# Patient Record
Sex: FEMALE | Race: WHITE | NOT HISPANIC OR LATINO | Employment: OTHER
[De-identification: names, ages, dates, MRNs, and addresses within clinical notes are randomized per-mention and may not be internally consistent; named-entity substitution may affect disease eponyms.]

---

## 2015-11-09 LAB — PAP-ABSTRACT: NORMAL

## 2017-06-19 ENCOUNTER — RECORDS - HEALTHEAST (OUTPATIENT)
Dept: ADMINISTRATIVE | Facility: OTHER | Age: 52
End: 2017-06-19

## 2017-06-20 ENCOUNTER — RECORDS - HEALTHEAST (OUTPATIENT)
Dept: ADMINISTRATIVE | Facility: OTHER | Age: 52
End: 2017-06-20

## 2017-07-10 ENCOUNTER — RECORDS - HEALTHEAST (OUTPATIENT)
Dept: ADMINISTRATIVE | Facility: OTHER | Age: 52
End: 2017-07-10

## 2017-07-13 ENCOUNTER — RECORDS - HEALTHEAST (OUTPATIENT)
Dept: ADMINISTRATIVE | Facility: OTHER | Age: 52
End: 2017-07-13

## 2017-07-15 ENCOUNTER — HEALTH MAINTENANCE LETTER (OUTPATIENT)
Age: 52
End: 2017-07-15

## 2017-08-07 ENCOUNTER — RECORDS - HEALTHEAST (OUTPATIENT)
Dept: ADMINISTRATIVE | Facility: OTHER | Age: 52
End: 2017-08-07

## 2017-09-14 ENCOUNTER — RECORDS - HEALTHEAST (OUTPATIENT)
Dept: ADMINISTRATIVE | Facility: OTHER | Age: 52
End: 2017-09-14

## 2018-01-28 ENCOUNTER — APPOINTMENT (OUTPATIENT)
Dept: CT IMAGING | Facility: CLINIC | Age: 53
DRG: 392 | End: 2018-01-28
Attending: EMERGENCY MEDICINE
Payer: COMMERCIAL

## 2018-01-28 ENCOUNTER — HOSPITAL ENCOUNTER (INPATIENT)
Facility: CLINIC | Age: 53
LOS: 2 days | Discharge: HOME | DRG: 392 | End: 2018-01-30
Attending: EMERGENCY MEDICINE
Payer: COMMERCIAL

## 2018-01-28 DIAGNOSIS — K57.32 DIVERTICULITIS OF COLON: ICD-10-CM

## 2018-01-28 DIAGNOSIS — R00.0 SINUS TACHYCARDIA: ICD-10-CM

## 2018-01-28 PROBLEM — K57.92 DIVERTICULITIS: Status: ACTIVE | Noted: 2018-01-28

## 2018-01-28 LAB
ALBUMIN UR-MCNC: NEGATIVE MG/DL
ANION GAP SERPL CALCULATED.3IONS-SCNC: 5 MMOL/L (ref 3–14)
APPEARANCE UR: CLEAR
BASOPHILS # BLD AUTO: 0 10E9/L (ref 0–0.2)
BASOPHILS NFR BLD AUTO: 0.2 %
BILIRUB UR QL STRIP: NEGATIVE
BUN SERPL-MCNC: 15 MG/DL (ref 7–30)
CALCIUM SERPL-MCNC: 9.2 MG/DL (ref 8.5–10.1)
CHLORIDE SERPL-SCNC: 107 MMOL/L (ref 94–109)
CO2 SERPL-SCNC: 25 MMOL/L (ref 20–32)
COLOR UR AUTO: YELLOW
CREAT SERPL-MCNC: 0.73 MG/DL (ref 0.52–1.04)
DIFFERENTIAL METHOD BLD: ABNORMAL
EOSINOPHIL # BLD AUTO: 0.1 10E9/L (ref 0–0.7)
EOSINOPHIL NFR BLD AUTO: 0.9 %
ERYTHROCYTE [DISTWIDTH] IN BLOOD BY AUTOMATED COUNT: 14 % (ref 10–15)
GFR SERPL CREATININE-BSD FRML MDRD: 83 ML/MIN/1.7M2
GLUCOSE SERPL-MCNC: 89 MG/DL (ref 70–99)
GLUCOSE UR STRIP-MCNC: NEGATIVE MG/DL
HCT VFR BLD AUTO: 48.3 % (ref 35–47)
HGB BLD-MCNC: 16.4 G/DL (ref 11.7–15.7)
HGB UR QL STRIP: NEGATIVE
IMM GRANULOCYTES # BLD: 0 10E9/L (ref 0–0.4)
IMM GRANULOCYTES NFR BLD: 0.2 %
KETONES UR STRIP-MCNC: NEGATIVE MG/DL
LACTATE BLD-SCNC: 0.5 MMOL/L (ref 0.7–2)
LEUKOCYTE ESTERASE UR QL STRIP: NEGATIVE
LYMPHOCYTES # BLD AUTO: 1.6 10E9/L (ref 0.8–5.3)
LYMPHOCYTES NFR BLD AUTO: 12.3 %
MCH RBC QN AUTO: 30.9 PG (ref 26.5–33)
MCHC RBC AUTO-ENTMCNC: 34 G/DL (ref 31.5–36.5)
MCV RBC AUTO: 91 FL (ref 78–100)
MONOCYTES # BLD AUTO: 0.9 10E9/L (ref 0–1.3)
MONOCYTES NFR BLD AUTO: 7.1 %
NEUTROPHILS # BLD AUTO: 10.3 10E9/L (ref 1.6–8.3)
NEUTROPHILS NFR BLD AUTO: 79.3 %
NITRATE UR QL: NEGATIVE
PH UR STRIP: 6 PH (ref 5–7)
PLATELET # BLD AUTO: 296 10E9/L (ref 150–450)
POTASSIUM SERPL-SCNC: 4.1 MMOL/L (ref 3.4–5.3)
RBC # BLD AUTO: 5.3 10E12/L (ref 3.8–5.2)
SODIUM SERPL-SCNC: 137 MMOL/L (ref 133–144)
SOURCE: NORMAL
SP GR UR STRIP: 1.02 (ref 1–1.03)
UROBILINOGEN UR STRIP-MCNC: NORMAL MG/DL (ref 0–2)
WBC # BLD AUTO: 13 10E9/L (ref 4–11)

## 2018-01-28 PROCEDURE — 25000128 H RX IP 250 OP 636: Performed by: PHYSICIAN ASSISTANT

## 2018-01-28 PROCEDURE — 12000000 ZZH R&B MED SURG/OB

## 2018-01-28 PROCEDURE — 99285 EMERGENCY DEPT VISIT HI MDM: CPT | Mod: Z6 | Performed by: EMERGENCY MEDICINE

## 2018-01-28 PROCEDURE — 99223 1ST HOSP IP/OBS HIGH 75: CPT | Mod: AI | Performed by: PHYSICIAN ASSISTANT

## 2018-01-28 PROCEDURE — 96375 TX/PRO/DX INJ NEW DRUG ADDON: CPT

## 2018-01-28 PROCEDURE — 74177 CT ABD & PELVIS W/CONTRAST: CPT

## 2018-01-28 PROCEDURE — 99221 1ST HOSP IP/OBS SF/LOW 40: CPT | Performed by: SURGERY

## 2018-01-28 PROCEDURE — 80048 BASIC METABOLIC PNL TOTAL CA: CPT | Performed by: EMERGENCY MEDICINE

## 2018-01-28 PROCEDURE — G0378 HOSPITAL OBSERVATION PER HR: HCPCS

## 2018-01-28 PROCEDURE — 85025 COMPLETE CBC W/AUTO DIFF WBC: CPT | Performed by: EMERGENCY MEDICINE

## 2018-01-28 PROCEDURE — 96375 TX/PRO/DX INJ NEW DRUG ADDON: CPT | Performed by: EMERGENCY MEDICINE

## 2018-01-28 PROCEDURE — 25000128 H RX IP 250 OP 636: Performed by: EMERGENCY MEDICINE

## 2018-01-28 PROCEDURE — 25000125 ZZHC RX 250: Performed by: EMERGENCY MEDICINE

## 2018-01-28 PROCEDURE — 83605 ASSAY OF LACTIC ACID: CPT | Performed by: EMERGENCY MEDICINE

## 2018-01-28 PROCEDURE — 81003 URINALYSIS AUTO W/O SCOPE: CPT | Performed by: EMERGENCY MEDICINE

## 2018-01-28 PROCEDURE — 96365 THER/PROPH/DIAG IV INF INIT: CPT | Mod: 59 | Performed by: EMERGENCY MEDICINE

## 2018-01-28 PROCEDURE — 99285 EMERGENCY DEPT VISIT HI MDM: CPT | Mod: 25 | Performed by: EMERGENCY MEDICINE

## 2018-01-28 PROCEDURE — 25000132 ZZH RX MED GY IP 250 OP 250 PS 637: Performed by: PHYSICIAN ASSISTANT

## 2018-01-28 RX ORDER — HYDROMORPHONE HYDROCHLORIDE 1 MG/ML
.3-.5 INJECTION, SOLUTION INTRAMUSCULAR; INTRAVENOUS; SUBCUTANEOUS
Status: DISCONTINUED | OUTPATIENT
Start: 2018-01-28 | End: 2018-01-30 | Stop reason: HOSPADM

## 2018-01-28 RX ORDER — PROMETHAZINE HYDROCHLORIDE 25 MG/ML
12.5 INJECTION, SOLUTION INTRAMUSCULAR; INTRAVENOUS EVERY 6 HOURS PRN
Status: DISCONTINUED | OUTPATIENT
Start: 2018-01-28 | End: 2018-01-30 | Stop reason: HOSPADM

## 2018-01-28 RX ORDER — BUPROPION HYDROCHLORIDE 150 MG/1
150 TABLET, FILM COATED, EXTENDED RELEASE ORAL
COMMUNITY
Start: 2018-01-25 | End: 2018-04-05

## 2018-01-28 RX ORDER — IOPAMIDOL 755 MG/ML
76 INJECTION, SOLUTION INTRAVASCULAR ONCE
Status: COMPLETED | OUTPATIENT
Start: 2018-01-28 | End: 2018-01-28

## 2018-01-28 RX ORDER — IBUPROFEN 600 MG/1
600 TABLET, FILM COATED ORAL EVERY 6 HOURS PRN
Status: DISCONTINUED | OUTPATIENT
Start: 2018-01-28 | End: 2018-01-30 | Stop reason: HOSPADM

## 2018-01-28 RX ORDER — TIZANIDINE 2 MG/1
2 TABLET ORAL EVERY 6 HOURS PRN
COMMUNITY
Start: 2018-01-25 | End: 2020-06-10

## 2018-01-28 RX ORDER — NALOXONE HYDROCHLORIDE 0.4 MG/ML
.1-.4 INJECTION, SOLUTION INTRAMUSCULAR; INTRAVENOUS; SUBCUTANEOUS
Status: DISCONTINUED | OUTPATIENT
Start: 2018-01-28 | End: 2018-01-28

## 2018-01-28 RX ORDER — PROCHLORPERAZINE 25 MG
25 SUPPOSITORY, RECTAL RECTAL EVERY 12 HOURS PRN
Status: DISCONTINUED | OUTPATIENT
Start: 2018-01-28 | End: 2018-01-30 | Stop reason: HOSPADM

## 2018-01-28 RX ORDER — HYDROMORPHONE HYDROCHLORIDE 1 MG/ML
0.5 INJECTION, SOLUTION INTRAMUSCULAR; INTRAVENOUS; SUBCUTANEOUS
Status: DISCONTINUED | OUTPATIENT
Start: 2018-01-28 | End: 2018-01-28 | Stop reason: DRUGHIGH

## 2018-01-28 RX ORDER — SODIUM CHLORIDE 9 MG/ML
INJECTION, SOLUTION INTRAVENOUS CONTINUOUS
Status: DISCONTINUED | OUTPATIENT
Start: 2018-01-28 | End: 2018-01-30 | Stop reason: HOSPADM

## 2018-01-28 RX ORDER — NALOXONE HYDROCHLORIDE 0.4 MG/ML
.1-.4 INJECTION, SOLUTION INTRAMUSCULAR; INTRAVENOUS; SUBCUTANEOUS
Status: DISCONTINUED | OUTPATIENT
Start: 2018-01-28 | End: 2018-01-30 | Stop reason: HOSPADM

## 2018-01-28 RX ORDER — NICOTINE 21 MG/24HR
1 PATCH, TRANSDERMAL 24 HOURS TRANSDERMAL DAILY
Status: DISCONTINUED | OUTPATIENT
Start: 2018-01-28 | End: 2018-01-28

## 2018-01-28 RX ORDER — PROCHLORPERAZINE MALEATE 10 MG
10 TABLET ORAL EVERY 6 HOURS PRN
Status: DISCONTINUED | OUTPATIENT
Start: 2018-01-28 | End: 2018-01-30 | Stop reason: HOSPADM

## 2018-01-28 RX ORDER — BUPROPION HYDROCHLORIDE 150 MG/1
150 TABLET, FILM COATED, EXTENDED RELEASE ORAL 2 TIMES DAILY
Status: DISCONTINUED | OUTPATIENT
Start: 2018-01-28 | End: 2018-01-28

## 2018-01-28 RX ORDER — PROCHLORPERAZINE MALEATE 10 MG
10 TABLET ORAL EVERY 6 HOURS PRN
Status: DISCONTINUED | OUTPATIENT
Start: 2018-01-28 | End: 2018-01-28

## 2018-01-28 RX ORDER — NICOTINE 21 MG/24HR
1 PATCH, TRANSDERMAL 24 HOURS TRANSDERMAL DAILY
Status: DISCONTINUED | OUTPATIENT
Start: 2018-01-28 | End: 2018-01-30 | Stop reason: HOSPADM

## 2018-01-28 RX ORDER — PROCHLORPERAZINE 25 MG
25 SUPPOSITORY, RECTAL RECTAL EVERY 12 HOURS PRN
Status: DISCONTINUED | OUTPATIENT
Start: 2018-01-28 | End: 2018-01-28

## 2018-01-28 RX ORDER — HYOSCYAMINE SULFATE 0.125 MG
125-250 TABLET ORAL EVERY 4 HOURS PRN
Status: DISCONTINUED | OUTPATIENT
Start: 2018-01-28 | End: 2018-01-30 | Stop reason: HOSPADM

## 2018-01-28 RX ORDER — AMPICILLIN AND SULBACTAM 2; 1 G/1; G/1
3 INJECTION, POWDER, FOR SOLUTION INTRAMUSCULAR; INTRAVENOUS ONCE
Status: COMPLETED | OUTPATIENT
Start: 2018-01-28 | End: 2018-01-28

## 2018-01-28 RX ORDER — HYDROCODONE BITARTRATE AND ACETAMINOPHEN 5; 325 MG/1; MG/1
1-2 TABLET ORAL EVERY 4 HOURS PRN
Status: DISCONTINUED | OUTPATIENT
Start: 2018-01-28 | End: 2018-01-30 | Stop reason: HOSPADM

## 2018-01-28 RX ORDER — AMPICILLIN AND SULBACTAM 2; 1 G/1; G/1
3 INJECTION, POWDER, FOR SOLUTION INTRAMUSCULAR; INTRAVENOUS EVERY 6 HOURS
Status: DISCONTINUED | OUTPATIENT
Start: 2018-01-28 | End: 2018-01-30 | Stop reason: HOSPADM

## 2018-01-28 RX ORDER — KETOROLAC TROMETHAMINE 30 MG/ML
30 INJECTION, SOLUTION INTRAMUSCULAR; INTRAVENOUS ONCE
Status: COMPLETED | OUTPATIENT
Start: 2018-01-28 | End: 2018-01-28

## 2018-01-28 RX ADMIN — HYDROMORPHONE HYDROCHLORIDE 0.5 MG: 1 INJECTION, SOLUTION INTRAMUSCULAR; INTRAVENOUS; SUBCUTANEOUS at 21:22

## 2018-01-28 RX ADMIN — KETOROLAC TROMETHAMINE 30 MG: 30 INJECTION, SOLUTION INTRAMUSCULAR at 10:02

## 2018-01-28 RX ADMIN — IOPAMIDOL 76 ML: 755 INJECTION, SOLUTION INTRAVENOUS at 10:21

## 2018-01-28 RX ADMIN — AMPICILLIN SODIUM AND SULBACTAM SODIUM 3 G: 2; 1 INJECTION, POWDER, FOR SOLUTION INTRAMUSCULAR; INTRAVENOUS at 13:11

## 2018-01-28 RX ADMIN — NICOTINE 1 PATCH: 14 PATCH, EXTENDED RELEASE TRANSDERMAL at 15:24

## 2018-01-28 RX ADMIN — SODIUM CHLORIDE 59 ML: 9 INJECTION, SOLUTION INTRAVENOUS at 10:20

## 2018-01-28 RX ADMIN — HYDROMORPHONE HYDROCHLORIDE 0.5 MG: 1 INJECTION, SOLUTION INTRAMUSCULAR; INTRAVENOUS; SUBCUTANEOUS at 19:01

## 2018-01-28 RX ADMIN — SODIUM CHLORIDE: 9 INJECTION, SOLUTION INTRAVENOUS at 22:42

## 2018-01-28 RX ADMIN — HYDROMORPHONE HYDROCHLORIDE 0.5 MG: 1 INJECTION, SOLUTION INTRAMUSCULAR; INTRAVENOUS; SUBCUTANEOUS at 13:10

## 2018-01-28 RX ADMIN — AMPICILLIN SODIUM AND SULBACTAM SODIUM 3 G: 2; 1 INJECTION, POWDER, FOR SOLUTION INTRAMUSCULAR; INTRAVENOUS at 17:59

## 2018-01-28 RX ADMIN — SODIUM CHLORIDE 1000 ML: 9 INJECTION, SOLUTION INTRAVENOUS at 13:09

## 2018-01-28 RX ADMIN — HYDROMORPHONE HYDROCHLORIDE 0.5 MG: 1 INJECTION, SOLUTION INTRAMUSCULAR; INTRAVENOUS; SUBCUTANEOUS at 16:47

## 2018-01-28 RX ADMIN — BUPROPION HYDROCHLORIDE 150 MG: 150 TABLET, FILM COATED, EXTENDED RELEASE ORAL at 20:36

## 2018-01-28 ASSESSMENT — ACTIVITIES OF DAILY LIVING (ADL)
RETIRED_EATING: 0-->INDEPENDENT
FALL_HISTORY_WITHIN_LAST_SIX_MONTHS: NO
TOILETING: 0-->INDEPENDENT
DRESS: 0-->INDEPENDENT
SWALLOWING: 2-->DIFFICULTY SWALLOWING LIQUIDS
BATHING: 0-->INDEPENDENT
AMBULATION: 0-->INDEPENDENT
RETIRED_COMMUNICATION: 0-->UNDERSTANDS/COMMUNICATES WITHOUT DIFFICULTY
COGNITION: 0 - NO COGNITION ISSUES REPORTED
TRANSFERRING: 0-->INDEPENDENT

## 2018-01-28 ASSESSMENT — ENCOUNTER SYMPTOMS
ABDOMINAL PAIN: 1
FEVER: 0
NAUSEA: 1

## 2018-01-28 NOTE — ED NOTES
Patient has  [place] to Observation  order. Patient has been given Patient Bill of Rights, Observation brochure and  What does Observation mean to me  forms.  Patient has been given the opportunity to ask questions about observation status and their plan of care.   Margarita Courtney

## 2018-01-28 NOTE — IP AVS SNAPSHOT
MRN:[id number]                      After Visit Summary   1/28/2018    Selina Parikh    MRN: [id number]           Thank you!     Thank you for choosing [facility] for your care. Our goal is always to provide you with excellent care. Hearing back from our patients is one way we can continue to improve our services. Please take a few minutes to complete the written survey that you may receive in the mail after you visit with us. Thank you!        Patient Information     Date Of Birth          1965        Designated Caregiver       Most Recent Value    Caregiver    Will someone help with your care after discharge? yes    Name of designated caregiver Smitha    Phone number of caregiver [phone]    Caregiver address [address] [address]      About your hospital stay     You were admitted on:  January 28, 2018 You last received care in the:  [facility]    You were discharged on:  January 30, 2018       Who to Call     For medical emergencies, please call 911.  For non-urgent questions about your medical care, please call your primary care provider or clinic, [phone]          Attending Provider     Provider Specialty    Eliazar Ferreira DO Emergency Medicine    Francisco Alanis MD Internal Medicine    [place], Adonis Esquivel MD Pulmonary    Adonis Larios MD [facility]       Primary Care Provider Office Phone # Fax #    Karen Paredes [phone]      Further instructions from your care team       Take augmentin 875 twice daily for 14 days.  Follow up with primary care within one week--talk to them about a surgical referral.    Pending Results     Date and Time Order Name Status Description    1/29/2018 0132 EKG 12-LEAD, TRACING ONLY In process             Admission Information     Date & Time Provider Department Dept. Phone    1/28/2018 Adonis Larios MD [facility] [phone]  "     Your Vitals Were     Blood Pressure Pulse Temperature Respirations Weight Pulse Oximetry    141/81 79 98  F (36.7  C) (Oral) 18 69.4 kg (153 lb) 97%    BMI (Body Mass Index)                   29.39 kg/m2           [employer] Information     [employer] lets you send messages to your doctor, view your test results, renew your prescriptions, schedule appointments and more. To sign up, go to [email or web address] . Click on \"Log in\" on the left side of the screen, which will take you to the Welcome page. Then click on \"Sign up Now\" on the right side of the page.     You will be asked to enter the access code listed below, as well as some personal information. Please follow the directions to create your username and password.     Your access code is: [email or web address]  Expires: 2018  1:05 PM     Your access code will  in 90 days. If you need help or a new code, please call your [facility] clinic or [phone].        Care EveryWhere ID     This is your Care EveryWhere ID. This could be used by other organizations to access your [facility] medical records  [id number]        Equal Access to Services     ISIAH THOMPSON : Hadii maame Powell, waamberda chucky, qaybta kaalmada fiona, mickey benjamin. So [facility] [phone].    ATENCIÓN: Si habla español, tiene a carlson disposición servicios gratuitos de asistencia lingüística. Glynn al [phone].    We comply with applicable federal civil rights laws and [place] laws. We do not discriminate on the basis of race, color, national origin, age, disability, sex, sexual orientation, or gender identity.               Review of your medicines      START taking        Dose / Directions    amoxicillin-clavulanate 875-125 MG per tablet   Commonly known as:  AUGMENTIN   Used for:  Diverticulitis of colon        Dose:  1 tablet   Take 1 tablet by mouth 2 times daily   Quantity:  28 tablet   Refills:  0         CONTINUE these medicines which have " NOT CHANGED        Dose / Directions    buPROPion 150 MG 12 hr tablet   Commonly known as:  ZYBAN        Dose:  150 mg   Take 150 mg by mouth Take 150 mg by mouth two times daily, 1 tablet daily for 3 days, then twice daily.   Refills:  0       DOCUSATE SODIUM PO        Dose:  100 mg   Take 100 mg by mouth daily   Refills:  0       hyoscyamine 0.125 MG tablet   Commonly known as:  ANASPAZ/LEVSIN        Dose:  0.125-0.25 mg   Take 1-2 tablets by mouth every 4 hours as needed for cramping.   Quantity:  30 tablet   Refills:  1       magnesium hydroxide 400 MG/5ML suspension   Commonly known as:  MILK OF MAGNESIA        Take  by mouth daily as needed.   Refills:  0       MOTRIN PO        Dose:  220 mg   Take 220 mg by mouth every 4 hours as needed   Refills:  0       tiZANidine 2 MG tablet   Commonly known as:  ZANAFLEX        Dose:  2 mg   Take 2 mg by mouth every 6 hours   Refills:  0       TUMS 500 MG chewable tablet   Generic drug:  calcium carbonate        Dose:  1-2 chew tab   Take 1-2 chew tab by mouth 3 times daily as needed.   Refills:  0            Where to get your medicines      These medications were sent to [facility] [place], [place] - [address]  [address] [address]     Phone:  [phone]     amoxicillin-clavulanate 875-125 MG per tablet                Protect others around you: Learn how to safely use, store and throw away your medicines at www.disposemymeds.org.        ANTIBIOTIC INSTRUCTION     You've Been Prescribed an Antibiotic - Now What?  Your healthcare team thinks that you or your loved one might have an infection. Some infections can be treated with antibiotics, which are powerful, life-saving drugs. Like all medications, antibiotics have side effects and should only be used when necessary. There are some important things you should know about your antibiotic treatment.      Your healthcare team may run tests before you start taking an antibiotic.    Your  team may take samples (e.g., from your blood, urine or other areas) to run tests to look for bacteria. These test can be important to determine if you need an antibiotic at all and, if you do, which antibiotic will work best.      Within a few days, your healthcare team might change or even stop your antibiotic.    Your team may start you on an antibiotic while they are working to find out what is making you sick.    Your team might change your antibiotic because test results show that a different antibiotic would be better to treat your infection.    In some cases, once your team has more information, they learn that you do not need an antibiotic at all. They may find out that you don't have an infection, or that the antibiotic you're taking won't work against your infection. For example, an infection caused by a virus can't be treated with antibiotics. Staying on an antibiotic when you don't need it is more likely to be harmful than helpful.      You may experience side effects from your antibiotic.    Like all medications, antibiotics have side effects. Some of these can be serious.    Let you healthcare team know if you have any known allergies when you are admitted to the hospital.    One significant side effect of nearly all antibiotics is the risk of severe and sometimes deadly diarrhea caused by Clostridium difficile (C. Difficile). This occurs when a person takes antibiotics because some good germs are destroyed. Antibiotic use allows C. diificile to take over, putting patients at high risk for this serious infection.    As a patient or caregiver, it is important to understand your or your loved one's antibiotic treatment. It is especially important for caregivers to speak up when patients can't speak for themselves. Here are some important questions to ask your healthcare team.    What infection is this antibiotic treating and how do you know I have that infection?    What side effects might occur from  this antibiotic?    How long will I need to take this antibiotic?    Is it safe to take this antibiotic with other medications or supplements (e.g., vitamins) that I am taking?     Are there any special directions I need to know about taking this antibiotic? For example, should I take it with food?    How will I be monitored to know whether my infection is responding to the antibiotic?    What tests may help to make sure the right antibiotic is prescribed for me?      Information provided by:  www.cdc.gov/getsmart  U.S. Department of Health and Human Services  Centers for disease Control and Prevention  National Center for Emerging and Zoonotic Infectious Diseases  Division of Healthcare Quality Promotion             Medication List: This is a list of all your medications and when to take them. Check marks below indicate your daily home schedule. Keep this list as a reference.      Medications           Morning Afternoon Evening Bedtime As Needed    amoxicillin-clavulanate 875-125 MG per tablet   Commonly known as:  AUGMENTIN   Take 1 tablet by mouth 2 times daily                                buPROPion 150 MG 12 hr tablet   Commonly known as:  ZYBAN   Take 150 mg by mouth Take 150 mg by mouth two times daily, 1 tablet daily for 3 days, then twice daily.   Last time this was given:  150 mg on 1/28/2018  8:36 PM                                DOCUSATE SODIUM PO   Take 100 mg by mouth daily   Last time this was given:  100 mg on 1/30/2018  7:36 AM                                hyoscyamine 0.125 MG tablet   Commonly known as:  ANASPAZ/LEVSIN   Take 1-2 tablets by mouth every 4 hours as needed for cramping.                                magnesium hydroxide 400 MG/5ML suspension   Commonly known as:  MILK OF MAGNESIA   Take  by mouth daily as needed.                                MOTRIN PO   Take 220 mg by mouth every 4 hours as needed                                tiZANidine 2 MG tablet   Commonly known as:   ZANAFLEX   Take 2 mg by mouth every 6 hours                                TUMS 500 MG chewable tablet   Take 1-2 chew tab by mouth 3 times daily as needed.   Generic drug:  calcium carbonate

## 2018-01-28 NOTE — IP AVS SNAPSHOT
[facility]    [address] [address]    Phone:  [phone]    Fax:  [phone]                                       After Visit Summary   1/28/2018    Selina Parikh    MRN: [id number]           After Visit Summary Signature Page     I have received my discharge instructions, and my questions have been answered. I have discussed any challenges I see with this plan with the nurse or doctor.    ..........................................................................................................................................  Patient/Patient Representative Signature      ..........................................................................................................................................  Patient Representative Print Name and Relationship to Patient    ..................................................               ................................................  Date                                            Time    ..........................................................................................................................................  Reviewed by Signature/Title    ...................................................              ..............................................  Date                                                            Time

## 2018-01-28 NOTE — PLAN OF CARE
Problem: Patient Care Overview  Goal: Plan of Care/Patient Progress Review  Outcome: No Change  [place] NS[facility] ADMISSION NOTE    Patient admitted to room 2404 at approximately 1415 via cart from emergency room. Patient was accompanied by transport tech.     Verbal SBAR report received from Amy WARD prior to patient arrival.     Patient ambulated to bed with stand-by assist. Patient alert and oriented X 3. Pain is controlled with current analgesics.  Medication(s) being used: narcotic analgesics including Dilaudid. 0-10 Pain Scale: 6. Admission vital signs: Blood pressure 114/63, temperature 98.1  F (36.7  C), temperature source Oral, resp. rate 16, weight 69.4 kg (153 lb), SpO2 97 %. Patient was oriented to plan of care, call light, bed controls, tv, telephone, bathroom and visiting hours.     The following safety risks were identified during admission: none. Yellow risk band applied: DARIUSZ Vick

## 2018-01-28 NOTE — ED NOTES
Pt states she has had burning in her throat for the past year on and off and last not it became worse. She states it's due to the homes on each side of her that they due drugs and the fumes come into her home and cause irration to her throat.

## 2018-01-28 NOTE — H&P
"[facility]    History and Physical  Hospital Medicine       Date of Admission:  1/28/2018  Date of Service: 1/28/2018     Primary Care Physician   Karen Paredes [phone]    Assessment & Plan   Selina Parikh is a 52 year old female with PMH significant for hx of diverticulitis, tobaccos abuse and GERD who now presents with abdominal pain.      Diverticulitis  Present with abdominal pain for approximately 24 hours. +peritoneal signs in the ED.  Has had diverticulitis once Q6 months since 2013.  VS revealed tachycardia on arrival (124).  WBC was 13.0. Lactic acid 0.5.  CT shows: \"There is wall thickening and inflammatory change around the descending-sigmoid colon junction region. This is consistent with diverticulitis. No abscess or free air.\"  Started on Unasyn in the ED due to previous response to Augmentin and allergy to both Cipro and Flagyl  - continue Unasyn Q6 hours  - CBC in AM  - IP surgical consult placed; Dr. Murray to see today given peritoneal signs on physical exam.  - NPO pending surgical consultation  - pain control with IV dilaudid    Tobacco abuse  Started Wellbutrin yesterday; wishes to stop this medication and resume on discharge from hospital.  - nicotine patch in place      GERD (gastroesophageal reflux disease)  Not on medication.    F: 100cc/hr of NS  E: BMP in AM  N: NPO pending surgical evaluation  DVT Prophylaxis: mechanical    Code Status: Full Code    Disposition: Anticipate discharge in 1-2 days. Appropriate for inpatient care given peritoneal signs.    Case discussed with Dr. Adonis Urbina.  Assessment and plan as written above.    Madonna Oconnell PA-C  [facility] Hospitalist Program    History is obtained from the patient and review of the EMR.    Past Medical History   - non contributory    Past Surgical History   Past Surgical History:   Procedure Laterality Date     CHOLECYSTECTOMY, LAPOROSCOPIC  2/14/2000    Cholecystectomy, Laparoscopic     " "ORTHOPEDIC SURGERY  10/2010    Cut palm and reattched tendons and nerves in left hand forefinger.     SURGICAL HISTORY OF -   1/4/2000    Esophagogastroduodenoscopy with biopsy     TUBAL LIGATION         Family History    Family History   Problem Relation Age of Onset     C.A.D. Father 50     50's     DIABETES Father      DIABETES Brother      C.A.D. Brother 42     quad bypass and MI     DIABETES Brother      2 brothers have DM     CANCER Brother 34     Melanoma     Allergies Son      Meds     Allergies Daughter      Med     CANCER Mother      C.A.D. Brother 38     MI age 38       History of Present Illness   Selina Parikh is a 52 year old female with PMH significant for hx of diverticulitis, tobaccos abuse and GERD who now presents with abdominal pain.    The patient presents with LLQ abdominal pain which began yesterday evening.  This began at approximately 6pm.  She has known sciatic pain in addition to pain secondary to diverticulitis; as such, she was unsure what the primary process was at that point in time.  Last BM was yesterday morning; she notes that this was non bloody.  She was able to sleep last night.  This morning, she awoke with 8/10 LLQ abdominal pain which was radiating to her left pelvis.  She reports certain body positions would sometimes improve the pain (\"finding a comfortable spot\") and that certain body positions would greatly worsen this pain.  NO radiation to her knee/thigh nor chest. The pain was constant, but would come and go in regards to it's severity.  Given similar nature of today's pain to previous diverticulitis episodes, she elected to present to the ED.  She has had mild chills with this pain in addition to nausea.  She denies fever and emesis.  She has associated constipation.    ROS: the patient otherwise denies myalgias, cold-like symptoms (congestion, pharyngitis, sinus pressure, rhinorrhea), headaches, lightheadedness, dizziness, chest pain, palpitations/flutters, SOB, " cough, wheezes,  dysuria, hematuria, joint swelling, joint pain, leg swelling, and rashes.      Prior to Admission Medications   Prior to Admission Medications   Prescriptions Last Dose Informant Patient Reported? Taking?   DOCUSATE SODIUM PO Past Week at Unknown time Self Yes Yes   Sig: Take 100 mg by mouth daily    Ibuprofen (MOTRIN PO) 1/27/2018 at pm  Yes Yes   Sig: Take 220 mg by mouth every 4 hours as needed    buPROPion (ZYBAN) 150 MG 12 hr tablet 1/27/2018 at pm  Yes Yes   Sig: Take 150 mg by mouth Take 150 mg by mouth two times daily, 1 tablet daily for 3 days, then twice daily.   calcium carbonate (TUMS) 500 MG chewable tablet Past Week at Unknown time Self Yes Yes   Sig: Take 1-2 chew tab by mouth 3 times daily as needed.   hyoscyamine (ANASPAZ,LEVSIN) 125 MCG tablet Unknown at Unknown time  No No   Sig: Take 1-2 tablets by mouth every 4 hours as needed for cramping.   magnesium hydroxide (MILK OF MAGNESIA) 400 MG/5ML suspension More than a month at Unknown time Self Yes No   Sig: Take  by mouth daily as needed.     tiZANidine (ZANAFLEX) 2 MG tablet 1/27/2018 at pm  Yes Yes   Sig: Take 2 mg by mouth every 6 hours       Facility-Administered Medications: None       Allergies   Allergies   Allergen Reactions     Ciprofloxacin Anaphylaxis     Flagyl [Metronidazole] Anaphylaxis     Augmentin Nausea and Vomiting     Denies hives at this time     Vicodin [Hydrocodone-Acetaminophen] Other (See Comments)     Anxiety-agitation     Zofran [Ondansetron] Other (See Comments)     abd pain         Social History   Social History     Social History     Marital status:      Spouse name: N/A     Number of children: N/A     Years of education: N/A     Occupational History     Not on file.     Social History Main Topics     Smoking status: Current Every Day Smoker     Packs/day: 1.00     Years: 30.00     Types: Cigarettes     Smokeless tobacco: Never Used      Comment: 1 in last 2 days     Alcohol use No     Drug  use: No     Sexual activity: Yes     Partners: Male     Birth control/ protection: Surgical     Other Topics Concern     Parent/Sibling W/ Cabg, Mi Or Angioplasty Before 65f 55m? Yes     brother- 38, MI, brother 42- quad bypass     Social History Narrative       Physical Exam     /63  Temp 98.1  F (36.7  C) (Oral)  Resp 16  Wt 69.4 kg (153 lb)  SpO2 97%  BMI 29.39 kg/m2     Weight: 153 lbs 0 oz Body mass index is 29.39 kg/(m^2).     Constitutional: Alert and oriented x4.  Cooperative.  Appears stated age.  Appears in mild distress secondary to pain.   HEENT: Oropharynx is clear and moist. No evidence of cranial trauma.  Lymph/Hematologic: No occipital, submental, submandibular, anterior or posterior cervical, or supraclavicular lymphadenopathy is appreciated.  Cardiovascular: Regular rate/rhythm.  S1 and S2 grossly normal.  No appreciable murmur, rub, gallop.   No lower extremity edema.  Respiratory: Clear to auscultation bilaterally. Equal chest expansion.  GI: Slight distention. Exquisitely tender to palpation of LLQ. Slightly tender to palpation of LUQ. +rebound tenderness. +tenderness when shaking the patient's bed.  Hypoactive BS. no hepatosplenomegaly.  Genitourinary: Deferred  Musculoskeletal: Normal muscle bulk and tone.  Skin: Warm and dry, no rashes.   Neurologic: Neck supple. Cranial nerves 3-12 are grossly intact.  is symmetric.     Data   Data reviewed today:     Recent Labs  Lab 01/28/18  0957 01/28/18  0925   WBC  --  13.0*   HGB  --  16.4*   MCV  --  91   PLT  --  296     --    POTASSIUM 4.1  --    CHLORIDE 107  --    CO2 25  --    BUN 15  --    CR 0.73  --    ANIONGAP 5  --    MAXWELL 9.2  --    GLC 89  --        Recent Results (from the past 24 hour(s))   CT Abdomen Pelvis w Contrast    Narrative    CT ABDOMEN AND PELVIS WITH CONTRAST  1/28/2018 10:31 AM     HISTORY:  Suspect diverticulitis.  Abdominal pain.    TECHNIQUE:   76 mL Isovue 370. Radiation dose for this scan  was  reduced using automated exposure control, adjustment of the mA and/or  kV according to patient size, or iterative reconstruction technique.    COMPARISON: 10/12/2016    FINDINGS:  Colonic diverticulosis. There is wall thickening and  inflammatory change around the descending-sigmoid colon junction  region. This is consistent with diverticulitis. No abscess or free  air. Surgical clips in the gallbladder fossa. There is a 1.1 cm left  liver lesion which has a density measurement consistent with a cyst.  There is a tiny right liver lesion on image 15, which is too small to  characterize. Unremarkable appendix.  Nothing else acute is seen in  the upper abdominal organs.       Impression    IMPRESSION:  Left lower quadrant diverticulitis, as above.    RUTH COTTER MD       I personally reviewed no images or EKG's today.    Madonna Oconnell PA-C  [facility]

## 2018-01-28 NOTE — ED NOTES
Pt remains a/o x 4, denies nausea and is requesting something to eat, per MD clear liquids are ok, gave H20 and jello, continue to monitor pt

## 2018-01-28 NOTE — ED PROVIDER NOTES
History     Chief Complaint   Patient presents with     Abdominal Pain     left low abd pain started last night, constipated. hx diverticulitis     HPI  Selina Parikh is a 52 year old female with history of IBS, GERD, and diverticulitis who present to the ED with abdominal pain. Patient reports she reported left lower abdominal pain started last night. She reported confirmed diverticulitis in the past, but this feels different than usual. She reports she was unsure if the pain was from the sciatic nerve, because she experiences symptoms of pain shooting down the leg in both diverticulitis and sciatic nerve pain. She reports she has been hospitalized for diverticulitis 3-4 times before. She denies any history of complications such as an abscess or perforation. She report an allergy to ciprofloxacin. She reports she usually takes Augmentin but has a hard time keeping it down. She reports she drove to the ED so does not want IV pain medication.       Patient Active Problem List   Diagnosis     Post-concussion syndrome     IBS (irritable bowel syndrome)     R Occipital Neuralgia     Scapulocostal syndrome     CARDIOVASCULAR SCREENING; LDL GOAL LESS THAN 160     Health Care Home     Tobacco abuse     GERD (gastroesophageal reflux disease)     Family history of ischemic heart disease     Psoriasis     Current Outpatient Prescriptions   Medication Sig Dispense Refill     Ibuprofen (MOTRIN PO) Take  by mouth.         penicillin V potassium (VEETID) 500 MG tablet Take 1 tablet by mouth 3 times daily. 30 tablet 0     sulfamethoxazole-trimethoprim (BACTRIM DS,SEPTRA DS) 800-160 MG per tablet Take 1 tablet by mouth 2 times daily. 14 tablet 0     clobetasol propionate 0.05 % FOAM Apply  topically 2 times daily as needed. Apply sparingly to affected area.  Do not apply to face. 100 g 1     LANsoprazole (PREVACID) 30 MG capsule Take 1 capsule by mouth daily. Take 30-60 minutes before a meal. 30 capsule 6     DOCUSATE SODIUM PO  Take  by mouth.         magnesium hydroxide (MILK OF MAGNESIA) 400 MG/5ML suspension Take  by mouth daily as needed.         hyoscyamine (ANASPAZ,LEVSIN) 125 MCG tablet Take 1-2 tablets by mouth every 4 hours as needed for cramping. 30 tablet 1     HYDROmorphone (DILAUDID) 2 MG tablet Take 1 tablet by mouth every 4 hours as needed for pain. 20 tablet 0     traMADol (ULTRAM) 50 MG tablet Take 1-2 tablets by mouth every 6 hours as needed for pain. 20 tablet 0     PANtoprazole (PROTONIX) 40 MG enteric coated tablet Take 1 tablet by mouth daily. 90 tablet 3     fluocinonide (LIDEX) 0.05 % ointment Apply  topically 2 times daily as needed. Apply sparingly to affected area.  Do not apply to face. 120 g 1     ketoconazole (NIZORAL) 2 % shampoo Apply  topically daily. Apply to the affected area and wash off after 5 minutes Wash scalp daily 210 mL 1     fluocinonide (LIDEX) 0.05 % external solution Apply  topically 2 times daily. Apply sparingly to affected area.  Areas in scalp 60 mL 0     calcium carbonate (TUMS) 500 MG chewable tablet Take 1-2 chew tab by mouth 3 times daily as needed.       Allergies   Allergen Reactions     Ciprofloxacin Anaphylaxis     Flagyl [Metronidazole] Anaphylaxis     Augmentin Nausea and Vomiting     Denies hives at this time     Vicodin [Hydrocodone-Acetaminophen] Other (See Comments)     Anxiety-agitation     Zofran [Ondansetron] Other (See Comments)     abd pain       Problem List:    Patient Active Problem List    Diagnosis Date Noted     Psoriasis 06/20/2012     Priority: Medium     Family history of ischemic heart disease 11/03/2011     Priority: Medium     GERD (gastroesophageal reflux disease) 03/15/2011     Priority: Medium     Tobacco abuse 02/17/2011     Priority: Medium     Health Care Home 01/27/2011     Priority: Medium     DX V65.8 REPLACED WITH [facility] (04/08/2013)       CARDIOVASCULAR SCREENING; LDL GOAL LESS THAN 160 10/31/2010     Priority: Medium     R  Occipital Neuralgia 10/07/2009     Priority: Medium     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 On fiber, senna, and MOM. Advised to d/c MOM, start Miralax and titer to regular BM's. Pt has been seen by GI.        Post-concussion syndrome 03/06/2009     Priority: Medium     Work comp.  Has seen Dr. Ahmadi, [facility] of neurolog[facility].  MRIs, EMG unremarkalbe, some foraminal stenosis on C5/C6  Sent her to physiatrist, trigger point injections didn't help.  After some neck traction, had intense unremitting HA, neck pain.  Off/on tingling in arms.  Pain clinic and/or spine surg for more eval probable next steps.    Has failed multiple rescue and preventive HA meds.  Unfortunately, has ended up on opiods prn, but we are trying to taper off.            Past Medical History:    No past medical history on file.    Past Surgical History:    Past Surgical History:   Procedure Laterality Date     CHOLECYSTECTOMY, LAPOROSCOPIC  2/14/2000    Cholecystectomy, Laparoscopic     ORTHOPEDIC SURGERY  10/2010    Cut palm and reattched tendons and nerves in left hand forefinger.     SURGICAL HISTORY OF -   1/4/2000    Esophagogastroduodenoscopy with biopsy     TUBAL LIGATION         Family History:    Family History   Problem Relation Age of Onset     C.A.D. Father 50     50's     DIABETES Father      DIABETES Brother      C.A.D. Brother 42     quad bypass and MI     DIABETES Brother      2 brothers have DM     CANCER Brother 34     Melanoma     CANCER Mother      Allergies Son      Meds     Allergies Daughter      Med     C.A.D. Brother 38     MI age 38       Social History:  Marital Status:   [2]  Social History   Substance Use Topics     Smoking status: Current Every Day Smoker     Packs/day: 1.00     Years: 30.00     Types: Cigarettes     Smokeless  tobacco: Never Used      Comment: 1 in last 2 days     Alcohol use No        Medications:      Ibuprofen (MOTRIN PO)   penicillin V potassium (VEETID) 500 MG tablet   sulfamethoxazole-trimethoprim (BACTRIM DS,SEPTRA DS) 800-160 MG per tablet   clobetasol propionate 0.05 % FOAM   LANsoprazole (PREVACID) 30 MG capsule   DOCUSATE SODIUM PO   magnesium hydroxide (MILK OF MAGNESIA) 400 MG/5ML suspension   hyoscyamine (ANASPAZ,LEVSIN) 125 MCG tablet   HYDROmorphone (DILAUDID) 2 MG tablet   traMADol (ULTRAM) 50 MG tablet   PANtoprazole (PROTONIX) 40 MG enteric coated tablet   fluocinonide (LIDEX) 0.05 % ointment   ketoconazole (NIZORAL) 2 % shampoo   fluocinonide (LIDEX) 0.05 % external solution   calcium carbonate (TUMS) 500 MG chewable tablet         Review of Systems   Constitutional: Negative for fever.   Gastrointestinal: Positive for abdominal pain and nausea.   All other systems reviewed and are negative.      Physical Exam   BP: 110/67  Heart Rate: 124  Temp: 98  F (36.7  C)  Resp: 16  Weight: 69.4 kg (153 lb)  SpO2: 99 %      Physical Exam  Nursing notes reviewed  Vital signs reviewed.  Constitutional: Appears well-nourished.  Not diaphoretic and not distressed.  HEENT: Normocephalic.  Atraumatic.  Right TM normal.  Left TM normal.  Oral pharynx normal.  Posterior pharynx normal.  Dentition normal.  Eyes: PERRLA.  Conjunctiva clear.  No icteric sclerae.  Extraocular motion normal.  No discharge  Neck: Normal range of motion and supple.  No thyromegaly.  No cervical adenopathy  Cardiovascular: Normal rate and rhythm.  Heart sounds normal.  Intact distal pulses.  No detected murmur.  No friction rub or gallop.  Respiratory: Respiratory effort normal.  Breath sounds clear throughout on auscultation.  No wheezing.  No rales.  Chest/Breast: No deformity.  Chest wall nontender.    Abdomen: Appearance is normal.  Soft.  Bowel sounds present and normal.  Pain localizes left lower quadrant with guarding and rebound.  No  detected abdominal bruit.  No palpable mass.  No hepatomegaly.  Spleen tip not palpable.   No guarding.  No rebound.  No palpable hernia.    Genitourinary: Deferred  Musculoskeletal: Normal range of motion both upper and lower extremities with no discomfort.  No edema or tenderness.  Neurologic: Alert.  Oriented ×3.  No cranial nerve deficits.  Normal tone.  No motor or sensory deficits. No pathologic reflexes.   Skin: Warm and dry.  No rash.  Hematologic/lymphatic:  Psychiatric: Normal mood and affect.  Behavior is normal.  Thought content normal.  Judgment normal.    ED Course     ED Course     Procedures                   Results for orders placed or performed during the hospital encounter of 01/28/18   CT Abdomen Pelvis w Contrast    Narrative    CT ABDOMEN AND PELVIS WITH CONTRAST  1/28/2018 10:31 AM     HISTORY:  Suspect diverticulitis.  Abdominal pain.    TECHNIQUE:   76 mL Isovue 370. Radiation dose for this scan was  reduced using automated exposure control, adjustment of the mA and/or  kV according to patient size, or iterative reconstruction technique.    COMPARISON: 10/12/2016    FINDINGS:  Colonic diverticulosis. There is wall thickening and  inflammatory change around the descending-sigmoid colon junction  region. This is consistent with diverticulitis. No abscess or free  air. Surgical clips in the gallbladder fossa. There is a 1.1 cm left  liver lesion which has a density measurement consistent with a cyst.  There is a tiny right liver lesion on image 15, which is too small to  characterize. Unremarkable appendix.  Nothing else acute is seen in  the upper abdominal organs.       Impression    IMPRESSION:  Left lower quadrant diverticulitis, as above.    RUTH COTTER MD   CBC with platelets differential   Result Value Ref Range    WBC 13.0 (H) 4.0 - 11.0 10e9/L    RBC Count 5.30 (H) 3.8 - 5.2 10e12/L    Hemoglobin 16.4 (H) 11.7 - 15.7 g/dL    Hematocrit 48.3 (H) 35.0 - 47.0 %    MCV 91 78 - 100 fl     MCH 30.9 26.5 - 33.0 pg    MCHC 34.0 31.5 - 36.5 g/dL    RDW 14.0 10.0 - 15.0 %    Platelet Count 296 150 - 450 10e9/L    Diff Method Automated Method     % Neutrophils 79.3 %    % Lymphocytes 12.3 %    % Monocytes 7.1 %    % Eosinophils 0.9 %    % Basophils 0.2 %    % Immature Granulocytes 0.2 %    Absolute Neutrophil 10.3 (H) 1.6 - 8.3 10e9/L    Absolute Lymphocytes 1.6 0.8 - 5.3 10e9/L    Absolute Monocytes 0.9 0.0 - 1.3 10e9/L    Absolute Eosinophils 0.1 0.0 - 0.7 10e9/L    Absolute Basophils 0.0 0.0 - 0.2 10e9/L    Abs Immature Granulocytes 0.0 0 - 0.4 10e9/L   Basic metabolic panel   Result Value Ref Range    Sodium 137 133 - 144 mmol/L    Potassium 4.1 3.4 - 5.3 mmol/L    Chloride 107 94 - 109 mmol/L    Carbon Dioxide 25 20 - 32 mmol/L    Anion Gap 5 3 - 14 mmol/L    Glucose 89 70 - 99 mg/dL    Urea Nitrogen 15 7 - 30 mg/dL    Creatinine 0.73 0.52 - 1.04 mg/dL    GFR Estimate 83 >60 mL/min/1.7m2    GFR Estimate If Black >90 >60 mL/min/1.7m2    Calcium 9.2 8.5 - 10.1 mg/dL   UA reflex to Microscopic and Culture   Result Value Ref Range    Color Urine Yellow     Appearance Urine Clear     Glucose Urine Negative NEG^Negative mg/dL    Bilirubin Urine Negative NEG^Negative    Ketones Urine Negative NEG^Negative mg/dL    Specific Gravity Urine 1.024 1.003 - 1.035    Blood Urine Negative NEG^Negative    pH Urine 6.0 5.0 - 7.0 pH    Protein Albumin Urine Negative NEG^Negative mg/dL    Urobilinogen mg/dL Normal 0.0 - 2.0 mg/dL    Nitrite Urine Negative NEG^Negative    Leukocyte Esterase Urine Negative NEG^Negative    Source Midstream Urine        9:08 AM Patient Assessed.     Assessments & Plan (with Medical Decision Making)  52-year-old female presents with left lower quadrant abdominal pain.  History for diverticulitis with multiple hospital admissions.  No previous complication with perforation or abscess.  Reports no document fever but has had chills.  Noted to have mild leukocytosis.  Currently afebrile.   Examination noted localized pain left lower quadrant with peritoneal signs.  CT confirmed acute diverticulitis affecting the sigmoid and descending colon.  No complications such as abscess or perforation noted.  Patient is very uncomfortable.  Even just moving her to the CT scanner caused considerable pain.  Plan: Hospitalization.  Patient has responded in the past to Augmentin when treat as an outpatient.  Elected to use IV Unasyn with anticipation the patient will go home on Augmentin.  States that she had a drug allergy to either Cipro or Flagyl and they could not determine which one it was and so she would prefer not to receive either of those.  Patient should consider colorectal consultation after a full recovery to determine if she is a candidate for elective colon resection to avoid recurrent diverticulitis and the risk for its complications.  12:55 PM  Discussed with hospitalist.  Agrees to admission.  Requested surgery consultation.  Patient still has ongoing peritoneal signs on examination.  CT does not show perforation.  Also had transient tachycardia in triage.  Heart rate was noted to be 124.  No hypotension and patient is afebrile.  Currently heart rate is 96 bpm.  Monitor appears sinus.  Lactate pending.       I have reviewed the nursing notes.    I have reviewed the findings, diagnosis, plan and need for follow up with the patient.      New Prescriptions    No medications on file       Final diagnoses:   Diverticulitis of colon     This document serves as a record of the services and decisions personally performed and made by Eliazar Ferreira, *. It was created on HIS/HER behalf by   Suha Zamora, a trained medical scribe. The creation of this document is based the provider's statements to the medical scribe.  Suha Zamora 9:08 AM 1/28/2018    Provider:   The information in this document, created by the medical scribe for me, accurately reflects the services I personally performed and  the decisions made by me. I have reviewed and approved this document for accuracy prior to leaving the patient care area.  Eliazar Ferreira, * 9:08 AM 1/28/2018 1/28/2018   [facility] EMERGENCY DEPARTMENT     Eliazar Ferreira, DO  01/28/18 1241       Eliazar Ferreira, DO  01/28/18 1642

## 2018-01-28 NOTE — CONSULTS
Asked by Madonna Oconnell to see patient regarding her abd pain.    52 year old female with history of multiple bouts of diverticulitis yearly (approx 5) admitted from ER c/o 2 day history of left sided abd pain. Pain 6/10 with radiation to midline.  Aggravated by movement and alleviated by lying still.  Patient denies fevers and chills and reports no nausea or vomiting.  Last BM was yesterday and normal.   Pain is dull and achy.  No other associated symptoms.    Patient Active Problem List   Diagnosis     Brain syndrome, posttraumatic     IBS (irritable bowel syndrome)     R Occipital Neuralgia     Scapulocostal syndrome     CARDIOVASCULAR SCREENING; LDL GOAL LESS THAN 160     Health Care Home     Tobacco abuse     GERD (gastroesophageal reflux disease)     Psoriasis     Diverticulitis       No past medical history on file.    Past Surgical History:   Procedure Laterality Date     CHOLECYSTECTOMY, LAPOROSCOPIC  2/14/2000    Cholecystectomy, Laparoscopic     ORTHOPEDIC SURGERY  10/2010    Cut palm and reattched tendons and nerves in left hand forefinger.     SURGICAL HISTORY OF -   1/4/2000    Esophagogastroduodenoscopy with biopsy     TUBAL LIGATION         Family History   Problem Relation Age of Onset     C.A.D. Father 50     50's     DIABETES Father      DIABETES Brother      C.A.D. Brother 42     quad bypass and MI     DIABETES Brother      2 brothers have DM     CANCER Brother 34     Melanoma     Allergies Son      Meds     Allergies Daughter      Med     CANCER Mother      C.A.D. Brother 38     MI age 38       Social History   Substance Use Topics     Smoking status: Current Every Day Smoker     Packs/day: 1.00     Years: 30.00     Types: Cigarettes     Smokeless tobacco: Never Used      Comment: 1 in last 2 days     Alcohol use No        History   Drug Use No       No current outpatient prescriptions on file.       Allergies   Allergen Reactions     Ciprofloxacin Anaphylaxis     Flagyl [Metronidazole]  Anaphylaxis     Augmentin Nausea and Vomiting     Denies hives at this time     Vicodin [Hydrocodone-Acetaminophen] Other (See Comments)     Anxiety-agitation     Zofran [Ondansetron] Other (See Comments)     abd pain         CBC  Recent Labs   Lab Test  01/28/18   0925   WBC  13.0*   RBC  5.30*   HGB  16.4*   HCT  48.3*   MCV  91   MCH  30.9   MCHC  34.0   RDW  14.0   PLT  296       BMP  Recent Labs   Lab Test  01/28/18   0957   NA  137   POTASSIUM  4.1   MAXWELL  9.2   CHLORIDE  107   CO2  25   BUN  15   CR  0.73   GLC  89       LFTs  Recent Labs   Lab Test  10/12/16   1820 03/25/13   PROTTOTAL  7.5  6.5   ALBUMIN  3.9  3.9   BILITOTAL  0.2  0.4   ALKPHOS  94   --    AST  16  17   ALT  19  11   BILIDIRECT   --   0.2     Results for orders placed or performed during the hospital encounter of 01/28/18   CT Abdomen Pelvis w Contrast    Narrative    CT ABDOMEN AND PELVIS WITH CONTRAST  1/28/2018 10:31 AM     HISTORY:  Suspect diverticulitis.  Abdominal pain.    TECHNIQUE:   76 mL Isovue 370. Radiation dose for this scan was  reduced using automated exposure control, adjustment of the mA and/or  kV according to patient size, or iterative reconstruction technique.    COMPARISON: 10/12/2016    FINDINGS:  Colonic diverticulosis. There is wall thickening and  inflammatory change around the descending-sigmoid colon junction  region. This is consistent with diverticulitis. No abscess or free  air. Surgical clips in the gallbladder fossa. There is a 1.1 cm left  liver lesion which has a density measurement consistent with a cyst.  There is a tiny right liver lesion on image 15, which is too small to  characterize. Unremarkable appendix.  Nothing else acute is seen in  the upper abdominal organs.       Impression    IMPRESSION:  Left lower quadrant diverticulitis, as above.    RUTH COTTER MD     ROS  Constitutional - Denies fevers, weight loss, malaise, lethargy  Neuro - Denies tremors or seizures  Pulmon - Denies SOB, dyspnea,  hemoptysis, chronic cough or use of an inhaler  CV - Denies CP, SOB, lower extremity edema, difficulty w/ stairs, has never used NTG  GI - Denies hematemesis, BRBPR, melena, chronic diarrhea or epigastric pain   - Denies hematuria, difficulty voiding, h/o STDs  Hematology - Denies blood clotting disorders, chronic anemias  Dermatology - No melanomas or skin cancers  Rheumatology - No h/o RA  Pysch - Denies depression, bipolar d/o or schizophrenia    Exam:/71  Temp 97.8  F (36.6  C) (Oral)  Resp 16  Wt 69.4 kg (153 lb)  SpO2 99%  BMI 29.39 kg/m2    General - Alert and Oriented X4, NAD, well nourished  HEENT - Normocephalic, atraumatic, PERRL, Nose midline, Throat without lesions  Neck - supple, no LAD, Carotids without bruits  Lungs - Clear to auscultation bilaterally with good inspiratory effort, no tactile fremitus  CV - Heart RRR, no lift's, thrills, murmurs, rubs, or gallops. Carotid, radial, and femoral pulses 2+ bilaterally  Abdomen - Soft, TTP Left side with vol guarding and rebound tenderness, +BS, no hepatosplenomegaly, no palpable masses  Neuro - Full ROM, Strength 5/5 and major muscle groups, sensation intact  Extremities - No cyanosis, clubbing or edema    A/P: 52 year old female with acute diverticulitis. No evidence of large perforation.(All diverticulitis comes from perforation)  Although patient's exam is significant, her CT scan  and labs suggest minimal inflammation.  Exquisiteness of exam likely because this is a not the usual place she has symptoms. Ok to place on clear liquids.  No surgical indication at this time.  Discussed elective colectomy once she resolves this episode and the inflammation has subsided, about 2-3 months.  Patient will discuss with PCP and likely setup an appt with [facility].    Hill Murray MD

## 2018-01-29 LAB
ANION GAP SERPL CALCULATED.3IONS-SCNC: 2 MMOL/L (ref 3–14)
BASOPHILS # BLD AUTO: 0 10E9/L (ref 0–0.2)
BASOPHILS NFR BLD AUTO: 0.1 %
BUN SERPL-MCNC: 8 MG/DL (ref 7–30)
CALCIUM SERPL-MCNC: 7.9 MG/DL (ref 8.5–10.1)
CHLORIDE SERPL-SCNC: 112 MMOL/L (ref 94–109)
CO2 SERPL-SCNC: 28 MMOL/L (ref 20–32)
CREAT SERPL-MCNC: 0.71 MG/DL (ref 0.52–1.04)
DIFFERENTIAL METHOD BLD: NORMAL
EOSINOPHIL # BLD AUTO: 0.1 10E9/L (ref 0–0.7)
EOSINOPHIL NFR BLD AUTO: 1.5 %
ERYTHROCYTE [DISTWIDTH] IN BLOOD BY AUTOMATED COUNT: 13.8 % (ref 10–15)
GFR SERPL CREATININE-BSD FRML MDRD: 86 ML/MIN/1.7M2
GLUCOSE SERPL-MCNC: 89 MG/DL (ref 70–99)
HCT VFR BLD AUTO: 37.9 % (ref 35–47)
HGB BLD-MCNC: 12.6 G/DL (ref 11.7–15.7)
IMM GRANULOCYTES # BLD: 0 10E9/L (ref 0–0.4)
IMM GRANULOCYTES NFR BLD: 0.3 %
LYMPHOCYTES # BLD AUTO: 1.7 10E9/L (ref 0.8–5.3)
LYMPHOCYTES NFR BLD AUTO: 21.3 %
MCH RBC QN AUTO: 30.9 PG (ref 26.5–33)
MCHC RBC AUTO-ENTMCNC: 33.2 G/DL (ref 31.5–36.5)
MCV RBC AUTO: 93 FL (ref 78–100)
MONOCYTES # BLD AUTO: 0.7 10E9/L (ref 0–1.3)
MONOCYTES NFR BLD AUTO: 8.5 %
NEUTROPHILS # BLD AUTO: 5.3 10E9/L (ref 1.6–8.3)
NEUTROPHILS NFR BLD AUTO: 68.3 %
PLATELET # BLD AUTO: 211 10E9/L (ref 150–450)
POTASSIUM SERPL-SCNC: 4.5 MMOL/L (ref 3.4–5.3)
RBC # BLD AUTO: 4.08 10E12/L (ref 3.8–5.2)
SODIUM SERPL-SCNC: 142 MMOL/L (ref 133–144)
TROPONIN I SERPL-MCNC: <0.015 UG/L (ref 0–0.04)
WBC # BLD AUTO: 7.8 10E9/L (ref 4–11)

## 2018-01-29 PROCEDURE — 25000132 ZZH RX MED GY IP 250 OP 250 PS 637: Performed by: PHYSICIAN ASSISTANT

## 2018-01-29 PROCEDURE — 85025 COMPLETE CBC W/AUTO DIFF WBC: CPT | Performed by: PHYSICIAN ASSISTANT

## 2018-01-29 PROCEDURE — 25000128 H RX IP 250 OP 636: Performed by: EMERGENCY MEDICINE

## 2018-01-29 PROCEDURE — 12000000 ZZH R&B MED SURG/OB

## 2018-01-29 PROCEDURE — 25000128 H RX IP 250 OP 636: Performed by: FAMILY MEDICINE

## 2018-01-29 PROCEDURE — 80048 BASIC METABOLIC PNL TOTAL CA: CPT | Performed by: PHYSICIAN ASSISTANT

## 2018-01-29 PROCEDURE — 25000128 H RX IP 250 OP 636: Performed by: PHYSICIAN ASSISTANT

## 2018-01-29 PROCEDURE — 99231 SBSQ HOSP IP/OBS SF/LOW 25: CPT | Performed by: SURGERY

## 2018-01-29 PROCEDURE — 99233 SBSQ HOSP IP/OBS HIGH 50: CPT | Performed by: FAMILY MEDICINE

## 2018-01-29 PROCEDURE — 93005 ELECTROCARDIOGRAM TRACING: CPT

## 2018-01-29 PROCEDURE — 25000132 ZZH RX MED GY IP 250 OP 250 PS 637: Performed by: FAMILY MEDICINE

## 2018-01-29 PROCEDURE — 36415 COLL VENOUS BLD VENIPUNCTURE: CPT | Performed by: PHYSICIAN ASSISTANT

## 2018-01-29 PROCEDURE — 84484 ASSAY OF TROPONIN QUANT: CPT

## 2018-01-29 PROCEDURE — 36415 COLL VENOUS BLD VENIPUNCTURE: CPT

## 2018-01-29 RX ORDER — DOCUSATE SODIUM 100 MG/1
100 CAPSULE, LIQUID FILLED ORAL 2 TIMES DAILY
Status: DISCONTINUED | OUTPATIENT
Start: 2018-01-29 | End: 2018-01-29

## 2018-01-29 RX ORDER — DOCUSATE SODIUM 100 MG/1
100 CAPSULE, LIQUID FILLED ORAL 2 TIMES DAILY
Status: DISCONTINUED | OUTPATIENT
Start: 2018-01-29 | End: 2018-01-30 | Stop reason: HOSPADM

## 2018-01-29 RX ADMIN — DOCUSATE SODIUM 100 MG: 100 CAPSULE, LIQUID FILLED ORAL at 14:26

## 2018-01-29 RX ADMIN — HYDROMORPHONE HYDROCHLORIDE 0.5 MG: 1 INJECTION, SOLUTION INTRAMUSCULAR; INTRAVENOUS; SUBCUTANEOUS at 09:50

## 2018-01-29 RX ADMIN — HYDROMORPHONE HYDROCHLORIDE 0.5 MG: 1 INJECTION, SOLUTION INTRAMUSCULAR; INTRAVENOUS; SUBCUTANEOUS at 14:16

## 2018-01-29 RX ADMIN — AMPICILLIN SODIUM AND SULBACTAM SODIUM 3 G: 2; 1 INJECTION, POWDER, FOR SOLUTION INTRAMUSCULAR; INTRAVENOUS at 00:09

## 2018-01-29 RX ADMIN — AMPICILLIN SODIUM AND SULBACTAM SODIUM 3 G: 2; 1 INJECTION, POWDER, FOR SOLUTION INTRAMUSCULAR; INTRAVENOUS at 12:31

## 2018-01-29 RX ADMIN — HYDROMORPHONE HYDROCHLORIDE 0.5 MG: 1 INJECTION, SOLUTION INTRAMUSCULAR; INTRAVENOUS; SUBCUTANEOUS at 16:56

## 2018-01-29 RX ADMIN — SODIUM CHLORIDE: 9 INJECTION, SOLUTION INTRAVENOUS at 08:03

## 2018-01-29 RX ADMIN — NICOTINE 1 PATCH: 14 PATCH, EXTENDED RELEASE TRANSDERMAL at 07:54

## 2018-01-29 RX ADMIN — DOCUSATE SODIUM 100 MG: 100 CAPSULE, LIQUID FILLED ORAL at 20:11

## 2018-01-29 RX ADMIN — AMPICILLIN SODIUM AND SULBACTAM SODIUM 3 G: 2; 1 INJECTION, POWDER, FOR SOLUTION INTRAMUSCULAR; INTRAVENOUS at 17:45

## 2018-01-29 RX ADMIN — HYDROMORPHONE HYDROCHLORIDE 0.5 MG: 1 INJECTION, SOLUTION INTRAMUSCULAR; INTRAVENOUS; SUBCUTANEOUS at 01:51

## 2018-01-29 RX ADMIN — HYDROMORPHONE HYDROCHLORIDE 0.5 MG: 1 INJECTION, SOLUTION INTRAMUSCULAR; INTRAVENOUS; SUBCUTANEOUS at 00:07

## 2018-01-29 RX ADMIN — HYDROMORPHONE HYDROCHLORIDE 0.5 MG: 1 INJECTION, SOLUTION INTRAMUSCULAR; INTRAVENOUS; SUBCUTANEOUS at 04:26

## 2018-01-29 RX ADMIN — AMPICILLIN SODIUM AND SULBACTAM SODIUM 3 G: 2; 1 INJECTION, POWDER, FOR SOLUTION INTRAMUSCULAR; INTRAVENOUS at 23:57

## 2018-01-29 RX ADMIN — SODIUM CHLORIDE: 9 INJECTION, SOLUTION INTRAVENOUS at 17:44

## 2018-01-29 RX ADMIN — AMPICILLIN SODIUM AND SULBACTAM SODIUM 3 G: 2; 1 INJECTION, POWDER, FOR SOLUTION INTRAMUSCULAR; INTRAVENOUS at 06:24

## 2018-01-29 NOTE — PLAN OF CARE
Problem: Pain, Acute (Adult)  Goal: Identify Related Risk Factors and Signs and Symptoms  Related risk factors and signs and symptoms are identified upon initiation of Human Response Clinical Practice Guideline (CPG).   Outcome: Improving    Patient states she is feeling better today. Tolerating clear liquid diet, voiding without difficult. Steady on her feet, ambulating in room. Using call light to notify staff with questions. Bowel sounds audible and active, abdomin distend, but soft. Would like a stool softener, sticky note placed for MD.

## 2018-01-29 NOTE — PROGRESS NOTES
0120- patient called nurse into room, reporting chest pain and tightness.  informed, ordered troponin and ekg.  Ekg and troponin within normal limits .Patient got up to go to bathroom and reported chest pain improved with walking.

## 2018-01-29 NOTE — PLAN OF CARE
Problem: Pain, Acute (Adult)  Goal: Identify Related Risk Factors and Signs and Symptoms  Related risk factors and signs and symptoms are identified upon initiation of Human Response Clinical Practice Guideline (CPG).   Outcome: No Change  Patient calls for assistance when needed. Left lower quadrant pain.  Taking 0.5 mg Dilaudid every 2-3 hours overnight, with pain waking her up with movement. + bowel sounds.  IV infusing.

## 2018-01-29 NOTE — PROGRESS NOTES
CC: Abd pain    Interval HPI: Pain improved.  Tolerating clear liquid diet. No nausea.    I/O last 3 completed shifts:  In: 600 [P.O.:600]  Out: 1350 [Urine:1350]    Patient Vitals for the past 24 hrs:   BP Temp Temp src Pulse Heart Rate Resp SpO2   01/29/18 1148 129/68 98.3  F (36.8  C) Oral 87 - 16 99 %   01/29/18 0759 124/69 98.4  F (36.9  C) Oral 99 - 16 98 %   01/29/18 0418 131/75 98.6  F (37  C) Oral - 89 16 95 %   01/29/18 0122 130/70 - - 92 - 16 100 %   01/29/18 0016 118/72 98.3  F (36.8  C) Oral 90 - 18 98 %   01/28/18 1957 127/66 98.2  F (36.8  C) Oral - 98 16 95 %   01/28/18 1610 112/71 97.8  F (36.6  C) Oral - 87 16 99 %   01/28/18 1411 114/63 98.1  F (36.7  C) Oral - 97 16 97 %     ROS  CV - No CP or SOB  Resp - No SOB or dyspnea  GI - No nausea or vomiting   - No difficulty with urination    Exam:  AXO3 NAD  Neuro - Strength 5/5 all major groups, sensation intact, PERRL  Lungs - CTA  CV - RRR  Abd - Soft, mildly distended, TTP LLQ without guarding +BS  Extr - No edema    A/P: Acute diverticulitis. Improving.  Advance to full liquids.  Ambulate.    Hill Murray MD

## 2018-01-29 NOTE — PROGRESS NOTES
[facility] Hospitalist Service      Subjective:  Feels better   Less pain    Review of Systems:  C: NEGATIVE for fever, chills, change in weight  I: NEGATIVE for worrisome rashes, moles or lesions  E: NEGATIVE for vision changes or irritation  E/M: NEGATIVE for ear, mouth and throat problems  R: NEGATIVE for significant cough or SOB  B: NEGATIVE for masses, tenderness or discharge  CV: NEGATIVE for chest pain, palpitations or peripheral edema  GI: abd pain left lower to mid  : NEGATIVE for frequency, dysuria, or hematuria  M: NEGATIVE for significant arthralgias or myalgia  N: NEGATIVE for weakness, dizziness or paresthesias  E: NEGATIVE for temperature intolerance, skin/hair changes  H: NEGATIVE for bleeding problems  P: NEGATIVE for changes in mood or affect    Physical Exam:  Vitals Were Reviewed    Patient Vitals for the past 16 hrs:   BP Temp Temp src Pulse Heart Rate Resp SpO2   01/29/18 1148 129/68 98.3  F (36.8  C) Oral 87 - 16 99 %   01/29/18 0759 124/69 98.4  F (36.9  C) Oral 99 - 16 98 %   01/29/18 0418 131/75 98.6  F (37  C) Oral - 89 16 95 %   01/29/18 0122 130/70 - - 92 - 16 100 %   01/29/18 0016 118/72 98.3  F (36.8  C) Oral 90 - 18 98 %         Intake/Output Summary (Last 24 hours) at 01/29/18 1426  Last data filed at 01/29/18 1400   Gross per 24 hour   Intake             2790 ml   Output             2550 ml   Net              240 ml       GENERAL APPEARANCE: healthy, alert and no distress  EYES: conjunctiva clear, eyes grossly normal  RESP: lungs clear to auscultation - no rales, rhonchi or wheezes  CV: regular rate and rhythm, normal S1 S2, no S3 or S4 and no murmur, click or rub   ABDOMEN: mild rebound along with moderate tenderness llq, bs pos  MS: no clubbing, cyanosis; no edema  SKIN: clear without significant rashes or lesions    Lab:  Recent Labs   Lab Test  01/29/18   0625  01/28/18   0957   NA  142  137   POTASSIUM  4.5  4.1   CHLORIDE  112*  107   CO2  28  25   ANIONGAP  2*  5   GLC   89  89   BUN  8  15   CR  0.71  0.73   MAXWELL  7.9*  9.2     CBC RESULTS:   Recent Labs   Lab Test  01/29/18   0625  01/28/18   0925   WBC  7.8  13.0*   RBC  4.08  5.30*   HGB  12.6  16.4*   HCT  37.9  48.3*   PLT  211  296       Results for orders placed or performed during the hospital encounter of 01/28/18 (from the past 24 hour(s))   Surgery General IP Consult: Patient to be seen: Routine - within 24 hours; diverticulitis, peritonitis; Consultant may enter orders: Yes    Narrative    Hill Murray MD     1/28/2018  5:14 PM  Asked by Madonna Oconnell to see patient regarding her abd pain.    52 year old female with history of multiple bouts of   diverticulitis yearly (approx 5) admitted from ER c/o 2 day   history of left sided abd pain. Pain 6/10 with radiation to   midline.  Aggravated by movement and alleviated by lying still.    Patient denies fevers and chills and reports no nausea or   vomiting.  Last BM was yesterday and normal.   Pain is dull and   achy.  No other associated symptoms.    Patient Active Problem List   Diagnosis     Brain syndrome, posttraumatic     IBS (irritable bowel syndrome)     R Occipital Neuralgia     Scapulocostal syndrome     CARDIOVASCULAR SCREENING; LDL GOAL LESS THAN 160     Health Care Home     Tobacco abuse     GERD (gastroesophageal reflux disease)     Psoriasis     Diverticulitis       No past medical history on file.    Past Surgical History:   Procedure Laterality Date     CHOLECYSTECTOMY, LAPOROSCOPIC  2/14/2000    Cholecystectomy, Laparoscopic     ORTHOPEDIC SURGERY  10/2010    Cut palm and reattched tendons and nerves in left hand   forefinger.     SURGICAL HISTORY OF -   1/4/2000    Esophagogastroduodenoscopy with biopsy     TUBAL LIGATION         Family History   Problem Relation Age of Onset     C.A.D. Father 50     50's     DIABETES Father      DIABETES Brother      C.A.D. Brother 42     quad bypass and MI     DIABETES Brother      2 brothers have DM     CANCER Brother  34     Melanoma     Allergies Son      Meds     Allergies Daughter      Med     CANCER Mother      C.A.D. Brother 38     MI age 38       Social History   Substance Use Topics     Smoking status: Current Every Day Smoker     Packs/day: 1.00     Years: 30.00     Types: Cigarettes     Smokeless tobacco: Never Used      Comment: 1 in last 2 days     Alcohol use No        History   Drug Use No       No current outpatient prescriptions on file.       Allergies   Allergen Reactions     Ciprofloxacin Anaphylaxis     Flagyl [Metronidazole] Anaphylaxis     Augmentin Nausea and Vomiting     Denies hives at this time     Vicodin [Hydrocodone-Acetaminophen] Other (See Comments)     Anxiety-agitation     Zofran [Ondansetron] Other (See Comments)     abd pain         CBC  Recent Labs   Lab Test  01/28/18   0925   WBC  13.0*   RBC  5.30*   HGB  16.4*   HCT  48.3*   MCV  91   MCH  30.9   MCHC  34.0   RDW  14.0   PLT  296       BMP  Recent Labs   Lab Test  01/28/18   0957   NA  137   POTASSIUM  4.1   MAXWELL  9.2   CHLORIDE  107   CO2  25   BUN  15   CR  0.73   GLC  89       LFTs  Recent Labs   Lab Test  10/12/16   1820 03/25/13   PROTTOTAL  7.5  6.5   ALBUMIN  3.9  3.9   BILITOTAL  0.2  0.4   ALKPHOS  94   --    AST  16  17   ALT  19  11   BILIDIRECT   --   0.2     Results for orders placed or performed during the hospital   encounter of 01/28/18   CT Abdomen Pelvis w Contrast    Narrative    CT ABDOMEN AND PELVIS WITH CONTRAST  1/28/2018 10:31 AM     HISTORY:  Suspect diverticulitis.  Abdominal pain.    TECHNIQUE:   76 mL Isovue 370. Radiation dose for this scan was  reduced using automated exposure control, adjustment of the mA   and/or  kV according to patient size, or iterative reconstruction   technique.    COMPARISON: 10/12/2016    FINDINGS:  Colonic diverticulosis. There is wall thickening and  inflammatory change around the descending-sigmoid colon junction  region. This is consistent with diverticulitis. No abscess or    free  air. Surgical clips in the gallbladder fossa. There is a 1.1 cm   left  liver lesion which has a density measurement consistent with a   cyst.  There is a tiny right liver lesion on image 15, which is too   small to  characterize. Unremarkable appendix.  Nothing else acute is seen   in  the upper abdominal organs.       Impression    IMPRESSION:  Left lower quadrant diverticulitis, as above.    MD SCARLET AUSTIN  Constitutional - Denies fevers, weight loss, malaise, lethargy  Neuro - Denies tremors or seizures  Pulmon - Denies SOB, dyspnea, hemoptysis, chronic cough or use of   an inhaler  CV - Denies CP, SOB, lower extremity edema, difficulty w/ stairs,   has never used NTG  GI - Denies hematemesis, BRBPR, melena, chronic diarrhea or   epigastric pain   - Denies hematuria, difficulty voiding, h/o STDs  Hematology - Denies blood clotting disorders, chronic anemias  Dermatology - No melanomas or skin cancers  Rheumatology - No h/o RA  Pysch - Denies depression, bipolar d/o or schizophrenia    Exam:/71  Temp 97.8  F (36.6  C) (Oral)  Resp 16  Wt   69.4 kg (153 lb)  SpO2 99%  BMI 29.39 kg/m2    General - Alert and Oriented X4, NAD, well nourished  HEENT - Normocephalic, atraumatic, PERRL, Nose midline, Throat   without lesions  Neck - supple, no LAD, Carotids without bruits  Lungs - Clear to auscultation bilaterally with good inspiratory   effort, no tactile fremitus  CV - Heart RRR, no lift's, thrills, murmurs, rubs, or gallops.   Carotid, radial, and femoral pulses 2+ bilaterally  Abdomen - Soft, TTP Left side with vol guarding and rebound   tenderness, +BS, no hepatosplenomegaly, no palpable masses  Neuro - Full ROM, Strength 5/5 and major muscle groups, sensation   intact  Extremities - No cyanosis, clubbing or edema    A/P: 52 year old female with acute diverticulitis. No evidence of   large perforation.(All diverticulitis comes from perforation)    Although patient's exam is significant,  her CT scan  and labs   suggest minimal inflammation.  Exquisiteness of exam likely   because this is a not the usual place she has symptoms. Ok to   place on clear liquids.  No surgical indication at this time.    Discussed elective colectomy once she resolves this episode and   the inflammation has subsided, about 2-3 months.  Patient will   discuss with PCP and likely setup an appt with [facility].    Hill Murray MD    Troponin I   Result Value Ref Range    Troponin I ES <0.015 0.000 - 0.045 ug/L   Basic metabolic panel   Result Value Ref Range    Sodium 142 133 - 144 mmol/L    Potassium 4.5 3.4 - 5.3 mmol/L    Chloride 112 (H) 94 - 109 mmol/L    Carbon Dioxide 28 20 - 32 mmol/L    Anion Gap 2 (L) 3 - 14 mmol/L    Glucose 89 70 - 99 mg/dL    Urea Nitrogen 8 7 - 30 mg/dL    Creatinine 0.71 0.52 - 1.04 mg/dL    GFR Estimate 86 >60 mL/min/1.7m2    GFR Estimate If Black >90 >60 mL/min/1.7m2    Calcium 7.9 (L) 8.5 - 10.1 mg/dL   CBC with platelets differential   Result Value Ref Range    WBC 7.8 4.0 - 11.0 10e9/L    RBC Count 4.08 3.8 - 5.2 10e12/L    Hemoglobin 12.6 11.7 - 15.7 g/dL    Hematocrit 37.9 35.0 - 47.0 %    MCV 93 78 - 100 fl    MCH 30.9 26.5 - 33.0 pg    MCHC 33.2 31.5 - 36.5 g/dL    RDW 13.8 10.0 - 15.0 %    Platelet Count 211 150 - 450 10e9/L    Diff Method Automated Method     % Neutrophils 68.3 %    % Lymphocytes 21.3 %    % Monocytes 8.5 %    % Eosinophils 1.5 %    % Basophils 0.1 %    % Immature Granulocytes 0.3 %    Absolute Neutrophil 5.3 1.6 - 8.3 10e9/L    Absolute Lymphocytes 1.7 0.8 - 5.3 10e9/L    Absolute Monocytes 0.7 0.0 - 1.3 10e9/L    Absolute Eosinophils 0.1 0.0 - 0.7 10e9/L    Absolute Basophils 0.0 0.0 - 0.2 10e9/L    Abs Immature Granulocytes 0.0 0 - 0.4 10e9/L       Assessment and Plan:    Selina Parikh is a 52 year old female with PMH significant for hx of diverticulitis, tobaccos abuse and GERD who now presents with abdominal pain.        Diverticulitis  Present with  "abdominal pain for approximately 24 hours. +peritoneal signs in the ED.  Has had diverticulitis once Q6 months since 2013.  VS revealed tachycardia on arrival (124).  WBC was 13.0. Lactic acid 0.5.  CT shows: \"There is wall thickening and inflammatory change around the descending-sigmoid colon junction region. This is consistent with diverticulitis. No abscess or free air.\"  Started on Unasyn in the ED due to previous response to Augmentin and allergy to both Cipro and Flagyl  - continue Unasyn Q6 hours     Tobacco abuse  Started Wellbutrin yesterday; wishes to stop this medication and resume on discharge from hospital.  - nicotine patch in place      GERD (gastroesophageal reflux disease)  Not on medication.     F: 100cc/hr of NS  E: BMP in AM  N: per surgery  DVT Prophylaxis: mechanical     Code Status: Full Code     Discussion-clinically improved, allow liquids , continue atb's, out pt surgical referral.      "

## 2018-01-29 NOTE — PLAN OF CARE
Problem: Patient Care Overview  Goal: Plan of Care/Patient Progress Review  Outcome: Improving  Patient on clear liquid diet. Complains of pain located at her left lower and mid abdomin. Abdomin soft and tender. Receiving IV Dilaudid to keep her comfortable. IVF infusing. Receiving IV antibiotics. Dr. Murray was here to see patient. Will continue to monitor pain .

## 2018-01-30 VITALS
BODY MASS INDEX: 29.39 KG/M2 | SYSTOLIC BLOOD PRESSURE: 141 MMHG | WEIGHT: 153 LBS | DIASTOLIC BLOOD PRESSURE: 81 MMHG | TEMPERATURE: 98 F | OXYGEN SATURATION: 97 % | RESPIRATION RATE: 18 BRPM | HEART RATE: 79 BPM

## 2018-01-30 LAB
ANION GAP SERPL CALCULATED.3IONS-SCNC: 7 MMOL/L (ref 3–14)
BUN SERPL-MCNC: 4 MG/DL (ref 7–30)
CALCIUM SERPL-MCNC: 8.4 MG/DL (ref 8.5–10.1)
CHLORIDE SERPL-SCNC: 113 MMOL/L (ref 94–109)
CO2 SERPL-SCNC: 24 MMOL/L (ref 20–32)
CREAT SERPL-MCNC: 0.59 MG/DL (ref 0.52–1.04)
ERYTHROCYTE [DISTWIDTH] IN BLOOD BY AUTOMATED COUNT: 13.3 % (ref 10–15)
GFR SERPL CREATININE-BSD FRML MDRD: >90 ML/MIN/1.7M2
GLUCOSE SERPL-MCNC: 88 MG/DL (ref 70–99)
HCT VFR BLD AUTO: 40.1 % (ref 35–47)
HGB BLD-MCNC: 13.4 G/DL (ref 11.7–15.7)
MCH RBC QN AUTO: 30.8 PG (ref 26.5–33)
MCHC RBC AUTO-ENTMCNC: 33.4 G/DL (ref 31.5–36.5)
MCV RBC AUTO: 92 FL (ref 78–100)
PLATELET # BLD AUTO: 234 10E9/L (ref 150–450)
POTASSIUM SERPL-SCNC: 3.8 MMOL/L (ref 3.4–5.3)
RBC # BLD AUTO: 4.35 10E12/L (ref 3.8–5.2)
SODIUM SERPL-SCNC: 144 MMOL/L (ref 133–144)
WBC # BLD AUTO: 5.8 10E9/L (ref 4–11)

## 2018-01-30 PROCEDURE — 25000132 ZZH RX MED GY IP 250 OP 250 PS 637: Performed by: FAMILY MEDICINE

## 2018-01-30 PROCEDURE — 85027 COMPLETE CBC AUTOMATED: CPT | Performed by: FAMILY MEDICINE

## 2018-01-30 PROCEDURE — 99239 HOSP IP/OBS DSCHRG MGMT >30: CPT | Performed by: FAMILY MEDICINE

## 2018-01-30 PROCEDURE — 25000132 ZZH RX MED GY IP 250 OP 250 PS 637: Performed by: PHYSICIAN ASSISTANT

## 2018-01-30 PROCEDURE — 36415 COLL VENOUS BLD VENIPUNCTURE: CPT | Performed by: FAMILY MEDICINE

## 2018-01-30 PROCEDURE — 99231 SBSQ HOSP IP/OBS SF/LOW 25: CPT | Performed by: SURGERY

## 2018-01-30 PROCEDURE — 25000128 H RX IP 250 OP 636: Performed by: EMERGENCY MEDICINE

## 2018-01-30 PROCEDURE — 80048 BASIC METABOLIC PNL TOTAL CA: CPT | Performed by: FAMILY MEDICINE

## 2018-01-30 PROCEDURE — 25000128 H RX IP 250 OP 636: Performed by: FAMILY MEDICINE

## 2018-01-30 RX ADMIN — AMPICILLIN SODIUM AND SULBACTAM SODIUM 3 G: 2; 1 INJECTION, POWDER, FOR SOLUTION INTRAMUSCULAR; INTRAVENOUS at 12:02

## 2018-01-30 RX ADMIN — SODIUM CHLORIDE: 9 INJECTION, SOLUTION INTRAVENOUS at 09:35

## 2018-01-30 RX ADMIN — DOCUSATE SODIUM 100 MG: 100 CAPSULE, LIQUID FILLED ORAL at 07:36

## 2018-01-30 RX ADMIN — Medication 1 MG: at 00:01

## 2018-01-30 RX ADMIN — AMPICILLIN SODIUM AND SULBACTAM SODIUM 3 G: 2; 1 INJECTION, POWDER, FOR SOLUTION INTRAMUSCULAR; INTRAVENOUS at 06:40

## 2018-01-30 RX ADMIN — NICOTINE 1 PATCH: 14 PATCH, EXTENDED RELEASE TRANSDERMAL at 07:35

## 2018-01-30 NOTE — PLAN OF CARE
Patient asking for cream of chicken soup. Patient advised to take it slowly. Had abdominal pain about 20-30 minutes later. Asking for pain medication. Medicated with Dilaudid. Walking in hallway, trying to get things moving.

## 2018-01-30 NOTE — DISCHARGE SUMMARY
Admit Date:     01/28/2018   Discharge Date:           HISTORY OF PRESENT ILLNESS:  Selina Parikh is a 52-year-old female with a history of recurrent diverticulitis, tobacco abuse and GERD.  She presented with a 24-hour history of abdominal pain.  She has had diverticulitis reportedly symptomatic about every 6 months since 2013.  Upon admission she was tachycardic.  Her white count was 13,000.  CT showed diverticulitis around the descending and sigmoid colon.  She was started on Unasyn.      Dr. Cronin saw her in the hospital.  She initially had significant peritoneal signs, but these resolved quite rapidly. She improved rapidly. On the day of discharge she was afebrile, her white count was normal.  She had very minimal tenderness in the left lower quadrant.  Dr. Cronin had seen her and had felt that she could discharge.  She was anxious for discharge.      ASSESSMENT:   1.  Recurrent left-sided diverticulitis.   2.  Tobacco abuse.   3.  Gastroesophageal reflux disease.      PLAN:  Augmentin 875 b.i.d. for 14 days.  She is going to see her primary doctor on Monday. We are recommending that she talk to her about potential surgical referral. The patient wants to see Dr. Cronin and apparently is planning on seeing him for followup.     Current Discharge Medication List      START taking these medications    Details   amoxicillin-clavulanate (AUGMENTIN) 875-125 MG per tablet Take 1 tablet by mouth 2 times daily  Qty: 28 tablet, Refills: 0    Associated Diagnoses: Diverticulitis of colon         CONTINUE these medications which have NOT CHANGED    Details   tiZANidine (ZANAFLEX) 2 MG tablet Take 2 mg by mouth every 6 hours       buPROPion (ZYBAN) 150 MG 12 hr tablet Take 150 mg by mouth Take 150 mg by mouth two times daily, 1 tablet daily for 3 days, then twice daily.      Ibuprofen (MOTRIN PO) Take 220 mg by mouth every 4 hours as needed       DOCUSATE SODIUM PO Take 100 mg by mouth daily       calcium carbonate (TUMS) 500 MG  chewable tablet Take 1-2 chew tab by mouth 3 times daily as needed.      magnesium hydroxide (MILK OF MAGNESIA) 400 MG/5ML suspension Take  by mouth daily as needed.        hyoscyamine (ANASPAZ,LEVSIN) 125 MCG tablet Take 1-2 tablets by mouth every 4 hours as needed for cramping.  Qty: 30 tablet, Refills: 1           Unresulted Labs Ordered in the Past 30 Days of this Admission     No orders found from 2017 to 2018.              Greater than 30 minutes spent on this.         ROSIE KELLER MD             D: 2018   T: 2018   MT: NAIMA      Name:     MARIAMA MURPHY   MRN:      [id number]        Account:        [id number]   :      1965           Admit Date:     2018                                  Discharge Date:       Document: [id number]

## 2018-01-30 NOTE — PLAN OF CARE
Problem: Patient Care Overview  Goal: Plan of Care/Patient Progress Review  Outcome: Adequate for Discharge Date Met: 01/30/18  COLE MCCARTHY DISCHARGE NOTE    Patient discharged to home at 2:22 PM via ambulation. Accompanied by son and staff. Discharge instructions reviewed with patient, opportunity offered to ask questions. Prescriptions sent to patients preferred pharmacy. All belongings sent with patient.    Estrellita Elena

## 2018-01-30 NOTE — PROGRESS NOTES
HD#3    CC: Abd pain    Interval HPI:  Pain significanty improved.  Passing stool and flatus. Hungry.    I/O last 3 completed shifts:  In: 2590 [P.O.:1840; I.V.:750]  Out: 4450 [Urine:4450]    Patient Vitals for the past 24 hrs:   BP Temp Temp src Pulse Heart Rate Resp SpO2   01/30/18 0806 138/78 98.1  F (36.7  C) Oral 80 - 18 97 %   01/30/18 0416 126/70 97.9  F (36.6  C) Oral 77 - 20 98 %   01/30/18 0004 145/81 97.9  F (36.6  C) Axillary 80 - 20 98 %   01/29/18 2013 133/65 98.2  F (36.8  C) Oral - 85 16 97 %   01/29/18 1524 135/77 98.7  F (37.1  C) Oral 88 - 16 96 %   01/29/18 1148 129/68 98.3  F (36.8  C) Oral 87 - 16 99 %     Exam:  AXO3 NAD  Neuro - Strength 5/5 all major groups, sensation intact, PERRL  Lungs - CTA  CV - RRR  Abd - Soft, non-distended, non-tender, +BS  Extr - No edema    A/P: Diverticulitis resolving.  Advance to regular diet.  Ok to discharge if tolerating diet.  Follow up with PCP and/or surgery for possible elective colectomy. Two weeks of Augmentin.    Hill Murray MD

## 2018-01-30 NOTE — PROGRESS NOTES
[facility]      Subjective:  Pain virtually gone  Eating   Wants dc    Review of Systems:  C: NEGATIVE for fever, chills, change in weight  I: NEGATIVE for worrisome rashes, moles or lesions  E: NEGATIVE for vision changes or irritation  E/M: NEGATIVE for ear, mouth and throat problems  R: NEGATIVE for significant cough or SOB  B: NEGATIVE for masses, tenderness or discharge  CV: NEGATIVE for chest pain, palpitations or peripheral edema  GI: minimal pain  : NEGATIVE for frequency, dysuria, or hematuria  M: NEGATIVE for significant arthralgias or myalgia  N: NEGATIVE for weakness, dizziness or paresthesias  E: NEGATIVE for temperature intolerance, skin/hair changes  H: NEGATIVE for bleeding problems  P: NEGATIVE for changes in mood or affect    Physical Exam:  Vitals Were Reviewed    Patient Vitals for the past 16 hrs:   BP Temp Temp src Pulse Resp SpO2   01/30/18 1110 141/81 98  F (36.7  C) Oral 79 18 97 %   01/30/18 0806 138/78 98.1  F (36.7  C) Oral 80 18 97 %   01/30/18 0416 126/70 97.9  F (36.6  C) Oral 77 20 98 %   01/30/18 0004 145/81 97.9  F (36.6  C) Axillary 80 20 98 %         Intake/Output Summary (Last 24 hours) at 01/30/18 1242  Last data filed at 01/30/18 0857   Gross per 24 hour   Intake             1750 ml   Output             4150 ml   Net            -2400 ml       GENERAL APPEARANCE: healthy, alert and no distress  EYES: conjunctiva clear, eyes grossly normal  RESP: lungs clear to auscultation - no rales, rhonchi or wheezes  CV: regular rate and rhythm, normal S1 S2, no S3 or S4 and no murmur, click or rub   ABDOMEN: very little llq tenderness  MS: no clubbing, cyanosis; no edema  SKIN: clear without significant rashes or lesions    Lab:  Recent Labs   Lab Test  01/30/18   0720  01/29/18   0625   NA  144  142   POTASSIUM  3.8  4.5   CHLORIDE  113*  112*   CO2  24  28   ANIONGAP  7  2*   GLC  88  89   BUN  4*  8   CR  0.59  0.71   MAXWELL  8.4*  7.9*     CBC RESULTS:   Recent  "Labs   Lab Test  01/30/18   0720  01/29/18   0625   WBC  5.8  7.8   RBC  4.35  4.08   HGB  13.4  12.6   HCT  40.1  37.9   PLT  234  211       Results for orders placed or performed during the hospital encounter of 01/28/18 (from the past 24 hour(s))   CBC with platelets   Result Value Ref Range    WBC 5.8 4.0 - 11.0 10e9/L    RBC Count 4.35 3.8 - 5.2 10e12/L    Hemoglobin 13.4 11.7 - 15.7 g/dL    Hematocrit 40.1 35.0 - 47.0 %    MCV 92 78 - 100 fl    MCH 30.8 26.5 - 33.0 pg    MCHC 33.4 31.5 - 36.5 g/dL    RDW 13.3 10.0 - 15.0 %    Platelet Count 234 150 - 450 10e9/L   Basic metabolic panel   Result Value Ref Range    Sodium 144 133 - 144 mmol/L    Potassium 3.8 3.4 - 5.3 mmol/L    Chloride 113 (H) 94 - 109 mmol/L    Carbon Dioxide 24 20 - 32 mmol/L    Anion Gap 7 3 - 14 mmol/L    Glucose 88 70 - 99 mg/dL    Urea Nitrogen 4 (L) 7 - 30 mg/dL    Creatinine 0.59 0.52 - 1.04 mg/dL    GFR Estimate >90 >60 mL/min/1.7m2    GFR Estimate If Black >90 >60 mL/min/1.7m2    Calcium 8.4 (L) 8.5 - 10.1 mg/dL       Assessment and Plan:    Selina Parikh is a 52 year old female with PMH significant for hx of diverticulitis, tobaccos abuse and GERD who now presents with abdominal pain.          Diverticulitis  Present with abdominal pain for approximately 24 hours. +peritoneal signs in the ED.  Has had diverticulitis once Q6 months since 2013.  VS revealed tachycardia on arrival (124).  WBC was 13.0. Lactic acid 0.5.  CT shows: \"There is wall thickening and inflammatory change around the descending-sigmoid colon junction region. This is consistent with diverticulitis. No abscess or free air.\"  Started on Unasyn in the ED due to previous response to Augmentin and allergy to both Cipro and Flagyl  - continue Unasyn Q6 hours  January 30, 2018 improved, less pain, wants dc, surgery feels she can dc      Tobacco abuse  Started Wellbutrin yesterday; wishes to stop this medication and resume on discharge from hospital.  - nicotine patch in " place      GERD (gastroesophageal reflux disease)  Not on medication.        DVT Prophylaxis: mechanical      Code Status: Full Code      Discussion-clinically improved-dc

## 2018-01-30 NOTE — DISCHARGE INSTRUCTIONS
Take augmentin 875 twice daily for 14 days.  Follow up with primary care within one week--talk to them about a surgical referral.

## 2018-01-30 NOTE — PHARMACY - DISCHARGE MEDICATION RECONCILIATION
Discharge medication review for this patient is complete. Pharmacist assisted with medication reconciliation of discharge medications with prior to admission medications.     The following changes were made to the discharge medication list based on pharmacist review:  Added:  none  Discontinued: none  Changed: none      Patient's Discharge Medication List  - medications as listed on After Visit Summary (AVS)     Review of your medicines      START taking       Dose / Directions    amoxicillin-clavulanate 875-125 MG per tablet   Commonly known as:  AUGMENTIN   Used for:  Diverticulitis of colon        Dose:  1 tablet   Take 1 tablet by mouth 2 times daily   Quantity:  28 tablet   Refills:  0         CONTINUE these medicines which have NOT CHANGED       Dose / Directions    buPROPion 150 MG 12 hr tablet   Commonly known as:  ZYBAN        Dose:  150 mg   Take 150 mg by mouth Take 150 mg by mouth two times daily, 1 tablet daily for 3 days, then twice daily.   Refills:  0       DOCUSATE SODIUM PO        Dose:  100 mg   Take 100 mg by mouth daily   Refills:  0       hyoscyamine 0.125 MG tablet   Commonly known as:  ANASPAZ/LEVSIN        Dose:  0.125-0.25 mg   Take 1-2 tablets by mouth every 4 hours as needed for cramping.   Quantity:  30 tablet   Refills:  1       magnesium hydroxide 400 MG/5ML suspension   Commonly known as:  MILK OF MAGNESIA        Take  by mouth daily as needed.   Refills:  0       MOTRIN PO        Dose:  220 mg   Take 220 mg by mouth every 4 hours as needed   Refills:  0       tiZANidine 2 MG tablet   Commonly known as:  ZANAFLEX        Dose:  2 mg   Take 2 mg by mouth every 6 hours   Refills:  0       TUMS 500 MG chewable tablet   Generic drug:  calcium carbonate        Dose:  1-2 chew tab   Take 1-2 chew tab by mouth 3 times daily as needed.   Refills:  0            Where to get your medicines      These medications were sent to [facility] [place], [place] - [address]  [facility] [address]     Phone:  [phone]      amoxicillin-clavulanate 875-125 MG per tablet           Magaly Ferreira PharmD

## 2018-01-30 NOTE — PLAN OF CARE
Problem: Patient Care Overview  Goal: Plan of Care/Patient Progress Review  Outcome: Improving  Pt ate pudding and cream of wheat without any increase of abdominal pain. Up walking the halls x1. Denies any discomforts. IV NS and antibiotics infusing per MAR. VSS.

## 2018-02-14 ENCOUNTER — RECORDS - HEALTHEAST (OUTPATIENT)
Dept: ADMINISTRATIVE | Facility: OTHER | Age: 53
End: 2018-02-14

## 2018-03-07 ENCOUNTER — TELEPHONE (OUTPATIENT)
Dept: SURGERY | Facility: CLINIC | Age: 53
End: 2018-03-07

## 2018-03-07 ENCOUNTER — OFFICE VISIT (OUTPATIENT)
Dept: SURGERY | Facility: CLINIC | Age: 53
End: 2018-03-07
Payer: COMMERCIAL

## 2018-03-07 VITALS
HEIGHT: 60 IN | DIASTOLIC BLOOD PRESSURE: 71 MMHG | TEMPERATURE: 97.8 F | HEART RATE: 91 BPM | SYSTOLIC BLOOD PRESSURE: 113 MMHG | WEIGHT: 150 LBS | BODY MASS INDEX: 29.45 KG/M2

## 2018-03-07 DIAGNOSIS — K57.32 DIVERTICULITIS OF LARGE INTESTINE WITHOUT PERFORATION OR ABSCESS WITHOUT BLEEDING: Primary | ICD-10-CM

## 2018-03-07 PROCEDURE — 99213 OFFICE O/P EST LOW 20 MIN: CPT | Performed by: SURGERY

## 2018-03-07 NOTE — PROGRESS NOTES
52-year-old female here for follow-up of an attack of diverticulitis for which she was recently hospitalized. Patient has had multiple attacks in the past several years each of which has required hospitalization. Patient is ready to discuss an elective colectomy. I will give her another month to let the inflammation from this attack and  down and we will go ahead and schedule another clinic appointment in April and follow that up with a bowel prep and a surgery for left colectomy. We will discussed the particulars of surgery at that preoperative appointment but this will likely be a laparoscopic assisted left colectomy. Urology will be consulted to place lighted stents.    Hill Murray MD

## 2018-03-07 NOTE — TELEPHONE ENCOUNTER
Type of surgery: Laparoscopic assisted left colectomy  [place] [facility] [place]  Date and time of surgery: 4/10/18 @ 9am  Surgeon: Trevor  Pre-Op Appt Date: Dr Murray to update  Post-Op Appt Date: Post op 1 week   Packet sent out: Yes  Pre-cert/Authorization completed:  Financial securing  Date: 3/7/18

## 2018-03-07 NOTE — LETTER
3/7/2018         RE: Selina Parikh  [address] [address] [facility] [address] [address]        Dear Colleague,    Thank you for referring your patient, Selina Parikh, to the [facility]. Please see a copy of my visit note below.    52-year-old female here for follow-up of an attack of diverticulitis for which she was recently hospitalized. Patient has had multiple attacks in the past several years each of which has required hospitalization. Patient is ready to discuss an elective colectomy. I will give her another month to let the inflammation from this attack and  down and we will go ahead and schedule another clinic appointment in April and follow that up with a bowel prep and a surgery for left colectomy. We will discussed the particulars of surgery at that preoperative appointment but this will likely be a laparoscopic assisted left colectomy. Urology will be consulted to place lighted stents.    Hill Murray MD     Again, thank you for allowing me to participate in the care of your patient.        Sincerely,        Hill Murray MD

## 2018-03-07 NOTE — MR AVS SNAPSHOT
"              After Visit Summary   3/7/2018    Selina Parikh    MRN: [id number]           Patient Information     Date Of Birth          1965        Visit Information        Provider Department      3/7/2018 1:00 PM Hill Murray MD [facility]        Today's Diagnoses     Diverticulitis of large intestine without perforation or abscess without bleeding    -  1      Care Instructions    Per Dr. Murray's instructions          Follow-ups after your visit        Your next 10 appointments already scheduled     Apr 06, 2018  9:45 AM CDT   [facility]    [address] [address]   [phone]              Who to contact     If you have questions or need follow up information about today's clinic visit or your schedule please contact [facility] directly at [phone].  Normal or non-critical lab and imaging results will be communicated to you by MyChart, letter or phone within 4 business days after the clinic has received the results. If you do not hear from us within 7 days, please contact the clinic through MyChart or phone. If you have a critical or abnormal lab result, we will notify you by phone as soon as possible.  Submit refill requests through [facility] or call your pharmacy and they will forward the refill request to us. Please allow 3 business days for your refill to be completed.          Additional Information About Your Visit        MyChart Information     [facility] lets you send messages to your doctor, view your test results, renew your prescriptions, schedule appointments and more. To sign up, go to [email or web address] . Click on \"Log in\" on the left side of the screen, which will take you to the Welcome page. Then click on \"Sign up Now\" on the right side of the page.     You will be asked to enter the access code listed below, as well as some personal information. Please " "follow the directions to create your username and password.     Your access code is: [email or web address]  Expires: 2018  1:05 PM     Your access code will  in 90 days. If you need help or a new code, please call your [facility] clinic or [phone].        Care EveryWhere ID     This is your Care EveryWhere ID. This could be used by other organizations to access your [facility] medical records  [id number]        Your Vitals Were     Pulse Temperature Height BMI (Body Mass Index)          91 97.8  F (36.6  C) (Oral) 1.525 m (5' 0.02\") 29.27 kg/m2         Blood Pressure from Last 3 Encounters:   18 113/71   18 141/81   10/12/16 126/82    Weight from Last 3 Encounters:   18 68 kg (150 lb)   18 69.4 kg (153 lb)   12 70.1 kg (154 lb 9.6 oz)              We Performed the Following     [facility]atherin-Operative Worksheet General        Primary Care Provider Office Phone # Fax #    Karen Paredes [phone]       [place] [id number]        Equal Access to Services     ISIAH THOMPSON : Hadii aad ku hadasho Soomaali, waaxda luqadaha, qaybta kaalmada adeegyada, waxay idiin haysherman dangelo . So [facility] [phone].    ATENCIÓN: Si habla español, tiene a carlson disposición servicios gratuitos de asistencia lingüística. Llame al [phone].    We comply with applicable federal civil rights laws and [place] laws. We do not discriminate on the basis of race, color, national origin, age, disability, sex, sexual orientation, or gender identity.            Thank you!     Thank you for choosing [facility]  for your care. Our goal is always to provide you with excellent care. Hearing back from our patients is one way we can continue to improve our services. Please take a few minutes to complete the written survey that you may receive in the mail after your visit with us. Thank you!             Your Updated Medication List " - Protect others around you: Learn how to safely use, store and throw away your medicines at www.disposemymeds.org.          This list is accurate as of 3/7/18  1:31 PM.  Always use your most recent med list.                   Brand Name Dispense Instructions for use Diagnosis    buPROPion 150 MG 12 hr tablet    ZYBAN     Take 150 mg by mouth Take 150 mg by mouth two times daily, 1 tablet daily for 3 days, then twice daily.        DOCUSATE SODIUM PO      Take 100 mg by mouth daily        hyoscyamine 0.125 MG tablet    ANASPAZ/LEVSIN    30 tablet    Take 1-2 tablets by mouth every 4 hours as needed for cramping.        magnesium hydroxide 400 MG/5ML suspension    MILK OF MAGNESIA     Take  by mouth daily as needed.        MOTRIN PO      Take 220 mg by mouth every 4 hours as needed        tiZANidine 2 MG tablet    ZANAFLEX     Take 2 mg by mouth every 6 hours        TUMS 500 MG chewable tablet   Generic drug:  calcium carbonate      Take 1-2 chew tab by mouth 3 times daily as needed.

## 2018-03-07 NOTE — NURSING NOTE
"Initial /71 (BP Location: Right arm, Patient Position: Sitting, Cuff Size: Adult Regular)  Pulse 91  Temp 97.8  F (36.6  C) (Oral)  Ht 1.525 m (5' 0.02\")  Wt 68 kg (150 lb)  BMI 29.27 kg/m2 Estimated body mass index is 29.27 kg/(m^2) as calculated from the following:    Height as of this encounter: 1.525 m (5' 0.02\").    Weight as of this encounter: 68 kg (150 lb). .    Janae Johnson MA    "

## 2018-04-06 ENCOUNTER — OFFICE VISIT (OUTPATIENT)
Dept: SURGERY | Facility: CLINIC | Age: 53
End: 2018-04-06
Payer: COMMERCIAL

## 2018-04-06 VITALS
DIASTOLIC BLOOD PRESSURE: 68 MMHG | TEMPERATURE: 97.6 F | SYSTOLIC BLOOD PRESSURE: 108 MMHG | RESPIRATION RATE: 16 BRPM | HEART RATE: 94 BPM

## 2018-04-06 DIAGNOSIS — K57.32 DIVERTICULITIS OF LARGE INTESTINE WITHOUT PERFORATION OR ABSCESS WITHOUT BLEEDING: Primary | ICD-10-CM

## 2018-04-06 PROCEDURE — 99214 OFFICE O/P EST MOD 30 MIN: CPT | Performed by: SURGERY

## 2018-04-06 RX ORDER — AMLODIPINE BESYLATE 5 MG/1
5 TABLET ORAL EVERY EVENING
COMMUNITY
Start: 2018-03-29 | End: 2020-04-01

## 2018-04-06 NOTE — NURSING NOTE
"Chief Complaint   Patient presents with     Consult     Discuss surgery        Initial /68  Pulse 94  Temp 97.6  F (36.4  C) (Tympanic)  Resp 16 Estimated body mass index is 29.27 kg/(m^2) as calculated from the following:    Height as of 3/7/18: 5' 0.02\" (1.525 m).    Weight as of 3/7/18: 150 lb (68 kg).  Medication Reconciliation: complete    "

## 2018-04-06 NOTE — LETTER
4/6/2018         RE: Selina Parikh  [address] [facility] [address]        Dear Colleague,    Thank you for referring your patient, Selina Parikh, to the [facility]. Please see a copy of my visit note below.    52-year-old female here for preop appointment. Patient has not had had additional flareups of her diverticulitis since her most recent hospitalization. Patient has had multiple bouts of diverticulitis resulted in hospitalizations. Her most recent attack was in the descending colon. She does have diverticula extending down into the pelvis. We discussed her surgical options and I recommended a laparoscopic left colectomy and sigmoid colectomy to get most of these diverticula.    Patient Active Problem List   Diagnosis     Brain syndrome, posttraumatic     IBS (irritable bowel syndrome)     R Occipital Neuralgia     Scapulocostal syndrome     CARDIOVASCULAR SCREENING; LDL GOAL LESS THAN 160     Health Care Home     Tobacco abuse     GERD (gastroesophageal reflux disease)     Psoriasis     Diverticulitis       History reviewed. No pertinent past medical history.    Past Surgical History:   Procedure Laterality Date     CHOLECYSTECTOMY, LAPOROSCOPIC  2/14/2000    Cholecystectomy, Laparoscopic     ORTHOPEDIC SURGERY  10/2010    Cut palm and reattched tendons and nerves in left hand forefinger.     SURGICAL HISTORY OF -   1/4/2000    Esophagogastroduodenoscopy with biopsy     TUBAL LIGATION         Family History   Problem Relation Age of Onset     C.A.D. Father 50     50's     DIABETES Father      DIABETES Brother      C.A.D. Brother 42     quad bypass and MI     DIABETES Brother      2 brothers have DM     CANCER Brother 34     Melanoma     Allergies Son      Meds     Allergies Daughter      Med     CANCER Mother      C.A.D. Brother 38     MI age 38       Social History   Substance Use Topics     Smoking status: Current Every Day Smoker     Packs/day: 0.50     Years: 30.00      Types: Cigarettes     Smokeless tobacco: Never Used      Comment: 1 in last 2 days     Alcohol use No        History   Drug Use No       Current Outpatient Prescriptions   Medication Sig Dispense Refill     amLODIPine (NORVASC) 5 MG tablet Take 5 mg by mouth       Ibuprofen (MOTRIN PO) Take 220 mg by mouth every 4 hours as needed        DOCUSATE SODIUM PO Take 100 mg by mouth daily        calcium carbonate (TUMS) 500 MG chewable tablet Take 1-2 chew tab by mouth 3 times daily as needed.       tiZANidine (ZANAFLEX) 2 MG tablet Take 2 mg by mouth every 6 hours          Allergies   Allergen Reactions     Ciprofloxacin Anaphylaxis     Flagyl [Metronidazole] Anaphylaxis     Augmentin Nausea and Vomiting     Denies hives at this time     Vicodin [Hydrocodone-Acetaminophen] Other (See Comments)     Anxiety-agitation     Zofran [Ondansetron] Other (See Comments)     abd pain         CBC  Recent Labs   Lab Test  01/30/18   0720   WBC  5.8   RBC  4.35   HGB  13.4   HCT  40.1   MCV  92   MCH  30.8   MCHC  33.4   RDW  13.3   PLT  234       BMP  Recent Labs   Lab Test  01/30/18   0720   NA  144   POTASSIUM  3.8   MAXWELL  8.4*   CHLORIDE  113*   CO2  24   BUN  4*   CR  0.59   GLC  88       LFTs  Recent Labs   Lab Test  10/12/16   1820 03/25/13   PROTTOTAL  7.5  6.5   ALBUMIN  3.9  3.9   BILITOTAL  0.2  0.4   ALKPHOS  94   --    AST  16  17   ALT  19  11   BILIDIRECT   --   0.2     Results for orders placed or performed during the hospital encounter of 01/28/18   CT Abdomen Pelvis w Contrast    Narrative    CT ABDOMEN AND PELVIS WITH CONTRAST  1/28/2018 10:31 AM     HISTORY:  Suspect diverticulitis.  Abdominal pain.    TECHNIQUE:   76 mL Isovue 370. Radiation dose for this scan was  reduced using automated exposure control, adjustment of the mA and/or  kV according to patient size, or iterative reconstruction technique.    COMPARISON: 10/12/2016    FINDINGS:  Colonic diverticulosis. There is wall thickening and  inflammatory change  around the descending-sigmoid colon junction  region. This is consistent with diverticulitis. No abscess or free  air. Surgical clips in the gallbladder fossa. There is a 1.1 cm left  liver lesion which has a density measurement consistent with a cyst.  There is a tiny right liver lesion on image 15, which is too small to  characterize. Unremarkable appendix.  Nothing else acute is seen in  the upper abdominal organs.       Impression    IMPRESSION:  Left lower quadrant diverticulitis, as above.    MD SCARLET AUSTIN  Constitutional - Denies fevers, weight loss, malaise, lethargy  Neuro - Denies tremors or seizures  Pulmon - Denies SOB, dyspnea, hemoptysis, chronic cough or use of an inhaler  CV - Denies CP, SOB, lower extremity edema, difficulty w/ stairs, has never used NTG  GI - Denies hematemesis, BRBPR, melena, chronic diarrhea or epigastric pain   - Denies hematuria, difficulty voiding, h/o STDs  Hematology - Denies blood clotting disorders, chronic anemias  Dermatology - No melanomas or skin cancers  Rheumatology - No h/o RA  Pysch - Denies depression, bipolar d/o or schizophrenia    Exam:  Patient Vitals for the past 24 hrs:   BP Temp Temp src Pulse Resp   04/06/18 0921 108/68 97.6  F (36.4  C) Tympanic 94 16       General - Alert and Oriented X4, NAD, well nourished  HEENT - Normocephalic, atraumatic, PERRLA, Nose midline, Throat without lesions  Neck - supple, no LAD, Thyroid normal, Carotids without bruits  Lungs - Clear to auscultation bilaterally with good inspiratory effort, no tactile fremitus  CV - Heart RRR, no lift's, thrills, murmurs, rubs, or gallops. Carotid, radial, and femoral pulses 2+ bilaterally  Abdomen - Soft, non-tender, +BS, no hepatosplenomegaly, no palpable masses  Groins - 2+ pulses bilaterally and no LAD, no masses  Neuro - Full ROM, Strength 5/5 and major muscle groups, sensation intact  Extremities - No cyanosis, clubbing or edema    Assessment and plan: 52-year-old female  with chronic diverticulitis. Patient has good candidate for laparoscopic colon resection. Risks benefits alternatives and complications were discussed with the patient including the possibility of infection bleeding or anastomotic breakdown. We also discussed possible injury to the ureters. I plan to have urology present to place bilateral ureteral lighted stents prior to surgery. Told patient to expect at least 1 week in the hospital. I suspect she will be out sooner. Patient has blood work from January and an up-to-date EKG. PATIENT IS CLEARED FOR SURGERY.    Hill Murray MD     Again, thank you for allowing me to participate in the care of your patient.        Sincerely,        Hill Murray MD

## 2018-04-06 NOTE — PROGRESS NOTES
52-year-old female here for preop appointment. Patient has not had had additional flareups of her diverticulitis since her most recent hospitalization. Patient has had multiple bouts of diverticulitis resulted in hospitalizations. Her most recent attack was in the descending colon. She does have diverticula extending down into the pelvis. We discussed her surgical options and I recommended a laparoscopic left colectomy and sigmoid colectomy to get most of these diverticula.    Patient Active Problem List   Diagnosis     Brain syndrome, posttraumatic     IBS (irritable bowel syndrome)     R Occipital Neuralgia     Scapulocostal syndrome     CARDIOVASCULAR SCREENING; LDL GOAL LESS THAN 160     Health Care Home     Tobacco abuse     GERD (gastroesophageal reflux disease)     Psoriasis     Diverticulitis       History reviewed. No pertinent past medical history.    Past Surgical History:   Procedure Laterality Date     CHOLECYSTECTOMY, LAPOROSCOPIC  2/14/2000    Cholecystectomy, Laparoscopic     ORTHOPEDIC SURGERY  10/2010    Cut palm and reattched tendons and nerves in left hand forefinger.     SURGICAL HISTORY OF -   1/4/2000    Esophagogastroduodenoscopy with biopsy     TUBAL LIGATION         Family History   Problem Relation Age of Onset     C.A.D. Father 50     50's     DIABETES Father      DIABETES Brother      C.A.D. Brother 42     quad bypass and MI     DIABETES Brother      2 brothers have DM     CANCER Brother 34     Melanoma     Allergies Son      Meds     Allergies Daughter      Med     CANCER Mother      C.A.D. Brother 38     MI age 38       Social History   Substance Use Topics     Smoking status: Current Every Day Smoker     Packs/day: 0.50     Years: 30.00     Types: Cigarettes     Smokeless tobacco: Never Used      Comment: 1 in last 2 days     Alcohol use No        History   Drug Use No       Current Outpatient Prescriptions   Medication Sig Dispense Refill     amLODIPine (NORVASC) 5 MG tablet Take 5  mg by mouth       Ibuprofen (MOTRIN PO) Take 220 mg by mouth every 4 hours as needed        DOCUSATE SODIUM PO Take 100 mg by mouth daily        calcium carbonate (TUMS) 500 MG chewable tablet Take 1-2 chew tab by mouth 3 times daily as needed.       tiZANidine (ZANAFLEX) 2 MG tablet Take 2 mg by mouth every 6 hours          Allergies   Allergen Reactions     Ciprofloxacin Anaphylaxis     Flagyl [Metronidazole] Anaphylaxis     Augmentin Nausea and Vomiting     Denies hives at this time     Vicodin [Hydrocodone-Acetaminophen] Other (See Comments)     Anxiety-agitation     Zofran [Ondansetron] Other (See Comments)     abd pain         CBC  Recent Labs   Lab Test  01/30/18   0720   WBC  5.8   RBC  4.35   HGB  13.4   HCT  40.1   MCV  92   MCH  30.8   MCHC  33.4   RDW  13.3   PLT  234       BMP  Recent Labs   Lab Test  01/30/18   0720   NA  144   POTASSIUM  3.8   MAXWELL  8.4*   CHLORIDE  113*   CO2  24   BUN  4*   CR  0.59   GLC  88       LFTs  Recent Labs   Lab Test  10/12/16   1820 03/25/13   PROTTOTAL  7.5  6.5   ALBUMIN  3.9  3.9   BILITOTAL  0.2  0.4   ALKPHOS  94   --    AST  16  17   ALT  19  11   BILIDIRECT   --   0.2     Results for orders placed or performed during the hospital encounter of 01/28/18   CT Abdomen Pelvis w Contrast    Narrative    CT ABDOMEN AND PELVIS WITH CONTRAST  1/28/2018 10:31 AM     HISTORY:  Suspect diverticulitis.  Abdominal pain.    TECHNIQUE:   76 mL Isovue 370. Radiation dose for this scan was  reduced using automated exposure control, adjustment of the mA and/or  kV according to patient size, or iterative reconstruction technique.    COMPARISON: 10/12/2016    FINDINGS:  Colonic diverticulosis. There is wall thickening and  inflammatory change around the descending-sigmoid colon junction  region. This is consistent with diverticulitis. No abscess or free  air. Surgical clips in the gallbladder fossa. There is a 1.1 cm left  liver lesion which has a density measurement consistent with a  cyst.  There is a tiny right liver lesion on image 15, which is too small to  characterize. Unremarkable appendix.  Nothing else acute is seen in  the upper abdominal organs.       Impression    IMPRESSION:  Left lower quadrant diverticulitis, as above.    MD SCARLET AUSTIN  Constitutional - Denies fevers, weight loss, malaise, lethargy  Neuro - Denies tremors or seizures  Pulmon - Denies SOB, dyspnea, hemoptysis, chronic cough or use of an inhaler  CV - Denies CP, SOB, lower extremity edema, difficulty w/ stairs, has never used NTG  GI - Denies hematemesis, BRBPR, melena, chronic diarrhea or epigastric pain   - Denies hematuria, difficulty voiding, h/o STDs  Hematology - Denies blood clotting disorders, chronic anemias  Dermatology - No melanomas or skin cancers  Rheumatology - No h/o RA  Pysch - Denies depression, bipolar d/o or schizophrenia    Exam:  Patient Vitals for the past 24 hrs:   BP Temp Temp src Pulse Resp   04/06/18 0921 108/68 97.6  F (36.4  C) Tympanic 94 16       General - Alert and Oriented X4, NAD, well nourished  HEENT - Normocephalic, atraumatic, PERRLA, Nose midline, Throat without lesions  Neck - supple, no LAD, Thyroid normal, Carotids without bruits  Lungs - Clear to auscultation bilaterally with good inspiratory effort, no tactile fremitus  CV - Heart RRR, no lift's, thrills, murmurs, rubs, or gallops. Carotid, radial, and femoral pulses 2+ bilaterally  Abdomen - Soft, non-tender, +BS, no hepatosplenomegaly, no palpable masses  Groins - 2+ pulses bilaterally and no LAD, no masses  Neuro - Full ROM, Strength 5/5 and major muscle groups, sensation intact  Extremities - No cyanosis, clubbing or edema    Assessment and plan: 52-year-old female with chronic diverticulitis. Patient has good candidate for laparoscopic colon resection. Risks benefits alternatives and complications were discussed with the patient including the possibility of infection bleeding or anastomotic breakdown. We  also discussed possible injury to the ureters. I plan to have urology present to place bilateral ureteral lighted stents prior to surgery. Told patient to expect at least 1 week in the hospital. I suspect she will be out sooner. Patient has blood work from January and an up-to-date EKG. PATIENT IS CLEARED FOR SURGERY.    Hill Murray MD

## 2018-04-06 NOTE — MR AVS SNAPSHOT
"              After Visit Summary   4/6/2018    Selina Parikh    MRN: [id number]           Patient Information     Date Of Birth          1965        Visit Information        Provider Department      4/6/2018 9:45 AM Hill Murray MD [facility]        Today's Diagnoses     Diverticulitis of large intestine without perforation or abscess without bleeding    -  1       Follow-ups after your visit        Your next 10 appointments already scheduled     Apr 10, 2018   Procedure with Hill Murray MD   [facility] (--)    [address] [id number] [phone]           The medical center is located at [address]. (between [address] in [place], [place] north of [place]).              Who to contact     If you have questions or need follow up information about today's clinic visit or your schedule please contact [facility] directly at [phone].  Normal or non-critical lab and imaging results will be communicated to you by Aldagenhart, letter or phone within 4 business days after the clinic has received the results. If you do not hear from us within 7 days, please contact the clinic through Aldagenhart or phone. If you have a critical or abnormal lab result, we will notify you by phone as soon as possible.  Submit refill requests through SergeMD or call your pharmacy and they will forward the refill request to us. Please allow 3 business days for your refill to be completed.          Additional Information About Your Visit        Aldagenhart Information     SergeMD lets you send messages to your doctor, view your test results, renew your prescriptions, schedule appointments and more. To sign up, go to [email or web address] . Click on \"Log in\" on the left side of the screen, which will take you to the Welcome page. Then click on \"Sign up Now\" on the right side of the page.     You will be asked to enter the access code listed below, " as well as some personal information. Please follow the directions to create your username and password.     Your access code is: [email or web address]  Expires: 2018  2:05 PM     Your access code will  in 90 days. If you need help or a new code, please call your [facility] clinic or [phone].        Care EveryWhere ID     This is your Care EveryWhere ID. This could be used by other organizations to access your [facility] medical records  [id number]        Your Vitals Were     Pulse Temperature Respirations             94 97.6  F (36.4  C) (Tympanic) 16          Blood Pressure from Last 3 Encounters:   18 108/68   18 113/71   18 141/81    Weight from Last 3 Encounters:   18 68 kg (150 lb)   18 69.4 kg (153 lb)   12 70.1 kg (154 lb 9.6 oz)              We Performed the Following     Katherin-Operative Worksheet General        Primary Care Provider Office Phone # Fax #    Karen Paredes [phone]       [place] [address]        Equal Access to Services     ISIAH THOMPSON : Hadii maame harrison hadasho Soomaali, waaxda luqadaha, qaybta kaalmada adeegyada, mickey benjamin. So [facility] [phone].    ATENCIÓN: Si habla español, tiene a carlson disposición servicios gratuitos de asistencia lingüística. [facility] [phone].    We comply with applicable federal civil rights laws and [place] laws. We do not discriminate on the basis of race, color, national origin, age, disability, sex, sexual orientation, or gender identity.            Thank you!     Thank you for choosing [facility]  for your care. Our goal is always to provide you with excellent care. Hearing back from our patients is one way we can continue to improve our services. Please take a few minutes to complete the written survey that you may receive in the mail after your visit with us. Thank you!             Your Updated  Medication List - Protect others around you: Learn how to safely use, store and throw away your medicines at www.disposemymeds.org.          This list is accurate as of 4/6/18  9:49 AM.  Always use your most recent med list.                   Brand Name Dispense Instructions for use Diagnosis    amLODIPine 5 MG tablet    NORVASC     Take 5 mg by mouth        DOCUSATE SODIUM PO      Take 100 mg by mouth daily        MOTRIN PO      Take 220 mg by mouth every 4 hours as needed        tiZANidine 2 MG tablet    ZANAFLEX     Take 2 mg by mouth every 6 hours        TUMS 500 MG chewable tablet   Generic drug:  calcium carbonate      Take 1-2 chew tab by mouth 3 times daily as needed.

## 2018-04-09 ENCOUNTER — ANESTHESIA EVENT (OUTPATIENT)
Dept: SURGERY | Facility: CLINIC | Age: 53
DRG: 331 | End: 2018-04-09
Payer: COMMERCIAL

## 2018-04-10 ENCOUNTER — HOSPITAL ENCOUNTER (INPATIENT)
Facility: CLINIC | Age: 53
LOS: 5 days | Discharge: HOME | DRG: 331 | End: 2018-04-15
Attending: SURGERY | Admitting: SURGERY
Payer: COMMERCIAL

## 2018-04-10 ENCOUNTER — SURGERY (OUTPATIENT)
Age: 53
End: 2018-04-10

## 2018-04-10 ENCOUNTER — ANESTHESIA (OUTPATIENT)
Dept: SURGERY | Facility: CLINIC | Age: 53
DRG: 331 | End: 2018-04-10
Payer: COMMERCIAL

## 2018-04-10 DIAGNOSIS — Z90.49 S/P PARTIAL COLECTOMY: Primary | ICD-10-CM

## 2018-04-10 PROCEDURE — 37000008 ZZH ANESTHESIA TECHNICAL FEE, 1ST 30 MIN: Performed by: UROLOGY

## 2018-04-10 PROCEDURE — 40000306 ZZH STATISTIC PRE PROC ASSESS II: Performed by: UROLOGY

## 2018-04-10 PROCEDURE — 52005 CYSTO W/URTRL CATHJ: CPT | Performed by: UROLOGY

## 2018-04-10 PROCEDURE — 25000128 H RX IP 250 OP 636: Performed by: NURSE ANESTHETIST, CERTIFIED REGISTERED

## 2018-04-10 PROCEDURE — C9399 UNCLASSIFIED DRUGS OR BIOLOG: HCPCS | Performed by: NURSE ANESTHETIST, CERTIFIED REGISTERED

## 2018-04-10 PROCEDURE — 27210995 ZZH RX 272: Performed by: SURGERY

## 2018-04-10 PROCEDURE — 88307 TISSUE EXAM BY PATHOLOGIST: CPT | Mod: 26 | Performed by: UROLOGY

## 2018-04-10 PROCEDURE — 71000012 ZZH RECOVERY PHASE 1 LEVEL 1 FIRST HR: Performed by: UROLOGY

## 2018-04-10 PROCEDURE — 27210794 ZZH OR GENERAL SUPPLY STERILE: Performed by: UROLOGY

## 2018-04-10 PROCEDURE — 88304 TISSUE EXAM BY PATHOLOGIST: CPT | Performed by: UROLOGY

## 2018-04-10 PROCEDURE — 12000007 ZZH R&B INTERMEDIATE

## 2018-04-10 PROCEDURE — 71000013 ZZH RECOVERY PHASE 1 LEVEL 1 EA ADDTL HR: Performed by: UROLOGY

## 2018-04-10 PROCEDURE — 25000125 ZZHC RX 250: Performed by: NURSE ANESTHETIST, CERTIFIED REGISTERED

## 2018-04-10 PROCEDURE — 25000125 ZZHC RX 250: Performed by: UROLOGY

## 2018-04-10 PROCEDURE — 0WHR8YZ INSERTION OF OTHER DEVICE INTO GENITOURINARY TRACT, VIA NATURAL OR ARTIFICIAL OPENING ENDOSCOPIC: ICD-10-PCS | Performed by: UROLOGY

## 2018-04-10 PROCEDURE — 36000067 ZZH SURGERY LEVEL 5 1ST 30 MIN: Performed by: UROLOGY

## 2018-04-10 PROCEDURE — 44204 LAPARO PARTIAL COLECTOMY: CPT | Performed by: SURGERY

## 2018-04-10 PROCEDURE — 88307 TISSUE EXAM BY PATHOLOGIST: CPT | Performed by: UROLOGY

## 2018-04-10 PROCEDURE — 0DBN4ZZ EXCISION OF SIGMOID COLON, PERCUTANEOUS ENDOSCOPIC APPROACH: ICD-10-PCS | Performed by: SURGERY

## 2018-04-10 PROCEDURE — 25000128 H RX IP 250 OP 636: Performed by: SURGERY

## 2018-04-10 PROCEDURE — 27110028 ZZH OR GENERAL SUPPLY NON-STERILE: Performed by: UROLOGY

## 2018-04-10 PROCEDURE — 37000009 ZZH ANESTHESIA TECHNICAL FEE, EACH ADDTL 15 MIN: Performed by: UROLOGY

## 2018-04-10 PROCEDURE — 99232 SBSQ HOSP IP/OBS MODERATE 35: CPT | Performed by: FAMILY MEDICINE

## 2018-04-10 PROCEDURE — 44204 LAPARO PARTIAL COLECTOMY: CPT | Mod: AS | Performed by: PHYSICIAN ASSISTANT

## 2018-04-10 PROCEDURE — 36000069 ZZH SURGERY LEVEL 5 EA 15 ADDTL MIN: Performed by: UROLOGY

## 2018-04-10 PROCEDURE — 88304 TISSUE EXAM BY PATHOLOGIST: CPT | Mod: 26 | Performed by: UROLOGY

## 2018-04-10 RX ORDER — PROPOFOL 10 MG/ML
INJECTION, EMULSION INTRAVENOUS PRN
Status: DISCONTINUED | OUTPATIENT
Start: 2018-04-10 | End: 2018-04-10

## 2018-04-10 RX ORDER — ONDANSETRON 4 MG/1
4 TABLET, ORALLY DISINTEGRATING ORAL EVERY 6 HOURS PRN
Status: DISCONTINUED | OUTPATIENT
Start: 2018-04-10 | End: 2018-04-10

## 2018-04-10 RX ORDER — CALCIUM CARBONATE 500 MG/1
500-1000 TABLET, CHEWABLE ORAL 3 TIMES DAILY PRN
Status: DISCONTINUED | OUTPATIENT
Start: 2018-04-10 | End: 2018-04-15 | Stop reason: HOSPADM

## 2018-04-10 RX ORDER — PROCHLORPERAZINE MALEATE 10 MG
10 TABLET ORAL EVERY 6 HOURS PRN
Status: DISCONTINUED | OUTPATIENT
Start: 2018-04-10 | End: 2018-04-15 | Stop reason: HOSPADM

## 2018-04-10 RX ORDER — ONDANSETRON 2 MG/ML
INJECTION INTRAMUSCULAR; INTRAVENOUS PRN
Status: DISCONTINUED | OUTPATIENT
Start: 2018-04-10 | End: 2018-04-10

## 2018-04-10 RX ORDER — CLOBETASOL PROPIONATE 0.5 MG/G
OINTMENT TOPICAL
COMMUNITY
Start: 2018-03-29 | End: 2021-01-04

## 2018-04-10 RX ORDER — LIDOCAINE HYDROCHLORIDE 10 MG/ML
INJECTION, SOLUTION INFILTRATION; PERINEURAL PRN
Status: DISCONTINUED | OUTPATIENT
Start: 2018-04-10 | End: 2018-04-10

## 2018-04-10 RX ORDER — BUPIVACAINE HYDROCHLORIDE AND EPINEPHRINE 5; 5 MG/ML; UG/ML
INJECTION, SOLUTION PERINEURAL PRN
Status: DISCONTINUED | OUTPATIENT
Start: 2018-04-10 | End: 2018-04-10 | Stop reason: HOSPADM

## 2018-04-10 RX ORDER — KETOROLAC TROMETHAMINE 30 MG/ML
INJECTION, SOLUTION INTRAMUSCULAR; INTRAVENOUS PRN
Status: DISCONTINUED | OUTPATIENT
Start: 2018-04-10 | End: 2018-04-10

## 2018-04-10 RX ORDER — HYDROMORPHONE HYDROCHLORIDE 1 MG/ML
.3-.5 INJECTION, SOLUTION INTRAMUSCULAR; INTRAVENOUS; SUBCUTANEOUS EVERY 5 MIN PRN
Status: DISCONTINUED | OUTPATIENT
Start: 2018-04-10 | End: 2018-04-10 | Stop reason: HOSPADM

## 2018-04-10 RX ORDER — ONDANSETRON 2 MG/ML
4 INJECTION INTRAMUSCULAR; INTRAVENOUS EVERY 30 MIN PRN
Status: DISCONTINUED | OUTPATIENT
Start: 2018-04-10 | End: 2018-04-10

## 2018-04-10 RX ORDER — CLOBETASOL PROPIONATE 0.5 MG/G
OINTMENT TOPICAL 2 TIMES DAILY
Status: DISCONTINUED | OUTPATIENT
Start: 2018-04-10 | End: 2018-04-15 | Stop reason: HOSPADM

## 2018-04-10 RX ORDER — DEXAMETHASONE SODIUM PHOSPHATE 4 MG/ML
INJECTION, SOLUTION INTRA-ARTICULAR; INTRALESIONAL; INTRAMUSCULAR; INTRAVENOUS; SOFT TISSUE PRN
Status: DISCONTINUED | OUTPATIENT
Start: 2018-04-10 | End: 2018-04-10

## 2018-04-10 RX ORDER — TIZANIDINE 2 MG/1
2 TABLET ORAL EVERY 6 HOURS PRN
Status: DISCONTINUED | OUTPATIENT
Start: 2018-04-10 | End: 2018-04-15 | Stop reason: HOSPADM

## 2018-04-10 RX ORDER — NALOXONE HYDROCHLORIDE 0.4 MG/ML
.1-.4 INJECTION, SOLUTION INTRAMUSCULAR; INTRAVENOUS; SUBCUTANEOUS
Status: ACTIVE | OUTPATIENT
Start: 2018-04-10 | End: 2018-04-11

## 2018-04-10 RX ORDER — KETAMINE HYDROCHLORIDE 10 MG/ML
INJECTION INTRAMUSCULAR; INTRAVENOUS PRN
Status: DISCONTINUED | OUTPATIENT
Start: 2018-04-10 | End: 2018-04-10

## 2018-04-10 RX ORDER — OXYCODONE AND ACETAMINOPHEN 5; 325 MG/1; MG/1
1-2 TABLET ORAL EVERY 4 HOURS PRN
Status: DISCONTINUED | OUTPATIENT
Start: 2018-04-10 | End: 2018-04-11

## 2018-04-10 RX ORDER — PROCHLORPERAZINE 25 MG
25 SUPPOSITORY, RECTAL RECTAL EVERY 12 HOURS PRN
Status: DISCONTINUED | OUTPATIENT
Start: 2018-04-10 | End: 2018-04-15 | Stop reason: HOSPADM

## 2018-04-10 RX ORDER — ONDANSETRON 4 MG/1
4 TABLET, ORALLY DISINTEGRATING ORAL EVERY 30 MIN PRN
Status: DISCONTINUED | OUTPATIENT
Start: 2018-04-10 | End: 2018-04-10

## 2018-04-10 RX ORDER — FENTANYL CITRATE 50 UG/ML
25-50 INJECTION, SOLUTION INTRAMUSCULAR; INTRAVENOUS
Status: DISCONTINUED | OUTPATIENT
Start: 2018-04-10 | End: 2018-04-10 | Stop reason: HOSPADM

## 2018-04-10 RX ORDER — SODIUM CHLORIDE, SODIUM LACTATE, POTASSIUM CHLORIDE, CALCIUM CHLORIDE 600; 310; 30; 20 MG/100ML; MG/100ML; MG/100ML; MG/100ML
INJECTION, SOLUTION INTRAVENOUS CONTINUOUS
Status: DISCONTINUED | OUTPATIENT
Start: 2018-04-10 | End: 2018-04-10 | Stop reason: HOSPADM

## 2018-04-10 RX ORDER — METOCLOPRAMIDE HYDROCHLORIDE 5 MG/ML
10 INJECTION INTRAMUSCULAR; INTRAVENOUS EVERY 6 HOURS
Status: DISCONTINUED | OUTPATIENT
Start: 2018-04-10 | End: 2018-04-15 | Stop reason: HOSPADM

## 2018-04-10 RX ORDER — AMLODIPINE BESYLATE 5 MG/1
5 TABLET ORAL DAILY
Status: DISCONTINUED | OUTPATIENT
Start: 2018-04-11 | End: 2018-04-15 | Stop reason: HOSPADM

## 2018-04-10 RX ORDER — ONDANSETRON 2 MG/ML
4 INJECTION INTRAMUSCULAR; INTRAVENOUS EVERY 6 HOURS PRN
Status: DISCONTINUED | OUTPATIENT
Start: 2018-04-10 | End: 2018-04-10

## 2018-04-10 RX ORDER — PROMETHAZINE HYDROCHLORIDE 25 MG/ML
25 INJECTION, SOLUTION INTRAMUSCULAR; INTRAVENOUS EVERY 6 HOURS PRN
Status: DISCONTINUED | OUTPATIENT
Start: 2018-04-10 | End: 2018-04-15 | Stop reason: HOSPADM

## 2018-04-10 RX ORDER — SODIUM CHLORIDE, SODIUM LACTATE, POTASSIUM CHLORIDE, CALCIUM CHLORIDE 600; 310; 30; 20 MG/100ML; MG/100ML; MG/100ML; MG/100ML
INJECTION, SOLUTION INTRAVENOUS CONTINUOUS
Status: DISCONTINUED | OUTPATIENT
Start: 2018-04-10 | End: 2018-04-14

## 2018-04-10 RX ORDER — LIDOCAINE 40 MG/G
CREAM TOPICAL
Status: DISCONTINUED | OUTPATIENT
Start: 2018-04-10 | End: 2018-04-10 | Stop reason: HOSPADM

## 2018-04-10 RX ORDER — HYDROMORPHONE HYDROCHLORIDE 1 MG/ML
.5-1 INJECTION, SOLUTION INTRAMUSCULAR; INTRAVENOUS; SUBCUTANEOUS
Status: DISCONTINUED | OUTPATIENT
Start: 2018-04-10 | End: 2018-04-15 | Stop reason: HOSPADM

## 2018-04-10 RX ORDER — FENTANYL CITRATE 50 UG/ML
INJECTION, SOLUTION INTRAMUSCULAR; INTRAVENOUS PRN
Status: DISCONTINUED | OUTPATIENT
Start: 2018-04-10 | End: 2018-04-10

## 2018-04-10 RX ORDER — CALCIUM CARBONATE 500 MG/1
500 TABLET, CHEWABLE ORAL 3 TIMES DAILY
COMMUNITY
Start: 2018-03-29 | End: 2019-12-12

## 2018-04-10 RX ORDER — PROPOFOL 10 MG/ML
INJECTION, EMULSION INTRAVENOUS CONTINUOUS PRN
Status: DISCONTINUED | OUTPATIENT
Start: 2018-04-10 | End: 2018-04-10

## 2018-04-10 RX ADMIN — HYDROMORPHONE HYDROCHLORIDE 0.5 MG: 1 INJECTION, SOLUTION INTRAMUSCULAR; INTRAVENOUS; SUBCUTANEOUS at 14:33

## 2018-04-10 RX ADMIN — HYDROMORPHONE HYDROCHLORIDE 0.5 MG: 1 INJECTION, SOLUTION INTRAMUSCULAR; INTRAVENOUS; SUBCUTANEOUS at 16:09

## 2018-04-10 RX ADMIN — ONDANSETRON 4 MG: 2 INJECTION INTRAMUSCULAR; INTRAVENOUS at 09:36

## 2018-04-10 RX ADMIN — HYDROMORPHONE HYDROCHLORIDE 0.5 MG: 1 INJECTION, SOLUTION INTRAMUSCULAR; INTRAVENOUS; SUBCUTANEOUS at 21:04

## 2018-04-10 RX ADMIN — FENTANYL CITRATE 50 MCG: 50 INJECTION, SOLUTION INTRAMUSCULAR; INTRAVENOUS at 10:16

## 2018-04-10 RX ADMIN — PHENYLEPHRINE HYDROCHLORIDE 200 MCG: 10 INJECTION, SOLUTION INTRAMUSCULAR; INTRAVENOUS; SUBCUTANEOUS at 10:00

## 2018-04-10 RX ADMIN — HYDROMORPHONE HYDROCHLORIDE 0.5 MG: 1 INJECTION, SOLUTION INTRAMUSCULAR; INTRAVENOUS; SUBCUTANEOUS at 14:50

## 2018-04-10 RX ADMIN — LIDOCAINE HYDROCHLORIDE 50 MG: 10 INJECTION, SOLUTION INFILTRATION; PERINEURAL at 09:41

## 2018-04-10 RX ADMIN — PROPOFOL 120 MCG/KG/MIN: 10 INJECTION, EMULSION INTRAVENOUS at 13:01

## 2018-04-10 RX ADMIN — METOCLOPRAMIDE 10 MG: 5 INJECTION, SOLUTION INTRAMUSCULAR; INTRAVENOUS at 16:12

## 2018-04-10 RX ADMIN — FENTANYL CITRATE 50 MCG: 50 INJECTION, SOLUTION INTRAMUSCULAR; INTRAVENOUS at 14:10

## 2018-04-10 RX ADMIN — KETAMINE HYDROCHLORIDE 50 MG: 10 INJECTION, SOLUTION INTRAMUSCULAR; INTRAVENOUS at 10:29

## 2018-04-10 RX ADMIN — ONDANSETRON 4 MG: 2 INJECTION INTRAMUSCULAR; INTRAVENOUS at 13:14

## 2018-04-10 RX ADMIN — WATER 1 G: 1 INJECTION INTRAMUSCULAR; INTRAVENOUS; SUBCUTANEOUS at 09:45

## 2018-04-10 RX ADMIN — FENTANYL CITRATE 150 MCG: 50 INJECTION, SOLUTION INTRAMUSCULAR; INTRAVENOUS at 10:49

## 2018-04-10 RX ADMIN — SUGAMMADEX 150 MG: 100 INJECTION, SOLUTION INTRAVENOUS at 13:14

## 2018-04-10 RX ADMIN — MIDAZOLAM HYDROCHLORIDE 1.5 MG: 1 INJECTION, SOLUTION INTRAMUSCULAR; INTRAVENOUS at 12:55

## 2018-04-10 RX ADMIN — MIDAZOLAM HYDROCHLORIDE 3.5 MG: 1 INJECTION, SOLUTION INTRAMUSCULAR; INTRAVENOUS at 09:36

## 2018-04-10 RX ADMIN — METOCLOPRAMIDE 10 MG: 5 INJECTION, SOLUTION INTRAMUSCULAR; INTRAVENOUS at 21:04

## 2018-04-10 RX ADMIN — SODIUM CHLORIDE, POTASSIUM CHLORIDE, SODIUM LACTATE AND CALCIUM CHLORIDE: 600; 310; 30; 20 INJECTION, SOLUTION INTRAVENOUS at 08:09

## 2018-04-10 RX ADMIN — HYDROMORPHONE HYDROCHLORIDE 1 MG: 1 INJECTION, SOLUTION INTRAMUSCULAR; INTRAVENOUS; SUBCUTANEOUS at 11:39

## 2018-04-10 RX ADMIN — SODIUM CHLORIDE, POTASSIUM CHLORIDE, SODIUM LACTATE AND CALCIUM CHLORIDE: 600; 310; 30; 20 INJECTION, SOLUTION INTRAVENOUS at 12:57

## 2018-04-10 RX ADMIN — FENTANYL CITRATE 100 MCG: 50 INJECTION, SOLUTION INTRAMUSCULAR; INTRAVENOUS at 10:50

## 2018-04-10 RX ADMIN — FENTANYL CITRATE 200 MCG: 50 INJECTION, SOLUTION INTRAMUSCULAR; INTRAVENOUS at 09:41

## 2018-04-10 RX ADMIN — SODIUM CHLORIDE, POTASSIUM CHLORIDE, SODIUM LACTATE AND CALCIUM CHLORIDE: 600; 310; 30; 20 INJECTION, SOLUTION INTRAVENOUS at 16:50

## 2018-04-10 RX ADMIN — DEXAMETHASONE SODIUM PHOSPHATE 4 MG: 4 INJECTION, SOLUTION INTRA-ARTICULAR; INTRALESIONAL; INTRAMUSCULAR; INTRAVENOUS; SOFT TISSUE at 09:36

## 2018-04-10 RX ADMIN — FENTANYL CITRATE 50 MCG: 50 INJECTION, SOLUTION INTRAMUSCULAR; INTRAVENOUS at 14:04

## 2018-04-10 RX ADMIN — PROPOFOL 140 MG: 10 INJECTION, EMULSION INTRAVENOUS at 09:41

## 2018-04-10 RX ADMIN — LIDOCAINE HYDROCHLORIDE 1 ML: 10 INJECTION, SOLUTION EPIDURAL; INFILTRATION; INTRACAUDAL; PERINEURAL at 08:09

## 2018-04-10 RX ADMIN — ROCURONIUM BROMIDE 50 MG: 10 INJECTION INTRAVENOUS at 09:41

## 2018-04-10 RX ADMIN — BUPIVACAINE HYDROCHLORIDE AND EPINEPHRINE BITARTRATE 28 ML: 5; .005 INJECTION, SOLUTION PERINEURAL at 13:09

## 2018-04-10 RX ADMIN — FENTANYL CITRATE 100 MCG: 50 INJECTION, SOLUTION INTRAMUSCULAR; INTRAVENOUS at 12:39

## 2018-04-10 RX ADMIN — SODIUM CHLORIDE, POTASSIUM CHLORIDE, SODIUM LACTATE AND CALCIUM CHLORIDE: 600; 310; 30; 20 INJECTION, SOLUTION INTRAVENOUS at 16:13

## 2018-04-10 RX ADMIN — SODIUM CHLORIDE, POTASSIUM CHLORIDE, SODIUM LACTATE AND CALCIUM CHLORIDE: 600; 310; 30; 20 INJECTION, SOLUTION INTRAVENOUS at 10:32

## 2018-04-10 RX ADMIN — ROCURONIUM BROMIDE 5 MG: 10 INJECTION INTRAVENOUS at 12:47

## 2018-04-10 RX ADMIN — KETOROLAC TROMETHAMINE 30 MG: 30 INJECTION, SOLUTION INTRAMUSCULAR at 13:14

## 2018-04-10 RX ADMIN — ROCURONIUM BROMIDE 20 MG: 10 INJECTION INTRAVENOUS at 10:25

## 2018-04-10 RX ADMIN — ROCURONIUM BROMIDE 20 MG: 10 INJECTION INTRAVENOUS at 11:39

## 2018-04-10 ASSESSMENT — LIFESTYLE VARIABLES: TOBACCO_USE: 1

## 2018-04-10 NOTE — ANESTHESIA CARE TRANSFER NOTE
Patient: Selina Parikh    Procedure(s):  Lighted Stent Placement,Laparoscopic Assisted Sigmoid Colectomy - Wound Class: II-Clean Contaminated   - Wound Class: II-Clean Contaminated    Diagnosis: chronic diverticulitis  Diagnosis Additional Information: No value filed.    Anesthesia Type:   General     Note:  Airway :Face Mask and Oral Airway  Patient transferred to:PACU  Comments: Patient to PACU on 10L O2 via FM with OPA and is somnolent but has a patent airway. Report to RN and transfer of care. BP: 120/74 HR: 99 Temp: 98.0 axillary RR: 16 SpO2: 100% on 10L FM.      Vitals: (Last set prior to Anesthesia Care Transfer)    CRNA VITALS  4/10/2018 1303 - 4/10/2018 1339      4/10/2018             Resp Rate (observed): (!)  3                Electronically Signed By: CARLTON Guillen CRNA  April 10, 2018  1:39 PM

## 2018-04-10 NOTE — PROGRESS NOTES
[facility]           Assessment and Plan:       S/P partial colectomy  4/10/2018 -- management per surgery - pain with dilaudid.       Hypertension   4/10/2018 -- blood pressure stable, continue home amlodipine with parameters.       IBS (irritable bowel syndrome)  4/10/2018 -- carries history of this but no medications         Tobacco abuse  4/10/2018 -- smoking just 5-6 cigarettes per day, planning to quit on discharge, does not want replacement while here.        GERD (gastroesophageal reflux disease)  4/10/2018 -- continue prior to admission tums as needed but will add IV protonix daily until taking orals well.         Psoriasis  4/10/2018 -- continue home clobetasol cream.         Brain syndrome, posttraumatic with chronic back pain of various levels   4/10/2018 -- symptoms at baseline, continue zanaflex as needed - pain medications as per post-op orders, holding home ibuprofen for now.  Does not use narcotics for this.      Prophylaxis  Mechanical per surgery     Lines  PIV, chaudhari from recent surgery.      Disposition  Anticipate at least 2-3 days inpatient.  Disposition per surgery.              Interval History:   Still a bit groggy after anesthesia, but starting to feel abdominal soreness/pain - just had dose of dilaudid.  Mild nausea.  No vomiting.  No fever or chills, no dyspnea.  Some headache which she says she gets if she doesn't have caffeine.  No other new concerns.    No other pain.  deneis any illicit drug use, rare alcohol use.             Review of Systems:    ROS: 10 point ROS neg other than the symptoms noted above in the HPI.             Medications:   Current active medications and PTA medications reviewed, see medication list for details.            Physical Exam:   Vitals were reviewed  Patient Vitals for the past 24 hrs:   BP Temp Temp src Heart Rate Resp SpO2 Height Weight   04/10/18 1545 129/75 96.1  F (35.6  C) Oral 95 16 98 % - -   04/10/18 1515 112/68 - -  "86 14 95 % - -   04/10/18 1505 - - - - - 95 % - -   04/10/18 1500 105/65 - - 89 15 94 % - -   04/10/18 1450 - - - - - 92 % - -   04/10/18 1445 120/60 - - 95 12 94 % - -   04/10/18 1430 (!) 108/95 - - 91 15 94 % - -   04/10/18 1415 99/69 - - 96 10 98 % - -   04/10/18 1400 122/81 97.8  F (36.6  C) Oral 104 10 98 % - -   04/10/18 1345 107/85 - - 96 16 100 % - -   04/10/18 1341 121/79 - - 100 26 100 % - -   04/10/18 1338 120/74 98  F (36.7  C) Axillary 99 19 97 % - -   04/10/18 0746 118/70 98.7  F (37.1  C) Oral - 16 97 % 1.53 m (5' 0.25\") 68 kg (150 lb)       Temperatures:  Current - Temp: 96.1  F (35.6  C); Max - Temp  Av.7  F (36.5  C)  Min: 96.1  F (35.6  C)  Max: 98.7  F (37.1  C)  Respiration range: Resp  Avg: 15.4  Min: 10  Max: 26  Pulse range: No Data Recorded  Blood pressure range: Systolic (24hrs), Av , Min:99 , Max:129   ; Diastolic (24hrs), Av, Min:60, Max:95    Pulse oximetry range: SpO2  Av.3 %  Min: 92 %  Max: 100 %  I/O last 3 completed shifts:  In: 2200 [I.V.:2200]  Out: 320 [Urine:220; Blood:100]    Intake/Output Summary (Last 24 hours) at 04/10/18 1557  Last data filed at 04/10/18 1500   Gross per 24 hour   Intake             2650 ml   Output              360 ml   Net             2290 ml     EXAM:  General: awake and alert, NAD, oriented x 3  Head: normocephalic  Neck: unremarkable, no lymphadenopathy   HEENT: oropharynx pink and moist    Heart: Regular rate and rhythm, no murmurs, rubs, or gallops  Lungs: clear to auscultation bilaterally with good air movement throughout  Abdomen: soft, mildly tender, wound not evaluated, no masses or organomegaly, no audible bowel sounds yet.    Extremities: no edema in lower extremities   Skin unremarkable.               Data:   No results found for this or any previous visit (from the past 24 hour(s)).        Attestation:  I have reviewed today's vital signs, notes, medications, labs and imaging.  Amount of time performed on this daily note: " 25 minutes.     Demetris Ford MD, MD

## 2018-04-10 NOTE — BRIEF OP NOTE
PreOp Dx: Chronic diverticulitis    PostOp Dx: Same    Procedure: lap sigmoid colectomy    Surgeon: MUSA Murray    Anesthesia: GET    Findings: 12 inches of colon removed    IVF: 2000ml    UOP: 200ml    EBL: 100ml      Hill Murray MD

## 2018-04-10 NOTE — PROGRESS NOTES
Pradhan  Re-adjusted and urine output at10cc post op fluids are at 2200 input and urine out was 200 at 1240. Bladder scan 12 cc    Oral airway out and pt encouraged to cough and deep breathe

## 2018-04-10 NOTE — PLAN OF CARE
[place] [facility] [place]    Patient admitted to room 2300 at approximately 1530 via cart from surgery. Patient was accompanied by daughter and other friends and family.     Verbal SBAR report received from Sujey WARD prior to patient arrival.     Patient transferred to bed via air emil. Patient alert and oriented X 3. Pain is not well controlled.  Medication(s) being used: narcotic analgesics including hydromorphone (Dilaudid), will given additional medication after verified by pharmacy. 0-10 Pain Scale: 7. Admission vital signs: Blood pressure 129/75, temperature 96.1  F (35.6  C), temperature source Oral, resp. rate 16, height 1.53 m (5' 0.25\"), weight 68 kg (150 lb), last menstrual period 04/10/2016, SpO2 98 %. Patient was oriented to plan of care, call light, bed controls, tv, telephone, bathroom and visiting hours.     Risk Assessment    The following safety risks were identified during admission: fall. Yellow risk band applied: YES.     Skin Initial Assessment    This writer admitted this patient and completed a full skin assessment and Jax score in the Adult PCS flowsheet. Appropriate interventions initiated as needed.    Skin  Inspection of bony prominences: Full  Procedural focused assessment (identify areas inspected) : Abdomen, 3 stab sites and 1 incision about 2 inches long, glued and CD&I  Skin WDL: RAY Bradley    "

## 2018-04-10 NOTE — OP NOTE
Pre operative diagnosis:  diverticulitis    Post operative diagnosis:  same    Procedure performed:  Inserting of lighted ureteral stents for ureteral localization during laparoscopy.    Surgeon: Roderick Reina MD    Anesthesia:  General     Blood loss:  0 cc    Description of procedure:  After the patient was prepped and draped in the dorsal lithotomy position the urethra and bladder were inspected. No abnormalities were identified.    The left and right ureters were identified and lighted stents were advanced approximately 20 cm on each side.    The stents were then secured to a chaudhari catheter.

## 2018-04-10 NOTE — OR NURSING
Bowel prep yesterday and liquid results. Pt without period for 2 years. Mom and daughter with pt preop and ready for surgery.

## 2018-04-10 NOTE — IP AVS SNAPSHOT
MRN:[id number]                      After Visit Summary   4/10/2018    Selina Parikh    MRN: [id number]           Thank you!     Thank you for choosing [facility] for your care. Our goal is always to provide you with excellent care. Hearing back from our patients is one way we can continue to improve our services. Please take a few minutes to complete the written survey that you may receive in the mail after you visit with us. Thank you!        Patient Information     Date Of Birth          1965        Designated Caregiver       Most Recent Value    Caregiver    Will someone help with your care after discharge? no    Name of designated caregiver Selina    Phone number of caregiver [phone]      About your hospital stay     You were admitted on:  April 10, 2018 You last received care in the:  [facility]    You were discharged on:  April 15, 2018        Reason for your hospital stay       Patient with history of diverticulitis.  Underwent elective laparoscopic sigmoidectomy without complicaton.  She remained in hospital post-op for pain mgt and monitoring of bowel function. Diet was advanced as tolerated to regular.  Patient was passing flatus and had BM today.     D/c home in stable condition                  Who to Call     For medical emergencies, please call 911.  For non-urgent questions about your medical care, please call your primary care provider or clinic, [phone]  For questions related to your surgery, please call your surgery clinic        Attending Provider     Provider Specialty    Demetris Ford MD [facility]    Hill Murray MD Surgery       Primary Care Provider Office Phone # Fax #    Karen Paredes [phone]       When to contact your care team       Call your primary doctor if you have any of the following: temperature greater than 102 ,  increased shortness of breath, increased drainage, increased swelling or  "increased pain.                  After Care Instructions     Activity       Your activity upon discharge: activity as tolerated            Diet       Follow this diet upon discharge: Regular            Discharge Instructions           Wound care and dressings       Instructions to care for your wound at home: as directed.                  Follow-up Appointments     Follow-up and recommended labs and tests        Follow up with Dr. Murray , at (location with clinic name or city) [facility], within 2  to evaluate after surgery. No follow up labs or test are needed.                  Additional Information     If you use hormonal birth control (such as the pill, patch, ring or implants): You'll need a second form of birth control for 7 days (condoms, a diaphragm or contraceptive foam). While in the hospital, you received a medicine called Bridion. Your normal birth control will not work as well for a week after taking this medicine.          Pending Results     No orders found from 4/8/2018 to 4/11/2018.            Admission Information     Date & Time Provider Department Dept. Phone    4/10/2018 Hill Murray MD [facility] Medical Surgical [phone]      Your Vitals Were     Blood Pressure Pulse Temperature Respirations Height Weight    117/63 (BP Location: Left arm) 87 98.5  F (36.9  C) (Oral) 16 1.53 m (5' 0.25\") 67.5 kg (148 lb 13 oz)    Last Period Pulse Oximetry BMI (Body Mass Index)             04/10/2016 96% 28.82 kg/m2         [facility] Information     [facility] [employer] lets you send messages to your doctor, view your test results, renew your prescriptions, schedule appointments and more. To sign up, go to [email or web address] . Click on \"Log in\" on the left side of the screen, which will take you to the Welcome page. Then click on \"Sign up Now\" on the right side of the page.     You will be asked to enter the access code listed below, as well as some personal information. Please follow the directions to " create your username and password.     Your access code is: [email or web address]  Expires: 2018  2:05 PM     Your access code will  in 90 days. If you need help or a new code, please call your [facility] clinic or [phone].        Care EveryWhere ID     This is your Care EveryWhere ID. This could be used by other organizations to access your [facility] medical records  [id number]        Equal Access to Services     ISIAH THOMPSON : Hadii aad ku hadasho Soomaali, waaxda luqadaha, qaybta kaalmada adeegyada, waxay owenin hayaan adeyimi cortezmaryellenherbert dangelo . So [facility] [phone].    ATENCIÓN: Si habla español, tiene a carlson disposición servicios gratuitos de asistencia lingüística. [facility] [phone].    We comply with applicable federal civil rights laws and [place] laws. We do not discriminate on the basis of race, color, national origin, age, disability, sex, sexual orientation, or gender identity.               Review of your medicines      START taking        Dose / Directions    HYDROmorphone 2 MG tablet   Commonly known as:  DILAUDID        Dose:  2-4 mg   Take 1-2 tablets (2-4 mg) by mouth every 4 hours as needed for moderate to severe pain   Quantity:  20 tablet   Refills:  0         CONTINUE these medicines which may have CHANGED, or have new prescriptions. If we are uncertain of the size of tablets/capsules you have at home, strength may be listed as something that might have changed.        Dose / Directions    docusate sodium 50 MG capsule   Commonly known as:  COLACE   This may have changed:    - medication strength  - when to take this        Dose:  100 mg   Take 2 capsules (100 mg) by mouth 2 times daily   Quantity:  60 capsule   Refills:  0         CONTINUE these medicines which have NOT CHANGED        Dose / Directions    amLODIPine 5 MG tablet   Commonly known as:  NORVASC   Notes to Patient:  Talk with your MD about this. Was not given in the hospital        Dose:  5 mg   Take 5 mg by mouth every  evening   Refills:  0       clobetasol 0.05 % ointment   Commonly known as:  TEMOVATE        Apply  topically to affected area(s) 2 times daily.   Refills:  0       melatonin 1 MG/ML Liqd liquid        Dose:  2 mg   Take 2 mg by mouth At Bedtime   Refills:  0       MOTRIN PO        Dose:  600 mg   Take 600 mg by mouth 1-2 times daily   Refills:  0       tiZANidine 2 MG tablet   Commonly known as:  ZANAFLEX   Indication:  Muscle Spasticity, Acute left-sided low back pain with left-sided sciatica, Radiculopathy, cervical region        Dose:  2 mg   Take 2 mg by mouth every 6 hours as needed   Refills:  0       * TUMS 500 MG chewable tablet   Generic drug:  calcium carbonate        Dose:  1-2 chew tab   Take 1-2 chew tab by mouth 3 times daily as needed.   Refills:  0       * TUMS 500 MG chewable tablet   Generic drug:  calcium carbonate        Dose:  500 mg   Take 500 mg by mouth 3 times daily   Refills:  0       * Notice:  This list has 2 medication(s) that are the same as other medications prescribed for you. Read the directions carefully, and ask your doctor or other care provider to review them with you.         Where to get your medicines      These medications were sent to [facility] [place], [place] - [address]  [address] [address]     Phone:  [phone]     docusate sodium 50 MG capsule         Some of these will need a paper prescription and others can be bought over the counter. Ask your nurse if you have questions.     Bring a paper prescription for each of these medications     HYDROmorphone 2 MG tablet                Protect others around you: Learn how to safely use, store and throw away your medicines at www.disposemymeds.org.        Information about OPIOIDS     PRESCRIPTION OPIOIDS: WHAT YOU NEED TO KNOW    Prescription opioids can be used to help relieve moderate to severe pain and are often prescribed following a surgery or injury, or for certain health  conditions. These medications can be an important part of treatment but also come with serious risks. It is important to work with your health care provider to make sure you are getting the safest, most effective care.    WHAT ARE THE RISKS AND SIDE EFFECTS OF OPIOID USE?  Prescription opioids carry serious risks of addiction and overdose, especially with prolonged use. An opioid overdose, often marked by slowed breathing can cause sudden death. The use of prescription opioids can have a number of side effects as well, even when taken as directed:      Tolerance - meaning you might need to take more of a medication for the same pain relief    Physical dependence - meaning you have symptoms of withdrawal when a medication is stopped    Increased sensitivity to pain    Constipation    Nausea, vomiting, and dry mouth    Sleepiness and dizziness    Confusion    Depression    Low levels of testosterone that can result in lower sex drive, energy, and strength    Itching and sweating    RISKS ARE GREATER WITH:    History of drug misuse, substance use disorder, or overdose    Mental health conditions (such as depression or anxiety)    Sleep apnea    Older age (65 years or older)    Pregnancy    Avoid alcohol while taking prescription opioids.   Also, unless specifically advised by your health care provider, medications to avoid include:    Benzodiazepines (such as Xanax or Valium)    Muscle relaxants (such as Soma or Flexeril)    Hypnotics (such as Ambien or Lunesta)    Other prescription opioids    KNOW YOUR OPTIONS:  Talk to your health care provider about ways to manage your pain that do not involve prescription opioids. Some of these options may actually work better and have fewer risks and side effects:    Pain relievers such as acetaminophen, ibuprofen, and naproxen    Some medications that are also used for depression or seizures    Physical therapy and exercise    Cognitive behavioral therapy, a psychological,  goal-directed approach, in which patients learn how to modify physical, behavioral, and emotional triggers of pain and stress    IF YOU ARE PRESCRIBED OPIOIDS FOR PAIN:    Never take opioids in greater amounts or more often than prescribed    Follow up with your primary health care provider and work together to create a plan on how to manage your pain.    Talk about ways to help manage your pain that do not involve prescription opioids    Talk about all concerns and side effects    Help prevent misuse and abuse    Never sell or share prescription opioids    Never use another person's prescription opioids    Store prescription opioids in a secure place and out of reach of others (this may include visitors, children, friends, and family)    Visit www.cdc.gov/drugoverdose to learn about risks of opioid abuse and overdose    If you believe you may be struggling with addiction, tell your health care provider and ask for guidance or call [facility]'s National Helpline at 2-941-358-HELP    LEARN MORE / www.cdc.gov/drugoverdose/prescribing/guideline.html    Safely dispose of unused prescription opioids: Find your local drug take-back programs and more information about the importance of safe disposal at www.doseofreality.[place].gov             Medication List: This is a list of all your medications and when to take them. Check marks below indicate your daily home schedule. Keep this list as a reference.      Medications           Morning Afternoon Evening Bedtime As Needed    amLODIPine 5 MG tablet   Commonly known as:  NORVASC   Take 5 mg by mouth every evening   Notes to Patient:  Talk with your MD about this. Was not given in the hospital                                clobetasol 0.05 % ointment   Commonly known as:  TEMOVATE   Apply  topically to affected area(s) 2 times daily.   Last time this was given:  4/15/2018  8:40 AM                                   docusate sodium 50 MG capsule   Commonly known as:  COLACE   Take 2  capsules (100 mg) by mouth 2 times daily                                HYDROmorphone 2 MG tablet   Commonly known as:  DILAUDID   Take 1-2 tablets (2-4 mg) by mouth every 4 hours as needed for moderate to severe pain   Last time this was given:  2 mg on 4/15/2018  9:47 AM                Take anytime after 1:47pm                   melatonin 1 MG/ML Liqd liquid   Take 2 mg by mouth At Bedtime                                MOTRIN PO   Take 600 mg by mouth 1-2 times daily                                tiZANidine 2 MG tablet   Commonly known as:  ZANAFLEX   Take 2 mg by mouth every 6 hours as needed   Last time this was given:  2 mg on 4/12/2018  5:06 PM                                   * TUMS 500 MG chewable tablet   Take 1-2 chew tab by mouth 3 times daily as needed.   Generic drug:  calcium carbonate                                * TUMS 500 MG chewable tablet   Take 500 mg by mouth 3 times daily   Generic drug:  calcium carbonate                                * Notice:  This list has 2 medication(s) that are the same as other medications prescribed for you. Read the directions carefully, and ask your doctor or other care provider to review them with you.              More Information        Recovering from Colorectal Surgery    When the surgery is done, you ll be taken to the recovery room (also called the post-anesthesia care unit or PACU). Here, you will be carefully monitored for vital signs including breathing, temperature, blood pressure, and heart rate. You ll also receive pain medicine to keep you comfortable. When you re ready, you ll be moved to a regular hospital room. Your hospital stay may last 5 to 10 days or longer.  Right after surgery  If you have a urinary catheter, it will probably be removed shortly after surgery. Your intravenous (IV) line will remain in place for a few days to give you fluids. And you ll continue to receive medicine for pain. Soon after surgery, you ll be up and walking  around. This helps improve blood flow and prevent blood clots. It also helps your bowels return to normal. You ll be given breathing exercises to keep your lungs clear.  Eating again  You won t eat or drink much until your colon begins working again. You'll begin with a liquid diet, then move on to solid foods.  Recovering at home  In most cases, you ll visit your healthcare provider within a few weeks after leaving the hospital. You can get back to your normal routine about a month or 2 after surgery. Full recovery may take 6 weeks or longer. While your body heals, you may tire more easily. You also are likely to have some bloating. Loose stools and more frequent bowel movements are common after bowel surgery. This may get better over time, but may never disappear completely.  Resuming everyday activities  Being active helps your body heal. But you must protect your healing incisions:    Walk as much as you feel up to.    Avoid heavy lifting or vigorous exercise until your healthcare provider says it s OK. Follow your healthcare provider s advice about climbing stairs and bathing.    You can drive when you re no longer taking pain medicines--in about 7 to 10 days.    Follow your healthcare provider's advice about resuming sexual activity.   Call your healthcare provider  Call your healthcare provider if you have:    Fever of 100.4 F (38 C) or higher, or as directed by your healthcare provider     Persistent nausea or vomiting    Unusual redness, swelling, drainage, or pain around your incision    Severe constipation or diarrhea    Worsening pain    Leg swelling or trouble breathing    Bleeding from the rectum    Difficulty or inability to urinate   Date Last Reviewed: 8/1/2016 2000-2017 [employer]. [address] [place] [address], [place] [address]. All rights reserved. This information is not intended as a substitute for professional medical care. Always follow your healthcare professional's  instructions.

## 2018-04-10 NOTE — ANESTHESIA POSTPROCEDURE EVALUATION
Patient: Selina Parikh    Procedure(s):  Lighted Stent Placement,Laparoscopic Assisted Sigmoid Colectomy - Wound Class: II-Clean Contaminated   - Wound Class: II-Clean Contaminated    Diagnosis:chronic diverticulitis  Diagnosis Additional Information: No value filed.    Anesthesia Type:  General    Note:  Anesthesia Post Evaluation    Patient location during evaluation: Floor  Patient participation: Other/plan (See Comments) (sleeping)  Post-procedure mental status: sleeping.  Pain management: adequate (sleeping now--was in pain eariler)  Airway patency: patent  Cardiovascular status: stable  Respiratory status: nasal cannula  Hydration status: stable  PONV: none     Anesthetic complications: None          Last vitals:  Vitals:    04/10/18 1515 04/10/18 1545 04/10/18 1650   BP: 112/68 129/75 106/57   Resp: 14 16 16   Temp:  35.6  C (96.1  F)    SpO2: 95% 98% 98%         Electronically Signed By: CARLTON Wakefield CRNA  April 10, 2018  5:13 PM

## 2018-04-10 NOTE — ANESTHESIA PREPROCEDURE EVALUATION
Anesthesia Evaluation     . Pt has had prior anesthetic. Type: General    No history of anesthetic complications          ROS/MED HX    ENT/Pulmonary:     (+)tobacco use, Current use , . .    Neurologic:  - neg neurologic ROS     Cardiovascular:  - neg cardiovascular ROS       METS/Exercise Tolerance:  >4 METS   Hematologic:  - neg hematologic  ROS       Musculoskeletal:  - neg musculoskeletal ROS       GI/Hepatic:     (+) GERD Symptomatic,       Renal/Genitourinary:  - ROS Renal section negative       Endo:  - neg endo ROS       Psychiatric:  - neg psychiatric ROS       Infectious Disease:         Malignancy:         Other:    - neg other ROS                 Physical Exam  Normal systems: cardiovascular, pulmonary and dental    Airway   Mallampati: II  TM distance: >3 FB  Neck ROM: full    Dental     Cardiovascular       Pulmonary                     Anesthesia Plan      History & Physical Review  History and physical reviewed and following examination; no interval change.    ASA Status:  2 .    NPO Status:  > 8 hours    Plan for General with Intravenous induction. Maintenance will be Inhalation.    PONV prophylaxis:  Ondansetron (or other 5HT-3) and Dexamethasone or Solumedrol  Additional equipment: Videolaryngoscope      Postoperative Care  Postoperative pain management:  Peripheral nerve block (Single Shot) and IV analgesics.      Consents  Anesthetic plan, risks, benefits and alternatives discussed with:  Patient..                          .

## 2018-04-10 NOTE — OP NOTE
Procedure Date: 04/10/2018      PREOPERATIVE DIAGNOSIS:  Chronic diverticulitis.      POSTOPERATIVE DIAGNOSIS:  Chronic diverticulitis.      PROCEDURE:  Laparoscopic sigmoid colectomy.      SURGEON:  Hill Murray MD      ASSISTANT:  BONNIE Hansen (Needed for expertise in camera operation, retraction, hemostasis and suctioning.)      ANESTHESIA:  General.      INDICATIONS:  The patient is a 52-year-old female seen in my clinic complaining of multiple episodes of diverticulitis requiring hospitalizations.      CONSENT:  Risks, benefits, alternatives and complications were discussed with the patient, including the possibility of infection, bleeding, or ostomy formation.  The patient understood and wished to proceed.      PROCEDURE:  The patient was taken to the operating room and placed in supine position.  General endotracheal anesthesia was induced and surgical timeout was performed.  She was then place into a low lithotomy position.  Urology Service kindly placed some lighted stents in her ureters and then her abdomen was cleaned and draped in a sterile manner.  Then 1 gram of Invanz was used as perioperative antibiotics.        A small vertical midline incision was made just above her umbilicus and the subcutaneous tissues dissected to the fascia.  The fascia was opened sharply and a 12 mm Jerson trocar was inserted.  Carbon dioxide was insufflated to a pressure of 15 mmHg.  Under direct vision, a separate suprapubic gel port was placed through a 10 cm incision.  By the end of the case, a right lower quadrant 12 mm trocar was placed as was a right upper quadrant 5 mm trocar and a left lower quadrant 5 mm trocar.  Attention was first turned to the omentum.  This was reflected superiorly and  from the colon using the LigaSure device.  This continued all the way up to the splenic flexure and the splenic flexure was released from the spleen and swept down.  The dissection then continued from the pelvic  inlet up the white line of Toldt on the descending colon, reflecting the colon medially.  Both lighted stents could easily be seen.  Dissection continued down into the pelvis, taking the peritoneum.  Peritoneum was taken down into the pelvis on both sides of the sigmoid colon and down into the rectum near the reflection.  LigaSure device was used to take down the mesentery of the sigmoid colon and the mesorectum throughout the length of the dissection.  Appropriate transection points were located and an Endo-HOLDEN stapler with a purple tissue load was fired across the proximal and distal ends without difficulty.  The proximal end was marked and the air was then desufflated and the proximal stump was brought out through the gel port.  The end was prepared by taking down the fatty tissue surrounding the colon.  Finally the staple line was opened, revealing the mucosa.  Several sizers were used and a 28 mm sizer was deemed appropriate.  A 4-0 Vicryl pursestring suture was placed around the proximal stump, the anvil was inserted into the proximal stump and the pursestring suture was cinched down snugly, tying completely around the anvil.  Anvil was then dropped back into the abdominal cavity.  The gel port was reattached and the air was again insufflated.  The EEA stapler was gently inserted into the rectum and advanced to the level of the distal stump.  This was guided through palpation through the gel port.  The spike was extended through the staple line and attached to the proximal end.  Special care was taken to make sure there was no twisting of the colon at this point.  Finally, the stapler was approximated and tightened until a green window appeared.  The stapler was fired once and removed.  Two intact donuts of tissue were in the stapler and these were both sent to Pathology.  A colonoscope was then inserted into the patient's rectum which was insufflated, fluid in the pelvis checked for air leaks and after  pressurizing to relatively high pressure, there was no air or bubbling around the anastomosis.  A picture of the anastomosis was widely patent and intact and this picture was printed and placed in the chart.  Finally, the air was allowed to desufflate from the colon.  The entire abdomen was irrigated with 3 liters of warm normal saline solution, especially around the anastomosis.  All trocars were then removed under direct vision.  The air was allowed to desufflate.  All trocar sites were stitched up with 0 Vicryl sutures in the fascia and 4-0 Vicryl running subcuticular stitches on the skin layer.  The GelPort site was closed using 2 looped PDS from superior to inferior and inferior to superior, tying at the middle of the incision.  This was then irrigated with normal saline and the skin closed using 4-0 Vicryl running subcuticular stitch after being irrigated.  The patient tolerated the procedure well, was placed in an abdominal binder, extubated and transferred to the PACU in stable condition.  The lighted stents were removed at that time.      PLAN:  Following a stable postoperative course, she will be admitted to the floor and advanced to a clear liquid diet.      ESTIMATED BLOOD LOSS:  100 mL.      INTRAVENOUS FLUIDS:  2000.      URINE OUTPUT:  200.         RELL PRICE             D: 04/10/2018   T: 04/10/2018   MT: GERMÁN      Name:     MARIAMA MURPHY   MRN:      [id number]        Account:        [id number]   :      1965           Procedure Date: 04/10/2018      Document: [id number]

## 2018-04-10 NOTE — IP AVS SNAPSHOT
[facility]    [address] [address]    Phone:  [phone]    Fax:  [phone]                                       After Visit Summary   4/10/2018    Selina Parikh    MRN: [id number]           After Visit Summary Signature Page     I have received my discharge instructions, and my questions have been answered. I have discussed any challenges I see with this plan with the nurse or doctor.    ..........................................................................................................................................  Patient/Patient Representative Signature      ..........................................................................................................................................  Patient Representative Print Name and Relationship to Patient    ..................................................               ................................................  Date                                            Time    ..........................................................................................................................................  Reviewed by Signature/Title    ...................................................              ..............................................  Date                                                            Time

## 2018-04-11 LAB
ANION GAP SERPL CALCULATED.3IONS-SCNC: 6 MMOL/L (ref 3–14)
BUN SERPL-MCNC: 9 MG/DL (ref 7–30)
CALCIUM SERPL-MCNC: 8.1 MG/DL (ref 8.5–10.1)
CHLORIDE SERPL-SCNC: 107 MMOL/L (ref 94–109)
CO2 SERPL-SCNC: 27 MMOL/L (ref 20–32)
CREAT SERPL-MCNC: 0.66 MG/DL (ref 0.52–1.04)
ERYTHROCYTE [DISTWIDTH] IN BLOOD BY AUTOMATED COUNT: 13.9 % (ref 10–15)
GFR SERPL CREATININE-BSD FRML MDRD: >90 ML/MIN/1.7M2
GLUCOSE SERPL-MCNC: 98 MG/DL (ref 70–99)
HCT VFR BLD AUTO: 40.4 % (ref 35–47)
HGB BLD-MCNC: 13.1 G/DL (ref 11.7–15.7)
MAGNESIUM SERPL-MCNC: 2.1 MG/DL (ref 1.6–2.3)
MCH RBC QN AUTO: 30.3 PG (ref 26.5–33)
MCHC RBC AUTO-ENTMCNC: 32.4 G/DL (ref 31.5–36.5)
MCV RBC AUTO: 94 FL (ref 78–100)
PHOSPHATE SERPL-MCNC: 2.8 MG/DL (ref 2.5–4.5)
PLATELET # BLD AUTO: 253 10E9/L (ref 150–450)
POTASSIUM SERPL-SCNC: 4.2 MMOL/L (ref 3.4–5.3)
RBC # BLD AUTO: 4.32 10E12/L (ref 3.8–5.2)
SODIUM SERPL-SCNC: 140 MMOL/L (ref 133–144)
WBC # BLD AUTO: 12.3 10E9/L (ref 4–11)

## 2018-04-11 PROCEDURE — 25000132 ZZH RX MED GY IP 250 OP 250 PS 637: Performed by: SURGERY

## 2018-04-11 PROCEDURE — 25000125 ZZHC RX 250: Performed by: FAMILY MEDICINE

## 2018-04-11 PROCEDURE — 12000000 ZZH R&B MED SURG/OB

## 2018-04-11 PROCEDURE — 36415 COLL VENOUS BLD VENIPUNCTURE: CPT | Performed by: FAMILY MEDICINE

## 2018-04-11 PROCEDURE — 25000128 H RX IP 250 OP 636: Performed by: SURGERY

## 2018-04-11 PROCEDURE — 83735 ASSAY OF MAGNESIUM: CPT | Performed by: FAMILY MEDICINE

## 2018-04-11 PROCEDURE — 85027 COMPLETE CBC AUTOMATED: CPT | Performed by: FAMILY MEDICINE

## 2018-04-11 PROCEDURE — 80048 BASIC METABOLIC PNL TOTAL CA: CPT | Performed by: FAMILY MEDICINE

## 2018-04-11 PROCEDURE — 25000132 ZZH RX MED GY IP 250 OP 250 PS 637: Performed by: FAMILY MEDICINE

## 2018-04-11 PROCEDURE — 99232 SBSQ HOSP IP/OBS MODERATE 35: CPT | Performed by: FAMILY MEDICINE

## 2018-04-11 PROCEDURE — 84100 ASSAY OF PHOSPHORUS: CPT | Performed by: FAMILY MEDICINE

## 2018-04-11 RX ORDER — HYDROMORPHONE HYDROCHLORIDE 2 MG/1
4 TABLET ORAL
Status: DISCONTINUED | OUTPATIENT
Start: 2018-04-11 | End: 2018-04-14

## 2018-04-11 RX ORDER — PANTOPRAZOLE SODIUM 40 MG/1
40 TABLET, DELAYED RELEASE ORAL EVERY MORNING
Status: DISCONTINUED | OUTPATIENT
Start: 2018-04-12 | End: 2018-04-15 | Stop reason: HOSPADM

## 2018-04-11 RX ORDER — ERTAPENEM 1 G/1
1 INJECTION, POWDER, LYOPHILIZED, FOR SOLUTION INTRAMUSCULAR; INTRAVENOUS EVERY 24 HOURS
Status: DISCONTINUED | OUTPATIENT
Start: 2018-04-11 | End: 2018-04-11

## 2018-04-11 RX ADMIN — HYDROMORPHONE HYDROCHLORIDE 0.5 MG: 1 INJECTION, SOLUTION INTRAMUSCULAR; INTRAVENOUS; SUBCUTANEOUS at 06:47

## 2018-04-11 RX ADMIN — HYDROMORPHONE HYDROCHLORIDE 0.5 MG: 1 INJECTION, SOLUTION INTRAMUSCULAR; INTRAVENOUS; SUBCUTANEOUS at 02:13

## 2018-04-11 RX ADMIN — HYDROMORPHONE HYDROCHLORIDE 0.5 MG: 1 INJECTION, SOLUTION INTRAMUSCULAR; INTRAVENOUS; SUBCUTANEOUS at 04:23

## 2018-04-11 RX ADMIN — ENOXAPARIN SODIUM 40 MG: 40 INJECTION SUBCUTANEOUS at 09:48

## 2018-04-11 RX ADMIN — PANTOPRAZOLE SODIUM 40 MG: 40 INJECTION, POWDER, FOR SOLUTION INTRAVENOUS at 07:29

## 2018-04-11 RX ADMIN — ERTAPENEM SODIUM 1 G: 1 INJECTION, POWDER, LYOPHILIZED, FOR SOLUTION INTRAMUSCULAR; INTRAVENOUS at 09:45

## 2018-04-11 RX ADMIN — HYDROMORPHONE HYDROCHLORIDE 0.5 MG: 1 INJECTION, SOLUTION INTRAMUSCULAR; INTRAVENOUS; SUBCUTANEOUS at 00:07

## 2018-04-11 RX ADMIN — HYDROMORPHONE HYDROCHLORIDE 4 MG: 2 TABLET ORAL at 22:37

## 2018-04-11 RX ADMIN — SODIUM CHLORIDE, POTASSIUM CHLORIDE, SODIUM LACTATE AND CALCIUM CHLORIDE: 600; 310; 30; 20 INJECTION, SOLUTION INTRAVENOUS at 20:51

## 2018-04-11 RX ADMIN — HYDROMORPHONE HYDROCHLORIDE 4 MG: 2 TABLET ORAL at 19:24

## 2018-04-11 RX ADMIN — HYDROMORPHONE HYDROCHLORIDE 4 MG: 2 TABLET ORAL at 13:22

## 2018-04-11 RX ADMIN — METOCLOPRAMIDE 10 MG: 5 INJECTION, SOLUTION INTRAMUSCULAR; INTRAVENOUS at 04:23

## 2018-04-11 RX ADMIN — HYDROMORPHONE HYDROCHLORIDE 0.5 MG: 1 INJECTION, SOLUTION INTRAMUSCULAR; INTRAVENOUS; SUBCUTANEOUS at 01:10

## 2018-04-11 RX ADMIN — HYDROMORPHONE HYDROCHLORIDE 4 MG: 2 TABLET ORAL at 07:26

## 2018-04-11 RX ADMIN — METOCLOPRAMIDE 10 MG: 5 INJECTION, SOLUTION INTRAMUSCULAR; INTRAVENOUS at 09:41

## 2018-04-11 RX ADMIN — HYDROMORPHONE HYDROCHLORIDE 4 MG: 2 TABLET ORAL at 16:19

## 2018-04-11 RX ADMIN — HYDROMORPHONE HYDROCHLORIDE 0.5 MG: 1 INJECTION, SOLUTION INTRAMUSCULAR; INTRAVENOUS; SUBCUTANEOUS at 16:36

## 2018-04-11 RX ADMIN — SODIUM CHLORIDE, POTASSIUM CHLORIDE, SODIUM LACTATE AND CALCIUM CHLORIDE: 600; 310; 30; 20 INJECTION, SOLUTION INTRAVENOUS at 12:27

## 2018-04-11 RX ADMIN — HYDROMORPHONE HYDROCHLORIDE 4 MG: 2 TABLET ORAL at 10:31

## 2018-04-11 RX ADMIN — HYDROMORPHONE HYDROCHLORIDE 0.5 MG: 1 INJECTION, SOLUTION INTRAMUSCULAR; INTRAVENOUS; SUBCUTANEOUS at 21:45

## 2018-04-11 RX ADMIN — CLOBETASOL PROPIONATE: 0.5 OINTMENT TOPICAL at 07:31

## 2018-04-11 RX ADMIN — METOCLOPRAMIDE 10 MG: 5 INJECTION, SOLUTION INTRAMUSCULAR; INTRAVENOUS at 21:45

## 2018-04-11 RX ADMIN — CLOBETASOL PROPIONATE: 0.5 OINTMENT TOPICAL at 00:10

## 2018-04-11 RX ADMIN — METOCLOPRAMIDE 10 MG: 5 INJECTION, SOLUTION INTRAMUSCULAR; INTRAVENOUS at 16:19

## 2018-04-11 RX ADMIN — SODIUM CHLORIDE, POTASSIUM CHLORIDE, SODIUM LACTATE AND CALCIUM CHLORIDE: 600; 310; 30; 20 INJECTION, SOLUTION INTRAVENOUS at 02:44

## 2018-04-11 NOTE — PROGRESS NOTES
"POD#1    Significant pain this AM.      I/O last 3 completed shifts:  In: 2650 [P.O.:200; I.V.:2450]  Out: 360 [Urine:260; Blood:100]    Patient Vitals for the past 24 hrs:   BP Temp Temp src Heart Rate Resp SpO2 Height Weight   04/10/18 2340 100/66 97.4  F (36.3  C) Oral 86 14 97 % - -   04/10/18 1929 104/54 98.5  F (36.9  C) Oral 89 18 97 % - -   04/10/18 1832 108/55 - - 98 16 96 % - -   04/10/18 1748 103/54 - - 95 - 93 % - -   04/10/18 1716 101/57 - - 93 18 96 % - -   04/10/18 1701 105/55 - - 88 16 95 % - -   04/10/18 1650 106/57 - - 88 16 98 % - -   04/10/18 1545 129/75 96.1  F (35.6  C) Oral 95 16 98 % - -   04/10/18 1515 112/68 - - 86 14 95 % - -   04/10/18 1505 - - - - - 95 % - -   04/10/18 1500 105/65 - - 89 15 94 % - -   04/10/18 1450 - - - - - 92 % - -   04/10/18 1445 120/60 - - 95 12 94 % - -   04/10/18 1430 (!) 108/95 - - 91 15 94 % - -   04/10/18 1415 99/69 - - 96 10 98 % - -   04/10/18 1400 122/81 97.8  F (36.6  C) Oral 104 10 98 % - -   04/10/18 1345 107/85 - - 96 16 100 % - -   04/10/18 1341 121/79 - - 100 26 100 % - -   04/10/18 1338 120/74 98  F (36.7  C) Axillary 99 19 97 % - -   04/10/18 0746 118/70 98.7  F (37.1  C) Oral - 16 97 % 1.53 m (5' 0.25\") 68 kg (150 lb)     Exam:  AXO3 NAD  Neuro - Strength 5/5 all major groups, sensation intact, PERRL  Lungs - CTA  CV - RRR  Abd - Soft, non-distended, mild incisional tenderness, +BS  Extr - No edema    A/P: s/p lap sigmoid colectomy. Change oral pain meds to dilaudid.  Ambulate.  Remove chaudhari.    Hill Murray MD    "

## 2018-04-11 NOTE — PLAN OF CARE
Problem: Bowel Resection (Adult)  Goal: Signs and Symptoms of Listed Potential Problems Will be Absent, Minimized or Managed (Bowel Resection)  Signs and symptoms of listed potential problems will be absent, minimized or managed by discharge/transition of care (reference Bowel Resection (Adult) CPG).   Outcome: Improving  PRN IV dilaudid, ice and scheduled reglan effectively providing comfort. Incision and lap sites well approximated and open to air, no redness or drainage observed. Good urine output.

## 2018-04-11 NOTE — PLAN OF CARE
Dr Murray here and told this writer to give her a dose IV dilaudid for better pain control. Monitor put on patient at this time.

## 2018-04-11 NOTE — PLAN OF CARE
P.O. Dilaudid started this morning, receiving about every 3 hours. No complaints of nausea today. Pradhan catheter pulled and patient has voided since then, also having loose stools.

## 2018-04-11 NOTE — PROGRESS NOTES
[facility]           Assessment and Plan:       S/P partial colectomy  4/10/2018 -- management per surgery - pain with dilaudid.     4/11/2018 -- transitioned to oral pain medications (dilaudid) but still needing some IV - continue per surgery.        Hypertension   4/10/2018 -- blood pressure stable, continue home amlodipine with parameters.   4/11/2018 -- no change       IBS (irritable bowel syndrome)  4/10/2018 -- carries history of this but no medications          Tobacco abuse  4/10/2018 -- smoking just 5-6 cigarettes per day, planning to quit on discharge, does not want replacement while here.         GERD (gastroesophageal reflux disease)  4/10/2018 -- continue prior to admission tums as needed but will add IV protonix daily until taking orals well.     4/11/2018 -- transition to oral protonix tomorrow while here .  Has tums as needed.         Psoriasis  4/10/2018 -- continue home clobetasol cream.    4/11/2018 -- no change.         Brain syndrome, posttraumatic with chronic back pain of various levels   4/10/2018 -- symptoms at baseline, continue zanaflex as needed - pain medications as per post-op orders, holding home ibuprofen for now.  Does not use narcotics for this.    4/11/2018 -- no issues, managed with home medications and narcotics for surgery as above.        Prophylaxis  lovenox per surgery      Lines  PIV    Disposition  Home when ready             Interval History:   Feels a bit improved - pain a bit better, mostly oral dilaudid today but needing occasional IV still.  No new concerns.  Taking clears, no flatus yet.    No other pain             Review of Systems:    ROS: 10 point ROS neg other than the symptoms noted above in the HPI.           Medications:   Current active medications and PTA medications reviewed, see medication list for details.            Physical Exam:   Vitals were reviewed  Patient Vitals for the past 24 hrs:   BP Temp Temp src Pulse Heart Rate  Resp SpO2   18 1508 128/67 97.8  F (36.6  C) Oral - 83 12 97 %   18 1057 97/56 98.2  F (36.8  C) Oral 84 - 16 90 %   18 0706 118/58 97  F (36.1  C) Oral - 85 16 -   04/10/18 2340 100/66 97.4  F (36.3  C) Oral - 86 14 97 %   04/10/18 1929 104/54 98.5  F (36.9  C) Oral - 89 18 97 %   04/10/18 1832 108/55 - - - 98 16 96 %   04/10/18 1748 103/54 - - - 95 - 93 %   04/10/18 1716 101/57 - - - 93 18 96 %   04/10/18 1701 105/55 - - - 88 16 95 %   04/10/18 1650 106/57 - - - 88 16 98 %       Temperatures:  Current - Temp: 97.8  F (36.6  C); Max - Temp  Av.8  F (36.6  C)  Min: 97  F (36.1  C)  Max: 98.5  F (36.9  C)  Respiration range: Resp  Avg: 15.8  Min: 12  Max: 18  Pulse range: Pulse  Av  Min: 84  Max: 84  Blood pressure range: Systolic (24hrs), Av , Min:97 , Max:128   ; Diastolic (24hrs), Av, Min:54, Max:67    Pulse oximetry range: SpO2  Av.4 %  Min: 90 %  Max: 98 %  I/O last 3 completed shifts:  In: 450 [P.O.:200; I.V.:250]  Out: 1040 [Urine:1040]    Intake/Output Summary (Last 24 hours) at 18 1644  Last data filed at 18 1637   Gross per 24 hour   Intake                0 ml   Output             1400 ml   Net            -1400 ml     EXAM:  General: awake and alert, NAD, oriented x 3  Head: normocephalic  Neck: unremarkable, no lymphadenopathy   HEENT: oropharynx pink and moist    Heart: Regular rate and rhythm, no murmurs, rubs, or gallops  Lungs: clear to auscultation bilaterally with good air movement throughout  Abdomen: soft,  no masses or organomegaly, just  Mildly tender, bowel sounds present but hypoactive still  Extremities: no edema in lower extremities   Skin unremarkable.               Data:     Results for orders placed or performed during the hospital encounter of 04/10/18 (from the past 24 hour(s))   CBC with platelets   Result Value Ref Range    WBC 12.3 (H) 4.0 - 11.0 10e9/L    RBC Count 4.32 3.8 - 5.2 10e12/L    Hemoglobin 13.1 11.7 - 15.7 g/dL     Hematocrit 40.4 35.0 - 47.0 %    MCV 94 78 - 100 fl    MCH 30.3 26.5 - 33.0 pg    MCHC 32.4 31.5 - 36.5 g/dL    RDW 13.9 10.0 - 15.0 %    Platelet Count 253 150 - 450 10e9/L   Basic metabolic panel   Result Value Ref Range    Sodium 140 133 - 144 mmol/L    Potassium 4.2 3.4 - 5.3 mmol/L    Chloride 107 94 - 109 mmol/L    Carbon Dioxide 27 20 - 32 mmol/L    Anion Gap 6 3 - 14 mmol/L    Glucose 98 70 - 99 mg/dL    Urea Nitrogen 9 7 - 30 mg/dL    Creatinine 0.66 0.52 - 1.04 mg/dL    GFR Estimate >90 >60 mL/min/1.7m2    GFR Estimate If Black >90 >60 mL/min/1.7m2    Calcium 8.1 (L) 8.5 - 10.1 mg/dL   Magnesium   Result Value Ref Range    Magnesium 2.1 1.6 - 2.3 mg/dL   Phosphorus   Result Value Ref Range    Phosphorus 2.8 2.5 - 4.5 mg/dL           Attestation:  I have reviewed today's vital signs, notes, medications, labs and imaging.  Amount of time performed on this daily note: 25 minutes.     Demetris Ford MD, MD

## 2018-04-12 LAB
ANION GAP SERPL CALCULATED.3IONS-SCNC: 5 MMOL/L (ref 3–14)
BUN SERPL-MCNC: 5 MG/DL (ref 7–30)
CALCIUM SERPL-MCNC: 8.2 MG/DL (ref 8.5–10.1)
CHLORIDE SERPL-SCNC: 107 MMOL/L (ref 94–109)
CO2 SERPL-SCNC: 29 MMOL/L (ref 20–32)
CREAT SERPL-MCNC: 0.69 MG/DL (ref 0.52–1.04)
ERYTHROCYTE [DISTWIDTH] IN BLOOD BY AUTOMATED COUNT: 13.7 % (ref 10–15)
GFR SERPL CREATININE-BSD FRML MDRD: 89 ML/MIN/1.7M2
GLUCOSE SERPL-MCNC: 92 MG/DL (ref 70–99)
HCT VFR BLD AUTO: 37.6 % (ref 35–47)
HGB BLD-MCNC: 12.4 G/DL (ref 11.7–15.7)
MCH RBC QN AUTO: 30.9 PG (ref 26.5–33)
MCHC RBC AUTO-ENTMCNC: 33 G/DL (ref 31.5–36.5)
MCV RBC AUTO: 94 FL (ref 78–100)
PLATELET # BLD AUTO: 220 10E9/L (ref 150–450)
POTASSIUM SERPL-SCNC: 3.9 MMOL/L (ref 3.4–5.3)
RBC # BLD AUTO: 4.01 10E12/L (ref 3.8–5.2)
SODIUM SERPL-SCNC: 141 MMOL/L (ref 133–144)
WBC # BLD AUTO: 8.3 10E9/L (ref 4–11)

## 2018-04-12 PROCEDURE — 12000000 ZZH R&B MED SURG/OB

## 2018-04-12 PROCEDURE — 25000132 ZZH RX MED GY IP 250 OP 250 PS 637: Performed by: FAMILY MEDICINE

## 2018-04-12 PROCEDURE — 36415 COLL VENOUS BLD VENIPUNCTURE: CPT | Performed by: FAMILY MEDICINE

## 2018-04-12 PROCEDURE — 25000132 ZZH RX MED GY IP 250 OP 250 PS 637: Performed by: SURGERY

## 2018-04-12 PROCEDURE — 85027 COMPLETE CBC AUTOMATED: CPT | Performed by: FAMILY MEDICINE

## 2018-04-12 PROCEDURE — 25000128 H RX IP 250 OP 636: Performed by: SURGERY

## 2018-04-12 PROCEDURE — 80048 BASIC METABOLIC PNL TOTAL CA: CPT | Performed by: FAMILY MEDICINE

## 2018-04-12 PROCEDURE — 99231 SBSQ HOSP IP/OBS SF/LOW 25: CPT | Performed by: FAMILY MEDICINE

## 2018-04-12 RX ADMIN — METOCLOPRAMIDE 10 MG: 5 INJECTION, SOLUTION INTRAMUSCULAR; INTRAVENOUS at 23:56

## 2018-04-12 RX ADMIN — SODIUM CHLORIDE, POTASSIUM CHLORIDE, SODIUM LACTATE AND CALCIUM CHLORIDE: 600; 310; 30; 20 INJECTION, SOLUTION INTRAVENOUS at 16:28

## 2018-04-12 RX ADMIN — HYDROMORPHONE HYDROCHLORIDE 4 MG: 2 TABLET ORAL at 14:34

## 2018-04-12 RX ADMIN — HYDROMORPHONE HYDROCHLORIDE 4 MG: 2 TABLET ORAL at 17:53

## 2018-04-12 RX ADMIN — TIZANIDINE 2 MG: 2 TABLET ORAL at 09:37

## 2018-04-12 RX ADMIN — METOCLOPRAMIDE 10 MG: 5 INJECTION, SOLUTION INTRAMUSCULAR; INTRAVENOUS at 04:25

## 2018-04-12 RX ADMIN — HYDROMORPHONE HYDROCHLORIDE 4 MG: 2 TABLET ORAL at 08:09

## 2018-04-12 RX ADMIN — SODIUM CHLORIDE, POTASSIUM CHLORIDE, SODIUM LACTATE AND CALCIUM CHLORIDE: 600; 310; 30; 20 INJECTION, SOLUTION INTRAVENOUS at 05:07

## 2018-04-12 RX ADMIN — METOCLOPRAMIDE 10 MG: 5 INJECTION, SOLUTION INTRAMUSCULAR; INTRAVENOUS at 17:54

## 2018-04-12 RX ADMIN — HYDROMORPHONE HYDROCHLORIDE 4 MG: 2 TABLET ORAL at 01:39

## 2018-04-12 RX ADMIN — CLOBETASOL PROPIONATE: 0.5 OINTMENT TOPICAL at 21:01

## 2018-04-12 RX ADMIN — HYDROMORPHONE HYDROCHLORIDE 4 MG: 2 TABLET ORAL at 21:01

## 2018-04-12 RX ADMIN — HYDROMORPHONE HYDROCHLORIDE 4 MG: 2 TABLET ORAL at 11:27

## 2018-04-12 RX ADMIN — PANTOPRAZOLE SODIUM 40 MG: 40 TABLET, DELAYED RELEASE ORAL at 08:09

## 2018-04-12 RX ADMIN — HYDROMORPHONE HYDROCHLORIDE 4 MG: 2 TABLET ORAL at 23:56

## 2018-04-12 RX ADMIN — METOCLOPRAMIDE 10 MG: 5 INJECTION, SOLUTION INTRAMUSCULAR; INTRAVENOUS at 11:28

## 2018-04-12 RX ADMIN — ENOXAPARIN SODIUM 40 MG: 40 INJECTION SUBCUTANEOUS at 09:12

## 2018-04-12 RX ADMIN — TIZANIDINE 2 MG: 2 TABLET ORAL at 17:06

## 2018-04-12 RX ADMIN — HYDROMORPHONE HYDROCHLORIDE 4 MG: 2 TABLET ORAL at 04:25

## 2018-04-12 NOTE — PROGRESS NOTES
[facility]           Assessment and Plan:       S/P partial colectomy  4/10/2018 -- management per surgery - pain with dilaudid.     4/11/2018 -- transitioned to oral pain medications (dilaudid) but still needing some IV - continue per surgery.     4/12/2018 -- improving.      Hypertension   4/10/2018 -- blood pressure stable, continue home amlodipine with parameters.   4/12/2018 -- no change       IBS (irritable bowel syndrome)  4/10/2018 -- carries history of this but no medications           Tobacco abuse  4/10/2018 -- smoking just 5-6 cigarettes per day, planning to quit on discharge, does not want replacement while here.          GERD (gastroesophageal reflux disease)  4/10/2018 -- continue prior to admission tums as needed but will add IV protonix daily until taking orals well.     4/11/2018 -- transition to oral protonix tomorrow while here .  Has tums as needed.    4/12/2018 -- no change.        Psoriasis  4/10/2018 -- continue home clobetasol cream.    4/12/2018 -- no change.         Brain syndrome, posttraumatic with chronic back pain of various levels   4/10/2018 -- symptoms at baseline, continue zanaflex as needed - pain medications as per post-op orders, holding home ibuprofen for now.  Does not use narcotics for this.    4/12/2018 -- no issues, managed with home medications and narcotics for surgery as above.        Prophylaxis  lovenox per surgery       Lines  PIV    Disposition  Improving, disposition per surgery.            Interval History:   Improving. Pain controlled.  Passing gas, no fever or chills.  No dyspnea. No bowel movement yet, tolerating clears reasonably well.   No other pain             Review of Systems:    ROS: 10 point ROS neg other than the symptoms noted above in the HPI.             Medications:   Current active medications and PTA medications reviewed, see medication list for details.            Physical Exam:   Vitals were reviewed  Patient Vitals  for the past 24 hrs:   BP Temp Temp src Pulse Heart Rate Resp SpO2   18 1133 145/72 98.8  F (37.1  C) Oral - 98 16 93 %   18 0803 140/76 98  F (36.7  C) Oral - 101 16 92 %   18 2256 122/69 98  F (36.7  C) Oral 91 - 18 90 %       Temperatures:  Current - Temp: 98.8  F (37.1  C); Max - Temp  Av.3  F (36.8  C)  Min: 98  F (36.7  C)  Max: 98.8  F (37.1  C)  Respiration range: Resp  Av.7  Min: 16  Max: 18  Pulse range: Pulse  Av  Min: 91  Max: 91  Blood pressure range: Systolic (24hrs), Av , Min:122 , Max:145   ; Diastolic (24hrs), Av, Min:69, Max:76    Pulse oximetry range: SpO2  Av.7 %  Min: 90 %  Max: 93 %  I/O last 3 completed shifts:  In: 440 [P.O.:440]  Out: 1750 [Urine:1750]    Intake/Output Summary (Last 24 hours) at 18 1617  Last data filed at 18 1045   Gross per 24 hour   Intake              440 ml   Output             1550 ml   Net            -1110 ml     EXAM:  General: awake and alert, NAD, oriented x 3  Head: normocephalic  Neck: unremarkable, no lymphadenopathy   HEENT: oropharynx pink and moist    Heart: Regular rate and rhythm, no murmurs, rubs, or gallops  Lungs: clear to auscultation bilaterally with good air movement throughout  Abdomen: soft, mildly tender throughout, no rebound or guarding, no masses or organomegaly, bowel sounds now present and fairly normal throughout   Extremities: no edema in lower extremities   Skin unremarkable.               Data:     Results for orders placed or performed during the hospital encounter of 04/10/18 (from the past 24 hour(s))   CBC with platelets   Result Value Ref Range    WBC 8.3 4.0 - 11.0 10e9/L    RBC Count 4.01 3.8 - 5.2 10e12/L    Hemoglobin 12.4 11.7 - 15.7 g/dL    Hematocrit 37.6 35.0 - 47.0 %    MCV 94 78 - 100 fl    MCH 30.9 26.5 - 33.0 pg    MCHC 33.0 31.5 - 36.5 g/dL    RDW 13.7 10.0 - 15.0 %    Platelet Count 220 150 - 450 10e9/L   Basic metabolic panel   Result Value Ref Range    Sodium  141 133 - 144 mmol/L    Potassium 3.9 3.4 - 5.3 mmol/L    Chloride 107 94 - 109 mmol/L    Carbon Dioxide 29 20 - 32 mmol/L    Anion Gap 5 3 - 14 mmol/L    Glucose 92 70 - 99 mg/dL    Urea Nitrogen 5 (L) 7 - 30 mg/dL    Creatinine 0.69 0.52 - 1.04 mg/dL    GFR Estimate 89 >60 mL/min/1.7m2    GFR Estimate If Black >90 >60 mL/min/1.7m2    Calcium 8.2 (L) 8.5 - 10.1 mg/dL           Attestation:  I have reviewed today's vital signs, notes, medications, labs and imaging.  Amount of time performed on this daily note: 20 minutes.     Demetris Ford MD, MD

## 2018-04-12 NOTE — PROGRESS NOTES
POD#2    Tolerating diet. No nausea. Some burping. No flatus but some liquid stool.  Feels bloated and swollen in her legs.    I/O last 3 completed shifts:  In: 200 [P.O.:200]  Out: 1400 [Urine:1400]    Patient Vitals for the past 24 hrs:   BP Temp Temp src Pulse Heart Rate Resp SpO2   04/11/18 2256 122/69 98  F (36.7  C) Oral 91 - 18 90 %   04/11/18 1508 128/67 97.8  F (36.6  C) Oral - 83 12 97 %   04/11/18 1057 97/56 98.2  F (36.8  C) Oral 84 - 16 90 %   04/11/18 0706 118/58 97  F (36.1  C) Oral - 85 16 -     Exam:  AXO3 NAD  Neuro - Strength 5/5 all major groups, sensation intact, PERRL  Lungs - CTA  CV - RRR  Abd - Soft, non-distended, mildly tender, +BS, wounds clean, dry, and intact with no erythema.   Extr - No edema    A/P: s/p lap colectomy. Stable.  Anastomosis intact and no signs of complication.  Will hold at clear liquids for now. Slow down fluids.    Hill Murray MD

## 2018-04-13 ENCOUNTER — APPOINTMENT (OUTPATIENT)
Dept: GENERAL RADIOLOGY | Facility: CLINIC | Age: 53
DRG: 331 | End: 2018-04-13
Attending: SURGERY
Payer: COMMERCIAL

## 2018-04-13 LAB — COPATH REPORT: NORMAL

## 2018-04-13 PROCEDURE — 12000000 ZZH R&B MED SURG/OB

## 2018-04-13 PROCEDURE — 74019 RADEX ABDOMEN 2 VIEWS: CPT

## 2018-04-13 PROCEDURE — 25000132 ZZH RX MED GY IP 250 OP 250 PS 637: Performed by: FAMILY MEDICINE

## 2018-04-13 PROCEDURE — 25000128 H RX IP 250 OP 636: Performed by: SURGERY

## 2018-04-13 PROCEDURE — 25000132 ZZH RX MED GY IP 250 OP 250 PS 637: Performed by: SURGERY

## 2018-04-13 PROCEDURE — 99231 SBSQ HOSP IP/OBS SF/LOW 25: CPT | Performed by: FAMILY MEDICINE

## 2018-04-13 RX ORDER — MINERAL OIL/HYDROPHIL PETROLAT
OINTMENT (GRAM) TOPICAL 2 TIMES DAILY PRN
Status: DISCONTINUED | OUTPATIENT
Start: 2018-04-13 | End: 2018-04-15 | Stop reason: HOSPADM

## 2018-04-13 RX ADMIN — METOCLOPRAMIDE 10 MG: 5 INJECTION, SOLUTION INTRAMUSCULAR; INTRAVENOUS at 22:50

## 2018-04-13 RX ADMIN — SODIUM CHLORIDE, POTASSIUM CHLORIDE, SODIUM LACTATE AND CALCIUM CHLORIDE: 600; 310; 30; 20 INJECTION, SOLUTION INTRAVENOUS at 19:40

## 2018-04-13 RX ADMIN — HYDROMORPHONE HYDROCHLORIDE 4 MG: 2 TABLET ORAL at 03:44

## 2018-04-13 RX ADMIN — HYDROMORPHONE HYDROCHLORIDE 4 MG: 2 TABLET ORAL at 11:05

## 2018-04-13 RX ADMIN — METOCLOPRAMIDE 10 MG: 5 INJECTION, SOLUTION INTRAMUSCULAR; INTRAVENOUS at 05:38

## 2018-04-13 RX ADMIN — HYDROMORPHONE HYDROCHLORIDE 4 MG: 2 TABLET ORAL at 23:53

## 2018-04-13 RX ADMIN — CLOBETASOL PROPIONATE: 0.5 OINTMENT TOPICAL at 19:39

## 2018-04-13 RX ADMIN — CLOBETASOL PROPIONATE: 0.5 OINTMENT TOPICAL at 11:05

## 2018-04-13 RX ADMIN — HYDROMORPHONE HYDROCHLORIDE 4 MG: 2 TABLET ORAL at 16:54

## 2018-04-13 RX ADMIN — METOCLOPRAMIDE 10 MG: 5 INJECTION, SOLUTION INTRAMUSCULAR; INTRAVENOUS at 11:06

## 2018-04-13 RX ADMIN — HYDROMORPHONE HYDROCHLORIDE 4 MG: 2 TABLET ORAL at 08:02

## 2018-04-13 RX ADMIN — HYDROMORPHONE HYDROCHLORIDE 4 MG: 2 TABLET ORAL at 14:18

## 2018-04-13 RX ADMIN — SODIUM CHLORIDE, POTASSIUM CHLORIDE, SODIUM LACTATE AND CALCIUM CHLORIDE: 600; 310; 30; 20 INJECTION, SOLUTION INTRAVENOUS at 05:36

## 2018-04-13 RX ADMIN — METOCLOPRAMIDE 10 MG: 5 INJECTION, SOLUTION INTRAMUSCULAR; INTRAVENOUS at 16:55

## 2018-04-13 RX ADMIN — PANTOPRAZOLE SODIUM 40 MG: 40 TABLET, DELAYED RELEASE ORAL at 08:04

## 2018-04-13 RX ADMIN — HYDROMORPHONE HYDROCHLORIDE 4 MG: 2 TABLET ORAL at 20:32

## 2018-04-13 RX ADMIN — ENOXAPARIN SODIUM 40 MG: 40 INJECTION SUBCUTANEOUS at 08:59

## 2018-04-13 NOTE — PROGRESS NOTES
Abdominal x-rays shows air in the colon and some in the distal rectum. The problem is likely edema of the anastomotic site. Continue ambulation. No signs of complication.    Hill Murray MD

## 2018-04-13 NOTE — PLAN OF CARE
Problem: Patient Care Overview  Goal: Plan of Care/Patient Progress Review  Outcome: No Change  Took walk at hs, small flatus, no BM or large flatus, BS hyper. Willing take deep breath and cough w/ splinting assist, declined use of IS because it makes her cough which hurts, continue to take pain meds every 3 hours.

## 2018-04-13 NOTE — PROGRESS NOTES
[facility]           Assessment and Plan:       S/P partial colectomy  4/10/2018 -- management per surgery - pain with dilaudid.     4/11/2018 -- transitioned to oral pain medications (dilaudid) but still needing some IV - continue per surgery.     4/13/2018 -- slowly improving.      Hypertension   4/10/2018 -- blood pressure stable, continue home amlodipine with parameters.   4/13/2018 -- no change        IBS (irritable bowel syndrome)  4/10/2018 -- carries history of this but no medications           Tobacco abuse  4/10/2018 -- smoking just 5-6 cigarettes per day, planning to quit on discharge, does not want replacement while here.          GERD (gastroesophageal reflux disease)  4/10/2018 -- continue prior to admission tums as needed but will add IV protonix daily until taking orals well.     4/11/2018 -- transition to oral protonix tomorrow while here .  Has tums as needed.    4/13/2018 -- no change.        Psoriasis  4/10/2018 -- continue home clobetasol cream.    4/13/2018 -- no change.         Brain syndrome, posttraumatic with chronic back pain of various levels   4/10/2018 -- symptoms at baseline, continue zanaflex as needed - pain medications as per post-op orders, holding home ibuprofen for now.  Does not use narcotics for this.    4/13/2018 -- no issues, managed with home medications and narcotics for surgery as above.         Prophylaxis  lovenox per surgery       Lines  PIV    Disposition  Slowly improving.  Still 1-2 days.              Interval History:   Improving, pain improving, now passing gas, intake slowly improving.  No new concerns.    No other pain             Review of Systems:    ROS: 10 point ROS neg other than the symptoms noted above in the HPI.           Medications:   Current active medications and PTA medications reviewed, see medication list for details.            Physical Exam:   Vitals were reviewed  Patient Vitals for the past 24 hrs:   BP Temp Temp  src Pulse Resp SpO2 Weight   18 0902 137/85 98.1  F (36.7  C) Oral 90 18 95 % -   18 0546 - - - - - - 68.4 kg (150 lb 12.7 oz)   18 2213 138/72 98.4  F (36.9  C) Oral 86 18 93 % -   18 1630 138/73 98.7  F (37.1  C) Oral 100 18 95 % -       Temperatures:  Current - Temp: 98.1  F (36.7  C); Max - Temp  Av.4  F (36.9  C)  Min: 98.1  F (36.7  C)  Max: 98.7  F (37.1  C)  Respiration range: Resp  Av  Min: 18  Max: 18  Pulse range: Pulse  Av  Min: 86  Max: 100  Blood pressure range: Systolic (24hrs), Av , Min:137 , Max:138   ; Diastolic (24hrs), Av, Min:72, Max:85    Pulse oximetry range: SpO2  Av.3 %  Min: 93 %  Max: 95 %  I/O last 3 completed shifts:  In: 2586 [P.O.:1450; I.V.:1136]  Out: 450 [Urine:450]    Intake/Output Summary (Last 24 hours) at 18 1241  Last data filed at 18 0930   Gross per 24 hour   Intake             2836 ml   Output                0 ml   Net             2836 ml     EXAM:  General: awake and alert, NAD, oriented x 3  Head: normocephalic  Neck: unremarkable, no lymphadenopathy   HEENT: oropharynx pink and moist    Heart: Regular rate and rhythm, no murmurs, rubs, or gallops  Lungs: clear to auscultation bilaterally with good air movement throughout  Abdomen: soft, still mildly tender throughout, no rebound or guarding, bowel sounds present, no masses or organomegaly  Extremities: no edema in lower extremities   Skin: 1-2cm erythema around lower midline incision, improved from this AM per patient, no drainage or fluctuance - consistent with irritation.  Otherwise unremarkable.              Data:     Results for orders placed or performed during the hospital encounter of 04/10/18 (from the past 24 hour(s))   XR Abdomen 2 Views    Narrative    ABDOMEN TWO VIEWS   2018 7:55 AM     HISTORY: Bloating following colon resection.     COMPARISON: CT abdomen and pelvis 2018.      Impression    IMPRESSION: The visualized colon is filled  with air but not  particularly dilated. Relative paucity of bowel gas in the small  bowel. No dilated air-filled loops of bowel identified. Findings are  nonspecific, but there is no obvious evidence of bowel obstruction.  Surgical staple line seen in the low pelvis. Surgical clip in the  right upper quadrant. No free air under the diaphragm on the upright  view. Visualized lung bases are clear. Degenerative changes in spine  particularly at L4-L5.    VITALIY GONZALEZ MD           Attestation:  I have reviewed today's vital signs, notes, medications, labs and imaging.  Amount of time performed on this daily note: 30 minutes.     Demetris Ford MD, MD

## 2018-04-13 NOTE — PROGRESS NOTES
POD#3    Tolerating clear liquids. No nausea. No flatus.    I/O last 3 completed shifts:  In: 2586 [P.O.:1450; I.V.:1136]  Out: 450 [Urine:450]    Patient Vitals for the past 24 hrs:   BP Temp Temp src Pulse Heart Rate Resp SpO2 Weight   04/13/18 0546 - - - - - - - 68.4 kg (150 lb 12.7 oz)   04/12/18 2213 138/72 98.4  F (36.9  C) Oral 86 - 18 93 % -   04/12/18 1630 138/73 98.7  F (37.1  C) Oral 100 - 18 95 % -   04/12/18 1133 145/72 98.8  F (37.1  C) Oral - 98 16 93 % -   04/12/18 0803 140/76 98  F (36.7  C) Oral - 101 16 92 % -     Exam:  AXO3 NAD  Neuro - Strength 5/5 all major groups, sensation intact, PERRL  Lungs - CTA  CV - RRR  Abd - Soft, non-distended, non-tender, +BS  Extr - No edema    A/P: s/p lap colectomy.  Abd xray to eval for anastomotic stenosis.  Advance to full liquids as tolerated.    Hill Murray MD

## 2018-04-13 NOTE — PLAN OF CARE
Problem: Patient Care Overview  Goal: Plan of Care/Patient Progress Review  Outcome: Improving  Patient taking 4 mg of oral dilaudid for pain, this is relieving. Up in hallway ambulating independently. Passing flatus. Patient spoke of incision sites tight and dry, Aquaphor was ordered and patient applied this. Xray done this am, patient understands the results as stated by Surgeon. Tolerating the full liquid diet. Voiding fine. Abdomen incisions, stab sites marked per MD.

## 2018-04-13 NOTE — PROGRESS NOTES
Dr. Murray called with the results of this mornings abdominal xray, this information was relayed to the patient, she verbalized understanding.

## 2018-04-13 NOTE — PROGRESS NOTES
Patient reports ambulating in hallways 3 or more times today. Passing flatus, but in small amounts.   States she feels like she will feel much better after she passes larger amount of gas.   Tolerating clear liquid diet and states oral dilaudid helpful controlling pain.  Encouraged continued ambulation as well as incentive spirometry use. ZH=[id number]

## 2018-04-14 LAB
ALBUMIN SERPL-MCNC: 3.4 G/DL (ref 3.4–5)
ALP SERPL-CCNC: 78 U/L (ref 40–150)
ALT SERPL W P-5'-P-CCNC: 29 U/L (ref 0–50)
ANION GAP SERPL CALCULATED.3IONS-SCNC: 7 MMOL/L (ref 3–14)
AST SERPL W P-5'-P-CCNC: 24 U/L (ref 0–45)
BASOPHILS # BLD AUTO: 0 10E9/L (ref 0–0.2)
BASOPHILS NFR BLD AUTO: 0.1 %
BILIRUB SERPL-MCNC: 0.4 MG/DL (ref 0.2–1.3)
BUN SERPL-MCNC: 4 MG/DL (ref 7–30)
CALCIUM SERPL-MCNC: 8.6 MG/DL (ref 8.5–10.1)
CHLORIDE SERPL-SCNC: 107 MMOL/L (ref 94–109)
CO2 SERPL-SCNC: 27 MMOL/L (ref 20–32)
CREAT SERPL-MCNC: 0.68 MG/DL (ref 0.52–1.04)
DIFFERENTIAL METHOD BLD: NORMAL
EOSINOPHIL # BLD AUTO: 0.2 10E9/L (ref 0–0.7)
EOSINOPHIL NFR BLD AUTO: 2.3 %
ERYTHROCYTE [DISTWIDTH] IN BLOOD BY AUTOMATED COUNT: 13.1 % (ref 10–15)
GFR SERPL CREATININE-BSD FRML MDRD: >90 ML/MIN/1.7M2
GLUCOSE BLDC GLUCOMTR-MCNC: 132 MG/DL (ref 70–99)
GLUCOSE BLDC GLUCOMTR-MCNC: 73 MG/DL (ref 70–99)
GLUCOSE SERPL-MCNC: 98 MG/DL (ref 70–99)
HCT VFR BLD AUTO: 37.7 % (ref 35–47)
HGB BLD-MCNC: 12.8 G/DL (ref 11.7–15.7)
IMM GRANULOCYTES # BLD: 0 10E9/L (ref 0–0.4)
IMM GRANULOCYTES NFR BLD: 0.3 %
LYMPHOCYTES # BLD AUTO: 1.9 10E9/L (ref 0.8–5.3)
LYMPHOCYTES NFR BLD AUTO: 25.1 %
MAGNESIUM SERPL-MCNC: 2 MG/DL (ref 1.6–2.3)
MCH RBC QN AUTO: 30.8 PG (ref 26.5–33)
MCHC RBC AUTO-ENTMCNC: 34 G/DL (ref 31.5–36.5)
MCV RBC AUTO: 91 FL (ref 78–100)
MONOCYTES # BLD AUTO: 0.5 10E9/L (ref 0–1.3)
MONOCYTES NFR BLD AUTO: 6.4 %
NEUTROPHILS # BLD AUTO: 4.9 10E9/L (ref 1.6–8.3)
NEUTROPHILS NFR BLD AUTO: 65.8 %
PHOSPHATE SERPL-MCNC: 3.6 MG/DL (ref 2.5–4.5)
PLATELET # BLD AUTO: 249 10E9/L (ref 150–450)
POTASSIUM SERPL-SCNC: 3.8 MMOL/L (ref 3.4–5.3)
PROT SERPL-MCNC: 7 G/DL (ref 6.8–8.8)
RBC # BLD AUTO: 4.15 10E12/L (ref 3.8–5.2)
SODIUM SERPL-SCNC: 141 MMOL/L (ref 133–144)
WBC # BLD AUTO: 7.4 10E9/L (ref 4–11)

## 2018-04-14 PROCEDURE — 36415 COLL VENOUS BLD VENIPUNCTURE: CPT | Performed by: FAMILY MEDICINE

## 2018-04-14 PROCEDURE — 84100 ASSAY OF PHOSPHORUS: CPT | Performed by: FAMILY MEDICINE

## 2018-04-14 PROCEDURE — 25000132 ZZH RX MED GY IP 250 OP 250 PS 637: Performed by: SURGERY

## 2018-04-14 PROCEDURE — 83735 ASSAY OF MAGNESIUM: CPT | Performed by: FAMILY MEDICINE

## 2018-04-14 PROCEDURE — 00000146 ZZHCL STATISTIC GLUCOSE BY METER IP

## 2018-04-14 PROCEDURE — 25000128 H RX IP 250 OP 636: Performed by: SURGERY

## 2018-04-14 PROCEDURE — 85025 COMPLETE CBC W/AUTO DIFF WBC: CPT | Performed by: FAMILY MEDICINE

## 2018-04-14 PROCEDURE — 99231 SBSQ HOSP IP/OBS SF/LOW 25: CPT | Performed by: FAMILY MEDICINE

## 2018-04-14 PROCEDURE — 80053 COMPREHEN METABOLIC PANEL: CPT | Performed by: FAMILY MEDICINE

## 2018-04-14 PROCEDURE — 25000132 ZZH RX MED GY IP 250 OP 250 PS 637: Performed by: FAMILY MEDICINE

## 2018-04-14 PROCEDURE — 12000000 ZZH R&B MED SURG/OB

## 2018-04-14 RX ORDER — HYDROMORPHONE HYDROCHLORIDE 2 MG/1
2-4 TABLET ORAL EVERY 4 HOURS PRN
Status: DISCONTINUED | OUTPATIENT
Start: 2018-04-14 | End: 2018-04-15 | Stop reason: HOSPADM

## 2018-04-14 RX ORDER — NALOXONE HYDROCHLORIDE 0.4 MG/ML
.1-.4 INJECTION, SOLUTION INTRAMUSCULAR; INTRAVENOUS; SUBCUTANEOUS
Status: DISCONTINUED | OUTPATIENT
Start: 2018-04-14 | End: 2018-04-15 | Stop reason: HOSPADM

## 2018-04-14 RX ADMIN — HYDROMORPHONE HYDROCHLORIDE 2 MG: 2 TABLET ORAL at 23:49

## 2018-04-14 RX ADMIN — METOCLOPRAMIDE 10 MG: 5 INJECTION, SOLUTION INTRAMUSCULAR; INTRAVENOUS at 04:16

## 2018-04-14 RX ADMIN — ENOXAPARIN SODIUM 40 MG: 40 INJECTION SUBCUTANEOUS at 13:39

## 2018-04-14 RX ADMIN — HYDROMORPHONE HYDROCHLORIDE 2 MG: 2 TABLET ORAL at 20:00

## 2018-04-14 RX ADMIN — CLOBETASOL PROPIONATE: 0.5 OINTMENT TOPICAL at 19:55

## 2018-04-14 RX ADMIN — HYDROMORPHONE HYDROCHLORIDE 2 MG: 2 TABLET ORAL at 13:38

## 2018-04-14 RX ADMIN — HYDROMORPHONE HYDROCHLORIDE 4 MG: 2 TABLET ORAL at 04:16

## 2018-04-14 RX ADMIN — HYDROMORPHONE HYDROCHLORIDE 2 MG: 2 TABLET ORAL at 09:47

## 2018-04-14 RX ADMIN — PANTOPRAZOLE SODIUM 40 MG: 40 TABLET, DELAYED RELEASE ORAL at 08:11

## 2018-04-14 RX ADMIN — CLOBETASOL PROPIONATE: 0.5 OINTMENT TOPICAL at 08:11

## 2018-04-14 NOTE — PLAN OF CARE
Problem: Patient Care Overview  Goal: Plan of Care/Patient Progress Review  Pt taking dilaudid po for abdominal discomfort, states this does help. Afebrile. Up indep in room, +vdg. Abdomen soft, BS+, +flatus. Incision & lap sites dry/intact. Using IS with encouragement & pulls to 1000. Needs encouragement to splint abdomen & cough.

## 2018-04-14 NOTE — PROGRESS NOTES
POD#4    Passing flatus. No nausea. Tolerating full liquids.    I/O last 3 completed shifts:  In: 3160 [P.O.:1180; I.V.:1980]  Out: -     Patient Vitals for the past 24 hrs:   BP Temp Temp src Pulse Resp SpO2   04/14/18 0715 123/69 97.6  F (36.4  C) Oral 74 16 95 %   04/14/18 0412 129/73 98.8  F (37.1  C) Oral 69 16 94 %   04/13/18 2300 135/75 98  F (36.7  C) Oral 83 16 96 %   04/13/18 1509 136/80 98.6  F (37  C) Oral 84 16 95 %     Exam:  AXO3 NAD  Neuro - Strength 5/5 all major groups, sensation intact, PERRL  Lungs - CTA  CV - RRR  Abd - Soft, non-distended, non-tender, +BS, wound clean, dry, and intact with small erythema  Extr - No edema    A/P: s/p lap colectomy doing well.  Keep at full liquids.  Will keep a close eye on the lower incision but no abx at this time.  Likely home tomorrow.    Hill Murray MD

## 2018-04-14 NOTE — PLAN OF CARE
Problem: Patient Care Overview  Goal: Plan of Care/Patient Progress Review  Outcome: Improving  Patient is doing well today. Incision and stab sites still pink/red. Surgeon rounded and assessed them. Patient uses Aquaphor on these sites. Ambulating in hallway independently. Tolerating full liquids, per Dr. Murray, patient is to continue with full liquid diet. Taking 2mg of dilaudid oral for pain, this is relieving. Likely discharge in a.m patient in full agreement with this plan.

## 2018-04-14 NOTE — PLAN OF CARE
Problem: Bowel Resection (Adult)  Goal: Signs and Symptoms of Listed Potential Problems Will be Absent, Minimized or Managed (Bowel Resection)  Signs and symptoms of listed potential problems will be absent, minimized or managed by discharge/transition of care (reference Bowel Resection (Adult) CPG).   Outcome: Improving  Pt up in hallway ambulating frequently, independently. Pt tolerating full liquid diet well, eating 100%. Passing flatus. VSS, afebrile. Incisions CDI. Taking PO Dilaudid for pain, pt stating tomorrow may want to try taking 1 -2 tabs; Will have day nurse relay to MD. Suha Carvajal RN

## 2018-04-14 NOTE — PROGRESS NOTES
"[facility] Hospitalist Progress Note           Assessment and Plan:       S/P partial colectomy  4/10/2018 -- management per surgery - pain with dilaudid.     4/11/2018 -- transitioned to oral pain medications (dilaudid) but still needing some IV - continue per surgery.     4/14/2018 -- slowly improving.   Full liquids per surgery.        Hypertension   4/10/2018 -- blood pressure stable, continue home amlodipine with parameters.   4/14/2018 -- doing well, no change        IBS (irritable bowel syndrome)  4/10/2018 -- carries history of this but no medications           Tobacco abuse  4/10/2018 -- smoking just 5-6 cigarettes per day, planning to quit on discharge, does not want replacement while here.          GERD (gastroesophageal reflux disease)  4/10/2018 -- continue prior to admission tums as needed but will add IV protonix daily until taking orals well.     4/11/2018 -- transition to oral protonix tomorrow while here .  Has tums as needed.    4/14/2018 -- no change.        Psoriasis  4/10/2018 -- continue home clobetasol cream.    4/14/2018 -- no change.         Brain syndrome, posttraumatic with chronic back pain of various levels   4/10/2018 -- symptoms at baseline, continue zanaflex as needed - pain medications as per post-op orders, holding home ibuprofen for now.  Does not use narcotics for this.    4/14/2018 -- no issues, managed with home medications and narcotics for surgery as above.         Prophylaxis  lovenox per surgery       Lines  PIV    Disposition  Possible discharge tomorrow per surgery if improving.              Interval History:   No new concerns.  Pain doing well, tolerating full liquids well, admits that she did \"sneak a bag or chips\" this afternoon but appears to be tolerating this ok.  No nausea or vomiting.  Having flatus but no bowel movement yet.    No other pain             Review of Systems:    ROS: 10 point ROS neg other than the symptoms noted above in the HPI.             " Medications:   Current active medications and PTA medications reviewed, see medication list for details.            Physical Exam:   Vitals were reviewed  Patient Vitals for the past 24 hrs:   BP Temp Temp src Pulse Resp SpO2   18 0715 123/69 97.6  F (36.4  C) Oral 74 16 95 %   18 0412 129/73 98.8  F (37.1  C) Oral 69 16 94 %   18 2300 135/75 98  F (36.7  C) Oral 83 16 96 %   18 1509 136/80 98.6  F (37  C) Oral 84 16 95 %       Temperatures:  Current - Temp: 97.6  F (36.4  C); Max - Temp  Av.3  F (36.8  C)  Min: 97.6  F (36.4  C)  Max: 98.8  F (37.1  C)  Respiration range: Resp  Av  Min: 16  Max: 16  Pulse range: Pulse  Av.5  Min: 69  Max: 84  Blood pressure range: Systolic (24hrs), Av , Min:123 , Max:136   ; Diastolic (24hrs), Av, Min:69, Max:80    Pulse oximetry range: SpO2  Av %  Min: 94 %  Max: 96 %  I/O last 3 completed shifts:  In: 2335 [P.O.:930; I.V.:1405]  Out: -     Intake/Output Summary (Last 24 hours) at 18 1503  Last data filed at 18 0625   Gross per 24 hour   Intake             2335 ml   Output                0 ml   Net             2335 ml     EXAM:  General: awake and alert, NAD, oriented x 3  Head: normocephalic  Neck: unremarkable, no lymphadenopathy   HEENT: oropharynx pink and moist    Heart: Regular rate and rhythm, no murmurs, rubs, or gallops  Lungs: clear to auscultation bilaterally with good air movement throughout  Abdomen: soft, mildly tender, no masses or organomegaly  Extremities: no edema in lower extremities   Skin: incision sites less erythematous, look consistent with irritation, otherwise unremarkable.               Data:     Results for orders placed or performed during the hospital encounter of 04/10/18 (from the past 24 hour(s))   CBC with platelets differential   Result Value Ref Range    WBC 7.4 4.0 - 11.0 10e9/L    RBC Count 4.15 3.8 - 5.2 10e12/L    Hemoglobin 12.8 11.7 - 15.7 g/dL    Hematocrit 37.7 35.0 - 47.0  %    MCV 91 78 - 100 fl    MCH 30.8 26.5 - 33.0 pg    MCHC 34.0 31.5 - 36.5 g/dL    RDW 13.1 10.0 - 15.0 %    Platelet Count 249 150 - 450 10e9/L    Diff Method Automated Method     % Neutrophils 65.8 %    % Lymphocytes 25.1 %    % Monocytes 6.4 %    % Eosinophils 2.3 %    % Basophils 0.1 %    % Immature Granulocytes 0.3 %    Absolute Neutrophil 4.9 1.6 - 8.3 10e9/L    Absolute Lymphocytes 1.9 0.8 - 5.3 10e9/L    Absolute Monocytes 0.5 0.0 - 1.3 10e9/L    Absolute Eosinophils 0.2 0.0 - 0.7 10e9/L    Absolute Basophils 0.0 0.0 - 0.2 10e9/L    Abs Immature Granulocytes 0.0 0 - 0.4 10e9/L   Comprehensive metabolic panel   Result Value Ref Range    Sodium 141 133 - 144 mmol/L    Potassium 3.8 3.4 - 5.3 mmol/L    Chloride 107 94 - 109 mmol/L    Carbon Dioxide 27 20 - 32 mmol/L    Anion Gap 7 3 - 14 mmol/L    Glucose 98 70 - 99 mg/dL    Urea Nitrogen 4 (L) 7 - 30 mg/dL    Creatinine 0.68 0.52 - 1.04 mg/dL    GFR Estimate >90 >60 mL/min/1.7m2    GFR Estimate If Black >90 >60 mL/min/1.7m2    Calcium 8.6 8.5 - 10.1 mg/dL    Bilirubin Total 0.4 0.2 - 1.3 mg/dL    Albumin 3.4 3.4 - 5.0 g/dL    Protein Total 7.0 6.8 - 8.8 g/dL    Alkaline Phosphatase 78 40 - 150 U/L    ALT 29 0 - 50 U/L    AST 24 0 - 45 U/L   Magnesium   Result Value Ref Range    Magnesium 2.0 1.6 - 2.3 mg/dL   Phosphorus   Result Value Ref Range    Phosphorus 3.6 2.5 - 4.5 mg/dL   Glucose by meter   Result Value Ref Range    Glucose 73 70 - 99 mg/dL            Attestation:  I have reviewed today's vital signs, notes, medications, labs and imaging.  Amount of time performed on this daily note: 20 minutes.     Demetris Ford MD, MD

## 2018-04-15 VITALS
HEIGHT: 60 IN | OXYGEN SATURATION: 96 % | WEIGHT: 148.81 LBS | TEMPERATURE: 98.5 F | SYSTOLIC BLOOD PRESSURE: 117 MMHG | HEART RATE: 87 BPM | BODY MASS INDEX: 29.22 KG/M2 | RESPIRATION RATE: 16 BRPM | DIASTOLIC BLOOD PRESSURE: 63 MMHG

## 2018-04-15 PROCEDURE — 25000128 H RX IP 250 OP 636: Performed by: SURGERY

## 2018-04-15 PROCEDURE — 25000132 ZZH RX MED GY IP 250 OP 250 PS 637: Performed by: FAMILY MEDICINE

## 2018-04-15 PROCEDURE — 25000132 ZZH RX MED GY IP 250 OP 250 PS 637: Performed by: SURGERY

## 2018-04-15 RX ORDER — HYDROMORPHONE HYDROCHLORIDE 2 MG/1
2-4 TABLET ORAL EVERY 4 HOURS PRN
Qty: 20 TABLET | Refills: 0 | Status: SHIPPED | OUTPATIENT
Start: 2018-04-15 | End: 2018-05-02

## 2018-04-15 RX ADMIN — HYDROMORPHONE HYDROCHLORIDE 2 MG: 2 TABLET ORAL at 09:47

## 2018-04-15 RX ADMIN — PANTOPRAZOLE SODIUM 40 MG: 40 TABLET, DELAYED RELEASE ORAL at 08:38

## 2018-04-15 RX ADMIN — ENOXAPARIN SODIUM 40 MG: 40 INJECTION SUBCUTANEOUS at 11:33

## 2018-04-15 RX ADMIN — CLOBETASOL PROPIONATE: 0.5 OINTMENT TOPICAL at 08:40

## 2018-04-15 RX ADMIN — HYDROMORPHONE HYDROCHLORIDE 2 MG: 2 TABLET ORAL at 04:21

## 2018-04-15 NOTE — PHARMACY - DISCHARGE MEDICATION RECONCILIATION
Discharge medication review for this patient is complete. Pharmacist assisted with medication reconciliation of discharge medications with prior to admission medications.     The following changes were made to the discharge medication list based on pharmacist review:  Added:  none  Discontinued: none  Changed: none      Patient's Discharge Medication List  - medications as listed on After Visit Summary (AVS)     Review of your medicines      START taking       Dose / Directions    HYDROmorphone 2 MG tablet   Commonly known as:  DILAUDID        Dose:  2-4 mg   Take 1-2 tablets (2-4 mg) by mouth every 4 hours as needed for moderate to severe pain   Quantity:  20 tablet   Refills:  0         CONTINUE these medicines which may have CHANGED, or have new prescriptions. If we are uncertain of the size of tablets/capsules you have at home, strength may be listed as something that might have changed.       Dose / Directions    docusate sodium 50 MG capsule   Commonly known as:  COLACE   This may have changed:    - medication strength  - when to take this        Dose:  100 mg   Take 2 capsules (100 mg) by mouth 2 times daily   Quantity:  60 capsule   Refills:  0         CONTINUE these medicines which have NOT CHANGED       Dose / Directions    amLODIPine 5 MG tablet   Commonly known as:  NORVASC   Notes to Patient:  Talk with your MD about this. Was not given in the hospital        Dose:  5 mg   Take 5 mg by mouth every evening   Refills:  0       clobetasol 0.05 % ointment   Commonly known as:  TEMOVATE        Apply  topically to affected area(s) 2 times daily.   Refills:  0       melatonin 1 MG/ML Liqd liquid        Dose:  2 mg   Take 2 mg by mouth At Bedtime   Refills:  0       MOTRIN PO        Dose:  600 mg   Take 600 mg by mouth 1-2 times daily   Refills:  0       tiZANidine 2 MG tablet   Commonly known as:  ZANAFLEX   Indication:  Muscle Spasticity, Acute left-sided low back pain with left-sided sciatica, Radiculopathy,  cervical region        Dose:  2 mg   Take 2 mg by mouth every 6 hours as needed   Refills:  0       * TUMS 500 MG chewable tablet   Generic drug:  calcium carbonate        Dose:  1-2 chew tab   Take 1-2 chew tab by mouth 3 times daily as needed.   Refills:  0       * TUMS 500 MG chewable tablet   Generic drug:  calcium carbonate        Dose:  500 mg   Take 500 mg by mouth 3 times daily   Refills:  0       * Notice:  This list has 2 medication(s) that are the same as other medications prescribed for you. Read the directions carefully, and ask your doctor or other care provider to review them with you.         Where to get your medicines      These medications were sent to [facility] [place], [place] - [address]  [address] [address]     Phone:  [phone]      docusate sodium 50 MG capsule         Some of these will need a paper prescription and others can be bought over the counter. Ask your nurse if you have questions.     Bring a paper prescription for each of these medications      HYDROmorphone 2 MG tablet           Magaly Ferreira, FatimahD

## 2018-04-15 NOTE — DISCHARGE SUMMARY
[facility]    Discharge Summary  General Surgery    Date of Admission:  4/10/2018  Date of Discharge:  4/15/2018  Discharging Provider: Adonis Norman D.O.    Discharge Diagnoses   Active Problems:    Brain syndrome, posttraumatic    IBS (irritable bowel syndrome)    Tobacco abuse    GERD (gastroesophageal reflux disease)    Psoriasis    S/P partial colectomy  Resolved Problems:    * No resolved hospital problems. *      Procedure/Surgery Information   Procedure: Procedure(s):  Lighted Stent Placement,Laparoscopic Assisted Sigmoid Colectomy - Wound Class: II-Clean Contaminated   - Wound Class: II-Clean Contaminated   Surgeon(s): Surgeon(s) and Role:  Panel 1:     * ERVIN Reina MD - Primary     * Rajan Lawson PA-C - Resident - Observing    Panel 2:     * Hill Murray MD - Primary     * Rajan Lawson PA-C - Assisting   Specimens:   ID Type Source Tests Collected by Time Destination   A : Sigmoid Colon- Stitch is Proximal  Tissue Large Intestine, Sigmoid SURGICAL PATHOLOGY EXAM ERVIN Reina MD 4/10/2018 12:12 PM    B : ANASTOMOTIC DONUTS Tissue Large Intestine, Other SURGICAL PATHOLOGY EXAM ERVIN Reina MD 4/10/2018 12:56 PM       Non-operative procedures None performed     History of Present Illness   Selina Parikh is a 52 year old female who presented with recurrent diverticulitis.  Elective Laparoscopic Sigmoidectomy was advised.    Hospital Course   Selina Parikh was admitted on 4/10/2018.  The following problems were addressed during her hospitalization:  Active Problems:    S/P partial colectomy    Assessment: stable    Plan: d/c home today      Post-operative pain control: included Hydromorphone (dilaudid) IV and Toradol and will be Hydromorphone oral on discharge.     Medications discontinued or adjusted during this hospitalization: No change     Antibiotics prescribed at discharge: None prescribed     Imaging study follow up needs:   -None  performed    Significant Findings: N/A    Adonis Norman D.O. jd  Discharge Disposition   Discharged to home   Condition at discharge: Satisfactory    Pending Results   Final pathology results:     A. Colon, Sigmoid:   - Diverticulosis without inflammation in sampled areas.   - Multiple hyperplastic polyps.   - Proximal and distal resection margins with viable colonic mucosa without    abnormalities.     B. Anastomosis donuts:   - Benign colonic mucosal rings without abnormality.     Primary Care Physician   STEVE VELASQUEZ    Physical Exam   Temp: 98.5  F (36.9  C) Temp src: Oral BP: 117/63 Pulse: 87   Resp: 16 SpO2: 96 % O2 Device: None (Room air)    Vitals:    04/10/18 0746 04/13/18 0546 04/15/18 0639   Weight: 68 kg (150 lb) 68.4 kg (150 lb 12.7 oz) 67.5 kg (148 lb 13 oz)     Vital Signs with Ranges  Temp:  [97.5  F (36.4  C)-98.5  F (36.9  C)] 98.5  F (36.9  C)  Pulse:  [82-88] 87  Resp:  [16] 16  BP: (113-131)/(53-65) 117/63  SpO2:  [95 %-98 %] 96 %       Constitutional: awake, alert, cooperative, no apparent distress, and appears stated age  Eyes: Lids and lashes normal, pupils equal, round and reactive to light, extra ocular muscles intact, sclera clear, conjunctiva normal  ENT: Normocephalic, without obvious abnormality, atraumatic,   Respiratory: No increased work of breathing, good air exchange,   Cardiovascular: Normal apical impulse, regular rate and rhythm,   GI: normal bowel sounds, soft, non-distended, appropriately tender, no masses palpated, no hepatosplenomegally.  Incisions c/d/i.   Skin: no bruising or bleeding and normal skin color, texture, turgor  Musculoskeletal: There is no redness, warmth, or swelling of the joints.  Full range of motion noted.  Motor strength is 5 out of 5 all extremities bilaterally.  Tone is normal.  Neurologic: Awake, alert, oriented to name, place and time.  Cranial nerves II-XII are grossly intact.  Motor is 5 out of 5 bilaterally.     Neuropsychiatric: General: normal, calm and normal eye contact    Consultations This Hospital Stay   HOSPITALIST IP CONSULT    Time Spent on this Encounter   I have spent greater than 30 minutes on this discharge.    Discharge Orders     Reason for your hospital stay   Patient with history of diverticulitis.  Underwent elective laparoscopic sigmoidectomy without complicaton.  She remained in hospital post-op for pain mgt and monitoring of bowel function. Diet was advanced as tolerated to regular.  Patient was passing flatus and had BM today.     D/c home in stable condition     Follow-up and recommended labs and tests    Follow up with Dr. Murray , at (location with clinic name or city) [facility], within 2  to evaluate after surgery. No follow up labs or test are needed.     Activity   Your activity upon discharge: activity as tolerated     When to contact your care team   Call your primary doctor if you have any of the following: temperature greater than 102 ,  increased shortness of breath, increased drainage, increased swelling or increased pain.     Wound care and dressings   Instructions to care for your wound at home: as directed.     Discharge Instructions     Full Code     Diet   Follow this diet upon discharge: Regular       Discharge Medications   Current Discharge Medication List      START taking these medications    Details   HYDROmorphone (DILAUDID) 2 MG tablet Take 1-2 tablets (2-4 mg) by mouth every 4 hours as needed for moderate to severe pain  Qty: 20 tablet, Refills: 0    Associated Diagnoses: S/P partial colectomy         CONTINUE these medications which have CHANGED    Details   docusate sodium (COLACE) 50 MG capsule Take 2 capsules (100 mg) by mouth 2 times daily  Qty: 60 capsule, Refills: 0    Associated Diagnoses: S/P partial colectomy         CONTINUE these medications which have NOT CHANGED    Details   !! calcium carbonate (TUMS) 500 MG chewable tablet Take 500 mg by mouth 3 times  daily       clobetasol (TEMOVATE) 0.05 % ointment Apply  topically to affected area(s) 2 times daily.      melatonin 1 MG/ML LIQD liquid Take 2 mg by mouth At Bedtime      amLODIPine (NORVASC) 5 MG tablet Take 5 mg by mouth every evening       tiZANidine (ZANAFLEX) 2 MG tablet Take 2 mg by mouth every 6 hours as needed       Ibuprofen (MOTRIN PO) Take 600 mg by mouth 1-2 times daily      !! calcium carbonate (TUMS) 500 MG chewable tablet Take 1-2 chew tab by mouth 3 times daily as needed.       !! - Potential duplicate medications found. Please discuss with provider.        Allergies   Allergies   Allergen Reactions     Ciprofloxacin Anaphylaxis     Flagyl [Metronidazole] Anaphylaxis     Zofran [Ondansetron] Other (See Comments) and GI Disturbance     Causes bloating, moderately uncomfortable, abd pain       Augmentin Nausea and Vomiting     Denies hives at this time     Percocet [Oxycodone-Acetaminophen] Other (See Comments)     Goes nuts - nasty mean irritated, can take Dilaudid     Vicodin [Hydrocodone-Acetaminophen] Other (See Comments)     Anxiety-agitation     Data   Results for orders placed or performed during the hospital encounter of 04/10/18   XR Abdomen 2 Views    Narrative    ABDOMEN TWO VIEWS   4/13/2018 7:55 AM     HISTORY: Bloating following colon resection.     COMPARISON: CT abdomen and pelvis 1/28/2018.      Impression    IMPRESSION: The visualized colon is filled with air but not  particularly dilated. Relative paucity of bowel gas in the small  bowel. No dilated air-filled loops of bowel identified. Findings are  nonspecific, but there is no obvious evidence of bowel obstruction.  Surgical staple line seen in the low pelvis. Surgical clip in the  right upper quadrant. No free air under the diaphragm on the upright  view. Visualized lung bases are clear. Degenerative changes in spine  particularly at L4-L5.    VITALIY GONZALEZ MD     Most Recent 3 CBC's:  Recent Labs   Lab Test  04/14/18   0654   04/12/18   0637  04/11/18   0719   WBC  7.4  8.3  12.3*   HGB  12.8  12.4  13.1   MCV  91  94  94   PLT  249  220  253     Most Recent 3 BMP's:  Recent Labs   Lab Test  04/14/18   0654  04/12/18   0637  04/11/18   0719   NA  141  141  140   POTASSIUM  3.8  3.9  4.2   CHLORIDE  107  107  107   CO2  27  29  27   BUN  4*  5*  9   CR  0.68  0.69  0.66   ANIONGAP  7  5  6   MAXWELL  8.6  8.2*  8.1*   GLC  98  92  98     Most Recent 2 LFT's:  Recent Labs   Lab Test  04/14/18   0654  10/12/16   1820   AST  24  16   ALT  29  19   ALKPHOS  78  94   BILITOTAL  0.4  0.2     Most Recent 3 INR's:  Recent Labs   Lab Test 03/25/13   INR  1.0     Most Recent 3 Troponin's:  Recent Labs   Lab Test  01/29/18   0130  06/16/12   2110  12/10/11   0536   12/09/11   1856   TROPI  <0.015  <0.012  <0.012   < >   --    TROPONIN   --    --    --    --   0.00    < > = values in this interval not displayed.     Most Recent 6 Bacteria Isolates From Any Culture (See EPIC Reports for Culture Details):No lab results found.  Most Recent Urinalysis:  Recent Labs   Lab Test  01/28/18   1035   06/16/12 2025   COLOR  Yellow   < >  Yellow   APPEARANCE  Clear   < >  Slightly Cloudy   URINEGLC  Negative   < >  Negative   URINEBILI  Negative   < >  Negative   URINEKETONE  Negative   < >  Negative   SG  1.024   < >  1.013   UBLD  Negative   < >  Negative   URINEPH  6.0   < >  7.0   PROTEIN  Negative   < >  Negative   NITRITE  Negative   < >  Negative   LEUKEST  Negative   < >  Negative   RBCU   --    --   0   WBCU   --    --   0    < > = values in this interval not displayed.     Most Recent ABG:No lab results found.

## 2018-04-15 NOTE — PROGRESS NOTES
[facility] DISCHARGE NOTE    Patient discharged to home at 11:50 am via wheel chair. Accompanied by daughter and staff. Discharge instructions reviewed with patient, opportunity offered to ask questions. Prescriptions sent to patients preferred pharmacy. All belongings sent with patient.    Samantha Silva RN

## 2018-04-19 ENCOUNTER — RECORDS - HEALTHEAST (OUTPATIENT)
Dept: ADMINISTRATIVE | Facility: OTHER | Age: 53
End: 2018-04-19

## 2018-04-23 ENCOUNTER — RECORDS - HEALTHEAST (OUTPATIENT)
Dept: ADMINISTRATIVE | Facility: OTHER | Age: 53
End: 2018-04-23

## 2018-05-02 ENCOUNTER — OFFICE VISIT (OUTPATIENT)
Dept: SURGERY | Facility: CLINIC | Age: 53
End: 2018-05-02
Payer: COMMERCIAL

## 2018-05-02 VITALS
WEIGHT: 148 LBS | TEMPERATURE: 98 F | DIASTOLIC BLOOD PRESSURE: 69 MMHG | BODY MASS INDEX: 29.06 KG/M2 | HEART RATE: 96 BPM | HEIGHT: 60 IN | SYSTOLIC BLOOD PRESSURE: 109 MMHG

## 2018-05-02 DIAGNOSIS — Z12.11 ENCOUNTER FOR SCREENING COLONOSCOPY: Primary | ICD-10-CM

## 2018-05-02 DIAGNOSIS — Z09 POSTOP CHECK: ICD-10-CM

## 2018-05-02 PROCEDURE — 99024 POSTOP FOLLOW-UP VISIT: CPT | Performed by: SURGERY

## 2018-05-02 ASSESSMENT — PAIN SCALES - GENERAL: PAINLEVEL: MILD PAIN (3)

## 2018-05-02 NOTE — NURSING NOTE
"Initial /69 (BP Location: Right arm, Patient Position: Sitting, Cuff Size: Adult Regular)  Pulse 96  Temp 98  F (36.7  C) (Oral)  Ht 1.53 m (5' 0.25\")  Wt 67.1 kg (148 lb)  BMI 28.66 kg/m2 Estimated body mass index is 28.66 kg/(m^2) as calculated from the following:    Height as of this encounter: 1.53 m (5' 0.25\").    Weight as of this encounter: 67.1 kg (148 lb). .    Estrellita Mariscal CMA    "

## 2018-05-02 NOTE — LETTER
"    5/2/2018         RE: Selina Parikh  [address] [address]        Dear Colleague,    Thank you for referring your patient, Selina Parikh, to the [facility]. Please see a copy of my visit note below.    No complaints.  Pain controlled with oral pain meds.    /69 (BP Location: Right arm, Patient Position: Sitting, Cuff Size: Adult Regular)  Pulse 96  Temp 98  F (36.7  C) (Oral)  Ht 1.53 m (5' 0.25\")  Wt 67.1 kg (148 lb)  BMI 28.66 kg/m2    Exam  [id number]  CTAB  RRR  S&NTND+BS, wounds - cdi s erythema  No CCE    A/P s/p lap sigmoid colon resection healing well.  No heavy lifting for 2 weeks.  RTC prn.    Hill Murray MD       Again, thank you for allowing me to participate in the care of your patient.        Sincerely,        Hill Murray MD    "

## 2018-05-02 NOTE — MR AVS SNAPSHOT
After Visit Summary   5/2/2018    Selina Parikh    MRN: [id number]           Patient Information     Date Of Birth          1965        Visit Information        Provider Department      5/2/2018 1:30 PM Hill Murray MD [facility]        Today's Diagnoses     Encounter for screening colonoscopy    -  1    Postop check          Care Instructions    Per Physician's instructions            Follow-ups after your visit        Additional Services     GASTROENTEROLOGY ADULT REF PROCEDURE ONLY       Last Lab Result: Creatinine (mg/dL)       Date                     Value                 04/14/2018               0.68             ----------  There is no height or weight on file to calculate BMI.     Needed:  No  Language:  English    Patient will be contacted to schedule procedure.     Please be aware that coverage of these services is subject to the terms and limitations of your health insurance plan.  Call member services at your health plan with any benefit or coverage questions.  Any procedures must be performed at a [place] facility OR coordinated by your clinic's referral office.    Please bring the following with you to your appointment:    (1) Any X-Rays, CTs or MRIs which have been performed.  Contact the facility where they were done to arrange for  prior to your scheduled appointment.    (2) List of current medications   (3) This referral request   (4) Any documents/labs given to you for this referral                  Who to contact     If you have questions or need follow up information about today's clinic visit or your schedule please contact [facility] directly at [phone].  Normal or non-critical lab and imaging results will be communicated to you by MyChart, letter or phone within 4 business days after the clinic has received the results. If you do not hear from us within 7 days, please contact the clinic through MyChart or phone. If you  "have a critical or abnormal lab result, we will notify you by phone as soon as possible.  Submit refill requests through Somewhere or call your pharmacy and they will forward the refill request to us. Please allow 3 business days for your refill to be completed.          Additional Information About Your Visit        "[employer]hart Information     Somewhere lets you send messages to your doctor, view your test results, renew your prescriptions, schedule appointments and more. To sign up, go to [email or web address] . Click on \"Log in\" on the left side of the screen, which will take you to the Welcome page. Then click on \"Sign up Now\" on the right side of the page.     You will be asked to enter the access code listed below, as well as some personal information. Please follow the directions to create your username and password.     Your access code is: [email or web address]  Expires: 2018  1:52 PM     Your access code will  in 90 days. If you need help or a new code, please call your [place] clinic or [phone].        Care EveryWhere ID     This is your Care EveryWhere ID. This could be used by other organizations to access your [place] medical records  [id number]        Your Vitals Were     Pulse Temperature Height BMI (Body Mass Index)          96 98  F (36.7  C) (Oral) 1.53 m (5' 0.25\") 28.66 kg/m2         Blood Pressure from Last 3 Encounters:   18 109/69   04/15/18 117/63   18 108/68    Weight from Last 3 Encounters:   18 67.1 kg (148 lb)   04/15/18 67.5 kg (148 lb 13 oz)   18 68 kg (150 lb)              We Performed the Following     GASTROENTEROLOGY ADULT REF PROCEDURE ONLY          Today's Medication Changes          These changes are accurate as of 18  1:52 PM.  If you have any questions, ask your nurse or doctor.               Stop taking these medicines if you haven't already. Please contact your care team if you have questions.     HYDROmorphone 2 MG tablet   Commonly known " as:  DILAUDID   Stopped by:  Hill Murary MD                    Primary Care Provider Office Phone # Fax #    Karen Paredes [phone]       [address] [address]        Equal Access to Services     ISIAH THOMPSON AH: Hadii aad ku hadasho Soomaali, waaxda luqadaha, qaybta kaalmada adeegyada, waxay owenin haydanan aricyimi mendoza la'danajessie benjamin. So [facility] [phone].    ATENCIÓN: Si habla español, tiene a carlson disposición servicios gratuitos de asistencia lingüística. Llame al [phone].    We comply with applicable federal civil rights laws and [place] laws. We do not discriminate on the basis of race, color, national origin, age, disability, sex, sexual orientation, or gender identity.            Thank you!     Thank you for choosing [facility]  for your care. Our goal is always to provide you with excellent care. Hearing back from our patients is one way we can continue to improve our services. Please take a few minutes to complete the written survey that you may receive in the mail after your visit with us. Thank you!             Your Updated Medication List - Protect others around you: Learn how to safely use, store and throw away your medicines at www.disposemymeds.org.          This list is accurate as of 5/2/18  1:52 PM.  Always use your most recent med list.                   Brand Name Dispense Instructions for use Diagnosis    amLODIPine 5 MG tablet    NORVASC     Take 5 mg by mouth every evening        clobetasol 0.05 % ointment    TEMOVATE     Apply  topically to affected area(s) 2 times daily.        docusate sodium 50 MG capsule    COLACE    60 capsule    Take 2 capsules (100 mg) by mouth 2 times daily    S/P partial colectomy       melatonin 1 MG/ML Liqd liquid      Take 2 mg by mouth At Bedtime        MOTRIN PO      Take 600 mg by mouth 1-2 times daily        tiZANidine 2 MG tablet    ZANAFLEX     Take 2 mg by mouth every 6  hours as needed        * TUMS 500 MG chewable tablet   Generic drug:  calcium carbonate      Take 1-2 chew tab by mouth 3 times daily as needed.        * TUMS 500 MG chewable tablet   Generic drug:  calcium carbonate      Take 500 mg by mouth 3 times daily        * Notice:  This list has 2 medication(s) that are the same as other medications prescribed for you. Read the directions carefully, and ask your doctor or other care provider to review them with you.

## 2018-05-02 NOTE — PROGRESS NOTES
"No complaints.  Pain controlled with oral pain meds.    /69 (BP Location: Right arm, Patient Position: Sitting, Cuff Size: Adult Regular)  Pulse 96  Temp 98  F (36.7  C) (Oral)  Ht 1.53 m (5' 0.25\")  Wt 67.1 kg (148 lb)  BMI 28.66 kg/m2    Exam  [id number]  CTAB  RRR  S&NTND+BS, wounds - cdi s erythema  No CCE    A/P s/p lap sigmoid colon resection healing well.  No heavy lifting for 2 weeks.  RTC prn.    Hill Murray MD     "

## 2018-05-23 ENCOUNTER — RECORDS - HEALTHEAST (OUTPATIENT)
Dept: ADMINISTRATIVE | Facility: OTHER | Age: 53
End: 2018-05-23

## 2018-10-11 ENCOUNTER — RECORDS - HEALTHEAST (OUTPATIENT)
Dept: ADMINISTRATIVE | Facility: OTHER | Age: 53
End: 2018-10-11

## 2018-10-31 ENCOUNTER — TRANSFERRED RECORDS (OUTPATIENT)
Dept: HEALTH INFORMATION MANAGEMENT | Facility: CLINIC | Age: 53
End: 2018-10-31

## 2018-11-07 ENCOUNTER — OFFICE VISIT (OUTPATIENT)
Dept: SURGERY | Facility: CLINIC | Age: 53
End: 2018-11-07
Payer: COMMERCIAL

## 2018-11-07 VITALS
BODY MASS INDEX: 30.23 KG/M2 | HEIGHT: 60 IN | DIASTOLIC BLOOD PRESSURE: 73 MMHG | WEIGHT: 154 LBS | SYSTOLIC BLOOD PRESSURE: 117 MMHG | HEART RATE: 79 BPM | TEMPERATURE: 97.8 F

## 2018-11-07 DIAGNOSIS — D12.5 ADENOMATOUS POLYP OF SIGMOID COLON: Primary | ICD-10-CM

## 2018-11-07 PROCEDURE — 99207 ZZC NO CHARGE LOS: CPT | Performed by: SURGERY

## 2018-11-07 NOTE — NURSING NOTE
"Initial /73 (BP Location: Right arm, Patient Position: Sitting, Cuff Size: Adult Regular)  Pulse 79  Temp 97.8  F (36.6  C) (Oral)  Ht 1.53 m (5' 0.25\")  Wt 69.9 kg (154 lb)  BMI 29.83 kg/m2 Estimated body mass index is 29.83 kg/(m^2) as calculated from the following:    Height as of this encounter: 1.53 m (5' 0.25\").    Weight as of this encounter: 69.9 kg (154 lb). .    Janae Johnson MA    "

## 2018-11-07 NOTE — LETTER
11/7/2018         RE: Selina Parikh  [address] [address]        Dear Colleague,    Thank you for referring your patient, Selina Parikh, to the [facility]. Please see a copy of my visit note below.    Patient here for follow-up of a colonoscopy done at outside facility.  During that procedure, a few small diverticula were still present and a 7 mm polyp was removed.  Pathology was a tubular adenoma.  Advised patient to repeat the colonoscopy in 5 years.    Hill Murray MD     Again, thank you for allowing me to participate in the care of your patient.        Sincerely,        Hill Murray MD

## 2018-11-07 NOTE — MR AVS SNAPSHOT
"              After Visit Summary   11/7/2018    Selina Parikh    MRN: [id number]           Patient Information     Date Of Birth          1965        Visit Information        Provider Department      11/7/2018 1:30 PM iHll Murray MD [facility]        Today's Diagnoses     Adenomatous polyp of sigmoid colon    -  1      Care Instructions    Per Dr. Murray's instructions          Follow-ups after your visit        Who to contact     If you have questions or need follow up information about today's clinic visit or your schedule please contact [facility] directly at [phone].  Normal or non-critical lab and imaging results will be communicated to you by MyChart, letter or phone within 4 business days after the clinic has received the results. If you do not hear from us within 7 days, please contact the clinic through MyChart or phone. If you have a critical or abnormal lab result, we will notify you by phone as soon as possible.  Submit refill requests through [employer] or call your pharmacy and they will forward the refill request to us. Please allow 3 business days for your refill to be completed.          Additional Information About Your Visit        Care EveryWhere ID     This is your Care EveryWhere ID. This could be used by other organizations to access your [place] medical records  [id number]        Your Vitals Were     Pulse Temperature Height BMI (Body Mass Index)          79 97.8  F (36.6  C) (Oral) 1.53 m (5' 0.25\") 29.83 kg/m2         Blood Pressure from Last 3 Encounters:   11/07/18 117/73   05/02/18 109/69   04/15/18 117/63    Weight from Last 3 Encounters:   11/07/18 69.9 kg (154 lb)   05/02/18 67.1 kg (148 lb)   04/15/18 67.5 kg (148 lb 13 oz)              Today, you had the following     No orders found for display       Primary Care Provider Office Phone # Fax #    Karen Paredes [phone]       [address] " [facility] [address]        Equal Access to Services     KELLE DONNA : Hadii maame harrison samira Powell, waaxda luqadaha, qaybta kaalmada fiona, mickey benjamin. So [facility] [phone].    ATENCIÓN: Si habla español, tiene a carlson disposición servicios gratuitos de asistencia lingüística. [facility] [phone].    We comply with applicable federal civil rights laws and [place] laws. We do not discriminate on the basis of race, color, national origin, age, disability, sex, sexual orientation, or gender identity.            Thank you!     Thank you for choosing [facility]  for your care. Our goal is always to provide you with excellent care. Hearing back from our patients is one way we can continue to improve our services. Please take a few minutes to complete the written survey that you may receive in the mail after your visit with us. Thank you!             Your Updated Medication List - Protect others around you: Learn how to safely use, store and throw away your medicines at www.disposemymeds.org.          This list is accurate as of 11/7/18  1:30 PM.  Always use your most recent med list.                   Brand Name Dispense Instructions for use Diagnosis    amLODIPine 5 MG tablet    NORVASC     Take 5 mg by mouth every evening        clobetasol 0.05 % ointment    TEMOVATE     Apply  topically to affected area(s) 2 times daily.        docusate sodium 50 MG capsule    COLACE    60 capsule    Take 2 capsules (100 mg) by mouth 2 times daily    S/P partial colectomy       melatonin 1 MG/ML Liqd liquid      Take 2 mg by mouth At Bedtime        MOTRIN PO      Take 600 mg by mouth 1-2 times daily        tiZANidine 2 MG tablet    ZANAFLEX     Take 2 mg by mouth every 6 hours as needed        * TUMS 500 MG chewable tablet   Generic drug:  calcium carbonate      Take 1-2 chew tab by mouth 3 times daily as needed.        * TUMS 500 MG chewable tablet   Generic drug:  calcium  carbonate      Take 500 mg by mouth 3 times daily        * Notice:  This list has 2 medication(s) that are the same as other medications prescribed for you. Read the directions carefully, and ask your doctor or other care provider to review them with you.

## 2018-11-07 NOTE — PROGRESS NOTES
Patient here for follow-up of a colonoscopy done at outside facility.  During that procedure, a few small diverticula were still present and a 7 mm polyp was removed.  Pathology was a tubular adenoma.  Advised patient to repeat the colonoscopy in 5 years.    Hill Murray MD

## 2018-12-06 ENCOUNTER — RECORDS - HEALTHEAST (OUTPATIENT)
Dept: ADMINISTRATIVE | Facility: OTHER | Age: 53
End: 2018-12-06

## 2018-12-08 ENCOUNTER — RECORDS - HEALTHEAST (OUTPATIENT)
Dept: ADMINISTRATIVE | Facility: OTHER | Age: 53
End: 2018-12-08

## 2019-01-17 ENCOUNTER — RECORDS - HEALTHEAST (OUTPATIENT)
Dept: ADMINISTRATIVE | Facility: OTHER | Age: 54
End: 2019-01-17

## 2019-03-07 ENCOUNTER — RECORDS - HEALTHEAST (OUTPATIENT)
Dept: ADMINISTRATIVE | Facility: OTHER | Age: 54
End: 2019-03-07

## 2019-07-08 ENCOUNTER — RECORDS - HEALTHEAST (OUTPATIENT)
Dept: ADMINISTRATIVE | Facility: OTHER | Age: 54
End: 2019-07-08

## 2019-07-11 ENCOUNTER — RECORDS - HEALTHEAST (OUTPATIENT)
Dept: ADMINISTRATIVE | Facility: OTHER | Age: 54
End: 2019-07-11

## 2019-11-01 ENCOUNTER — HOSPITAL ENCOUNTER (EMERGENCY)
Facility: CLINIC | Age: 54
Discharge: HOME | End: 2019-11-02
Attending: EMERGENCY MEDICINE | Admitting: EMERGENCY MEDICINE
Payer: COMMERCIAL

## 2019-11-01 ENCOUNTER — APPOINTMENT (OUTPATIENT)
Dept: CT IMAGING | Facility: CLINIC | Age: 54
End: 2019-11-01
Attending: EMERGENCY MEDICINE
Payer: COMMERCIAL

## 2019-11-01 VITALS
TEMPERATURE: 97.9 F | HEIGHT: 60 IN | OXYGEN SATURATION: 96 % | HEART RATE: 74 BPM | SYSTOLIC BLOOD PRESSURE: 140 MMHG | DIASTOLIC BLOOD PRESSURE: 75 MMHG | RESPIRATION RATE: 16 BRPM | BODY MASS INDEX: 30.43 KG/M2 | WEIGHT: 155 LBS

## 2019-11-01 DIAGNOSIS — R51.9 ACUTE NONINTRACTABLE HEADACHE, UNSPECIFIED HEADACHE TYPE: ICD-10-CM

## 2019-11-01 LAB
ALBUMIN SERPL-MCNC: 4.1 G/DL (ref 3.4–5)
ALP SERPL-CCNC: 88 U/L (ref 40–150)
ALT SERPL W P-5'-P-CCNC: 35 U/L (ref 0–50)
ANION GAP SERPL CALCULATED.3IONS-SCNC: 8 MMOL/L (ref 3–14)
AST SERPL W P-5'-P-CCNC: 21 U/L (ref 0–45)
BASOPHILS # BLD AUTO: 0 10E9/L (ref 0–0.2)
BASOPHILS NFR BLD AUTO: 0.5 %
BILIRUB SERPL-MCNC: 0.1 MG/DL (ref 0.2–1.3)
BUN SERPL-MCNC: 23 MG/DL (ref 7–30)
CALCIUM SERPL-MCNC: 9.4 MG/DL (ref 8.5–10.1)
CHLORIDE SERPL-SCNC: 116 MMOL/L (ref 94–109)
CO2 SERPL-SCNC: 27 MMOL/L (ref 20–32)
CREAT SERPL-MCNC: 0.84 MG/DL (ref 0.52–1.04)
DIFFERENTIAL METHOD BLD: NORMAL
EOSINOPHIL # BLD AUTO: 0.3 10E9/L (ref 0–0.7)
EOSINOPHIL NFR BLD AUTO: 2.8 %
ERYTHROCYTE [DISTWIDTH] IN BLOOD BY AUTOMATED COUNT: 14.1 % (ref 10–15)
ERYTHROCYTE [SEDIMENTATION RATE] IN BLOOD BY WESTERGREN METHOD: 9 MM/H (ref 0–30)
GFR SERPL CREATININE-BSD FRML MDRD: 78 ML/MIN/{1.73_M2}
GLUCOSE SERPL-MCNC: 81 MG/DL (ref 70–99)
HCT VFR BLD AUTO: 43.8 % (ref 35–47)
HGB BLD-MCNC: 14.3 G/DL (ref 11.7–15.7)
IMM GRANULOCYTES # BLD: 0 10E9/L (ref 0–0.4)
IMM GRANULOCYTES NFR BLD: 0.2 %
LYMPHOCYTES # BLD AUTO: 3.1 10E9/L (ref 0.8–5.3)
LYMPHOCYTES NFR BLD AUTO: 35.5 %
MCH RBC QN AUTO: 30.7 PG (ref 26.5–33)
MCHC RBC AUTO-ENTMCNC: 32.6 G/DL (ref 31.5–36.5)
MCV RBC AUTO: 94 FL (ref 78–100)
MONOCYTES # BLD AUTO: 0.6 10E9/L (ref 0–1.3)
MONOCYTES NFR BLD AUTO: 6.8 %
NEUTROPHILS # BLD AUTO: 4.8 10E9/L (ref 1.6–8.3)
NEUTROPHILS NFR BLD AUTO: 54.2 %
NRBC # BLD AUTO: 0 10*3/UL
NRBC BLD AUTO-RTO: 0 /100
PLATELET # BLD AUTO: 269 10E9/L (ref 150–450)
POTASSIUM SERPL-SCNC: 4.3 MMOL/L (ref 3.4–5.3)
PROT SERPL-MCNC: 7.8 G/DL (ref 6.8–8.8)
RBC # BLD AUTO: 4.66 10E12/L (ref 3.8–5.2)
SODIUM SERPL-SCNC: 139 MMOL/L (ref 133–144)
SODIUM SERPL-SCNC: 151 MMOL/L (ref 133–144)
TROPONIN I SERPL-MCNC: <0.015 UG/L (ref 0–0.04)
WBC # BLD AUTO: 8.9 10E9/L (ref 4–11)

## 2019-11-01 PROCEDURE — 99285 EMERGENCY DEPT VISIT HI MDM: CPT | Mod: 25

## 2019-11-01 PROCEDURE — 25000128 H RX IP 250 OP 636: Performed by: EMERGENCY MEDICINE

## 2019-11-01 PROCEDURE — 70450 CT HEAD/BRAIN W/O DYE: CPT

## 2019-11-01 PROCEDURE — 93005 ELECTROCARDIOGRAM TRACING: CPT

## 2019-11-01 PROCEDURE — 96375 TX/PRO/DX INJ NEW DRUG ADDON: CPT

## 2019-11-01 PROCEDURE — 99285 EMERGENCY DEPT VISIT HI MDM: CPT | Mod: 25 | Performed by: EMERGENCY MEDICINE

## 2019-11-01 PROCEDURE — 93010 ELECTROCARDIOGRAM REPORT: CPT | Mod: Z6 | Performed by: EMERGENCY MEDICINE

## 2019-11-01 PROCEDURE — 25800030 ZZH RX IP 258 OP 636: Performed by: EMERGENCY MEDICINE

## 2019-11-01 PROCEDURE — 84295 ASSAY OF SERUM SODIUM: CPT | Mod: 91 | Performed by: EMERGENCY MEDICINE

## 2019-11-01 PROCEDURE — 84484 ASSAY OF TROPONIN QUANT: CPT | Performed by: EMERGENCY MEDICINE

## 2019-11-01 PROCEDURE — 80053 COMPREHEN METABOLIC PANEL: CPT | Performed by: EMERGENCY MEDICINE

## 2019-11-01 PROCEDURE — 96374 THER/PROPH/DIAG INJ IV PUSH: CPT

## 2019-11-01 PROCEDURE — 85025 COMPLETE CBC W/AUTO DIFF WBC: CPT | Performed by: EMERGENCY MEDICINE

## 2019-11-01 PROCEDURE — 85652 RBC SED RATE AUTOMATED: CPT | Performed by: EMERGENCY MEDICINE

## 2019-11-01 PROCEDURE — 96361 HYDRATE IV INFUSION ADD-ON: CPT

## 2019-11-01 RX ORDER — KETOROLAC TROMETHAMINE 15 MG/ML
15 INJECTION, SOLUTION INTRAMUSCULAR; INTRAVENOUS ONCE
Status: COMPLETED | OUTPATIENT
Start: 2019-11-01 | End: 2019-11-01

## 2019-11-01 RX ADMIN — SODIUM CHLORIDE 1000 ML: 9 INJECTION, SOLUTION INTRAVENOUS at 22:53

## 2019-11-01 RX ADMIN — PROCHLORPERAZINE EDISYLATE 5 MG: 5 INJECTION INTRAMUSCULAR; INTRAVENOUS at 22:51

## 2019-11-01 RX ADMIN — KETOROLAC TROMETHAMINE 15 MG: 15 INJECTION, SOLUTION INTRAMUSCULAR; INTRAVENOUS at 22:50

## 2019-11-01 ASSESSMENT — ENCOUNTER SYMPTOMS
BACK PAIN: 0
HEADACHES: 1
PHOTOPHOBIA: 0
FEVER: 0
SHORTNESS OF BREATH: 1

## 2019-11-01 ASSESSMENT — MIFFLIN-ST. JEOR: SCORE: 1228.55

## 2019-11-01 NOTE — ED AVS SNAPSHOT
[facility]  [address] [address]  Phone:  [phone]  Fax:  [phone]                                    Selina Parikh   MRN: [id number]    Department:  [facility]   Date of Visit:  11/1/2019           After Visit Summary Signature Page    I have received my discharge instructions, and my questions have been answered. I have discussed any challenges I see with this plan with the nurse or doctor.    ..........................................................................................................................................  Patient/Patient Representative Signature      ..........................................................................................................................................  Patient Representative Print Name and Relationship to Patient    ..................................................               ................................................  Date                                   Time    ..........................................................................................................................................  Reviewed by Signature/Title    ...................................................              ..............................................  Date                                               Time          22EPIC Rev 08/18

## 2019-11-02 NOTE — ED NOTES
Received report from prev shift. Pt states she was supposed to get nerve block prior to CT, MD updated, will do nerve block first. Family at bedside. Lights dimmed for comfort.

## 2019-11-02 NOTE — ED PROVIDER NOTES
History     Chief Complaint   Patient presents with     Hypertension     onset of elevated BP yesterday, despite taking BP meds. pt state she took extra BP med at HS yesterday and then noted low BP's earlier today. tonight again it high again.      Headache     currently has headache     Chest Pain     tightness in chest, jaw discomfort, onset ot sx yesteday.      HPI  Selina Parikh is a 54 year old female who resents with headache and hypertension.  Denies chest pain.  Does have a history of tobacco use and COPD.  Says that her blood pressures at home of been in the 170s and 180s.  That this is causing her headaches.  Does have a history of traumatic brain injury from a job injury that happened many years ago.  Is on blood pressure medications.  Says her blood pressure started being high yesterday she noticed that she had a headache that she is says is on her bilateral temples and behind her eyes.  No photophobia.  No fevers.  Also has a sensation of jaw pain.  Contrary to triage note she does not have chest pain. No cardiac hx per patient. She does have some sensation of difficulty breathing.  Blood pressure on arrival was 148/90 with downtrending to 111/80 in the room. Nobody else in house with headache.     Allergies:  Allergies   Allergen Reactions     Ciprofloxacin Anaphylaxis     Flagyl [Metronidazole] Anaphylaxis     Zofran [Ondansetron] Other (See Comments) and GI Disturbance     Causes bloating, moderately uncomfortable, abd pain       Augmentin Nausea and Vomiting     Denies hives at this time     Percocet [Oxycodone-Acetaminophen] Other (See Comments)     Goes nuts - nasty mean irritated, can take Dilaudid     Vicodin [Hydrocodone-Acetaminophen] Other (See Comments)     Anxiety-agitation       Problem List:    Patient Active Problem List    Diagnosis Date Noted     S/P partial colectomy 04/10/2018     Priority: Medium     Diverticulitis 01/28/2018     Priority: Medium     Psoriasis 06/20/2012      Priority: Medium     GERD (gastroesophageal reflux disease) 03/15/2011     Priority: Medium     Tobacco abuse 02/17/2011     Priority: Medium     Health Care Home 01/27/2011     Priority: Medium     DX V65.8 REPLACED WITH [facility]ALTH CARE HOME (04/08/2013)       CARDIOVASCULAR SCREENING; LDL GOAL LESS THAN 160 10/31/2010     Priority: Medium     R Occipital Neuralgia 10/07/2009     Priority: Medium     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 On fiber, senna, and MOM. Advised to d/c MOM, start Miralax and titer to regular BM's. Pt has been seen by GI.        Brain syndrome, posttraumatic 03/06/2009     Priority: Medium     Work comp.  Has seen Dr. Ahmadi, [facility] clinic of neurology.  MRIs, EMG unremarkalbe, some foraminal stenosis on C5/C6  Sent her to physiatrist, trigger point injections didn't help.  After some neck traction, had intense unremitting HA, neck pain.  Off/on tingling in arms.  Pain clinic and/or spine surg for more eval probable next steps.    Has failed multiple rescue and preventive HA meds. On no narcotics          Past Medical History:    No past medical history on file.    Past Surgical History:    Past Surgical History:   Procedure Laterality Date     CHOLECYSTECTOMY, LAPOROSCOPIC  2/14/2000    Cholecystectomy, Laparoscopic     CYSTOSCOPY, INSERT LIGHTED STENT URETER(S) N/A 4/10/2018    Procedure: CYSTOSCOPY, INSERT LIGHTED STENT URETER(S);  Lighted Stent Placement,Laparoscopic Assisted Sigmoid Colectomy;  Surgeon: ERVIN Reina MD;  Location: [facility] OR     LAPAROSCOPIC ASSISTED COLECTOMY LEFT (DESCENDING) N/A 4/10/2018    Procedure: LAPAROSCOPIC ASSISTED COLECTOMY LEFT (DESCENDING);;  Surgeon: Hill Murray MD;  Location: [facility] OR     ORTHOPEDIC SURGERY  10/2010    Cut palm and reattched tendons and nerves in left hand  "forefinger.     SURGICAL HISTORY OF -   1/4/2000    Esophagogastroduodenoscopy with biopsy     TUBAL LIGATION         Family History:    Family History   Problem Relation Age of Onset     C.A.D. Father 50        50's     Diabetes Father      Diabetes Brother      C.A.D. Brother 42        quad bypass and MI     Diabetes Brother         2 brothers have DM     Cancer Brother 34        Melanoma     Allergies Son         Meds     Allergies Daughter         Med     Cancer Mother      C.A.D. Brother 38        MI age 38       Social History:  Marital Status:   [2]  Social History     Tobacco Use     Smoking status: Current Every Day Smoker     Packs/day: 0.50     Years: 30.00     Pack years: 15.00     Types: Cigarettes     Smokeless tobacco: Never Used     Tobacco comment: 1 in last 2 days   Substance Use Topics     Alcohol use: No     Drug use: No        Medications:    amLODIPine (NORVASC) 5 MG tablet  calcium carbonate (TUMS) 500 MG chewable tablet  calcium carbonate (TUMS) 500 MG chewable tablet  clobetasol (TEMOVATE) 0.05 % ointment  docusate sodium (COLACE) 50 MG capsule  Ibuprofen (MOTRIN PO)  melatonin 1 MG/ML LIQD liquid  tiZANidine (ZANAFLEX) 2 MG tablet          Review of Systems   Constitutional: Negative for fever.   HENT:        Jaw pain   Eyes: Negative for photophobia and visual disturbance.   Respiratory: Positive for shortness of breath.    Cardiovascular: Negative for chest pain.        Hypertension   Musculoskeletal: Negative for back pain.   Neurological: Positive for headaches.   All other systems reviewed and are negative.      Physical Exam   BP: (!) 148/90  Pulse: 74  Heart Rate: 77  Temp: 97.9  F (36.6  C)  Resp: 18  Height: 153 cm (5' 0.25\")  Weight: 70.3 kg (155 lb)  SpO2: 97 %      Physical Exam  Constitutional:       General: She is not in acute distress.     Appearance: She is not ill-appearing or diaphoretic.   HENT:      Head:      Comments: No exquisite scalp tenderness     Nose: No " congestion.      Mouth/Throat:      Pharynx: No oropharyngeal exudate.   Eyes:      Extraocular Movements: Extraocular movements intact.      Pupils: Pupils are equal, round, and reactive to light.      Comments: No cloudy cornea  PERRL   Neck:      Musculoskeletal: No neck rigidity or muscular tenderness.   Cardiovascular:      Rate and Rhythm: Normal rate.      Comments:  in room.  Pulmonary:      Effort: No respiratory distress.      Breath sounds: No stridor. No rhonchi.   Abdominal:      General: There is no distension.      Tenderness: There is no tenderness.      Hernia: No hernia is present.   Musculoskeletal:         General: No swelling or deformity.   Skin:     Coloration: Skin is not jaundiced.   Neurological:      Cranial Nerves: No cranial nerve deficit.      Sensory: No sensory deficit.      Comments: Normal gait   Psychiatric:      Comments: anxious         ED Course     1214 - CT head w/o acute findings. ESR is 9. Repeat Na is 139, favor lab error from first Na. Non-toxic, sleeping in room s/p lesser occipital nerve block. Woke patient, no headache. Has outpatient f/u. Wants to go home. Ok to discharge at this time.          [facility]    Procedure: Lesser Occipital Nerve Block  Date/Time: 11/1/2019 11:25 PM  Performed by: Ronnie Robbins MD  Authorized by: Ronnie Robbins MD        ANESTHESIA    Local Anesthetic: Bupivacaine 0.25% without epinephrine  Anesthetic Total (mL):  8    PROCEDURE   Patient Tolerance:  Patient tolerated the procedure well with no immediate complications  Describe Procedure: Sterile injection of bupivacaine to occipital nerve bilaterally. Sterilized skin. Performed sterile. No bleeding. Felt much better immediately.                  EKG Interpretation:      Interpreted by Ronnie Robbins MD  Time reviewed: 840  Symptoms at time of EKG: headache  Rhythm: normal sinus   Rate: normal  Axis: normal  Ectopy: none  Conduction:  normal  ST Segments/ T Waves: No ST-T wave changes  Q Waves: none  Comparison to prior: Grossly unchanged from 1/28/2018    Clinical Impression: normal EKG           No results found for this or any previous visit (from the past 24 hour(s)).    Medications   0.9% sodium chloride BOLUS (0 mLs Intravenous Stopped 11/2/19 0028)   prochlorperazine (COMPAZINE) injection 5 mg (5 mg Intravenous Given 11/1/19 2251)   ketorolac (TORADOL) injection 15 mg (15 mg Intravenous Given 11/1/19 2250)       Assessments & Plan (with Medical Decision Making)     I do not see any red flags on this patient's headache, however given her history of traumatic brain injury and her serious concern that this headache is dangerous I am going to obtain a head CT.  I also to treat her for pain and will perform an occipital nerve block. I am going to check an ESR. Her ECG does not show signs of ischemia. Troponin is negative, and I am doubtful of a cardiac etiology.     I have reviewed the nursing notes.    I have reviewed the findings, diagnosis, plan and need for follow up with the patient.    Discharge Medication List as of 11/2/2019 12:29 AM          Final diagnoses:   Acute nonintractable headache, unspecified headache type       11/1/2019   [facility] EMERGENCY DEPARTMENT     Ronnie Robbins MD  11/02/19 0028       Ronnie Robbins MD  11/02/19 0051       Ronnie Robbins MD  11/07/19 0948

## 2019-11-02 NOTE — DISCHARGE INSTRUCTIONS
You are seen today for headache and high blood pressure.  Your high blood pressure was not high here.  We did get a CT of your head and that was normal.  Your blood work was also reassuring.  Please take your medication at home as you normally would, and see your regular doctor within the next few days. Please come back for any fevers, confusion, or vomiting, or for any other symptoms that cause you concern.    Thank you for choosing [facility].

## 2019-11-02 NOTE — ED NOTES
Pt states HA is gone, c/o restless legs, would like to get up and walk, states she is uncomfortably lying in bed. Pt denies dizziness or lightheaded when up. Pt ambulating around ED, states legs feel better.

## 2019-11-15 ENCOUNTER — HOSPITAL ENCOUNTER (EMERGENCY)
Facility: CLINIC | Age: 54
Discharge: HOME | End: 2019-11-15
Attending: PHYSICIAN ASSISTANT | Admitting: PHYSICIAN ASSISTANT
Payer: COMMERCIAL

## 2019-11-15 VITALS
WEIGHT: 157 LBS | BODY MASS INDEX: 30.82 KG/M2 | HEIGHT: 60 IN | DIASTOLIC BLOOD PRESSURE: 84 MMHG | TEMPERATURE: 97.6 F | SYSTOLIC BLOOD PRESSURE: 132 MMHG | OXYGEN SATURATION: 97 %

## 2019-11-15 DIAGNOSIS — J20.9 ACUTE BRONCHITIS, UNSPECIFIED ORGANISM: ICD-10-CM

## 2019-11-15 DIAGNOSIS — J01.00 ACUTE NON-RECURRENT MAXILLARY SINUSITIS: ICD-10-CM

## 2019-11-15 PROCEDURE — G0463 HOSPITAL OUTPT CLINIC VISIT: HCPCS | Mod: 25 | Performed by: PHYSICIAN ASSISTANT

## 2019-11-15 PROCEDURE — 25000125 ZZHC RX 250: Performed by: PHYSICIAN ASSISTANT

## 2019-11-15 PROCEDURE — 99214 OFFICE O/P EST MOD 30 MIN: CPT | Mod: Z6 | Performed by: PHYSICIAN ASSISTANT

## 2019-11-15 PROCEDURE — 94640 AIRWAY INHALATION TREATMENT: CPT | Performed by: PHYSICIAN ASSISTANT

## 2019-11-15 RX ORDER — ALBUTEROL SULFATE 0.83 MG/ML
2.5 SOLUTION RESPIRATORY (INHALATION) EVERY 4 HOURS PRN
Qty: 1 BOX | Refills: 0 | Status: SHIPPED | OUTPATIENT
Start: 2019-11-15 | End: 2020-04-01

## 2019-11-15 RX ORDER — IPRATROPIUM BROMIDE AND ALBUTEROL SULFATE 2.5; .5 MG/3ML; MG/3ML
3 SOLUTION RESPIRATORY (INHALATION) ONCE
Status: COMPLETED | OUTPATIENT
Start: 2019-11-15 | End: 2019-11-15

## 2019-11-15 RX ORDER — PREDNISONE 20 MG/1
TABLET ORAL
Qty: 10 TABLET | Refills: 0 | Status: SHIPPED | OUTPATIENT
Start: 2019-11-15 | End: 2019-11-19

## 2019-11-15 RX ADMIN — IPRATROPIUM BROMIDE AND ALBUTEROL SULFATE 3 ML: .5; 3 SOLUTION RESPIRATORY (INHALATION) at 15:52

## 2019-11-15 ASSESSMENT — MIFFLIN-ST. JEOR: SCORE: 1237.62

## 2019-11-15 NOTE — ED PROVIDER NOTES
History     Chief Complaint   Patient presents with     Sinusitis     congestion for 10 days, using netti pot with no relief     HPI  Selina Parikh is a 54 year old female who presents to the urgent care with concern over illness which been present for at least last 10 days.  She reports that she quit smoking approximately 3 weeks ago.  In the last 10 days she has developed nasal congestion, sore throat, postnasal drainage, cough.  In the last 2 days she states that her cough is worse and accompanied by shortness of breath, wheezing and increasing head/sinus pressure.  She has not had any objective fever.  She denies any prior history of previously diagnosed asthma or COPD.  Prior to quitting she had a 40+ pack year history.      Allergies:  Allergies   Allergen Reactions     Ciprofloxacin Anaphylaxis     Flagyl [Metronidazole] Anaphylaxis     Zofran [Ondansetron] Other (See Comments) and GI Disturbance     Causes bloating, moderately uncomfortable, abd pain       Augmentin Nausea and Vomiting     Denies hives at this time     Percocet [Oxycodone-Acetaminophen] Other (See Comments)     Goes nuts - nasty mean irritated, can take Dilaudid     Vicodin [Hydrocodone-Acetaminophen] Other (See Comments)     Anxiety-agitation     Problem List:    Patient Active Problem List    Diagnosis Date Noted     S/P partial colectomy 04/10/2018     Priority: Medium     Diverticulitis 01/28/2018     Priority: Medium     Psoriasis 06/20/2012     Priority: Medium     GERD (gastroesophageal reflux disease) 03/15/2011     Priority: Medium     Tobacco abuse 02/17/2011     Priority: Medium     Health Care Home 01/27/2011     Priority: Medium     DX V65.8 REPLACED WITH [facility] (04/08/2013)       CARDIOVASCULAR SCREENING; LDL GOAL LESS THAN 160 10/31/2010     Priority: Medium     R Occipital Neuralgia 10/07/2009     Priority: Medium     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 On fiber, senna, and MOM. Advised to d/c MOM, start Miralax and titer to regular BM's. Pt has been seen by GI.        Brain syndrome, posttraumatic 03/06/2009     Priority: Medium     Work comp.  Has seen Dr. Ahmadi, [facility].  MRIs, EMG unremarkalbe, some foraminal stenosis on C5/C6  Sent her to physiatrist, trigger point injections didn't help.  After some neck traction, had intense unremitting HA, neck pain.  Off/on tingling in arms.  Pain clinic and/or spine surg for more eval probable next steps.    Has failed multiple rescue and preventive HA meds. On no narcotics        Past Medical History:    History reviewed. No pertinent past medical history.    Past Surgical History:    Past Surgical History:   Procedure Laterality Date     CHOLECYSTECTOMY, LAPOROSCOPIC  2/14/2000    Cholecystectomy, Laparoscopic     CYSTOSCOPY, INSERT LIGHTED STENT URETER(S) N/A 4/10/2018    Procedure: CYSTOSCOPY, INSERT LIGHTED STENT URETER(S);  Lighted Stent Placement,Laparoscopic Assisted Sigmoid Colectomy;  Surgeon: ERVIN Reina MD;  Location: [facility]     LAPAROSCOPIC ASSISTED COLECTOMY LEFT (DESCENDING) N/A 4/10/2018    Procedure: LAPAROSCOPIC ASSISTED COLECTOMY LEFT (DESCENDING);;  Surgeon: Hill Murray MD;  Location: [facility] OR     ORTHOPEDIC SURGERY  10/2010    Cut palm and reattched tendons and nerves in left hand forefinger.     SURGICAL HISTORY OF -   1/4/2000    Esophagogastroduodenoscopy with biopsy     TUBAL LIGATION       Family History:    Family History   Problem Relation Age of Onset     C.A.D. Father 50        50's     Diabetes Father      Diabetes Brother      C.A.D. Brother 42        quad bypass and MI     Diabetes Brother         2 brothers have DM     Cancer Brother 34        Melanoma     Allergies Son         Meds     Allergies Daughter         Med     Cancer Mother       "MIRIAN Brother 38        MI age 38     Social History:  Marital Status:   [2]  Social History     Tobacco Use     Smoking status: Current Every Day Smoker     Packs/day: 0.50     Years: 30.00     Pack years: 15.00     Types: Cigarettes     Smokeless tobacco: Never Used     Tobacco comment: 1 in last 2 days   Substance Use Topics     Alcohol use: No     Drug use: No      Medications:    amLODIPine (NORVASC) 5 MG tablet  calcium carbonate (TUMS) 500 MG chewable tablet  calcium carbonate (TUMS) 500 MG chewable tablet  clobetasol (TEMOVATE) 0.05 % ointment  docusate sodium (COLACE) 50 MG capsule  Ibuprofen (MOTRIN PO)  melatonin 1 MG/ML LIQD liquid  tiZANidine (ZANAFLEX) 2 MG tablet      Review of Systems  CONSTITUTIONAL:POSITIVE  for chills, myalgias and NEGATIVE  for fever  INTEGUMENTARY/SKIN: NEGATIVE for worrisome rashes, moles or lesions  EYES: NEGATIVE for vision changes or irritation  ENT/MOUTH: POSITIVE for nasal congestion, ins pressure, sore throat and bilateral ear pressure  RESP:POSITIVE for cough, shortness of breath, wheezing   GI: NEGATIVE for vomiting, diarrhea or abdominal pain  Physical Exam   BP: 132/84  Heart Rate: 81  Temp: 97.6  F (36.4  C)  Height: 153 cm (5' 0.25\")  Weight: 71.2 kg (157 lb)  SpO2: 97 %  Physical Exam  GENERAL APPEARANCE: alert non-toxic and no distress  EYES: EOMI,  PERRL, conjunctiva clear  HENT: ear canals and TM's normal.  Nasal mucosa moist.  Posterior pharynx is nonerythematous without exudate, tenderness to palpation of bilateral maxillary, frontal sinuses.  NECK: supple, nontender, no lymphadenopathy  RESP: expiratory wheezing, no rales, rhonchi   CV: regular rates and rhythm, normal S1 S2, no murmur noted  SKIN: no suspicious lesions or rashes  ED Course        Procedures        Critical Care time:  none           Medications   ipratropium - albuterol 0.5 mg/2.5 mg/3 mL (DUONEB) neb solution 3 mL (3 mLs Nebulization Given 11/15/19 1552)     Patient tolerated DuoNeb " and did report some symptomatic improvement.  On repeat auscultation wheezing had resolved   Assessments & Plan (with Medical Decision Making)     I have reviewed the nursing notes.    I have reviewed the findings, diagnosis, plan and need for follow up with the patient.       Discharge Medication List as of 11/15/2019  4:10 PM      START taking these medications    Details   albuterol (PROVENTIL) (2.5 MG/3ML) 0.083% neb solution Take 1 vial (2.5 mg) by nebulization every 4 hours as needed for shortness of breath / dyspnea or wheezing, Disp-1 Box, R-0, E-Prescribe      amoxicillin-clavulanate (AUGMENTIN) 875-125 MG tablet Take 1 tablet by mouth 2 times daily for 7 days, Disp-14 tablet, R-0, E-Prescribe      predniSONE (DELTASONE) 20 MG tablet Take two tablets (= 40mg) each day for 5 (five) days, Disp-10 tablet, R-0, E-Prescribe           Final diagnoses:   Acute non-recurrent maxillary sinusitis   Acute bronchitis, unspecified organism     54-year-old female presents to the urgent over 10-day history of nasal congestion, sore throat, postnasal drainage, shortness of breath, wheezing.  She had stable vital signs upon arrival.  Physical exam findings as described above were significant for maxillary and frontal sinus tenderness to palpation, bilateral expiratory wheezing.  Patient tolerated DuoNeb and did report some symptomatic improvement.  On repeat auscultation wheezing is resolved.  Is consistent with bronchitis, sinusitis.  I do not suspect pneumonia however discussed versus benefits of obtaining chest x-ray and patient elected to defer.  She was discharged with stable prescription for albuterol nebs, prednisone, Augmentin.  Follow-up with primary care provider if no improvement within the next 48 to 72 hours.  Worrisome reasons to return to the ER/UC sooner discussed.    Disclaimer: This note consists of symbols derived from keyboarding, dictation, and/or voice recognition software. As a result, there may be  errors in the script that have gone undetected.  Please consider this when interpreting information found in the chart.    11/15/2019   [facility] EMERGENCY DEPARTMENT     Julia Min PA-C  11/19/19 0996

## 2019-11-15 NOTE — ED AVS SNAPSHOT
[facility] Emergency Department  [address] [address]  Phone:  [phone]  Fax:  [phone]                                    Selina Parikh   MRN: [id number]    Department:  [facility] Emergency Department   Date of Visit:  11/15/2019           After Visit Summary Signature Page    I have received my discharge instructions, and my questions have been answered. I have discussed any challenges I see with this plan with the nurse or doctor.    ..........................................................................................................................................  Patient/Patient Representative Signature      ..........................................................................................................................................  Patient Representative Print Name and Relationship to Patient    ..................................................               ................................................  Date                                   Time    ..........................................................................................................................................  Reviewed by Signature/Title    ...................................................              ..............................................  Date                                               Time          22EPIC Rev 08/18

## 2019-11-19 ENCOUNTER — HOSPITAL ENCOUNTER (EMERGENCY)
Facility: CLINIC | Age: 54
Discharge: HOME | End: 2019-11-19
Attending: PHYSICIAN ASSISTANT | Admitting: PHYSICIAN ASSISTANT
Payer: COMMERCIAL

## 2019-11-19 ENCOUNTER — APPOINTMENT (OUTPATIENT)
Dept: GENERAL RADIOLOGY | Facility: CLINIC | Age: 54
End: 2019-11-19
Attending: PHYSICIAN ASSISTANT
Payer: COMMERCIAL

## 2019-11-19 VITALS
SYSTOLIC BLOOD PRESSURE: 114 MMHG | TEMPERATURE: 97.3 F | OXYGEN SATURATION: 97 % | DIASTOLIC BLOOD PRESSURE: 75 MMHG | RESPIRATION RATE: 16 BRPM

## 2019-11-19 DIAGNOSIS — J20.9 ACUTE BRONCHITIS: ICD-10-CM

## 2019-11-19 DIAGNOSIS — J01.90 ACUTE SINUSITIS WITH SYMPTOMS > 10 DAYS: ICD-10-CM

## 2019-11-19 PROCEDURE — G0463 HOSPITAL OUTPT CLINIC VISIT: HCPCS | Mod: 25

## 2019-11-19 PROCEDURE — 71046 X-RAY EXAM CHEST 2 VIEWS: CPT

## 2019-11-19 PROCEDURE — 99214 OFFICE O/P EST MOD 30 MIN: CPT | Mod: Z6 | Performed by: PHYSICIAN ASSISTANT

## 2019-11-19 RX ORDER — ALBUTEROL SULFATE 90 UG/1
2 AEROSOL, METERED RESPIRATORY (INHALATION) EVERY 6 HOURS PRN
Qty: 1 INHALER | Refills: 0 | Status: SHIPPED | OUTPATIENT
Start: 2019-11-19 | End: 2019-12-12

## 2019-11-19 RX ORDER — PREDNISONE 20 MG/1
TABLET ORAL
Qty: 9 TABLET | Refills: 0 | Status: SHIPPED | OUTPATIENT
Start: 2019-11-19 | End: 2019-12-12

## 2019-11-19 RX ORDER — IPRATROPIUM BROMIDE AND ALBUTEROL SULFATE 2.5; .5 MG/3ML; MG/3ML
1 SOLUTION RESPIRATORY (INHALATION) EVERY 6 HOURS PRN
Qty: 1 BOX | Refills: 0 | Status: SHIPPED | OUTPATIENT
Start: 2019-11-19 | End: 2019-12-12

## 2019-11-19 NOTE — ED PROVIDER NOTES
"  History     Chief Complaint   Patient presents with     Sinusitis     HPI  Selina Parikh is a 54 year old female who presents with complaints of worsening sinus pain and pressure, nasal congestion, facial pain, headaches, sore throat, left ear pain, rhinorrhea, postnasal drainage, and productive cough for the past 2 to 3 weeks.  Patient was evaluated in the urgent care 4 days ago and was diagnosed with sinusitis and bronchitis for which she was treated with Augmentin, Prednisone, and Albuterol.  She states she has been taking her medications without improvement.  She continues to be short of breath and wheezy.  Patient continues to smoke 1 cigarette in the morning and one at night but is trying to cut back.  She reports history of COPD diagnosis in the past.  Denies fevers, chills, rash, or chest pain.  Patient states she feels like she can \"taste a staph infection.\"  Pt has 40+ pack year history of smoking.      Allergies:  Allergies   Allergen Reactions     Ciprofloxacin Anaphylaxis     Flagyl [Metronidazole] Anaphylaxis     Zofran [Ondansetron] Other (See Comments) and GI Disturbance     Causes bloating, moderately uncomfortable, abd pain       Augmentin Nausea and Vomiting     Denies hives at this time     Percocet [Oxycodone-Acetaminophen] Other (See Comments)     Goes nuts - nasty mean irritated, can take Dilaudid     Vicodin [Hydrocodone-Acetaminophen] Other (See Comments)     Anxiety-agitation       Problem List:    Patient Active Problem List    Diagnosis Date Noted     S/P partial colectomy 04/10/2018     Priority: Medium     Diverticulitis 01/28/2018     Priority: Medium     Psoriasis 06/20/2012     Priority: Medium     GERD (gastroesophageal reflux disease) 03/15/2011     Priority: Medium     Tobacco abuse 02/17/2011     Priority: Medium     Health Care Home 01/27/2011     Priority: Medium     DX V65.8 REPLACED WITH [facility] (04/08/2013)       CARDIOVASCULAR SCREENING; LDL GOAL LESS " THAN 160 10/31/2010     Priority: Medium     R Occipital Neuralgia 10/07/2009     Priority: Medium     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 On fiber, senna, and MOM. Advised to d/c MOM, start Miralax and titer to regular BM's. Pt has been seen by GI.        Brain syndrome, posttraumatic 03/06/2009     Priority: Medium     Work comp.  Has seen Dr. Ahmadi, [facility] clinic of neurology.  MRIs, EMG unremarkalbe, some foraminal stenosis on C5/C6  Sent her to physiatrist, trigger point injections didn't help.  After some neck traction, had intense unremitting HA, neck pain.  Off/on tingling in arms.  Pain clinic and/or spine surg for more eval probable next steps.    Has failed multiple rescue and preventive HA meds. On no narcotics          Past Medical History:    History reviewed. No pertinent past medical history.    Past Surgical History:    Past Surgical History:   Procedure Laterality Date     CHOLECYSTECTOMY, LAPOROSCOPIC  2/14/2000    Cholecystectomy, Laparoscopic     CYSTOSCOPY, INSERT LIGHTED STENT URETER(S) N/A 4/10/2018    Procedure: CYSTOSCOPY, INSERT LIGHTED STENT URETER(S);  Lighted Stent Placement,Laparoscopic Assisted Sigmoid Colectomy;  Surgeon: ERVIN Reina MD;  Location: [facility] OR     LAPAROSCOPIC ASSISTED COLECTOMY LEFT (DESCENDING) N/A 4/10/2018    Procedure: LAPAROSCOPIC ASSISTED COLECTOMY LEFT (DESCENDING);;  Surgeon: Hill Murray MD;  Location: [facility] OR     ORTHOPEDIC SURGERY  10/2010    Cut palm and reattched tendons and nerves in left hand forefinger.     SURGICAL HISTORY OF -   1/4/2000    Esophagogastroduodenoscopy with biopsy     TUBAL LIGATION         Family History:    Family History   Problem Relation Age of Onset     C.A.D. Father 50        50's     Diabetes Father      Diabetes Brother      C.A.D. Brother 42         quad bypass and MI     Diabetes Brother         2 brothers have DM     Cancer Brother 34        Melanoma     Allergies Son         Meds     Allergies Daughter         Med     Cancer Mother      C.A.D. Brother 38        MI age 38       Social History:  Marital Status:   [2]  Social History     Tobacco Use     Smoking status: Current Every Day Smoker     Packs/day: 0.50     Years: 30.00     Pack years: 15.00     Types: Cigarettes     Smokeless tobacco: Never Used     Tobacco comment: 1 in last 2 days   Substance Use Topics     Alcohol use: No     Drug use: No        Medications:    albuterol (PROAIR HFA/PROVENTIL HFA/VENTOLIN HFA) 108 (90 Base) MCG/ACT inhaler  amoxicillin-clavulanate (AUGMENTIN) 875-125 MG tablet  ipratropium - albuterol 0.5 mg/2.5 mg/3 mL (DUONEB) 0.5-2.5 (3) MG/3ML neb solution  predniSONE (DELTASONE) 20 MG tablet  albuterol (PROVENTIL) (2.5 MG/3ML) 0.083% neb solution  amLODIPine (NORVASC) 5 MG tablet  amoxicillin-clavulanate (AUGMENTIN) 875-125 MG tablet  calcium carbonate (TUMS) 500 MG chewable tablet  calcium carbonate (TUMS) 500 MG chewable tablet  clobetasol (TEMOVATE) 0.05 % ointment  docusate sodium (COLACE) 50 MG capsule  Ibuprofen (MOTRIN PO)  melatonin 1 MG/ML LIQD liquid  tiZANidine (ZANAFLEX) 2 MG tablet          Review of Systems   Constitutional: Negative.  Negative for fever.   HENT: Positive for congestion, ear pain, postnasal drip, rhinorrhea, sinus pressure, sinus pain and sore throat.    Respiratory: Positive for cough, shortness of breath and wheezing.    Cardiovascular: Negative.    Musculoskeletal: Negative.    Skin: Negative.    Neurological: Positive for headaches.   All other systems reviewed and are negative.      Physical Exam   BP: 114/75  Heart Rate: 82  Temp: 97.3  F (36.3  C)  Resp: 16  SpO2: 97 %      Physical Exam  Constitutional:       General: She is not in acute distress.     Appearance: She is well-developed. She is not ill-appearing, toxic-appearing or  diaphoretic.   HENT:      Head: Normocephalic and atraumatic.      Right Ear: Hearing, tympanic membrane, ear canal and external ear normal.      Left Ear: Hearing, tympanic membrane, ear canal and external ear normal.      Nose: Mucosal edema, congestion and rhinorrhea present.      Mouth/Throat:      Lips: Pink. No lesions.      Mouth: Mucous membranes are moist. No oral lesions.      Pharynx: Uvula midline. Posterior oropharyngeal erythema present. No pharyngeal swelling, oropharyngeal exudate or uvula swelling.      Tonsils: No tonsillar exudate or tonsillar abscesses.   Eyes:      Conjunctiva/sclera: Conjunctivae normal.      Pupils: Pupils are equal, round, and reactive to light.   Neck:      Musculoskeletal: Full passive range of motion without pain, normal range of motion and neck supple. Normal range of motion. No neck rigidity.   Cardiovascular:      Rate and Rhythm: Normal rate and regular rhythm.      Heart sounds: Normal heart sounds.   Pulmonary:      Effort: Pulmonary effort is normal. No respiratory distress.      Breath sounds: Normal air entry. No stridor, decreased air movement or transmitted upper airway sounds. Wheezing present. No decreased breath sounds, rhonchi or rales.   Musculoskeletal: Normal range of motion.   Lymphadenopathy:      Cervical: No cervical adenopathy.   Skin:     General: Skin is warm and dry.   Neurological:      Mental Status: She is alert and oriented to person, place, and time.         ED Course        Procedures      Results for orders placed or performed during the hospital encounter of 11/19/19 (from the past 24 hour(s))   XR Chest 2 Views    Narrative    CHEST TWO VIEWS  11/19/2019 2:22 PM     HISTORY: Cough.    COMPARISON: Chest x-ray 12/6/2011.      Impression    IMPRESSION: PA and lateral views of the chest. Lungs are clear. Heart  is normal in size. No effusions are evident. No pneumothorax. Cervical  fusion hardware is noted.    DAKOTA KURTZ MD       Medications  - No data to display    Assessments & Plan (with Medical Decision Making)     Pt is a 54 year old female who presents with complaints of worsening sinus pain and pressure, nasal congestion, facial pain, headaches, sore throat, left ear pain, rhinorrhea, postnasal drainage, and productive cough for the past 2 to 3 weeks.  Patient was evaluated in the urgent care 4 days ago and was diagnosed with sinusitis and bronchitis for which she was treated with Augmentin, Prednisone, and Albuterol.  She states she has been taking her medications without improvement.  She continues to be short of breath and wheezy.  Patient continues to smoke 1 cigarette in the morning and one at night but is trying to cut back.  She reports history of COPD diagnosis in the past.  Pt has 40+ pack year history of smoking.  Pt is afebrile on arrival.  Exam as above.  Chest x-ray was obtained today was negative for pneumonia or acute pathology.  Discussed results with patient.  Instructed patient to continue taking her Augmentin antibiotic as prescribed.  I believe this will provide adequate coverage for her sinusitis and will not yet call this a treatment failure as she is only on day 4.  Will extend her antibiotic course for another 3 days for total of a 10-day course as well as a taper of Prednisone.  Return precautions were reviewed.  Hand-outs were provided.    Patient was instructed to follow-up with PCP if no improvement in 5-7 days for continued care and management or sooner if new or worsening symptoms.  She is to return to the ED for persistent and/or worsening symptoms.  Patient expressed understanding of the diagnosis and plan and was discharged home in good condition.    I have reviewed the nursing notes.    I have reviewed the findings, diagnosis, plan and need for follow up with the patient.    Discharge Medication List as of 11/19/2019  2:41 PM      START taking these medications    Details   albuterol (PROAIR HFA/PROVENTIL  HFA/VENTOLIN HFA) 108 (90 Base) MCG/ACT inhaler Inhale 2 puffs into the lungs every 6 hours as needed for shortness of breath / dyspnea or wheezing, Disp-1 Inhaler, R-0, E-Prescribe      !! amoxicillin-clavulanate (AUGMENTIN) 875-125 MG tablet Take 1 tablet by mouth 2 times daily for 3 days Continue this after your current antibiotic course for a total of 10-days, Disp-6 tablet, R-0, E-Prescribe      ipratropium - albuterol 0.5 mg/2.5 mg/3 mL (DUONEB) 0.5-2.5 (3) MG/3ML neb solution Take 1 vial (3 mLs) by nebulization every 6 hours as needed for shortness of breath / dyspnea or wheezing, Disp-1 Box, R-0, E-Prescribe      predniSONE (DELTASONE) 20 MG tablet 2 tabs day 1-2, then 1 tab days 3-4, then 1/2 tab daily for 6 days, Disp-9 tablet, R-0, E-Prescribe       !! - Potential duplicate medications found. Please discuss with provider.          Final diagnoses:   Acute sinusitis with symptoms > 10 days   Acute bronchitis       11/19/2019   [facility] EMERGENCY DEPARTMENT      Disclaimer:  This note consists of symbols derived from keyboarding, dictation and/or voice recognition software.  As a result, there may be errors in the script that have gone undetected.  Please consider this when interpreting information found in this chart.     Ruth Terry PA-C  11/20/19 [id number]

## 2019-11-19 NOTE — ED AVS SNAPSHOT
[facility] Emergency Department  [address] [address]  Phone:  [phone]  Fax:  [phone]                                    Selina Parikh   MRN: [id number]    Department:  [facility] Emergency Department   Date of Visit:  11/19/2019           After Visit Summary Signature Page    I have received my discharge instructions, and my questions have been answered. I have discussed any challenges I see with this plan with the nurse or doctor.    ..........................................................................................................................................  Patient/Patient Representative Signature      ..........................................................................................................................................  Patient Representative Print Name and Relationship to Patient    ..................................................               ................................................  Date                                   Time    ..........................................................................................................................................  Reviewed by Signature/Title    ...................................................              ..............................................  Date                                               Time          22EPIC Rev 08/18

## 2019-11-20 ASSESSMENT — ENCOUNTER SYMPTOMS
WHEEZING: 1
CONSTITUTIONAL NEGATIVE: 1
RHINORRHEA: 1
SORE THROAT: 1
COUGH: 1
SINUS PRESSURE: 1
SINUS PAIN: 1
CARDIOVASCULAR NEGATIVE: 1
SHORTNESS OF BREATH: 1
HEADACHES: 1
FEVER: 0
MUSCULOSKELETAL NEGATIVE: 1

## 2019-12-12 ENCOUNTER — ANCILLARY PROCEDURE (OUTPATIENT)
Dept: GENERAL RADIOLOGY | Facility: CLINIC | Age: 54
End: 2019-12-12
Attending: NURSE PRACTITIONER
Payer: COMMERCIAL

## 2019-12-12 ENCOUNTER — OFFICE VISIT (OUTPATIENT)
Dept: FAMILY MEDICINE | Facility: CLINIC | Age: 54
End: 2019-12-12
Payer: COMMERCIAL

## 2019-12-12 VITALS
SYSTOLIC BLOOD PRESSURE: 118 MMHG | HEIGHT: 60 IN | HEART RATE: 83 BPM | TEMPERATURE: 97.1 F | RESPIRATION RATE: 16 BRPM | BODY MASS INDEX: 30.63 KG/M2 | DIASTOLIC BLOOD PRESSURE: 86 MMHG | WEIGHT: 156 LBS | OXYGEN SATURATION: 97 %

## 2019-12-12 DIAGNOSIS — R05.9 COUGH: ICD-10-CM

## 2019-12-12 DIAGNOSIS — J01.00 ACUTE NON-RECURRENT MAXILLARY SINUSITIS: Primary | ICD-10-CM

## 2019-12-12 DIAGNOSIS — K21.9 GASTROESOPHAGEAL REFLUX DISEASE WITHOUT ESOPHAGITIS: ICD-10-CM

## 2019-12-12 DIAGNOSIS — J20.9 ACUTE BRONCHITIS, UNSPECIFIED ORGANISM: ICD-10-CM

## 2019-12-12 PROCEDURE — 71046 X-RAY EXAM CHEST 2 VIEWS: CPT

## 2019-12-12 PROCEDURE — 99203 OFFICE O/P NEW LOW 30 MIN: CPT | Performed by: NURSE PRACTITIONER

## 2019-12-12 RX ORDER — ALBUTEROL SULFATE 90 UG/1
2 AEROSOL, METERED RESPIRATORY (INHALATION) EVERY 6 HOURS PRN
Qty: 1 INHALER | Refills: 3 | Status: SHIPPED | OUTPATIENT
Start: 2019-12-12 | End: 2020-09-11

## 2019-12-12 RX ORDER — BENZONATATE 100 MG/1
CAPSULE ORAL
Refills: 0 | COMMUNITY
Start: 2019-11-14 | End: 2020-02-12

## 2019-12-12 RX ORDER — IPRATROPIUM BROMIDE AND ALBUTEROL SULFATE 2.5; .5 MG/3ML; MG/3ML
1 SOLUTION RESPIRATORY (INHALATION) EVERY 6 HOURS PRN
Qty: 1 BOX | Refills: 1 | Status: SHIPPED | OUTPATIENT
Start: 2019-12-12 | End: 2020-06-10

## 2019-12-12 RX ORDER — TRAMADOL HYDROCHLORIDE 50 MG/1
50 TABLET ORAL EVERY 6 HOURS PRN
COMMUNITY
End: 2022-04-15

## 2019-12-12 RX ORDER — DOXYCYCLINE HYCLATE 100 MG
100 TABLET ORAL 2 TIMES DAILY
Qty: 20 TABLET | Refills: 0 | Status: SHIPPED | OUTPATIENT
Start: 2019-12-12 | End: 2020-02-12

## 2019-12-12 RX ORDER — METOPROLOL SUCCINATE 25 MG/1
25 TABLET, EXTENDED RELEASE ORAL
COMMUNITY
Start: 2019-09-24 | End: 2020-04-01

## 2019-12-12 RX ORDER — FLUTICASONE PROPIONATE 50 MCG
SPRAY, SUSPENSION (ML) NASAL
Refills: 2 | COMMUNITY
Start: 2019-11-14 | End: 2020-02-12

## 2019-12-12 RX ORDER — DIPHENOXYLATE HYDROCHLORIDE AND ATROPINE SULFATE 2.5; .025 MG/1; MG/1
1 TABLET ORAL DAILY
COMMUNITY
End: 2022-04-28

## 2019-12-12 RX ORDER — CELECOXIB 200 MG/1
200 CAPSULE ORAL DAILY
COMMUNITY
Start: 2019-09-24 | End: 2020-09-15

## 2019-12-12 ASSESSMENT — MIFFLIN-ST. JEOR: SCORE: 1233.08

## 2019-12-12 NOTE — PROGRESS NOTES
"Delfin Parikh is a 54 year old female who presents to clinic today for the following health issues:    HPI   ENT Symptoms             Symptoms: cc Present Absent Comment   Fever/Chills   x    Fatigue  x     Muscle Aches  x     Eye Irritation  x     Sneezing  x     Nasal Martin/Drg  x     Sinus Pressure/Pain  x     Loss of smell  x     Dental pain   x    Sore Throat  x     Swollen Glands       Ear Pain/Fullness  x  Left ear   Cough  x     Wheeze  x     Chest Pain  x  Lungs hurt   Shortness of breath  x     Rash       Other  x  Has a very distinct taste/smell to her breath     Symptom duration:  months-   Ongoing sinus infection   Symptom severity:  severe   Treatments tried:  antibiotics (Augmentin x10 days was somewhat helpful while on it), steroids (prednisone 10 days - she states that the prednisone just made her feel bad);  inhalers   Contacts:  unknown       GERD:  Asking for refills of omeprazole - has been on it long term.  Taking it twice daily.      Reviewed and updated as needed this visit by Provider         Review of Systems   ROS COMP: Constitutional, HEENT, cardiovascular, pulmonary, gi and gu systems are negative, except as otherwise noted.      Objective    /86 (BP Location: Right arm)   Pulse 83   Temp 97.1  F (36.2  C) (Tympanic)   Resp 16   Ht 1.53 m (5' 0.25\")   Wt 70.8 kg (156 lb)   SpO2 97%   BMI 30.21 kg/m    Body mass index is 30.21 kg/m .  Physical Exam   GENERAL: healthy, alert and no distress  HENT: ear canals and TM's normal, nose and mouth without ulcers or lesions  NECK: no adenopathy, no asymmetry, masses, or scars and thyroid normal to palpation  RESP: lungs clear to auscultation - no rales, rhonchi or wheezes  CV: regular rate and rhythm, normal S1 S2, no S3 or S4, no murmur, click or rub, no peripheral edema and peripheral pulses strong    Xray independently reviewed, negative. Radiologist read pending.          Assessment & Plan       ICD-10-CM    1. " Acute non-recurrent maxillary sinusitis J01.00 doxycycline hyclate (VIBRA-TABS) 100 MG tablet   2. Acute bronchitis, unspecified organism J20.9 ipratropium - albuterol 0.5 mg/2.5 mg/3 mL (DUONEB) 0.5-2.5 (3) MG/3ML neb solution     albuterol (PROAIR HFA/PROVENTIL HFA/VENTOLIN HFA) 108 (90 Base) MCG/ACT inhaler   3. Cough R05 XR Chest 2 Views   4. Gastroesophageal reflux disease without esophagitis K21.9 omeprazole (PRILOSEC) 20 MG DR capsule     Patient advised to return in one month - should have PFTs to r/o COPD.       Tobacco Cessation:   reports that she has been smoking cigarettes. She has a 15.00 pack-year smoking history. She has never used smokeless tobacco.  Tobacco Cessation Action Plan: Information offered: Patient not interested at this time        Return in about 1 week (around 12/19/2019), or if symptoms worsen or fail to improve.    The risks, benefits and treatment options of prescribed medications or other treatments have been discussed with the patient. The patient verbalized their understanding and should call or follow up if no improvement or if they develop further problems.    CARLTON Mckeon [facility]

## 2019-12-12 NOTE — PATIENT INSTRUCTIONS
You are due for a screening Mammogram.  Please contact the Diagnostics Registration Department at: [phone] to schedule this appointment.  Your clinic record indicates that you are due for:   Pap and physical exam      Thank you for choosing [facility].  You may be receiving an email and/or telephone survey request from [facility] Customer Experience regarding your visit today.  Please take a few minutes to respond to the survey to let us know how we are doing.      If you have questions or concerns, please contact us via [employer] or you can contact your care team at [phone].    Our Clinic hours are:  Monday 6:40 am  to 7:00 pm  Tuesday -Friday 6:40 am to 5:00 pm    The [place] outpatient lab hours are:  Monday - Friday 6:10 am to 4:45 pm  Saturdays 7:00 am to 11:00 am  Appointments are required, call [phone]    If you have clinical questions after hours or would like to schedule an appointment,  call the clinic at [phone].

## 2019-12-19 ENCOUNTER — TELEPHONE (OUTPATIENT)
Dept: FAMILY MEDICINE | Facility: CLINIC | Age: 54
End: 2019-12-19

## 2019-12-19 NOTE — TELEPHONE ENCOUNTER
Panel Management Review           Composite cancer screening  Chart review shows that this patient is due/due soon for the following Pap Smear and Mammogram  Summary:    Patient is due/failing the following:   MAMMOGRAM and PAP    Action needed:   Patient needs office visit for physical and pap smear  Schedule mammogram.    Type of outreach:    Sent letter.    Questions for provider review:    None                                                                                                                                    JERALD TEONRIO

## 2019-12-19 NOTE — LETTER
December 19, 2019      Selina Parikh  [address] [place] [address]        Dear Selina Parikh, [id number]    At [facility] we care about your health and are committed to providing quality patient care, which includes staying current on preventative cancer screenings.  You can increase your chances of finding and treating cancers through regular screenings.      Our records show that you are due for the following screening(s):      Mammogram for breast cancer - [phone] to schedule  Recommended every 1-2 years for women age 50 and older  Mammograms help detect breast cancer, which is the most common cancer among women in the [place].  You may need to start having mammograms earlier and more often if you have had breast cancer, breast problems, or a family history of breast cancer.     Pap Smear for cervical cancer - [phone] to schedule  Recommended every three years for women 21 and older  A Pap test is used to detect cervical cancer.  The test should be taken at least once every three years but women who are at a greater risk for cervical cancer may need to have the test more often.      You are at a greater risk for cervical cancer if:   - You have had a sexually transmitted disease   - You have had more than one sex partner   - You have had an abnormal pap test in the past    If you have a My-Chart Account, you also can schedule this appointment through there.    If you have already had one or all of the above screening tests at another facility, please call us so that we may update your chart.         Quality Committee   [facility]

## 2020-01-17 ENCOUNTER — OFFICE VISIT (OUTPATIENT)
Dept: FAMILY MEDICINE | Facility: CLINIC | Age: 55
End: 2020-01-17
Payer: COMMERCIAL

## 2020-01-17 ENCOUNTER — ANCILLARY PROCEDURE (OUTPATIENT)
Dept: GENERAL RADIOLOGY | Facility: CLINIC | Age: 55
End: 2020-01-17
Attending: NURSE PRACTITIONER
Payer: COMMERCIAL

## 2020-01-17 VITALS
DIASTOLIC BLOOD PRESSURE: 88 MMHG | SYSTOLIC BLOOD PRESSURE: 124 MMHG | HEART RATE: 80 BPM | WEIGHT: 161 LBS | TEMPERATURE: 97.4 F | HEIGHT: 60 IN | OXYGEN SATURATION: 97 % | BODY MASS INDEX: 31.61 KG/M2

## 2020-01-17 DIAGNOSIS — M54.2 NECK PAIN: ICD-10-CM

## 2020-01-17 DIAGNOSIS — J01.01 ACUTE RECURRENT MAXILLARY SINUSITIS: Primary | ICD-10-CM

## 2020-01-17 PROCEDURE — 99214 OFFICE O/P EST MOD 30 MIN: CPT | Performed by: NURSE PRACTITIONER

## 2020-01-17 PROCEDURE — 72040 X-RAY EXAM NECK SPINE 2-3 VW: CPT

## 2020-01-17 ASSESSMENT — MIFFLIN-ST. JEOR: SCORE: 1251.79

## 2020-01-17 NOTE — PATIENT INSTRUCTIONS
For recurrent sinus issues: schedule appointment with [phone]      For Neck: xrays today  Schedule PT         You are due for a screening Mammogram.  Please contact the Diagnostics Registration Department at: [phone] to schedule this appointment.  Your clinic record indicates that you are due for:   Pap and physical exam        Thank you for choosing [facility].  You may be receiving an email and/or telephone survey request from [facility] Customer Experience regarding your visit today.  Please take a few minutes to respond to the survey to let us know how we are doing.      If you have questions or concerns, please contact us via [employer] or you can contact your care team at [phone].    Our Clinic hours are:  Monday 6:40 am  to 7:00 pm  Tuesday -Friday 6:40 am to 5:00 pm    The [place] outpatient lab hours are:  Monday - Friday 6:10 am to 4:45 pm  Saturdays 7:00 am to 11:00 am  Appointments are required, call [phone]    If you have clinical questions after hours or would like to schedule an appointment,  call the clinic at [phone].

## 2020-01-17 NOTE — PROGRESS NOTES
Subjective     Selina Parikh is a 54 year old female who presents to clinic today for the following health issues:    HPI   Musculoskeletal problem/pain      Duration: one month    Description  Location: upper back pain and neck pain  Wondering if she has something wrong with the hardware in her neck  Has had cervical fusion X2    Intensity:  severe    Accompanying signs and symptoms:  Has known neuropathy in her hands    History  Previous similar problem: no   Previous evaluation:  None- nothing recent    Precipitating or alleviating factors:  Trauma or overuse: no   Aggravating factors include: movement of head and arms    Therapies tried and outcome: NSAID - and pain med daily      ENT Symptoms             Symptoms: cc Present Absent Comment   Fever/Chills   x chills   Fatigue       Muscle Aches  x     Eye Irritation       Sneezing       Nasal Martin/Drg  x     Sinus Pressure/Pain x x     Loss of smell  x     Dental pain   x    Sore Throat  x     Swollen Glands       Ear Pain/Fullness  x     Cough  x     Wheeze  x     Chest Pain   x    Shortness of breath   x    Rash       Other         Symptom duration:  two weeks - symptoms wont go away     Symptom severity:  moderate   Treatments tried:  has been treated 2-3 times for a sinus infection with antibiotics  Nasal sprays, marta pot   Contacts:  room mate has been sick     Received doxy one month ago - cleared it up for a week, now its back.  She has a h/o c diff            Reviewed and updated as needed this visit by Provider  Tobacco  Allergies  Meds  Problems  Med Hx  Surg Hx  Fam Hx         Review of Systems   ROS COMP: Constitutional, HEENT, cardiovascular, pulmonary, gi and gu systems are negative, except as otherwise noted.      Objective    /88   Pulse 80   Temp 97.4  F (36.3  C) (Tympanic)   Ht 1.524 m (5')   Wt 73 kg (161 lb)   SpO2 97%   BMI 31.44 kg/m    Body mass index is 31.44 kg/m .  Physical Exam   GENERAL: healthy, alert and no  distress  HENT: ear canals and TM's normal, nose and mouth without ulcers or lesions  NECK: no adenopathy, no asymmetry, masses, or scars and thyroid normal to palpation  RESP: lungs clear to auscultation - no rales, rhonchi or wheezes  CV: regular rate and rhythm, normal S1 S2, no S3 or S4, no murmur, click or rub, no peripheral edema and peripheral pulses strong  MS: Posterior neck and upper back - scar visible from previous surgeries. No tenderness along the c spine. Tender to palpation of the left upper back - muscles feel tight            Assessment & Plan       ICD-10-CM    1. Acute recurrent maxillary sinusitis J01.01 Patient is afebrile and appears well.  She has had multiple rounds of antibiotics lately - has a h/o c diff.  Discussed that ongoing antibiotic use is risky, and doesn't seem to be resolving her issue.  Recommend eval by ENT.  OTOLARYNGOLOGY REFERRAL     2. Neck pain M54.2 XR Cervical Spine 2/3 Views - patient worried that hardware has slipped; no evidence for this an exam.     PHYSICAL THERAPY REFERRAL - pain seem more muscle related at this time.  If no improvement with PT, will refer back to spine specialist.           Return in about 4 weeks (around 2/14/2020), or if symptoms worsen or fail to improve.    The risks, benefits and treatment options of prescribed medications or other treatments have been discussed with the patient. The patient verbalized their understanding and should call or follow up if no improvement or if they develop further problems.    CARLTON Mckeon [facility]

## 2020-01-18 ENCOUNTER — NURSE TRIAGE (OUTPATIENT)
Dept: NURSING | Facility: CLINIC | Age: 55
End: 2020-01-18

## 2020-01-18 NOTE — TELEPHONE ENCOUNTER
"FNA informed patient of Kiarra Monreal's message \"Hardware is in place.\"  Caller verbalizes understanding.    Pepito Mckeon RN/[facility]        \Message from Chelsea Hubbard sent at 1/18/2020 11:57 AM CST     Summary: NONE    Reason for call:  Results   Name of test or procedure: XRAY  Date of test or procedure: January 17, 2020  Location of test or procedure: [facility] [place]    Additional comments: NONE    Phone number to reach patient:  Home number on file [phone] (home)    Best Time:  ANYTIME    Can we leave a detailed message on this number?  NO                  Reason for Disposition    Caller requesting lab results    Additional Information    Negative: Lab calling with strep throat test results and triager can call in prescription    Negative: Lab calling with urinalysis test results and triager can call in prescription    Negative: Medication questions    Negative: ED call to PCP    Negative: Physician call to PCP    Negative: Call about patient who is currently hospitalized    Negative: Lab or radiology calling with CRITICAL test results    Negative: [1] Prescription not at pharmacy AND [2] was prescribed today by PCP    Negative: [1] Follow-up call from patient regarding patient's clinical status AND [2] information urgent    Negative: [1] Caller requests to speak ONLY to PCP AND [2] URGENT question    Negative: [1] Caller requests to speak to PCP now AND [2] won't tell us reason for call  (Exception: if 10 pm to 6 am, caller must first discuss reason for the call)    Negative: Notification of hospital admission    Negative: Notification of death    Protocols used: PCP CALL - NO TRIAGE-A-      "

## 2020-01-25 ENCOUNTER — APPOINTMENT (OUTPATIENT)
Dept: GENERAL RADIOLOGY | Facility: CLINIC | Age: 55
End: 2020-01-25
Attending: PHYSICIAN ASSISTANT
Payer: COMMERCIAL

## 2020-01-25 ENCOUNTER — HOSPITAL ENCOUNTER (EMERGENCY)
Facility: CLINIC | Age: 55
Discharge: HOME | End: 2020-01-25
Attending: PHYSICIAN ASSISTANT | Admitting: PHYSICIAN ASSISTANT
Payer: COMMERCIAL

## 2020-01-25 VITALS
SYSTOLIC BLOOD PRESSURE: 129 MMHG | RESPIRATION RATE: 18 BRPM | DIASTOLIC BLOOD PRESSURE: 85 MMHG | OXYGEN SATURATION: 97 % | TEMPERATURE: 98.4 F

## 2020-01-25 DIAGNOSIS — J11.1 INFLUENZA-LIKE ILLNESS: ICD-10-CM

## 2020-01-25 DIAGNOSIS — M79.18 MUSCULOSKELETAL PAIN: ICD-10-CM

## 2020-01-25 DIAGNOSIS — R10.13 ABDOMINAL PAIN, EPIGASTRIC: ICD-10-CM

## 2020-01-25 DIAGNOSIS — R31.9 HEMATURIA: ICD-10-CM

## 2020-01-25 LAB
ALBUMIN SERPL-MCNC: 4.1 G/DL (ref 3.4–5)
ALBUMIN UR-MCNC: NEGATIVE MG/DL
ALP SERPL-CCNC: 115 U/L (ref 40–150)
ALT SERPL W P-5'-P-CCNC: 25 U/L (ref 0–50)
ANION GAP SERPL CALCULATED.3IONS-SCNC: 5 MMOL/L (ref 3–14)
APPEARANCE UR: CLEAR
AST SERPL W P-5'-P-CCNC: 15 U/L (ref 0–45)
BASOPHILS # BLD AUTO: 0 10E9/L (ref 0–0.2)
BASOPHILS NFR BLD AUTO: 0.4 %
BILIRUB SERPL-MCNC: 0.3 MG/DL (ref 0.2–1.3)
BILIRUB UR QL STRIP: NEGATIVE
BUN SERPL-MCNC: 13 MG/DL (ref 7–30)
CALCIUM SERPL-MCNC: 9.8 MG/DL (ref 8.5–10.1)
CHLORIDE SERPL-SCNC: 106 MMOL/L (ref 94–109)
CO2 SERPL-SCNC: 29 MMOL/L (ref 20–32)
COLOR UR AUTO: YELLOW
CREAT SERPL-MCNC: 0.66 MG/DL (ref 0.52–1.04)
DIFFERENTIAL METHOD BLD: NORMAL
EOSINOPHIL # BLD AUTO: 0 10E9/L (ref 0–0.7)
EOSINOPHIL NFR BLD AUTO: 0.5 %
ERYTHROCYTE [DISTWIDTH] IN BLOOD BY AUTOMATED COUNT: 13.8 % (ref 10–15)
FLUAV AG UPPER RESP QL IA.RAPID: NEGATIVE
FLUBV AG UPPER RESP QL IA.RAPID: NEGATIVE
GFR SERPL CREATININE-BSD FRML MDRD: >90 ML/MIN/{1.73_M2}
GLUCOSE SERPL-MCNC: 102 MG/DL (ref 70–99)
GLUCOSE UR STRIP-MCNC: NEGATIVE MG/DL
HCT VFR BLD AUTO: 45 % (ref 35–47)
HGB BLD-MCNC: 14.6 G/DL (ref 11.7–15.7)
HGB UR QL STRIP: ABNORMAL
IMM GRANULOCYTES # BLD: 0 10E9/L (ref 0–0.4)
IMM GRANULOCYTES NFR BLD: 0.4 %
INTERNAL QC OK POCT: YES
INTERNAL QC OK POCT: YES
KETONES UR STRIP-MCNC: 5 MG/DL
LEUKOCYTE ESTERASE UR QL STRIP: NEGATIVE
LIPASE SERPL-CCNC: 113 U/L (ref 73–393)
LYMPHOCYTES # BLD AUTO: 2 10E9/L (ref 0.8–5.3)
LYMPHOCYTES NFR BLD AUTO: 23.6 %
MCH RBC QN AUTO: 29.9 PG (ref 26.5–33)
MCHC RBC AUTO-ENTMCNC: 32.4 G/DL (ref 31.5–36.5)
MCV RBC AUTO: 92 FL (ref 78–100)
MONOCYTES # BLD AUTO: 0.6 10E9/L (ref 0–1.3)
MONOCYTES NFR BLD AUTO: 6.7 %
MUCOUS THREADS #/AREA URNS LPF: PRESENT /LPF
NEUTROPHILS # BLD AUTO: 5.9 10E9/L (ref 1.6–8.3)
NEUTROPHILS NFR BLD AUTO: 68.4 %
NITRATE UR QL: NEGATIVE
NRBC # BLD AUTO: 0 10*3/UL
NRBC BLD AUTO-RTO: 0 /100
PH UR STRIP: 6 PH (ref 5–7)
PLATELET # BLD AUTO: 265 10E9/L (ref 150–450)
POTASSIUM SERPL-SCNC: 3.9 MMOL/L (ref 3.4–5.3)
PROT SERPL-MCNC: 8.6 G/DL (ref 6.8–8.8)
RBC # BLD AUTO: 4.88 10E12/L (ref 3.8–5.2)
RBC #/AREA URNS AUTO: 5 /HPF (ref 0–2)
S PYO AG THROAT QL IA.RAPID: NEGATIVE
SODIUM SERPL-SCNC: 140 MMOL/L (ref 133–144)
SOURCE: ABNORMAL
SP GR UR STRIP: 1.01 (ref 1–1.03)
UROBILINOGEN UR STRIP-MCNC: 0 MG/DL (ref 0–2)
WBC # BLD AUTO: 8.6 10E9/L (ref 4–11)
WBC #/AREA URNS AUTO: <1 /HPF (ref 0–5)

## 2020-01-25 PROCEDURE — 87880 STREP A ASSAY W/OPTIC: CPT | Performed by: PHYSICIAN ASSISTANT

## 2020-01-25 PROCEDURE — 87086 URINE CULTURE/COLONY COUNT: CPT | Performed by: PHYSICIAN ASSISTANT

## 2020-01-25 PROCEDURE — 87081 CULTURE SCREEN ONLY: CPT | Performed by: PHYSICIAN ASSISTANT

## 2020-01-25 PROCEDURE — 81001 URINALYSIS AUTO W/SCOPE: CPT | Performed by: PHYSICIAN ASSISTANT

## 2020-01-25 PROCEDURE — 99284 EMERGENCY DEPT VISIT MOD MDM: CPT | Mod: 25 | Performed by: PHYSICIAN ASSISTANT

## 2020-01-25 PROCEDURE — 87804 INFLUENZA ASSAY W/OPTIC: CPT | Performed by: PHYSICIAN ASSISTANT

## 2020-01-25 PROCEDURE — 85025 COMPLETE CBC W/AUTO DIFF WBC: CPT | Performed by: PHYSICIAN ASSISTANT

## 2020-01-25 PROCEDURE — 71046 X-RAY EXAM CHEST 2 VIEWS: CPT

## 2020-01-25 PROCEDURE — 80053 COMPREHEN METABOLIC PANEL: CPT | Performed by: PHYSICIAN ASSISTANT

## 2020-01-25 PROCEDURE — 83690 ASSAY OF LIPASE: CPT | Performed by: PHYSICIAN ASSISTANT

## 2020-01-25 PROCEDURE — 99284 EMERGENCY DEPT VISIT MOD MDM: CPT | Mod: Z6 | Performed by: PHYSICIAN ASSISTANT

## 2020-01-25 RX ORDER — OSELTAMIVIR PHOSPHATE 75 MG/1
75 CAPSULE ORAL 2 TIMES DAILY
Qty: 10 CAPSULE | Refills: 0 | Status: SHIPPED | OUTPATIENT
Start: 2020-01-25 | End: 2020-02-12

## 2020-01-25 ASSESSMENT — ENCOUNTER SYMPTOMS
COUGH: 1
NAUSEA: 1
FEVER: 1
ACTIVITY CHANGE: 1
ABDOMINAL PAIN: 1
EYES NEGATIVE: 1
APPETITE CHANGE: 1
SORE THROAT: 1
RHINORRHEA: 1
MYALGIAS: 1
DIARRHEA: 1

## 2020-01-25 NOTE — ED TRIAGE NOTES
Pt here with generalized body aches and cough and fever for the last couple days. Pt states that she had tylenol 2 hours ago.

## 2020-01-25 NOTE — ED PROVIDER NOTES
History     Chief Complaint   Patient presents with     Generalized Body Aches     patient would like to be tested for pneumonia and influenza     Pharyngitis     HPI  Selina Parikh is a 54 year old female with history of diverticulitis, psoiriasis, partial colectomy, tobacco abuse, IBS, post traumatic brain injury, and GERD presents to the urgent care with 3 days of fever, congestion, sore throat, cough, generalized body aches, nausea, diarrhea, abdominal pain, and left sided rib pain with breathing and coughing. Patient denies ear pain, headache, dizziness, chest pain, heart racing/skipping beats, vomiting, rash, bloody or black tarry stools. Patient states she has been using cough drops, tylenol and ibuprofen for symptoms. 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 Patient requesting influenza and pneumonia testing.     Allergies:  Allergies   Allergen Reactions     Ciprofloxacin Anaphylaxis     Flagyl [Metronidazole] Anaphylaxis     Zofran [Ondansetron] Other (See Comments) and GI Disturbance     Causes bloating, moderately uncomfortable, abd pain       Augmentin Nausea and Vomiting     Denies hives at this time     Percocet [Oxycodone-Acetaminophen] Other (See Comments)     Goes nuts - nasty mean irritated, can take Dilaudid     Vicodin [Hydrocodone-Acetaminophen] Other (See Comments)     Anxiety-agitation       Problem List:    Patient Active Problem List    Diagnosis Date Noted     S/P partial colectomy 04/10/2018     Priority: Medium     Diverticulitis 01/28/2018     Priority: Medium     Psoriasis 06/20/2012     Priority: Medium     GERD (gastroesophageal reflux disease) 03/15/2011     Priority: Medium     Tobacco abuse 02/17/2011     Priority: Medium     Health Care Home 01/27/2011     Priority: Medium     DX V65.8 REPLACED WITH [facility] (04/08/2013)       CARDIOVASCULAR SCREENING; LDL GOAL LESS THAN 160 10/31/2010     Priority: Medium     R Occipital  Neuralgia 10/07/2009     Priority: Medium     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 On fiber, senna, and MOM. Advised to d/c MOM, start Miralax and titer to regular BM's. Pt has been seen by GI.        Brain syndrome, posttraumatic 03/06/2009     Priority: Medium     Work comp.  Has seen Dr. Ahmadi, [facility] of neurology.  MRIs, EMG unremarkalbe, some foraminal stenosis on C5/C6  Sent her to physiatrist, trigger point injections didn't help.  After some neck traction, had intense unremitting HA, neck pain.  Off/on tingling in arms.  Pain clinic and/or spine surg for more eval probable next steps.    Has failed multiple rescue and preventive HA meds. On no narcotics          Past Medical History:    History reviewed. No pertinent past medical history.    Past Surgical History:    Past Surgical History:   Procedure Laterality Date     CHOLECYSTECTOMY, LAPOROSCOPIC  2/14/2000    Cholecystectomy, Laparoscopic     CYSTOSCOPY, INSERT LIGHTED STENT URETER(S) N/A 4/10/2018    Procedure: CYSTOSCOPY, INSERT LIGHTED STENT URETER(S);  Lighted Stent Placement,Laparoscopic Assisted Sigmoid Colectomy;  Surgeon: ERVIN Reina MD;  Location: [facility] OR     LAPAROSCOPIC ASSISTED COLECTOMY LEFT (DESCENDING) N/A 4/10/2018    Procedure: LAPAROSCOPIC ASSISTED COLECTOMY LEFT (DESCENDING);;  Surgeon: Hill Murray MD;  Location: [facility] OR     ORTHOPEDIC SURGERY  10/2010    Cut palm and reattched tendons and nerves in left hand forefinger.     SURGICAL HISTORY OF -   1/4/2000    Esophagogastroduodenoscopy with biopsy     TUBAL LIGATION         Family History:    Family History   Problem Relation Age of Onset     C.A.D. Father 50        50's     Diabetes Father      Diabetes Brother      C.A.D. Brother 42        quad bypass and MI     Diabetes Brother         2 brothers  have DM     Cancer Brother 34        Melanoma     Allergies Son         Meds     Allergies Daughter         Med     Cancer Mother      C.A.D. Brother 38        MI age 38       Social History:  Marital Status:   [2]  Social History     Tobacco Use     Smoking status: Current Every Day Smoker     Packs/day: 0.25     Years: 30.00     Pack years: 7.50     Types: Cigarettes     Smokeless tobacco: Never Used     Tobacco comment: 3-5 per day   Substance Use Topics     Alcohol use: No     Drug use: No        Medications:    oseltamivir (TAMIFLU) 75 MG capsule  albuterol (PROAIR HFA/PROVENTIL HFA/VENTOLIN HFA) 108 (90 Base) MCG/ACT inhaler  albuterol (PROVENTIL) (2.5 MG/3ML) 0.083% neb solution  amLODIPine (NORVASC) 5 MG tablet  benzonatate (TESSALON) 100 MG capsule  celecoxib (CELEBREX) 200 MG capsule  clobetasol (TEMOVATE) 0.05 % ointment  docusate sodium (COLACE) 50 MG capsule  fluticasone (FLONASE) 50 MCG/ACT nasal spray  Ibuprofen (MOTRIN PO)  ipratropium - albuterol 0.5 mg/2.5 mg/3 mL (DUONEB) 0.5-2.5 (3) MG/3ML neb solution  melatonin 1 MG/ML LIQD liquid  metoprolol succinate ER (TOPROL-XL) 25 MG 24 hr tablet  Multiple Vitamin (MULTI-VITAMINS) TABS  omeprazole (PRILOSEC) 20 MG DR capsule  tiZANidine (ZANAFLEX) 2 MG tablet  traMADol (ULTRAM) 50 MG tablet          Review of Systems   Constitutional: Positive for activity change, appetite change and fever.   HENT: Positive for congestion, rhinorrhea and sore throat.    Eyes: Negative.    Respiratory: Positive for cough.    Gastrointestinal: Positive for abdominal pain, diarrhea and nausea.   Genitourinary: Negative.    Musculoskeletal: Positive for myalgias.   Skin: Negative.    All other systems reviewed and are negative.      Physical Exam   BP: 129/85  Heart Rate: 98  Temp: 98.4  F (36.9  C)  Resp: 18  SpO2: 97 %      Physical Exam  Vitals signs and nursing note reviewed.   Constitutional:       General: She is not in acute distress.     Appearance: She is  well-developed and normal weight. She is ill-appearing. She is not toxic-appearing.   HENT:      Head: Normocephalic and atraumatic.      Right Ear: Tympanic membrane and ear canal normal.      Left Ear: Tympanic membrane and ear canal normal.      Mouth/Throat:      Mouth: Mucous membranes are moist. No oral lesions.      Pharynx: Uvula midline. Posterior oropharyngeal erythema present. No pharyngeal swelling, oropharyngeal exudate or uvula swelling.      Tonsils: No tonsillar exudate or tonsillar abscesses. 0 on the right. 0 on the left.   Eyes:      Extraocular Movements:      Right eye: Normal extraocular motion.      Left eye: Normal extraocular motion.      Conjunctiva/sclera: Conjunctivae normal.      Pupils: Pupils are equal, round, and reactive to light.   Neck:      Musculoskeletal: Normal range of motion and neck supple.      Thyroid: No thyromegaly.   Cardiovascular:      Rate and Rhythm: Normal rate and regular rhythm.      Heart sounds: Normal heart sounds. No murmur.   Pulmonary:      Effort: Pulmonary effort is normal.      Breath sounds: Normal breath sounds.   Chest:      Chest wall: Tenderness (over left lower anterior ribs with palpation.  no crepitus, bruising, or rash noted. ) present.   Abdominal:      General: Bowel sounds are normal. There is no distension.      Palpations: Abdomen is soft. There is no mass.      Tenderness: There is abdominal tenderness (generalized abdominal tenderness that is more prominent across upper abdomen. ). There is no guarding or rebound.      Hernia: No hernia is present.   Lymphadenopathy:      Cervical: Cervical adenopathy present.   Skin:     General: Skin is warm.      Capillary Refill: Capillary refill takes less than 2 seconds.      Findings: No rash.   Neurological:      General: No focal deficit present.      Mental Status: She is alert and oriented to person, place, and time.   Psychiatric:         Mood and Affect: Mood normal.         Behavior:  Behavior normal.         ED Course        Procedures              Critical Care time:  none               Results for orders placed or performed during the hospital encounter of 01/25/20 (from the past 24 hour(s))   Rapid strep group A screen POCT   Result Value Ref Range    Rapid Strep A Screen negative neg    Internal QC OK Yes    Influenza A/B antigen POCT   Result Value Ref Range    Influenza A negative neg    Influenza B negative neg    Internal QC OK Yes    CBC with platelets differential   Result Value Ref Range    WBC 8.6 4.0 - 11.0 10e9/L    RBC Count 4.88 3.8 - 5.2 10e12/L    Hemoglobin 14.6 11.7 - 15.7 g/dL    Hematocrit 45.0 35.0 - 47.0 %    MCV 92 78 - 100 fl    MCH 29.9 26.5 - 33.0 pg    MCHC 32.4 31.5 - 36.5 g/dL    RDW 13.8 10.0 - 15.0 %    Platelet Count 265 150 - 450 10e9/L    Diff Method Automated Method     % Neutrophils 68.4 %    % Lymphocytes 23.6 %    % Monocytes 6.7 %    % Eosinophils 0.5 %    % Basophils 0.4 %    % Immature Granulocytes 0.4 %    Nucleated RBCs 0 0 /100    Absolute Neutrophil 5.9 1.6 - 8.3 10e9/L    Absolute Lymphocytes 2.0 0.8 - 5.3 10e9/L    Absolute Monocytes 0.6 0.0 - 1.3 10e9/L    Absolute Eosinophils 0.0 0.0 - 0.7 10e9/L    Absolute Basophils 0.0 0.0 - 0.2 10e9/L    Abs Immature Granulocytes 0.0 0 - 0.4 10e9/L    Absolute Nucleated RBC 0.0    Comprehensive metabolic panel   Result Value Ref Range    Sodium 140 133 - 144 mmol/L    Potassium 3.9 3.4 - 5.3 mmol/L    Chloride 106 94 - 109 mmol/L    Carbon Dioxide 29 20 - 32 mmol/L    Anion Gap 5 3 - 14 mmol/L    Glucose 102 (H) 70 - 99 mg/dL    Urea Nitrogen 13 7 - 30 mg/dL    Creatinine 0.66 0.52 - 1.04 mg/dL    GFR Estimate >90 >60 mL/min/[1.73_m2]    GFR Estimate If Black >90 >60 mL/min/[1.73_m2]    Calcium 9.8 8.5 - 10.1 mg/dL    Bilirubin Total 0.3 0.2 - 1.3 mg/dL    Albumin 4.1 3.4 - 5.0 g/dL    Protein Total 8.6 6.8 - 8.8 g/dL    Alkaline Phosphatase 115 40 - 150 U/L    ALT 25 0 - 50 U/L    AST 15 0 - 45 U/L   Lipase    Result Value Ref Range    Lipase 113 73 - 393 U/L   UA with Microscopic   Result Value Ref Range    Color Urine Yellow     Appearance Urine Clear     Glucose Urine Negative NEG^Negative mg/dL    Bilirubin Urine Negative NEG^Negative    Ketones Urine 5 (A) NEG^Negative mg/dL    Specific Gravity Urine 1.014 1.003 - 1.035    Blood Urine Small (A) NEG^Negative    pH Urine 6.0 5.0 - 7.0 pH    Protein Albumin Urine Negative NEG^Negative mg/dL    Urobilinogen mg/dL 0.0 0.0 - 2.0 mg/dL    Nitrite Urine Negative NEG^Negative    Leukocyte Esterase Urine Negative NEG^Negative    Source Midstream Urine     WBC Urine <1 0 - 5 /HPF    RBC Urine 5 (H) 0 - 2 /HPF    Mucous Urine Present (A) NEG^Negative /LPF   Chest XR,  PA & LAT    Narrative    CHEST TWO VIEWS 1/25/2020 3:10 PM     HISTORY: cough, fever, and left lower chest/rib pain; evaluate for  pneumonia.    COMPARISON: 12/12/2019       Impression    Impression: No focal infiltrate or consolidation. No pleural fluid.  Normal heart size. Normal pulmonary vascularity. Surgical clips upper  quadrant. Degenerative changes in the spine.    CURT L BEHRNS, MD       Medications - No data to display    Assessments & Plan (with Medical Decision Making)     I have reviewed the nursing notes.    I have reviewed the findings, diagnosis, plan and need for follow up with the patient.   54-year-old female presents the urgent care with 3-day history of fever, congestion, sore throat, cough, generalized body aches, nausea, diarrhea, abdominal pain and left-sided rib pain with breathing and coughing.'s exam findings above.  Patient does have a history of tobacco use.  Chest x-ray obtained in office today which was negative.  UA also obtained which shows small amount of blood otherwise negative.  Urine culture sent and currently pending.  CBC, CMP and lipase also obtained and were all within normal limits other than slightly elevated glucose at 102.  Discussed with patient that at this time  symptoms appear to be influenza-like in origin and no concerns for acute abdomen at this time.  Musculoskeletal pain likely from coughing which is causing some of his left anterior rib pain.  Patient is nontender bilaterally over the CVA region.  Patient not complaining of any urinary symptoms or flank pain.  Even though there is slight hematuria I do not suspect a ureteral lithiasis or pyelonephritis at this time.  Patient to increase fluids, rest, Tylenol and ibuprofen over-the-counter as needed for pain and fevers.  Patient given Tamiflu twice daily for 5 days for treatment of influenza-like illness especially since she has a history of tobacco use.  Patient informed to return if symptoms worsen or change these were discussed with patient given a discharge paperwork.  Patient discharged in stable condition in no acute distress and nontoxic in appearance.  No concerns for pulmonary embolism or acute cardiac issues at this time.  Lungs clear to auscultation bilaterally throughout and pain is reproducible on exam in the left lower rib.    Discharge Medication List as of 1/25/2020  3:49 PM      START taking these medications    Details   oseltamivir (TAMIFLU) 75 MG capsule Take 1 capsule (75 mg) by mouth 2 times daily for 5 days, Disp-10 capsule, R-0, E-Prescribe             Final diagnoses:   Influenza-like illness   Abdominal pain, epigastric   Musculoskeletal pain - with cough to left anterior lower rib.   Hematuria - microscopic       1/25/2020   [facility] EMERGENCY DEPARTMENT     Olga Chaparro PA-C  01/25/20 [id number]

## 2020-01-25 NOTE — DISCHARGE INSTRUCTIONS
Increase fluids, rest, Tylenol and ibuprofen over-the-counter as needed for pain as long as no allergies or contraindications.    Use Tamiflu as directed.  Side effects discussed with patient.    Patient to return to the emergency department if symptoms worsen or change including abdominal pain, vomiting, diarrhea, persistent fevers in the next 2 to 3 days, chest pain, heart racing or skipping beats, shortness of breath, wheezing, flank pain, worsening hematuria, urinary symptoms or change in symptoms.    Patient follow-up with primary care doctor for recheck of urine in 1 week    Urine culture sent and currently pending.

## 2020-01-26 LAB
BACTERIA SPEC CULT: NO GROWTH
Lab: NORMAL
SPECIMEN SOURCE: NORMAL

## 2020-01-27 LAB
BACTERIA SPEC CULT: NORMAL
Lab: NORMAL
SPECIMEN SOURCE: NORMAL

## 2020-01-27 NOTE — RESULT ENCOUNTER NOTE
Final Beta strep group A r/o culture is NEGATIVE for Group A streptococcus.    No treatment or change in treatment per [facility] Strep protocol.

## 2020-01-27 NOTE — RESULT ENCOUNTER NOTE
Final urine culture report is NEGATIVE per [facility] ED Lab Result protocol.    If NEGATIVE result, no change in treatment, per [facility] ED Lab Result protocol.

## 2020-02-10 ENCOUNTER — APPOINTMENT (OUTPATIENT)
Dept: GENERAL RADIOLOGY | Facility: CLINIC | Age: 55
End: 2020-02-10
Attending: PHYSICIAN ASSISTANT
Payer: COMMERCIAL

## 2020-02-10 ENCOUNTER — HOSPITAL ENCOUNTER (EMERGENCY)
Facility: CLINIC | Age: 55
Discharge: HOME | End: 2020-02-10
Attending: PHYSICIAN ASSISTANT | Admitting: PHYSICIAN ASSISTANT
Payer: COMMERCIAL

## 2020-02-10 VITALS
OXYGEN SATURATION: 97 % | SYSTOLIC BLOOD PRESSURE: 145 MMHG | DIASTOLIC BLOOD PRESSURE: 91 MMHG | HEIGHT: 60 IN | WEIGHT: 160 LBS | RESPIRATION RATE: 16 BRPM | BODY MASS INDEX: 31.41 KG/M2

## 2020-02-10 DIAGNOSIS — R07.0 THROAT PAIN: ICD-10-CM

## 2020-02-10 DIAGNOSIS — R09.89 THROAT FULLNESS: ICD-10-CM

## 2020-02-10 LAB
INTERNAL QC OK POCT: YES
S PYO AG THROAT QL IA.RAPID: NEGATIVE

## 2020-02-10 PROCEDURE — 87081 CULTURE SCREEN ONLY: CPT | Performed by: PHYSICIAN ASSISTANT

## 2020-02-10 PROCEDURE — 99214 OFFICE O/P EST MOD 30 MIN: CPT | Mod: Z6 | Performed by: PHYSICIAN ASSISTANT

## 2020-02-10 PROCEDURE — G0463 HOSPITAL OUTPT CLINIC VISIT: HCPCS | Mod: 25 | Performed by: PHYSICIAN ASSISTANT

## 2020-02-10 PROCEDURE — 70360 X-RAY EXAM OF NECK: CPT

## 2020-02-10 PROCEDURE — 87880 STREP A ASSAY W/OPTIC: CPT | Performed by: PHYSICIAN ASSISTANT

## 2020-02-10 PROCEDURE — 25000125 ZZHC RX 250: Performed by: PHYSICIAN ASSISTANT

## 2020-02-10 RX ORDER — DEXAMETHASONE SODIUM PHOSPHATE 4 MG/ML
10 VIAL (ML) INJECTION ONCE
Status: COMPLETED | OUTPATIENT
Start: 2020-02-10 | End: 2020-02-10

## 2020-02-10 RX ADMIN — DEXAMETHASONE SODIUM PHOSPHATE 10 MG: 4 INJECTION, SOLUTION INTRAMUSCULAR; INTRAVENOUS at 20:21

## 2020-02-10 ASSESSMENT — ENCOUNTER SYMPTOMS
GASTROINTESTINAL NEGATIVE: 1
TROUBLE SWALLOWING: 1
VOICE CHANGE: 1
COUGH: 1
MUSCULOSKELETAL NEGATIVE: 1
CARDIOVASCULAR NEGATIVE: 1
NEUROLOGICAL NEGATIVE: 1
SORE THROAT: 1
SHORTNESS OF BREATH: 0
FEVER: 0
EYES NEGATIVE: 1
WHEEZING: 0

## 2020-02-10 ASSESSMENT — MIFFLIN-ST. JEOR: SCORE: 1247.26

## 2020-02-10 NOTE — ED AVS SNAPSHOT
[facility] Emergency Department  [address] [address]  Phone:  [phone]  Fax:  [phone]                                    Selina Pairkh   MRN: [id number]    Department:  [facility] Emergency Department   Date of Visit:  2/10/2020           After Visit Summary Signature Page    I have received my discharge instructions, and my questions have been answered. I have discussed any challenges I see with this plan with the nurse or doctor.    ..........................................................................................................................................  Patient/Patient Representative Signature      ..........................................................................................................................................  Patient Representative Print Name and Relationship to Patient    ..................................................               ................................................  Date                                   Time    ..........................................................................................................................................  Reviewed by Signature/Title    ...................................................              ..............................................  Date                                               Time          22EPIC Rev 08/18

## 2020-02-11 NOTE — ED TRIAGE NOTES
Dysphagia S/P Cervical Surgery 12/10/18. Patient reports throat feeling swollen for 3 days. Ana Laura Marks LPN on 2/10/2020 at 6:52 PM

## 2020-02-11 NOTE — DISCHARGE INSTRUCTIONS
No antibiotics indicated at this time.  Single dose of oral Decadron given to patient in office today.    Patient to keep her appointment with ENT for this Thursday.    Increase fluids, rest, avoid spicy or acidic foods.    Return if symptoms worsen or change including difficulty swallowing, change in voice, fevers occur, drooling, or change in symptoms occur.

## 2020-02-11 NOTE — PROGRESS NOTES
Chief Complaint   Patient presents with     Sinus Problem     Consult sinus/issues since Thanksgiving/seen in  2 days ago/throat pain/fullness/hx of 2 cervical surgeries/coughing up green/yellow phelgm     History of Present Illness   Selina Parikh is a 54 year old female who presents for nose and sinus evaluation. I am seeing this patient in consultation for acute recurrent maxillary sinusitis at the request of the provider Kiarra Monreal CNP.  The patient describes symptoms of throat discomfort, postnasal drainage, nose and sinus pressure, congestion for the past several months to years.  She feels like it is been worse since November.  The patient's past history is significant for spinal fusion for chronic headaches and neck pain.  She is had 2 fusions and hardware removal, most recent surgery was in the winter 2018.     The patient reports daily symptoms of gastroesophageal reflux usually in the evening.  She takes omeprazole twice daily but does not eat food after taking the medication.  She has thick postnasal drainage.  The patient is a tobacco user, roughly 1/4 pack of cigarettes per day.  Has been smoking since her teens.  She does have products to help her quit and is currently working on cutting down and quitting.  The patient reports dysphagia, odynophagia, pharyngodynia, otalgia, dysphonia, sense of neck lumps/bumps/swelling.  She denies any unintentional weight loss.  She denies any hemoptysis.     The patient reports bilateral nasal obstruction worse on the left-hand side, rhinorrhea, postnasal drainage, decreased sense of smell.  She has constant face pain/pressure/fullness in her malar areas.  Has not had previous nose or sinus surgery.  She is not using any medications in her nose.  She does have a history of allergy symptoms but usually just takes Sudafed when she is having symptoms.  She is not seen an allergist before had previous allergy testing.     The patient underwent a head CT on  11/1/2019 for severe headache.  My review of the head CT shows normal paranasal sinuses without any evidence of acute or chronic inflammation.  The inferior portions of the maxillary sinuses were incompletely imaged.  There is a rightward anterior nasal septal deviation, also incompletely imaged.      Past Medical History  Patient Active Problem List   Diagnosis     Brain syndrome, posttraumatic     IBS (irritable bowel syndrome)     R Occipital Neuralgia     Scapulocostal syndrome     CARDIOVASCULAR SCREENING; LDL GOAL LESS THAN 160     Health Care Home     Tobacco abuse     GERD (gastroesophageal reflux disease)     Psoriasis     Diverticulitis     S/P partial colectomy     Current Medications     Current Outpatient Medications:      albuterol (PROVENTIL) (2.5 MG/3ML) 0.083% neb solution, Take 1 vial (2.5 mg) by nebulization every 4 hours as needed for shortness of breath / dyspnea or wheezing, Disp: 1 Box, Rfl: 0     amLODIPine (NORVASC) 5 MG tablet, Take 5 mg by mouth every evening , Disp: , Rfl:      celecoxib (CELEBREX) 200 MG capsule, Take 200 mg by mouth, Disp: , Rfl:      docusate sodium (COLACE) 50 MG capsule, Take 2 capsules (100 mg) by mouth 2 times daily, Disp: 60 capsule, Rfl: 0     fluticasone (FLONASE) 50 MCG/ACT nasal spray, Spray 2 sprays into both nostrils daily, Disp: 47.4 mL, Rfl: 3     Ibuprofen (MOTRIN PO), Take 600 mg by mouth 1-2 times daily, Disp: , Rfl:      ipratropium - albuterol 0.5 mg/2.5 mg/3 mL (DUONEB) 0.5-2.5 (3) MG/3ML neb solution, Take 1 vial (3 mLs) by nebulization every 6 hours as needed for shortness of breath / dyspnea or wheezing, Disp: 1 Box, Rfl: 1     melatonin 1 MG/ML LIQD liquid, Take 2 mg by mouth At Bedtime, Disp: , Rfl:      metoprolol succinate ER (TOPROL-XL) 25 MG 24 hr tablet, Take 25 mg by mouth, Disp: , Rfl:      Multiple Vitamin (MULTI-VITAMINS) TABS, Take 1 tablet by mouth, Disp: , Rfl:      omeprazole (PRILOSEC) 20 MG DR capsule, Take 1 capsule (20 mg) by  mouth 2 times daily, Disp: 60 capsule, Rfl: 11     omeprazole (PRILOSEC) 40 MG DR capsule, Take 1 capsule (40 mg) by mouth daily Take 20-30 minutes prior to morning meal, Disp: 90 capsule, Rfl: 1     tiZANidine (ZANAFLEX) 2 MG tablet, Take 2 mg by mouth every 6 hours as needed , Disp: , Rfl:      traMADol (ULTRAM) 50 MG tablet, Take 50 mg by mouth every 6 hours as needed for pain (back and neck pain), Disp: , Rfl:      albuterol (PROAIR HFA/PROVENTIL HFA/VENTOLIN HFA) 108 (90 Base) MCG/ACT inhaler, Inhale 2 puffs into the lungs every 6 hours as needed for shortness of breath / dyspnea or wheezing (Patient not taking: Reported on 2/12/2020), Disp: 1 Inhaler, Rfl: 3     clobetasol (TEMOVATE) 0.05 % ointment, Apply  topically to affected area(s) 2 times daily., Disp: , Rfl:     Allergies  Allergies   Allergen Reactions     Ciprofloxacin Anaphylaxis     Flagyl [Metronidazole] Anaphylaxis     Zofran [Ondansetron] Other (See Comments) and GI Disturbance     Causes bloating, moderately uncomfortable, abd pain       Augmentin Nausea and Vomiting     Denies hives at this time     Percocet [Oxycodone-Acetaminophen] Other (See Comments)     Goes nuts - nasty mean irritated, can take Dilaudid     Vicodin [Hydrocodone-Acetaminophen] Other (See Comments)     Anxiety-agitation       Social History   Social History     Socioeconomic History     Marital status:      Spouse name: Not on file     Number of children: Not on file     Years of education: Not on file     Highest education level: Not on file   Occupational History     Not on file   Social Needs     Financial resource strain: Not on file     Food insecurity:     Worry: Not on file     Inability: Not on file     Transportation needs:     Medical: Not on file     Non-medical: Not on file   Tobacco Use     Smoking status: Current Every Day Smoker     Packs/day: 0.25     Years: 30.00     Pack years: 7.50     Types: Cigarettes     Smokeless tobacco: Never Used     Tobacco  comment: 3-5 per day   Substance and Sexual Activity     Alcohol use: No     Drug use: No     Sexual activity: Yes     Partners: Male     Birth control/protection: Surgical   Lifestyle     Physical activity:     Days per week: Not on file     Minutes per session: Not on file     Stress: Not on file   Relationships     Social connections:     Talks on phone: Not on file     Gets together: Not on file     Attends Judaism service: Not on file     Active member of club or organization: Not on file     Attends meetings of clubs or organizations: Not on file     Relationship status: Not on file     Intimate partner violence:     Fear of current or ex partner: Not on file     Emotionally abused: Not on file     Physically abused: Not on file     Forced sexual activity: Not on file   Other Topics Concern     Parent/sibling w/ CABG, MI or angioplasty before 65F 55M? Yes     Comment: brother- 38, MI, brother 42- quad bypass   Social History Narrative     Not on file       Family History  Family History   Problem Relation Age of Onset     C.A.D. Father 50        50's     Diabetes Father      Diabetes Brother      C.A.D. Brother 42        quad bypass and MI     Diabetes Brother         2 brothers have DM     Cancer Brother 34        Melanoma     Allergies Son         Meds     Allergies Daughter         Med     Cancer Mother      C.A.D. Brother 38        MI age 38       Review of Systems  As per HPI and PMHx, otherwise 10+ comprehensive system review is negative.    Physical Exam  /57 (BP Location: Right arm, Patient Position: Sitting, Cuff Size: Adult Regular)   Pulse 75   Temp 97.8  F (36.6  C) (Oral)   Ht 1.524 m (5')   Wt 72.6 kg (160 lb)   BMI 31.25 kg/m    GENERAL: Patient is a pleasant, cooperative 54 year old female in no acute distress.  HEAD: Normocephalic, atraumatic.  Hair and scalp are normal.  EYES: Pupils are equal, round, reactive to light and accommodation.  Extraocular movements are intact.  The  sclera nonicteric without injection.  The extraocular structures are normal.  EARS: Normal shape and symmetry.  No tenderness when palpating the mastoid or tragal areas bilaterally.  Otoscopic exam reveals a minimal amount of cerumen bilaterally.  The bilateral tympanic membranes are round, intact without evidence of effusion, good landmarks.  No retraction, granulation, or drainage.  NOSE: Nares are patent.  Nasal mucosa is boggy and inflamed with sticky, inflammatory mucus.  The patient has moderate inferior turbinate hypertrophy.  The patient has a leftward nasal septal deviation with spur that does contact the inferior turbinate.  No nasal cavity masses, polyps, or mucopurulence on anterior rhinoscopy.  ORAL CAVITY: Dentition is in good repair.  Mucous membranes are dry.  Tongue is mobile, protrudes to the midline.  Palate elevates symmetrically.  Tonsils are 1+, symmetric.  No erythema or exudate.  No oral cavity or oropharyngeal masses, lesions, ulcerations, leukoplakia.  NECK: Supple, trachea is midline.  There no palpable cervical lymphadenopathy or masses bilaterally.  Palpation of the bilateral parotid and submandibular areas reveal no masses.  No thyromegaly.    NEUROLOGIC: Cranial nerves II through XII are grossly intact.  Voice is strong.  Patient is House-Brackman I/VI bilaterally.  CARDIOVASCULAR: Extremities are warm and well-perfused.  No significant peripheral edema.  RESPIRATORY: Patient has nonlabored breathing without cough, wheeze, stridor.  PSYCHIATRIC: Patient is alert and oriented.  Mood and affect appear normal.  SKIN: Warm and dry.  No scalp, face, or neck lesions noted.    Procedure: Flexible Laryngoscopy  Indication: Sore throat, dysphagia, neck fullness, postnasal drainage.    To best visualize the upper airway anatomy and due to the chief complaint and HPI, I proceeded with flexible fiberoptic laryngoscopy examination.  The bilateral nasal cavities were anesthetized and decongested  with a mixture of lidocaine and neosynephrine.  The bilateral nasal cavities were examined using a flexible fiberoptic laryngoscope.  There were no nasal cavity masses, polyps, or mucopurulence bilaterally.  The nasal septum deviates to the left with spur that does contact the inferior turbinate.  The nasopharynx had a normal appearance with normal Eustachian tube openings and fossa of Rosenmuller bilaterally.  Minimal adenoid tissue.  There is significant posterior oropharyngeal cobblestoning that extends down to the piriforms.  The base of tongue, vallecula, epiglottis, aryepiglottic folds, arytenoids, and piriform sinuses were without mass or lesion.  There is some significant interarytenoid thickening and mild erythema consistent with reflux.  The bilateral true vocal folds were symmetrically mobile without nodules or masses.  The visualized portions of the infraglottic and subglottic airway are unremarkable.  The scope was removed.  The patient tolerated the procedure well.    Assessment and Plan     ICD-10-CM    1. Sore throat J02.9 omeprazole (PRILOSEC) 40 MG DR capsule     LARYNGOSCOPY FLEX FIBEROPTIC, DIAGNOSTIC     fluticasone (FLONASE) 50 MCG/ACT nasal spray   2. Laryngopharyngeal reflux K21.9 omeprazole (PRILOSEC) 40 MG DR capsule     LARYNGOSCOPY FLEX FIBEROPTIC, DIAGNOSTIC     fluticasone (FLONASE) 50 MCG/ACT nasal spray   3. History of gastroesophageal reflux (GERD) Z87.19 omeprazole (PRILOSEC) 40 MG DR capsule     LARYNGOSCOPY FLEX FIBEROPTIC, DIAGNOSTIC     fluticasone (FLONASE) 50 MCG/ACT nasal spray   4. Nasal congestion R09.81 omeprazole (PRILOSEC) 40 MG DR capsule     LARYNGOSCOPY FLEX FIBEROPTIC, DIAGNOSTIC     fluticasone (FLONASE) 50 MCG/ACT nasal spray   5. Deviated nasal septum J34.2 omeprazole (PRILOSEC) 40 MG DR capsule     LARYNGOSCOPY FLEX FIBEROPTIC, DIAGNOSTIC     fluticasone (FLONASE) 50 MCG/ACT nasal spray   6. Nasal turbinate hypertrophy J34.3 omeprazole (PRILOSEC) 40 MG DR capsule      LARYNGOSCOPY FLEX FIBEROPTIC, DIAGNOSTIC     fluticasone (FLONASE) 50 MCG/ACT nasal spray   7. History of allergic rhinitis Z87.09 omeprazole (PRILOSEC) 40 MG DR capsule     LARYNGOSCOPY FLEX FIBEROPTIC, DIAGNOSTIC     fluticasone (FLONASE) 50 MCG/ACT nasal spray   8. Tobacco dependence syndrome F17.200 omeprazole (PRILOSEC) 40 MG DR capsule     LARYNGOSCOPY FLEX FIBEROPTIC, DIAGNOSTIC     fluticasone (FLONASE) 50 MCG/ACT nasal spray   9. Encounter for tobacco use cessation counseling Z71.6 omeprazole (PRILOSEC) 40 MG DR capsule     LARYNGOSCOPY FLEX FIBEROPTIC, DIAGNOSTIC     fluticasone (FLONASE) 50 MCG/ACT nasal spray   10. History of spinal fusion Z98.1 omeprazole (PRILOSEC) 40 MG DR capsule     LARYNGOSCOPY FLEX FIBEROPTIC, DIAGNOSTIC     fluticasone (FLONASE) 50 MCG/ACT nasal spray     It was my pleasure seeing Selina Parikh today in clinic.  The patient presents with a constellation of nose and throat symptoms that I think are multifactorial.  Based on her history of reflux, tobacco use, and her endoscopic exam, I think that she has throat irritation from laryngal pharyngeal reflux and chronic gastroesophageal reflux.  She is not getting benefit from the omeprazole as it has not taken appropriately.  We discussed taking 40 mg of Prilosec once daily 20-30 minutes prior to morning meal.  I sent her a prescription.    The patient does have obvious rhinitis which is likely mostly tobacco driven.  This could also have a component of a viral rhinitis.  In either case, I would recommend good nasal regimen.  We discussed daily nasal saline irrigation followed by topical application of Flonase nasal spray.  I provided the patient with a rinse bottle and written instructions.  I sent a prescription to her pharmacy.     Would like to see the patient back in 6 to 8 weeks for recheck.  If her symptoms are still persisting, we can consider video swallow study, possible neck CT with contrast, CT of the sinuses, etc.      Antonio Morejon MD  Department of Otolarygology-Head and Neck Surgery  [facility]

## 2020-02-11 NOTE — ED PROVIDER NOTES
"  History     Chief Complaint   Patient presents with     Pharyngitis     HPI    Selina Parikh  is a 54 year old female who is here today because of: Sore Throat.  The patient has had symptoms of sore throat, hoarseness and painful swallowing with sensation of \"choking on food\" that has been an on and off issue every since she had her second cervical spine surgery. Patient states she smokes about 6 cigarettes/day.  Patient states that she feels that 3 days ago she choked on a piece of food and somehow scratched or irritated her throat then.  Patient states since then she is had this persistent sore throat, with fullness in her throat.  Onset of symptoms was 3 days ago. Course of illness is same.  Patient denies exposure to illness at home or work/school.   Patient denies fever, earache, nasal congestion/runny nose, nausea, vomiting, diarrhea and headache  Treatment measures tried include acetaminophen.    Patient states she does have a cough that is chronic and has COPD.     Problem list, Medication list, Allergies, and Medical/Social/Surgical histories reviewed in [facility] and updated as appropriate.    Allergies:  Allergies   Allergen Reactions     Ciprofloxacin Anaphylaxis     Flagyl [Metronidazole] Anaphylaxis     Zofran [Ondansetron] Other (See Comments) and GI Disturbance     Causes bloating, moderately uncomfortable, abd pain       Augmentin Nausea and Vomiting     Denies hives at this time     Percocet [Oxycodone-Acetaminophen] Other (See Comments)     Goes nuts - nasty mean irritated, can take Dilaudid     Vicodin [Hydrocodone-Acetaminophen] Other (See Comments)     Anxiety-agitation       Problem List:    Patient Active Problem List    Diagnosis Date Noted     S/P partial colectomy 04/10/2018     Priority: Medium     Diverticulitis 01/28/2018     Priority: Medium     Psoriasis 06/20/2012     Priority: Medium     GERD (gastroesophageal reflux disease) 03/15/2011     Priority: Medium     Tobacco abuse " 02/17/2011     Priority: Medium     Health Care Home 01/27/2011     Priority: Medium     DX V65.8 REPLACED WITH 90592 HEALTH CARE HOME (04/08/2013)       CARDIOVASCULAR SCREENING; LDL GOAL LESS THAN 160 10/31/2010     Priority: Medium     R Occipital Neuralgia 10/07/2009     Priority: Medium     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 On fiber, senna, and MOM. Advised to d/c MOM, start Miralax and titer to regular BM's. Pt has been seen by GI.        Brain syndrome, posttraumatic 03/06/2009     Priority: Medium     Work comp.  Has seen Dr. Ahmadi, [facility] clinic of neurology.  MRIs, EMG unremarkalbe, some foraminal stenosis on C5/C6  Sent her to physiatrist, trigger point injections didn't help.  After some neck traction, had intense unremitting HA, neck pain.  Off/on tingling in arms.  Pain clinic and/or spine surg for more eval probable next steps.    Has failed multiple rescue and preventive HA meds. On no narcotics          Past Medical History:    History reviewed. No pertinent past medical history.    Past Surgical History:    Past Surgical History:   Procedure Laterality Date     CHOLECYSTECTOMY, LAPOROSCOPIC  2/14/2000    Cholecystectomy, Laparoscopic     CYSTOSCOPY, INSERT LIGHTED STENT URETER(S) N/A 4/10/2018    Procedure: CYSTOSCOPY, INSERT LIGHTED STENT URETER(S);  Lighted Stent Placement,Laparoscopic Assisted Sigmoid Colectomy;  Surgeon: ERVIN Reina MD;  Location: [facility] OR     LAPAROSCOPIC ASSISTED COLECTOMY LEFT (DESCENDING) N/A 4/10/2018    Procedure: LAPAROSCOPIC ASSISTED COLECTOMY LEFT (DESCENDING);;  Surgeon: Hill Murray MD;  Location: [facility] OR     ORTHOPEDIC SURGERY  10/2010    Cut palm and reattched tendons and nerves in left hand forefinger.     SURGICAL HISTORY OF -   1/4/2000    Esophagogastroduodenoscopy with biopsy      TUBAL LIGATION         Family History:    Family History   Problem Relation Age of Onset     C.A.D. Father 50        50's     Diabetes Father      Diabetes Brother      C.A.D. Brother 42        quad bypass and MI     Diabetes Brother         2 brothers have DM     Cancer Brother 34        Melanoma     Allergies Son         Meds     Allergies Daughter         Med     Cancer Mother      C.A.D. Brother 38        MI age 38       Social History:  Marital Status:   [2]  Social History     Tobacco Use     Smoking status: Current Every Day Smoker     Packs/day: 0.25     Years: 30.00     Pack years: 7.50     Types: Cigarettes     Smokeless tobacco: Never Used     Tobacco comment: 3-5 per day   Substance Use Topics     Alcohol use: No     Drug use: No        Medications:    albuterol (PROAIR HFA/PROVENTIL HFA/VENTOLIN HFA) 108 (90 Base) MCG/ACT inhaler  albuterol (PROVENTIL) (2.5 MG/3ML) 0.083% neb solution  amLODIPine (NORVASC) 5 MG tablet  benzonatate (TESSALON) 100 MG capsule  celecoxib (CELEBREX) 200 MG capsule  clobetasol (TEMOVATE) 0.05 % ointment  docusate sodium (COLACE) 50 MG capsule  fluticasone (FLONASE) 50 MCG/ACT nasal spray  Ibuprofen (MOTRIN PO)  ipratropium - albuterol 0.5 mg/2.5 mg/3 mL (DUONEB) 0.5-2.5 (3) MG/3ML neb solution  melatonin 1 MG/ML LIQD liquid  metoprolol succinate ER (TOPROL-XL) 25 MG 24 hr tablet  Multiple Vitamin (MULTI-VITAMINS) TABS  omeprazole (PRILOSEC) 20 MG DR capsule  tiZANidine (ZANAFLEX) 2 MG tablet  traMADol (ULTRAM) 50 MG tablet          Review of Systems   Constitutional: Negative for fever.   HENT: Positive for sore throat, trouble swallowing and voice change.    Eyes: Negative.    Respiratory: Positive for cough. Negative for shortness of breath and wheezing.    Cardiovascular: Negative.    Gastrointestinal: Negative.    Genitourinary: Negative.    Musculoskeletal: Negative.    Skin: Negative.    Neurological: Negative.    All other systems reviewed and are  "negative.      Physical Exam   BP: (!) 145/91  Heart Rate: 80  Resp: 16  Height: 152.4 cm (5')  Weight: 72.6 kg (160 lb)  SpO2: 97 %      Physical Exam       BP (!) 145/91   Resp 16   Ht 1.524 m (5')   Wt 72.6 kg (160 lb)   SpO2 97%   BMI 31.25 kg/m    General: healthy, alert with no acute distress, and non toxic in appearance  Eyes - conjunctivae clear.  Ears - External ears normal. Canals clear. TM's normal.  Nose/Sinuses - Nares normal.Mucosa normal. No drainage or sinus tenderness.  Oropharynx - Lips, mucosa, and tongue normal. Positive findings: mild oropharyngeal erythema. No tonsillar hypertrophy or exudates present. Uvula midline with no dysphonia, dysphagia, or trismus noted.   Neck - Neck supple; no cervical nodes. No meningeal signs.   Lungs - Lungs clear; no wheezing or rales.  Heart - regular rate and rhythm. No murmurs, rub.  Abdomen: Abdomen soft, non-tender. BS normal. No masses, organomegaly  SKIN: no suspicious lesions or rashes    Labs:  Rapid Strep test is negative; await throat culture results.  Results for orders placed or performed during the hospital encounter of 02/10/20 (from the past 24 hour(s))   Rapid strep group A screen POCT   Result Value Ref Range    Rapid Strep A Screen negative neg    Internal QC OK Yes    Neck soft tissue XR    Narrative    NECK SOFT TISSUE   2/10/2020 7:31 PM     HISTORY: Fullness in throat for the past 3 days with sore throat after  \"irritating throat with food\".    COMPARISON: None.      Impression    IMPRESSION: Single lateral view shows normal soft tissues anteriorly.  There is solid anterior spinal fusion C4-C7 and instrumented posterior  spinal fusion at same levels.            ED Course        Procedures              Critical Care time:  none               Results for orders placed or performed during the hospital encounter of 02/10/20 (from the past 24 hour(s))   Rapid strep group A screen POCT   Result Value Ref Range    Rapid Strep A Screen " "negative neg    Internal QC OK Yes    Neck soft tissue XR    Narrative    NECK SOFT TISSUE   2/10/2020 7:31 PM     HISTORY: Fullness in throat for the past 3 days with sore throat after  \"irritating throat with food\".    COMPARISON: None.      Impression    IMPRESSION: Single lateral view shows normal soft tissues anteriorly.  There is solid anterior spinal fusion C4-C7 and instrumented posterior  spinal fusion at same levels.        Medications   dexamethasone (DECADRON) oral solution (inj used orally) 10 mg (has no administration in time range)       Assessments & Plan (with Medical Decision Making)     I have reviewed the nursing notes.    I have reviewed the findings, diagnosis, plan and need for follow up with the patient.   54-year-old female presents the urgent care with fullness and pain in the throat that started 3 days ago after she ate.  Patient states ever since she had her cervical neck surgery she has had this occasional symptoms of \"choking\" on food.  Patient states it seems get stuck in a pocket and she has a difficult time getting rid of it.  See exam findings above.  Rapid strep test obtained in office today and was negative.  Throat culture sent and currently pending.  Soft tissue neck x-ray obtained in office today which shows normal soft tissue anteriorly.  There is solid anterior spinal fusion C4-C7 and instrumented posterior spinal fusion at same levels.  Patient given single dose of oral Decadron in office today for symptoms and informed to keep her ENT appointment that she has in 3 days for further evaluation and treatment.  No indication for antibiotics at this time.  Patient to return if symptoms worsen or change these were discussed with patient on discharge paperwork.  Patient discharged in stable condition.  Differentials include irritation to throat from abrasion versus viral pharyngitis versus strep throat versus possible esophageal issues/fullness.     New Prescriptions    No " medications on file       Final diagnoses:   Throat fullness   Throat pain       2/10/2020   [facility] EMERGENCY DEPARTMENT     Olga Chaparro PA-C  02/10/20 2015

## 2020-02-12 ENCOUNTER — OFFICE VISIT (OUTPATIENT)
Dept: OTOLARYNGOLOGY | Facility: CLINIC | Age: 55
End: 2020-02-12
Payer: COMMERCIAL

## 2020-02-12 VITALS
WEIGHT: 160 LBS | SYSTOLIC BLOOD PRESSURE: 103 MMHG | HEIGHT: 60 IN | BODY MASS INDEX: 31.41 KG/M2 | DIASTOLIC BLOOD PRESSURE: 57 MMHG | TEMPERATURE: 97.8 F | HEART RATE: 75 BPM

## 2020-02-12 DIAGNOSIS — R09.81 NASAL CONGESTION: ICD-10-CM

## 2020-02-12 DIAGNOSIS — J02.9 SORE THROAT: Primary | ICD-10-CM

## 2020-02-12 DIAGNOSIS — Z87.19 HISTORY OF GASTROESOPHAGEAL REFLUX (GERD): ICD-10-CM

## 2020-02-12 DIAGNOSIS — K21.9 LARYNGOPHARYNGEAL REFLUX: ICD-10-CM

## 2020-02-12 DIAGNOSIS — Z71.6 ENCOUNTER FOR TOBACCO USE CESSATION COUNSELING: ICD-10-CM

## 2020-02-12 DIAGNOSIS — J34.2 DEVIATED NASAL SEPTUM: ICD-10-CM

## 2020-02-12 DIAGNOSIS — F17.200 TOBACCO DEPENDENCE SYNDROME: ICD-10-CM

## 2020-02-12 DIAGNOSIS — Z98.1 HISTORY OF SPINAL FUSION: ICD-10-CM

## 2020-02-12 DIAGNOSIS — Z87.09 HISTORY OF ALLERGIC RHINITIS: ICD-10-CM

## 2020-02-12 DIAGNOSIS — J34.3 NASAL TURBINATE HYPERTROPHY: ICD-10-CM

## 2020-02-12 PROCEDURE — 31575 DIAGNOSTIC LARYNGOSCOPY: CPT | Performed by: OTOLARYNGOLOGY

## 2020-02-12 PROCEDURE — 99203 OFFICE O/P NEW LOW 30 MIN: CPT | Mod: 25 | Performed by: OTOLARYNGOLOGY

## 2020-02-12 RX ORDER — FLUTICASONE PROPIONATE 50 MCG
2 SPRAY, SUSPENSION (ML) NASAL DAILY
Qty: 47.4 ML | Refills: 3 | Status: SHIPPED | OUTPATIENT
Start: 2020-02-12 | End: 2020-04-01

## 2020-02-12 RX ORDER — OMEPRAZOLE 40 MG/1
40 CAPSULE, DELAYED RELEASE ORAL DAILY
Qty: 90 CAPSULE | Refills: 1 | Status: SHIPPED | OUTPATIENT
Start: 2020-02-12 | End: 2020-04-01

## 2020-02-12 ASSESSMENT — MIFFLIN-ST. JEOR: SCORE: 1247.26

## 2020-02-12 NOTE — PATIENT INSTRUCTIONS
Per physician instructions      If you have questions or concerns on any instructions given to you by your provider today or if you need to schedule an appointment, you can reach us at [phone].     NASAL SALINE IRRIGATION INSTRUCTIONS    You will be starting nasal saline irrigations and will need to obtain the following:      - NeilMed Sinus Rinse 8 oz Kit  - Distilled or filtered water   - Normal saline salt packets    Place filtered or distilled water into the NeilMed bottle up to the fill line (DO NOT USE TAP OR WELL WATER).  Place the pre-made salt packet in the 8 oz of saline.  Shake the bottle to suspend into solution.  Lean head forward over a sink or a basin.  Rinse each side of the nose with one-half of the bottle (each squeeze is about one-half of the bottle). Rinse the nose daily.     If you use topical nasal sprays, apply following irrigation.    Video example: https://www.[employer]com/watch?v=RE3lhTa4Mc2

## 2020-02-12 NOTE — LETTER
2/12/2020         RE: Selina Parikh  [address] [place] [address]        Dear Colleague,    Thank you for referring your patient, Selina Parikh, to the [facility]. Please see a copy of my visit note below.    Chief Complaint   Patient presents with     Sinus Problem     Consult sinus/issues since Thanksgiving/seen in  2 days ago/throat pain/fullness/hx of 2 cervical surgeries/coughing up green/yellow phelgm     History of Present Illness   Selina Parikh is a 54 year old female who presents for nose and sinus evaluation. I am seeing this patient in consultation for acute recurrent maxillary sinusitis at the request of the provider Kiarra Monreal CNP.  The patient describes symptoms of throat discomfort, postnasal drainage, nose and sinus pressure, congestion for the past several months to years.  She feels like it is been worse since November.  The patient's past history is significant for spinal fusion for chronic headaches and neck pain.  She is had 2 fusions and hardware removal, most recent surgery was in the winter 2018.     The patient reports daily symptoms of gastroesophageal reflux usually in the evening.  She takes omeprazole twice daily but does not eat food after taking the medication.  She has thick postnasal drainage.  The patient is a tobacco user, roughly 1/4 pack of cigarettes per day.  Has been smoking since her teens.  She does have products to help her quit and is currently working on cutting down and quitting.  The patient reports dysphagia, odynophagia, pharyngodynia, otalgia, dysphonia, sense of neck lumps/bumps/swelling.  She denies any unintentional weight loss.  She denies any hemoptysis.     The patient reports bilateral nasal obstruction worse on the left-hand side, rhinorrhea, postnasal drainage, decreased sense of smell.  She has constant face pain/pressure/fullness in her malar areas.  Has not had previous nose or sinus surgery.  She is not using any  medications in her nose.  She does have a history of allergy symptoms but usually just takes Sudafed when she is having symptoms.  She is not seen an allergist before had previous allergy testing.     The patient underwent a head CT on 11/1/2019 for severe headache.  My review of the head CT shows normal paranasal sinuses without any evidence of acute or chronic inflammation.  The inferior portions of the maxillary sinuses were incompletely imaged.  There is a rightward anterior nasal septal deviation, also incompletely imaged.      Past Medical History  Patient Active Problem List   Diagnosis     Brain syndrome, posttraumatic     IBS (irritable bowel syndrome)     R Occipital Neuralgia     Scapulocostal syndrome     CARDIOVASCULAR SCREENING; LDL GOAL LESS THAN 160     Health Care Home     Tobacco abuse     GERD (gastroesophageal reflux disease)     Psoriasis     Diverticulitis     S/P partial colectomy     Current Medications     Current Outpatient Medications:      albuterol (PROVENTIL) (2.5 MG/3ML) 0.083% neb solution, Take 1 vial (2.5 mg) by nebulization every 4 hours as needed for shortness of breath / dyspnea or wheezing, Disp: 1 Box, Rfl: 0     amLODIPine (NORVASC) 5 MG tablet, Take 5 mg by mouth every evening , Disp: , Rfl:      celecoxib (CELEBREX) 200 MG capsule, Take 200 mg by mouth, Disp: , Rfl:      docusate sodium (COLACE) 50 MG capsule, Take 2 capsules (100 mg) by mouth 2 times daily, Disp: 60 capsule, Rfl: 0     fluticasone (FLONASE) 50 MCG/ACT nasal spray, Spray 2 sprays into both nostrils daily, Disp: 47.4 mL, Rfl: 3     Ibuprofen (MOTRIN PO), Take 600 mg by mouth 1-2 times daily, Disp: , Rfl:      ipratropium - albuterol 0.5 mg/2.5 mg/3 mL (DUONEB) 0.5-2.5 (3) MG/3ML neb solution, Take 1 vial (3 mLs) by nebulization every 6 hours as needed for shortness of breath / dyspnea or wheezing, Disp: 1 Box, Rfl: 1     melatonin 1 MG/ML LIQD liquid, Take 2 mg by mouth At Bedtime, Disp: , Rfl:       metoprolol succinate ER (TOPROL-XL) 25 MG 24 hr tablet, Take 25 mg by mouth, Disp: , Rfl:      Multiple Vitamin (MULTI-VITAMINS) TABS, Take 1 tablet by mouth, Disp: , Rfl:      omeprazole (PRILOSEC) 20 MG DR capsule, Take 1 capsule (20 mg) by mouth 2 times daily, Disp: 60 capsule, Rfl: 11     omeprazole (PRILOSEC) 40 MG DR capsule, Take 1 capsule (40 mg) by mouth daily Take 20-30 minutes prior to morning meal, Disp: 90 capsule, Rfl: 1     tiZANidine (ZANAFLEX) 2 MG tablet, Take 2 mg by mouth every 6 hours as needed , Disp: , Rfl:      traMADol (ULTRAM) 50 MG tablet, Take 50 mg by mouth every 6 hours as needed for pain (back and neck pain), Disp: , Rfl:      albuterol (PROAIR HFA/PROVENTIL HFA/VENTOLIN HFA) 108 (90 Base) MCG/ACT inhaler, Inhale 2 puffs into the lungs every 6 hours as needed for shortness of breath / dyspnea or wheezing (Patient not taking: Reported on 2/12/2020), Disp: 1 Inhaler, Rfl: 3     clobetasol (TEMOVATE) 0.05 % ointment, Apply  topically to affected area(s) 2 times daily., Disp: , Rfl:     Allergies  Allergies   Allergen Reactions     Ciprofloxacin Anaphylaxis     Flagyl [Metronidazole] Anaphylaxis     Zofran [Ondansetron] Other (See Comments) and GI Disturbance     Causes bloating, moderately uncomfortable, abd pain       Augmentin Nausea and Vomiting     Denies hives at this time     Percocet [Oxycodone-Acetaminophen] Other (See Comments)     Goes nuts - nasty mean irritated, can take Dilaudid     Vicodin [Hydrocodone-Acetaminophen] Other (See Comments)     Anxiety-agitation       Social History   Social History     Socioeconomic History     Marital status:      Spouse name: Not on file     Number of children: Not on file     Years of education: Not on file     Highest education level: Not on file   Occupational History     Not on file   Social Needs     Financial resource strain: Not on file     Food insecurity:     Worry: Not on file     Inability: Not on file     Transportation  needs:     Medical: Not on file     Non-medical: Not on file   Tobacco Use     Smoking status: Current Every Day Smoker     Packs/day: 0.25     Years: 30.00     Pack years: 7.50     Types: Cigarettes     Smokeless tobacco: Never Used     Tobacco comment: 3-5 per day   Substance and Sexual Activity     Alcohol use: No     Drug use: No     Sexual activity: Yes     Partners: Male     Birth control/protection: Surgical   Lifestyle     Physical activity:     Days per week: Not on file     Minutes per session: Not on file     Stress: Not on file   Relationships     Social connections:     Talks on phone: Not on file     Gets together: Not on file     Attends Spiritism service: Not on file     Active member of club or organization: Not on file     Attends meetings of clubs or organizations: Not on file     Relationship status: Not on file     Intimate partner violence:     Fear of current or ex partner: Not on file     Emotionally abused: Not on file     Physically abused: Not on file     Forced sexual activity: Not on file   Other Topics Concern     Parent/sibling w/ CABG, MI or angioplasty before 65F 55M? Yes     Comment: brother- 38, MI, brother 42- quad bypass   Social History Narrative     Not on file       Family History  Family History   Problem Relation Age of Onset     C.A.D. Father 50        50's     Diabetes Father      Diabetes Brother      C.A.D. Brother 42        quad bypass and MI     Diabetes Brother         2 brothers have DM     Cancer Brother 34        Melanoma     Allergies Son         Meds     Allergies Daughter         Med     Cancer Mother      C.A.D. Brother 38        MI age 38       Review of Systems  As per HPI and PMHx, otherwise 10+ comprehensive system review is negative.    Physical Exam  /57 (BP Location: Right arm, Patient Position: Sitting, Cuff Size: Adult Regular)   Pulse 75   Temp 97.8  F (36.6  C) (Oral)   Ht 1.524 m (5')   Wt 72.6 kg (160 lb)   BMI 31.25 kg/m     GENERAL:  Patient is a pleasant, cooperative 54 year old female in no acute distress.  HEAD: Normocephalic, atraumatic.  Hair and scalp are normal.  EYES: Pupils are equal, round, reactive to light and accommodation.  Extraocular movements are intact.  The sclera nonicteric without injection.  The extraocular structures are normal.  EARS: Normal shape and symmetry.  No tenderness when palpating the mastoid or tragal areas bilaterally.  Otoscopic exam reveals a minimal amount of cerumen bilaterally.  The bilateral tympanic membranes are round, intact without evidence of effusion, good landmarks.  No retraction, granulation, or drainage.  NOSE: Nares are patent.  Nasal mucosa is boggy and inflamed with sticky, inflammatory mucus.  The patient has moderate inferior turbinate hypertrophy.  The patient has a leftward nasal septal deviation with spur that does contact the inferior turbinate.  No nasal cavity masses, polyps, or mucopurulence on anterior rhinoscopy.  ORAL CAVITY: Dentition is in good repair.  Mucous membranes are dry.  Tongue is mobile, protrudes to the midline.  Palate elevates symmetrically.  Tonsils are 1+, symmetric.  No erythema or exudate.  No oral cavity or oropharyngeal masses, lesions, ulcerations, leukoplakia.  NECK: Supple, trachea is midline.  There no palpable cervical lymphadenopathy or masses bilaterally.  Palpation of the bilateral parotid and submandibular areas reveal no masses.  No thyromegaly.    NEUROLOGIC: Cranial nerves II through XII are grossly intact.  Voice is strong.  Patient is House-Brackman I/VI bilaterally.  CARDIOVASCULAR: Extremities are warm and well-perfused.  No significant peripheral edema.  RESPIRATORY: Patient has nonlabored breathing without cough, wheeze, stridor.  PSYCHIATRIC: Patient is alert and oriented.  Mood and affect appear normal.  SKIN: Warm and dry.  No scalp, face, or neck lesions noted.    Procedure: Flexible Laryngoscopy  Indication: Sore throat, dysphagia, neck  fullness, postnasal drainage.    To best visualize the upper airway anatomy and due to the chief complaint and HPI, I proceeded with flexible fiberoptic laryngoscopy examination.  The bilateral nasal cavities were anesthetized and decongested with a mixture of lidocaine and neosynephrine.  The bilateral nasal cavities were examined using a flexible fiberoptic laryngoscope.  There were no nasal cavity masses, polyps, or mucopurulence bilaterally.  The nasal septum deviates to the left with spur that does contact the inferior turbinate.  The nasopharynx had a normal appearance with normal Eustachian tube openings and fossa of Rosenmuller bilaterally.  Minimal adenoid tissue.  There is significant posterior oropharyngeal cobblestoning that extends down to the piriforms.  The base of tongue, vallecula, epiglottis, aryepiglottic folds, arytenoids, and piriform sinuses were without mass or lesion.  There is some significant interarytenoid thickening and mild erythema consistent with reflux.  The bilateral true vocal folds were symmetrically mobile without nodules or masses.  The visualized portions of the infraglottic and subglottic airway are unremarkable.  The scope was removed.  The patient tolerated the procedure well.    Assessment and Plan     ICD-10-CM    1. Sore throat J02.9 omeprazole (PRILOSEC) 40 MG DR capsule     LARYNGOSCOPY FLEX FIBEROPTIC, DIAGNOSTIC     fluticasone (FLONASE) 50 MCG/ACT nasal spray   2. Laryngopharyngeal reflux K21.9 omeprazole (PRILOSEC) 40 MG DR capsule     LARYNGOSCOPY FLEX FIBEROPTIC, DIAGNOSTIC     fluticasone (FLONASE) 50 MCG/ACT nasal spray   3. History of gastroesophageal reflux (GERD) Z87.19 omeprazole (PRILOSEC) 40 MG DR capsule     LARYNGOSCOPY FLEX FIBEROPTIC, DIAGNOSTIC     fluticasone (FLONASE) 50 MCG/ACT nasal spray   4. Nasal congestion R09.81 omeprazole (PRILOSEC) 40 MG DR capsule     LARYNGOSCOPY FLEX FIBEROPTIC, DIAGNOSTIC     fluticasone (FLONASE) 50 MCG/ACT nasal spray    5. Deviated nasal septum J34.2 omeprazole (PRILOSEC) 40 MG DR capsule     LARYNGOSCOPY FLEX FIBEROPTIC, DIAGNOSTIC     fluticasone (FLONASE) 50 MCG/ACT nasal spray   6. Nasal turbinate hypertrophy J34.3 omeprazole (PRILOSEC) 40 MG DR capsule     LARYNGOSCOPY FLEX FIBEROPTIC, DIAGNOSTIC     fluticasone (FLONASE) 50 MCG/ACT nasal spray   7. History of allergic rhinitis Z87.09 omeprazole (PRILOSEC) 40 MG DR capsule     LARYNGOSCOPY FLEX FIBEROPTIC, DIAGNOSTIC     fluticasone (FLONASE) 50 MCG/ACT nasal spray   8. Tobacco dependence syndrome F17.200 omeprazole (PRILOSEC) 40 MG DR capsule     LARYNGOSCOPY FLEX FIBEROPTIC, DIAGNOSTIC     fluticasone (FLONASE) 50 MCG/ACT nasal spray   9. Encounter for tobacco use cessation counseling Z71.6 omeprazole (PRILOSEC) 40 MG DR capsule     LARYNGOSCOPY FLEX FIBEROPTIC, DIAGNOSTIC     fluticasone (FLONASE) 50 MCG/ACT nasal spray   10. History of spinal fusion Z98.1 omeprazole (PRILOSEC) 40 MG DR capsule     LARYNGOSCOPY FLEX FIBEROPTIC, DIAGNOSTIC     fluticasone (FLONASE) 50 MCG/ACT nasal spray     It was my pleasure seeing Selina Parikh today in clinic.  The patient presents with a constellation of nose and throat symptoms that I think are multifactorial.  Based on her history of reflux, tobacco use, and her endoscopic exam, I think that she has throat irritation from laryngal pharyngeal reflux and chronic gastroesophageal reflux.  She is not getting benefit from the omeprazole as it has not taken appropriately.  We discussed taking 40 mg of Prilosec once daily 20-30 minutes prior to morning meal.  I sent her a prescription.    The patient does have obvious rhinitis which is likely mostly tobacco driven.  This could also have a component of a viral rhinitis.  In either case, I would recommend good nasal regimen.  We discussed daily nasal saline irrigation followed by topical application of Flonase nasal spray.  I provided the patient with a rinse bottle and written  instructions.  I sent a prescription to her pharmacy.     Would like to see the patient back in 6 to 8 weeks for recheck.  If her symptoms are still persisting, we can consider video swallow study, possible neck CT with contrast, CT of the sinuses, etc.     Antonio Morejon MD  Department of Otolarygology-Head and Neck Surgery  [facility]     Again, thank you for allowing me to participate in the care of your patient.        Sincerely,        Antonio Morejon MD

## 2020-02-12 NOTE — PROGRESS NOTES
This laryngoscopy scope was used on this patient.    Flexible    [employer] Flexible #[id number] Adult  Estrellita Mariscal CMA

## 2020-02-12 NOTE — NURSING NOTE
Initial /57 (BP Location: Right arm, Patient Position: Sitting, Cuff Size: Adult Regular)   Pulse 75   Temp 97.8  F (36.6  C) (Oral)   Ht 1.524 m (5')   Wt 72.6 kg (160 lb)   BMI 31.25 kg/m   Estimated body mass index is 31.25 kg/m  as calculated from the following:    Height as of this encounter: 1.524 m (5').    Weight as of this encounter: 72.6 kg (160 lb). .    Estrellita Mariscal CMA

## 2020-02-13 LAB
BACTERIA SPEC CULT: NORMAL
Lab: NORMAL
SPECIMEN SOURCE: NORMAL

## 2020-02-13 NOTE — RESULT ENCOUNTER NOTE
Final Beta strep group A r/o culture is NEGATIVE for Group A streptococcus.    No treatment or change in treatment per [place] Strep protocol.

## 2020-04-01 ENCOUNTER — ALLIED HEALTH/NURSE VISIT (OUTPATIENT)
Dept: PHARMACY | Facility: CLINIC | Age: 55
End: 2020-04-01
Payer: COMMERCIAL

## 2020-04-01 DIAGNOSIS — G56.80: ICD-10-CM

## 2020-04-01 DIAGNOSIS — J44.9 CHRONIC OBSTRUCTIVE PULMONARY DISEASE, UNSPECIFIED COPD TYPE (H): Primary | ICD-10-CM

## 2020-04-01 DIAGNOSIS — Z78.9 TAKES DIETARY SUPPLEMENTS: ICD-10-CM

## 2020-04-01 DIAGNOSIS — K21.9 GASTROESOPHAGEAL REFLUX DISEASE WITHOUT ESOPHAGITIS: ICD-10-CM

## 2020-04-01 DIAGNOSIS — I10 ESSENTIAL HYPERTENSION: ICD-10-CM

## 2020-04-01 PROCEDURE — 99605 MTMS BY PHARM NP 15 MIN: CPT | Performed by: PHARMACIST

## 2020-04-01 PROCEDURE — 99607 MTMS BY PHARM ADDL 15 MIN: CPT | Performed by: PHARMACIST

## 2020-04-01 RX ORDER — DOCUSATE SODIUM 100 MG/1
100 CAPSULE, LIQUID FILLED ORAL EVERY EVENING
COMMUNITY
End: 2021-07-26

## 2020-04-01 RX ORDER — PHENOL 1.4 %
10 AEROSOL, SPRAY (ML) MUCOUS MEMBRANE AT BEDTIME
Status: ON HOLD | COMMUNITY
End: 2023-06-09

## 2020-04-01 NOTE — LETTER
[place] [address] [facility] [address]  [phone]      April 1, 2020    Selina Parikh                                                                                                                     [address] [place] [address]      Dear Selina,    It was so nice to speak with you on 4/1/20.  I hope I was able to give you some useful information during our Medication Therapy Management (MTM) visit. The purpose of this visit with a clinical pharmacist was to review the medicines you are currently taking. We want to make sure that you know which medicines to take and what they are for. We also want to make sure all your medicines are working, safe, and as easy to take as possible.    Based upon our conversation continue to take your medications as prescribed.  Continue to monitor your blood pressure to make sure it does not start to rise.  Additionally, I placed an order for omeprazole 20 mg twice daily instead of 40 mg daily given that you have had more relief from this dose.    Feel free to call if you have any questions or concerns. By working together with you and your doctor, I hope to help you feel confident managing your medicines and improving your quality of life.       Best wishes,         Bib Ervin, PharmD, [facility]  Medication Therapy Management Pharmacist  Pager: [phone]

## 2020-04-01 NOTE — PROGRESS NOTES
MTM ENCOUNTER  SUBJECTIVE/OBJECTIVE:                           Selina Parikh is a 54 year old female called for an initial visit. She was referred to me from her [facility].      Patient consented to a telehealth visit: yes    Chief Complaint: Comprehensive review.    Allergies/ADRs: Reviewed in Epic  Tobacco:  reports that she quit smoking about 6 weeks ago. Her smoking use included cigarettes. She has a 7.50 pack-year smoking history. She has never used smokeless tobacco.  Alcohol: none  Caffeine: 3 cups/day of coffee  Activity: energy level higher than activity level - back and neck make it hard to move as much  PMH: Reviewed in Epic    Medication Adherence/Access: Patient takes medications directly from bottles.  Patient takes medications 2 time(s) per day.   Per patient, misses medication 0 times per week.   Medication barriers: none.   The patient fills medications at [place]: NO, fills medications at [facility] at [place].    Hypertension: Current medications include no medications. Pt quit smoking in February and her blood pressure dropped significantly to the point of being symptomatically hypotensive. Pt has stopped amlodipine and metoprolol for over a month.  Patient does self-monitor BP. Home BP monitoring in range of 110's systolic over 70's diastolic.  Patient reports no current medication side effects.    Pain: Currently taking celecoxib 200 mg daily, tramadol 50 mg every 6 hours PRN (a few times a week in the evening), and tizanidine 2 mg every 6 hours PRN (rare). Pt finds this to be effective for pain. She is concerned about taking her celecoxib due to some suggestions that ibuprofen should be avoided due to COVID-19. Pt reports some constipation with her pain medicatiosn, but daily docusate helps.     COPD: Pt is currently using albuterol HFA PRN and Duonebs PRN. Pt states that since quitting smoking she is really hardly using either.  Maybe only 3 nights a week. Finds to be  effective. Denies any issues.  Pt can also go much farther no without coughing and can laugh without coughing.     GERD: Current medications include: Prilosec (omeprazole) 20 mg twice daily. Her prescription was changed to a single 40 mg daily capsule, but she found this did not work as well and would prefer to go back (she is using older prescription). Pt c/o no current symptoms.  Patient feels that current regimen is effective.    Supplements: Currently taking melatonin 10 mg at bedtime, a daily multivitamin, and vitamin D3 5000 units daily. Pt reports no issues. Finds melatonin helpful with sleep.     Today's Vitals: There were no vitals taken for this visit. - telephone encounter, no vitals    BP Readings from Last 3 Encounters:   02/12/20 103/57   02/10/20 (!) 145/91   01/25/20 129/85     ASSESSMENT:                              Medication Adherence: good, no issues identified    Hypertension: Improved. Pt is meeting BP goal of <140/90 mmHg. Pt would benefit from continuing to monitor and future recheck at clinic.     Pain: Stable. Unclear evidence on avoiding NSAIDs during pandemic and not clear same recommendations would apply to celecoxib. Pt would benefit from continuing therapy for now.     COPD: Improved per patient since quitting smoking.      GERD: Needs Improvement. Pt would benefit from splitting dose given change in symptoms after going from 20 mg BID to 40 mg daily.     Supplements: Stable.     PLAN:                            1. Continue current therapy.  2. Reordered omeprazole at 20 mg twice daily vs 40 mg daily.    I spent 30 minutes with this patient today. All changes were made via collaborative practice agreement with Kiarra Monreal APRN, CNP. A copy of the visit note was provided to the patient's primary care provider.    Will follow up in 6 months or sooner if needed.    The patient was mailed a summary of these recommendations.     Bib Ervin, PharmD, BCACP  Medication Therapy Management  Pharmacist  Pager: [phone]

## 2020-06-10 ENCOUNTER — OFFICE VISIT (OUTPATIENT)
Dept: FAMILY MEDICINE | Facility: CLINIC | Age: 55
End: 2020-06-10
Payer: COMMERCIAL

## 2020-06-10 VITALS
HEIGHT: 60 IN | TEMPERATURE: 96.6 F | BODY MASS INDEX: 30.51 KG/M2 | OXYGEN SATURATION: 98 % | WEIGHT: 155.4 LBS | RESPIRATION RATE: 20 BRPM | DIASTOLIC BLOOD PRESSURE: 82 MMHG | HEART RATE: 75 BPM | SYSTOLIC BLOOD PRESSURE: 136 MMHG

## 2020-06-10 DIAGNOSIS — J44.9 CHRONIC OBSTRUCTIVE PULMONARY DISEASE, UNSPECIFIED COPD TYPE (H): ICD-10-CM

## 2020-06-10 DIAGNOSIS — M54.41 CHRONIC MIDLINE LOW BACK PAIN WITH BILATERAL SCIATICA: ICD-10-CM

## 2020-06-10 DIAGNOSIS — G89.29 CHRONIC MIDLINE LOW BACK PAIN WITH BILATERAL SCIATICA: ICD-10-CM

## 2020-06-10 DIAGNOSIS — M54.42 CHRONIC MIDLINE LOW BACK PAIN WITH BILATERAL SCIATICA: ICD-10-CM

## 2020-06-10 DIAGNOSIS — M54.2 CHRONIC NECK PAIN: Primary | ICD-10-CM

## 2020-06-10 DIAGNOSIS — G89.29 CHRONIC NECK PAIN: Primary | ICD-10-CM

## 2020-06-10 PROCEDURE — 99214 OFFICE O/P EST MOD 30 MIN: CPT | Performed by: NURSE PRACTITIONER

## 2020-06-10 RX ORDER — ALBUTEROL SULFATE 0.83 MG/ML
2.5 SOLUTION RESPIRATORY (INHALATION) EVERY 6 HOURS PRN
Qty: 1 BOX | Refills: 11 | Status: SHIPPED | OUTPATIENT
Start: 2020-06-10 | End: 2021-07-26

## 2020-06-10 RX ORDER — PREDNISONE 20 MG/1
40 TABLET ORAL DAILY
Qty: 10 TABLET | Refills: 0 | Status: SHIPPED | OUTPATIENT
Start: 2020-06-10 | End: 2020-06-15

## 2020-06-10 RX ORDER — TIZANIDINE 2 MG/1
2 TABLET ORAL EVERY 6 HOURS PRN
Qty: 40 TABLET | Refills: 1 | Status: ON HOLD | OUTPATIENT
Start: 2020-06-10 | End: 2022-04-28

## 2020-06-10 RX ORDER — IPRATROPIUM BROMIDE AND ALBUTEROL SULFATE 2.5; .5 MG/3ML; MG/3ML
1 SOLUTION RESPIRATORY (INHALATION) EVERY 6 HOURS PRN
Qty: 1 BOX | Refills: 1 | Status: SHIPPED | OUTPATIENT
Start: 2020-06-10 | End: 2021-07-26

## 2020-06-10 ASSESSMENT — MIFFLIN-ST. JEOR: SCORE: 1226.39

## 2020-06-10 ASSESSMENT — PAIN SCALES - GENERAL: PAINLEVEL: EXTREME PAIN (8)

## 2020-06-10 NOTE — PROGRESS NOTES
Subjective     Selina Parikh is a 54 year old female who presents to clinic today for the following health issues:    HPI   COPD Follow-Up    Overall, how are your COPD symptoms since your last clinic visit?  Slightly worse    How much fatigue or shortness of breath do you have when you are walking?  A lot more than usual    How much shortness of breath do you have when you are resting?  Same as usual    How often do you cough? All the time    Have you noticed any change in your sputum/phlegm?  Yes- thicker and more    Have you experienced a recent fever? No    Please describe how far you can walk without stopping to rest:  The length of 3-5 rooms, can walk for longer if she is pushing something    How many flights of stairs are you able to walk up without stopping?  1 flight    Have you had any Emergency Room Visits, Urgent Care Visits, or Hospital Admissions because of your COPD since your last office visit?  No     Patient states that she has never had PFTs before - states that her previous doctors told her she had COPD based on her symptoms and smoking history. Uses Duonebs and albuterol nebs - hasn't ever been on a different inhaler.    History   Smoking Status     Current Every Day Smoker     Packs/day: 0.50     Years: 30.00     Types: Cigarettes     Last attempt to quit: 2/14/2020   Smokeless Tobacco     Never Used     No results found for: FEV1, HRC7GAT      How many servings of fruits and vegetables do you eat daily?  0-1    On average, how many sweetened beverages do you drink each day (Examples: soda, juice, sweet tea, etc.  Do NOT count diet or artificially sweetened beverages)?   0    How many days per week do you exercise enough to make your heart beat faster? none    How many minutes a day do you exercise enough to make your heart beat faster? n/a    How many days per week do you miss taking your medication? 0    Back and Neck Pain       Duration: 2017 for low back, 2015 for the neck surgery         Specific cause: none  Has had 2 fusions in the neck  No back surgery    Description:   Location of pain: low back bilateral and neck bilateral  Character of pain: sharp, burning and stinging  Pain radiation: radiates down both legs, knee on the right ankle on the left  New numbness or weakness in legs, not attributed to pain:  YES- left knee weakness    Intensity: Currently 6-8/10 low back, 4/10 for the neck    History:   Pain interferes with job: Not applicable  History of back problems: disc issues in neck, disc issues in low back  Any previous MRI or X-rays: Yes- at [place].  Date 2/10/20, 2017 or 2018 for an MRI - through [facility]  Sees a specialist for back pain:  Was seeing Dr. Swann at [facility] spine  Therapies tried without relief: acetaminophen (Tylenol) and heat  Injections are helpful -she would like new orders  She would also like a second opinion on her neck.    Alleviating factors:   Improved by: tramadol, tizanidine, ice     Precipitating factors:  Worsened by: doing anything for any length of time          Accompanying Signs & Symptoms:  Risk of Fracture:  None  Risk of Cauda Equina:  None  Risk of Infection:  None  Risk of Cancer:  None  Risk of Ankylosing Spondylitis:  Onset at age <35, male, AND morning back stiffness. no                      Reviewed and updated as needed this visit by Provider  Tobacco  Allergies  Meds  Problems  Med Hx  Surg Hx  Fam Hx         Review of Systems   Constitutional, HEENT, cardiovascular, pulmonary, gi and gu systems are negative, except as otherwise noted.      Objective    /82   Pulse 75   Temp 96.6  F (35.9  C) (Tympanic)   Resp 20   Ht 1.524 m (5')   Wt 70.5 kg (155 lb 6.4 oz)   SpO2 98%   BMI 30.35 kg/m    Body mass index is 30.35 kg/m .  Physical Exam   GENERAL: healthy, alert and mild distress  RESP: lungs clear to auscultation - no rales, rhonchi or wheezes  CV: regular rate and rhythm, normal S1 S2, no S3 or S4, no murmur, click or  rub, no peripheral edema and peripheral pulses strong  MS: pacing around room, unable to sit for more than a few minutes.            Assessment & Plan       ICD-10-CM    1. Chronic neck pain  M54.2 MR Cervical Spine w/o Contrast    G89.29 Orthopedic & Spine  Referral     tiZANidine (ZANAFLEX) 2 MG tablet   2. Chronic midline low back pain with bilateral sciatica  M54.41 MR Lumbar Spine w/o Contrast    M54.42 Orthopedic & Spine  Referral    G89.29 tiZANidine (ZANAFLEX) 2 MG tablet   3. Chronic obstructive pulmonary disease, unspecified COPD type (H)  J44.9 ipratropium - albuterol 0.5 mg/2.5 mg/3 mL (DUONEB) 0.5-2.5 (3) MG/3ML neb solution     albuterol (PROVENTIL) (2.5 MG/3ML) 0.083% neb solution     General PFT Lab (Please always keep checked)     Pulmonary Function Test     predniSONE (DELTASONE) 20 MG tablet        Patient Instructions   Schedule lung function test: [phone]    Schedule MRI of neck and back: [phone]    Schedule appointment with spine doctor - they will call you.      Thank you for choosing [facility].  You may be receiving an email and/or telephone survey request from [facility] regarding your visit today.  Please take a few minutes to respond to the survey to let us know how we are doing.      If you have questions or concerns, please contact us via vLex or you can contact your care team at [phone].    Our Clinic hours are:  Monday 6:40 am  to 7:00 pm  Tuesday -Friday 6:40 am to 5:00 pm    The [place] outpatient lab hours are:  Monday - Friday 6:10 am to 4:45 pm  Saturdays 7:00 am to 11:00 am  Appointments are required, call [phone]    If you have clinical questions after hours or would like to schedule an appointment,  call the clinic at [phone].        Return in about 4 weeks (around 7/8/2020).    The risks, benefits and treatment options of prescribed medications or other treatments have been discussed with the patient. The  patient verbalized their understanding and should call or follow up if no improvement or if they develop further problems.    CARLTON Mcke[facility]

## 2020-06-11 ENCOUNTER — COMMUNICATION - HEALTHEAST (OUTPATIENT)
Dept: NEUROSURGERY | Facility: CLINIC | Age: 55
End: 2020-06-11

## 2020-06-15 ENCOUNTER — HOSPITAL ENCOUNTER (OUTPATIENT)
Dept: MRI IMAGING | Facility: CLINIC | Age: 55
End: 2020-06-15
Attending: NURSE PRACTITIONER
Payer: COMMERCIAL

## 2020-06-15 DIAGNOSIS — M54.41 CHRONIC MIDLINE LOW BACK PAIN WITH BILATERAL SCIATICA: ICD-10-CM

## 2020-06-15 DIAGNOSIS — M54.42 CHRONIC MIDLINE LOW BACK PAIN WITH BILATERAL SCIATICA: ICD-10-CM

## 2020-06-15 DIAGNOSIS — G89.29 CHRONIC MIDLINE LOW BACK PAIN WITH BILATERAL SCIATICA: ICD-10-CM

## 2020-06-15 DIAGNOSIS — G89.29 CHRONIC NECK PAIN: ICD-10-CM

## 2020-06-15 DIAGNOSIS — M54.2 CHRONIC NECK PAIN: ICD-10-CM

## 2020-06-15 PROBLEM — J44.9 CHRONIC OBSTRUCTIVE PULMONARY DISEASE, UNSPECIFIED COPD TYPE (H): Status: ACTIVE | Noted: 2020-06-15

## 2020-06-15 PROCEDURE — 72148 MRI LUMBAR SPINE W/O DYE: CPT

## 2020-06-15 PROCEDURE — 72141 MRI NECK SPINE W/O DYE: CPT

## 2020-06-16 ENCOUNTER — RECORDS - HEALTHEAST (OUTPATIENT)
Dept: ADMINISTRATIVE | Facility: OTHER | Age: 55
End: 2020-06-16

## 2020-06-17 ENCOUNTER — RECORDS - HEALTHEAST (OUTPATIENT)
Dept: RADIOLOGY | Facility: CLINIC | Age: 55
End: 2020-06-17

## 2020-06-17 ENCOUNTER — HOSPITAL ENCOUNTER (OUTPATIENT)
Dept: PHYSICAL MEDICINE AND REHAB | Facility: CLINIC | Age: 55
Discharge: HOME | End: 2020-06-17
Attending: PHYSICAL MEDICINE & REHABILITATION

## 2020-06-17 DIAGNOSIS — M54.12 CERVICAL RADICULITIS: ICD-10-CM

## 2020-06-17 DIAGNOSIS — M43.22 CERVICAL VERTEBRAL FUSION: ICD-10-CM

## 2020-06-17 DIAGNOSIS — M48.061 LUMBAR FORAMINAL STENOSIS: ICD-10-CM

## 2020-06-17 DIAGNOSIS — G89.29 CHRONIC BILATERAL LOW BACK PAIN WITH BILATERAL SCIATICA: ICD-10-CM

## 2020-06-17 DIAGNOSIS — M54.41 CHRONIC BILATERAL LOW BACK PAIN WITH BILATERAL SCIATICA: ICD-10-CM

## 2020-06-17 DIAGNOSIS — M54.2 NECK PAIN: ICD-10-CM

## 2020-06-17 DIAGNOSIS — M54.42 CHRONIC BILATERAL LOW BACK PAIN WITH BILATERAL SCIATICA: ICD-10-CM

## 2020-06-17 ASSESSMENT — MIFFLIN-ST. JEOR: SCORE: 1223.55

## 2020-06-19 ENCOUNTER — TELEPHONE (OUTPATIENT)
Dept: FAMILY MEDICINE | Facility: CLINIC | Age: 55
End: 2020-06-19

## 2020-06-19 NOTE — LETTER
June 19, 2020      Selina Parikh  [address] [place] [address]        Dear Selina Parikh, [id number]    At [facility] we care about your health and are committed to providing quality patient care, which includes staying current on preventative cancer screenings.  You can increase your chances of finding and treating cancers through regular screenings.      Our records show that you are due for the following screening(s):       Mammogram for breast cancer - [phone] to schedule  Recommended every 1-2 years for women age 50 and older  Mammograms help detect breast cancer, which is the most common cancer among women in the [place].  You may need to start having mammograms earlier and more often if you have had breast cancer, breast problems, or a family history of breast cancer.     Pap Smear for cervical cancer - [phone] to schedule  Recommended every three years for women 21 and older  A Pap test is used to detect cervical cancer.  The test should be taken at least once every three years but women who are at a greater risk for cervical cancer may need to have the test more often.      You are at a greater risk for cervical cancer if:   - You have had a sexually transmitted disease   - You have had more than one sex partner   - You have had an abnormal pap test in the past    If you have a My-Chart Account, you also can schedule this appointment through there.    Your partners in health,      Quality Committee   [facility]

## 2020-06-19 NOTE — TELEPHONE ENCOUNTER
Panel Management Review        Composite cancer screening  Chart review shows that this patient is due/due soon for the following Pap Smear and Mammogram  Summary:    Patient is due/failing the following:   MAMMOGRAM and PAP    Action needed:   Patient needs office visit for physical and pap smear.  Schedule mammogram    Type of outreach:    Sent letter.    Questions for provider review:    None                                                                                                                                    JERALD TENORIO

## 2020-07-02 ENCOUNTER — THERAPY VISIT (OUTPATIENT)
Dept: PHYSICAL THERAPY | Facility: CLINIC | Age: 55
End: 2020-07-02
Payer: COMMERCIAL

## 2020-07-02 DIAGNOSIS — M54.41 CHRONIC MIDLINE LOW BACK PAIN WITH BILATERAL SCIATICA: ICD-10-CM

## 2020-07-02 DIAGNOSIS — G89.29 CHRONIC MIDLINE LOW BACK PAIN WITH BILATERAL SCIATICA: ICD-10-CM

## 2020-07-02 DIAGNOSIS — M54.42 CHRONIC MIDLINE LOW BACK PAIN WITH BILATERAL SCIATICA: ICD-10-CM

## 2020-07-02 PROCEDURE — 97110 THERAPEUTIC EXERCISES: CPT | Mod: GP | Performed by: PHYSICAL THERAPIST

## 2020-07-02 PROCEDURE — 97163 PT EVAL HIGH COMPLEX 45 MIN: CPT | Mod: GP | Performed by: PHYSICAL THERAPIST

## 2020-07-02 NOTE — PROGRESS NOTES
[facility] Initial Evaluation  Subjective:  The history is provided by the patient. No  was used.   Patient Health History  Selina Parikh being seen for neck and LBP.  Patient states she has to complete 4 PT visits in order to get injections.  She would prefer to focus on the LB for today.     Problem began: 6/17/2020 (date of MD referral).   Problem occurred: 2017 - insidious onset, possibly related to boating a lot for 2 weeks on [facility]     General health as reported by patient is good.  Pertinent medical history includes: asthma, high blood pressure, menopausal, migraines/headaches, numbness/tingling, overweight, smoking, sleep disorder/apnea and other (6 work related concussions between [id number]).   Red flags:  Cold/hot extremity and pain at rest/night.  Medical allergies: see EMR.   Surgeries include:  Orthopedic surgery (2 cervical fusions; hardware removal).    Current medications:  Anti-inflammatory, muscle relaxants, high blood pressure medication, pain medication and sleep medication.    Current occupation is None.                     Therapist Generated HPI Evaluation         Type of problem:  Lumbar.    This is a chronic condition.    Where condition occurred: for unknown reasons.  Site of Pain: central lower thoracic and lumbar spine   Pain is described as aching and sharp (rated as 4/10) Pain frequency: constant in LB; intermittent in LE's.  Radiates to: R glute, R hip, and lateral R thigh;  Left glute and left LE in L4/5 dermatome distribution. Pain is worse in the A.M..  Since onset symptoms are gradually worsening.  Associated symptoms:  Loss of motion/stiffness, numbness, tingling, loss of strength and other (occasional buckling of left LE). Symptoms are exacerbated by bending, walking, twisting, lifting and other (reaching overhead, stair climbing)  and relieved by ice, muscle relaxants and other (changing positions).  Special tests included:  MRI  "and x-ray (see EMR).  Previous treatment includes physical therapy ([facility] program ?). There was none improvement following previous treatment.  Barriers include:  None as reported by patient (lives with roommate).      Patient's Goals/Expectations:  \"To be more mobile and get my back to loosen up\"                 Objective:  Standing Alignment:        Lumbar:  Lordosis decr                  Sitting Posture: Fair  Correction of Sitting posture:  No effect         Lumbar/SI Evaluation  ROM:    AROM Lumbar:   Flexion:          WNL (LBP);  Positive Fletcher's sign  Ext:                    Signif loss (LBP; decr RLE pain)    Side Bend:        Left:     Right:   Rotation:           Left:     Right:   Side Glide:        Left:  Mild loss (LBP)    Right:  Mild loss (LBP)           Lumbar Myotomes:    T12-L3 (Hip Flex):  Left: 5    Right: 4+  L2-4 (Quads):  Left:  5    Right:  5  L4 (Ankle DF):  Left:  5    Right:  5  L5 (Great Toe Ext): Left: 5    Right: 5   S1 (Toe Raise):  Left: 5    Right: 5  Lumbar DTR's:    L4 (Quad):  Left:  0   Right:  0  S1 (Achilles):  Left:  0   Right:  0      Neural Tension/Mobility:      Left side:SLR; SLR w/DF or Slump  negative.     Right side:   SLR w/DF; Slump or SLR  negative.   Lumbar Palpation:  not assessed                                                     Repeated Movement Testing:   Rep JEREMIAH:  During:  Decreases LBP    After:  No better    Mechanical Effect:  No effect  Prone Lying in Extension:  During:  Abolishes LE pain   After:  No better  Rep EIL:   During:  Abolishes LE pain     After:  Better (pain centralized to bilat hips)    Mechanical Effect:  incr ROM    General     ROS    Assessment/Plan:    Patient is a 54 year old female with lumbar complaints.    Patient has the following significant findings with corresponding treatment plan.                Diagnosis 1:  LBP   Pain -  self management, education, directional preference exercise and home program  Decreased ROM/flexibility - " therapeutic exercise  Decreased strength - therapeutic exercise  Decreased function - home program  Impaired posture - neuro re-education      Therapy Evaluation Codes:   1) History comprised of:   Personal factors that impact the plan of care:      Age, Coping style, Past/current experiences and Time since onset of symptoms.    Comorbidity factors that impact the plan of care are:      Asthma, Concussion, High blood pressure, Migraines/headaches, Numbness/tingling, Overweight, Pain at night/rest and Smoking.     Medications impacting care: Anti-inflammatory, High blood pressure, Muscle relaxant, Pain and Sleep.  2) Examination of Body Systems comprised of:   Body structures and functions that impact the plan of care:      Lumbar spine.   Activity limitations that impact the plan of care are:      Bending, Lifting, Walking and Twisting, Reaching overhead.  3) Clinical presentation characteristics are:   Unstable/Unpredictable.  4) Decision-Making    High complexity using standardized patient assessment instrument and/or measureable assessment of functional outcome.  Cumulative Therapy Evaluation is: High complexity.    Previous and current functional limitations:  (See Goal Flow Sheet for this information)    Short term and Long term goals: (See Goal Flow Sheet for this information)     Communication ability:  Patient appears to be able to clearly communicate and understand verbal and written communication and follow directions correctly.  Treatment Explanation - The following has been discussed with the patient:     RX ordered/plan of care  Anticipated outcomes  Possible risks and side effects  This patient would benefit from PT intervention to resume normal activities.   Rehab potential is fair.    Frequency:  1 X week, once daily  Duration:  for 8 weeks  Discharge Plan:  Achieve all LTG.  Independent in home treatment program.  Reach maximal therapeutic benefit.    Please refer to the daily flowsheet for  treatment today, total treatment time and time spent performing 1:1 timed codes.

## 2020-07-09 ENCOUNTER — COMMUNICATION - HEALTHEAST (OUTPATIENT)
Dept: PHYSICAL MEDICINE AND REHAB | Facility: CLINIC | Age: 55
End: 2020-07-09

## 2020-07-09 ENCOUNTER — HOSPITAL ENCOUNTER (OUTPATIENT)
Dept: PHYSICAL MEDICINE AND REHAB | Facility: CLINIC | Age: 55
Discharge: HOME | End: 2020-07-09
Attending: PHYSICAL MEDICINE & REHABILITATION

## 2020-07-09 DIAGNOSIS — M54.12 CERVICAL RADICULOPATHY: ICD-10-CM

## 2020-07-09 DIAGNOSIS — M54.12 CERVICAL RADICULITIS: ICD-10-CM

## 2020-07-16 ENCOUNTER — THERAPY VISIT (OUTPATIENT)
Dept: PHYSICAL THERAPY | Facility: CLINIC | Age: 55
End: 2020-07-16
Payer: COMMERCIAL

## 2020-07-16 DIAGNOSIS — G89.29 CHRONIC MIDLINE LOW BACK PAIN WITH BILATERAL SCIATICA: ICD-10-CM

## 2020-07-16 DIAGNOSIS — M54.41 CHRONIC MIDLINE LOW BACK PAIN WITH BILATERAL SCIATICA: ICD-10-CM

## 2020-07-16 DIAGNOSIS — M54.42 CHRONIC MIDLINE LOW BACK PAIN WITH BILATERAL SCIATICA: ICD-10-CM

## 2020-07-16 PROCEDURE — 97110 THERAPEUTIC EXERCISES: CPT | Mod: GP | Performed by: PHYSICAL THERAPIST

## 2020-07-16 PROCEDURE — 97112 NEUROMUSCULAR REEDUCATION: CPT | Mod: GP | Performed by: PHYSICAL THERAPIST

## 2020-07-17 ENCOUNTER — THERAPY VISIT (OUTPATIENT)
Dept: PHYSICAL THERAPY | Facility: CLINIC | Age: 55
End: 2020-07-17
Payer: COMMERCIAL

## 2020-07-17 DIAGNOSIS — M54.42 CHRONIC MIDLINE LOW BACK PAIN WITH BILATERAL SCIATICA: ICD-10-CM

## 2020-07-17 DIAGNOSIS — M54.12 CERVICAL RADICULOPATHY: ICD-10-CM

## 2020-07-17 DIAGNOSIS — M54.2 CHRONIC NECK PAIN: Primary | ICD-10-CM

## 2020-07-17 DIAGNOSIS — G89.29 CHRONIC MIDLINE LOW BACK PAIN WITH BILATERAL SCIATICA: ICD-10-CM

## 2020-07-17 DIAGNOSIS — G89.29 CHRONIC NECK PAIN: Primary | ICD-10-CM

## 2020-07-17 DIAGNOSIS — M54.41 CHRONIC MIDLINE LOW BACK PAIN WITH BILATERAL SCIATICA: ICD-10-CM

## 2020-07-17 PROCEDURE — 97161 PT EVAL LOW COMPLEX 20 MIN: CPT | Mod: GP | Performed by: PHYSICAL THERAPIST

## 2020-07-17 PROCEDURE — 97035 APP MDLTY 1+ULTRASOUND EA 15: CPT | Mod: GP | Performed by: PHYSICAL THERAPIST

## 2020-07-17 PROCEDURE — 97140 MANUAL THERAPY 1/> REGIONS: CPT | Mod: GP | Performed by: PHYSICAL THERAPIST

## 2020-07-24 ENCOUNTER — AMBULATORY - HEALTHEAST (OUTPATIENT)
Dept: PHYSICAL MEDICINE AND REHAB | Facility: CLINIC | Age: 55
End: 2020-07-24

## 2020-07-24 ENCOUNTER — HOSPITAL ENCOUNTER (OUTPATIENT)
Dept: PHYSICAL MEDICINE AND REHAB | Facility: CLINIC | Age: 55
Discharge: HOME | End: 2020-07-24
Attending: PHYSICAL MEDICINE & REHABILITATION

## 2020-07-24 DIAGNOSIS — M54.41 CHRONIC BILATERAL LOW BACK PAIN WITH BILATERAL SCIATICA: ICD-10-CM

## 2020-07-24 DIAGNOSIS — G89.29 CHRONIC BILATERAL LOW BACK PAIN WITH BILATERAL SCIATICA: ICD-10-CM

## 2020-07-24 DIAGNOSIS — M54.42 CHRONIC BILATERAL LOW BACK PAIN WITH BILATERAL SCIATICA: ICD-10-CM

## 2020-07-24 DIAGNOSIS — M54.12 CERVICAL RADICULOPATHY: ICD-10-CM

## 2020-07-26 NOTE — PROGRESS NOTES
[facility] Initial Evaluation  Subjective:  The history is provided by the patient. No  was used.   Therapist Generated HPI Evaluation  Problem details: Pleasant 55 yo female arrives to PT today with chief complaint and referral for cx radiculitis to bilateral arms and hands.     She reports sx's as being constant and contributing to HA and progressive  loss of day to day function.     Selina is seeing another provider for LBP  She is scheduled for cx epidural injection in a week.    EMG testing is normal    .         Type of problem:  Cervical spine.      Condition occurred with:  Degenerative joint disease and insidious onset.    Patient reports pain:  Lower cervical spine.    Pain radiates to:  Hand right, hand left, lower arm right, lower arm left, upper arm right, upper arm left, shoulder left, shoulder right and head.     Associated symptoms:  Headache, loss of motion/stiffness, loss of strength and tingling. Symptoms are exacerbated by carrying, certain positions, lifting, looking up or down, rotating head and stress  and relieved by nothing.                              Objective:  Standing Alignment:    Cervical/Thoracic:  Forward head and cervical lordosis decreased  Shoulder/UE:  Rounded shoulders              Gait:    Gait Type:  Normal   Weight Bearing Status:  WBAT         Flexibility/Screens:   Positive screens:  Cervical  Upper Extremity:    Decreased left upper extremity flexibility at:  Pectoralis Major    Decreased right upper extremity flexibility present at:  Pectoralis Major    Spine:  Decreased left spine flexibility:  Scalenes; Rhomboids; Upper Trap and Levator    Decreased right spine flexibility:  Scalenes; Rhomboids; Upper Trap and Levator    Neurological: She is alert. She has normal strength and normal reflexes. No cranial nerve deficit or sensory deficit.                 Cervical/Thoracic Evaluation    AROM:  AROM Cervical:    Flexion:             Limited ROM, pain   Extension:       Limited ROM, pain bilateral arms/hands   Rotation:         Left: limited rOM, pain      Right: limited ROM, pain   Side Bend:      Left: limited ROM, pain      Right:  Limited ROM, pain       Headaches: cervical  Cervical Myotomes:  normal                  DTR's:  normal          Cervical Dermatomes:  normal                    Cervical Palpation:    Tenderness present at Left:    Scalenes; Rhomboids; Upper Trap; Levator; Erector Spinae and Suboccipitals  Tenderness present at Right:    Scalenes; Rhomboids; Upper Trap; Levator; Erector Spinae and Suboccipitals        Cord Sign:  normal                                            General     ROS    Assessment/Plan:    Patient is a 54 year old female with cervical complaints.    Patient has the following significant findings with corresponding treatment plan.                Diagnosis 1:  cx radiculopathy       Therapy Evaluation Codes:   1) History comprised of:   Personal factors that impact the plan of care:      None and hx of cx fusion .    Comorbidity factors that impact the plan of care are:      None.     Medications impacting care: Anti-inflammatory, Pain and Sleep.  2) Examination of Body Systems comprised of:   Body structures and functions that impact the plan of care:      Cervical spine.   Activity limitations that impact the plan of care are:      Driving, Dressing, Lifting, Reading/Computer work, Sitting, Sleeping and Laying down.  3) Clinical presentation characteristics are:   Evolving/Changing.  4) Decision-Making    Moderate complexity using standardized patient assessment instrument and/or measureable assessment of functional outcome.  Cumulative Therapy Evaluation is: Moderate complexity.    Previous and current functional limitations:  (See Goal Flow Sheet for this information)    Short term and Long term goals: (See Goal Flow Sheet for this information)     Communication ability:  Patient appears to be able to  clearly communicate and understand verbal and written communication and follow directions correctly.  Treatment Explanation - The following has been discussed with the patient:   RX ordered/plan of care  Anticipated outcomes  Possible risks and side effects  This patient would benefit from PT intervention to resume normal activities.   Rehab potential is fair.    Frequency:  1 X week, to every other week  once daily  Duration:  for 12 weeks  Discharge Plan:  Achieve all LTG.  Independent in home treatment program.  Reach maximal therapeutic benefit.    Recommend gradual exercise program for strength and conditioning, Injection therapy, manual mobilization and modalities prn.     Please refer to the daily flowsheet for treatment today, total treatment time and time spent performing 1:1 timed codes.

## 2020-07-29 ENCOUNTER — THERAPY VISIT (OUTPATIENT)
Dept: PHYSICAL THERAPY | Facility: CLINIC | Age: 55
End: 2020-07-29
Payer: COMMERCIAL

## 2020-07-29 DIAGNOSIS — M54.41 CHRONIC MIDLINE LOW BACK PAIN WITH BILATERAL SCIATICA: ICD-10-CM

## 2020-07-29 DIAGNOSIS — M54.42 CHRONIC MIDLINE LOW BACK PAIN WITH BILATERAL SCIATICA: ICD-10-CM

## 2020-07-29 DIAGNOSIS — G89.29 CHRONIC MIDLINE LOW BACK PAIN WITH BILATERAL SCIATICA: ICD-10-CM

## 2020-07-29 DIAGNOSIS — M54.2 NECK PAIN: Primary | ICD-10-CM

## 2020-07-29 PROCEDURE — 97140 MANUAL THERAPY 1/> REGIONS: CPT | Mod: GP | Performed by: PHYSICAL THERAPIST

## 2020-07-29 PROCEDURE — 97110 THERAPEUTIC EXERCISES: CPT | Mod: GP | Performed by: PHYSICAL THERAPIST

## 2020-07-30 ENCOUNTER — THERAPY VISIT (OUTPATIENT)
Dept: PHYSICAL THERAPY | Facility: CLINIC | Age: 55
End: 2020-07-30
Payer: COMMERCIAL

## 2020-07-30 DIAGNOSIS — M54.42 CHRONIC MIDLINE LOW BACK PAIN WITH BILATERAL SCIATICA: ICD-10-CM

## 2020-07-30 DIAGNOSIS — G89.29 CHRONIC MIDLINE LOW BACK PAIN WITH BILATERAL SCIATICA: ICD-10-CM

## 2020-07-30 DIAGNOSIS — M54.41 CHRONIC MIDLINE LOW BACK PAIN WITH BILATERAL SCIATICA: ICD-10-CM

## 2020-07-30 PROCEDURE — 97110 THERAPEUTIC EXERCISES: CPT | Mod: GP | Performed by: PHYSICAL THERAPIST

## 2020-07-30 PROCEDURE — 97140 MANUAL THERAPY 1/> REGIONS: CPT | Mod: GP | Performed by: PHYSICAL THERAPIST

## 2020-08-07 ENCOUNTER — HOSPITAL ENCOUNTER (OUTPATIENT)
Dept: PHYSICAL MEDICINE AND REHAB | Facility: CLINIC | Age: 55
Discharge: HOME | End: 2020-08-07
Attending: PHYSICAL MEDICINE & REHABILITATION

## 2020-08-07 DIAGNOSIS — M54.41 CHRONIC BILATERAL LOW BACK PAIN WITH BILATERAL SCIATICA: ICD-10-CM

## 2020-08-07 DIAGNOSIS — M54.42 CHRONIC BILATERAL LOW BACK PAIN WITH BILATERAL SCIATICA: ICD-10-CM

## 2020-08-07 DIAGNOSIS — G89.29 CHRONIC BILATERAL LOW BACK PAIN WITH BILATERAL SCIATICA: ICD-10-CM

## 2020-08-10 ENCOUNTER — THERAPY VISIT (OUTPATIENT)
Dept: PHYSICAL THERAPY | Facility: CLINIC | Age: 55
End: 2020-08-10
Payer: COMMERCIAL

## 2020-08-10 DIAGNOSIS — G89.29 CHRONIC MIDLINE LOW BACK PAIN WITH BILATERAL SCIATICA: ICD-10-CM

## 2020-08-10 DIAGNOSIS — M54.42 CHRONIC MIDLINE LOW BACK PAIN WITH BILATERAL SCIATICA: ICD-10-CM

## 2020-08-10 DIAGNOSIS — M54.41 CHRONIC MIDLINE LOW BACK PAIN WITH BILATERAL SCIATICA: ICD-10-CM

## 2020-08-10 PROCEDURE — 97140 MANUAL THERAPY 1/> REGIONS: CPT | Mod: GP | Performed by: PHYSICAL THERAPIST

## 2020-08-10 PROCEDURE — 97035 APP MDLTY 1+ULTRASOUND EA 15: CPT | Mod: GP | Performed by: PHYSICAL THERAPIST

## 2020-08-10 PROCEDURE — 97110 THERAPEUTIC EXERCISES: CPT | Mod: GP | Performed by: PHYSICAL THERAPIST

## 2020-08-19 ENCOUNTER — THERAPY VISIT (OUTPATIENT)
Dept: PHYSICAL THERAPY | Facility: CLINIC | Age: 55
End: 2020-08-19
Payer: COMMERCIAL

## 2020-08-19 DIAGNOSIS — M54.42 CHRONIC MIDLINE LOW BACK PAIN WITH BILATERAL SCIATICA: ICD-10-CM

## 2020-08-19 DIAGNOSIS — M54.41 CHRONIC MIDLINE LOW BACK PAIN WITH BILATERAL SCIATICA: ICD-10-CM

## 2020-08-19 DIAGNOSIS — G89.29 CHRONIC MIDLINE LOW BACK PAIN WITH BILATERAL SCIATICA: ICD-10-CM

## 2020-08-19 PROCEDURE — 97110 THERAPEUTIC EXERCISES: CPT | Mod: GP | Performed by: PHYSICAL THERAPIST

## 2020-08-19 PROCEDURE — 97035 APP MDLTY 1+ULTRASOUND EA 15: CPT | Mod: GP | Performed by: PHYSICAL THERAPIST

## 2020-08-19 PROCEDURE — 97140 MANUAL THERAPY 1/> REGIONS: CPT | Mod: GP | Performed by: PHYSICAL THERAPIST

## 2020-09-02 ENCOUNTER — HOSPITAL ENCOUNTER (OUTPATIENT)
Dept: PHYSICAL MEDICINE AND REHAB | Facility: CLINIC | Age: 55
Discharge: HOME | End: 2020-09-02
Attending: PHYSICAL MEDICINE & REHABILITATION

## 2020-09-02 DIAGNOSIS — M54.2 NECK PAIN: ICD-10-CM

## 2020-09-02 DIAGNOSIS — G89.29 CHRONIC BILATERAL LOW BACK PAIN WITH BILATERAL SCIATICA: ICD-10-CM

## 2020-09-02 DIAGNOSIS — M54.42 CHRONIC BILATERAL LOW BACK PAIN WITH BILATERAL SCIATICA: ICD-10-CM

## 2020-09-02 DIAGNOSIS — M54.41 CHRONIC BILATERAL LOW BACK PAIN WITH BILATERAL SCIATICA: ICD-10-CM

## 2020-09-02 DIAGNOSIS — M54.12 CERVICAL RADICULITIS: ICD-10-CM

## 2020-09-10 ENCOUNTER — THERAPY VISIT (OUTPATIENT)
Dept: PHYSICAL THERAPY | Facility: CLINIC | Age: 55
End: 2020-09-10
Payer: COMMERCIAL

## 2020-09-10 DIAGNOSIS — G89.29 CHRONIC MIDLINE LOW BACK PAIN WITH BILATERAL SCIATICA: ICD-10-CM

## 2020-09-10 DIAGNOSIS — M54.42 CHRONIC MIDLINE LOW BACK PAIN WITH BILATERAL SCIATICA: ICD-10-CM

## 2020-09-10 DIAGNOSIS — M54.41 CHRONIC MIDLINE LOW BACK PAIN WITH BILATERAL SCIATICA: ICD-10-CM

## 2020-09-10 DIAGNOSIS — M54.2 NECK PAIN: Primary | ICD-10-CM

## 2020-09-10 PROCEDURE — 97035 APP MDLTY 1+ULTRASOUND EA 15: CPT | Mod: GP | Performed by: PHYSICAL THERAPIST

## 2020-09-10 PROCEDURE — 97140 MANUAL THERAPY 1/> REGIONS: CPT | Mod: GP | Performed by: PHYSICAL THERAPIST

## 2020-09-10 PROCEDURE — 97110 THERAPEUTIC EXERCISES: CPT | Mod: GP | Performed by: PHYSICAL THERAPIST

## 2020-09-11 DIAGNOSIS — J20.9 ACUTE BRONCHITIS, UNSPECIFIED ORGANISM: ICD-10-CM

## 2020-09-11 RX ORDER — ALBUTEROL SULFATE 90 UG/1
AEROSOL, METERED RESPIRATORY (INHALATION)
Qty: 18 G | Refills: 2 | Status: SHIPPED | OUTPATIENT
Start: 2020-09-11 | End: 2021-07-26

## 2020-09-11 NOTE — TELEPHONE ENCOUNTER
"Requested Prescriptions   Pending Prescriptions Disp Refills     VENTOLIN  (90 Base) MCG/ACT inhaler [Pharmacy Med Name: Ventolin  (90 Base) MCG/ACT Inhalation Aerosol Solution] 18 g 0     Sig: INHALE 2 PUFFS BY MOUTH EVERY 6 HOURS AS NEEDED FOR SHORTNESS OF BREATH OR WHEEZING       Asthma Maintenance Inhalers - Anticholinergics Passed - 9/11/2020  7:20 AM        Passed - Patient is age 12 years or older        Passed - Recent (12 mo) or future (30 days) visit within the authorizing provider's specialty     Patient has had an office visit with the authorizing provider or a provider within the authorizing providers department within the previous 12 mos or has a future within next 30 days. See \"Patient Info\" tab in inbasket, or \"Choose Columns\" in Meds & Orders section of the refill encounter.              Passed - Medication is active on med list       Short-Acting Beta Agonist Inhalers Protocol  Passed - 9/11/2020  7:20 AM        Passed - Patient is age 12 or older        Passed - Recent (12 mo) or future (30 days) visit within the authorizing provider's specialty     Patient has had an office visit with the authorizing provider or a provider within the authorizing providers department within the previous 12 mos or has a future within next 30 days. See \"Patient Info\" tab in inbasket, or \"Choose Columns\" in Meds & Orders section of the refill encounter.              Passed - Medication is active on med list           "

## 2020-09-16 ENCOUNTER — COMMUNICATION - HEALTHEAST (OUTPATIENT)
Dept: PHYSICAL MEDICINE AND REHAB | Facility: CLINIC | Age: 55
End: 2020-09-16

## 2020-09-17 ENCOUNTER — HOSPITAL ENCOUNTER (OUTPATIENT)
Dept: PHYSICAL MEDICINE AND REHAB | Facility: CLINIC | Age: 55
Discharge: HOME | End: 2020-09-17
Attending: PHYSICAL MEDICINE & REHABILITATION

## 2020-09-17 DIAGNOSIS — M54.2 NECK PAIN: ICD-10-CM

## 2020-09-17 DIAGNOSIS — M54.42 CHRONIC BILATERAL LOW BACK PAIN WITH BILATERAL SCIATICA: ICD-10-CM

## 2020-09-17 DIAGNOSIS — M54.12 CERVICAL RADICULITIS: ICD-10-CM

## 2020-09-17 DIAGNOSIS — M54.41 CHRONIC BILATERAL LOW BACK PAIN WITH BILATERAL SCIATICA: ICD-10-CM

## 2020-09-17 DIAGNOSIS — G89.29 CHRONIC BILATERAL LOW BACK PAIN WITH BILATERAL SCIATICA: ICD-10-CM

## 2020-09-22 ENCOUNTER — HOSPITAL ENCOUNTER (OUTPATIENT)
Dept: PHYSICAL MEDICINE AND REHAB | Facility: CLINIC | Age: 55
Discharge: HOME | End: 2020-09-22
Attending: PHYSICAL MEDICINE & REHABILITATION

## 2020-09-22 DIAGNOSIS — G89.29 CHRONIC BILATERAL LOW BACK PAIN WITH BILATERAL SCIATICA: ICD-10-CM

## 2020-09-22 DIAGNOSIS — M54.42 CHRONIC BILATERAL LOW BACK PAIN WITH BILATERAL SCIATICA: ICD-10-CM

## 2020-09-22 DIAGNOSIS — M54.41 CHRONIC BILATERAL LOW BACK PAIN WITH BILATERAL SCIATICA: ICD-10-CM

## 2020-09-23 ENCOUNTER — HOSPITAL ENCOUNTER (OUTPATIENT)
Dept: RESPIRATORY THERAPY | Facility: CLINIC | Age: 55
Discharge: HOME | End: 2020-09-23
Attending: INTERNAL MEDICINE | Admitting: INTERNAL MEDICINE
Payer: COMMERCIAL

## 2020-09-23 DIAGNOSIS — J44.9 CHRONIC OBSTRUCTIVE PULMONARY DISEASE, UNSPECIFIED COPD TYPE (H): ICD-10-CM

## 2020-09-23 PROCEDURE — 94729 DIFFUSING CAPACITY: CPT

## 2020-09-23 PROCEDURE — 94726 PLETHYSMOGRAPHY LUNG VOLUMES: CPT

## 2020-09-23 PROCEDURE — 94060 EVALUATION OF WHEEZING: CPT | Mod: 26 | Performed by: INTERNAL MEDICINE

## 2020-09-23 PROCEDURE — 94729 DIFFUSING CAPACITY: CPT | Mod: 26 | Performed by: INTERNAL MEDICINE

## 2020-09-23 PROCEDURE — 25000125 ZZHC RX 250: Performed by: NURSE PRACTITIONER

## 2020-09-23 PROCEDURE — 94726 PLETHYSMOGRAPHY LUNG VOLUMES: CPT | Mod: 26 | Performed by: INTERNAL MEDICINE

## 2020-09-23 PROCEDURE — 94060 EVALUATION OF WHEEZING: CPT

## 2020-09-23 RX ORDER — ALBUTEROL SULFATE 0.83 MG/ML
2.5 SOLUTION RESPIRATORY (INHALATION) ONCE
Status: COMPLETED | OUTPATIENT
Start: 2020-09-23 | End: 2020-09-23

## 2020-09-23 RX ADMIN — ALBUTEROL SULFATE 2.5 MG: 2.5 SOLUTION RESPIRATORY (INHALATION) at 08:40

## 2020-09-24 LAB
DLCOUNC-%PRED-PRE: 108 %
DLCOUNC-PRE: 19.42 ML/MIN/MMHG
DLCOUNC-PRED: 17.94 ML/MIN/MMHG
ERV-%PRED-PRE: 40 %
ERV-PRE: 0.23 L
ERV-PRED: 0.56 L
EXPTIME-PRE: 5.72 SEC
FEF2575-%PRED-POST: 109 %
FEF2575-%PRED-PRE: 94 %
FEF2575-POST: 2.5 L/SEC
FEF2575-PRE: 2.15 L/SEC
FEF2575-PRED: 2.28 L/SEC
FEFMAX-%PRED-PRE: 76 %
FEFMAX-PRE: 4.55 L/SEC
FEFMAX-PRED: 5.94 L/SEC
FEV1-%PRED-PRE: 91 %
FEV1-PRE: 2.1 L
FEV1FEV6-PRE: 81 %
FEV1FEV6-PRED: 82 %
FEV1FVC-PRE: 81 %
FEV1FVC-PRED: 80 %
FEV1SVC-PRE: 80 %
FEV1SVC-PRED: 80 %
FIFMAX-PRE: 2.13 L/SEC
FRCPLETH-%PRED-PRE: 98 %
FRCPLETH-PRE: 2.43 L
FRCPLETH-PRED: 2.47 L
FVC-%PRED-PRE: 89 %
FVC-PRE: 2.58 L
FVC-PRED: 2.87 L
IC-%PRED-PRE: 96 %
IC-PRE: 2.22 L
IC-PRED: 2.3 L
RVPLETH-%PRED-PRE: 122 %
RVPLETH-PRE: 2.01 L
RVPLETH-PRED: 1.64 L
TLCPLETH-%PRED-PRE: 108 %
TLCPLETH-PRE: 4.65 L
TLCPLETH-PRED: 4.27 L
VA-%PRED-PRE: 99 %
VA-PRE: 4.07 L
VC-%PRED-PRE: 91 %
VC-PRE: 2.63 L
VC-PRED: 2.87 L

## 2020-10-02 ENCOUNTER — OFFICE VISIT (OUTPATIENT)
Dept: FAMILY MEDICINE | Facility: CLINIC | Age: 55
End: 2020-10-02
Payer: COMMERCIAL

## 2020-10-02 VITALS
HEIGHT: 60 IN | DIASTOLIC BLOOD PRESSURE: 84 MMHG | HEART RATE: 74 BPM | BODY MASS INDEX: 29.57 KG/M2 | SYSTOLIC BLOOD PRESSURE: 104 MMHG | OXYGEN SATURATION: 98 % | RESPIRATION RATE: 14 BRPM | TEMPERATURE: 97.1 F | WEIGHT: 150.6 LBS

## 2020-10-02 DIAGNOSIS — Z87.898 H/O FEVER: ICD-10-CM

## 2020-10-02 DIAGNOSIS — R13.10 DYSPHAGIA, UNSPECIFIED TYPE: Primary | ICD-10-CM

## 2020-10-02 DIAGNOSIS — Z23 NEED FOR PROPHYLACTIC VACCINATION AND INOCULATION AGAINST INFLUENZA: ICD-10-CM

## 2020-10-02 DIAGNOSIS — R06.83 SNORES: ICD-10-CM

## 2020-10-02 PROCEDURE — 99207 PR NO CHARGE NURSE ONLY: CPT | Performed by: NURSE PRACTITIONER

## 2020-10-02 PROCEDURE — 90471 IMMUNIZATION ADMIN: CPT | Performed by: NURSE PRACTITIONER

## 2020-10-02 PROCEDURE — 36415 COLL VENOUS BLD VENIPUNCTURE: CPT | Performed by: NURSE PRACTITIONER

## 2020-10-02 PROCEDURE — 90682 RIV4 VACC RECOMBINANT DNA IM: CPT | Performed by: NURSE PRACTITIONER

## 2020-10-02 PROCEDURE — 86769 SARS-COV-2 COVID-19 ANTIBODY: CPT | Performed by: NURSE PRACTITIONER

## 2020-10-02 ASSESSMENT — MIFFLIN-ST. JEOR: SCORE: 1199.62

## 2020-10-02 NOTE — PROGRESS NOTES
"Subjective     Selina Parikh is a 55 year old female who presents to clinic today for the following health issues:    HPI       Pt states that she had her PFT on 09/23/2020 that showed no COPD or Asthma, was advised to follow up in clinic to discuss results.   Patient reports that she was given the diagnosis of COPD years ago based on an XRAY result.  This was her first PFT test.  She reports that the albuterol helps her shortness of breath.  She states that her oxygen levels at home at normal until night-time when she sits and relaxes - then go into the high 80s and low 90s.  She c/o difficulty swallowing since her neck surgery.  Has had swallow studies which are normal.  Hasn't ever had a laryngoscope   She doesn't sleep well - she thinks this is due to pain.  Has daytime fatigue.  Possibly snores - usually sleeps alone, but when her grand-daughter is with her she will poke her awake due to snoring.           Pt states that she was sick with an URI in 02/2020 and states that she believes it could've been COVID 19 - is requesting a COVID antibiody test.   Symptoms included: fever, cough, vomiting  She thought it was the flu - influenza tests negative x2  \"I felt like I was dying\"            Review of Systems   Constitutional, HEENT, cardiovascular, pulmonary, gi and gu systems are negative, except as otherwise noted.      Objective    /84 (BP Location: Right arm, Patient Position: Sitting, Cuff Size: Adult Large)   Pulse 74   Temp 97.1  F (36.2  C) (Tympanic)   Resp 14   Ht 1.524 m (5')   Wt 68.3 kg (150 lb 9.6 oz)   SpO2 98%   BMI 29.41 kg/m    Body mass index is 29.41 kg/m .  Physical Exam   GENERAL: healthy, alert and no distress  NECK: no adenopathy, no asymmetry, masses, or scars and thyroid normal to palpation  RESP: lungs clear to auscultation - no rales, rhonchi or wheezes  CV: regular rate and rhythm, normal S1 S2, no S3 or S4, no murmur, click or rub, no peripheral edema and peripheral " pulses strong  ABDOMEN: soft, nontender, no hepatosplenomegaly, no masses and bowel sounds normal  MS: no gross musculoskeletal defects noted, no edema            Assessment & Plan     Dysphagia, unspecified type  Based on PFTs, patient does not have COPD - results discussed.  Patient reports low oxygen sats and shortness of breath at home  She also reports difficulty swallowing since a cervical spine surgery.  The flow loops on her PFTs possibly show a extrathoracic obstruction.  Concern for possibly laryngeal etiology?  Referral to ENT for laryngoscope.  - OTOLARYNGOLOGY REFERRAL    Snores  Symptoms also concerning for ZEKE  Will r/o with sleep study  - SLEEP EVALUATION & MANAGEMENT REFERRAL - [facility] [place] [phone] (Age 13 and up if over 100 lbs); Future    H/O fever  - COVID-19 Virus (Coronavirus) Antibody & Titer Reflex    Need for prophylactic vaccination and inoculation against influenza  - INFLUENZA QUAD, RECOMBINANT, P-FREE (RIV4) (FLUBLOCK) [87108]  - Vaccine Administration, Initial [74781]     BMI:   Estimated body mass index is 29.41 kg/m  as calculated from the following:    Height as of this encounter: 1.524 m (5').    Weight as of this encounter: 68.3 kg (150 lb 9.6 oz).   Weight management plan: Discussed healthy diet and exercise guidelines             Return in about 4 weeks (around 10/30/2020).    The risks, benefits and treatment options of prescribed medications or other treatments have been discussed with the patient. The patient verbalized their understanding and should call or follow up if no improvement or if they develop further problems.    CARLTON Mckeon CNP  [facility]

## 2020-10-03 LAB
COVID-19 SPIKE RBD ABY TITER: NORMAL
COVID-19 SPIKE RBD ABY: NEGATIVE

## 2020-10-09 ENCOUNTER — HOSPITAL ENCOUNTER (OUTPATIENT)
Dept: PHYSICAL MEDICINE AND REHAB | Facility: CLINIC | Age: 55
Discharge: HOME | End: 2020-10-09
Attending: PHYSICAL MEDICINE & REHABILITATION

## 2020-10-09 DIAGNOSIS — M54.41 CHRONIC BILATERAL LOW BACK PAIN WITH BILATERAL SCIATICA: ICD-10-CM

## 2020-10-09 DIAGNOSIS — M47.816 LUMBAR FACET ARTHROPATHY: ICD-10-CM

## 2020-10-09 DIAGNOSIS — M54.12 CERVICAL RADICULOPATHY: ICD-10-CM

## 2020-10-09 DIAGNOSIS — G89.29 CHRONIC BILATERAL LOW BACK PAIN WITH BILATERAL SCIATICA: ICD-10-CM

## 2020-10-09 DIAGNOSIS — M54.2 NECK PAIN: ICD-10-CM

## 2020-10-09 DIAGNOSIS — M47.816 LUMBAR FACET JOINT SYNDROME: ICD-10-CM

## 2020-10-09 DIAGNOSIS — M54.42 CHRONIC BILATERAL LOW BACK PAIN WITH BILATERAL SCIATICA: ICD-10-CM

## 2020-10-12 ENCOUNTER — HOSPITAL ENCOUNTER (EMERGENCY)
Facility: CLINIC | Age: 55
Discharge: HOME | End: 2020-10-12
Attending: EMERGENCY MEDICINE | Admitting: EMERGENCY MEDICINE
Payer: COMMERCIAL

## 2020-10-12 ENCOUNTER — APPOINTMENT (OUTPATIENT)
Dept: CT IMAGING | Facility: CLINIC | Age: 55
End: 2020-10-12
Attending: EMERGENCY MEDICINE
Payer: COMMERCIAL

## 2020-10-12 VITALS
SYSTOLIC BLOOD PRESSURE: 137 MMHG | HEART RATE: 79 BPM | OXYGEN SATURATION: 97 % | RESPIRATION RATE: 8 BRPM | DIASTOLIC BLOOD PRESSURE: 82 MMHG | HEIGHT: 60 IN | WEIGHT: 150 LBS | BODY MASS INDEX: 29.45 KG/M2

## 2020-10-12 DIAGNOSIS — R07.9 ACUTE CHEST PAIN: ICD-10-CM

## 2020-10-12 LAB
ALBUMIN SERPL-MCNC: 3.8 G/DL (ref 3.4–5)
ALP SERPL-CCNC: 94 U/L (ref 40–150)
ALT SERPL W P-5'-P-CCNC: 22 U/L (ref 0–50)
ANION GAP SERPL CALCULATED.3IONS-SCNC: 5 MMOL/L (ref 3–14)
AST SERPL W P-5'-P-CCNC: 14 U/L (ref 0–45)
BASOPHILS # BLD AUTO: 0 10E9/L (ref 0–0.2)
BASOPHILS NFR BLD AUTO: 0.4 %
BILIRUB SERPL-MCNC: 0.2 MG/DL (ref 0.2–1.3)
BUN SERPL-MCNC: 15 MG/DL (ref 7–30)
CALCIUM SERPL-MCNC: 9.1 MG/DL (ref 8.5–10.1)
CHLORIDE SERPL-SCNC: 111 MMOL/L (ref 94–109)
CO2 SERPL-SCNC: 26 MMOL/L (ref 20–32)
CREAT SERPL-MCNC: 0.75 MG/DL (ref 0.52–1.04)
DIFFERENTIAL METHOD BLD: ABNORMAL
EOSINOPHIL # BLD AUTO: 0.2 10E9/L (ref 0–0.7)
EOSINOPHIL NFR BLD AUTO: 2 %
ERYTHROCYTE [DISTWIDTH] IN BLOOD BY AUTOMATED COUNT: 15.2 % (ref 10–15)
GFR SERPL CREATININE-BSD FRML MDRD: >90 ML/MIN/{1.73_M2}
GLUCOSE SERPL-MCNC: 102 MG/DL (ref 70–99)
HCT VFR BLD AUTO: 41.6 % (ref 35–47)
HGB BLD-MCNC: 13.6 G/DL (ref 11.7–15.7)
IMM GRANULOCYTES # BLD: 0 10E9/L (ref 0–0.4)
IMM GRANULOCYTES NFR BLD: 0.3 %
LYMPHOCYTES # BLD AUTO: 2.3 10E9/L (ref 0.8–5.3)
LYMPHOCYTES NFR BLD AUTO: 31.2 %
MCH RBC QN AUTO: 31.1 PG (ref 26.5–33)
MCHC RBC AUTO-ENTMCNC: 32.7 G/DL (ref 31.5–36.5)
MCV RBC AUTO: 95 FL (ref 78–100)
MONOCYTES # BLD AUTO: 0.5 10E9/L (ref 0–1.3)
MONOCYTES NFR BLD AUTO: 7.2 %
NEUTROPHILS # BLD AUTO: 4.3 10E9/L (ref 1.6–8.3)
NEUTROPHILS NFR BLD AUTO: 58.9 %
NRBC # BLD AUTO: 0 10*3/UL
NRBC BLD AUTO-RTO: 0 /100
PLATELET # BLD AUTO: 243 10E9/L (ref 150–450)
POTASSIUM SERPL-SCNC: 3.6 MMOL/L (ref 3.4–5.3)
PROT SERPL-MCNC: 7.3 G/DL (ref 6.8–8.8)
RBC # BLD AUTO: 4.38 10E12/L (ref 3.8–5.2)
SODIUM SERPL-SCNC: 142 MMOL/L (ref 133–144)
TROPONIN I SERPL-MCNC: <0.015 UG/L (ref 0–0.04)
TROPONIN I SERPL-MCNC: <0.015 UG/L (ref 0–0.04)
WBC # BLD AUTO: 7.4 10E9/L (ref 4–11)

## 2020-10-12 PROCEDURE — 85025 COMPLETE CBC W/AUTO DIFF WBC: CPT | Performed by: EMERGENCY MEDICINE

## 2020-10-12 PROCEDURE — 80053 COMPREHEN METABOLIC PANEL: CPT | Performed by: EMERGENCY MEDICINE

## 2020-10-12 PROCEDURE — 250N000013 HC RX MED GY IP 250 OP 250 PS 637: Performed by: EMERGENCY MEDICINE

## 2020-10-12 PROCEDURE — 93010 ELECTROCARDIOGRAM REPORT: CPT | Performed by: EMERGENCY MEDICINE

## 2020-10-12 PROCEDURE — 74177 CT ABD & PELVIS W/CONTRAST: CPT

## 2020-10-12 PROCEDURE — 99285 EMERGENCY DEPT VISIT HI MDM: CPT | Mod: 25 | Performed by: EMERGENCY MEDICINE

## 2020-10-12 PROCEDURE — 84484 ASSAY OF TROPONIN QUANT: CPT | Performed by: EMERGENCY MEDICINE

## 2020-10-12 PROCEDURE — 84484 ASSAY OF TROPONIN QUANT: CPT | Mod: 91 | Performed by: EMERGENCY MEDICINE

## 2020-10-12 PROCEDURE — 93005 ELECTROCARDIOGRAM TRACING: CPT | Performed by: EMERGENCY MEDICINE

## 2020-10-12 PROCEDURE — 250N000011 HC RX IP 250 OP 636: Performed by: EMERGENCY MEDICINE

## 2020-10-12 PROCEDURE — 250N000009 HC RX 250: Performed by: EMERGENCY MEDICINE

## 2020-10-12 RX ORDER — NITROGLYCERIN 0.4 MG/1
0.4 TABLET SUBLINGUAL EVERY 5 MIN PRN
Status: DISCONTINUED | OUTPATIENT
Start: 2020-10-12 | End: 2020-10-12 | Stop reason: HOSPADM

## 2020-10-12 RX ORDER — ACETAMINOPHEN 500 MG
1000 TABLET ORAL ONCE
Status: DISCONTINUED | OUTPATIENT
Start: 2020-10-12 | End: 2020-10-12

## 2020-10-12 RX ORDER — ACETAMINOPHEN 500 MG
1000 TABLET ORAL ONCE
Status: COMPLETED | OUTPATIENT
Start: 2020-10-12 | End: 2020-10-12

## 2020-10-12 RX ORDER — IOPAMIDOL 755 MG/ML
80 INJECTION, SOLUTION INTRAVASCULAR ONCE
Status: COMPLETED | OUTPATIENT
Start: 2020-10-12 | End: 2020-10-12

## 2020-10-12 RX ORDER — ASPIRIN 81 MG/1
324 TABLET, CHEWABLE ORAL ONCE
Status: COMPLETED | OUTPATIENT
Start: 2020-10-12 | End: 2020-10-12

## 2020-10-12 RX ADMIN — IOPAMIDOL 80 ML: 755 INJECTION, SOLUTION INTRAVENOUS at 05:34

## 2020-10-12 RX ADMIN — SODIUM CHLORIDE 80 ML: 9 INJECTION, SOLUTION INTRAVENOUS at 05:34

## 2020-10-12 RX ADMIN — NITROGLYCERIN 0.4 MG: 0.4 TABLET SUBLINGUAL at 04:57

## 2020-10-12 RX ADMIN — ASPIRIN 81 MG 324 MG: 81 TABLET ORAL at 04:54

## 2020-10-12 RX ADMIN — ACETAMINOPHEN 1000 MG: 500 TABLET, FILM COATED ORAL at 04:56

## 2020-10-12 ASSESSMENT — ENCOUNTER SYMPTOMS
COUGH: 0
NECK STIFFNESS: 0
RHINORRHEA: 0
NECK PAIN: 1
CONFUSION: 0
NAUSEA: 1
WEAKNESS: 0
FEVER: 0
CHILLS: 0
PALPITATIONS: 0
LIGHT-HEADEDNESS: 0
NUMBNESS: 1
DIARRHEA: 0
SORE THROAT: 0
DYSPHORIC MOOD: 0
VOICE CHANGE: 0
ABDOMINAL PAIN: 0
APPETITE CHANGE: 0
CHEST TIGHTNESS: 0
VOMITING: 0
SHORTNESS OF BREATH: 0
BACK PAIN: 1
HEADACHES: 1
FATIGUE: 0
DIAPHORESIS: 1

## 2020-10-12 ASSESSMENT — MIFFLIN-ST. JEOR: SCORE: 1196.9

## 2020-10-12 NOTE — DISCHARGE INSTRUCTIONS
Return to the Emergency Room if the following occurs:     Severely worsened pain, worsened breathing, fever >101, or for any concern at anytime.    Or, follow-up with the following provider as we discussed:     Return to your primary doctor this week for reevaluation.    Medications discussed:    None new.  No changes.    If you received pain-relieving or sedating medication during your time in the ER, avoid alcohol, driving automobiles, or working with machinery.  Also, a responsible adult must stay with you.        Call the Nurse Advice Line at [phone] or [phone] for any concern at anytime.

## 2020-10-12 NOTE — ED PROVIDER NOTES
History     Chief Complaint   Patient presents with     Chest Pain     HPI  Selina Parikh is a 55 year old female with a history of hypertension, smoking, strong family history of coronary artery disease presenting for evaluation of chest pain.  Patient reports she woke around 1 AM with acute onset of central chest pressure radiating across her chest associated with some radiation up into her neck and back.  Symptoms associated with some difficulty breathing and some sweating.  Patient reports she is not certain if the sweating was different from her baseline as she does get frequent hot flashes.  Denies nausea or vomiting.  Symptoms have been fluctuating in intensity and severity.  Initially was severe and she contacted 911 however pain subsided and so she refused transport.  Pain subsequently returned so she decided to come in for evaluation.  Pain at its worst rated 8/10.  Currently 2/10.  Patient describes it as pressure.  Chest pain also associated with a frontal headache tonight, different from frequent baseline headaches.  Denies any new numbness, tingling, or weakness but does report some mild chronic extremity tingling associated with neck and back problems.  Denies any cough, runny nose, congestion, fever, chills, change in taste or smell.    Allergies:  Allergies   Allergen Reactions     Ciprofloxacin Anaphylaxis     Flagyl [Metronidazole] Anaphylaxis     Zofran [Ondansetron] Other (See Comments) and GI Disturbance     Causes bloating, moderately uncomfortable, abd pain       Augmentin Nausea and Vomiting     Denies hives at this time     Benadryl [Diphenhydramine] Other (See Comments)     Muscle spasms     Percocet [Oxycodone-Acetaminophen] Other (See Comments)     Goes nuts - nasty mean irritated, can take Dilaudid     Vicodin [Hydrocodone-Acetaminophen] Other (See Comments)     Anxiety-agitation       Problem List:    Patient Active Problem List    Diagnosis Date Noted     Chronic midline low back  pain with bilateral sciatica 07/02/2020     Priority: Medium     S/P partial colectomy 04/10/2018     Priority: Medium     Diverticulitis 01/28/2018     Priority: Medium     Psoriasis 06/20/2012     Priority: Medium     GERD (gastroesophageal reflux disease) 03/15/2011     Priority: Medium     Tobacco abuse 02/17/2011     Priority: Medium     Health Care Home 01/27/2011     Priority: Medium     DX V65.8 REPLACED WITH [facility] (04/08/2013)       CARDIOVASCULAR SCREENING; LDL GOAL LESS THAN 160 10/31/2010     Priority: Medium     R Occipital Neuralgia 10/07/2009     Priority: Medium     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 On fiber, senna, and MOM. Advised to d/c MOM, start Miralax and titer to regular BM's. Pt has been seen by GI.        Brain syndrome, posttraumatic 03/06/2009     Priority: Medium     Work comp.  Has seen Dr. Ahmadi, [facility] clinic of neurology.  MRIs, EMG unremarkalbe, some foraminal stenosis on C5/C6  Sent her to physiatrist, trigger point injections didn't help.  After some neck traction, had intense unremitting HA, neck pain.  Off/on tingling in arms.  Pain clinic and/or spine surg for more eval probable next steps.    Has failed multiple rescue and preventive HA meds. On no narcotics          Past Medical History:    No past medical history on file.    Past Surgical History:    Past Surgical History:   Procedure Laterality Date     CHOLECYSTECTOMY, LAPOROSCOPIC  2/14/2000    Cholecystectomy, Laparoscopic     CYSTOSCOPY, INSERT LIGHTED STENT URETER(S) N/A 4/10/2018    Procedure: CYSTOSCOPY, INSERT LIGHTED STENT URETER(S);  Lighted Stent Placement,Laparoscopic Assisted Sigmoid Colectomy;  Surgeon: ERVIN Reina MD;  Location: [facility] OR     LAPAROSCOPIC ASSISTED COLECTOMY LEFT (DESCENDING) N/A 4/10/2018     Procedure: LAPAROSCOPIC ASSISTED COLECTOMY LEFT (DESCENDING);;  Surgeon: Hill Murray MD;  Location: [facility] OR     ORTHOPEDIC SURGERY  10/2010    Cut palm and reattched tendons and nerves in left hand forefinger.     SURGICAL HISTORY OF -   2000    Esophagogastroduodenoscopy with biopsy     TUBAL LIGATION         Family History:    Family History   Problem Relation Age of Onset     C.A.D. Father 50        50's     Diabetes Father      Diabetes Brother      C.A.D. Brother 42        quad bypass and MI     Diabetes Brother         2 brothers have DM     Cancer Brother 34        Melanoma     Allergies Son         Meds     Allergies Daughter         Med     Cancer Mother      C.A.D. Brother 38        MI age 38       Social History:  Marital Status:   [2]  Social History     Tobacco Use     Smoking status: Current Every Day Smoker     Packs/day: 0.50     Years: 30.00     Pack years: 15.00     Types: Cigarettes     Last attempt to quit: 2020     Years since quittin.6     Smokeless tobacco: Never Used   Substance Use Topics     Alcohol use: No     Drug use: No        Medications:         celecoxib (CELEBREX) 200 MG capsule       Cholecalciferol 125 MCG (5000 UT) TABS       Melatonin 10 MG TABS tablet       omeprazole (PRILOSEC) 20 MG DR capsule       tiZANidine (ZANAFLEX) 2 MG tablet       traMADol (ULTRAM) 50 MG tablet       albuterol (PROVENTIL) (2.5 MG/3ML) 0.083% neb solution       clobetasol (TEMOVATE) 0.05 % ointment       docusate sodium (COLACE) 100 MG capsule       ipratropium - albuterol 0.5 mg/2.5 mg/3 mL (DUONEB) 0.5-2.5 (3) MG/3ML neb solution       Multiple Vitamin (MULTI-VITAMINS) TABS       VENTOLIN  (90 Base) MCG/ACT inhaler          Review of Systems   Constitutional: Positive for diaphoresis. Negative for appetite change, chills, fatigue and fever.   HENT: Negative for congestion, rhinorrhea, sore throat and voice change.    Eyes: Negative for visual disturbance.   Respiratory:  Negative for cough, chest tightness and shortness of breath.    Cardiovascular: Positive for chest pain. Negative for palpitations and leg swelling.   Gastrointestinal: Positive for nausea. Negative for abdominal pain, diarrhea and vomiting.   Genitourinary: Negative for decreased urine volume.   Musculoskeletal: Positive for back pain and neck pain (chest pain radiats up left neck to left ear). Negative for neck stiffness.   Skin: Negative for rash.   Neurological: Positive for numbness (chronic, at baseline due to neck/back problems) and headaches (frontal). Negative for weakness and light-headedness.   Psychiatric/Behavioral: Negative for confusion and dysphoric mood.   All other systems reviewed and are negative.      Physical Exam   BP: (!) 134/96  Pulse: 82  Resp: 18  Height: 152.4 cm (5')  Weight: 68 kg (150 lb)  SpO2: 97 %      Physical Exam  Vitals signs and nursing note reviewed.   Constitutional:       Appearance: She is well-developed. She is not ill-appearing or diaphoretic.   HENT:      Head: Atraumatic.      Nose: Nose normal.      Mouth/Throat:      Mouth: Mucous membranes are moist.   Eyes:      Conjunctiva/sclera: Conjunctivae normal.   Neck:      Musculoskeletal: Normal range of motion.   Cardiovascular:      Rate and Rhythm: Normal rate.      Pulses: Normal pulses.      Heart sounds: Normal heart sounds. No murmur.   Pulmonary:      Effort: Pulmonary effort is normal.      Breath sounds: Normal breath sounds.   Abdominal:      General: Abdomen is flat.      Palpations: Abdomen is soft.      Tenderness: There is no abdominal tenderness.   Musculoskeletal:      Right lower leg: No edema.      Left lower leg: No edema.   Skin:     General: Skin is warm.      Capillary Refill: Capillary refill takes less than 2 seconds.   Neurological:      Mental Status: She is alert and oriented to person, place, and time.   Psychiatric:         Mood and Affect: Mood normal.         ED Course        Procedures                EKG Interpretation:      Interpreted by Vic Schulte MD  Time reviewed: 7998  Symptoms at time of EKG: chest pain   Rhythm: normal sinus   Rate: Normal  Axis: Normal  Ectopy: none  Conduction: normal  ST Segments/ T Waves: T wave inverted in V1 and V2 as well as lead III  Q Waves: none  Comparison to prior: New T wave inversion in V2 but otherwise unchanged from previous EKG 11/1/2019    Clinical Impression: Sinus rhythm with inverted T waves in V1, V2, and lead III, possible mild ischemia             Results for orders placed or performed during the hospital encounter of 10/12/20 (from the past 24 hour(s))   CBC with platelets differential   Result Value Ref Range    WBC 7.4 4.0 - 11.0 10e9/L    RBC Count 4.38 3.8 - 5.2 10e12/L    Hemoglobin 13.6 11.7 - 15.7 g/dL    Hematocrit 41.6 35.0 - 47.0 %    MCV 95 78 - 100 fl    MCH 31.1 26.5 - 33.0 pg    MCHC 32.7 31.5 - 36.5 g/dL    RDW 15.2 (H) 10.0 - 15.0 %    Platelet Count 243 150 - 450 10e9/L    Diff Method Automated Method     % Neutrophils 58.9 %    % Lymphocytes 31.2 %    % Monocytes 7.2 %    % Eosinophils 2.0 %    % Basophils 0.4 %    % Immature Granulocytes 0.3 %    Nucleated RBCs 0 0 /100    Absolute Neutrophil 4.3 1.6 - 8.3 10e9/L    Absolute Lymphocytes 2.3 0.8 - 5.3 10e9/L    Absolute Monocytes 0.5 0.0 - 1.3 10e9/L    Absolute Eosinophils 0.2 0.0 - 0.7 10e9/L    Absolute Basophils 0.0 0.0 - 0.2 10e9/L    Abs Immature Granulocytes 0.0 0 - 0.4 10e9/L    Absolute Nucleated RBC 0.0    Comprehensive metabolic panel   Result Value Ref Range    Sodium 142 133 - 144 mmol/L    Potassium 3.6 3.4 - 5.3 mmol/L    Chloride 111 (H) 94 - 109 mmol/L    Carbon Dioxide 26 20 - 32 mmol/L    Anion Gap 5 3 - 14 mmol/L    Glucose 102 (H) 70 - 99 mg/dL    Urea Nitrogen 15 7 - 30 mg/dL    Creatinine 0.75 0.52 - 1.04 mg/dL    GFR Estimate >90 >60 mL/min/[1.73_m2]    GFR Estimate If Black >90 >60 mL/min/[1.73_m2]    Calcium 9.1 8.5 - 10.1 mg/dL    Bilirubin Total  0.2 0.2 - 1.3 mg/dL    Albumin 3.8 3.4 - 5.0 g/dL    Protein Total 7.3 6.8 - 8.8 g/dL    Alkaline Phosphatase 94 40 - 150 U/L    ALT 22 0 - 50 U/L    AST 14 0 - 45 U/L   Troponin I   Result Value Ref Range    Troponin I ES <0.015 0.000 - 0.045 ug/L   CT Aortic Survey w Contrast    Narrative    EXAM: CT AORTIC SURVEY W CONTRAST  LOCATION: [facility]  DATE/TIME: 10/12/2020 5:24 AM    INDICATION: Acute chest, neck, and back pain.  COMPARISON: None.  TECHNIQUE: CT angiogram chest abdomen pelvis during arterial phase of injection of IV contrast. 2D and 3D MIP reconstructions were performed by the CT technologist. Dose reduction techniques were used.   CONTRAST: 80 mL Isovue 370.    FINDINGS:   CT ANGIOGRAM CHEST, ABDOMEN, AND PELVIS: Atherosclerotic calcifications of the aorta. No aortic dissection, aneurysm, or central pulmonary embolus.    LUNGS AND PLEURA: No pneumothorax or pleural effusion. No focal consolidation.    MEDIASTINUM/AXILLAE: Status post cholecystectomy.    HEPATOBILIARY: Normal.    PANCREAS: Normal.    SPLEEN: Normal.    ADRENAL GLANDS: Normal.    KIDNEYS/BLADDER: Normal.    BOWEL: Diverticulosis. No diverticulitis, colitis, or appendicitis.    LYMPH NODES: Normal.    PELVIC ORGANS: Normal.    MUSCULOSKELETAL: Multilevel discogenic degenerative change. Posterior fusion of the visualized cervical spine.      Impression    IMPRESSION:  1.  No aortic dissection, aneurysm, or pulmonary embolus.  2.  Coronary artery disease and atherosclerotic vascular disease.  3.  Diverticulosis. No diverticulitis, colitis, appendicitis, or obstruction.  4.  Status post cholecystectomy.                   Medications   nitroGLYcerin (NITROSTAT) sublingual tablet 0.4 mg (0.4 mg Sublingual Given 10/12/20 6387)   aspirin (ASA) chewable tablet 324 mg (324 mg Oral Given 10/12/20 4204)   acetaminophen (TYLENOL) tablet 1,000 mg (1,000 mg Oral Given 10/12/20 2486)   iopamidol (ISOVUE-370) solution 80 mL (80 mLs  Intravenous Given 10/12/20 0534)   sodium chloride 0.9 % bag 500mL for CT scan flush use (80 mLs As instructed Given 10/12/20 0534)     6:02 AM Patient re-assessed: Asymptomatic.  Chest pain has resolved.  Otherwise feeling well.  Advised of reassuring initial work-up.  My initial view of the CT does not show any evidence of dissection or aneurysm.  Formal read pending.    6:11 AM: Patient was signed out at shift change to Dr Lee to f/u on ct results and repeat troponin and disposition.         Assessments & Plan (with Medical Decision Making)  55-year-old female with history of hypertension, smoking, and strong family history of coronary disease pending for evaluation of chest pain.  She reports pressure in her chest radiating to her back and up to her neck and left ear associated with some diaphoresis and nausea.  She does report 1 similar episode a few weeks ago which lasted a few hours and resolved.  No known history of cardiac disease personally.  Reports multiple siblings have all had cardiac disease and bypass surgery at a young age.  Had 2 episodes of chest pain tonight, first episode around 1 AM.  Upon arrival in the ED pain is improving spontaneously and resolved after nitro.  Given aspirin in the ED.  EKG showed inverted T wave in V2 which is slightly different from previous.  Initial troponin negative.  Given the reported pain radiating to her back and neck, CT for aneurysm/dissection obtained.  Signed out at shift change pending formal CT results with a plan for observation and second troponin evaluation in the ED.     I have reviewed the nursing notes.    I have reviewed the findings, diagnosis, plan and need for follow up with the patient.       New Prescriptions    No medications on file       Final diagnoses:   Acute chest pain       10/12/2020   [facility] EMERGENCY DEPT     Schulte, Vic Alejandre MD  10/12/20 0615

## 2020-10-12 NOTE — ED PROVIDER NOTES
Emergency Department Patient Sign-out       Brief HPI:  This is a 55 year old female signed out to me by Dr. Schulte.  See initial ED Provider note for details of the presentation.     Significant Events prior to my assuming care: Troponin pending.      Exam:   Patient Vitals for the past 24 hrs:   BP Pulse Resp SpO2 Height Weight   10/12/20 0900 137/82 79 8 97 % -- --   10/12/20 0845 (!) 142/89 77 14 97 % -- --   10/12/20 0830 -- 65 12 98 % -- --   10/12/20 0815 (!) 154/88 -- -- -- -- --   10/12/20 0800 137/82 73 -- -- -- --   10/12/20 0745 130/75 -- -- 95 % -- --   10/12/20 0715 (!) 140/84 -- -- 98 % -- --   10/12/20 0700 138/82 69 -- 96 % -- --   10/12/20 0640 101/67 80 -- 96 % -- --   10/12/20 0620 124/79 66 -- 95 % -- --   10/12/20 0550 132/86 74 18 97 % -- --   10/12/20 0520 128/79 85 18 97 % -- --   10/12/20 0500 114/75 90 13 94 % -- --   10/12/20 0431 (!) 134/96 82 18 97 % 1.524 m (5') 68 kg (150 lb)           ED RESULTS:   Results for orders placed or performed during the hospital encounter of 10/12/20 (from the past 24 hour(s))   CBC with platelets differential     Status: Abnormal    Collection Time: 10/12/20  4:41 AM   Result Value Ref Range    WBC 7.4 4.0 - 11.0 10e9/L    RBC Count 4.38 3.8 - 5.2 10e12/L    Hemoglobin 13.6 11.7 - 15.7 g/dL    Hematocrit 41.6 35.0 - 47.0 %    MCV 95 78 - 100 fl    MCH 31.1 26.5 - 33.0 pg    MCHC 32.7 31.5 - 36.5 g/dL    RDW 15.2 (H) 10.0 - 15.0 %    Platelet Count 243 150 - 450 10e9/L    Diff Method Automated Method     % Neutrophils 58.9 %    % Lymphocytes 31.2 %    % Monocytes 7.2 %    % Eosinophils 2.0 %    % Basophils 0.4 %    % Immature Granulocytes 0.3 %    Nucleated RBCs 0 0 /100    Absolute Neutrophil 4.3 1.6 - 8.3 10e9/L    Absolute Lymphocytes 2.3 0.8 - 5.3 10e9/L    Absolute Monocytes 0.5 0.0 - 1.3 10e9/L    Absolute Eosinophils 0.2 0.0 - 0.7 10e9/L    Absolute Basophils 0.0 0.0 - 0.2 10e9/L    Abs Immature Granulocytes 0.0 0 - 0.4 10e9/L    Absolute  Nucleated RBC 0.0    Comprehensive metabolic panel     Status: Abnormal    Collection Time: 10/12/20  4:41 AM   Result Value Ref Range    Sodium 142 133 - 144 mmol/L    Potassium 3.6 3.4 - 5.3 mmol/L    Chloride 111 (H) 94 - 109 mmol/L    Carbon Dioxide 26 20 - 32 mmol/L    Anion Gap 5 3 - 14 mmol/L    Glucose 102 (H) 70 - 99 mg/dL    Urea Nitrogen 15 7 - 30 mg/dL    Creatinine 0.75 0.52 - 1.04 mg/dL    GFR Estimate >90 >60 mL/min/[1.73_m2]    GFR Estimate If Black >90 >60 mL/min/[1.73_m2]    Calcium 9.1 8.5 - 10.1 mg/dL    Bilirubin Total 0.2 0.2 - 1.3 mg/dL    Albumin 3.8 3.4 - 5.0 g/dL    Protein Total 7.3 6.8 - 8.8 g/dL    Alkaline Phosphatase 94 40 - 150 U/L    ALT 22 0 - 50 U/L    AST 14 0 - 45 U/L   Troponin I     Status: None    Collection Time: 10/12/20  4:41 AM   Result Value Ref Range    Troponin I ES <0.015 0.000 - 0.045 ug/L   CT Aortic Survey w Contrast     Status: None    Collection Time: 10/12/20  5:51 AM    Narrative    EXAM: CT AORTIC SURVEY W CONTRAST  LOCATION: [facility]  DATE/TIME: 10/12/2020 5:24 AM    INDICATION: Acute chest, neck, and back pain.  COMPARISON: None.  TECHNIQUE: CT angiogram chest abdomen pelvis during arterial phase of injection of IV contrast. 2D and 3D MIP reconstructions were performed by the CT technologist. Dose reduction techniques were used.   CONTRAST: 80 mL Isovue 370.    FINDINGS:   CT ANGIOGRAM CHEST, ABDOMEN, AND PELVIS: Atherosclerotic calcifications of the aorta. No aortic dissection, aneurysm, or central pulmonary embolus.    LUNGS AND PLEURA: No pneumothorax or pleural effusion. No focal consolidation.    MEDIASTINUM/AXILLAE: Status post cholecystectomy.    HEPATOBILIARY: Normal.    PANCREAS: Normal.    SPLEEN: Normal.    ADRENAL GLANDS: Normal.    KIDNEYS/BLADDER: Normal.    BOWEL: Diverticulosis. No diverticulitis, colitis, or appendicitis.    LYMPH NODES: Normal.    PELVIC ORGANS: Normal.    MUSCULOSKELETAL: Multilevel discogenic degenerative  "change. Posterior fusion of the visualized cervical spine.      Impression    IMPRESSION:  1.  No aortic dissection, aneurysm, or pulmonary embolus.  2.  Coronary artery disease and atherosclerotic vascular disease.  3.  Diverticulosis. No diverticulitis, colitis, appendicitis, or obstruction.  4.  Status post cholecystectomy.               Troponin I (second draw)     Status: None    Collection Time: 10/12/20  8:42 AM   Result Value Ref Range    Troponin I ES <0.015 0.000 - 0.045 ug/L       ED MEDICATIONS:   Medications   nitroGLYcerin (NITROSTAT) sublingual tablet 0.4 mg (0.4 mg Sublingual Given 10/12/20 0457)   aspirin (ASA) chewable tablet 324 mg (324 mg Oral Given 10/12/20 0454)   acetaminophen (TYLENOL) tablet 1,000 mg (1,000 mg Oral Given 10/12/20 0456)   iopamidol (ISOVUE-370) solution 80 mL (80 mLs Intravenous Given 10/12/20 0534)   sodium chloride 0.9 % bag 500mL for CT scan flush use (80 mLs As instructed Given 10/12/20 0534)         Impression:    ICD-10-CM    1. Acute chest pain  R07.9 Troponin I (second draw)       Plan:    The patient remained without pain while awaiting the second troponin test.  It is notable that her pain started at about 1:00 AM.  It resolved at home after nitroglycerin.  Her EKG was unremarkable here in the ED.  Her initial troponin test is negative.  Her second troponin test is negative.  Her imaging is unremarkable.  Low concern for acute coronary obstruction.  Low concern for aortic pathology.  Low concern for severe pulmonary pathology.  No need for emergent hospitalization or consultation at this time.  The patient is complaining of \"a caffeine headache\" prior to discharge.  Coffee provided.  Follow-up discussed.  Return here for worsening.      MD Rosa Villavicencio Jason M, MD  10/12/20 9676    "

## 2020-10-12 NOTE — ED AVS SNAPSHOT
[facility] Emergency Dept  [address] [address]  Phone: [phone]  Fax: [phone]                                    Selina Parikh   MRN: [id number]    Department: [facility] Emergency Dept   Date of Visit: 10/12/2020           After Visit Summary Signature Page    I have received my discharge instructions, and my questions have been answered. I have discussed any challenges I see with this plan with the nurse or doctor.    ..........................................................................................................................................  Patient/Patient Representative Signature      ..........................................................................................................................................  Patient Representative Print Name and Relationship to Patient    ..................................................               ................................................  Date                                   Time    ..........................................................................................................................................  Reviewed by Signature/Title    ...................................................              ..............................................  Date                                               Time          22EPIC Rev 08/18

## 2020-10-12 NOTE — ED TRIAGE NOTES
"Awoke with chest pain about 0100 radiated up L side of neck \"into my ear'      \"pressure in my head'    Nausea no vomiting took a ETOH drink at her daughters advise as she had no asprin    Pain 8/10 at its worst 911 was called pain had improved so she declined transport came in when pain intensified again    Has COPD and chronic cough- denies any changes with this   "

## 2020-10-14 NOTE — PROGRESS NOTES
"Selina Parikh is a 55 year old female who is being evaluated via a billable telephone visit.      The patient has been notified of following:     \"This telephone visit will be conducted via a call between you and your physician/provider. We have found that certain health care needs can be provided without the need for a physical exam.  This service lets us provide the care you need with a short phone conversation.  If a prescription is necessary we can send it directly to your pharmacy.  If lab work is needed we can place an order for that and you can then stop by our lab to have the test done at a later time.    Telephone visits are billed at different rates depending on your insurance coverage. During this emergency period, for some insurers they may be billed the same as an in-person visit.  Please reach out to your insurance provider with any questions.    If during the course of the call the physician/provider feels a telephone visit is not appropriate, you will not be charged for this service.\"    Patient has given verbal consent for Telephone visit?  Yes    What phone number would you like to be contacted at? [phone]    How would you like to obtain your AVS? Mail a copy    Phone call duration: 30 minutes    Nash Lopez PA-C      Does Selina have a CPAP/Bipap?  No     [place] Sleep Scale: 14  Sleep Consultation:    Date on this visit: 10/16/2020    Selina Parikh is sent by No ref. provider found for a sleep consultation regarding possible sleep apnea.     Primary Physician: Kiarra Monreal     Selina Parikh reports nightly snoring and occasional kicking, snorting and poor quality of sleep for many years. Worse the last year.    Selina goes to sleep at 10:30 PM during the week. She wakes up at 5:30 AM without an alarm. She falls asleep in  minutes.  Selina has difficulty falling asleep.  She wakes up 2-4 times a night for 5-15 minutes before falling back to sleep.  Selina wakes up to " go to the bathroom, pain and hot flashes.  On weekends, Selina keeps the same schedule.  Patient gets an average of 2-4 hours of sleep per night.     Patient does watch TV in bed.     Selina does not do shift work.     Selina does snore every night. Patient does not have a regular bed partner. There is report of snoring.  She does not have witnessed apneas.  Patient sleeps on her side and stomach. She has occasional snort arousals and restless legs and frequent morning dry mouth, morning headaches and morning confusion,. Selina has occasional bruxism, sleep talking and sleep paralysis.    She confirms sleep talking and sleep terrors as a child.  Selina has reflux at night, heartburn and depression.      Selina has gained 0-5 pounds in the last year.  Patient describes themself as a morning person.  She would prefer to go to sleep at 10:00 PM and wake up at 7:00 AM.  Patient's [place] Sleepiness score 14/24 consistent with excessive  daytime sleepiness.      Selina naps 0 times per week . She takes some inadvertant naps.  She denies closing eyes, dozing and falling asleep while driving.Patient was counseled on the importance of driving while alert, to pull over if drowsy, or nap before getting into the vehicle if sleepy.  She uses 3-4 cups/day of coffee. Last caffeine intake is usually before 2  .    Allergies:    Allergies   Allergen Reactions     Ciprofloxacin Anaphylaxis     Flagyl [Metronidazole] Anaphylaxis     Zofran [Ondansetron] Other (See Comments) and GI Disturbance     Causes bloating, moderately uncomfortable, abd pain       Augmentin Nausea and Vomiting     Denies hives at this time     Benadryl [Diphenhydramine] Other (See Comments)     Muscle spasms     Percocet [Oxycodone-Acetaminophen] Other (See Comments)     Goes nuts - nasty mean irritated, can take Dilaudid     Vicodin [Hydrocodone-Acetaminophen] Other (See Comments)     Anxiety-agitation       Medications:    Current Outpatient  Medications   Medication Sig Dispense Refill     albuterol (PROVENTIL) (2.5 MG/3ML) 0.083% neb solution Take 1 vial (2.5 mg) by nebulization every 6 hours as needed for shortness of breath / dyspnea or wheezing 1 Box 11     celecoxib (CELEBREX) 200 MG capsule Take 1 capsule (200 mg) by mouth daily 90 capsule 3     Cholecalciferol 125 MCG (5000 UT) TABS Take 1 tablet by mouth daily       clobetasol (TEMOVATE) 0.05 % ointment Apply  topically to affected area(s) 2 times daily.       docusate sodium (COLACE) 100 MG capsule Take 100 mg by mouth every evening       ipratropium - albuterol 0.5 mg/2.5 mg/3 mL (DUONEB) 0.5-2.5 (3) MG/3ML neb solution Take 1 vial (3 mLs) by nebulization every 6 hours as needed for shortness of breath / dyspnea or wheezing 1 Box 1     Melatonin 10 MG TABS tablet Take 10 mg by mouth nightly as needed for sleep       Multiple Vitamin (MULTI-VITAMINS) TABS Take 1 tablet by mouth daily        omeprazole (PRILOSEC) 20 MG DR capsule Take 1 capsule (20 mg) by mouth 2 times daily 180 capsule 1     tiZANidine (ZANAFLEX) 2 MG tablet Take 1 tablet (2 mg) by mouth every 6 hours as needed for muscle spasms 40 tablet 1     traMADol (ULTRAM) 50 MG tablet Take 50 mg by mouth every 6 hours as needed for pain (back and neck pain)       VENTOLIN  (90 Base) MCG/ACT inhaler INHALE 2 PUFFS BY MOUTH EVERY 6 HOURS AS NEEDED FOR SHORTNESS OF BREATH OR WHEEZING 18 g 2       Problem List:  Patient Active Problem List    Diagnosis Date Noted     Chronic midline low back pain with bilateral sciatica 07/02/2020     Priority: Medium     S/P partial colectomy 04/10/2018     Priority: Medium     Diverticulitis 01/28/2018     Priority: Medium     Psoriasis 06/20/2012     Priority: Medium     GERD (gastroesophageal reflux disease) 03/15/2011     Priority: Medium     Tobacco abuse 02/17/2011     Priority: Medium     Health Care Home 01/27/2011     Priority: Medium     DX V65.8 REPLACED WITH [facility]  (04/08/2013)       CARDIOVASCULAR SCREENING; LDL GOAL LESS THAN 160 10/31/2010     Priority: Medium     R Occipital Neuralgia 10/07/2009     Priority: Medium     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 On fiber, senna, and MOM. Advised to d/c MOM, start Miralax and titer to regular BM's. Pt has been seen by GI.        Brain syndrome, posttraumatic 03/06/2009     Priority: Medium     Work comp.  Has seen Dr. Ahmadi, [facility] clinic of neurology.  MRIs, EMG unremarkalbe, some foraminal stenosis on C5/C6  Sent her to physiatrist, trigger point injections didn't help.  After some neck traction, had intense unremitting HA, neck pain.  Off/on tingling in arms.  Pain clinic and/or spine surg for more eval probable next steps.    Has failed multiple rescue and preventive HA meds. On no narcotics          Past Medical/Surgical History:  No past medical history on file.  Past Surgical History:   Procedure Laterality Date     CHOLECYSTECTOMY, LAPOROSCOPIC  2/14/2000    Cholecystectomy, Laparoscopic     CYSTOSCOPY, INSERT LIGHTED STENT URETER(S) N/A 4/10/2018    Procedure: CYSTOSCOPY, INSERT LIGHTED STENT URETER(S);  Lighted Stent Placement,Laparoscopic Assisted Sigmoid Colectomy;  Surgeon: ERVIN Reina MD;  Location: [facility] OR     LAPAROSCOPIC ASSISTED COLECTOMY LEFT (DESCENDING) N/A 4/10/2018    Procedure: LAPAROSCOPIC ASSISTED COLECTOMY LEFT (DESCENDING);;  Surgeon: Hill Murray MD;  Location: [facility] OR     ORTHOPEDIC SURGERY  10/2010    Cut palm and reattched tendons and nerves in left hand forefinger.     SURGICAL HISTORY OF -   1/4/2000    Esophagogastroduodenoscopy with biopsy     TUBAL LIGATION         Social History:  Social History     Socioeconomic History     Marital status:      Spouse name: Not on file     Number of children: Not on file      Years of education: Not on file     Highest education level: Not on file   Occupational History     Not on file   Social Needs     Financial resource strain: Not on file     Food insecurity     Worry: Not on file     Inability: Not on file     Transportation needs     Medical: Not on file     Non-medical: Not on file   Tobacco Use     Smoking status: Current Every Day Smoker     Packs/day: 0.50     Years: 30.00     Pack years: 15.00     Types: Cigarettes     Last attempt to quit: 2020     Years since quittin.6     Smokeless tobacco: Never Used   Substance and Sexual Activity     Alcohol use: No     Drug use: No     Sexual activity: Not Currently     Partners: Male     Birth control/protection: Surgical   Lifestyle     Physical activity     Days per week: Not on file     Minutes per session: Not on file     Stress: Not on file   Relationships     Social connections     Talks on phone: Not on file     Gets together: Not on file     Attends [employer] service: Not on file     Active member of club or organization: Not on file     Attends meetings of clubs or organizations: Not on file     Relationship status: Not on file     Intimate partner violence     Fear of current or ex partner: Not on file     Emotionally abused: Not on file     Physically abused: Not on file     Forced sexual activity: Not on file   Other Topics Concern     Parent/sibling w/ CABG, MI or angioplasty before 65F 55M? Yes     Comment: brother- 38, MI, brother 42- quad bypass   Social History Narrative     Not on file       Family History:  Family History   Problem Relation Age of Onset     C.A.REY Father 50        50's     Diabetes Father      Diabetes Brother      C.A.D. Brother 42        quad bypass and MI     Diabetes Brother         2 brothers have DM     Cancer Brother 34        Melanoma     Allergies Son         Meds     Allergies Daughter         Med     Cancer Mother      C.A.BIA. Brother 38        MI age 38            Impression/Plan:  Patient with daytime sleepiness, snoring. Chronic pain issues, frequent insomnia. Hx post traumatic brain syndrome. Will request a lab study for suspected sleep apnea.   Literature provided regarding sleep apnea.      She will follow up with me in approximately two weeks after her sleep study has been competed to review the results and discuss plan of care.       Polysomnography reviewed.  Obstructive sleep apnea reviewed.  Complications of untreated sleep apnea were reviewed.        CC: No ref. provider found

## 2020-10-15 ENCOUNTER — OFFICE VISIT (OUTPATIENT)
Dept: OTOLARYNGOLOGY | Facility: CLINIC | Age: 55
End: 2020-10-15
Attending: NURSE PRACTITIONER
Payer: COMMERCIAL

## 2020-10-15 VITALS
BODY MASS INDEX: 29.43 KG/M2 | SYSTOLIC BLOOD PRESSURE: 104 MMHG | DIASTOLIC BLOOD PRESSURE: 85 MMHG | WEIGHT: 149.91 LBS | TEMPERATURE: 97.6 F | HEIGHT: 60 IN | HEART RATE: 71 BPM

## 2020-10-15 DIAGNOSIS — R13.10 DYSPHAGIA, UNSPECIFIED TYPE: ICD-10-CM

## 2020-10-15 DIAGNOSIS — K21.9 GASTROESOPHAGEAL REFLUX DISEASE WITHOUT ESOPHAGITIS: ICD-10-CM

## 2020-10-15 DIAGNOSIS — K21.00 GASTROESOPHAGEAL REFLUX DISEASE WITH ESOPHAGITIS WITHOUT HEMORRHAGE: Primary | ICD-10-CM

## 2020-10-15 PROCEDURE — 31575 DIAGNOSTIC LARYNGOSCOPY: CPT | Performed by: OTOLARYNGOLOGY

## 2020-10-15 PROCEDURE — 99213 OFFICE O/P EST LOW 20 MIN: CPT | Mod: 25 | Performed by: OTOLARYNGOLOGY

## 2020-10-15 RX ORDER — FAMOTIDINE 20 MG/1
20 TABLET, FILM COATED ORAL 2 TIMES DAILY
Qty: 30 TABLET | Refills: 11 | Status: SHIPPED | OUTPATIENT
Start: 2020-10-15 | End: 2021-07-26

## 2020-10-15 ASSESSMENT — MIFFLIN-ST. JEOR: SCORE: 1196.5

## 2020-10-15 NOTE — LETTER
"    10/15/2020         RE: Selina Parikh  [address] [address] [facility] [address]        Dear Colleague,    Thank you for referring your patient, Selina Parikh, to the [facility]. Please see a copy of my visit note below.    CHIEF COMPLAINT:   Chief Complaint   Patient presents with     Ent Problem     Dysphagia - 2015 had neck surgery         HISTORY OF PRESENT ILLNESS    Selina was seen at the behest of [facility] for dysphagia.   She has difficulty swallowing since neck fusion in 2015 with removal of hardware in 2016 and then new hardware placed in 2018.   Problems with liquids and solids since 2015.  Feels like food get caught in a pocket.  She takes omeprazole 20 mg 2x daily.   Has chest pain that radiates to her left.   Recently seen in ER for chest pain and she was told her heart was okay.   Dry cough, sometimes with phlegmn.  No F/C weight loss.  She does have night sweats \"really bad\" for the past year.   No soda.   Drinks 4-5 cups of coffee at day.  No spicy, little fried.  Lots of tomato based foods.   Chief Complaint   Patient presents with     Ent Problem     Dysphagia - 2015 had neck surgery            REVIEW OF SYSTEMS    Review of Systems: a 10-system review is reviewed at this encounter.  See scanned document in Media tab.     Ciprofloxacin, Flagyl [metronidazole], Zofran [ondansetron], Augmentin, Benadryl [diphenhydramine], Percocet [oxycodone-acetaminophen], and Vicodin [hydrocodone-acetaminophen]     PHYSICAL EXAM:        HEAD: Normal appearance and symmetry:  No cutaneous lesions.     NECK:       EARS:    Ears:  EACs and TMs normal         NOSE:    Dorsum:   straight  Septum:  midline  Mucosa:  moist  Inferior turbinates:  3+       ORAL CAVITY/OROPHARYNX:    Lips:  Normal.  Tongue: normal, midline  Mucosa:   no lesions  Tonsils:  2+     NECK:  Trachea:  midline.   Thyroid:  normal   Adenopathy:  none       NEURO:   Alert and Oriented    GAIT AND STATION:  normal   "   RESPIRATORY:   Symmetry and Respiratory effort    PSYCH:  Normal mood and affect    SKIN:   warm and dry     FLEXIBLE LARYNGOSCOPY    After obtaining consent, topical oxymetazoline was sprayed into both nasal cavities.   A flexible laryngoscope is introduced into the nasal cavity and used to examine the upper airway.  Changes consisten See scanned photo.       IMPRESSION:    Encounter Diagnoses   Name Primary?     Gastroesophageal reflux disease with esophagitis without hemorrhage Yes     Gastroesophageal reflux disease without esophagitis           RECOMMENDATIONS:      Orders Placed This Encounter   Procedures     GENERAL SURG ADULT REFERRAL         Selina has has symptoms consistent with upper airway reflux. RSI = 42.  In the meantime we will double her omeprazole in the morning to 40 mg.  She will take her evening dose before supper and then we will start her on Pepcid 20 mg before bedtime.  Also I gave her lifestyle information regarding management of chronic reflux.  Also recommended she reduce eliminate tomato-based foods and try to find a low acid coffee alternative.  Given the fact that she has chest pain that is radiating to her ear I think it is important to have her [facility] general surgery for further management and management.  She can follow-up with me on an as-needed basis.          Again, thank you for allowing me to participate in the care of your patient.        Sincerely,        Sourav Chowdhury MD

## 2020-10-15 NOTE — PATIENT INSTRUCTIONS
Omeprazole 2 tabs before breakfast, 1 tab before supper  Pepcid 20 mg tab at bedtime  Referral placed with [facility] for further evaluation of reflux  Low acid coffee  Reduce/minimize tomato based foods    .Patient Education     Lifestyle Changes for Controlling GERD  When you have GERD, stomach acid feels as if it s backing up toward your mouth. Whether or not you take medicine to control your GERD, your symptoms can often be improved with lifestyle changes. Talk to your healthcare provider about the following suggestions. These suggestions may help you get relief from your symptoms.      Raise your head  Reflux is more likely to strike when you re lying down flat, because stomach fluid can flow backward more easily. Raising the head of your bed 4 to 6 inches can help. To do this:    Slide blocks or books under the legs at the head of your bed. Or, place a wedge under the mattress. Many [employer] can make a suitable wedge for you. The wedge should run from your waist to the top of your head.    Don t just prop your head on several pillows. This increases pressure on your stomach. It can make GERD worse.  Watch your eating habits  Certain foods may increase the acid in your stomach or relax the lower esophageal sphincter. This makes GERD more likely. It s best to avoid the following if they cause you symptoms:    Coffee, tea, and carbonated drinks (with and without caffeine)    Fatty, fried, or spicy food    Mint, chocolate, onions, and tomatoes    Peppermint    Any other foods that seem to irritate your stomach or cause you pain  Relieve the pressure  Tips include the following:    Eat smaller meals, even if you have to eat more often.    Don t lie down right after you eat. Wait a few hours for your stomach to empty.    Avoid tight belts and tight-fitting clothes.    Lose excess weight.  Tobacco and alcohol  Avoid smoking tobacco and drinking alcohol. They can make GERD symptoms worse.  Date Last  Reviewed: 7/1/2016 2000-2019 [employer]. [address] [place] [address], [place] [address]. All rights reserved. This information is not intended as a substitute for professional medical care. Always follow your healthcare professional's instructions.

## 2020-10-15 NOTE — PROGRESS NOTES
"CHIEF COMPLAINT:   Chief Complaint   Patient presents with     Ent Problem     Dysphagia - 2015 had neck surgery         HISTORY OF PRESENT ILLNESS    Selina was seen at the behest of [facility] for dysphagia.   She has difficulty swallowing since neck fusion in 2015 with removal of hardware in 2016 and then new hardware placed in 2018.   Problems with liquids and solids since 2015.  Feels like food get caught in a pocket.  She takes omeprazole 20 mg 2x daily.   Has chest pain that radiates to her left.   Recently seen in ED  for chest pain and she was told her heart was okay.   Dry cough, sometimes with phlegmn.  No F/C weight loss.  She does have night sweats \"really bad\" for the past year.   No soda.   Drinks 4-5 cups of coffee at day.  No spicy, little fried.  Lots of tomato based foods.   RSI = 42.    10/12/20 ED visit:    Assessments & Plan (with Medical Decision Making)  55-year-old female with history of hypertension, smoking, and strong family history of coronary disease pending for evaluation of chest pain.  She reports pressure in her chest radiating to her back and up to her neck and left ear associated with some diaphoresis and nausea.  She does report 1 similar episode a few weeks ago which lasted a few hours and resolved.  No known history of cardiac disease personally.  Reports multiple siblings have all had cardiac disease and bypass surgery at a young age.  Had 2 episodes of chest pain tonight, first episode around 1 AM.     Upon arrival in the ED pain is improving spontaneously and resolved after nitro.  Given aspirin in the ED.  EKG showed inverted T wave in V2 which is slightly different from previous.  Initial troponin negative.  Given the reported pain radiating to her back and neck, CT for aneurysm/dissection obtained.  Signed out at shift change pending formal CT results with a plan for observation and second troponin evaluation in the ED    Chief Complaint   Patient presents with     Ent " Problem     Dysphagia - 2015 had neck surgery            REVIEW OF SYSTEMS    Review of Systems: see HPI for pertinent positives/negatives.     Ciprofloxacin, Flagyl [metronidazole], Zofran [ondansetron], Augmentin, Benadryl [diphenhydramine], Percocet [oxycodone-acetaminophen], and Vicodin [hydrocodone-acetaminophen]     PHYSICAL EXAM:        HEAD: Normal appearance and symmetry:  No cutaneous lesions.     NECK:  Supple      EARS:  EACs and TMs normal    NOSE:    Dorsum:   straight  Septum:  midline  Mucosa:  moist  Inferior turbinates:  3+       ORAL CAVITY/OROPHARYNX:    Lips:  Normal.  Tongue: normal, midline  Mucosa:   no lesions  Tonsils:  2+     NECK:  Trachea:  midline.   Thyroid:  normal   Adenopathy:  none       NEURO:   Alert and Oriented    GAIT AND STATION:  normal     RESPIRATORY:   Symmetry and Respiratory effort    PSYCH:  Normal mood and affect    SKIN:   warm and dry     FLEXIBLE LARYNGOSCOPY    After obtaining consent, topical oxymetazoline was sprayed into both nasal cavities.   A flexible laryngoscope is introduced into the nasal cavity and used to examine the upper airway.  Changes consistent with LPRD.  See scanned photo.       IMPRESSION:    Encounter Diagnoses   Name Primary?     Gastroesophageal reflux disease with esophagitis without hemorrhage Yes     Gastroesophageal reflux disease without esophagitis           RECOMMENDATIONS:      Orders Placed This Encounter   Procedures     GENERAL SURG ADULT REFERRAL         Selina has has symptoms consistent with upper airway reflux. RSI = 42.  In the meantime we will double her omeprazole in the morning to 40 mg.  She will take her evening dose before supper and then we will start her on Pepcid 20 mg before bedtime.      Also I gave her lifestyle information regarding management of chronic reflux.  Also recommended she reduce eliminate tomato-based foods and try to find a low acid coffee alternative.  Given the fact that she has chest pain that  is radiating to her ear I think it is important to have her Souleymane Moreno MD general surgery for further management and management.  She can follow-up with me on an as-needed basis.

## 2020-10-15 NOTE — NURSING NOTE
Initial /85 (BP Location: Right arm, Patient Position: Sitting, Cuff Size: Adult Large)   Pulse 71   Temp 97.6  F (36.4  C) (Tympanic)   Ht 1.524 m (5')   Wt 68 kg (149 lb 14.6 oz)   BMI 29.28 kg/m   Estimated body mass index is 29.28 kg/m  as calculated from the following:    Height as of this encounter: 1.524 m (5').    Weight as of this encounter: 68 kg (149 lb 14.6 oz). .    Janae Johnson MA

## 2020-10-16 ENCOUNTER — VIRTUAL VISIT (OUTPATIENT)
Dept: SLEEP MEDICINE | Facility: CLINIC | Age: 55
End: 2020-10-16
Payer: COMMERCIAL

## 2020-10-16 DIAGNOSIS — R29.818 SUSPECTED SLEEP APNEA: Primary | ICD-10-CM

## 2020-10-16 PROCEDURE — 99214 OFFICE O/P EST MOD 30 MIN: CPT | Mod: TEL | Performed by: PHYSICIAN ASSISTANT

## 2020-10-16 RX ORDER — ZOLPIDEM TARTRATE 5 MG/1
TABLET ORAL
Qty: 1 TABLET | Refills: 0 | Status: SHIPPED | OUTPATIENT
Start: 2020-10-16 | End: 2020-12-30

## 2020-10-16 NOTE — PATIENT INSTRUCTIONS
"MY TREATMENT INFORMATION FOR SLEEP APNEA-  Selina Parikh    DOCTOR : Nash Lopez PA-C  SLEEP CENTER :      MY CONTACT NUMBER:     Am I having a sleep study at a sleep center?  Make sure you have an appointment for the study before you leave!    Am I having a home sleep study?  Watch this video:  /drop off device-   [email or web address]/watch?v=yGGFBdELGhk  Disposable device sent out require phone/computer application-   https://[email or web address]/watch?v=BCce_vbiwxE  Please verify your insurance coverage with your insurance carrier    Frequently asked questions:  1. What is Obstructive Sleep Apnea (ZEKE)? ZEKE is the most common type of sleep apnea. Apnea means, \"without breath.\"  Apnea is most often caused by narrowing or collapse of the upper airway as muscles relax during sleep.   Almost everyone has occasional apneas. Most people with sleep apnea have had brief interruptions at night frequently for many years.  The severity of sleep apnea is related to how frequent and severe the events are.   2. What are the consequences of ZEKE? Symptoms include: feeling sleepy during the day, snoring loudly, gasping or stopping of breathing, trouble sleeping, and occasionally morning headaches or heartburn at night.  Sleepiness can be serious and even increase the risk of falling asleep while driving. Other health consequences may include development of high blood pressure and other cardiovascular disease in persons who are susceptible. Untreated ZEKE  can contribute to heart disease, stroke and diabetes.   3. What are the treatment options? In most situations, sleep apnea is a lifelong disease that must be managed with daily therapy. Medications are not effective for sleep apnea and surgery is generally not considered until other therapies have been tried. Your treatment is your choice . Continuous Positive Airway (CPAP) works right away and is the therapy that is effective in nearly everyone. An oral device to " hold your jaw forward is usually the next most reliable option. Other options include postioning devices (to keep you off your back), weight loss, and surgery including a tongue pacing device. There is more detail about some of these options below.    Important tips for using CPAP and similar devices   Know your equipment:  CPAP is continuous positive airway pressure that prevents obstructive sleep apnea by keeping the throat from collapsing while you are sleeping. In most cases, the device is  smart  and can slowly self-adjusts if your throat collapses and keeps a record every day of how well you are treated-this information is available to you and your care team.  BPAP is bilevel positive airway pressure that keeps your throat open and also assists each breath with a pressure boost to maintain adequate breathing.  Special kinds of BPAP are used in patients who have inadequate breathing from lung or heart disease. In most cases, the device is  smart  and can slowly self-adjusts to assist breathing. Like CPAP, the device keeps a record of how well you are treated.  Your mask is your connection to the device. You get to choose what feels most comfortable and the staff will help to make sure if fits. Here: are some examples of the different masks that are available:       Key points to remember on your journey with sleep apnea:  1. Sleep study.  PAP devices often need to be adjusted during a sleep study to show that they are effective and adjusted right.  2. Good tips to remember: Try wearing just the mask during a quiet time during the day so your body adapts to wearing it. A humidifier is recommended for comfort in most cases to prevent drying of your nose and throat. Allergy medication from your provider may help you if you are having nasal congestion.  3. Getting settled-in. It takes more than one night for most of us to get used to wearing a mask. Try wearing just the mask during a quiet time during the day so  your body adapts to wearing it. A humidifier is recommended for comfort in most cases. Our team will work with you carefully on the first day and will be in contact within 4 days and again at 2 and 4 weeks for advice and remote device adjustments. Your therapy is evaluated by the device each day.   4. Use it every night. The more you are able to sleep naturally for 7-8 hours, the more likely you will have good sleep and to prevent health risks or symptoms from sleep apnea. Even if you use it 4 hours it helps. Occasionally all of us are unable to use a medical therapy, in sleep apnea, it is not dangerous to miss one night.   5. Communicate. Call our skilled team on the number provided on the first day if your visit for problems that make it difficult to wear the device. Over 2 out of 3 patients can learn to wear the device long-term with help from our team. Remember to call our team or your sleep providers if you are unable to wear the device as we may have other solutions for those who cannot adapt to mask CPAP therapy. It is recommended that you sleep your sleep provider within the first 3 months and yearly after that if you are not having problems.   6. Use it for your health. We encourage use of CPAP masks during daytime quiet periods to allow your face and brain to adapt to the sensation of CPAP so that it will be a more natural sensation to awaken to at night or during naps. This can be very useful during the first few weeks or months of adapting to CPAP though it does not help medically to wear CPAP during wakefulness and  should not be used as a strategy just to meet guidelines.  7. Take care of your equipment. Make sure you clean your mask and tubing using directions every day and that your filter and mask are replaced as recommended or if they are not working.     BESIDES CPAP, WHAT OTHER THERAPIES ARE THERE?    Positioning Device  Positioning devices are generally used when sleep apnea is mild and only  occurs on your back.This example shows a pillow that straps around the waist. It may be appropriate for those whose sleep study shows milder sleep apnea that occurs primarily when lying flat on one's back. Preliminary studies have shown benefit but effectiveness at home may need to be verified by a home sleep test. These devices are generally not covered by medical insurance.  Examples of devices that maintain sleeping on the back to prevent snoring and mild sleep apnea.    Belt type body positioner  Http://[employer]/    Electronic reminder  Http://nightshifttherapy.com/  Http://www.[employer].au/      Oral Appliance  What is oral appliance therapy?  An oral appliance device fits on your teeth at night like a retainer used after having braces. The device is made by a specialized dentist and requires several visits over 1-2 months before a manufactured device is made to fit your teeth and is adjusted to prevent your sleep apnea. Once an oral device is working properly, snoring should be improved. A home sleep test may be recommended at that time if to determine whether the sleep apnea is adequately treated.       Some things to remember:  -Oral devices are often, but not always, covered by your medical insurance. Be sure to check with your insurance provider.   -If you are referred for oral therapy, you will be given a list of specialized dentists to consider or you may choose to visit the Web site of the American Academy of Dental Sleep Medicine  -Oral devices are less likely to work if you have severe sleep apnea or are extremely overweight.     More detailed information  An oral appliance is a small acrylic device that fits over the upper and lower teeth  (similar to a retainer or a mouth guard). This device slightly moves jaw forward, which moves the base of the tongue forward, opens the airway, improves breathing for effective treat snoring and obstructive sleep apnea in perhaps 7 out of 10 people .  The  best working devices are custom-made by a dental device  after a mold is made of the teeth 1, 2, 3.  When is an oral appliance indicated?  Oral appliance therapy is recommended as a first-line treatment for patients with primary snoring, mild sleep apnea, and for patients with moderate sleep apnea who prefer appliance therapy to use of CPAP4, 5. Severity of sleep apnea is determined by sleep testing and is based on the number of respiratory events per hour of sleep.   How successful is oral appliance therapy?  The success rate of oral appliance therapy in patients with mild sleep apnea is 75-80% while in patients with moderate sleep apnea it is 50-70%. The chance of success in patients with severe sleep apnea is 40-50%. The research also shows that oral appliances have a beneficial effect on the cardiovascular health of ZEKE patients at the same magnitude as CPAP therapy7.  Oral appliances should be a second-line treatment in cases of severe sleep apnea, but if not completely successful then a combination therapy utilizing CPAP plus oral appliance therapy may be effective. Oral appliances tend to be effective in a broad range of patients although studies show that the patients who have the highest success are females, younger patients, those with milder disease, and less severe obesity. 3, 6.   Finding a dentist that practices dental sleep medicine  Specific training is available through the American Academy of Dental Sleep Medicine for dentists interested in working in the field of sleep. To find a dentist who is educated in the field of sleep and the use of oral appliances, near you, visit the Web site of the American Academy of Dental Sleep Medicine.    References  1. Brian et al. Objectively measured vs self-reported compliance during oral appliance therapy for sleep-disordered breathing. Chest 2013; 144(5): 1009-1276.  2. Patricia et al. Objective measurement of compliance during oral  appliance therapy for sleep-disordered breathing. Thorax 2013; 68(1): 91-96.  3. Pee, et al. Mandibular advancement devices in 620 men and women with ZEKE and snoring: tolerability and predictors of treatment success. Chest 2004; 125: 6857-9886.  4. Joseph et al. Oral appliances for snoring and ZEKE: a review. Sleep 2006; 29: 244-262.  5. Zeina et al. Oral appliance treatment for ZEKE: an update. J Clin Sleep Med 2014; 10(2): 215-227.  6. Federico et al. Predictors of OSAH treatment outcome. J Dent Res 2007; 86: 9332-6432.      Weight Loss:    Weight loss is a long-term strategy that may improve sleep apnea in some patients.    Weight management is a personal decision and the decision should be based on your interest and the potential benefits.  If you are interested in exploring weight loss strategies, the following discussion covers the impact on weight loss on sleep apnea and the approaches that may be successful.    Being overweight does not necessarily mean you will have health consequences.  Those who have BMI over 35 or over 27 with existing medical conditions carries greater risk.   Weight loss decreases severity of sleep apnea in most people with obesity. For those with mild obesity who have developed snoring with weight gain, even 15-30 pound weight loss can improve and occasionally eliminate sleep apnea.  Structured and life-long dietary and health habits are necessary to lose weight and keep healthier weight levels.     Though there may be significant health benefits from weight loss, long-term weight loss is very difficult to achieve- studies show success with dietary management in less than 10% of people. In addition, substantial weight loss may require years of dietary control and may be difficult if patients have severe obesity. In these cases, surgical management may be considered.  Finally, older individuals who have tolerated obesity without health complications may be less likely to  benefit from weight loss strategies.        Your BMI is There is no height or weight on file to calculate BMI.  Weight management is a personal decision.  If you are interested in exploring weight loss strategies, the following discussion covers the approaches that may be successful. Body mass index (BMI) is one way to tell whether you are at a healthy weight, overweight, or obese. It measures your weight in relation to your height.  A BMI of 18.5 to 24.9 is in the healthy range. A person with a BMI of 25 to 29.9 is considered overweight, and someone with a BMI of 30 or greater is considered obese. More than two-thirds of American adults are considered overweight or obese.  Being overweight or obese increases the risk for further weight gain. Excess weight may lead to heart disease and diabetes.  Creating and following plans for healthy eating and physical activity may help you improve your health.  Weight control is part of healthy lifestyle and includes exercise, emotional health, and healthy eating habits. Careful eating habits lifelong are the mainstay of weight control. Though there are significant health benefits from weight loss, long-term weight loss with diet alone may be very difficult to achieve- studies show long-term success with dietary management in less than 10% of people. Attaining a healthy weight may be especially difficult to achieve in those with severe obesity. In some cases, medications, devices and surgical management might be considered.  What can you do?  If you are overweight or obese and are interested in methods for weight loss, you should discuss this with your provider.     Consider reducing daily calorie intake by 500 calories.     Keep a food journal.     Avoiding skipping meals, consider cutting portions instead.    Diet combined with exercise helps maintain muscle while optimizing fat loss. Strength training is particularly important for building and maintaining muscle mass.  Exercise helps reduce stress, increase energy, and improves fitness. Increasing exercise without diet control, however, may not burn enough calories to loose weight.       Start walking three days a week 10-20 minutes at a time    Work towards walking thirty minutes five days a week     Eventually, increase the speed of your walking for 1-2 minutes at time    In addition, we recommend that you review healthy lifestyles and methods for weight loss available through the National Institutes of Health patient information sites:  http://win.niddk.nih.gov/publications/index.htm    And look into health and wellness programs that may be available through your health insurance provider, employer, local community center, or [employer].          Surgery:    Surgery for obstructive sleep apnea is considered generally only when other therapies fail to work. Surgery may be discussed with you if you are having a difficult time tolerating CPAP and or when there is an abnormal structure that requires surgical correction.  Nose and throat surgeries often enlarge the airway to prevent collapse.  Most of these surgeries create pain for 1-2 weeks and up to half of the most common surgeries are not effective throughout life.  You should carefully discuss the benefits and drawbacks to surgery with your sleep provider and surgeon to determine if it is the best solution for you.   More information  Surgery for ZEKE is directed at areas that are responsible for narrowing or complete obstruction of the airway during sleep.  There are a wide range of procedures available to enlarge and/or stabilize the airway to prevent blockage of breathing in the three major areas where it can occur: the palate, tongue, and nasal regions.  Successful surgical treatment depends on the accurate identification of the factors responsible for obstructive sleep apnea in each person.  A personalized approach is required because there is no single treatment that  works well for everyone.  Because of anatomic variation, consultation with an examination by a sleep surgeon is a critical first step in determining what surgical options are best for each patient.  In some cases, examination during sedation may be recommended in order to guide the selection of procedures.  Patients will be counseled about risks and benefits as well as the typical recovery course after surgery. Surgery is typically not a cure for a person s ZEKE.  However, surgery will often significantly improve one s ZEKE severity (termed  success rate ).  Even in the absence of a cure, surgery will decrease the cardiovascular risk associated with OSA7; improve overall quality of life8 (sleepiness, functionality, sleep quality, etc).      Palate Procedures:  Patients with ZEKE often have narrowing of their airway in the region of their tonsils and uvula.  The goals of palate procedures are to widen the airway in this region as well as to help the tissues resist collapse.  Modern palate procedure techniques focus on tissue conservation and soft tissue rearrangement, rather than tissue removal.  Often the uvula is preserved in this procedure. Residual sleep apnea is common in patient after pharyngoplasty with an average reduction in sleep apnea events of 33%2.      Tongue Procedures:  ExamWhile patients are awake, the muscles that surround the throat are active and keep this region open for breathing. These muscles relax during sleep, allowing the tongue and other structures to collapse and block breathing.  There are several different tongue procedures available.  Selection of a tongue base procedure depends on characteristics seen on physical exam.  Generally, procedures are aimed at removing bulky tissues in this area or preventing the back of the tongue from falling back during sleep.  Success rates for tongue surgery range from 50-62%3.    Hypoglossal Nerve Stimulation:  Hypoglossal nerve stimulation has recently  received approval from the [place] Food and Drug Administration for the treatment of obstructive sleep apnea.  This is based on research showing that the system was safe and effective in treating sleep apnea6.  Results showed that the median AHI score decreased 68%, from 29.3 to 9.0. This therapy uses an implant system that senses breathing patterns and delivers mild stimulation to airway muscles, which keeps the airway open during sleep.  The system consists of three fully implanted components: a small generator (similar in size to a pacemaker), a breathing sensor, and a stimulation lead.  Using a small handheld remote, a patient turns the therapy on before bed and off upon awakening.    Candidates for this device must be greater than 22 years of age, have moderate to severe ZEKE (AHI between 20-65), BMI less than 32, have tried CPAP/oral appliance without success, and have appropriate upper airway anatomy (determined by a sleep endoscopy performed by Dr. Corona).    Hypoglossal Nerve Stimulation Pathway:    The sleep surgeon s office will work with the patient through the insurance prior-authorization process (including communications and appeals).    Nasal Procedures:  Nasal obstruction can interfere with nasal breathing during the day and night.  Studies have shown that relief of nasal obstruction can improve the ability of some patients to tolerate positive airway pressure therapy for obstructive sleep apnea1.  Treatment options include medications such as nasal saline, topical corticosteroid and antihistamine sprays, and oral medications such as antihistamines or decongestants. Non-surgical treatments can include external nasal dilators for selected patients. If these are not successful by themselves, surgery can improve the nasal airway either alone or in combination with these other options.      Combination Procedures:  Combination of surgical procedures and other treatments may be recommended,  particularly if patients have more than one area of narrowing or persistent positional disease.  The success rate of combination surgery ranges from 66-80%2,3.    References  1. Clementine CANNON. The Role of the Nose in Snoring and Obstructive Sleep Apnoea: An Update.  Eur Arch Otorhinolaryngol. 2011; 268: 1365-73.  2.  Leland SM; Mikhail JA; Adri JR; Pallanch JF; Saloni MB; Christiano SG; Reyes DELACRUZ. Surgical modifications of the upper airway for obstructive sleep apnea in adults: a systematic review and meta-analysis. SLEEP 2010;33(10):1823-3186. Baltazar CRUZ. Hypopharyngeal surgery in obstructive sleep apnea: an evidence-based medicine review.  Arch Otolaryngol Head Neck Surg. 2006 Feb;132(2):206-13.  3. Dirk YH1, Annita Y, Hai APPLE. The efficacy of anatomically based multilevel surgery for obstructive sleep apnea. Otolaryngol Head Neck Surg. 2003 Oct;129(4):327-35.  4. Baltazar CRUZ, Goldberg A. Hypopharyngeal Surgery in Obstructive Sleep Apnea: An Evidence-Based Medicine Review. Arch Otolaryngol Head Neck Surg. 2006 Feb;132(2):206-13.  5. Corrina LEDESMA et al. Upper-Airway Stimulation for Obstructive Sleep Apnea.  N Engl J Med. 2014 Jan 9;370(2):139-49.  6. Jeanie Y et al. Increased Incidence of Cardiovascular Disease in Middle-aged Men with Obstructive Sleep Apnea. Am J Respir Crit Care Med; 2002 166: 159-165  7. Ervin EM et al. Studying Life Effects and Effectiveness of Palatopharyngoplasty (SLEEP) study: Subjective Outcomes of Isolated Uvulopalatopharyngoplasty. Otolaryngol Head Neck Surg. 2011; 144: 623-631.    Changing Sleep Habits    Sometimes insomnia can be made worse by our own habits or situation.  You should consider making some of the following changes to help improve your sleep:     If there is excessive noise in your house / neighborhood use a fan or similar device to make a quiet background noise that can drown out other noises    If your room is too bright early in the morning, hang a blanket or extra curtain  over the windows to keep the light out or use a sleeping mask to cover your eyes    If your room is too warm during the night, set the temperature in the house down a few degrees for the night    Spend a little time before bedtime trying to relax.  Don t work right up until bedtime.  Take 30-60 minutes to relax and unwind before bedtime with a book or watching TV    Do not drink or eat anything with caffeine in it at least 6 hours before bed.    Avoid alcohol at least three hours before bedtime    Do not smoke right before bedtime or during the night    No strenuous exercise for 2-3 hours before bedtime

## 2020-11-01 ENCOUNTER — VIRTUAL VISIT (OUTPATIENT)
Dept: URGENT CARE | Facility: CLINIC | Age: 55
End: 2020-11-01
Payer: COMMERCIAL

## 2020-11-01 ENCOUNTER — NURSE TRIAGE (OUTPATIENT)
Dept: NURSING | Facility: CLINIC | Age: 55
End: 2020-11-01

## 2020-11-01 DIAGNOSIS — Z20.822 EXPOSURE TO COVID-19 VIRUS: Primary | ICD-10-CM

## 2020-11-01 PROCEDURE — 99213 OFFICE O/P EST LOW 20 MIN: CPT | Mod: TEL | Performed by: NURSE PRACTITIONER

## 2020-11-01 NOTE — TELEPHONE ENCOUNTER
Mom spent the night in hospital and had a covid test and it was positive    Has had a lot of exposure to the mother    No current symptoms, but the mother has not had symptoms either    Patient would like a covid viral test to test for current infection.     Patient tried to do [facility] visit, but her phone would not allow it. Is requesting appointment to get order for covid testing.     Call transferred to scheduling for appointment. Patient is agreeable.     COVID 19 Nurse Triage Plan/Patient Instructions    Please be aware that novel coronavirus (COVID-19) may be circulating in the community. If you develop symptoms such as fever, cough, or SOB or if you have concerns about the presence of another infection including coronavirus (COVID-19), please contact your health care provider or visit [email or web address].     Disposition/Instructions    Virtual Visit with provider recommended. Reference Visit Selection Guide.    Thank you for taking steps to prevent the spread of this virus.  o Limit your contact with others.  o Wear a simple mask to cover your cough.  o Wash your hands well and often.    Resources    [facility]: About COVID-19: [email or web address]    CDC: What to Do If You're Sick: www.cdc.gov/coronavirus/2019-ncov/about/steps-when-sick.html    CDC: Ending Home Isolation: www.cdc.gov/coronavirus/2019-ncov/hcp/disposition-in-home-patients.html     CDC: Caring for Someone: www.cdc.gov/coronavirus/2019-ncov/if-you-are-sick/care-for-someone.html     [facility]: Interim Guidance for Hospital Discharge to Home: www.health.[facility].[place]/diseases/coronavirus/hcp/hospdischarge.pdf    [facility] clinical trials (COVID-19 research studies): [email or web address].[email or web address]/um-clinical-trials     Below are the COVID-19 hotlines at the [place] Department of Health ([facility]). Interpreters are available.   o For health questions: Call 533-760-6279 or 1-591.187.9548 (7 a.m. to 7 p.m.)  o For questions about  schools and childcare: Call 603-749-1749 or 1-318.337.3312 (7 a.m. to 7 p.m.)       Reason for Disposition    [1] COVID-19 EXPOSURE (Close Contact) within last 14 days AND [2] needs COVID-19 lab test to return to work AND [3] NO symptoms    Protocols used: CORONAVIRUS (COVID-19) EXPOSURE-A- 8.4.20

## 2020-11-02 NOTE — PROGRESS NOTES
"Selina Parikh is a 55 year old female who is being evaluated via a billable telephone visit.      The patient has been notified of following:     \"This telephone visit will be conducted via a call between you and your physician/provider. We have found that certain health care needs can be provided without the need for a physical exam.  This service lets us provide the care you need with a short phone conversation.  If a prescription is necessary we can send it directly to your pharmacy.  If lab work is needed we can place an order for that and you can then stop by our lab to have the test done at a later time.    Telephone visits are billed at different rates depending on your insurance coverage. During this emergency period, for some insurers they may be billed the same as an in-person visit.  Please reach out to your insurance provider with any questions.    If during the course of the call the physician/provider feels a telephone visit is not appropriate, you will not be charged for this service.\"    Patient has given verbal consent for Telephone visit?  Yes    What phone number would you like to be contacted at? [phone]      How would you like to obtain your AVS?     Subjective     Selina Parikh is a 55 year old female who presents via phone visit today for the following health issues:    HPI          Wants COVID test  Mom was with her Thursday and COVID test is positive.    No symptoms for her mom or herself  No fevers or chills  Is not coughing or SOB  Normal sense of taste and smell.        Review of Systems   Constitutional, HEENT, cardiovascular, pulmonary, gi and gu systems are negative, except as otherwise noted.       Objective          Vitals:  No vitals were obtained today due to virtual visit.    healthy, alert and no distress  PSYCH: Alert and oriented times 3; coherent speech, normal   rate and volume, able to articulate logical thoughts, able   to abstract reason, no tangential thoughts, " no hallucinations   or delusions  Her affect is normal  RESP: No cough, no audible wheezing, able to talk in full sentences  Remainder of exam unable to be completed due to telephone visits          Assessment/Plan:    Assessment & Plan     Exposure to COVID-19 virus  Will get COVID testing  Recommend she isolate herself until testing is resulted or if she is symptomatic.    Discussed worrysome sign and when to seek urgent care.      - Asymptomatic COVID-19 Virus (Coronavirus) by PCR          No follow-ups on file.    CARLTON Webb [facility]    Phone call duration:  5 minutes

## 2020-11-05 PROBLEM — G89.29 CHRONIC MIDLINE LOW BACK PAIN WITH BILATERAL SCIATICA: Status: RESOLVED | Noted: 2020-07-02 | Resolved: 2020-11-05

## 2020-11-05 PROBLEM — M54.41 CHRONIC MIDLINE LOW BACK PAIN WITH BILATERAL SCIATICA: Status: RESOLVED | Noted: 2020-07-02 | Resolved: 2020-11-05

## 2020-11-05 PROBLEM — M54.42 CHRONIC MIDLINE LOW BACK PAIN WITH BILATERAL SCIATICA: Status: RESOLVED | Noted: 2020-07-02 | Resolved: 2020-11-05

## 2020-11-05 NOTE — PROGRESS NOTES
Subjective:  HPI  Physical Exam                    Objective:  System    Physical Exam    General     ROS    Assessment/Plan:    DISCHARGE REPORT          SUBJECTIVE  Subjective: Selina notes of more low back pain since injection 2-3 weeks ago,. Neck pain however, is significantly reduced since injection and therapy combination.            Changes in function: No changes noted in function since last SOAP note   Adverse reactions: None;   ,     Patient has failed to return to therapy so current objective findings are unknown.  The subjective and objective information are from the last SOAP note on this patient.    OBJECTIVE  Objective: Limited mobility with minimal ERP , especially L SB/rotation motions       ASSESSMENT/PLAN  Updated problem list and treatment plan: Diagnosis 1:  Neck and back pain       Assessment of Progress: The patient's condition has potential to improve.  Patient decided to self discharge after being seen by an extender.  G codes could not be reported by the therapist.  Self Management Plans:  Patient has been instructed in a home treatment program.      Recommendations:  This patient is ready to be discharged from therapy and continue their home treatment program.  This patient would benefit from further evaluation.    Please refer to the daily flowsheet for treatment today, total treatment time and time spent performing 1:1 timed codes.

## 2020-12-02 ENCOUNTER — TELEPHONE (OUTPATIENT)
Dept: PHARMACY | Facility: CLINIC | Age: 55
End: 2020-12-02

## 2020-12-02 NOTE — TELEPHONE ENCOUNTER
This patient is due for MTM follow-up. I called the patient to schedule an appointment and left a message with the clinic phone number for the patient to call to schedule.    Bib Ervin, FatimahD, [facility]  Medication Therapy Management Pharmacist  Pager: [phone]

## 2020-12-30 ENCOUNTER — OFFICE VISIT (OUTPATIENT)
Dept: FAMILY MEDICINE | Facility: CLINIC | Age: 55
End: 2020-12-30
Payer: COMMERCIAL

## 2020-12-30 VITALS
HEIGHT: 60 IN | RESPIRATION RATE: 16 BRPM | SYSTOLIC BLOOD PRESSURE: 137 MMHG | DIASTOLIC BLOOD PRESSURE: 81 MMHG | BODY MASS INDEX: 30.26 KG/M2 | OXYGEN SATURATION: 99 % | WEIGHT: 154.13 LBS | TEMPERATURE: 97.6 F | HEART RATE: 89 BPM

## 2020-12-30 DIAGNOSIS — M54.41 CHRONIC MIDLINE LOW BACK PAIN WITH BILATERAL SCIATICA: ICD-10-CM

## 2020-12-30 DIAGNOSIS — I10 BENIGN ESSENTIAL HYPERTENSION: ICD-10-CM

## 2020-12-30 DIAGNOSIS — Z01.818 PRE-OPERATIVE GENERAL PHYSICAL EXAMINATION: Primary | ICD-10-CM

## 2020-12-30 DIAGNOSIS — G89.29 CHRONIC MIDLINE LOW BACK PAIN WITH BILATERAL SCIATICA: ICD-10-CM

## 2020-12-30 DIAGNOSIS — M54.42 CHRONIC MIDLINE LOW BACK PAIN WITH BILATERAL SCIATICA: ICD-10-CM

## 2020-12-30 DIAGNOSIS — F17.200 NICOTINE DEPENDENCE, UNCOMPLICATED, UNSPECIFIED NICOTINE PRODUCT TYPE: ICD-10-CM

## 2020-12-30 LAB
APTT PPP: 29 SEC (ref 22–37)
INR PPP: 0.96 (ref 0.86–1.14)

## 2020-12-30 PROCEDURE — 85610 PROTHROMBIN TIME: CPT | Performed by: FAMILY MEDICINE

## 2020-12-30 PROCEDURE — 36416 COLLJ CAPILLARY BLOOD SPEC: CPT | Performed by: FAMILY MEDICINE

## 2020-12-30 PROCEDURE — 99215 OFFICE O/P EST HI 40 MIN: CPT | Performed by: FAMILY MEDICINE

## 2020-12-30 PROCEDURE — 85730 THROMBOPLASTIN TIME PARTIAL: CPT | Performed by: FAMILY MEDICINE

## 2020-12-30 PROCEDURE — 93000 ELECTROCARDIOGRAM COMPLETE: CPT | Performed by: FAMILY MEDICINE

## 2020-12-30 RX ORDER — METOPROLOL SUCCINATE 25 MG/1
25 TABLET, EXTENDED RELEASE ORAL DAILY
Status: ON HOLD | COMMUNITY
End: 2022-04-28

## 2020-12-30 ASSESSMENT — MIFFLIN-ST. JEOR: SCORE: 1215.61

## 2020-12-30 NOTE — PATIENT INSTRUCTIONS
You will be contacted in 1-2 days for results of your lab tests.    Covid-19 tests are routinely ordered by the surgery teams, so coordinate this with them.    You may take Metoprolol XR on morning of surgery if they are scheduled to be taken then. Take only with a small sip of water.  All other scheduled medication may be held on morning of surgery, and resumed when you are allowed to eat.     Hold celecoxib 3 days before surgery.    Do not take Ibuprofen, Aspirin or Naproxen from now until after procedure.  If you need to take something for pain, take Acetaminophen 325 mg orally 1-2 tabs every 4-6 hrs as needed for pain.      HOW TO QUIT SMOKING  Smoking is one of the hardest habits to break. About half of all those who have ever smoked have been able to quit, and most of those (about 70%) who still smoke want to quit. Here are some of the best ways to stop smoking.     KEEP TRYING:  It takes most smokers about 8 tries before they are finally able to fully quit. So, the more often you try and fail, the better your chance of quitting the next time! So, don't give up!    GO COLD TURKEY:  Most ex-smokers quit cold turkey. Trying to cut back gradually doesn't seem to work as well, perhaps because it continues the smoking habit. Also, it is possible to fool yourself by inhaling more while smoking fewer cigarettes. This results in the same amount of nicotine in your body!    GET SUPPORT:  Support programs can make an important difference, especially for the heavy smoker. These groups offer lectures, methods to change your behavior and peer support. Call the free national Quitline for more information. 800-QUIT-NOW (404-907-4375). Low-cost or free programs are offered by many hospitals, local chapters of the American Lung Association (073-026-9685) and the American Cancer Society (096-257-4967). Support at home is important too. Non-smokers can help by offering praise and encouragement. If the smoker fails to quit,  encourage them to try again!    OVER-THE-COUNTER MEDICINES:  For those who can't quit on their own, Nicotine Replacement Therapy (NRT) may make quitting much easier. Certain aids such as the nicotine patch, gum and lozenge are available without a prescription. However, it is best to use these under the guidance of your doctor. The skin patch provides a steady supply of nicotine to the body. Nicotine gum and lozenge gives temporary bursts of low levels of nicotine. Both methods take the edge off the craving for cigarettes. WARNING: If you feel symptoms of nicotine overdose, such as nausea, vomiting, dizziness, weakness, or fast heartbeat, stop using these and see your doctor.    PRESCRIPTION MEDICINES:  After evaluating your smoking patterns and prior attempts at quitting, your doctor may offer a prescription medicine such as bupropion (Zyban, Wellbutrin), varenicline (Chantix, Champix), a niocotine inhaler or nasal spray. Each has its unique advantage and side effects which your doctor can review with you.    HEALTH BENEFITS OF QUITTING:  The benefits of quitting start right away and keep improving the longer you go without smokin minutes: blood pressure and pulse return to normal  8 hours: oxygen levels return to normal  2 days: ability to smell and taste begins to improve as damaged nerves start to regrow  2-3 weeks: circulation and lung function improves  1-9 months: decreased cough, congestion and shortness of breath; less tired  1 year: risk of heart attack decreases by half  5 years: risk of lung cancer decreases by half; risk of stroke becomes the same as a non-smoker  For information about how to quit smoking, visit the following links:  National Cancer [place] ,   Clearing the Air, Quit Smoking Today   - an online booklet. http://www.smokefree.gov/pubs/clearing_the_air.pdf  Smokefree.gov http://smokefree.gov/  QuitNet http://www.quitnet.com/    2074-2935 [employer], [address] [facility],  "LUC[place] [address]. All rights reserved. This information is not intended as a substitute for professional medical care. Always follow your healthcare professional's instructions.    Preparing for Your Surgery  Getting started  A surgery nurse will call you to review your health history and instructions. They will give you an arrival time based on your scheduled surgery time.  Please be ready to share the following:    Your doctor's clinic name and phone number    Your medical, surgical and anesthesia history    A list of allergies and sensitivities    A list of medicines, including herbal treatments and over-the-counter drugs    Whether the patient has a legal guardian (ask how to send us the papers in advance)  If your child is having surgery, please ask for a copy of Preparing for Your Child's Surgery.    Preparing for surgery    Within 30 days of surgery: Have an exam at your family clinic (primary care clinic), or go to a pre-operative clinic. This exam is called a \"History and Physical,\" or H&P.    At your H&P exam, talk to your care team about all medicines you take. If you need to stop any medicines before surgery, ask when to start taking them again.  ? We do this for your safety. Many medicines can make you bleed too much during surgery. Some change how well surgery (anesthesia) drugs work.    Call your insurance company to see what it will and won't pay for. Ask if they need to pre-approve the surgery. (If no insurance, call [phone].)    Call your surgeon's clinic if there's any change in your health. This includes signs of a cold or flu (sore throat, runny nose, cough, rash, fever). It also includes a scrape or scratch near the surgery site.    If you have questions on the day of surgery, call your surgery center.  Eating and drinking guidelines  For your safety: Unless your surgeon tells you otherwise, follow the guidelines below.    Eat and drink as usual until 8 hours before surgery. After that, " no food or milk.    Drink clear liquids until 2 hours before surgery. These are liquids you can see through, like water, Gatorade and Propel Water. You may also have black coffee and tea (no cream or milk).    Nothing by mouth within 2 hours of surgery. This includes gum, candy and breath mints.    Stop alcohol the midnight before surgery.    If your family clinic tells you to take medicine on the morning of surgery, it's okay to take it with a sip of water.  Preventing infection    Shower or bathe the night before and morning of your surgery. Follow the instructions your clinic gave you. (If no instructions, use regular soap.)    Don't shave or clip hair near your surgery site. This can lead to skin infection.    Don't smoke the morning of surgery. Smoking increases the risk of infection. You may chew nicotine gum up to 2 hours before surgery. A nicotine patch is okay.  ? Note: Some surgeries require you to completely quit smoking and nicotine. Check with your surgeon.    Your care team will make every effort to keep you safe from infection. We will:  ? Clean our hands often with soap and water (or an alcohol-based hand rub).  ? Clean the skin at your surgery site with a special soap that kills germs. We'll also remove hair from the site as needed.  ? Wear special hair covers, masks, gowns and gloves during surgery.  ? Give antibiotic medicine, if prescribed. Not all surgeries need antibiotics.  What to bring on the day of surgery    Photo ID and insurance card    Copy of your health care directive, if you have one    Glasses and hearing aides (bring cases)  ? You can't wear contacts during surgery    Inhaler and eye drops, if you use them (tell us about these when you arrive)    CPAP machine or breathing device, if you use them    A few personal items, if spending the night    If you have . . .  ? A pacemaker or ICD (cardiac defibrillator): Bring the ID card.  ? An implanted stimulator: Bring the remote  control.  ? A legal guardian: Bring a copy of the certified (court-stamped) guardianship papers.  Please remove any jewelry, including body piercings. Leave jewelry and other valuables at home.  If you're going home the day of surgery  Important: If you don't follow the rules below, we must cancel your surgery.     Arrange for someone to drive you home after surgery. You may not drive, take a taxi or take public transportation by yourself (unless you'll have local anesthesia only).    Arrange for a responsible adult to stay with you overnight. If you don't, we may keep you in the hospital overnight, and you may need to pay the costs yourself.  Questions?   If you have any questions for your care team, list them here: _________________________________________________________________________________________________________________________________________________________________________________________________________________________________________________________________________________________________________________________  For informational purposes only. Not to replace the advice of your health care provider. Copyright   4621-9831 [facility]. All rights reserved. Clinically reviewed by Yadira Hernandez MD. [employer] [id number] - REV 07/19.    Before Your Procedure or Hospital Admission  Testing for COVID-19 (Coronavirus)  Thank you for choosing [facility] for your health care needs. The COVID-19 pandemic is a very challenging time for everyone.   Our goal is to keep you and our team here at [facility] safe and healthy. We've taken many steps to make this happen. For example:    We test and screen our staff, care teams and patients for COVID-19.    Everyone at [facility] must wear a mask and stay 6 feet apart.    We are limiting hospital and clinic visitors.  Before you come in  All patients must get tested for COVID-19. Your test needs to happen 3 to 4 days before you check in to the  "hospital or surgery site.   A clinic scheduler will call about a week in advance to set up a testing time at one of our labs. We'll take a swab of your nose or throat.  Note: If you go to a clinic or pharmacy like [facility] or [facility] for your test, make sure you get a test deep inside the nose. This is called a   naso-pharyngeal (NP) RT-PCR lab test. Don't get a \"rapid\" test or a saliva (spit) test. See \"Questions and Answers\" on the next page.  After the test, please stay at home and stay out of contact with other people. It will be harder for you to recover if you get COVID-19 before your treatment.  Please follow all current safety guidelines, including:    Limit trips outside your home.    Limit the number of people you see.    Always wear a mask outside your home.    Use social distancing. Stay 6 feet away from others whenever you can.    Wash your hands often.  If your test shows you have COVID-19  If your test is positive, we'll let you know. A positive test means that you have the virus.   We'll probably have to postpone your admission, surgery or procedure. Your doctor will discuss this with you. After that, we'll let you know what to do and when you can re-schedule.   We may need to cancel your treatment on short notice for other reasons, too.  If your test shows you DON'T have COVID-19  Even if your test is negative, you can still get COVID-19. It's rare but, sometimes, the test result is wrong. You could also catch the virus after taking the test.   There's a very small chance that you could catch COVID-19 in the hospital or surgery center. [facility] has taken many steps to prevent this from happening.   Day of your surgery or procedure    Please come wearing a face covering that covers both your nose and mouth.    When you arrive, we'll ask you some questions to find out if you have any signs or symptoms of COVID-19.    Ask your care team if you can have visitors. All visitors must wear face " "coverings and will be screened for signs of COVID-19.  ? Even if no visitors are allowed, you can still have with you:    Your legal guardian or legal decision maker    A parent and one other visitor, if you are younger than 18 years old    A partner and a , if you are in labor  ? We might need to teach you about taking care of yourself after surgery. If so, a visitor can come into the hospital to learn about it, too.  ? The rules for visitors change often, depending on how much the virus is spreading. To learn more, see Visiting a Loved One in the Hospital during the COVID-19 Outbreak.  Please call your care team, hospital or surgery center if you have any questions. We thank you for your understanding and for choosing [facility] for your care.   Questions and Answers  Does it matter where I get tested for COVID-19?  Yes. We urge you to get tested at one of our [facility] COVID-19 testing sites. We process these tests in our lab and can get the results quickly. Your [facility] care team needs to get your results before you check in.  What should I do if I can't get tested at [facility]?  You can get tested somewhere else, but you'll need to take these extra steps:   1. Contact your family doctor or clinic to arrange your test.  2. Take the test within 4 days of your surgery or procedure. We can't accept tests older than 4 days.  3. Make sure you're getting a test deep inside the nose (a naso-pharyngeal, or NP, RT-PCR lab test). Many pharmacies use \"rapid\" tests or saliva (spit) tests. We don't accept those tests before surgery or procedures. For adults, tests deep inside the nose are the most accurate tests.  4. Make sure your doctor or clinic faxes your results to [facility] at [phone].  If we don't get your results in time, we may have to delay or cancel your treatment.  For informational purposes only. Not to replace the advice of your health care provider. Copyright "   2020 [facility]. All rights reserved. Clinically reviewed by Infection Prevention and the [facility] COVID-19 Clinical Team. [employer] 365893 - Rev 12/16/20.

## 2020-12-30 NOTE — PROGRESS NOTES
"[facility]  [address] [place] [address]  Phone: [phone]  Primary Provider: Kiarra Monreal  Pre-op Performing Provider: CHARLOTTE VAUGHAN    PREOPERATIVE EVALUATION:  Today's date: 12/30/2020    Selina Parikh is a 55 year old female who presents for a preoperative evaluation.    Surgical Information:  Surgery/Procedure: PSF with instrumentation Levels L3 to L5  Surgery Location: [facility]  Surgeon: Dr. Melissa Cee  Surgery Date: 1/5/2021  Time of Surgery: 10:00 am  Where patient plans to recover: At home with family  Fax number for surgical facility: [phone]    Type of Anesthesia Anticipated: to be determined    Subjective     HPI related to upcoming procedure: Patient has chronic back pain with sciatica due to multi level DDD. Hence, planned surgery. Reviewed above with patient.      Preop Questions 12/30/2020   1. Have you ever had a heart attack or stroke? No   2. Have you ever had surgery on your heart or blood vessels, such as a stent placement, a coronary artery bypass, or surgery on an artery in your head, neck, heart, or legs? No   3. Do you have chest pain with activity? No   4. Do you have a history of  heart failure? No   5. Do you currently have a cold, bronchitis or symptoms of other infection? No   6. Do you have a cough, shortness of breath, or wheezing? YES - due to smoking; saw ENT and need to have \"scope done\". COPD/ashtma - PFT not consistent with these diagnosis.   7. Do you or anyone in your family have previous history of blood clots? YES - father prone to DVT, PE; mother had DVT;  patient denies personal hx of hypercoag.   8. Do you or does anyone in your family have a serious bleeding problem such as prolonged bleeding following surgeries or cuts? UNKNOWN   9. Have you ever had problems with anemia or been told to take iron pills? YES - told she was anemic few yrs ago - not on iron supplement anymore.   10. Have you had " any abnormal blood loss such as black, tarry or bloody stools, or abnormal vaginal bleeding? No   11. Have you ever had a blood transfusion? No   12. Are you willing to have a blood transfusion if it is medically needed before, during, or after your surgery? Yes   13. Have you or any of your relatives ever had problems with anesthesia? UNKNOWN in family; patient has no personal hx of anesthesia adverse reactionb   14. Do you have sleep apnea, excessive snoring or daytime drowsiness? YES - needs to get a sleep study but has been deferred due to back pain interfering with sleep.   14a. Do you have a CPAP machine? No   15. Do you have any artifical heart valves or other implanted medical devices like a pacemaker, defibrillator, or continuous glucose monitor? No   16. Do you have artificial joints? No   17. Are you allergic to latex? No   18. Is there any chance that you may be pregnant? No     Health Care Directive:  Patient does not have a Health Care Directive or Living Will: Discussed advance care planning with patient; however, patient declined at this time.    Preoperative Review of :   reviewed - controlled substances reflected in medication list.      Status of Chronic Conditions:  See problem list for active medical problems.  Problems all longstanding and stable, except as noted/documented.  See ROS for pertinent symptoms related to these conditions.    Patient states she is taking metoprolol XR once a day. Not listed in meds but patient is adamant she has been prescribed by her PCP and has been receiving refills for the last 12 months. Tolerating the med well, per patient.    Tobacco use - patient would like to quit cold turkey.    Review of Systems  CONSTITUTIONAL: NEGATIVE for fever, chills, change in weight  INTEGUMENTARY/SKIN: NEGATIVE for worrisome rashes, moles or lesions  EYES: NEGATIVE for vision changes or irritation  ENT/MOUTH: NEGATIVE for ear, mouth and throat problems  RESP: NEGATIVE for  significant cough or SOB  CV: NEGATIVE for chest pain, palpitations or peripheral edema  GI: NEGATIVE for nausea, abdominal pain, heartburn, or change in bowel habits  : NEGATIVE for frequency, dysuria, or hematuria  MUSCULOSKELETAL: NEGATIVE for significant arthralgias or myalgia  NEURO: NEGATIVE for weakness, dizziness or paresthesias  ENDOCRINE: NEGATIVE for temperature intolerance, skin/hair changes  HEME: NEGATIVE for bleeding problems  PSYCHIATRIC: NEGATIVE for changes in mood or affect    Patient Active Problem List    Diagnosis Date Noted     S/P partial colectomy 04/10/2018     Priority: Medium     Diverticulitis 01/28/2018     Priority: Medium     Psoriasis 06/20/2012     Priority: Medium     GERD (gastroesophageal reflux disease) 03/15/2011     Priority: Medium     Tobacco abuse 02/17/2011     Priority: Medium     Health Care Home 01/27/2011     Priority: Medium     DX V65.8 REPLACED WITH 15226 [facility]ALTH CARE HOME (04/08/2013)       CARDIOVASCULAR SCREENING; LDL GOAL LESS THAN 160 10/31/2010     Priority: Medium     R Occipital Neuralgia 10/07/2009     Priority: Medium     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 On fiber, senna, and MOM. Advised to d/c MOM, start Miralax and titer to regular BM's. Pt has been seen by GI.        Brain syndrome, posttraumatic 03/06/2009     Priority: Medium     Work comp.  Has seen Dr. Ahmadi, [facility] of neurology.  MRIs, EMG unremarkalbe, some foraminal stenosis on C5/C6  Sent her to physiatrist, trigger point injections didn't help.  After some neck traction, had intense unremitting HA, neck pain.  Off/on tingling in arms.  Pain clinic and/or spine surg for more eval probable next steps.    Has failed multiple rescue and preventive HA meds. On no narcotics        No past medical history on  file.  Past Surgical History:   Procedure Laterality Date     CHOLECYSTECTOMY, LAPOROSCOPIC  2/14/2000    Cholecystectomy, Laparoscopic     CYSTOSCOPY, INSERT LIGHTED STENT URETER(S) N/A 4/10/2018    Procedure: CYSTOSCOPY, INSERT LIGHTED STENT URETER(S);  Lighted Stent Placement,Laparoscopic Assisted Sigmoid Colectomy;  Surgeon: ERVIN Reina MD;  Location: [facility] OR     LAPAROSCOPIC ASSISTED COLECTOMY LEFT (DESCENDING) N/A 4/10/2018    Procedure: LAPAROSCOPIC ASSISTED COLECTOMY LEFT (DESCENDING);;  Surgeon: Hill Murray MD;  Location: [facility] OR     ORTHOPEDIC SURGERY  10/2010    Cut palm and reattched tendons and nerves in left hand forefinger.     SURGICAL HISTORY OF -   1/4/2000    Esophagogastroduodenoscopy with biopsy     TUBAL LIGATION       Current Outpatient Medications   Medication Sig Dispense Refill     celecoxib (CELEBREX) 200 MG capsule Take 1 capsule (200 mg) by mouth daily 90 capsule 3     Cholecalciferol 125 MCG (5000 UT) TABS Take 1 tablet by mouth daily       clobetasol (TEMOVATE) 0.05 % ointment Apply  topically to affected area(s) 2 times daily.       docusate sodium (COLACE) 100 MG capsule Take 100 mg by mouth every evening       famotidine (PEPCID) 20 MG tablet Take 1 tablet (20 mg) by mouth 2 times daily (Patient taking differently: Take 20 mg by mouth daily ) 30 tablet 11     Melatonin 10 MG TABS tablet Take 10 mg by mouth nightly as needed for sleep       omeprazole (PRILOSEC) 20 MG DR capsule Take 2 tabs 30 min before breakfast, 1 tab before supper 180 capsule 1     tiZANidine (ZANAFLEX) 2 MG tablet Take 1 tablet (2 mg) by mouth every 6 hours as needed for muscle spasms 40 tablet 1     traMADol (ULTRAM) 50 MG tablet Take 50 mg by mouth every 6 hours as needed for pain (back and neck pain)       albuterol (PROVENTIL) (2.5 MG/3ML) 0.083% neb solution Take 1 vial (2.5 mg) by nebulization every 6 hours as needed for shortness of breath / dyspnea or wheezing (Patient not taking: Reported on  2020) 1 Box 11     ipratropium - albuterol 0.5 mg/2.5 mg/3 mL (DUONEB) 0.5-2.5 (3) MG/3ML neb solution Take 1 vial (3 mLs) by nebulization every 6 hours as needed for shortness of breath / dyspnea or wheezing (Patient not taking: Reported on 2020) 1 Box 1     Multiple Vitamin (MULTI-VITAMINS) TABS Take 1 tablet by mouth daily        VENTOLIN  (90 Base) MCG/ACT inhaler INHALE 2 PUFFS BY MOUTH EVERY 6 HOURS AS NEEDED FOR SHORTNESS OF BREATH OR WHEEZING (Patient not taking: Reported on 2020) 18 g 2       Allergies   Allergen Reactions     Ciprofloxacin Anaphylaxis     Flagyl [Metronidazole] Anaphylaxis     Zofran [Ondansetron] Other (See Comments) and GI Disturbance     Causes bloating, moderately uncomfortable, abd pain       Augmentin Nausea and Vomiting     Denies hives at this time     Benadryl [Diphenhydramine] Other (See Comments)     Muscle spasms     Percocet [Oxycodone-Acetaminophen] Other (See Comments)     Goes nuts - nasty mean irritated, can take Dilaudid     Vicodin [Hydrocodone-Acetaminophen] Other (See Comments)     Anxiety-agitation        Social History     Tobacco Use     Smoking status: Current Every Day Smoker     Packs/day: 0.25     Years: 30.00     Pack years: 7.50     Types: Cigarettes     Last attempt to quit: 2020     Years since quittin.8     Smokeless tobacco: Never Used   Substance Use Topics     Alcohol use: No     Family History   Problem Relation Age of Onset     C.A.D. Father 50        50's     Diabetes Father      Diabetes Brother      C.A.BIA. Brother 42        quad bypass and MI     Diabetes Brother         2 brothers have DM     Cancer Brother 34        Melanoma     Aortic aneurysm Brother      Allergies Son         Meds     Allergies Daughter         Med     Cancer Mother      C.A.D. Brother 38        MI age 38     History   Drug Use No         Objective     /81   Pulse 89   Temp 97.6  F (36.4  C) (Tympanic)   Resp 16   Ht 1.524 m (5')    Wt 69.9 kg (154 lb 2 oz)   SpO2 99%   BMI 30.10 kg/m      Physical Exam  GENERAL APPEARANCE:  alert and no distress; ambulatory w/o assist  EYES: pink conj, no icterus, PERRL, EOMI  HENT: ear canals and TM's normal, nose and mouth without ulcers or lesions, oropharynx clear and oral mucous membranes moist  NECK: no adenopathy, no asymmetry, masses, or scars and thyroid normal to palpation  RESP: lungs clear to auscultation - no rales, rhonchi or wheezes  CV: regular rates and rhythm, normal S1 S2, no S3 or S4, no murmur, click or rub, no peripheral edema and peripheral pulses strong  ABDOMEN: soft, nontender, no hepatosplenomegaly, no masses and bowel sounds normal  MS: no musculoskeletal defects are noted and gait is age appropriate without ataxia  SKIN: good turgor, no rash/jaundice/ecchymosis  NEURO: Normal strength and tone, sensory exam grossly normal, mentation intact and speech normal    Recent Labs   Lab Test 10/12/20  0441 01/25/20  1454   HGB 13.6 14.6    265    140   POTASSIUM 3.6 3.9   CR 0.75 0.66        Diagnostics:  Recent Results (from the past 168 hour(s))   Partial thromboplastin time    Collection Time: 12/30/20  4:13 PM   Result Value Ref Range    PTT 29 22 - 37 sec   INR    Collection Time: 12/30/20  4:13 PM   Result Value Ref Range    INR 0.96 0.86 - 1.14      EKG required for hypertension and tobacco use and not completed in the last 90 days.   EKG: sinus rhythm with normal rate, normal axis, normal intervals, no acute ST/T changes c/w ischemia, no LVH by voltage criteria, compared to previous tracing there is no acute change    Revised Cardiac Risk Index (RCRI):  The patient has the following serious cardiovascular risks for perioperative complications:   - No serious cardiac risks = 0 points     RCRI Interpretation: 0 points: Class I (very low risk - 0.4% complication rate)           Assessment & Plan   The proposed surgical procedure is considered INTERMEDIATE  risk.    Selina was seen today for pre-op exam and medication request.    Diagnoses and all orders for this visit:    Pre-operative general physical examination  -     EKG 12-lead complete w/read - Clinics  -     Partial thromboplastin time  -     INR  Ptt and INR were requested by her surgeon based on her preoperative instruction packet.    Chronic midline low back pain with bilateral sciatica    Benign essential hypertension    Nicotine dependence, uncomplicated, unspecified nicotine product type  -     EKG 12-lead complete w/read - Clinics      Possible Sleep Apnea: pursue sleep study after surgery. Surgical team is advised to monitor respirations and sats while sedated and refer to respiratory tehrapy and appropriate specialty as appropriate. [id number]}         Risks and Recommendations:  The patient has the following additional risks and recommendations for perioperative complications:   - Consult Hospitalist / IM to assist with post-op medical management  Pulmonary:    - Incentive spirometry post-op   - Consider Respiratory Therapy (Respiratory Care IP Consult) post-op   - Active nicotine user, advised smoking cessation    Medication Instructions:  Patient is to take all scheduled medications on the day of surgery EXCEPT for modifications listed below:  All other scheduled morning meds on day of surgery may be held until pt is allowed to take PO.   - Beta Blockers: Continue taking on the day of surgery.    RECOMMENDATION:  APPROVAL GIVEN to proceed with proposed procedure, without further diagnostic evaluation.    Signed Electronically by: Moisés Cintron MD    Copy of this evaluation report is provided to requesting physician.    Preop [facility] Preop Guidelines    Revised Cardiac Risk Index

## 2021-01-01 NOTE — PLAN OF CARE
"Problem: Patient Care Overview  Goal: Plan of Care/Patient Progress Review  Alert and oriented.  VSS. Up independently.  Stated adequate pain control with PRN Dilaudid.  Tolerated regular diet.  /65 (BP Location: Left arm)  Pulse 88  Temp 98.2  F (36.8  C) (Oral)  Resp 16  Ht 1.53 m (5' 0.25\")  Wt 67.5 kg (148 lb 13 oz)  LMP 04/10/2016  SpO2 95%  BMI 28.82 kg/m2      " Statement Selected

## 2021-01-04 ENCOUNTER — TELEPHONE (OUTPATIENT)
Dept: FAMILY MEDICINE | Facility: CLINIC | Age: 56
End: 2021-01-04

## 2021-01-04 DIAGNOSIS — L40.9 PSORIASIS: Primary | Chronic | ICD-10-CM

## 2021-01-04 RX ORDER — CLOBETASOL PROPIONATE 0.5 MG/G
OINTMENT TOPICAL
Qty: 60 G | Refills: 0 | Status: SHIPPED | OUTPATIENT
Start: 2021-01-04 | End: 2021-07-22

## 2021-01-04 NOTE — TELEPHONE ENCOUNTER
Reason for Call:  Medication or medication refill:    Do you use a [facility]?  Name of the pharmacy and phone number for the current request:  [facility] [phone]    Name of the medication requested: Clobetasol Ointment     Other request: Please refill and call when complete    Can we leave a detailed message on this number? YES    Phone number patient can be reached at: Home number on file [phone] (home)    Best Time: Today    Call taken on 1/4/2021 at 9:30 AM by Gabriela Ramirez    Reason for Call:  Other Fax Pre Op    Detailed comments: Please fax the Pre - Op results to [facility] Fax [phone] As soon as possible, Surgery date 1.5.2021    Phone Number Patient can be reached at: Home number on file [phone] (home)    Best Time: Today    Can we leave a detailed message on this number? YES    Call taken on 1/4/2021 at 9:31 AM by Gabriela Ramirez

## 2021-01-04 NOTE — TELEPHONE ENCOUNTER
Dr. Cintron,    Please prescribe the clobetasol oint.  Patient needs to take this with her tomorrow to surgery.  I have faxed [facility] for us again.  Thank you, Sophia SUE RN

## 2021-02-01 ENCOUNTER — TELEPHONE (OUTPATIENT)
Dept: FAMILY MEDICINE | Facility: CLINIC | Age: 56
End: 2021-02-01

## 2021-02-01 NOTE — TELEPHONE ENCOUNTER
Patient Quality Outreach Summary      Summary:    Patient is due/failing the following:   Breast Cancer Screening - Mammogram and Cervical Cancer Screening - PAP Needed    Type of outreach:    Sent letter.    Questions for provider review:    None                                                                                                                    JERALD TENORIO

## 2021-02-01 NOTE — LETTER
February 1, 2021      Selina Parikh  [address] [place] [address]          Dear Selina Parikh, [phone]    At [facility] we care about your health and are committed to providing quality patient care, which includes staying current on preventative cancer screenings.  You can increase your chances of finding and treating cancers through regular screenings.      Our records show that you are due for the following screening(s):      Mammogram for breast cancer - [phone] to schedule  Recommended every 1-2 years for women age 50 and older  Mammograms help detect breast cancer, which is the most common cancer among women in the [place].  You may need to start having mammograms earlier and more often if you have had breast cancer, breast problems, or a family history of breast cancer.     Pap Smear for cervical cancer - [phone] to schedule  Recommended every three years for women 21 and older  A Pap test is used to detect cervical cancer.  The test should be taken at least once every three years but women who are at a greater risk for cervical cancer may need to have the test more often.      You are at a greater risk for cervical cancer if:   - You have had a sexually transmitted disease   - You have had more than one sex partner   - You have had an abnormal pap test in the past    If you have a My-Chart Account, you also can schedule this appointment through there.          Your partners in health,      Quality Committee   [facility]

## 2021-03-06 DIAGNOSIS — K21.9 GASTROESOPHAGEAL REFLUX DISEASE WITHOUT ESOPHAGITIS: ICD-10-CM

## 2021-03-08 ENCOUNTER — HOSPITAL ENCOUNTER (OUTPATIENT)
Dept: MRI IMAGING | Facility: CLINIC | Age: 56
Discharge: HOME | End: 2021-03-08
Admitting: SPECIALIST
Payer: COMMERCIAL

## 2021-03-08 DIAGNOSIS — M48.062 LUMBAR STENOSIS WITH NEUROGENIC CLAUDICATION: ICD-10-CM

## 2021-03-08 PROCEDURE — 72148 MRI LUMBAR SPINE W/O DYE: CPT

## 2021-03-08 NOTE — TELEPHONE ENCOUNTER
Routing refill request to provider for review/approval because:  Last signed by another provider.    Esha Hernandez RN

## 2021-03-08 NOTE — TELEPHONE ENCOUNTER
Covering for PCP (out of clinic today):  Prescription last prescribed by ENT with only prn follow-up planned.  Patient was to take 40mg in the AM and 20mg in the PM, but this request is for 20mg BID.  Can someone please clarify with patient what dose she is taking and if symptoms are controlled?    Thanks,  Hai Gomez MD

## 2021-03-08 NOTE — TELEPHONE ENCOUNTER
"Requested Prescriptions   Pending Prescriptions Disp Refills     omeprazole (PRILOSEC) 20 MG DR capsule [Pharmacy Med Name: Omeprazole 20 MG Oral Capsule Delayed Release] 180 capsule 0     Sig: Take 1 capsule by mouth twice daily       PPI Protocol Passed - 3/6/2021 10:50 AM        Passed - Not on Clopidogrel (unless Pantoprazole ordered)        Passed - No diagnosis of osteoporosis on record        Passed - Recent (12 mo) or future (30 days) visit within the authorizing provider's specialty     Patient has had an office visit with the authorizing provider or a provider within the authorizing providers department within the previous 12 mos or has a future within next 30 days. See \"Patient Info\" tab in inbasket, or \"Choose Columns\" in Meds & Orders section of the refill encounter.              Passed - Medication is active on med list        Passed - Patient is age 18 or older        Passed - No active pregnacy on record        Passed - No positive pregnancy test in past 12 months             "

## 2021-03-10 ENCOUNTER — COMMUNICATION - HEALTHEAST (OUTPATIENT)
Dept: SURGERY | Facility: CLINIC | Age: 56
End: 2021-03-10

## 2021-03-22 ENCOUNTER — AMBULATORY - HEALTHEAST (OUTPATIENT)
Dept: OTOLARYNGOLOGY | Facility: CLINIC | Age: 56
End: 2021-03-22

## 2021-03-22 DIAGNOSIS — K21.9 GASTROESOPHAGEAL REFLUX DISEASE WITHOUT ESOPHAGITIS: ICD-10-CM

## 2021-03-26 ENCOUNTER — OFFICE VISIT - HEALTHEAST (OUTPATIENT)
Dept: SURGERY | Facility: CLINIC | Age: 56
End: 2021-03-26

## 2021-03-26 ENCOUNTER — SURGERY - HEALTHEAST (OUTPATIENT)
Dept: SURGERY | Facility: CLINIC | Age: 56
End: 2021-03-26

## 2021-03-26 DIAGNOSIS — K21.9 GASTROESOPHAGEAL REFLUX DISEASE WITHOUT ESOPHAGITIS: ICD-10-CM

## 2021-03-26 DIAGNOSIS — K44.9 HIATAL HERNIA: ICD-10-CM

## 2021-03-29 ENCOUNTER — COMMUNICATION - HEALTHEAST (OUTPATIENT)
Dept: SURGERY | Facility: CLINIC | Age: 56
End: 2021-03-29

## 2021-04-07 ENCOUNTER — HOSPITAL ENCOUNTER (OUTPATIENT)
Dept: RADIOLOGY | Facility: HOSPITAL | Age: 56
Discharge: HOME | End: 2021-04-07
Attending: SURGERY

## 2021-04-07 DIAGNOSIS — K21.9 GASTROESOPHAGEAL REFLUX DISEASE WITHOUT ESOPHAGITIS: ICD-10-CM

## 2021-04-17 ENCOUNTER — AMBULATORY - HEALTHEAST (OUTPATIENT)
Dept: SURGERY | Facility: AMBULATORY SURGERY CENTER | Age: 56
End: 2021-04-17

## 2021-04-17 DIAGNOSIS — Z11.59 ENCOUNTER FOR SCREENING FOR OTHER VIRAL DISEASES: ICD-10-CM

## 2021-04-19 ENCOUNTER — APPOINTMENT (OUTPATIENT)
Dept: GENERAL RADIOLOGY | Facility: CLINIC | Age: 56
End: 2021-04-19
Attending: FAMILY MEDICINE
Payer: COMMERCIAL

## 2021-04-19 ENCOUNTER — HOSPITAL ENCOUNTER (EMERGENCY)
Facility: CLINIC | Age: 56
Discharge: HOME | End: 2021-04-20
Attending: FAMILY MEDICINE | Admitting: FAMILY MEDICINE
Payer: COMMERCIAL

## 2021-04-19 ENCOUNTER — NURSE TRIAGE (OUTPATIENT)
Dept: NURSING | Facility: CLINIC | Age: 56
End: 2021-04-19

## 2021-04-19 VITALS
DIASTOLIC BLOOD PRESSURE: 106 MMHG | RESPIRATION RATE: 20 BRPM | TEMPERATURE: 96.4 F | BODY MASS INDEX: 30.27 KG/M2 | WEIGHT: 155 LBS | SYSTOLIC BLOOD PRESSURE: 170 MMHG | OXYGEN SATURATION: 98 % | HEART RATE: 86 BPM

## 2021-04-19 DIAGNOSIS — R07.9 ACUTE CHEST PAIN: ICD-10-CM

## 2021-04-19 LAB
ANION GAP SERPL CALCULATED.3IONS-SCNC: 4 MMOL/L (ref 3–14)
BASOPHILS # BLD AUTO: 0 10E9/L (ref 0–0.2)
BASOPHILS NFR BLD AUTO: 0.4 %
BUN SERPL-MCNC: 13 MG/DL (ref 7–30)
CALCIUM SERPL-MCNC: 8.8 MG/DL (ref 8.5–10.1)
CHLORIDE SERPL-SCNC: 111 MMOL/L (ref 94–109)
CO2 SERPL-SCNC: 27 MMOL/L (ref 20–32)
CREAT SERPL-MCNC: 0.73 MG/DL (ref 0.52–1.04)
D DIMER PPP FEU-MCNC: 0.4 UG/ML FEU (ref 0–0.5)
DIFFERENTIAL METHOD BLD: NORMAL
EOSINOPHIL # BLD AUTO: 0.1 10E9/L (ref 0–0.7)
EOSINOPHIL NFR BLD AUTO: 1.5 %
ERYTHROCYTE [DISTWIDTH] IN BLOOD BY AUTOMATED COUNT: 14.7 % (ref 10–15)
GFR SERPL CREATININE-BSD FRML MDRD: >90 ML/MIN/{1.73_M2}
GLUCOSE SERPL-MCNC: 89 MG/DL (ref 70–99)
HCT VFR BLD AUTO: 43.9 % (ref 35–47)
HGB BLD-MCNC: 14.2 G/DL (ref 11.7–15.7)
IMM GRANULOCYTES # BLD: 0 10E9/L (ref 0–0.4)
IMM GRANULOCYTES NFR BLD: 0.2 %
LYMPHOCYTES # BLD AUTO: 2.5 10E9/L (ref 0.8–5.3)
LYMPHOCYTES NFR BLD AUTO: 27.1 %
MCH RBC QN AUTO: 29.7 PG (ref 26.5–33)
MCHC RBC AUTO-ENTMCNC: 32.3 G/DL (ref 31.5–36.5)
MCV RBC AUTO: 92 FL (ref 78–100)
MONOCYTES # BLD AUTO: 0.6 10E9/L (ref 0–1.3)
MONOCYTES NFR BLD AUTO: 6.2 %
NEUTROPHILS # BLD AUTO: 5.9 10E9/L (ref 1.6–8.3)
NEUTROPHILS NFR BLD AUTO: 64.6 %
NRBC # BLD AUTO: 0 10*3/UL
NRBC BLD AUTO-RTO: 0 /100
PLATELET # BLD AUTO: 272 10E9/L (ref 150–450)
PLATELET # BLD EST: NORMAL 10*3/UL
POTASSIUM SERPL-SCNC: 4 MMOL/L (ref 3.4–5.3)
RBC # BLD AUTO: 4.78 10E12/L (ref 3.8–5.2)
RBC MORPH BLD: NORMAL
SODIUM SERPL-SCNC: 142 MMOL/L (ref 133–144)
TROPONIN I SERPL-MCNC: <0.015 UG/L (ref 0–0.04)
WBC # BLD AUTO: 9.1 10E9/L (ref 4–11)

## 2021-04-19 PROCEDURE — 71046 X-RAY EXAM CHEST 2 VIEWS: CPT

## 2021-04-19 PROCEDURE — 80048 BASIC METABOLIC PNL TOTAL CA: CPT | Performed by: EMERGENCY MEDICINE

## 2021-04-19 PROCEDURE — 85025 COMPLETE CBC W/AUTO DIFF WBC: CPT | Performed by: EMERGENCY MEDICINE

## 2021-04-19 PROCEDURE — 84484 ASSAY OF TROPONIN QUANT: CPT | Performed by: EMERGENCY MEDICINE

## 2021-04-19 PROCEDURE — 250N000013 HC RX MED GY IP 250 OP 250 PS 637: Performed by: FAMILY MEDICINE

## 2021-04-19 PROCEDURE — 250N000009 HC RX 250: Performed by: FAMILY MEDICINE

## 2021-04-19 PROCEDURE — 250N000011 HC RX IP 250 OP 636: Performed by: FAMILY MEDICINE

## 2021-04-19 PROCEDURE — 99285 EMERGENCY DEPT VISIT HI MDM: CPT | Mod: 25 | Performed by: FAMILY MEDICINE

## 2021-04-19 PROCEDURE — 96374 THER/PROPH/DIAG INJ IV PUSH: CPT | Performed by: FAMILY MEDICINE

## 2021-04-19 PROCEDURE — 93005 ELECTROCARDIOGRAM TRACING: CPT | Performed by: FAMILY MEDICINE

## 2021-04-19 PROCEDURE — 93010 ELECTROCARDIOGRAM REPORT: CPT | Performed by: FAMILY MEDICINE

## 2021-04-19 PROCEDURE — 85379 FIBRIN DEGRADATION QUANT: CPT | Performed by: FAMILY MEDICINE

## 2021-04-19 RX ORDER — KETOROLAC TROMETHAMINE 15 MG/ML
15 INJECTION, SOLUTION INTRAMUSCULAR; INTRAVENOUS ONCE
Status: COMPLETED | OUTPATIENT
Start: 2021-04-19 | End: 2021-04-19

## 2021-04-19 RX ADMIN — KETOROLAC TROMETHAMINE 15 MG: 15 INJECTION, SOLUTION INTRAMUSCULAR; INTRAVENOUS at 23:49

## 2021-04-19 RX ADMIN — LIDOCAINE HYDROCHLORIDE 30 ML: 20 SOLUTION ORAL; TOPICAL at 23:49

## 2021-04-20 RX ORDER — SUCRALFATE ORAL 1 G/10ML
1-2 SUSPENSION ORAL 4 TIMES DAILY PRN
Qty: 420 ML | Refills: 0 | Status: SHIPPED | OUTPATIENT
Start: 2021-04-20 | End: 2021-07-26

## 2021-04-20 NOTE — ED PROVIDER NOTES
"  HPI   The patient is a 55-year-old female presenting with waxing and waning chest pain since last evening.  She does not take prescription medication.  She no longer takes Celebrex since her low back surgery in January.  She continues to smoke.  She does not have a history of COPD.  She does have chronic reflux.    The patient recognized upper chest discomfort \"like I am wearing a bra that is two sizes too tight,\" since last night.  Her pain has been constant.  It waxes and wanes without obvious cause.  She feels like she cannot catch her breath.  She denies difficulty with exertion, however.  She is still able to perform usual tasks without difficulty.  No cough or congestion.  No headache.  No sore throat.  No fever.  No trauma or injury.  She denies upper back pain.  No neck, jaw, arm discomfort.  No radiating symptoms.  No abdominal pain.  No palpitations or lightheadedness.  No leg pain or swelling.  She does have left groin pain that seems to come on or worsen when her chest discomfort does.        Allergies:  Allergies   Allergen Reactions     Ciprofloxacin Anaphylaxis     Flagyl [Metronidazole] Anaphylaxis     Zofran [Ondansetron] Other (See Comments) and GI Disturbance     Causes bloating, moderately uncomfortable, abd pain       Augmentin Nausea and Vomiting     Denies hives at this time     Benadryl [Diphenhydramine] Other (See Comments)     Muscle spasms     Percocet [Oxycodone-Acetaminophen] Other (See Comments)     Goes nuts - nasty mean irritated, can take Dilaudid     Vicodin [Hydrocodone-Acetaminophen] Other (See Comments)     Anxiety-agitation     Problem List:    Patient Active Problem List    Diagnosis Date Noted     S/P partial colectomy 04/10/2018     Priority: Medium     Diverticulitis 01/28/2018     Priority: Medium     Psoriasis 06/20/2012     Priority: Medium     GERD (gastroesophageal reflux disease) 03/15/2011     Priority: Medium     Tobacco abuse 02/17/2011     Priority: Medium     " Health Care Home 01/27/2011     Priority: Medium     DX V65.8 REPLACED WITH [facility] (04/08/2013)       CARDIOVASCULAR SCREENING; LDL GOAL LESS THAN 160 10/31/2010     Priority: Medium     R Occipital Neuralgia 10/07/2009     Priority: Medium     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 On fiber, senna, and MOM. Advised to d/c MOM, start Miralax and titer to regular BM's. Pt has been seen by GI.        Brain syndrome, posttraumatic 03/06/2009     Priority: Medium     Work comp.  [facility] Dr[facility]hm[facility].  MRIs, EMG unremarkalbe, some foraminal stenosis on C5/C6  Sent her to physiatrist, trigger point injections didn't help.  After some neck traction, had intense unremitting HA, neck pain.  Off/on tingling in arms.  Pain clinic and/or spine surg for more eval probable next steps.    Has failed multiple rescue and preventive HA meds. On no narcotics        Past Medical History:    History reviewed. No pertinent past medical history.  Past Surgical History:    Past Surgical History:   Procedure Laterality Date     CHOLECYSTECTOMY, LAPOROSCOPIC  2/14/2000    Cholecystectomy, Laparoscopic     CYSTOSCOPY, INSERT LIGHTED STENT URETER(S) N/A 4/10/2018    Procedure: CYSTOSCOPY, INSERT LIGHTED STENT URETER(S);  Lighted Stent Placement,Laparoscopic Assisted Sigmoid Colectomy;  Surgeon: ERVIN Reina MD;  Location: [facility] OR     LAPAROSCOPIC ASSISTED COLECTOMY LEFT (DESCENDING) N/A 4/10/2018    Procedure: LAPAROSCOPIC ASSISTED COLECTOMY LEFT (DESCENDING);;  Surgeon: Hill Murray MD;  Location: [facility]     ORTHOPEDIC SURGERY  10/2010    Cut palm and reattched tendons and nerves in left hand forefinger.     SURGICAL HISTORY OF -   1/4/2000    Esophagogastroduodenoscopy with biopsy     TUBAL LIGATION       Family History:     Family History   Problem Relation Age of Onset     C.A.D. Father 50        50's     Diabetes Father      Diabetes Brother      C.A.D. Brother 42        quad bypass and MI     Diabetes Brother         2 brothers have DM     Cancer Brother 34        Melanoma     Aortic aneurysm Brother      Allergies Son         Meds     Allergies Daughter         Med     Cancer Mother      C.A.D. Brother 38        MI age 38     Social History:  Marital Status:   [2]  Social History     Tobacco Use     Smoking status: Current Every Day Smoker     Packs/day: 0.25     Years: 30.00     Pack years: 7.50     Types: Cigarettes     Last attempt to quit: 2020     Years since quittin.1     Smokeless tobacco: Never Used   Substance Use Topics     Alcohol use: No     Drug use: No      Medications:    sucralfate (CARAFATE) 1 GM/10ML suspension  albuterol (PROVENTIL) (2.5 MG/3ML) 0.083% neb solution  celecoxib (CELEBREX) 200 MG capsule  Cholecalciferol 125 MCG (5000 UT) TABS  clobetasol (TEMOVATE) 0.05 % external ointment  docusate sodium (COLACE) 100 MG capsule  famotidine (PEPCID) 20 MG tablet  ipratropium - albuterol 0.5 mg/2.5 mg/3 mL (DUONEB) 0.5-2.5 (3) MG/3ML neb solution  Melatonin 10 MG TABS tablet  metoprolol succinate ER (TOPROL-XL) 25 MG 24 hr tablet  Multiple Vitamin (MULTI-VITAMINS) TABS  omeprazole (PRILOSEC) 20 MG DR capsule  tiZANidine (ZANAFLEX) 2 MG tablet  traMADol (ULTRAM) 50 MG tablet  VENTOLIN  (90 Base) MCG/ACT inhaler      Review of Systems   All other systems reviewed and are negative.      PE   BP: (!) 163/91  Pulse: 99  Temp: 96.4  F (35.8  C)  Resp: 16  Weight: 70.3 kg (155 lb)  SpO2: 100 %  Physical Exam  Vitals signs reviewed.   Constitutional:       General: She is not in acute distress.     Appearance: She is well-developed.   HENT:      Head: Normocephalic and atraumatic.      Right Ear: External ear normal.      Left Ear: External ear normal.      Nose: Nose normal.      Mouth/Throat:       Mouth: Mucous membranes are moist.      Pharynx: Oropharynx is clear.   Eyes:      Extraocular Movements: Extraocular movements intact.      Conjunctiva/sclera: Conjunctivae normal.      Pupils: Pupils are equal, round, and reactive to light.   Neck:      Musculoskeletal: Normal range of motion.   Cardiovascular:      Rate and Rhythm: Normal rate and regular rhythm.      Heart sounds: Normal heart sounds.   Pulmonary:      Effort: Pulmonary effort is normal.      Breath sounds: Normal breath sounds.   Abdominal:      Palpations: Abdomen is soft.      Tenderness: There is no abdominal tenderness.   Musculoskeletal: Normal range of motion.      Right lower leg: She exhibits no tenderness. No edema.      Left lower leg: She exhibits no tenderness. No edema.   Skin:     General: Skin is warm and dry.   Neurological:      Mental Status: She is alert and oriented to person, place, and time.   Psychiatric:         Behavior: Behavior normal.         ED COURSE and MDM   2138.  The patient presents with waxing and waning chest discomfort since last evening.  She has high blood pressure.  She has left groin discomfort.  EKG is documented below.  D-dimer added.  Imaging will be ordered when results are back.    0025.  The patient has significant improvement of her pain with a GI cocktail.  Low concern for acute coronary obstruction.  Low concern for recent myocardial infarction.  Low concern for aortic pathology or pulmonary embolism.  Low concern for a pulmonary source of symptoms.  Carafate will be prescribed.  She has an appointment with her primary physician on Wednesday, 2 days from now.  She has an appointment for an upper endoscopy on Friday, 4 days from now.  Return for worsening.    EKG  (2127)   Interpretation performed by me.  Rate: 94     Rhythm: sinus     Axis: nl  Intervals: OH (12-2) 106, QRS (<12) 95, QTc (>5) 425  P wave: nl     QRS complex: nl  ST segment / T-wave: nl  Conclusion: nl    LABS  Labs Ordered and  Resulted from Time of ED Arrival Up to the Time of Departure from the ED   BASIC METABOLIC PANEL - Abnormal; Notable for the following components:       Result Value    Chloride 111 (*)     All other components within normal limits   CBC WITH PLATELETS DIFFERENTIAL   TROPONIN I   D DIMER QUANTITATIVE       IMAGING  Images reviewed by me.  Radiology report also reviewed.  XR Chest 2 Views   Final Result   IMPRESSION: No acute abnormality.          Procedures    Medications   ketorolac (TORADOL) injection 15 mg (15 mg Intravenous Given 4/19/21 2349)   lidocaine (XYLOCAINE) 2 % 15 mL, alum & mag hydroxide-simethicone (MAALOX) 15 mL GI Cocktail (30 mLs Oral Given 4/19/21 2349)         IMPRESSION       ICD-10-CM    1. Acute chest pain  R07.9             Medication List      Started    sucralfate 1 GM/10ML suspension  Commonly known as: Carafate  1-2 g, Oral, 4 TIMES DAILY PRN, TABLETS OK IF TOO EXPENSIVE                          Godfrey Lee MD  04/20/21 0026

## 2021-04-20 NOTE — ED NOTES
Patient states chest heaviness that started last night. Denies vomiting but has nausea. Pain started while watching TV. Pain in left groin as well.

## 2021-04-20 NOTE — ED TRIAGE NOTES
Intermittent chest pain, nausea, and hypertension since last night.  Patient denies shortness of breath, sweating. Patient stated that she normally has low blood pressure.

## 2021-04-20 NOTE — TELEPHONE ENCOUNTER
Pt is calling.    Used to take Celebrex. Was getting chest pain with Celebrex, so went off of it 3 months ago. Chest pain then went away until last night.  BP was 180/110 last night. Then 1 hour later was down to 150/90's. BP is all over the place.  She is complaining of headaches, but this is normal for her. No change in the pain but they last longer than usual. Blurry vision but is needing glasses and is overdue for an eye exam. Denies numbness or tingling.  Chest pain started last night as well.   Chest tightness with breathing only. Denies feeling short of breath. Heart feels like it is pounding harder. Chest tightness keeps getting worse per patient.  /70 up to 160/102 today.  Smoker.  Strong family history of heart disease and heart attacks. Chest keeps getting tighter and tighter. Has appointment for Wednesday.  I advised her that she needs to go to the ED NOW. Do not wait. She verbalized understanding.    Reason for Disposition    [1] Systolic BP  >= 160 OR Diastolic >= 100 AND [2] cardiac or neurologic symptoms (e.g., chest pain, difficulty breathing, unsteady gait, blurred vision)    Additional Information    Negative: Difficult to awaken or acting confused (e.g., disoriented, slurred speech)    Negative: Severe difficulty breathing (e.g., struggling for each breath, speaks in single words)    Negative: [1] Weakness of the face, arm or leg on one side of the body AND [2] new onset    Negative: [1] Numbness (i.e., loss of sensation) of the face, arm or leg on one side of the body AND [2] new onset    Negative: [1] Chest pain lasts > 5 minutes AND [2] history of heart disease  (i.e., heart attack, bypass surgery, angina, angioplasty, CHF)    Negative: [1] Chest pain AND [2] took nitrogylcerin AND [3] pain was not relieved    Negative: Sounds like a life-threatening emergency to the triager    Negative: Symptom is main concern  (e.g., headache, chest pain)    Negative: Low blood pressure is main  concern    Protocols used: HIGH BLOOD PRESSURE-A-    Elyssa Humphreys RN  [facility] Nurse Advisor  4/19/2021 at 7:44 PM

## 2021-04-20 NOTE — DISCHARGE INSTRUCTIONS
Return to the Emergency Room if the following occurs:     Severely worsened pain, fever >101, new trouble with breathing, or for any concern at anytime.    Or, follow-up with the following provider as we discussed:     Return to your primary doctor as scheduled.    Medications discussed:    Carafate as needed.    If you received pain-relieving or sedating medication during your time in the ER, avoid alcohol, driving automobiles, or working with machinery.  Also, a responsible adult must stay with you.        Call the Nurse Advice Line at [phone] or [phone] for any concern at anytime.

## 2021-04-28 ENCOUNTER — AMBULATORY - HEALTHEAST (OUTPATIENT)
Dept: LAB | Facility: CLINIC | Age: 56
End: 2021-04-28

## 2021-04-28 DIAGNOSIS — Z11.59 ENCOUNTER FOR SCREENING FOR OTHER VIRAL DISEASES: ICD-10-CM

## 2021-04-28 LAB
SARS-COV-2 PCR COMMENT: NORMAL
SARS-COV-2 RNA SPEC QL NAA+PROBE: NEGATIVE
SARS-COV-2 VIRUS SPECIMEN SOURCE: NORMAL

## 2021-04-29 ENCOUNTER — RECORDS - HEALTHEAST (OUTPATIENT)
Dept: ADMINISTRATIVE | Facility: OTHER | Age: 56
End: 2021-04-29

## 2021-04-29 ENCOUNTER — COMMUNICATION - HEALTHEAST (OUTPATIENT)
Dept: SCHEDULING | Facility: CLINIC | Age: 56
End: 2021-04-29

## 2021-04-29 ENCOUNTER — ANESTHESIA - HEALTHEAST (OUTPATIENT)
Dept: SURGERY | Facility: AMBULATORY SURGERY CENTER | Age: 56
End: 2021-04-29

## 2021-04-29 DIAGNOSIS — K21.9 GASTROESOPHAGEAL REFLUX DISEASE WITHOUT ESOPHAGITIS: ICD-10-CM

## 2021-04-29 ASSESSMENT — MIFFLIN-ST. JEOR: SCORE: 1214.58

## 2021-04-30 ENCOUNTER — RECORDS - HEALTHEAST (OUTPATIENT)
Dept: ADMINISTRATIVE | Facility: OTHER | Age: 56
End: 2021-04-30

## 2021-04-30 ENCOUNTER — SURGERY - HEALTHEAST (OUTPATIENT)
Dept: SURGERY | Facility: AMBULATORY SURGERY CENTER | Age: 56
End: 2021-04-30
Payer: COMMERCIAL

## 2021-04-30 ASSESSMENT — MIFFLIN-ST. JEOR: SCORE: 1214.58

## 2021-05-05 ENCOUNTER — HOSPITAL ENCOUNTER (OUTPATIENT)
Dept: CT IMAGING | Facility: CLINIC | Age: 56
Discharge: HOME | End: 2021-05-05
Admitting: SPECIALIST
Payer: COMMERCIAL

## 2021-05-05 DIAGNOSIS — M54.16 LUMBAR RADICULOPATHY: ICD-10-CM

## 2021-05-05 DIAGNOSIS — M79.605 LEFT LEG PAIN: ICD-10-CM

## 2021-05-05 PROCEDURE — 72131 CT LUMBAR SPINE W/O DYE: CPT

## 2021-05-07 ENCOUNTER — COMMUNICATION - HEALTHEAST (OUTPATIENT)
Dept: SURGERY | Facility: CLINIC | Age: 56
End: 2021-05-07

## 2021-06-04 VITALS — HEIGHT: 60 IN | WEIGHT: 155 LBS | BODY MASS INDEX: 30.43 KG/M2

## 2021-06-05 VITALS — BODY MASS INDEX: 30.27 KG/M2 | BODY MASS INDEX: 30.27 KG/M2 | WEIGHT: 155 LBS | HEIGHT: 60 IN

## 2021-06-08 ENCOUNTER — TRANSFERRED RECORDS (OUTPATIENT)
Dept: HEALTH INFORMATION MANAGEMENT | Facility: CLINIC | Age: 56
End: 2021-06-08

## 2021-06-08 NOTE — PROGRESS NOTES
ASSESSMENT: Selina Parikh is a 54 y.o. female  with a BMI of  30.02 with past medical history significant for hypertension, COPD, irritable bowel syndrome, GERD, peripheral neuropathy, diverticulitis who presents today for IN PERSON new patient evaluation of chronic neck pain with bilateral arm pain thought to be from cervical radiculitis.  Patient does have a history of a C4-C7 fusion with subsequent hardware removal and then a redo anterior/posterior cervical vertebral fusion, with the most recent surgery in 2018 through [facility]  The patient's MRI does show a cervical foraminal stenosis at multiple levels, though nothing is high-grade.  She may have an element of old carpal tunnel syndrome that is playing a role in her hand/arm symptoms as well.  She is also complaining of chronic bilateral low back pain with right anterior lateral thigh pain and left posterior lateral thigh and lower leg pain.  This is likely from right L3-4 foraminal stenosis and left L4-5 foraminal stenosis.  Her symptoms have been worsening in the last several months.  She is without any red flag symptoms at this time.    PSP:  Dr. Petit    MAKEDA: 64%  NDI: 73%  WHO-5: 7 (the patient is not interested in behavioral health services)    PLAN:  A shared decision making model was used.  The patient's values and choices were respected.  The following represents what was discussed and decided upon by the physician and the patient.      1.  DIAGNOSTIC TESTS:    -The patient's MRI of the cervical spine from Jaquelin 15 of 2020 performed through [facility] (uploaded into PACS) was personally reviewed today by the physician with the physician performing her own interpretation.  Patient does have an anterior cervical fusion from C4-C7.  There is mild bilateral C3-4 foraminal stenosis.  There is left greater than right C3-4 facet arthropathy.  Patient does have significant bilateral C4-5 foraminal stenosis) though the radiologist disagrees that  there is foraminal stenosis at this level) and bilateral C5-6 foraminal stenosis.  There are no significant findings at the C6-7 level.  There is bilateral C7-T1 foraminal stenosis with bilateral C7-T1 facet arthropathy.  Please see the radiology report for details.  -The images were shown to the patient and findings were explained using a spine model.  -The patient's MRI of the lumbar spine from Jaquelin 15 of 2020 performed through [facility] (uploaded into PACS) was personally reviewed today by the physician with the physician performing her own interpretation.  The patient does have disc degeneration seen throughout the lumbar spine, worse at the L4-5 level.  There is a left paracentral disc herniation at the L4-5 level which causes lateral recess stenosis and severe left L4-5 foraminal stenosis.  There is mild right L4-5 foraminal stenosis.  The patient does have significant right L3-4 foraminal stenosis.  There is mild facet arthropathy seen throughout the lower lumbar spine.  Mild to moderate central canal stenosis seen throughout the lower lumbar spine, slightly worse at the L3-4 and L4-5 levels.  The images were shown to the patient and findings were explained using a spine model.  -An EMG of the bilateral upper extremities is ordered today to look for return of her previously diagnosed carpal tunnel syndrome and/or cervical radiculopathy.  She does have a history of peripheral neuropathy diagnosed by neurology 6 to 7 years ago.  -The patient did sign a release of information so that her records from [facility] can be obtained, which will hopefully include her injection records.  She did sign a release for an injection facility that she thinks she had an injection at within the last year.  2.  PHYSICAL THERAPY: The patient tried the [facility] program in the fall 2019 but it significantly aggravated her pain.  She is open to re-trialing physical therapy with a traditional program for both the  neck and the low back.  An order was provided today to [facility] [place].  They are asked to work on both the neck and the low back with a home exercise program.  She is encouraged to do the exercises every day on a regular basis.  3.  MEDICATIONS:    -No changes to her medications today.  -She can continue to take the Celebrex 200 mg once a day as needed for pain.  -She can continue with the tizanidine 2 mg up to 3 times a day as needed for pain.  -She can continue with the tramadol as directed by her primary care physician.  She typically says that she takes that just at bedtime as needed.  -The patient reports that she has tried and failed gabapentin secondary to severe side effects (severe suicidal ideation).  4.  INTERVENTIONS: The patient is very interested in lumbar epidural steroid injections and she reports that these have provided significant relief in the past.  Explained that she needs to undergo physical therapy before these will be approved by her insurance company.  She verbalized understanding.  When she is completed at least 4 sessions of physical therapy (she is welcome to undergo more if she prefers prior to the injection) then a right L3-4 transforaminal epidural steroid injection and a left L4-5 transforaminal epidural injection can be ordered (to be performed on the same day).  The right L3-4 transforaminal epidural steroid injection is ordered because her pain is in the anterolateral right thigh distribution, stopping at the knee, which correlates with an L3 distribution.  The left L4-5 TFESI is ordered because she has pain going below the knee on the left leg, and the MRI shows severe left L4-5 foraminal stenosis.  -The patient is very interested in injections for her neck pain as well.  We will hold off on this to see what her EMG shows.  The EMG may help guide injection therapy for the neck.  If the EMG is negative, would likely recommend starting with a C7-T1  interlaminar epidural steroid injection given that she has bilateral arm symptoms.    Addendum on 6-24-20 at 12:43 pm:  Outside records review:  7-11-19:  L4-5 ILESI  5-23-18:  L4-5 ILESI  7-13-17:  L4-5 ILESI    -  Teto Verma (PA) note from 12-23-16:  Patient is 2 weeks s/p carpal tunnel release.  - Daisy MARTINEZ) note from 6-18-17: Patient seen for 7-day episode of low back pain.  Plan for oral prednisone, tizanidine, Norco.  Recommendation for physical therapy, ice, heat, over-the-counter NSAIDs.  Consideration for alternative treatment such as massage, yoga, acupuncture, chiropractor.  - Dr. De La Cruz note (Emergency room)  from 6-20-17: Patient presenting with neck and low back pain.  Given prescription for oral prednisone.  Currently taking tizanidine and Norco.  Has had trigger point injections in the lumbar area with IM morphine given at ER visit.  Recommendation was for physical therapy and/or chiropractor.  - Dr. Jahaira Goodwin note from 7-8-17: Patient recommended to have an MRI.  Take Norco and tizanidine sparingly.  Follow-up with Dr. Reggie Molina cervical spine 3-7-19:  Stable pedical screws and vertical connecting bars from C4-7.  No evidence of hardware complication.    End addendum on 6-24-20    Addendum on 6-26-20 at 17:47:  Ortho note from 12-13-16:  Patient underwent right carpal tunnel release (open)  Ortho note from 11-22-16:  Patient underwent a left carpal tunnel release  End addendum.      MRI lumbar spine:  7-10-17: There is mild edema of the left pedicles of L4 and L5 which may represent a stress reaction.  There is a right paracentral L2-3 disc herniation with mild right L2-3 foraminal stenosis.  There is moderate right and mild left L3-4 foraminal stenosis.  There is impingement of the right L3 nerve.  There is severe left L4-5 foraminal stenosis secondary to a left paracentral disc herniation.  MRI scan cervical spine 6-2-16: Postoperative changes at C4-5 and C5-6.  Normal  spinal cord signal.  At C5-6 there is disc degeneration with a bulge, but no central canal stenosis.  There is mild C5-6 foraminal stenosis.  There is severe right C6-7 foraminal stenosis.  There is mild bilateral C7-T1 foraminal stenosis.  Labs:  12-18-18:  WBC:  9.6, Hgb:  14, HCT 42.1, Plateletes:  413,  CRP:  0.76 (high)  5.  PATIENT EDUCATION:   -The patient does have an appointment with neurosurgery this afternoon.  However she stated that she wanted to cancel this, because she wants to manage her pain conservatively.  Dr. Petit explained that the neurosurgery appointment would be beneficial if she is wanting to have a second opinion regarding surgery, but otherwise conservative care can be managed through the spine center.  The patient then stated that she wanted to hold off on the neurosurgery appointment so the CSF staff was asked to cancel this appointment.   -Dr. Petit did encourage her to follow-up with her primary care physician regarding her leg swelling, the intermittent enlarged lymph nodes (submandibular), dizziness.  -All of her questions were answered to her satisfaction today.  She was in agreement with the treatment plan.  6.  FOLLOW-UP:   She can schedule first available with Dr. Short for the EMG.  Dr. Petit staff will call her with the results of the EMG and with Dr. Petit's recommendations for injections for the neck (this can be coordinated around her injection for the low back, will check her status with physical therapy).  If there are any questions/concerns or any significant worsening of pain prior to that time, the patient is asked to call the clinic via the nurse navigation line or via [employer].       SUBJECTIVE:  Selina Parikh  Is a 54 y.o. female who presents today for new patient evaluation of low back pain and neck pain.  Starting with the neck pain, she reports that she has had this for several years.  She did undergo a cervical fusion surgery in 2015 with subsequent  hardware removal and then subsequent anterior and posterior fusion, with the most recent surgery being done by [facility] in 2018.  She states that prior to surgery she is having quite a bit of pain in her hands, shoulders and with subsequent headaches.  She says that she felt like she had water running down the back of her head.  She was unable to sleep secondary to the pain.  Reports that after the first surgery she had some relief for short period of time.  However she developed problems with swallowing so than the surgeon went in and removed to the hardware.  However she reports that her symptoms became significantly worse so then she underwent an anterior and posterior fusion.  She reports that she really did not have much relief following the third surgery.  She reports that now she has headaches that are located in the occipital area and radiate up to the temples.  At times the headaches are behind the eyes.  She also has bilateral neck pain that radiates down to the tops of her shoulders.  She gets pain going down the right arm with numbness in the right arm.  She also has numbness in both hands.  She reports that the tingling and numbness seems to be worse at night.  She does feel like both of her hands are weak.  She does state that she has a history of carpal tunnel syndrome and had bilateral carpal tunnel releases in 2017.  She reports that her neck and arm symptoms seem to be worse anytime that she does activities where her arms are above her head.  If she flexes or extends her neck this will also aggravate her symptoms.  She does feel little bit better with ice, resting, and avoiding using her arms, particularly above shoulder height.    Patient also complains of midline low back pain that radiates out laterally to both sides.  She reports that sometimes the right side is worse than the left, but other times the left side is worse than the right.  She does get pain going down the anterior  lateral right thigh stopping at the knee.  She also has pain in the left leg, but this goes down the lateral aspect of the left thigh into the lateral aspect of the left lower leg going into the dorsal aspect of the foot.  She does have numbness and tingling and she feels like her legs are weak.  The left leg seems to be worse than the right.  She reports that she has fallen secondary to the weakness in the legs.  She does have a history of peripheral neuropathy diagnosed by neurology about 6 to 7 years ago.  Her back pain is worse with bending, sitting, standing.  She does feel little bit better with icing and she changes positions.  However it is hard to find a comfortable position.    Patient states that she did try to do the [facility] program in September 2019 but aggravated her symptoms so much that she had to stop.  She is currently managing her pain with taking Celebrex once a day.  Reports that she cannot function if she does not take her Celebrex.  She also takes tizanidine and tramadol as needed, mainly at bedtime.  Reports that these do help her sleep but otherwise do not help much with pain.  She states that she tried gabapentin in the past but it made her very suicidal so she cannot take this medication.  She states that she has had injections in the past which have seemed to help significantly with the pain and she is interested in repeating injections to help manage her pain at this time.    She states that her primary care provider referred her to neurosurgery but she is coming to this appointment today because she was told that she has to have this appointment in order to see the neurosurgeon.  She is interested in conservative care though, and is not really interested in surgery at this time.  If she does see a surgeon, she would like a second opinion as she states that her [place] spine surgeon wants to do more surgery/fusions on her.    Medications:  Reviewed and correct in the  chart.      Allergies: Reviewed and significant for Benadryl which causes hives, Percocet which causes a headache, Zofran which causes nausea/vomiting.  She also states that gabapentin causes suicidal ideation.  Ciprofloxacin and metronidazole both caused an anaphylactic reaction.    PMH:  Reviewed and significant for hypertension, COPD, irritable bowel syndrome, GERD, peripheral neuropathy, diverticulitis    PSH:  Reviewed and significant for bilateral carpal tunnel releases in 2017, 3 cervical spine surgeries, colon resection for diverticulitis, cholecystectomy, tubal ligation.    Family History:  Reviewed and significant for members, Marfan's disease, aneurysms, osteoarthritis.    Social History: The patient smokes 6 to 10 cigarettes/day.  She drinks alcohol about once per month.  She denies any illicit drug use.    ROS: Positive for weight gain, headaches related to neck pain, mild dysphagia (chronic since her cervical spine surgery), feet/leg swelling, shortness of breath/wheezing (related to her lung disease), intermittent enlarged lymph node, mild bowel incontinence, mild bladder incontinence, imbalance, falls, dizziness, insomnia, excessive tiredness, anxiety.   Specifically negative for bowel/bladder dysfunction, fevers,chills, appetite changes, unexplained weight loss.  Otherwise 13 systems reviewed are negative.  Please see the patient's intake questionnaire from today for details.      OBJECTIVE:  PHYSICAL EXAMINATION:    CONSTITUTIONAL:  Vital signs as above.  No acute distress.  The patient is well nourished and well groomed.  PSYCHIATRIC:  The patient is awake, alert, oriented to person, place, time and answering questions appropriately with clear speech.   HEENT:  Sclera are non-injected.  Extraocular muscles are intact. .  Moist oral mucosa.   SKIN:  Skin over the face, bilateral lower extremities, and posterior torso is clean, dry, intact without rashes.    GAIT:  Gait is non-antalgic.  The patient  is able to heel and toe walk without significant difficulty.    STANDING EXAMINATION: The patient does have tenderness over the lumbar paraspinal muscles from L3 down to S1, becoming more tender caudally.  He does report pain with lumbar flexion, lumbar extension, bilateral lumbar sidebending with more mild pain with bilateral trunk rotation.  She does report significant pain with bilateral lumbar facet loading (simultaneous extension rotation) with pain localizing to the ipsilateral side of rotation.  MUSCLE STRENGTH:  The patient has 5/5 strength for the bilateral hip flexors, knee flexors/extensors, ankle dorsiflexors/plantar flexors, great toe extensors, ankle evertors/invertors.  5/5 strength for the bilateral shoulder abductors, elbow flexors/extensors, wrist extensors, finger flexors/abductors.  NEUROLOGICAL: Trace right patellar reflex and unable to elicit a left patellar reflex.  Unable to elicit medial hamstring reflexes bilaterally.  1/4 Achilles reflexes bilaterally.  Equivocal Babinski's bilaterally.  No ankle clonus bilaterally. Sensation to light touch is intact in the bilateral L4, L5, and S1 dermatomes.  1/4 symmetric biceps, brachioradialis, and 2/4 triceps reflexes bilaterally.  Sensation to pinprick is mildly impaired in all of the digits of both upper extremities.  She reports a sensation to light touch is intact in all of the digits of both upper extremities.  Negative Pineda's bilaterally.    VASCULAR:  2/4 dorsalis pedis pulses bilaterally.  Bilateral lower extremities are warm.  There is no pitting edema of the bilateral lower extremities.  2/4 radial pulses bilaterally.  Warm upper limbs bilaterally.  Capillary refill in the upper extremities is less than 1 second.  ABDOMINAL:  Soft, non-distended, non-tender throughout all quadrants.  No pulsatile mass palpated in the left lower quadrant.  LYMPH NODES:  No palpable or tender inguinal lymph nodes.  No palpable or tender anterior/posterior  cervical, submandibular, or supraclavicular lymph nodes.   MUSCULOSKELETAL: Positive straight leg raising test on the left side for reproduction of left radicular leg symptoms.  Negative straight leg is a test on the right side.  Patient does have severely restricted internal rotation of the bilateral hips.  She does report pain with hip internal rotation bilaterally.  She has mild to moderate restriction of bilateral hip external rotation.  Fabere's test is positive on the right side for pain localizing to the right buttock and right SI joint.  Negative Edu's test on the left side.  She does have largely normal cervical flexion and cervical extension, but she does report pain with both these motions.  She has moderate to severe restriction of bilateral cervical sidebending and moderate restriction with bilateral cervical rotation.  She reports pain with all planes of motion.  Spurling's test is positive for reproduction of neck pain but no significant radicular arm symptoms bilaterally.  She does have tenderness over the cervical paraspinal muscles with tenderness and hypertonicity over the bilateral upper trapezius muscles, worse on the left side compared to the right.

## 2021-06-08 NOTE — PATIENT INSTRUCTIONS - HE
An EMG of your arms/hands is ordered today to look for return of carpal tunnel syndrome and/or any pinched nerves in your neck.  You can schedule this with Dr. Short at this clinic.  Dr. Petit's office will call you with the results and to discuss other treatment options after he had the EMG.  Please do not hesitate to contact the clinic at [phone] if you have any questions/concerns or any worsening of your pain prior to that time.  You are also welcome to contact Dr. Petit via [employer].    An order for physical therapy has been provided today.  Someone will call you to schedule physical therapy.  It will be very important for you to do your physical therapy exercises on a regular basis to decrease your pain and prevent future flares of pain.  You will need to go to at least 4 sessions of physical therapy before your insurance will allow you to have a lumbar epidural steroid injection.  Please call the nurse line at [phone] once you have gone to 4 sessions of physical therapy (or once you would like to proceed with the epidural injection).  The injection will then be ordered and you can schedule at that time.

## 2021-06-08 NOTE — TELEPHONE ENCOUNTER
Left message asking patient to cb. Referral received by fax fro [facility] . Chart started and will be in Yesenia's office.

## 2021-06-09 NOTE — PROGRESS NOTES
Patient presents at the request of Dr. Natalia Elizabeth for a bilateral upper extremity EMG.  She has neck pain bilateral shoulder and arm pain with hand numbness and tingling all digits involved.  Right equal to left.  This waxes and wanes in intensity.  History of bilateral carpal tunnel release.    EMG/NCS  results: Please see scanned full report    Comment NCS: Normal study  1.  Normal nerve conduction studies bilateral upper extremities.    Comment EMG: Normal study  1.  Normal needle EMG bilateral upper extremities.    Interpretation: Normal study    1. There is no electrodiagnostic evidence of cervical radiculopathy, brachial plexopathy, or focal neuropathy in the bilateral upper extremities.  Specifically, there is no electrodiagnostic evidence of median neuropathy at the wrist in the bilateral upper extremities.    The testing was completed in its entirety by the physician.       It was our pleasure caring for your patient today, if there any questions or concerns please do not hesitate to contact us.

## 2021-06-09 NOTE — TELEPHONE ENCOUNTER
Nurse navigation to call the patient and let her know that the physician reviewed her EMG which was normal/negative.  If she wishes to pursue a neck epidural steroid injection at this time, a C7-T1 ILESI can be ordered.

## 2021-06-09 NOTE — TELEPHONE ENCOUNTER
No response from patient. Second call placed to give results. Information given to patient. She reports her left arm continues to fall asleep when she lays on her right side and visa versa. She would like to have the injection. Order placed in Epic for C7-T1 ILESI. Injection requirements reviewed with patient. Stated understanding. Transferred to scheduling to make appointment.      She reports she is going to be having her 4th session of PT next week. Wondering if she can have injections done for both areas, low back and neck. Explained one area is focused on at a time.

## 2021-06-09 NOTE — PATIENT INSTRUCTIONS - HE
Follow-up visit in 2 weeks with Dr. Petit for right L3-4, left L4-5 epidural steroid injections.       DISCHARGE INSTRUCTIONS    During office hours (8:00 a.m.- 4:00 p.m.) questions or concerns may be answered  by calling Spine Center Navigation Nurses at  [phone].  Messages received after hours will be returned the following business day.      In the case of an emergency, please dial 911 or seek assistance at the nearest Emergency Room/Urgent Care facility.     All Patients:    ? You may experience an increase in your symptoms for the first 2 days (It may take anywhere between 2 days- 2 weeks for the steroid to have maximum effect).    ? You may use ice on the injection site, as frequently as 20 minutes each hour if needed.    ? You may take your pain medicine.    ? You may continue taking your regular medication after your injection. If you have had a Medial Branch Block you may resume pain medication once your pain diary is completed.    ? You may shower. No swimming, tub bath or hot tub for 48 hours.  You may remove your bandaid/bandage as soon as you are home.    ? You may resume light activities, as tolerated.    ? Resume your usual diet as tolerated.    ? It is strongly advised that you do not drive for 1-3 hours post injection.    ? If you have had oral sedation:  Do not drive for 8 hours post injection.      ? If you have had IV sedation:  Do not drive for 24 hours post injection.  Do not operate hazardous machinery or make important personal/business decisions for 24 hours.      POSSIBLE STEROID SIDE EFFECTS (If steroid/cortisone was used for your procedure)    -If you experience these symptoms, it should only last for a short period      Swelling of the legs                Skin redness (flushing)       Mouth (oral) irritation     Blood sugar (glucose) levels              Sweats                      Mood changes    Headache    Sleeplessness         POSSIBLE PROCEDURE SIDE EFFECTS  -Call the Spine  Center if you are concerned    Increased Pain             Increased numbness/tingling        Nausea/Vomiting            Bruising/bleeding at site        Redness or swelling                                                Difficulty walking        Weakness             Fever greater than 100.5    *In the event of a severe headache after an epidural steroid injection that is relieved by lying down, please call the [facility] to speak with a clinical staff member*

## 2021-06-09 NOTE — PATIENT INSTRUCTIONS - HE
Thank you for choosing the [facility] for your EMG testing.    The ordering provider will receive your final EMG results within the next few days.  Please follow up with your provider for the results and further treatment recommendations.

## 2021-06-10 NOTE — PATIENT INSTRUCTIONS - HE
DISCHARGE INSTRUCTIONS    During office hours (8:00 a.m.- 4:00 p.m.) questions or concerns may be answered  by calling Spine Center Navigation Nurses at  [phone].  Messages received after hours will be returned the following business day.      In the case of an emergency, please dial 911 or seek assistance at the nearest Emergency Room/Urgent Care facility.     All Patients:    ? You may experience an increase in your symptoms for the first 2 days (It may take anywhere between 2 days- 2 weeks for the steroid to have maximum effect).    ? You may use ice on the injection site, as frequently as 20 minutes each hour if needed.    ? You may take your pain medicine.    ? You may continue taking your regular medication after your injection. If you have had a Medial Branch Block you may resume pain medication once your pain diary is completed.    ? You may shower. No swimming, tub bath or hot tub for 48 hours.  You may remove your bandaid/bandage as soon as you are home.    ? You may resume light activities, as tolerated.    ? Resume your usual diet as tolerated.    ? It is strongly advised that you do not drive for 1-3 hours post injection.    ? If you have had oral sedation:  Do not drive for 8 hours post injection.      ? If you have had IV sedation:  Do not drive for 24 hours post injection.  Do not operate hazardous machinery or make important personal/business decisions for 24 hours.      POSSIBLE STEROID SIDE EFFECTS (If steroid/cortisone was used for your procedure)    -If you experience these symptoms, it should only last for a short period      Swelling of the legs                Skin redness (flushing)       Mouth (oral) irritation     Blood sugar (glucose) levels              Sweats                      Mood changes    Headache    Sleeplessness         POSSIBLE PROCEDURE SIDE EFFECTS  -Call the Spine Center if you are concerned    Increased Pain             Increased numbness/tingling         Nausea/Vomiting            Bruising/bleeding at site        Redness or swelling                                                Difficulty walking        Weakness             Fever greater than 100.5    *In the event of a severe headache after an epidural steroid injection that is relieved by lying down, please call the [facility] to speak with a clinical staff member*

## 2021-06-11 NOTE — TELEPHONE ENCOUNTER
Phone call to patient to talk to her about her appointment tomorrow, 9/17/20, with Dr[facility]

## 2021-06-11 NOTE — PROGRESS NOTES
"Selina Parikh is a 54 y.o. female who is being evaluated via a billable video visit.      The patient has been notified of following:     \"This video visit will be conducted via a call between you and your physician/provider. We have found that certain health care needs can be provided without the need for an in-person physical exam.  This service lets us provide the care you need with a video conversation.  If a prescription is necessary we can send it directly to your pharmacy.  If lab work is needed we can place an order for that and you can then stop by our lab to have the test done at a later time.    Video visits are billed at different rates depending on your insurance coverage. Please reach out to your insurance provider with any questions.    If during the course of the call the physician/provider feels a video visit is not appropriate, you will not be charged for this service.\"    Patient has given verbal consent to a Video visit? Yes    Patient would like to receive their AVS by:  She declines her AVS    Patient would like the video invitation sent by: text to [phone]    Will anyone else be joining your video visit? No        Video Start Time: 1:16    Assessment:   Selina Parikh is a 54 y.o. y.o. female with past medical history significant for hypertension, COPD, irritable bowel syndrome, GERD, peripheral neuropathy, diverticulitis who presents today for follow-up regarding chronic bilateral neck pain with bilateral arm pain thought to be from cervical radiculitis from multilevel cervical foraminal stenosis.  She has a history of a C4-7 fusion with subsequent hardware removal and then redo anterior/posterior cervical fusion (most recent surgery in 2018 through [facility] spine).  She is status post a C7-T1 interlaminar epidural steroid injection on 24th of 2020 which has provided relief though she still has some left neck pain and right arm pain.    She is also following up regarding chronic " bilateral low back pain with right anterior lateral thigh pain and left posterior lateral thigh and lower leg pain.  This is thought to be from a right L3-4 foraminal stenosis and left L4-5 foraminal stenosis respectively.  She is status post a right L3-4 TFESI and a left L4-5 TF MALIK on August 7 of 2020.  At this time, she is reporting approximately 80% relief on the left side and minimal relief on the right side.  Her MRI does show lateral recess stenosis at the L4-5 level, and the right L4-5 level could actually be the cause of her current symptoms instead of the foraminal stenosis on the right side seen at the L3-4 level.  She is without any red flag symptoms.    PSP:  Dr. Petit       Plan:    A shared decision making plan was used.  The patient's values and choices were respected.  The following represents what was discussed and decided upon by the physician and the patient.      1.  DIAGNOSTIC TESTS: The patient's MRI of the cervical spine from Jaquelin 15 of 2020 performed through [facility] (uploaded into PACS) was personally reviewed today by the physician with the physician performing her own interpretation.  Patient does have an anterior cervical fusion from C4-7.  There is mild bilateral C3-4 foraminal stenosis.  There is left greater than right C3-4 facet arthropathy.  There is significant bilateral C4-5 foraminal stenosis, though the radiologist disagrees that there is foraminal stenosis at this level.  There is bilateral C5-6 foraminal stenosis.  No significant findings at the C6-7 level.  There is bilateral C7-T1 foraminal stenosis with bilateral C7-T1 facet arthropathy.  -Patient's MRI of the lumbar spine from Jaquelin 15 of 2020 performed through [facility] (uploaded into PACS) was personally reviewed today by the physician with the physician performing her own interpretation.  Patient does have disc degeneration seen throughout the lumbar spine, worse at the L4-5 level.  There is left paracentral disc  herniation at the L4-5 level which causes lateral recess stenosis and left L4-5 foraminal stenosis.  There is mild right L4-5 foraminal stenosis.  Patient does have significant right L3-4 foraminal stenosis.  There is mild facet arthropathy seen throughout the lower lumbar spine.  There is also mild to moderate central canal stenosis seen throughout the lower lumbar spine slightly worse at the L3-4 and L4-5 levels.  -Patient is EMG report from July 9 of 2020 was reviewed by the physician and is summarized as follows: This is a normal EMG.  There is no electrodiagnostic evidence for cervical radiculopathy, brachial plexopathy, focal mononeuropathy.  2.  PHYSICAL THERAPY: The patient was previously given an order to the [facility] in [place] for both the neck and the low back pain.    He states that she has been going to physical therapy and is diligent in doing her home exercise program on a regular basis.  -She had previously tried the [facility] physical therapy program in the fall 2019, but she stated that it significantly aggravated her pain.  3.  MEDICATIONS: No new medications are prescribed today.  -She can continue to take the Celebrex 200 mg once a day as needed for pain.  -She can continue with the tizanidine 2 mg up to 3 times a day as needed for pain.  -She can continue with the tramadol as directed by her primary care physician (she typically just takes it at bedtime as needed).  -She has tried and failed gabapentin in the past secondary to severe side effects (suicidal ideation).  4.  INTERVENTIONS:   The patient was offered a right L4-5 transforaminal epidural steroid injection to see if she did have relief of the right leg pain, given that she did not have much relief of the right leg pain with a right L3-4 transforaminal epidural steroid injection.  -In the future because she can have a left L4-5 TFESI if symptoms return in the left low back and left leg.  -She can have a repeat C7-T1  interlaminar epidural steroid injection in the future.  We will need to watch the total amount of steroid that the patient receives given that she is having injections in the low back and the neck.  5.  PATIENT EDUCATION:    -Encouraged the patient to continue doing her home exercise program from physical therapy.  Explained that doing the exercises on a regular basis will hopefully help to the injection relief last as long as possible.  -All of her questions were answered to her satisfaction today.  She is in agreement with the treatment plan.  6.  FOLLOW-UP: The patient can follow-up in 2 weeks for the right L4-5 transforaminal epidural steroid injection.  She does have [employer] insurance, and they frequently will not allow epidural steroid injections within 6 weeks of each other.  Given that this is a different level, Dr. Petit will see if they will make an exception.  If there are any questions/concerns or any significant worsening of pain prior to that time, the patient is asked to call the clinic via the nurse navigation line or via [employer].     Subjective:    Selina Parikh is a 54 y.o. female who presents today for follow-up regarding chronic bilateral low back pain with bilateral radicular/sciatic type leg symptoms.  She is status post a right L3-4 and left L4-5 transforaminal epidural steroid injection.  At this time she is reporting approximately 80% relief of her pain on the left side.  Reports that she has had minimal relief of the pain on the right side.  She still has pain in the right low back that radiates around her right hip and goes into the right anterior thigh wrapping down the medial side of the thigh and stopping about the knee.  She does get some cramping in the hamstring and abductor muscle of the right thigh.  She says that it rarely goes below the knee.  She does have weakness in the right leg.  She also feels like her right hand is weak.  She continues to have right hand  weakness and some left-sided neck pain despite the neck epidural injection, though overall she does feel that this provided relief.  She has been going to physical therapy and the physical therapist is working on the knots in her neck.  She feels that this is going well and she is diligent in doing a home exercise program on a regular basis.  She continues to manage her pain with Celebrex 200 mg once a day, and tizanidine 2 mg as needed at bedtime.    She overall rates her pain today at a 4 out of 10.  At worst is an 8 or 9 out of 10.  At best it is a 2 out of 10.  Her pain is worse in the back with bending.  With arm movement, she does get headaches.  The neck pain she describes as a burning type sensation and she feels like there is something rubbing in her neck.  She feels better with ice and moving slowly, as quick movements can aggravate her pain.  She is getting some spasm down the right leg that is new, but she otherwise denies any new symptoms.    Past medical history is reviewed and is unchanged for any new medical diagnoses in the interim.      Family history is reviewed and is unchanged in the interim.      Review of Systems:  Positive for weakness in the right leg and hand.  Urge incontinence of the bladder, headaches related to the neck pain, difficulty swallowing (this is chronic and at baseline) and difficulty with right hand skills secondary to perceived weakness..  Pertinent negatives include no fevers, chills, unexplained weight loss, bowel incontinence, bladder incontinence, trips, stumbles, falls.  All others reviewed and are negative.     Objective:  CONSTITUTIONAL:  Vital signs as above.  No acute distress.  The patient is well nourished and well groomed.    PSYCHIATRIC:  The patient is awake, alert, oriented to person, place and time.  The patient is answering questions appropriately with clear speech.  Normal affect.  SKIN:  Skin over the face, posterior torso, bilateral upper and lower  extremities is clean, dry, intact without rashes.  MUSCULOSKELETAL:  Gait is non-antalgic.    She can squat down and raise back up independently.  She does not have any pain with lumbar flexion, but with returning to neutral from the flexed position she does have mild pain.  No significant pain with lumbar extension.     Video-Visit Details    Type of service:  Video Visit    Video End Time (time video stopped): 1:26 pm (total time 10 minutes)  Originating Location (pt. Location): Home    Distant Location (provider location):  [facility]     Platform used for Video Visit: Chantelle Elizabeth DO

## 2021-06-11 NOTE — PATIENT INSTRUCTIONS - HE
Follow-up virtual visit with Dr. Petit to discuss injection outcome and determine care plan going forward.       DISCHARGE INSTRUCTIONS    During office hours (8:00 a.m.- 4:00 p.m.) questions or concerns may be answered  by calling Spine Center Navigation Nurses at  [phone].  Messages received after hours will be returned the following business day.      In the case of an emergency, please dial 911 or seek assistance at the nearest Emergency Room/Urgent Care facility.     All Patients:    ? You may experience an increase in your symptoms for the first 2 days (It may take anywhere between 2 days- 2 weeks for the steroid to have maximum effect).    ? You may use ice on the injection site, as frequently as 20 minutes each hour if needed.    ? You may take your pain medicine.    ? You may continue taking your regular medication after your injection. If you have had a Medial Branch Block you may resume pain medication once your pain diary is completed.    ? You may shower. No swimming, tub bath or hot tub for 48 hours.  You may remove your bandaid/bandage as soon as you are home.    ? You may resume light activities, as tolerated.    ? Resume your usual diet as tolerated.    ? It is strongly advised that you do not drive for 1-3 hours post injection.    ? If you have had oral sedation:  Do not drive for 8 hours post injection.      ? If you have had IV sedation:  Do not drive for 24 hours post injection.  Do not operate hazardous machinery or make important personal/business decisions for 24 hours.      POSSIBLE STEROID SIDE EFFECTS (If steroid/cortisone was used for your procedure)    -If you experience these symptoms, it should only last for a short period      Swelling of the legs                Skin redness (flushing)       Mouth (oral) irritation     Blood sugar (glucose) levels              Sweats                      Mood changes    Headache    Sleeplessness         POSSIBLE PROCEDURE SIDE EFFECTS  -Call  the Spine Center if you are concerned    Increased Pain             Increased numbness/tingling        Nausea/Vomiting            Bruising/bleeding at site        Redness or swelling                                                Difficulty walking        Weakness             Fever greater than 100.5    *In the event of a severe headache after an epidural steroid injection that is relieved by lying down, please call the [facility] to speak with a clinical staff member*

## 2021-06-11 NOTE — PROGRESS NOTES
Assessment:   Selina Parikh is a 54 y.o. y.o. female with past medical history significant for hypertension, COPD, irritable bowel syndrome, GERD, peripheral neuropathy, diverticulitis who presents today for follow-up regarding worsening right-sided low back pain with right leg pain.  The patient reports that this is become very severe recently.  She is concerned that this is potentially coming from her kidney, but based on her exam and her symptoms, this seems to be consistent with radicular pain.  Her MRI does show a lateral recess stenosis at the L4-5 level in addition to foraminal stenosis at the right L3-4 level.  She continues to note relief of her left-sided neck pain, but is having return and worsening of her right-sided neck pain with pain and weakness going down the right arm and concentrating in the right thumb.  This is likely from right C5-6 foraminal stenosis.  She is without any red flag symptoms at this time.    PSP:  Dr. Petit       Plan:     A shared decision making plan was used.  The patient's values and choices were respected.  The following represents what was discussed and decided upon by the physician and the patient.      1.  DIAGNOSTIC TESTS: The patient's MRI of the lumbar spine from Jaquelin 15-20, 2020 performed through [facility] was personally reviewed today by the physician with physician performing her own interpretation.  There is disc degeneration seen throughout the lumbar spine, worse at the L4-5 level.  There is a left paracentral disc herniation at the L4-5 level which causes bilateral lateral recess stenosis.  There is mild right L4-5 foraminal stenosis with severe right L3-4 foraminal stenosis.  There is mild facet arthropathy seen throughout the lower lumbar spine.  There is mild to moderate central canal stenosis seen throughout the lower lumbar spine, slightly worse at the L3-4 and L4-5 levels.  -The patient's MRI of the cervical spine from Jaquelin 15, 2020 performed through  [facility] was personally reviewed today by the physician with the physician performing her own interpretation.  There is an anterior cervical fusion from C4-7.  There is bilateral C5-6 foraminal stenosis.  There is bilateral C7-T1 foraminal stenosis.  There is bilateral C3-4 foraminal stenosis, but this appears to be mild.  There are facet degenerative changes seen throughout the cervical spine.  Discussed the images with the patient and explained that the pain is most likely coming from the foraminal stenosis at the C5-6 level.  2.  PHYSICAL THERAPY: Patient has completed physical therapy (8 sessions).  She is encouraged to continue doing her home exercise program on a regular basis.  Further physical therapy is ordered at this time.  3.  MEDICATIONS: No changes to her medications at this time.    - The patient can continue to take Celebrex 200 mg once a day as needed for pain.  -She can continue to take tizanidine 2 mg up to 3 times a day as needed for pain.  -Can continue to take tramadol as directed by her primary care physician.  -She has tried and failed gabapentin in the past secondary to suicidal ideation side effect.  4.  INTERVENTIONS: The patient is scheduled for a right L4-5 transforaminal epidural steroid injection on September 22.  She is encouraged to follow through with this appointment as this will likely help with the right low back pain and right leg pain.  -Did discuss with the patient if the right neck pain is worsening, right C5-6 transforaminal epidural steroid injection can be performed.  Dr. Petit did explain that she will have had 4 epidural steroid injections since July 2020 if she does move forward with this injection.  She will need to try to wait at least 3 months before having anothersteroid injection anywhere in her body.  5.  PATIENT EDUCATION:    -The patient was asking for long-term disability form to be filled out.  It was unclear if this was for Social Security disability or for  her former employer.  She states that she is no longer working at her current job.  Dr. Petit explained that she could likely work in a sedentary job.  The patient explained that she is not able to work a sedentary job because she has to be able to sit down.  Dr. Petit explained that a sedentary job does allow for sitting.  She then stated that she needs to be able to change positions.  Dr. Petit explained that she can work with work restrictions that would allow her to change positions as needed for pain.  The patient stated she is unable to work.  Dr. Petit stated that the patient is able to work a sedentary job and that any paperwork she fills out will reflect this.  The patient then stated that she would have her surgeon fill out the disability paperwork.  -Dr. Petit discussed that her pain does not seem to be coming from the kidney, but she is welcome to see her primary care physician for further work-up for this if she is concerned.  Given that she does not have any blood in the urine or pain with urination, it seems less likely that this is coming from a kidney problem, and is more likely the lumbar radiculitis.  -All of her questions were answered regarding her treatment plan.  6.  FOLLOW-UP: Patient has an injection appointment scheduled for September 22 of 2020.  She can have a virtual follow-up 2 weeks after this injection to check her status.  We will consider proceeding with a right C5-6 transforaminal epidural steroid injection at that time if needed.  If there are any questions/concerns or any significant worsening of pain prior to that time, the patient is asked to call the clinic via the nurse navigation line or via [employer].     Subjective:     Selina Parikh is a 54 y.o. female who presents today for follow-up regarding worsening right-sided low back pain with radicular/sciatica type leg pain.  Patient is concerned that this is potentially her kidney as she states that it seems to be in  the flank area.  She rates her pain today at a 3 out of 10.  She describes it as a sharp pain.  Her back pain is worse with bending, lifting, twisting, walking on slopes, going up and down stairs, moving her arms a lot.  Turning her head will tend to aggravate the neck pain and arm pain.  She does feel better in general with ice, heat, lying down, sitting, getting up and moving around.  She is not having any new symptoms at this time, just some worsening of the right low back pain and right leg pain.  She continues to have neck pain, mainly on the right side with pain and weakness going down into the right arm and hand, concentrating in the right thumb.  She does have numbness and tingling in the right upper extremity.  She does feel like the left side of her neck continues to benefit from the previous C7-T1 interlaminar epidural steroid injection.  She has completed 8 sessions of physical therapy and is diligent in doing her home exercise program.  She continues to manage her pain with Tylenol, tizanidine, Celebrex.  She is interested in what the next steps are.  She is also wanting to have disability paperwork filled out today.    Past medical history is reviewed and is unchanged for any new medical diagnoses in the interim.      Family history is reviewed and is unchanged in the interim.      Review of Systems:  Positive for urge incontinence of the bladder, numbness, tingling, weakness, headaches, pain that makes it difficult to sleep at night, trips, stumbles, difficulty swallowing, difficulty with hand skills..  Pertinent negatives include no fevers, chills, unexplained weight loss, bowel incontinence.  She specifically denies any hematuria or dysuria.   All others reviewed and are negative.     Objective:   CONSTITUTIONAL:  Vital signs as above.  No acute distress.  The patient is well nourished and well groomed.    PSYCHIATRIC:  The patient is awake, alert, oriented to person, place and time.  The patient is  answering questions appropriately with clear speech.  Normal affect.  SKIN:  Skin over the face, posterior torso, bilateral upper and lower extremities is clean, dry, intact without rashes.  MUSCULOSKELETAL:  Gait is antalgic and she appears to be in pain with walking.  The patient is able to heel and toe walk but does appear to have pain, more pain with heel walking and toe walking.  Mild tenderness over the right lower lumbar paraspinal muscles.      The patient has 5/5 strength for the bilateral hip flexors, knee flexors/extensors, ankle dorsiflexors/plantar flexors, ankle evertors/invertors.    NEUROLOGICAL: 2/4 patellar, medial hamstring, achilles reflexes which are symmetric bilaterally.  No ankle clonus bilaterally.  Sensation to light touch is intact in the bilateral L4, L5, and S1 dermatomes.

## 2021-06-11 NOTE — PATIENT INSTRUCTIONS - HE
Please follow-up as scheduled for your injection on September 22.  You have a video visit with Dr. Petit 2 weeks after that to ensure that your back and leg pain are feeling better.  At that time if you wish to move forward with the right C5-6 transforaminal epidural steroid injection for your neck and arm pain, this can be ordered.  We do need to be careful with the total number of steroid injections that you have.  If we do move forward with the neck epidural injection on the right side, you will need to try to wait at least 3 months before having another epidural steroid injection.

## 2021-06-11 NOTE — PATIENT INSTRUCTIONS - HE
A right L4-5 transforaminal epidural steroid injection has been ordered today.  Please schedule this injection at least 2 weeks from now to allow time for insurance prior authorization.  On the day of your injection, you cannot be sick or taking antibiotics.  If you become sick and are prescribed, please call the clinic so your injection can be rescheduled for once you have completed your antibiotics.  You will need to bring a  with you for your injection.   If you have any questions or concerns prior to your injection, please do not hesitate to call the nurse navigation line at [phone] or contact Dr. Petit through [employer].

## 2021-06-12 NOTE — PATIENT INSTRUCTIONS - HE
Bilateral L3, L4, L5 medial branch/dorsal rami blocks have been ordered today.  Please note these are diagnostic injections will not provide any long-lasting relief.  Depending on your response, you may be offered a cortisone injection.    Please schedule this injection at least 1 week  from now to allow time for insurance prior authorization.  On the day of your injection, you cannot be sick or taking antibiotics.  If you become sick and are prescribed, please call the clinic so your injection can be rescheduled for once you have completed your antibiotics.  You will need to bring a  with you for your injection.   If you have any questions or concerns prior to your injection, please do not hesitate to call the nurse navigation line at [phone] or contact Dr. Petit through [facility].

## 2021-06-12 NOTE — PROGRESS NOTES
"Selina Parikh is a 55 y.o. female who is being evaluated via a billable video visit.      The patient has been notified of following:     \"This video visit will be conducted via a call between you and your physician/provider. We have found that certain health care needs can be provided without the need for an in-person physical exam.  This service lets us provide the care you need with a video conversation.  If a prescription is necessary we can send it directly to your pharmacy.  If lab work is needed we can place an order for that and you can then stop by our lab to have the test done at a later time.    Video visits are billed at different rates depending on your insurance coverage. Please reach out to your insurance provider with any questions.    If during the course of the call the physician/provider feels a video visit is not appropriate, you will not be charged for this service.\"    Patient has given verbal consent to a Video visit? Yes    Patient would like to receive their AVS by [employer]    Patient would like the video invitation sent by: text to [phone]    Will anyone else be joining your video visit? No        Video Start Time: 11:11            Assessment:   Selina Parikh is a 55 y.o. y.o. female with past medical history significant for hypertension, COPD, irritable bowel syndrome, GERD, peripheral neuropathy, diverticulitis who presents today for follow-up regarding chronic bilateral low back pain with recurrent left radicular/sciatic type leg pain..  At this point the patient is status post a right L3-4 and a left L4-5 TFESI on August 7 of 2020.  She is most recently status post a right L4-5 TFESI in September 22 of 2020.  At this time, she is noting 60% improvement of her radicular leg symptoms, but she continues to have pain across her low back that radiates into the iliac crest areas bilaterally, slightly worse on the right side compared to the left.  Pain may be secondary to lumbar facet " joint syndrome given her lumbar facet arthropathy.  Sacroiliac joint pain is also in the differential diagnosis.      She has had return of her neck pain and right radicular arm symptoms in addition to her headaches that are thought to be cervicogenic.  She did have relief of the symptoms following a a C7-T1 interlaminar epidural steroid injection on July 24 of 2020.  She would be interested in repeating this injection.    PSP:  Dr. Petit       Plan:     A shared decision making plan was used.  The patient's values and choices were respected.  The following represents what was discussed and decided upon by the physician and the patient.      1.  DIAGNOSTIC TESTS: The patient's MRI of the lumbar spine from Jaquelin 15 of 2020 performed through [email or web address] was personally reviewed today by the physician with the physician performing her own interpretation.  There is disc degeneration seen throughout the lumbar spine, worse at the L4-5 level.  There is a left L4-5 disc herniation causing bilateral lateral recess stenosis.  There is mild right L4-5 foraminal stenosis with severe right L3-4 foraminal stenosis.  There is facet arthropathy seen throughout the lower lumbar spine.  There is mild to moderate central canal stenosis seen throughout the lumbar spine, slightly worse at the L3-4 and L4-5 levels.  -The patient's MRI of the cervical spine from Jaquelin 15 of 2020 performed through [email or web address] was personally reviewed today by the physician with the physician performing her own interpretation.  There is an anterior cervical fusion from C4-7.  There is bilateral C5-6 foraminal stenosis.  There is bilateral C7-T1 foraminal stenosis.  There is mild bilateral C3-4 foraminal stenosis.  There are facet degenerative changes seen throughout the cervical spine.  -Would recommend diagnostic injections of the L3, L4, L5 medial branches to see if her pain is lumbar facet joint mediated.  2.  PHYSICAL THERAPY: Patient has completed 8 sessions  of physical therapy in the summer/fall 2020.  She is encouraged to continue her home exercise program on a regular basis.  She currently states that she does them daily between 1-3 times per day.  -Of note she has tried a TENS unit at home for her low back and it does not seem to provide much relief.  3.  MEDICATIONS:    -Can continue with the Celebrex 200 mg once a day as needed for pain.  -She can continue with the tizanidine 2 mg up to 3 times a day as needed for pain.  -She can continue to take tramadol as directed by her primary care physician.  -She has tried and failed gabapentin in the past secondary to suicidal ideation side effects.  4.  INTERVENTIONS: Discussed bilateral L3, L4, L5 medial branch blocks to see if this would help with her pain.  The patient would like to proceed with this so an order was provided.  If she has relief with this injection, then bilateral L4-5 and L5-S1 facet joint injections could be performed.  If she fails to have relief, Dr. Petit would recommend bilateral SI joint injections.  -After her treatment is rendered for her low back, she would be interested in a repeat C7-T1 interlaminar epidural steroid injection.  Ideally this should be performed on or after October 24 of 2020, so that it is at least 3 months after her previous C7-T1 interlaminar epidural steroid injection.  5.  PATIENT EDUCATION:    -Patient asked if she would be a candidate for spinal cord stimulator.  Dr. Petit explained that a spinal cord stimulator is only indicated for patients who have failed to have relief following a lumbar fusion surgery.  Given that she has not had any surgery in her low back, she is not currently a candidate for this.  6.  FOLLOW-UP: Patient follow-up in approximately 1 week for the bilateral L3, L4, L5 medial branch blocks.  If there are any questions/concerns or any significant worsening of pain prior to that time, the patient is asked to call the clinic via the nurse  navigation line or via [employer].     Subjective:     Selina Parikh is a 55 y.o. female who presents today for follow-up regarding chronic bilateral low back pain and right greater than left radicular/sciatic type leg symptoms.  Patient did have significant relief of her left radicular leg symptoms with a left L4-5 transforaminal epidural steroid injection.  She did not have much relief with a right L3-4 TFESI, so a right L4-5 TFESI was performed.  She reports that she has had about 60% relief of her leg pain at this time.  She continues to have low back pain.  She reports that this is 40% improved overall, but she still feels that it is quite bothersome to her.  Goes across the low back at approximately the level of the SI joints.  She does get radiation into the iliac crests bilaterally, slightly worse on the right side compared to the left.  She rates her pain today at a 4 out of 10.  At worst it is a 6 out of 10.  At best it is a 2 out of 10.  Her pain is worse with twisting, standing for long periods of time, especially when she tries to stand up straight will aggravate her pain.  If she walks, she can get some return of the leg pain, but overall this is better.  She does feel better with icing and laying down.  She has completed physical therapy at this time and she is doing the exercises between 1-3 times per day.  She does state that she tried a TENS unit but it does not seem to provide much relief.  She is currently taking Celebrex 200 mg once a day, Tylenol as needed, tramadol 50 mg very rarely (averages about 2 tablets/week) as well as tizanidine 4 mg at bedtime.  She had a doctor tell her that she may be a candidate for a spinal cord stimulator.  She is interested in knowing if this would be a possibility.    Past medical history is reviewed and is unchanged for any new medical diagnoses in the interim.      Family history is reviewed and is unchanged in the interim.      Review of Systems:  Positive for  numbness and tingling in the right upper extremity, weakness of the low back, stress incontinence of the bladder, headaches, pain that makes it difficult to sleep at night, difficulty swallowing, difficulty with hand skills..  Pertinent negatives include no fevers, chills, unexplained weight loss, bowel incontinence, bladder incontinence, trips, stumbles, falls.  All others reviewed and are negative.     Objective:   CONSTITUTIONAL:  Vital signs as above.  No acute distress.  The patient is well nourished and well groomed.    PSYCHIATRIC:  The patient is awake, alert, oriented to person, place and time.  The patient is answering questions appropriately with clear speech.  Normal affect.  SKIN:  Skin over the face, posterior torso, bilateral upper and lower extremities is clean, dry, intact without rashes.  MUSCULOSKELETAL: Patient can squat down and raise back up easily, though she does steady herself with her right hand.  She has some mild discomfort with lumbar flexion.  With lumbar extension, she does get a shooting pain going down her legs.  She does have some mild discomfort with left lumbar sidebending, but no significant pain with right lumbar sidebending.  She does get sharp shooting pain with twisting of the trunk bilaterally.  She points to pain over the iliac crest bilaterally and it does radiate around into the groin area, more on the right side compared to the left, and she points this with her hands.  She denies any pain with bilateral lumbar facet loading (simultaneous extension rotation).      Video-Visit Details    Type of service:  Video Visit    Video End Time (time video stopped): 11:21 am (total time 10 minutes)  Originating Location (pt. Location): Home    Distant Location (provider location):  [facility]     Platform used for Video Visit: [employer] (Patient reported not being able to login to [facility])      Natalia Elizabeth DO

## 2021-06-15 NOTE — TELEPHONE ENCOUNTER
Pt has never been seen by Dr. Moreno. I left her a message asking her to call me back to I can help her with her questions.

## 2021-06-15 NOTE — TELEPHONE ENCOUNTER
Patient called she saw Dr. Moreno back in Sept at the [facility].  She said he ordered a procedure maybe an upper GI,  but she didn't have it done she had back surgery.  She would will like to get that scheduled now.    Please call Selina and advise. Thanks!

## 2021-06-16 NOTE — TELEPHONE ENCOUNTER
I called to speak with the pt. A referral has been placed by Dr. Chowdhury in ENT. The pt was contacted to schedule a consult with Dr. Moreno.

## 2021-06-16 NOTE — PROGRESS NOTES
Selina Parikh is 55 y.o.  female who presents for a billable video visit today.    How would you like to obtain your AVS? E-Mail (Inform patient AVS not encrypted with this option).  If dropped from the video visit, the video invitation should be resent by: Text to cell phone: [phone]  Will anyone else be joining your video visit? No      Video Start Time: 1448    Provider Notes: 55-year-old female with a longstanding history of esophageal burning and reflux who has had several EGDs in the past.  Her last when she states was in 2000 and she was noted to have a hiatal hernia with esophageal changes.  She has not had any follow-up since then.  She states she has a difficult time laying flat at night which forces her to sleep propped up.  She does experience constant nighttime coughing.  She also experiences waterbrash as well as esophageal burning.  She states that her symptoms are mitigated with Prilosec and bread at night which seems to alleviate some of her symptoms.  She has no history of melenic stool.  She does also state that specific foods such as tomato-based foods will increase her symptoms as well.  Her HRQOL is  53 indicating fairly severe reflux.  After our discussion the plan will be for initial work-up including an esophagram as well as an EGD.  I discussed the procedure of which she is very familiar with and has consented to proceed.  We will schedule her for both of these as the initial work-up.      It is my professional judgment, in conjunction with the current COVID-19 pandemic recommended restrictions on elective procedures, that this procedure can safely be deferred until a later date which may be beyond the typical treatment period/window. I have engaged in a discussion with the patient (and family) about the need for this deferral, explained why the procedure can be safely deferred, the potential risks associated with the deferral, and answered all of the patients questions. The patient  has also been advised that if there is a change in their condition/symptoms they should notify me, my clinic/facility, and/or seek appropriate medical care. That patient has also been informed that the decision to defer this procedure can be re-evaluated if there is a change in their condition/symptoms. I have also told the patient they have the right to seek a second opinion on the decision to defer their procedure.    Jean Marie Moreno DO [facility]  [phone]        Video-Visit Details    Type of service:  Video Visit    Video End Time (time video stopped): 1500  Originating Location (pt. Location): Home    Distant Location (provider location):  [facility] SUR[facility]ATRICS [facility] [place]     Platform used for Video Visit: Chantelle

## 2021-06-16 NOTE — TELEPHONE ENCOUNTER
YANELIS to schedule upper endoscopy with Dr. Moreno. Provided direct line to call back.    Lashae COREAS  Surgery Scheduler  [facility]  Direct line: [phone]   Main Line: [phone]  Direct Fax: [phone]

## 2021-06-17 NOTE — ANESTHESIA PREPROCEDURE EVALUATION
Anesthesia Evaluation      Patient summary reviewed   No history of anesthetic complications     Airway   Mallampati: II   Pulmonary - normal exam   (+) a smoker                         Cardiovascular - normal exam  Exercise tolerance: > or = 4 METS  (+) hypertension well controlled, ,     Rhythm: regular  Rate: normal,         Neuro/Psych - negative ROS     Endo/Other - negative ROS   (+) obesity,      GI/Hepatic/Renal    (+) hiatal hernia, GERD well controlled,        Other findings: Results for MARIAMA MURPHY (MRN [id number]) as of 4/30/2021 10:40    4/28/2021 11:36  SARS-CoV-2 Virus Specimen Source: Nasopharyngeal  SARS-CoV-2 PCR Result: NEGATIVE        Dental - normal exam                        Anesthesia Plan  Planned anesthetic: MAC  The patient understands and accepts the risks of MAC anesthesia including (but not limited to) nausea, vomiting, dizziness, and chipped teeth. I also discussed the possibility of conversion to GAETT/GALMA anesthesia which include hoarse voice, sore throat, and pinched lip or chipped teeth.  Versed/fent  propofol ggt  Decadron/zofran    ASA 2     Anesthetic plan and risks discussed with: patient    Post-op plan: routine recovery

## 2021-06-17 NOTE — TELEPHONE ENCOUNTER
Patient called stating she had ESOPHAGOGASTRODUODENOSCOPY done on 04/30 and has not received any results yet. Are you available to help and call back.    Thank you

## 2021-06-17 NOTE — ANESTHESIA POSTPROCEDURE EVALUATION
Patient: Selina Parikh  Procedure(s):  ESOPHAGOGASTRODUODENOSCOPY (EGD)  Anesthesia type: MAC    Patient location: Phase II Recovery  Last vitals:   Vitals Value Taken Time   /79 04/30/21 1300   Temp 36.1  C (97  F) 04/30/21 1220   Pulse 87 04/30/21 1300   Resp 16 04/30/21 1300   SpO2 95 % 04/30/21 1300     Post vital signs: stable  Level of consciousness: awake and responds to simple questions  Post-anesthesia pain: pain controlled  Post-anesthesia nausea and vomiting: no  Pulmonary: unassisted, return to baseline  Cardiovascular: stable and blood pressure at baseline  Hydration: adequate  Anesthetic events: no    QCDR Measures:  ASA# 11 - Katherin-op Cardiac Arrest: ASA11B - Patient did NOT experience unanticipated cardiac arrest  ASA# 12 - Katherin-op Mortality Rate: ASA12B - Patient did NOT die  ASA# 13 - PACU Re-Intubation Rate: ASA13B - Patient did NOT require a new airway mgmt  ASA# 10 - Composite Anes Safety: ASA10A - No serious adverse event    Additional Notes:

## 2021-06-17 NOTE — ANESTHESIA CARE TRANSFER NOTE
Last vitals:   Vitals:    04/30/21 1023   BP: 128/75   Pulse: 87   Resp: 16   Temp: 36.5  C (97.7  F)   SpO2: 96%     Patient's level of consciousness is drowsy  Spontaneous respirations: yes  Maintains airway independently: yes  Dentition unchanged: yes  Oropharynx: oropharynx clear of all foreign objects    QCDR Measures:  ASA# 20 - Surgical Safety Checklist: WHO surgical safety checklist completed prior to induction    PQRS# 430 - Adult PONV Prevention: 4558F - Pt received => 2 anti-emetic agents (different classes) preop & intraop  ASA# 8 - Peds PONV Prevention: NA - Not pediatric patient, not GA or 2 or more risk factors NOT present  PQRS# 424 - Katherin-op Temp Management: 4559F - At least one body temp DOCUMENTED => 35.5C or 95.9F within required timeframe  PQRS# 426 - PACU Transfer Protocol: - Transfer of care checklist used  ASA# 14 - Acute Post-op Pain: ASA14B - Patient did NOT experience pain >= 7 out of 10

## 2021-06-17 NOTE — TELEPHONE ENCOUNTER
----- Message from Souleymane Moreno DO sent at 5/10/2021  9:07 AM CDT -----  Can we get her set up for manometry in [place] with JACQUELYN.   Thanks  danielle

## 2021-07-03 NOTE — ADDENDUM NOTE
Addendum Note by Natalia Gomez DO at 6/17/2020 11:00 AM     Author: Natalia Gomez DO Service: -- Author Type: Physician    Filed: 6/26/2020  5:51 PM Date of Service: 6/17/2020 11:00 AM Status: Signed    : Natalia Gomez DO (Physician)    Encounter addended by: Natalia Gomez DO on: 6/26/2020  5:51   PM      Actions taken: Clinical Note Signed

## 2021-07-03 NOTE — ADDENDUM NOTE
Addendum Note by Natalia Gomez DO at 6/17/2020 11:00 AM     Author: Natalia Gomez DO Service: -- Author Type: Physician    Filed: 6/24/2020  1:55 PM Date of Service: 6/17/2020 11:00 AM Status: Signed    : Natalia Gomez DO (Physician)    Encounter addended by: Natalia Gomez DO on: 6/24/2020  1:55   PM      Actions taken: Clinical Note Signed

## 2021-07-03 NOTE — ADDENDUM NOTE
Addendum Note by Natalia Gomez DO at 9/17/2020  9:00 AM     Author: Natalia Gomez DO Service: -- Author Type: Physician    Filed: 9/17/2020 12:50 PM Date of Service: 9/17/2020  9:00 AM Status: Signed    : Natalia Gomez DO (Physician)    Encounter addended by: Natalia Gomez DO on: 9/17/2020 12:50   PM      Actions taken: Level of Service modified

## 2021-07-19 DIAGNOSIS — L40.9 PSORIASIS: Chronic | ICD-10-CM

## 2021-07-22 RX ORDER — CLOBETASOL PROPIONATE 0.5 MG/G
OINTMENT TOPICAL
Qty: 60 G | Refills: 0 | Status: SHIPPED | OUTPATIENT
Start: 2021-07-22 | End: 2021-07-26

## 2021-07-24 ENCOUNTER — HEALTH MAINTENANCE LETTER (OUTPATIENT)
Age: 56
End: 2021-07-24

## 2021-07-26 ENCOUNTER — OFFICE VISIT (OUTPATIENT)
Dept: SURGERY | Facility: CLINIC | Age: 56
End: 2021-07-26
Payer: COMMERCIAL

## 2021-07-26 DIAGNOSIS — K21.9 GASTROESOPHAGEAL REFLUX DISEASE WITHOUT ESOPHAGITIS: Primary | ICD-10-CM

## 2021-07-26 PROCEDURE — 99024 POSTOP FOLLOW-UP VISIT: CPT | Performed by: SURGERY

## 2021-07-26 NOTE — LETTER
7/26/2021         RE: Selina Parikh  [address]  [place] [address]        Dear Colleague,    Thank you for referring your patient, Selina Parikh, to the [facility] SURGERY CL[facility]IATRICS [facility]ARE [place]. Please see a copy of my visit note below.    Had a manometry done.  Looks relatively normal DCI is okay.  However she still has this upper esophageal dysphagia right as she eats which may be a swallow problems.  She has had 3 cervical surgeries which is likely the cause of her issue going to send her to speech eval for rehab she can follow-up with me afterwards she also needs Nexium or Prilosec renewed because she takes 3 times a day and has significant reflux we will discuss fixing her hiatal hernia in the future once she is addressed her dysphagia     HPI: Selina Parikh is here for follow up to discuss her manometry results.  She continues to have upper oral and pharyngeal dysphagia which she states begins immediately after eating.  She also continues to have intermittent reflux.  She continues to take Nexium and would like to have these refilled.    Allergies, Medications, Social History, Past Medical History and Past Surgical History were reviewed and are noted in the chart.    There were no vitals taken for this visit.  There is no height or weight on file to calculate BMI.      EXAM:   GENERAL: Appears well      Incision/Surgical Site 04/10/18 Abdomen (Active)       Assessment/Plan: Selina Parikh continues to have chronic oropharyngeal dysphagia immediately after eating.  She has had 3 cervical spinal surgeries which are likely contributing to the neuromuscular dysfunction.  I will refer her to speech pathology for review swallow evaluation to see if there can be any therapeutic rehabilitation program she can undergo to assist with her dysphagia.    Her manometry was relatively normal with no abnormal esophageal dysmotility.  She does have a hiatal hernia and reflux but I do not  think these are contributing to her main complaints which are the upper esophageal and oropharyngeal problems with swallowing.  Once she has been evaluated by speech pathology she will follow-up with me for ongoing evaluation and possible surgery for her reflux.    Souleymane Moreno, DO [facility] Department of Surgery      Again, thank you for allowing me to participate in the care of your patient.        Sincerely,        Souleymane Moreno, DO

## 2021-07-26 NOTE — PROGRESS NOTES
HPI: Selina Parikh is here for follow up to discuss her manometry results.  She continues to have upper oral and pharyngeal dysphagia which she states begins immediately after eating.  She also continues to have intermittent reflux.  She continues to take Nexium and would like to have these refilled.    Allergies, Medications, Social History, Past Medical History and Past Surgical History were reviewed and are noted in the chart.    There were no vitals taken for this visit.  There is no height or weight on file to calculate BMI.      EXAM:   GENERAL: Appears well      Incision/Surgical Site 04/10/18 Abdomen (Active)       Assessment/Plan: Selina Parikh continues to have chronic oropharyngeal dysphagia immediately after eating.  She has had 3 cervical spinal surgeries which are likely contributing to the neuromuscular dysfunction.  I will refer her to speech pathology for review swallow evaluation to see if there can be any therapeutic rehabilitation program she can undergo to assist with her dysphagia.    Her manometry was relatively normal with no abnormal esophageal dysmotility.  She does have a hiatal hernia and reflux but I do not think these are contributing to her main complaints which are the upper esophageal and oropharyngeal problems with swallowing.  Once she has been evaluated by speech pathology she will follow-up with me for ongoing evaluation and possible surgery for her reflux.    Souleymane Moreno,  [facility] Department of Surgery

## 2021-08-03 ENCOUNTER — HOSPITAL ENCOUNTER (OUTPATIENT)
Dept: SPEECH THERAPY | Facility: CLINIC | Age: 56
Setting detail: THERAPIES SERIES
End: 2021-08-03
Attending: SURGERY
Payer: COMMERCIAL

## 2021-08-03 ENCOUNTER — TELEPHONE (OUTPATIENT)
Dept: SPEECH THERAPY | Facility: CLINIC | Age: 56
End: 2021-08-03

## 2021-08-03 DIAGNOSIS — R13.11 ORAL PHASE DYSPHAGIA: Primary | ICD-10-CM

## 2021-08-03 DIAGNOSIS — K21.9 GASTROESOPHAGEAL REFLUX DISEASE WITHOUT ESOPHAGITIS: ICD-10-CM

## 2021-08-03 PROCEDURE — 92610 EVALUATE SWALLOWING FUNCTION: CPT | Mod: GN | Performed by: SPEECH-LANGUAGE PATHOLOGIST

## 2021-08-03 PROCEDURE — 92526 ORAL FUNCTION THERAPY: CPT | Mod: GN | Performed by: SPEECH-LANGUAGE PATHOLOGIST

## 2021-08-03 NOTE — TELEPHONE ENCOUNTER
Hi Dr. Moreno,    I saw Selina today for a clinical swallow evaluation.  We are getting her started on some oropharyngeal exercises to see if that will help but would like to do a video swallow to assess for aspiration and if there are any strategies or positioning that may be helpful.  Please sign order if in agreement.    Thank you,  Cari German MA, CCC/SLP  [facility] Speech Pathology

## 2021-08-04 NOTE — PROGRESS NOTES
Clinical Swallow Evaluation  08/03/21    General Information   Type Of Visit Initial   Start Of Care Date 08/03/21   Referring Physician Souleymane Moreno, DO   Orders Evaluate And Treat   Medical Diagnosis Dysphagia   Onset Of Illness/injury Or Date Of Surgery 07/26/21  (order date)   Precautions/limitations No Known Precautions/limitations   Hearing WFL for exam   Pertinent History of Current Problem/OT: Additional Occupational Profile Info Pt is referred for a swallow evaluation due to She continued oral and pharyngeal dysphagia which she states begins immediately after eating.  She has had work-up of the esophagus: Her manometry was relatively normal with no abnormal esophageal dysmotility.  She does have a hiatal hernia and reflux but physician feel there is a oropharyngeal component.  Pt states this started in 2015 after cervical surgery (reports having 3 surgeries).  During an esophagram at [facility] it was noted that patient penetrated and aspirated thin liquids.  Pt reported symptoms at this time are  occasional coughing when drinking thin liquids and feeling of residue in her throat after the swallow.   Respiratory Status Room air   Prior Level Of Function Comment eats a regular consistency diet   Living Environment [place]   Patient/family Goals To be able to swallow without difficulty.   Pain Assessment   Pain Reported No   Fall Risk Screen   Fall screen completed by SLP   Have you fallen 2 or more times in the past year? No   Have you fallen and had an injury in the past year? No   Is patient a fall risk? No   Abuse Screen (yes response referral indicated)   Feels Unsafe at Home or Work/School no   Feels Threatened by Someone no   Does Anyone Try to Keep You From Having Contact with Others or Doing Things Outside Your Home? no   Physical Signs of Abuse Present no   Clinical Swallow Evaluation   Oral Musculature generally intact   Structural Abnormalities none present   Dentition present  and adequate   Mucosal Quality good   Mandibular Strength and Mobility intact   Oral Labial Strength and Mobility WFL   Lingual Strength and Mobility WFL   Velar Elevation intact   Buccal Strength and Mobility intact   Laryngeal Function Cough;Throat clear;Swallow;Voicing initiated   Oral Musculature Comments WNL   Additional Documentation Yes   Clinical Swallow Eval: Thin Liquid Texture Trial   Mode of Presentation, Thin Liquids cup;self-fed   Volume of Liquid or Food Presented 4 oz   Oral Phase of Swallow WFL   Pharyngeal Phase of Swallow intact   Diagnostic Statement WNL   Clinical Swallow Eval: Mildly Thick Liquids    Mode of Presentation cup;self-fed   Volume Presented sips x3   Oral Phase WFL   Pharyngeal Phase intact   Diagnostic Statement WNL   Clinical Swallow Eval: Extremely Thick Liquids    Mode of Presentation spoon;self-fed   Volume Presented pudding- 3 oz   Oral Phase WFL   Pharyngeal Phase intact   Diagnostic Statement WNL   Clinical Swallow Eval: Regular (Solid)   Mode of Presentation self-fed   Volume Presented muffin   Oral Phase WFL   Pharyngeal Phase intact   Diagnostic Statement WFL.  Feeling of residue and alternated water with solid to help clear.   Swallow Compensations   Swallow Compensations Alternate viscosity of consistencies   Results   (pt states chin tuck makes it worse)   Educational Assessment   Barriers to Learning No barriers   Preferred Learning Style Listening   Esophageal Phase of Swallow   Patient reports or presents with symptoms of esophageal dysphagia Yes   General Therapy Interventions   Planned Therapy Interventions Dysphagia Treatment   Dysphagia treatment Oropharyngeal exercise training;Compensatory strategies for swallowing   Intervention Comments to be determined by video swallow study   Swallow Eval: Clinical Impressions   Skilled Criteria for Therapy Intervention Skilled criteria met.  Treatment indicated.   Treatment Diagnosis pharyngeal dysphagia   Diet texture  recommendations Regular diet   Recommended Feeding/Eating Techniques alternate between small bites and sips of food/liquid   Rehab Potential fair, will monitor progress closely   Predicted Duration of Therapy Intervention (days/wks) 1x/2 wks x 6 weeks   Anticipated Discharge Disposition home   Risks and Benefits of Treatment have been explained. Yes   Patient, family and/or staff in agreement with Plan of Care Yes   Clinical Impression Comments Pt presents with a long history s/p cervical surgery with dysphagia.  Her chief complaints are: feels food sticking in pharynx, coughs with thin liquids about 3x/day.  Pt had previous esophagram at [facility] noted penetration and aspiration with thin liquids April 2021.  Recommendations: initiate pharyngeal exercises with home program to see if effective.  Video swallow requested from  MD to better assess pharyngeal swallow, aspiration risk, and test swallow strategies that would be beneficial   Swallow Goals   SLP Swallow Goals 1;2;3   Swallow Goal 1   Goal Description Pt will complete video swallow study to asses for aspiration and appropriate compensatory strategies.   Target Date 08/25/21   Swallow Goal 2   Goal Identifier pharyngeal ex   Goal Description Pt will perform 5 pharygeal ex to promote clearing of pharynx with the swallow 90% of the time.   Target Date 09/02/21   Swallow Goal 3   Goal Identifier compensatory swallow strategies   Goal Description Pt willl utilize compensatory strategies as determined by video swallow with 90% acc.   Target Date 09/18/21   Total Session Time   SLP Eval: oral/pharyngeal swallow function, clinical minutes ([id number]) 25   Total Evaluation Time 25 eval 20 tx

## 2021-08-06 DIAGNOSIS — R13.11 ORAL PHASE DYSPHAGIA: Primary | ICD-10-CM

## 2021-08-11 ENCOUNTER — TELEPHONE (OUTPATIENT)
Dept: SURGERY | Facility: CLINIC | Age: 56
End: 2021-08-11

## 2021-08-11 DIAGNOSIS — R13.11 ORAL PHASE DYSPHAGIA: Primary | ICD-10-CM

## 2021-08-11 DIAGNOSIS — R13.12 OROPHARYNGEAL DYSPHAGIA: ICD-10-CM

## 2021-08-11 NOTE — TELEPHONE ENCOUNTER
Speech Therapy order for Video Swallow Study needs to be singed by Physician. I tried co-singing order in Dr. Moreno's absence but they will not accept or schedule pt until order is singed by physician.

## 2021-08-16 ENCOUNTER — TELEPHONE (OUTPATIENT)
Dept: PHARMACY | Facility: CLINIC | Age: 56
End: 2021-08-16

## 2021-08-16 NOTE — TELEPHONE ENCOUNTER
This patient is due for MTM follow-up. I called the patient to schedule an appointment. Patient states she's on vacation right now and will call the clinic back to schedule.    Fatimah AgD, [facility]  Medication Therapy Management Pharmacist  Pager: [phone]

## 2021-08-23 ENCOUNTER — HOSPITAL ENCOUNTER (OUTPATIENT)
Dept: SPEECH THERAPY | Facility: HOSPITAL | Age: 56
Setting detail: THERAPIES SERIES
End: 2021-08-23
Attending: SURGERY
Payer: COMMERCIAL

## 2021-08-23 ENCOUNTER — HOSPITAL ENCOUNTER (OUTPATIENT)
Dept: RADIOLOGY | Facility: HOSPITAL | Age: 56
Discharge: HOME | End: 2021-08-23
Attending: SURGERY | Admitting: SURGERY
Payer: COMMERCIAL

## 2021-08-23 DIAGNOSIS — R13.11 ORAL PHASE DYSPHAGIA: ICD-10-CM

## 2021-08-23 PROCEDURE — 74230 X-RAY XM SWLNG FUNCJ C+: CPT

## 2021-08-23 PROCEDURE — 92611 MOTION FLUOROSCOPY/SWALLOW: CPT | Mod: GN

## 2021-08-23 RX ORDER — BARIUM SULFATE 400 MG/ML
SUSPENSION ORAL ONCE
Status: COMPLETED | OUTPATIENT
Start: 2021-08-23 | End: 2021-08-23

## 2021-08-23 RX ADMIN — BARIUM SULFATE: 400 SUSPENSION ORAL at 11:05

## 2021-08-23 NOTE — PROGRESS NOTES
"OP Video Swallow Study     08/23/21 1100   General Information   Type Of Visit Initial   Start Of Care Date 08/23/21   Referring Physician Yesenia Torres DO   Orders Evaluate And Treat   Medical Diagnosis Dysphagia   Onset Of Illness/injury Or Date Of Surgery   (2015 following C3-C6 spinal fusion with hardware)   Pertinent History of Current Problem/OT: Additional Occupational Profile Info Initial swallowing difficulty problem occurred following her c3-6 spinal fusion in 2015. She states she had a \"string\" on her throat(assume it was part of sutures) that she pulled and she immediately felt a tightness in her throat. She said that after that she has had swallowing difficulty. She had a revision of her surgery in 2016 and another cervical surgery in 2018. She had an esophagram in April 2021 with the following resuts: PHARYNGOGRAM: Slight reflex delay with complete inversion of the epiglottis. This results in pour over past the epiglottis. This results in episodes of laryngeal penetration and aspiration during the exam. Cough reflex is noted at the cords. No cervical esophageal stricture. IMPRESSION: 1.  Swallow reflex delay resulting in laryngeal penetration and aspiration with thin liquid barium. Patient has cough reflex at the cords. Consider dedicated swallow study with speech pathology. 2.  No cervical esophageal stricture. 3.  Mild esophageal dysmotility.4.  Gastroesophageal reflux to the level of the upper thoracic esophagus. 5.  Small sliding-type hiatal hernia. Currently pt describes swallowing difficulty of globus sensation in throat with solids and liquids and it makes her cough. She states she coughs on saliva and when she laughs.    Respiratory Status Room air   Prior Level Of Function Comment She does avoid certain foods, like meat and harder textures and that she takes small bites and sips to help compensate.    Patient/family Goals To be able to swallow without difficulty.   VFSS Eval: Radiology "   Radiologist Dr. Fahrner   Views Taken left lateral   Physical Location of Procedure [facility]   VFSS Eval: Thin Liquid Texture Trial   Mode of Presentation, Thin Liquid cup;self-fed   Order of Presentation approximately 4 oz   Preparatory Phase WFL   Oral Phase, Thin Liquid WFL   Pharyngeal Phase, Thin Liquid Delayed swallow reflex   Rosenbek's Penetration Aspiration Scale: Thin Liquid Trial Results 1 - no aspiration, contrast does not enter airway   Diagnostic Statement swallow triggered past in supraglottic space consistently   VFSS Eval: Mildly Thick Liquids    Mode of Presentation cup;self-fed   Order of Presentation approximately 2 oz   Preparatory Phase WFL   Oral Phase WFL   Pharyngeal Phase Delayed swallow reflex   Rosenbek's Penetration Aspiration Scale 1 - no aspiration, contrast does not enter airway   Diagnostic Statement swallow triggered past in supraglottic space consistently   VFSS Eval: Moderately Thick Liquids    Mode of Presentation spoon;fed by clinician   Order of Presentation 2 presentations   Preparatory Phase WFL   Oral Phase WFL   Pharyngeal Phase Delayed swallow reflex   Rosenbek's Penetration Aspiration Scale 1 - no aspiration, contrast does not enter airway   Diagnostic Statement swallow triggered past in supraglottic space consistently   VFSS Eval: Puree Solid Texture Trial   Mode of Presentation, Puree spoon   Order of Presentation 1 presentation   Preparatory Phase WFL   Oral Phase, Puree WFL   Pharyngeal Phase, Puree Delayed swallow reflex   Rosenbek's Penetration Aspiration Scale: Puree Food Trial Results 1 - no aspiration, contrast does not enter airway   Diagnostic Statement Swallow triggered at base of vallecula   VFSS Eval: Regular Texture Trial (Solid)   Mode of Presentation spoon   Order of Presentation barium coated cracker   Preparatory Phase WFL   Oral Phase WFL   Pharyngeal Phase Delayed swallow reflex   Rosenbek's Penetration Aspiration Scale 1 - no  aspiration, contrast does not enter airway   Diagnostic Statement Swallow triggered at base of vallecula   Esophageal Phase of Swallow   Esophageal comments see results of esophagram from 4/7/21   General Therapy Interventions   Intervention Comments VFSS showed intact oral prep and control of all textures trialed. She has a consistent delayed swallow trigger causing thin, mildly thick and moderately thick textures to pourover tip of epiglottis prior epiglottic inversion(swallow trigger). This can cause penetration or aspiration even though it was not seen during today's VFSS. She has an intact/complete epiglottic inversion wtih no pharyngeal stasis after the swallow. Strategies of chin tuck, and reduced bolus size were trialed to help compensate. She was able to demonstrate this strategy and small bolus size was most effective in keeping bolus in vallecula and not pouring over epiglottis. No change noted with chin tuck. Recommend continue current diet of regular foods/thin liquids and foods should be bite sized and small. Teach small sip strategy (about spoonful amount) to help compensate for delayed swallow reflex.    Swallow Eval: Clinical Impressions   Clinical Impression Comments VFSS showed intact oral prep and control of all textures trialed. She has a consistent delayed swallow trigger causing thin, mildly thick and moderately thick textures to pourover tip of epiglottis prior epiglottic inversion(swallow trigger). This can cause penetration or aspiration even though it was not seen during today's VFSS. She has an intact/complete epiglottic inversion wtih no pharyngeal stasis after the swallow. Strategies of chin tuck, and reduced bolus size were trialed to help compensate. She was able to demonstrate this strategy and small bolus size was most effective in keeping bolus in vallecula and not pouring over epiglottis. No change noted with chin tuck. Recommend continue current diet of regular foods/thin liquids  and foods should be bite sized and small. Teach small sip strategy (about spoonful amount) to help compensate for delayed swallow reflex. Unfortunately, there isn't an exercise that can be done to correct delayed swallow, compensatory strategies are recommended that was listed earlier.   Total Session Time   SLP Shawna: VideoFluoroscopic Swallow function Minutes ([id number]) 29

## 2021-09-02 ENCOUNTER — TRANSFERRED RECORDS (OUTPATIENT)
Dept: HEALTH INFORMATION MANAGEMENT | Facility: CLINIC | Age: 56
End: 2021-09-02

## 2021-09-18 ENCOUNTER — HEALTH MAINTENANCE LETTER (OUTPATIENT)
Age: 56
End: 2021-09-18

## 2021-09-21 ENCOUNTER — ANCILLARY PROCEDURE (OUTPATIENT)
Dept: PHYSICAL MEDICINE AND REHAB | Facility: CLINIC | Age: 56
End: 2021-09-21
Payer: COMMERCIAL

## 2021-09-21 VITALS
DIASTOLIC BLOOD PRESSURE: 82 MMHG | OXYGEN SATURATION: 98 % | TEMPERATURE: 97.9 F | SYSTOLIC BLOOD PRESSURE: 134 MMHG | HEART RATE: 78 BPM

## 2021-09-21 DIAGNOSIS — M54.16 LUMBAR RADICULOPATHY: ICD-10-CM

## 2021-09-21 PROCEDURE — 64483 NJX AA&/STRD TFRM EPI L/S 1: CPT | Mod: LT | Performed by: PHYSICAL MEDICINE & REHABILITATION

## 2021-09-21 RX ORDER — DEXAMETHASONE SODIUM PHOSPHATE 10 MG/ML
INJECTION, SOLUTION INTRAMUSCULAR; INTRAVENOUS
Status: COMPLETED | OUTPATIENT
Start: 2021-09-21 | End: 2021-09-21

## 2021-09-21 RX ORDER — LIDOCAINE HYDROCHLORIDE 10 MG/ML
INJECTION, SOLUTION EPIDURAL; INFILTRATION; INTRACAUDAL; PERINEURAL
Status: COMPLETED | OUTPATIENT
Start: 2021-09-21 | End: 2021-09-21

## 2021-09-21 RX ADMIN — LIDOCAINE HYDROCHLORIDE 1 ML: 10 INJECTION, SOLUTION EPIDURAL; INFILTRATION; INTRACAUDAL; PERINEURAL at 11:58

## 2021-09-21 RX ADMIN — DEXAMETHASONE SODIUM PHOSPHATE 10 MG: 10 INJECTION, SOLUTION INTRAMUSCULAR; INTRAVENOUS at 11:58

## 2021-09-21 ASSESSMENT — PAIN SCALES - GENERAL
PAINLEVEL: MILD PAIN (3)
PAINLEVEL: MILD PAIN (3)

## 2021-09-21 NOTE — PATIENT INSTRUCTIONS
DISCHARGE INSTRUCTIONS    During office hours (8:00 a.m.- 4:00 p.m.) questions or concerns may be answered  by calling Spine Center Navigation Nurses at  [phone].  Messages received after hours will be returned the following business day.      In the case of an emergency, please dial 911 or seek assistance at the nearest Emergency Room/Urgent Care facility.     All Patients:  ? You may experience an increase in your symptoms for the first 2 days (It may take anywhere between 2 days- 2 weeks for the steroid to have maximum effect).    ? You may use ice on the injection site, as frequently as 20 minutes each hour if needed.    ? You may take your pain medicine.    ? You may continue taking your regular medication.    ? You may shower. No swimming, tub bath or hot tub for 48 hours.  You may remove your bandaid/bandage as soon as you are home.    ? You may resume light activities, as tolerated.    ? Resume your usual diet as tolerated.    ? It is strongly advised that you do not drive for 1-3 hours post injection.    ? If you have had oral sedation:  Do not drive for 8 hours post injection.      ? If you have had IV sedation:  Do not drive for 24 hours post injection.  Do not operate hazardous machinery or make important personal/business decisions for 24 hours.    POSSIBLE STEROID SIDE EFFECTS (If steroid/cortisone was used for your procedure)    -If you experience these symptoms, it should only last for a short period      Swelling of the legs                Skin redness (flushing)       Mouth (oral) irritation     Blood sugar (glucose) levels              Sweats                     Mood changes    Headache    Weakened immune system for up to 14 days, which could increase the risk of sylwia the COVID-19 virus and/or experiencing more severe symptoms of the disease, if exposed.         POSSIBLE PROCEDURE SIDE EFFECTS    -Call the Spine Center if you are concerned      Increased Pain             Increased  numbness/tingling        Nausea/Vomiting            Bruising/bleeding at site        Redness or swelling                                                Difficulty walking        Weakness            Fever greater than 100.5    *In the event of a severe headache after an epidural steroid injection that is relieved by lying down, please call the [facility] to speak with a clinical staff member*

## 2021-09-21 NOTE — LETTER
9/21/2021         RE: Selina Parikh  [address] [place] [address]        Dear Colleague,    Thank you for referring your patient, Selina Parikh, to the [facility] [place]. Please see a copy of my visit note below.    No notes on file    Again, thank you for allowing me to participate in the care of your patient.        Sincerely,        Natalia Elizabeth, DO

## 2021-09-22 ENCOUNTER — TRANSFERRED RECORDS (OUTPATIENT)
Dept: HEALTH INFORMATION MANAGEMENT | Facility: CLINIC | Age: 56
End: 2021-09-22

## 2021-09-29 ENCOUNTER — HOSPITAL ENCOUNTER (OUTPATIENT)
Dept: CT IMAGING | Facility: CLINIC | Age: 56
Discharge: HOME | End: 2021-09-29
Attending: ORTHOPAEDIC SURGERY | Admitting: ORTHOPAEDIC SURGERY
Payer: COMMERCIAL

## 2021-09-29 DIAGNOSIS — M51.26 OTHER INTERVERTEBRAL DISC DISPLACEMENT, LUMBAR REGION: ICD-10-CM

## 2021-09-29 PROCEDURE — 72131 CT LUMBAR SPINE W/O DYE: CPT

## 2022-01-12 VITALS — WEIGHT: 155 LBS | BODY MASS INDEX: 30.43 KG/M2 | HEIGHT: 60 IN

## 2022-04-15 ENCOUNTER — OFFICE VISIT (OUTPATIENT)
Dept: FAMILY MEDICINE | Facility: CLINIC | Age: 57
End: 2022-04-15
Payer: COMMERCIAL

## 2022-04-15 VITALS
OXYGEN SATURATION: 98 % | TEMPERATURE: 97.6 F | DIASTOLIC BLOOD PRESSURE: 82 MMHG | SYSTOLIC BLOOD PRESSURE: 124 MMHG | HEIGHT: 60 IN | WEIGHT: 147.3 LBS | RESPIRATION RATE: 16 BRPM | HEART RATE: 98 BPM | BODY MASS INDEX: 28.92 KG/M2

## 2022-04-15 DIAGNOSIS — G89.29 CHRONIC LOW BACK PAIN WITH LEFT-SIDED SCIATICA, UNSPECIFIED BACK PAIN LATERALITY: Primary | ICD-10-CM

## 2022-04-15 DIAGNOSIS — Z13.220 SCREENING FOR HYPERLIPIDEMIA: ICD-10-CM

## 2022-04-15 DIAGNOSIS — Z11.59 NEED FOR HEPATITIS C SCREENING TEST: ICD-10-CM

## 2022-04-15 DIAGNOSIS — M54.42 CHRONIC LOW BACK PAIN WITH LEFT-SIDED SCIATICA, UNSPECIFIED BACK PAIN LATERALITY: Primary | ICD-10-CM

## 2022-04-15 DIAGNOSIS — L40.9 PSORIASIS: ICD-10-CM

## 2022-04-15 DIAGNOSIS — K21.9 GASTROESOPHAGEAL REFLUX DISEASE WITHOUT ESOPHAGITIS: ICD-10-CM

## 2022-04-15 DIAGNOSIS — Z12.31 VISIT FOR SCREENING MAMMOGRAM: ICD-10-CM

## 2022-04-15 LAB
ANION GAP SERPL CALCULATED.3IONS-SCNC: 6 MMOL/L (ref 3–14)
BUN SERPL-MCNC: 15 MG/DL (ref 7–30)
CALCIUM SERPL-MCNC: 9.2 MG/DL (ref 8.5–10.1)
CHLORIDE BLD-SCNC: 108 MMOL/L (ref 94–109)
CHOLEST SERPL-MCNC: 218 MG/DL
CO2 SERPL-SCNC: 26 MMOL/L (ref 20–32)
CREAT SERPL-MCNC: 0.8 MG/DL (ref 0.52–1.04)
ERYTHROCYTE [DISTWIDTH] IN BLOOD BY AUTOMATED COUNT: 13.6 % (ref 10–15)
FASTING STATUS PATIENT QL REPORTED: NO
GFR SERPL CREATININE-BSD FRML MDRD: 86 ML/MIN/1.73M2
GLUCOSE BLD-MCNC: 86 MG/DL (ref 70–99)
HCT VFR BLD AUTO: 43.2 % (ref 35–47)
HCV AB SERPL QL IA: NONREACTIVE
HDLC SERPL-MCNC: 52 MG/DL
HGB BLD-MCNC: 14.3 G/DL (ref 11.7–15.7)
LDLC SERPL CALC-MCNC: 138 MG/DL
MCH RBC QN AUTO: 31.2 PG (ref 26.5–33)
MCHC RBC AUTO-ENTMCNC: 33.1 G/DL (ref 31.5–36.5)
MCV RBC AUTO: 94 FL (ref 78–100)
NONHDLC SERPL-MCNC: 166 MG/DL
PLATELET # BLD AUTO: 264 10E3/UL (ref 150–450)
POTASSIUM BLD-SCNC: 4.2 MMOL/L (ref 3.4–5.3)
RBC # BLD AUTO: 4.59 10E6/UL (ref 3.8–5.2)
SODIUM SERPL-SCNC: 140 MMOL/L (ref 133–144)
TRIGL SERPL-MCNC: 142 MG/DL
WBC # BLD AUTO: 9.1 10E3/UL (ref 4–11)

## 2022-04-15 PROCEDURE — 80061 LIPID PANEL: CPT | Performed by: NURSE PRACTITIONER

## 2022-04-15 PROCEDURE — 36415 COLL VENOUS BLD VENIPUNCTURE: CPT | Performed by: NURSE PRACTITIONER

## 2022-04-15 PROCEDURE — 99214 OFFICE O/P EST MOD 30 MIN: CPT | Performed by: NURSE PRACTITIONER

## 2022-04-15 PROCEDURE — 80048 BASIC METABOLIC PNL TOTAL CA: CPT | Performed by: NURSE PRACTITIONER

## 2022-04-15 PROCEDURE — 85027 COMPLETE CBC AUTOMATED: CPT | Performed by: NURSE PRACTITIONER

## 2022-04-15 PROCEDURE — 86803 HEPATITIS C AB TEST: CPT | Performed by: NURSE PRACTITIONER

## 2022-04-15 RX ORDER — CLOBETASOL PROPIONATE 0.5 MG/G
OINTMENT TOPICAL 2 TIMES DAILY
COMMUNITY
End: 2022-04-15

## 2022-04-15 RX ORDER — MULTIVIT-MIN/IRON/FOLIC ACID/K 18-600-40
1000 CAPSULE ORAL DAILY
COMMUNITY

## 2022-04-15 RX ORDER — CLOBETASOL PROPIONATE 0.5 MG/G
OINTMENT TOPICAL 2 TIMES DAILY
Qty: 60 G | Refills: 3 | Status: SHIPPED | OUTPATIENT
Start: 2022-04-15 | End: 2023-05-10

## 2022-04-15 RX ORDER — MELOXICAM 7.5 MG/1
7.5 TABLET ORAL DAILY
Qty: 30 TABLET | Refills: 1 | Status: SHIPPED | OUTPATIENT
Start: 2022-04-15 | End: 2022-05-13

## 2022-04-15 RX ORDER — CHLORAL HYDRATE 500 MG
1 CAPSULE ORAL DAILY
COMMUNITY

## 2022-04-15 RX ORDER — TRAMADOL HYDROCHLORIDE 50 MG/1
50 TABLET ORAL EVERY 6 HOURS PRN
Qty: 10 TABLET | Refills: 0 | Status: ON HOLD | OUTPATIENT
Start: 2022-04-15 | End: 2022-09-23

## 2022-04-15 ASSESSMENT — ANXIETY QUESTIONNAIRES
6. BECOMING EASILY ANNOYED OR IRRITABLE: NEARLY EVERY DAY
4. TROUBLE RELAXING: NEARLY EVERY DAY
2. NOT BEING ABLE TO STOP OR CONTROL WORRYING: NEARLY EVERY DAY
7. FEELING AFRAID AS IF SOMETHING AWFUL MIGHT HAPPEN: SEVERAL DAYS
3. WORRYING TOO MUCH ABOUT DIFFERENT THINGS: SEVERAL DAYS
5. BEING SO RESTLESS THAT IT IS HARD TO SIT STILL: NEARLY EVERY DAY
1. FEELING NERVOUS, ANXIOUS, OR ON EDGE: SEVERAL DAYS
GAD7 TOTAL SCORE: 15
IF YOU CHECKED OFF ANY PROBLEMS ON THIS QUESTIONNAIRE, HOW DIFFICULT HAVE THESE PROBLEMS MADE IT FOR YOU TO DO YOUR WORK, TAKE CARE OF THINGS AT HOME, OR GET ALONG WITH OTHER PEOPLE: VERY DIFFICULT

## 2022-04-15 ASSESSMENT — ENCOUNTER SYMPTOMS
LEG PAIN: 1
BACK PAIN: 1

## 2022-04-15 ASSESSMENT — PAIN SCALES - GENERAL: PAINLEVEL: SEVERE PAIN (6)

## 2022-04-15 ASSESSMENT — PATIENT HEALTH QUESTIONNAIRE - PHQ9
SUM OF ALL RESPONSES TO PHQ QUESTIONS 1-9: 21
10. IF YOU CHECKED OFF ANY PROBLEMS, HOW DIFFICULT HAVE THESE PROBLEMS MADE IT FOR YOU TO DO YOUR WORK, TAKE CARE OF THINGS AT HOME, OR GET ALONG WITH OTHER PEOPLE: VERY DIFFICULT
SUM OF ALL RESPONSES TO PHQ QUESTIONS 1-9: 21

## 2022-04-15 NOTE — PROGRESS NOTES
Assessment & Plan     Chronic low back pain with left-sided sciatica, unspecified back pain laterality  Follows with spine surgery.  - traMADol (ULTRAM) 50 MG tablet; Take 1 tablet (50 mg) by mouth every 6 hours as needed for pain (back and neck pain)  - meloxicam (MOBIC) 7.5 MG tablet; Take 1 tablet (7.5 mg) by mouth daily  - Basic metabolic panel  (Ca, Cl, CO2, Creat, Gluc, K, Na, BUN); Future  - CBC with platelets; Future  - Basic metabolic panel  (Ca, Cl, CO2, Creat, Gluc, K, Na, BUN)  - CBC with platelets    Psoriasis  - clobetasol (TEMOVATE) 0.05 % external ointment; Apply topically 2 times daily    Gastroesophageal reflux disease without esophagitis  Well controlled.  - omeprazole (PRILOSEC) 20 MG DR capsule; Take 1 capsule (20 mg) by mouth 3 times daily    Need for hepatitis C screening test  - Hepatitis C Screen Reflex to HCV RNA Quant and Genotype; Future  - Hepatitis C Screen Reflex to HCV RNA Quant and Genotype    Screening for hyperlipidemia  - Lipid panel reflex to direct LDL Fasting; Future  - Lipid panel reflex to direct LDL Fasting    Visit for screening mammogram  - MA SCREENING DIGITAL BILAT - Future  (s+30); Future    The risks, benefits and treatment options of prescribed medications or other treatments have been discussed with the patient. The patient verbalized their understanding and should call or follow up if no improvement or if they develop further problems.    CARLTON Mckeon [facility] DAINSHA Marks is a 56 year old who presents for the following health issues     Back Pain   Associated symptoms include leg pain.   Leg Pain    History of Present Illness       Mental Health Follow-up:                    Today's PHQ-9         PHQ-9 Total Score: 21  PHQ-9 Q9 Thoughts of better off dead/self-harm past 2 weeks :   (P) Not at all    How difficult have these problems made it for you to do your work, take care of things at home, or get along  with other people: Very difficult        Reason for visit:  Knee pain back pain, Jos refills,low o2  Symptom onset:  More than a month  Symptoms include:  Pain,dizzy  Symptom intensity:  Moderate  Symptom progression:  Worsening  Had these symptoms before:  Yes  Has tried/received treatment for these symptoms:  Yes  Previous treatment was successful:  No  What makes it worse:  Move to much, sit to much  What makes it better:  Move, sit    She eats 0-1 servings of fruits and vegetables daily.She consumes 1 sweetened beverage(s) daily.She exercises with enough effort to increase her heart rate 9 or less minutes per day.  She exercises with enough effort to increase her heart rate 3 or less days per week.   She is taking medications regularly.     Medication Followup of omeprazole for GERD    Taking Medication as prescribed: yes    Side Effects:  None    Medication Helping Symptoms:  yes     Medication Followup of tramadol    Taking Medication as prescribed: yes    Side Effects:  None    Medication Helping Symptoms:  yes      Medication Followup of clobetasol for psoriasis    Taking Medication as prescribed: yes    Side Effects:  None    Medication Helping Symptoms:  yes        Pain History:  When did you first notice your pain? - More than 6 weeks   Have you seen this provider for your pain in the past?   Yes   Where in your body do you have pain? Lower back and left leg\knee  Are you seeing anyone else for your pain? No            PHQ-9 SCORE 10/6/2009 4/15/2022   PHQ-9 Total Score 17 -   PHQ-9 Total Score MyChart - 21 (Severe depression)   PHQ-9 Total Score - 21     JOSSELYN-7 SCORE 4/15/2022   Total Score 15       PEG Score 4/15/2022   PEG Total Score 8.33     JOSSELYN-7 SCORE 4/15/2022   Total Score 15       Chronic Pain Follow Up:    Location of pain: chronic low back pain - radiation into left leg  Analgesia/pain control:    - Recent changes:  no    - Overall control: Tolerable with discomfort    - Current treatments:  "tizanidine not helpful, tramadol helpful but using rarely   Adherence:     - Do you ever take more pain medicine than prescribed? No    - When did you take your last dose of pain medicine?  unknown   Adverse effects: No   PDMP Review     None        Last CSA Agreement:   CSA -- Patient Level:    CSA: None found at the patient level.             Review of Systems   Musculoskeletal: Positive for back pain.      Constitutional, HEENT, cardiovascular, pulmonary, gi and gu systems are negative, except as otherwise noted.          Objective    /82 (BP Location: Right arm, Patient Position: Sitting, Cuff Size: Adult Regular)   Pulse 98   Temp 97.6  F (36.4  C) (Tympanic)   Resp 16   Ht 1.516 m (4' 11.69\")   Wt 66.8 kg (147 lb 4.8 oz)   SpO2 98%   Breastfeeding No   BMI 29.07 kg/m    Body mass index is 29.07 kg/m .  Physical Exam   GENERAL: healthy, alert and no distress  PSYCH: mentation appears normal, affect normal/bright                "

## 2022-04-15 NOTE — LETTER
"April 18, 2022      Selina Parikh  [address] [address] [facility] [address]        Dear ,    We are writing to inform you of your test results.      Hepatitis C screening test was negative.   Blood counts are normal.   Kidney function, electrolytes and blood sugar are normal.     Cholesterol is elevated.  Recommend working on healthy dietary changes.  We should recheck your cholesterol in 1 year.  If no improvement, we will need to discuss cholesterol-lowering medication.     Good fat versus bad fat:     Saturated fats are bad for cholesterol.  These are fats that are solid at room temperature.  You should limit or avoid these bad fats.  Examples are whole milk products, cheese, butter, cream, fatty meats, red meat, poultry skin, cold cuts, baked goods, highly processed foods, margarine, vegetable shortenings, foods with partially hydrogenated vegetable oil, coconut oil.     Unsaturated fats are good fats and help lower your LDL (bad cholesterol).  You should choose to eat more of these good fats, examples are olive oil, canola oil, nuts, seeds, avocado, almonds, pecans, cashews, pistachios, fish oil, corn oil, soybean oil, safflower oil, sunflower oil, walnuts.     Choose omega-3 fats, including salmon, sardines, bluefish, mackerel, walnut, canola, soybean, flaxseed.         Good carbs versus bad carbs:     Minimize sugar and white flour, including white bread, white rice and other refined grains.  Limit potatoes.  Minimize processed food and sugar sweetened beverages such as sodas and fruit drinks.     Eat more whole fruits (instead of fruit juice).  Eat more beans and legumes.  Snack on raw vegetables instead of chips, crackers, or chocolate bars.     Eat more whole grains, such as whole-wheat, barley, wheat berries, quinoa, oats and brown rice.       Good websites:     www.oldwayspt.org   www.eatright.org       Recommend following the Mediterranean diet. This is lifestyle, not a \"diet\". It has been proved to " be effective in reducing weight, BMI, and waist circumference; decreasing total cholesterol and increasing HDL; better glycemic control and reduce insulin resistance. It also has been shown to decrease cardiovascular disease.          Resulted Orders   Hepatitis C Screen Reflex to HCV RNA Quant and Genotype   Result Value Ref Range    Hepatitis C Antibody Nonreactive Nonreactive    Narrative    Assay performance characteristics have not been established for newborns, infants, and children.   Lipid panel reflex to direct LDL Fasting   Result Value Ref Range    Cholesterol 218 (H) <200 mg/dL    Triglycerides 142 <150 mg/dL    Direct Measure HDL 52 >=50 mg/dL    LDL Cholesterol Calculated 138 (H) <=100 mg/dL    Non HDL Cholesterol 166 (H) <130 mg/dL    Patient Fasting > 8hrs? No     Narrative    Cholesterol  Desirable:  <200 mg/dL    Triglycerides  Normal:  Less than 150 mg/dL  Borderline High:  150-199 mg/dL  High:  200-499 mg/dL  Very High:  Greater than or equal to 500 mg/dL    Direct Measure HDL  Female:  Greater than or equal to 50 mg/dL   Male:  Greater than or equal to 40 mg/dL    LDL Cholesterol  Desirable:  <100mg/dL  Above Desirable:  100-129 mg/dL   Borderline High:  130-159 mg/dL   High:  160-189 mg/dL   Very High:  >= 190 mg/dL    Non HDL Cholesterol  Desirable:  130 mg/dL  Above Desirable:  130-159 mg/dL  Borderline High:  160-189 mg/dL  High:  190-219 mg/dL  Very High:  Greater than or equal to 220 mg/dL   Basic metabolic panel  (Ca, Cl, CO2, Creat, Gluc, K, Na, BUN)   Result Value Ref Range    Sodium 140 133 - 144 mmol/L    Potassium 4.2 3.4 - 5.3 mmol/L    Chloride 108 94 - 109 mmol/L    Carbon Dioxide (CO2) 26 20 - 32 mmol/L    Anion Gap 6 3 - 14 mmol/L    Urea Nitrogen 15 7 - 30 mg/dL    Creatinine 0.80 0.52 - 1.04 mg/dL    Calcium 9.2 8.5 - 10.1 mg/dL    Glucose 86 70 - 99 mg/dL    GFR Estimate 86 >60 mL/min/1.73m2      Comment:      Effective December 21, 2021 eGFRcr in adults is calculated using  the 2021 CKD-EPI creatinine equation which includes age and gender (Giuseppe et al., NE, DOI: 10.[facility]6/LGQRuy0960050)   CBC with platelets   Result Value Ref Range    WBC Count 9.1 4.0 - 11.0 10e3/uL    RBC Count 4.59 3.80 - 5.20 10e6/uL    Hemoglobin 14.3 11.7 - 15.7 g/dL    Hematocrit 43.2 35.0 - 47.0 %    MCV 94 78 - 100 fL    MCH 31.2 26.5 - 33.0 pg    MCHC 33.1 31.5 - 36.5 g/dL    RDW 13.6 10.0 - 15.0 %    Platelet Count 264 150 - 450 10e3/uL       If you have any questions or concerns, please call the clinic at the number listed above.       Sincerely,      CARLTON Mckeon CNP

## 2022-04-16 ASSESSMENT — ANXIETY QUESTIONNAIRES: GAD7 TOTAL SCORE: 15

## 2022-04-27 ENCOUNTER — NURSE TRIAGE (OUTPATIENT)
Dept: NURSING | Facility: CLINIC | Age: 57
End: 2022-04-27
Payer: COMMERCIAL

## 2022-04-27 NOTE — TELEPHONE ENCOUNTER
Triage Call:     Pt has been having chest pain since last night and elevated BP  She also reports pain in her neck and jaw  Took her mother's nitroglycerine last night and the pain resolved for a while    BP yesterday: 178/106    Hx: Smoker  Strong family hx of heart disease    Disposition: Call  now. Pt is at the [facility] with her mother waiting for a patient's procedure to be completed. Writer advised patient to go to the ED at the hospital that she is at, but patient stated that she might have a family member bring her to another hospital. No hospital staff nearby per patient. Pt advised to call 911.     Stefanie Renteria RN  [facility] Nurse Advisor 1:38 PM 4/27/2022    Reason for Disposition    Difficult to awaken or acting confused (e.g., disoriented, slurred speech)    Chest pain lasting longer than 5 minutes and ANY of the following:* Over 45 years old* Over 30 years old and at least one cardiac risk factor (e.g., diabetes, high blood pressure, high cholesterol, smoker, or strong family history of heart disease)* History of heart disease (i.e., angina, heart attack, heart failure, bypass surgery, takes nitroglycerin)* Pain is crushing, pressure-like, or heavy    Additional Information    Negative: Severe difficulty breathing (e.g., struggling for each breath, speaks in single words)    Negative: Passed out (i.e., fainted, collapsed and was not responding)    Negative: Shock suspected (e.g., cold/pale/clammy skin, too weak to stand, low BP, rapid pulse)    Protocols used: CHEST PAIN-A-OH    Stefanie Renteria RN  [facility] Nurse Advisor 1:38 PM 4/27/2022

## 2022-04-28 ENCOUNTER — APPOINTMENT (OUTPATIENT)
Dept: GENERAL RADIOLOGY | Facility: CLINIC | Age: 57
End: 2022-04-28
Attending: EMERGENCY MEDICINE
Payer: COMMERCIAL

## 2022-04-28 ENCOUNTER — TELEPHONE (OUTPATIENT)
Dept: FAMILY MEDICINE | Facility: CLINIC | Age: 57
End: 2022-04-28
Payer: COMMERCIAL

## 2022-04-28 ENCOUNTER — HOSPITAL ENCOUNTER (OUTPATIENT)
Facility: CLINIC | Age: 57
Setting detail: OBSERVATION
Discharge: LEFT BEFORE DISCHARGE | End: 2022-04-29
Attending: EMERGENCY MEDICINE | Admitting: INTERNAL MEDICINE
Payer: COMMERCIAL

## 2022-04-28 DIAGNOSIS — R07.9 ACUTE CHEST PAIN: ICD-10-CM

## 2022-04-28 DIAGNOSIS — Z11.52 ENCOUNTER FOR SCREENING LABORATORY TESTING FOR SEVERE ACUTE RESPIRATORY SYNDROME CORONAVIRUS 2 (SARS-COV-2): ICD-10-CM

## 2022-04-28 LAB
ANION GAP SERPL CALCULATED.3IONS-SCNC: 8 MMOL/L (ref 3–14)
BASOPHILS # BLD AUTO: 0 10E3/UL (ref 0–0.2)
BASOPHILS NFR BLD AUTO: 1 %
BUN SERPL-MCNC: 11 MG/DL (ref 7–30)
CALCIUM SERPL-MCNC: 8.7 MG/DL (ref 8.5–10.1)
CHLORIDE BLD-SCNC: 109 MMOL/L (ref 94–109)
CO2 SERPL-SCNC: 26 MMOL/L (ref 20–32)
CREAT SERPL-MCNC: 0.7 MG/DL (ref 0.52–1.04)
EOSINOPHIL # BLD AUTO: 0.1 10E3/UL (ref 0–0.7)
EOSINOPHIL NFR BLD AUTO: 2 %
ERYTHROCYTE [DISTWIDTH] IN BLOOD BY AUTOMATED COUNT: 14.2 % (ref 10–15)
GFR SERPL CREATININE-BSD FRML MDRD: >90 ML/MIN/1.73M2
GLUCOSE BLD-MCNC: 82 MG/DL (ref 70–99)
HBA1C MFR BLD: 5.6 % (ref 0–5.6)
HCT VFR BLD AUTO: 42.2 % (ref 35–47)
HGB BLD-MCNC: 14.1 G/DL (ref 11.7–15.7)
IMM GRANULOCYTES # BLD: 0 10E3/UL
IMM GRANULOCYTES NFR BLD: 0 %
LYMPHOCYTES # BLD AUTO: 2.1 10E3/UL (ref 0.8–5.3)
LYMPHOCYTES NFR BLD AUTO: 30 %
MCH RBC QN AUTO: 30.9 PG (ref 26.5–33)
MCHC RBC AUTO-ENTMCNC: 33.4 G/DL (ref 31.5–36.5)
MCV RBC AUTO: 92 FL (ref 78–100)
MONOCYTES # BLD AUTO: 0.4 10E3/UL (ref 0–1.3)
MONOCYTES NFR BLD AUTO: 6 %
NEUTROPHILS # BLD AUTO: 4.4 10E3/UL (ref 1.6–8.3)
NEUTROPHILS NFR BLD AUTO: 61 %
NRBC # BLD AUTO: 0 10E3/UL
NRBC BLD AUTO-RTO: 0 /100
PLATELET # BLD AUTO: 286 10E3/UL (ref 150–450)
POTASSIUM BLD-SCNC: 4 MMOL/L (ref 3.4–5.3)
RBC # BLD AUTO: 4.57 10E6/UL (ref 3.8–5.2)
SARS-COV-2 RNA RESP QL NAA+PROBE: NEGATIVE
SODIUM SERPL-SCNC: 143 MMOL/L (ref 133–144)
TROPONIN I SERPL HS-MCNC: 4 NG/L
TROPONIN I SERPL HS-MCNC: 4 NG/L
WBC # BLD AUTO: 7.2 10E3/UL (ref 4–11)

## 2022-04-28 PROCEDURE — 83036 HEMOGLOBIN GLYCOSYLATED A1C: CPT | Performed by: PHYSICIAN ASSISTANT

## 2022-04-28 PROCEDURE — 99285 EMERGENCY DEPT VISIT HI MDM: CPT | Mod: 25 | Performed by: EMERGENCY MEDICINE

## 2022-04-28 PROCEDURE — 93005 ELECTROCARDIOGRAM TRACING: CPT | Performed by: EMERGENCY MEDICINE

## 2022-04-28 PROCEDURE — 93010 ELECTROCARDIOGRAM REPORT: CPT | Performed by: EMERGENCY MEDICINE

## 2022-04-28 PROCEDURE — 250N000013 HC RX MED GY IP 250 OP 250 PS 637: Performed by: EMERGENCY MEDICINE

## 2022-04-28 PROCEDURE — 87635 SARS-COV-2 COVID-19 AMP PRB: CPT | Performed by: EMERGENCY MEDICINE

## 2022-04-28 PROCEDURE — 80048 BASIC METABOLIC PNL TOTAL CA: CPT | Performed by: EMERGENCY MEDICINE

## 2022-04-28 PROCEDURE — G0378 HOSPITAL OBSERVATION PER HR: HCPCS

## 2022-04-28 PROCEDURE — 84484 ASSAY OF TROPONIN QUANT: CPT | Performed by: EMERGENCY MEDICINE

## 2022-04-28 PROCEDURE — 71046 X-RAY EXAM CHEST 2 VIEWS: CPT

## 2022-04-28 PROCEDURE — 36415 COLL VENOUS BLD VENIPUNCTURE: CPT | Performed by: EMERGENCY MEDICINE

## 2022-04-28 PROCEDURE — C9803 HOPD COVID-19 SPEC COLLECT: HCPCS | Performed by: EMERGENCY MEDICINE

## 2022-04-28 PROCEDURE — 85025 COMPLETE CBC W/AUTO DIFF WBC: CPT | Performed by: EMERGENCY MEDICINE

## 2022-04-28 PROCEDURE — 93005 ELECTROCARDIOGRAM TRACING: CPT | Mod: 76 | Performed by: EMERGENCY MEDICINE

## 2022-04-28 PROCEDURE — 99220 PR INITIAL OBSERVATION CARE,LEVEL III: CPT | Performed by: PHYSICIAN ASSISTANT

## 2022-04-28 RX ORDER — NITROGLYCERIN 0.4 MG/1
0.4 TABLET SUBLINGUAL EVERY 5 MIN PRN
Status: DISCONTINUED | OUTPATIENT
Start: 2022-04-28 | End: 2022-04-28

## 2022-04-28 RX ORDER — NITROGLYCERIN 0.4 MG/1
0.4 TABLET SUBLINGUAL EVERY 5 MIN PRN
Status: DISCONTINUED | OUTPATIENT
Start: 2022-04-28 | End: 2022-04-29 | Stop reason: HOSPADM

## 2022-04-28 RX ORDER — LORAZEPAM 1 MG/1
1 TABLET ORAL ONCE
Status: COMPLETED | OUTPATIENT
Start: 2022-04-28 | End: 2022-04-28

## 2022-04-28 RX ORDER — NALOXONE HYDROCHLORIDE 0.4 MG/ML
0.4 INJECTION, SOLUTION INTRAMUSCULAR; INTRAVENOUS; SUBCUTANEOUS
Status: DISCONTINUED | OUTPATIENT
Start: 2022-04-28 | End: 2022-04-29 | Stop reason: HOSPADM

## 2022-04-28 RX ORDER — ACETAMINOPHEN 500 MG
1000 TABLET ORAL
COMMUNITY
End: 2023-04-11

## 2022-04-28 RX ORDER — ACETAMINOPHEN 500 MG
1000 TABLET ORAL ONCE
Status: COMPLETED | OUTPATIENT
Start: 2022-04-28 | End: 2022-04-28

## 2022-04-28 RX ORDER — ASPIRIN 81 MG/1
324 TABLET, CHEWABLE ORAL ONCE
Status: COMPLETED | OUTPATIENT
Start: 2022-04-28 | End: 2022-04-28

## 2022-04-28 RX ORDER — ACETAMINOPHEN 650 MG/1
650 SUPPOSITORY RECTAL EVERY 6 HOURS PRN
Status: DISCONTINUED | OUTPATIENT
Start: 2022-04-28 | End: 2022-04-29 | Stop reason: HOSPADM

## 2022-04-28 RX ORDER — HYDROCHLOROTHIAZIDE 12.5 MG/1
12.5 CAPSULE ORAL DAILY
Status: DISCONTINUED | OUTPATIENT
Start: 2022-04-28 | End: 2022-04-28

## 2022-04-28 RX ORDER — MAGNESIUM HYDROXIDE/ALUMINUM HYDROXICE/SIMETHICONE 120; 1200; 1200 MG/30ML; MG/30ML; MG/30ML
30 SUSPENSION ORAL EVERY 4 HOURS PRN
Status: DISCONTINUED | OUTPATIENT
Start: 2022-04-28 | End: 2022-04-29 | Stop reason: HOSPADM

## 2022-04-28 RX ORDER — ACETAMINOPHEN 325 MG/1
650 TABLET ORAL EVERY 6 HOURS PRN
Status: DISCONTINUED | OUTPATIENT
Start: 2022-04-28 | End: 2022-04-29 | Stop reason: HOSPADM

## 2022-04-28 RX ORDER — TRAMADOL HYDROCHLORIDE 50 MG/1
50 TABLET ORAL EVERY 6 HOURS PRN
Status: DISCONTINUED | OUTPATIENT
Start: 2022-04-28 | End: 2022-04-29 | Stop reason: HOSPADM

## 2022-04-28 RX ORDER — PANTOPRAZOLE SODIUM 40 MG/1
40 TABLET, DELAYED RELEASE ORAL
Status: DISCONTINUED | OUTPATIENT
Start: 2022-04-29 | End: 2022-04-29 | Stop reason: HOSPADM

## 2022-04-28 RX ORDER — FAMOTIDINE 20 MG/1
40 TABLET, FILM COATED ORAL ONCE
Status: COMPLETED | OUTPATIENT
Start: 2022-04-28 | End: 2022-04-28

## 2022-04-28 RX ORDER — NALOXONE HYDROCHLORIDE 0.4 MG/ML
0.2 INJECTION, SOLUTION INTRAMUSCULAR; INTRAVENOUS; SUBCUTANEOUS
Status: DISCONTINUED | OUTPATIENT
Start: 2022-04-28 | End: 2022-04-29 | Stop reason: HOSPADM

## 2022-04-28 RX ORDER — ASPIRIN 81 MG/1
81 TABLET ORAL DAILY
Status: DISCONTINUED | OUTPATIENT
Start: 2022-04-29 | End: 2022-04-29 | Stop reason: HOSPADM

## 2022-04-28 RX ORDER — MAGNESIUM HYDROXIDE/ALUMINUM HYDROXICE/SIMETHICONE 120; 1200; 1200 MG/30ML; MG/30ML; MG/30ML
30 SUSPENSION ORAL ONCE
Status: COMPLETED | OUTPATIENT
Start: 2022-04-28 | End: 2022-04-28

## 2022-04-28 RX ADMIN — ACETAMINOPHEN 1000 MG: 500 TABLET ORAL at 17:48

## 2022-04-28 RX ADMIN — FAMOTIDINE 40 MG: 20 TABLET, FILM COATED ORAL at 17:03

## 2022-04-28 RX ADMIN — NITROGLYCERIN 0.4 MG: 0.4 TABLET SUBLINGUAL at 15:35

## 2022-04-28 RX ADMIN — HYDROCHLOROTHIAZIDE 12.5 MG: 12.5 CAPSULE ORAL at 17:48

## 2022-04-28 RX ADMIN — NITROGLYCERIN 0.4 MG: 0.4 TABLET SUBLINGUAL at 16:13

## 2022-04-28 RX ADMIN — ALUMINUM HYDROXIDE, MAGNESIUM HYDROXIDE, AND SIMETHICONE 30 ML: 200; 200; 20 SUSPENSION ORAL at 15:35

## 2022-04-28 RX ADMIN — ASPIRIN 81 MG CHEWABLE TABLET 324 MG: 81 TABLET CHEWABLE at 15:35

## 2022-04-28 RX ADMIN — LORAZEPAM 1 MG: 1 TABLET ORAL at 17:48

## 2022-04-28 ASSESSMENT — ACTIVITIES OF DAILY LIVING (ADL)
TRANSFERRING: 0-->INDEPENDENT
FALL_HISTORY_WITHIN_LAST_SIX_MONTHS: YES
DIFFICULTY_COMMUNICATING: NO
WEAR_GLASSES_OR_BLIND: NO
EATING: 0-->INDEPENDENT
NUMBER_OF_TIMES_PATIENT_HAS_FALLEN_WITHIN_LAST_SIX_MONTHS: 2
DIFFICULTY_EATING/SWALLOWING: YES
EATING: 0-->INDEPENDENT
EQUIPMENT_CURRENTLY_USED_AT_HOME: GRAB BAR, TUB/SHOWER;SHOWER CHAIR
TOILETING_ISSUES: NO
SWALLOWING: 2-->DIFFICULTY SWALLOWING LIQUIDS/FOODS
WALKING_OR_CLIMBING_STAIRS_DIFFICULTY: YES
DRESSING/BATHING_DIFFICULTY: NO
TRANSFERRING: 0-->INDEPENDENT
SWALLOWING: 2-->DIFFICULTY SWALLOWING LIQUIDS/FOODS
CONCENTRATING,_REMEMBERING_OR_MAKING_DECISIONS_DIFFICULTY: YES
WALKING_OR_CLIMBING_STAIRS: STAIR CLIMBING DIFFICULTY, REQUIRES EQUIPMENT
CHANGE_IN_FUNCTIONAL_STATUS_SINCE_ONSET_OF_CURRENT_ILLNESS/INJURY: YES
DOING_ERRANDS_INDEPENDENTLY_DIFFICULTY: NO
EATING/SWALLOWING: SWALLOWING LIQUIDS;SWALLOWING SOLID FOOD
HEARING_DIFFICULTY_OR_DEAF: NO

## 2022-04-28 NOTE — ED TRIAGE NOTES
Pt has had CP x3 days.  Pt took 2 nitroglycerin 2 days ago, then again yesterday.  States relief with nitroglycerin.  No hx of MI.  Pt states that she took her mothers nitroglycerin .        Triage Assessment     Row Name 04/28/22 5594       Triage Assessment (Adult)    Airway WDL WDL       Respiratory WDL    Respiratory WDL WDL       Skin Circulation/Temperature WDL    Skin Circulation/Temperature WDL WDL       Cardiac WDL    Cardiac WDL chest pain       Chest Pain Assessment    Chest Pain Radiation jaw    Character sharp;tightness    Precipitating Factors nothing       Peripheral/Neurovascular WDL    Peripheral Neurovascular WDL WDL       Cognitive/Neuro/Behavioral WDL    Cognitive/Neuro/Behavioral WDL WDL

## 2022-04-28 NOTE — LETTER
April 28, 2022      Selina Parikh  [address] [address] [facility] [address]      Your healthcare team cares about your health. To provide you with the best care,   we have reviewed your chart and based on our findings, we see that you are due to:     - CERVICAL CANCER SCREENING: Schedule a Cervical Cancer Screening, with Pap and wellness exam.     If you have already completed these items, please contact the clinic via phone or   Mychart so your care team can review and update your records. Thank you for   choosing [facility] for your healthcare needs. For any questions,   concerns, or to schedule an appointment please contact the clinic.       Healthy Regards,      Your [facility] Care Team

## 2022-04-28 NOTE — ED NOTES
"Patient rated chest tightness 4/10, substernal tightness that radiates to left jaw and neck.  1 tab of nitroglycerin administered.  After 10 minutes pain still radiated pain 4/10 but felt pain was \"opening up\".  Encouraged patient to use call light if pain worsened.  "

## 2022-04-28 NOTE — H&P
[facility]    History and Physical - Hospitalist Service       Date of Admission:  4/28/2022    Assessment & Plan      Selina Parikh is a 56 year old female admitted on 4/28/2022. She has history of chronic low back pain, GERD, tobacco use.  She presents due to concerns of chest pain.    Chest Pain  Intermittent symptoms for the past 3 weeks.  On admission, chest pain began about 6 hours prior to presentation, ongoing though improved with nitroglycerin in the ED (4-5/10 to 1-2/10).  Located on left central chest, constant, feeling of sharp/pinch and pressure.  Radiates to the throat and right jaw.  Not associated with exertion, eating, or deep inspiration.  Associated irritability, diaphoresis/clamminess and palpitations.  Vital signs stable.  Troponin negative x2.  ECG negative x2.  Chest x-ray clear.  Normal angiogram in 2011.  Risk factors include strong family history (father and 3 brothers with early MI and mother), smoking, hypertension.   - telemetry monitoring  - EKG prn chest pain   - serial troponins  - NM Lexiscan stress test after negative troponins  - nitroglycerine PRN for pain    - Regular diet, No caffeine  - Daily aspirin ordered  - AM lipids, A1c    Dyspnea  3 weeks of reported mild dyspnea with activity and mild orthopnea.  Reported lower extremity edema though not present on admission.  Reports a 6 pound weight gain. Chest x-ray is clear.  Concern for CHF.  -Echo ordered    Elevated blood pressure, history of hypertension  Labile  Reports prior to metoprolol for hypertension though stopped due to labile blood pressures, including low values.  - monitor, likely outpatient follow up    Chronic Low Back Pain  Follows with spine.  - continue home tramadol prn, tizanidine prn    Gastroesophageal reflux disease  Chronic.  - continue home omeprzole prn    Tobacco Use Disorder  Reports smoking 6-10 cigarettes per day. Patch available.    Overweight  Body mass index is 29.69  kg/m . Contributes to overall morbidity and mortality.       Diet: NPO for Medical/Clinical Reasons Except for: Meds  Combination Diet Regular Diet; No Caffeine Diet  DVT Prophylaxis: Low Risk/Ambulatory with no VTE prophylaxis indicated  Pradhan Catheter: Not present  Central Lines: None  Cardiac Monitoring: ACTIVE order. Indication: Chest pain/ ACS rule out (24 hours)  Code Status: Full Code , discussed on admission    Disposition Plan   Expected Discharge:  likely tomorrow   Anticipated discharge location:  Awaiting care coordination huddle  Delays:     stress test, echo        The patient's care was discussed with the Attending Physician, Dr. Rajan Bajwa and Patient.    Margarita Marte PA-C  Hospitalist Service  [facility]  Securely message with the Vocera Web Console (learn more here)  Text page via [facility] Paging/Directory   ______________________________________________________________________    Chief Complaint   Chest pain    History is obtained from the patient    History of Present Illness   Selina Parikh is a 56 year old female who presents with concerns of chest discomfort.     Her symptoms have been occurring for the past 3 or so weeks.  She reports chest pain that feels like a sharp, pinching sensation as well as a pressure at times. She states it usually lasts for a couple of hours at least when it occurs.  It is located in the center of her chest just off to the left.  It initially did not have any radiation though in the past week or so it radiates up to her throat and the right side of her jaw.  She has associated irritability, diaphoresis/clamminess, and feeling as though her heart is beating in her head/throat.    She has tried nitroglycerin on 2 occasions (taken from a family member) which has helped her pain.  No other relieving factors including rest, repositioning or antacids.  Laying flat at times does exacerbate the pain no other exacerbating factors  "including activity, food, or deep breaths.     Over the past few weeks she has felt a little \"winded\" when walking and reports some orthopnea symptoms.  She has noted a 6 pound weight gain in the last few weeks.  She has slight swelling in her ankles over the past week however just when she is up walking for longer periods of time.  On admission there is no swelling in her ankles.    She takes her blood pressure intermittently when it occurs and it is always elevated at times around 170/100.  She previously was on metoprolol for blood pressure however it was stopped as her blood pressures were variable she states they were going from being high to \"bottoming out\".    She presented to the emergency department due to concerns of her pain.  Initially her pain was about a 2 but then it escalated to a 4/5.  She took nitroglycerin in the emergency department and it reduced the pain to a 1/2.  She currently states she is still \"aware of her pain\" but it is no longer bothersome.    She has a strong family history of coronary artery disease: Father with first MI in his 40s, Mother has multiple stents, Brother had 4 vessel bypass at age 50, Brother with 3 vessel bypass at age 50 (first MI at age 30), Brother with aneurysm on heart and MI. She had an angiogram in 2011 due to chest pain at that time given her family history and she reports it was clear at that time.    Her other risk factors include being overweight, smoking.    She did receive lorazepam in the emergency department due to anxiety.    Review of Systems    The 10 point Review of Systems is negative other than noted in the HPI or here.     Past Medical History    I have reviewed this patient's medical history and updated it with pertinent information if needed.   Past Medical History:   Diagnosis Date     Cancer (H)      Hiatal hernia      Hypertension      Past Surgical History   I have reviewed this patient's surgical history and updated it with pertinent " information if needed.  Past Surgical History:   Procedure Laterality Date     BACK SURGERY       CHOLECYSTECTOMY       CHOLECYSTECTOMY, LAPOROSCOPIC  2000    Cholecystectomy, Laparoscopic     COLON SURGERY       CYSTOSCOPY, INSERT LIGHTED STENT URETER(S) N/A 4/10/2018    Procedure: CYSTOSCOPY, INSERT LIGHTED STENT URETER(S);  Lighted Stent Placement,Laparoscopic Assisted Sigmoid Colectomy;  Surgeon: ERVIN Reina MD;  Location: [facility] OR     LAPAROSCOPIC ASSISTED COLECTOMY LEFT (DESCENDING) N/A 4/10/2018    Procedure: LAPAROSCOPIC ASSISTED COLECTOMY LEFT (DESCENDING);;  Surgeon: Hill Murray MD;  Location: [facility] OR     NECK SURGERY       ORTHOPEDIC SURGERY  10/2010    Cut palm and reattched tendons and nerves in left hand forefinger.     MN ESOPHAGOGASTRODUODENOSCOPY TRANSORAL DIAGNOSTIC N/A 2021    Procedure: ESOPHAGOGASTRODUODENOSCOPY (EGD);  Surgeon: Souleymane Moreno DO;  Location: [facility];  Service: General     SURGICAL HISTORY OF -   2000    Esophagogastroduodenoscopy with biopsy     TUBAL LIGATION       TUBAL LIGATION       Social History   I have reviewed this patient's social history and updated it with pertinent information if needed.  She smokes 6-10 cigarettes per day. No alcohol use. No drug use.   She is retired and lives in [place] with her brother.   Social History     Tobacco Use     Smoking status: Current Every Day Smoker     Packs/day: 0.50     Years: 30.00     Pack years: 15.00     Types: Cigarettes     Last attempt to quit: 2020     Years since quittin.2     Smokeless tobacco: Never Used   Vaping Use     Vaping Use: Never used   Substance Use Topics     Alcohol use: Not Currently     Comment: Alcoholic Drinks/day: 2x year      Drug use: Not Currently       Family History   I have reviewed this patient's family history and updated it with pertinent information if needed.  Family History   Problem Relation Age of Onset     C.A.D. Father 50        50's     Diabetes  Father      Diabetes Brother      C.A.BIA. Brother 42        quad bypass and MI     Diabetes Brother         2 brothers have DM     Cancer Brother 34        Melanoma     Aortic aneurysm Brother      Allergies Son         Meds     Allergies Daughter         Med     Cancer Mother      C.A.D. Brother 38        MI age 38     Diabetes Mother      Diabetes Brother        Prior to Admission Medications   Prior to Admission Medications   Prescriptions Last Dose Informant Patient Reported? Taking?   Melatonin 10 MG TABS tablet 4/27/2022 at hs Self Yes Yes   Sig: Take 20 mg by mouth nightly as needed for sleep   Vitamin D (Cholecalciferol) 25 MCG (1000 UT) TABS 4/28/2022 at am Self Yes Yes   Sig: Take 1,000 Units by mouth daily   acetaminophen (TYLENOL) 500 MG tablet 4/28/2022 at am Self Yes Yes   Sig: Take 1,000 mg by mouth daily before breakfast And second dose as needed   clobetasol (TEMOVATE) 0.05 % external ointment 4/28/2022 at 1100 Self No Yes   Sig: Apply topically 2 times daily   fish oil-omega-3 fatty acids 1000 MG capsule 4/28/2022 at am Self Yes Yes   Sig: Take 2 g by mouth daily   meloxicam (MOBIC) 7.5 MG tablet 4/28/2022 at am Self No Yes   Sig: Take 1 tablet (7.5 mg) by mouth daily   omeprazole (PRILOSEC) 20 MG DR capsule 4/28/2022 at 1200 2nd Self No Yes   Sig: Take 1 capsule (20 mg) by mouth 3 times daily   traMADol (ULTRAM) 50 MG tablet Past Month at Unknown time Self No Yes   Sig: Take 1 tablet (50 mg) by mouth every 6 hours as needed for pain (back and neck pain)      Facility-Administered Medications: None     Allergies   Allergies   Allergen Reactions     Ciprofloxacin Anaphylaxis     Flagyl [Metronidazole] Anaphylaxis     Zofran [Ondansetron] Other (See Comments) and GI Disturbance     Causes bloating, moderately uncomfortable, abd pain       Augmentin Nausea and Vomiting     Denies hives at this time     Benadryl [Diphenhydramine] Other (See Comments)     Muscle spasms     Percocet  [Oxycodone-Acetaminophen] Other (See Comments)     Goes nuts - nasty mean irritated, can take Dilaudid     Vicodin [Hydrocodone-Acetaminophen] Other (See Comments)     Anxiety-agitation       Physical Exam   Vital Signs: Temp: 97.5  F (36.4  C) Temp src: Oral BP: (!) 157/90 Pulse: 82   Resp: 18 SpO2: 99 % O2 Device: None (Room air)    Weight: 147 lbs 0 oz    Constitutional: Sitting up comfortably in the emergency department bed, awake, alert, cooperative, no apparent distress, and appears stated age  ENT: Oropharynx is clear and moist  Respiratory: No increased work of breathing, good air exchange, clear to auscultation bilaterally, no crackles or wheezing  Cardiovascular: Regular rate and rhythm, no murmur.  No lower extremity edema present.  GI:soft, non-distended, non-tender  Genitounirinary: Deferred  Skin: Warm and dry  Musculoskeletal: Moving all 4 extremities appropriately  Neurologic: Awake, alert, oriented.  Neuropsychiatric: Calm, pleasant, cooperative.  Appropriate thought process and content.  Short-term memory is grossly intact.    Data   Data reviewed today: I reviewed all medications, new labs and imaging results over the last 24 hours. I personally reviewed no images or EKG's today.    Recent Labs   Lab 04/28/22  1543   WBC 7.2   HGB 14.1   MCV 92         POTASSIUM 4.0   CHLORIDE 109   CO2 26   BUN 11   CR 0.70   ANIONGAP 8   MAXWELL 8.7   GLC 82     Recent Results (from the past 24 hour(s))   XR Chest 2 Views    Narrative    CHEST TWO VIEWS 4/28/2022 3:06 PM     HISTORY: Chest pain.    COMPARISON: 4/19/2021    FINDINGS: Heart size and pulmonary vascularity are within normal  limits. The lungs are clear. No pneumothorax or pleural effusion.       Impression    IMPRESSION: No radiographic evidence of acute chest abnormality.     MELANIE MANNING MD         SYSTEM ID:  [id number]

## 2022-04-28 NOTE — TELEPHONE ENCOUNTER
Patient Quality Outreach    Patient is due for the following:   Breast Cancer Screening - Mammogram  Cervical Cancer Screening - PAP Needed    NEXT STEPS:   Schedule a yearly physical    Type of outreach:    Sent letter. and patient has mammogram scheduled for june 2022      Questions for provider review:    None     Diane June, [facility]

## 2022-04-28 NOTE — ED PROVIDER NOTES
History     Chief Complaint   Patient presents with     Chest Pain     HPI  Selina Parikh is a 56 year old female with a history of GERD, hypertension, and tobacco use who presents for chest pain.  Symptoms have been intermittent over the past several days.  Today's chest pain has been ongoing over the past 6 hours.  Pain is located over the left side of her chest and radiates to her left jaw, feels like pressure and tightness, moderate in severity.  She has not take anything for symptoms today.  Over the past several days she has had similar symptoms that have resolved with using her stepfather's nitroglycerin.  She feels slightly nauseous but denies any worsening of her normal shortness of breath or cough.  No hemoptysis.  No fevers, chills, headache, abdominal pain, diarrhea, bloody stools, dysuria, or rash.  No abnormal lower extremity swelling.    Allergies:  Allergies   Allergen Reactions     Ciprofloxacin Anaphylaxis     Flagyl [Metronidazole] Anaphylaxis     Zofran [Ondansetron] Other (See Comments) and GI Disturbance     Causes bloating, moderately uncomfortable, abd pain       Augmentin Nausea and Vomiting     Denies hives at this time     Benadryl [Diphenhydramine] Other (See Comments)     Muscle spasms     Percocet [Oxycodone-Acetaminophen] Other (See Comments)     Goes nuts - nasty mean irritated, can take Dilaudid     Vicodin [Hydrocodone-Acetaminophen] Other (See Comments)     Anxiety-agitation       Problem List:    Patient Active Problem List    Diagnosis Date Noted     Acute chest pain 04/28/2022     Priority: Medium     S/P partial colectomy 04/10/2018     Priority: Medium     Diverticulitis 01/28/2018     Priority: Medium     Psoriasis 06/20/2012     Priority: Medium     GERD (gastroesophageal reflux disease) 03/15/2011     Priority: Medium     Tobacco abuse 02/17/2011     Priority: Medium     Health Care Home 01/27/2011     Priority: Medium     DX V65.8 REPLACED WITH 00233 [facility]ALTH CARE HOME  (04/08/2013)       CARDIOVASCULAR SCREENING; LDL GOAL LESS THAN 160 10/31/2010     Priority: Medium     R Occipital Neuralgia 10/07/2009     Priority: Medium     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 On fiber, senna, and MOM. Advised to d/c MOM, start Miralax and titer to regular BM's. Pt has been seen by GI.        Brain syndrome, posttraumatic 03/06/2009     Priority: Medium     Work comp.  Has seen Dr. Ahmadi, [facility] clinic of neurology.  MRIs, EMG unremarkalbe, some foraminal stenosis on C5/C6  Sent her to physiatrist, trigger point injections didn't help.  After some neck traction, had intense unremitting HA, neck pain.  Off/on tingling in arms.  Pain clinic and/or spine surg for more eval probable next steps.    Has failed multiple rescue and preventive HA meds. On no narcotics          Past Medical History:    Past Medical History:   Diagnosis Date     Cancer (H)      Hiatal hernia      Hypertension        Past Surgical History:    Past Surgical History:   Procedure Laterality Date     BACK SURGERY       CHOLECYSTECTOMY       CHOLECYSTECTOMY, LAPOROSCOPIC  2/14/2000    Cholecystectomy, Laparoscopic     COLON SURGERY       CYSTOSCOPY, INSERT LIGHTED STENT URETER(S) N/A 4/10/2018    Procedure: CYSTOSCOPY, INSERT LIGHTED STENT URETER(S);  Lighted Stent Placement,Laparoscopic Assisted Sigmoid Colectomy;  Surgeon: ERVIN Reina MD;  Location: [facility] OR     LAPAROSCOPIC ASSISTED COLECTOMY LEFT (DESCENDING) N/A 4/10/2018    Procedure: LAPAROSCOPIC ASSISTED COLECTOMY LEFT (DESCENDING);;  Surgeon: Hill Murray MD;  Location: [facility] OR     NECK SURGERY       ORTHOPEDIC SURGERY  10/2010    Cut palm and reattched tendons and nerves in left hand forefinger.     CA ESOPHAGOGASTRODUODENOSCOPY TRANSORAL DIAGNOSTIC N/A 4/30/2021    Procedure:  ESOPHAGOGASTRODUODENOSCOPY (EGD);  Surgeon: Souleymane Moreno DO;  Location: [facility];  Service: General     SURGICAL HISTORY OF -   2000    Esophagogastroduodenoscopy with biopsy     TUBAL LIGATION       TUBAL LIGATION         Family History:    Family History   Problem Relation Age of Onset     C.A.D. Father 50        50's     Diabetes Father      Diabetes Brother      C.A.D. Brother 42        quad bypass and MI     Diabetes Brother         2 brothers have DM     Cancer Brother 34        Melanoma     Aortic aneurysm Brother      Allergies Son         Meds     Allergies Daughter         Med     Cancer Mother      C.A.D. Brother 38        MI age 38     Diabetes Mother      Diabetes Brother        Social History:  Marital Status:   [2]  Social History     Tobacco Use     Smoking status: Current Every Day Smoker     Packs/day: 0.50     Years: 30.00     Pack years: 15.00     Types: Cigarettes     Last attempt to quit: 2020     Years since quittin.2     Smokeless tobacco: Never Used   Vaping Use     Vaping Use: Never used   Substance Use Topics     Alcohol use: Not Currently     Comment: Alcoholic Drinks/day: 2x year      Drug use: Not Currently        Medications:    acetaminophen (TYLENOL) 500 MG tablet  fish oil-omega-3 fatty acids 1000 MG capsule  Melatonin 10 MG TABS tablet  meloxicam (MOBIC) 7.5 MG tablet  omeprazole (PRILOSEC) 20 MG DR capsule  traMADol (ULTRAM) 50 MG tablet  Vitamin D (Cholecalciferol) 25 MCG (1000 UT) TABS  clobetasol (TEMOVATE) 0.05 % external ointment  metoprolol succinate ER (TOPROL-XL) 25 MG 24 hr tablet  tiZANidine (ZANAFLEX) 2 MG tablet          Review of Systems  Pertinent positives and negatives listed in the HPI, all other systems reviewed and are negative.    Physical Exam   BP: (!) 157/85  Pulse: 78  Temp: 97.6  F (36.4  C)  Resp: 16  Weight: 66.7 kg (147 lb)  SpO2: 97 %      Physical Exam  Vitals and nursing note reviewed.   Constitutional:        General: She is in acute distress.      Appearance: She is well-developed. She is not diaphoretic.   HENT:      Head: Normocephalic and atraumatic.      Right Ear: External ear normal.      Left Ear: External ear normal.      Nose: Nose normal.   Eyes:      General: No scleral icterus.     Conjunctiva/sclera: Conjunctivae normal.   Cardiovascular:      Rate and Rhythm: Normal rate and regular rhythm.      Pulses:           Radial pulses are 2+ on the right side and 2+ on the left side.        Posterior tibial pulses are 2+ on the right side and 2+ on the left side.      Heart sounds: No murmur heard.  Pulmonary:      Effort: Pulmonary effort is normal. No respiratory distress.      Breath sounds: No stridor.   Abdominal:      General: There is no distension.      Palpations: Abdomen is soft.   Musculoskeletal:      Cervical back: Normal range of motion.      Right lower leg: No tenderness. No edema.      Left lower leg: No tenderness. No edema.   Skin:     General: Skin is warm and dry.   Neurological:      Mental Status: She is alert and oriented to person, place, and time.   Psychiatric:         Behavior: Behavior normal.         ED Course                 Procedures              EKG Interpretation:      Interpreted by Dami Mercado MD  Time reviewed: 1440  Symptoms at time of EKG: Chest pain   Rhythm: normal sinus   Rate: normal  Axis: normal  Ectopy: none  Conduction: normal  ST Segments/ T Waves: No ST-T wave changes  Q Waves: none  Comparison to prior: Unchanged    Clinical Impression: normal EKG    Critical Care time:  none               Results for orders placed or performed during the hospital encounter of 04/28/22 (from the past 24 hour(s))   XR Chest 2 Views    Narrative    CHEST TWO VIEWS 4/28/2022 3:06 PM     HISTORY: Chest pain.    COMPARISON: 4/19/2021    FINDINGS: Heart size and pulmonary vascularity are within normal  limits. The lungs are clear. No pneumothorax or pleural effusion.        Impression    IMPRESSION: No radiographic evidence of acute chest abnormality.     MELANIE MANNING MD         SYSTEM ID:  [id number]   Troponin I   Result Value Ref Range    Troponin I High Sensitivity 4 <54 ng/L   CBC with Platelets & Differential    Narrative    The following orders were created for panel order CBC with Platelets & Differential.  Procedure                               Abnormality         Status                     ---------                               -----------         ------                     CBC with platelets and d...[[id number]]                      Final result                 Please view results for these tests on the individual orders.   Basic metabolic panel   Result Value Ref Range    Sodium 143 133 - 144 mmol/L    Potassium 4.0 3.4 - 5.3 mmol/L    Chloride 109 94 - 109 mmol/L    Carbon Dioxide (CO2) 26 20 - 32 mmol/L    Anion Gap 8 3 - 14 mmol/L    Urea Nitrogen 11 7 - 30 mg/dL    Creatinine 0.70 0.52 - 1.04 mg/dL    Calcium 8.7 8.5 - 10.1 mg/dL    Glucose 82 70 - 99 mg/dL    GFR Estimate >90 >60 mL/min/1.73m2   CBC with platelets and differential   Result Value Ref Range    WBC Count 7.2 4.0 - 11.0 10e3/uL    RBC Count 4.57 3.80 - 5.20 10e6/uL    Hemoglobin 14.1 11.7 - 15.7 g/dL    Hematocrit 42.2 35.0 - 47.0 %    MCV 92 78 - 100 fL    MCH 30.9 26.5 - 33.0 pg    MCHC 33.4 31.5 - 36.5 g/dL    RDW 14.2 10.0 - 15.0 %    Platelet Count 286 150 - 450 10e3/uL    % Neutrophils 61 %    % Lymphocytes 30 %    % Monocytes 6 %    % Eosinophils 2 %    % Basophils 1 %    % Immature Granulocytes 0 %    NRBCs per 100 WBC 0 <1 /100    Absolute Neutrophils 4.4 1.6 - 8.3 10e3/uL    Absolute Lymphocytes 2.1 0.8 - 5.3 10e3/uL    Absolute Monocytes 0.4 0.0 - 1.3 10e3/uL    Absolute Eosinophils 0.1 0.0 - 0.7 10e3/uL    Absolute Basophils 0.0 0.0 - 0.2 10e3/uL    Absolute Immature Granulocytes 0.0 <=0.4 10e3/uL    Absolute NRBCs 0.0 10e3/uL   Troponin I   Result Value Ref Range    Troponin I High  Sensitivity 4 <54 ng/L       Medications   nitroGLYcerin (NITROSTAT) sublingual tablet 0.4 mg (0.4 mg Sublingual Given 4/28/22 1613)   hydrochlorothiazide (MICROZIDE) capsule 12.5 mg (12.5 mg Oral Given 4/28/22 1748)   aspirin (ASA) chewable tablet 324 mg (324 mg Oral Given 4/28/22 1535)   alum & mag hydroxide-simethicone (MAALOX) suspension 30 mL (30 mLs Oral Given 4/28/22 1535)   famotidine (PEPCID) tablet 40 mg (40 mg Oral Given 4/28/22 1703)   acetaminophen (TYLENOL) tablet 1,000 mg (1,000 mg Oral Given 4/28/22 1748)   LORazepam (ATIVAN) tablet 1 mg (1 mg Oral Given 4/28/22 1748)       Assessments & Plan (with Medical Decision Making)   56-year-old female with history of hypertension.  GERD, and tobacco use who presents with chest pain.  Blood pressure is 157/85, temperature is 36.4  C, heart rate 78 and SPO2 is 97% on room air.  EKG is sinus rhythm without signs of ischemia or dysrhythmia.  She is given aspirin, nitroglycerin, and Maalox for symptoms.  She did have some improvement in her symptoms but it never went away completely.  Her initial troponin is normal at 4.  Electrolytes are within normal limits.  Her white blood cell count is 7.2 which is reassuring and her hemoglobin is 14.1, no signs of anemia.  Chest x-ray obtained, images reviewed independently as well as radiology read reviewed, no signs of infiltrate to suggest pneumonia, no signs of pneumothorax or heart failure, no widened mediastinum.  Symptoms do not seem typical of pulmonary embolism or aortic dissection and this is not pursued at this time.  Repeat troponin was again normal and a repeat EKG is stable without ischemic changes despite ongoing symptoms.  Given this I think she is low risk for acute coronary syndrome as a cause of her symptoms.  Unclear cause of her chest pain.  We discussed discharge home and the patient is very uncomfortable with this.  Given her history of smoking, hypertension, and strong family history of coronary  artery disease I think it is reasonable to admit the patient here for observation, repeat troponins, and possible stress test in the morning.  I have discussed this with the on-call cardiologist at the [facility], Dr. Tyler who agrees with the above assessment.  I have discussed the case with the on-call hospitalist PAReyna who agrees to admit the patient to her service.    I have reviewed the nursing notes.    I have reviewed the findings, diagnosis, plan and need for follow up with the patient.       New Prescriptions    No medications on file       Final diagnoses:   Acute chest pain       4/28/2022   [facility] EMERGENCY DEPT     Dami Mercado MD  04/28/22 [id number]

## 2022-04-29 ENCOUNTER — TELEPHONE (OUTPATIENT)
Dept: FAMILY MEDICINE | Facility: CLINIC | Age: 57
End: 2022-04-29
Payer: COMMERCIAL

## 2022-04-29 VITALS
OXYGEN SATURATION: 98 % | TEMPERATURE: 97.4 F | HEART RATE: 83 BPM | DIASTOLIC BLOOD PRESSURE: 89 MMHG | BODY MASS INDEX: 29.64 KG/M2 | HEIGHT: 59 IN | WEIGHT: 147 LBS | SYSTOLIC BLOOD PRESSURE: 166 MMHG | RESPIRATION RATE: 18 BRPM

## 2022-04-29 LAB
CHOLEST SERPL-MCNC: 209 MG/DL
FASTING STATUS PATIENT QL REPORTED: ABNORMAL
HDLC SERPL-MCNC: 53 MG/DL
HOLD SPECIMEN: NORMAL
LDLC SERPL CALC-MCNC: 137 MG/DL
NONHDLC SERPL-MCNC: 156 MG/DL
TRIGL SERPL-MCNC: 95 MG/DL
TROPONIN I SERPL HS-MCNC: 3 NG/L
TROPONIN I SERPL HS-MCNC: 4 NG/L

## 2022-04-29 PROCEDURE — G0378 HOSPITAL OBSERVATION PER HR: HCPCS

## 2022-04-29 PROCEDURE — 36415 COLL VENOUS BLD VENIPUNCTURE: CPT | Performed by: PHYSICIAN ASSISTANT

## 2022-04-29 PROCEDURE — 84484 ASSAY OF TROPONIN QUANT: CPT | Performed by: PHYSICIAN ASSISTANT

## 2022-04-29 PROCEDURE — 250N000013 HC RX MED GY IP 250 OP 250 PS 637: Performed by: PHYSICIAN ASSISTANT

## 2022-04-29 PROCEDURE — 250N000013 HC RX MED GY IP 250 OP 250 PS 637: Performed by: INTERNAL MEDICINE

## 2022-04-29 PROCEDURE — 80061 LIPID PANEL: CPT | Performed by: PHYSICIAN ASSISTANT

## 2022-04-29 RX ADMIN — ASPIRIN 81 MG: 81 TABLET, COATED ORAL at 07:49

## 2022-04-29 RX ADMIN — ACETAMINOPHEN 650 MG: 325 TABLET ORAL at 04:06

## 2022-04-29 RX ADMIN — PANTOPRAZOLE SODIUM 40 MG: 40 TABLET, DELAYED RELEASE ORAL at 06:57

## 2022-04-29 NOTE — TELEPHONE ENCOUNTER
Usually tingling around mouth is hyperventilating, so slow deep breath in through the nose, hold then blow out through the mouth through pursed lips hold, do this for 2 full minutes in a row and if tingling improves then we know it is from hyperventilation, if not improving with deep breaths and relaxation or worsening like spreading to one side of face then yes back to ER.    BP is higher but not concerning enough to go to ER. Try relaxation techniques as above and laying down.    Cardiology scheduling for the ECHO and stress test [phone] (They may need me to order them again as outpatient, so I'll do that).    The ear plugging I'm not concerned with, could be allergies or cold.    Follow up Monday or Tuesday in a hospital follow up slot, unless symptoms worsen as above then to ER again.    Thank you,  Wesley Wilkerson MD

## 2022-04-29 NOTE — PROGRESS NOTES
I was informed of limitations with ability to do stress testing on this patient in our facility today, namely, she was unwilling to participate in exercise stress testing due to chronic low back pain, and we did not have proper staff to be able to do Yoko scan.  Due to this, I did review her vitals, labs, and EKG results from admission,  and noted it was not necessary to get this testing immediately. I felt that I could discharge her with a safe follow-up plan but due to excessive patient care burden I could not leave my other tasks quickly and did ask staff to explain that I would be able to come and discuss options if she waited for me, but patient was not amenable to waiting. Left AGAINST MEDICAL ADVICE    Natalia Valles MD

## 2022-04-29 NOTE — PROGRESS NOTES
Karol left AMA. Had coffee prior to leaving with plans to go to a hospital with cardiac services that she is seeking.

## 2022-04-29 NOTE — PROGRESS NOTES
"[facility] ADMISSION NOTE    Patient admitted to room 2301 at approximately 2000 via wheel chair from emergency room. Patient was accompanied by transport tech.     Verbal SBAR report received from ED [facility] prior to patient arrival.     Patient ambulated to bed with stand-by assist. Patient alert and oriented X 4. Pain is controlled without any medications.  . Admission vital signs: Blood pressure (!) 146/64, pulse 82, temperature 97.9  F (36.6  C), temperature source Oral, resp. rate 18, height 1.499 m (4' 11\"), weight 66.7 kg (147 lb), SpO2 94 %, not currently breastfeeding. Patient was oriented to plan of care, call light, bed controls, tv, telephone, bathroom and visiting hours.     Risk Assessment    The following safety risks were identified during admission: none. Yellow risk band applied: NO.     Skin Initial Assessment    This writer admitted this patient and completed a full skin assessment and Jax score in the Adult PCS flowsheet. Appropriate interventions initiated as needed.       Jax Risk Assessment  Sensory Perception: 4-->no impairment  Moisture: 4-->rarely moist  Activity: 4-->walks frequently  Mobility: 4-->no limitation  Nutrition: 4-->excellent  Friction and Shear: 3-->no apparent problem  Jax Score: 23  Mattress: Standard Hospital Mattress (Foam)  Bed Frame: Standard width and length  Informed Refusal Interventions: No    Education    Patient has a Orrum to Observation order: Yes  Observation education completed and documented: Yes      Yesenia Wilson RN    "

## 2022-04-29 NOTE — TELEPHONE ENCOUNTER
Pt was called back & was scheduled for Monday with DR Wharton in same day appt.  Pt told if changes or continued sx she will need to go to ER. Pt verbalized understanding.    Dee West RN

## 2022-04-29 NOTE — PROGRESS NOTES
"Patient denies recurrence of CP/jaw pain which prompted her to come to ED for assessment. Has chronic \"joint\" pain and recently started on meloxicam. Currently experiencing a headache due to lack of caffeine, stress testing called to verify time of test, patient refused tylenol.   1100 Patient wants to go home. Spoke with stress testing provider. Dr Valles web paged for stress testing and update her that patient has a HA and would like to discharge.   "

## 2022-04-29 NOTE — PROGRESS NOTES
"Patient informed that MD would be able to see her in about an hour. Patient wants to leave now, AMA. \" I am going to go to another hospital that can take care of me\". Dr Hai gomez paged and informed that patient is leaving AMA, wants \" my medications\" and then added she was still having \" chest pain\".  "

## 2022-04-29 NOTE — TELEPHONE ENCOUNTER
"S-(situation): discharged form hosp today:continued chest, jaw, neck pain. New numbness around mouth & ears plugged    B-(background): pt states was in ER last night for chest pain x 3 weeks. Had jaw/neck pain yesterday, called for appt & told to go to ER. Pt stayed overnight for obs. Did not see provider today, was told it would be a few hours & no changes were going to be made so pt d/c'd & got home around 1200. States was told the provider ordered a stress test for today but due to scheduling they could not do this today. Pt was told she could do this as outpt but was never told who to call. Was not told when to f/u with PCP. Pt was given nitroglycerin, ativan,aspitin & pepcid in ER. No med changes overnight. Was told this is not heart related.    A-(assessment): pt got home around 1200. States cont to have chest, jaw, neck pain (no change). States neck & jaw pain 4/10, chest pain 2-4/10 \"relieves a little & comes back\". New sx since home: Started having numbness around entire mouth & plugged ears \"feels like im going down a hill & need to yawn to get ears to pop\". Pt took BP while on the phone: 151/98.  Denies facial droop, confusion, speech/swallowing issues. States has vision issues when BP is elevated, \"gets sparkly crystals in vision & can't focus\" has had this before. Thinks this is from not having caffeine.     R-(recommendations): routing to provider of day for 2nd level as pt just home from hosp today.    Dee West RN      "

## 2022-04-29 NOTE — TELEPHONE ENCOUNTER
Dr Wilkerson,    No hosp f/u appts avail next week. Ok to take same day slot with any provider for Monday?    Dee West RN

## 2022-04-29 NOTE — TELEPHONE ENCOUNTER
Provider covering for Kiarra,    Please see telephone call & advise next steps.  Send pt back to ER?     Dee West RN

## 2022-04-29 NOTE — TELEPHONE ENCOUNTER
Called & spoke with pt & read providers message. Pt's brother Emmanuel got on phone after pt gave consent to speak with him re: pt. Emmanuel states that he has heart hx & pt is not hyperventilating. He thinks this is cardiac related. Re-read providers message. Pt to try the relaxation technique & if not resolving then will go to ER. Emmanuel states he wants pt to be at hospital with cardiologist so he is looking at a different facility.   Pt was given number for cardiology scheduling.    No hosp f/u appts available. Routing to provider to advise. Pt states can come in Mon 5/2 or Wed 5/4.    Dee West RN

## 2022-04-30 ENCOUNTER — HEALTH MAINTENANCE LETTER (OUTPATIENT)
Age: 57
End: 2022-04-30

## 2022-05-02 ENCOUNTER — PATIENT OUTREACH (OUTPATIENT)
Dept: FAMILY MEDICINE | Facility: CLINIC | Age: 57
End: 2022-05-02
Payer: COMMERCIAL

## 2022-05-02 NOTE — TELEPHONE ENCOUNTER
See previous encounter dated 4/29/22 and closing this one, as patient does not wish to receive further care from [facility] at this time.     Blanquita Mathis RN  [facility]

## 2022-05-02 NOTE — TELEPHONE ENCOUNTER
"RN received a call from Ranulfo CROWLEY requesting to reach out to patient for re-triage.  Patient was supposed to see Dr. Wharton today for hospital f/u for chest pain, but was late to appointment and turned away. Customer relations had reached out to Ranulfo CANNON.  Unclear if patient was still having some symptoms, etc..  Patient's brother is Emmanuel-phone [phone] if needed.     RN reached out to patient's home number on file first.   Was able to reach patient and explain we're calling to f/u and triage to her to ensure she's not still having concerning symptoms.  Patient states \"I won't go back to your ER or your clinic.\"  Notes she did not have a good experience.  Patient states she thinks all of this is what caused some of her symptoms today.  Patient states she hadn't had symptoms of chest pain, etc. the past 2 days, then on her way home from being turned away from the clinic today, her chest and jaw started to hurt, but she \"calmed herself down\", and the symptoms improved.  She states she still has a little bit of pain, but not sure what anyone would do anyway, as any heart problem was ruled out in the ER.  Although it doesn't sound like patient thinks this is anxiety related.    RN advised that if she continues to have these symptoms, she should be re-evaluated anywhere, even if it's not with [facility]. Patient voices understanding, but again isn't sure how someone will help.  RN advised they could do further work-up or referral to specialist to r/o other things, etc.  Patient states she's \"taking care of herself and doesn't see how this conversation is helping\", then said she was going to end the call.  RN advised she can reach out anytime if she thinks we can be of help.     Blanquita Mathis RN  [facility]    "

## 2022-05-05 ENCOUNTER — OFFICE VISIT (OUTPATIENT)
Dept: FAMILY MEDICINE | Facility: CLINIC | Age: 57
End: 2022-05-05
Payer: COMMERCIAL

## 2022-05-05 ENCOUNTER — HOSPITAL ENCOUNTER (OUTPATIENT)
Facility: CLINIC | Age: 57
Setting detail: OBSERVATION
Discharge: OTHER INSTITUTION | End: 2022-05-06
Attending: EMERGENCY MEDICINE
Payer: COMMERCIAL

## 2022-05-05 ENCOUNTER — APPOINTMENT (OUTPATIENT)
Dept: CT IMAGING | Facility: CLINIC | Age: 57
End: 2022-05-05
Attending: EMERGENCY MEDICINE
Payer: COMMERCIAL

## 2022-05-05 ENCOUNTER — APPOINTMENT (OUTPATIENT)
Dept: RADIOLOGY | Facility: CLINIC | Age: 57
End: 2022-05-05
Attending: EMERGENCY MEDICINE
Payer: COMMERCIAL

## 2022-05-05 VITALS
BODY MASS INDEX: 30.7 KG/M2 | OXYGEN SATURATION: 100 % | DIASTOLIC BLOOD PRESSURE: 80 MMHG | SYSTOLIC BLOOD PRESSURE: 138 MMHG | WEIGHT: 152 LBS | HEART RATE: 79 BPM

## 2022-05-05 DIAGNOSIS — R07.9 ACUTE CHEST PAIN: Primary | ICD-10-CM

## 2022-05-05 DIAGNOSIS — M54.2 NECK PAIN ON LEFT SIDE: Primary | ICD-10-CM

## 2022-05-05 DIAGNOSIS — R07.9 CHEST PAIN, UNSPECIFIED TYPE: ICD-10-CM

## 2022-05-05 DIAGNOSIS — R63.5 WEIGHT GAIN: ICD-10-CM

## 2022-05-05 LAB
ANION GAP SERPL CALCULATED.3IONS-SCNC: 11 MMOL/L (ref 5–18)
ATRIAL RATE - MUSE: 81 BPM
BASOPHILS # BLD AUTO: 0 10E3/UL (ref 0–0.2)
BASOPHILS NFR BLD AUTO: 0 %
BNP SERPL-MCNC: 10 PG/ML (ref 0–84)
BUN SERPL-MCNC: 12 MG/DL (ref 8–22)
CALCIUM SERPL-MCNC: 9.6 MG/DL (ref 8.5–10.5)
CHLORIDE BLD-SCNC: 107 MMOL/L (ref 98–107)
CO2 SERPL-SCNC: 24 MMOL/L (ref 22–31)
CREAT SERPL-MCNC: 0.77 MG/DL (ref 0.6–1.1)
DIASTOLIC BLOOD PRESSURE - MUSE: NORMAL MMHG
EOSINOPHIL # BLD AUTO: 0.1 10E3/UL (ref 0–0.7)
EOSINOPHIL NFR BLD AUTO: 2 %
ERYTHROCYTE [DISTWIDTH] IN BLOOD BY AUTOMATED COUNT: 14.2 % (ref 10–15)
GFR SERPL CREATININE-BSD FRML MDRD: 90 ML/MIN/1.73M2
GLUCOSE BLD-MCNC: 90 MG/DL (ref 70–125)
HCT VFR BLD AUTO: 42.8 % (ref 35–47)
HGB BLD-MCNC: 14 G/DL (ref 11.7–15.7)
HOLD SPECIMEN: NORMAL
HOLD SPECIMEN: NORMAL
IMM GRANULOCYTES # BLD: 0 10E3/UL
IMM GRANULOCYTES NFR BLD: 0 %
INTERPRETATION ECG - MUSE: NORMAL
LYMPHOCYTES # BLD AUTO: 2 10E3/UL (ref 0.8–5.3)
LYMPHOCYTES NFR BLD AUTO: 34 %
MCH RBC QN AUTO: 30.1 PG (ref 26.5–33)
MCHC RBC AUTO-ENTMCNC: 32.7 G/DL (ref 31.5–36.5)
MCV RBC AUTO: 92 FL (ref 78–100)
MONOCYTES # BLD AUTO: 0.4 10E3/UL (ref 0–1.3)
MONOCYTES NFR BLD AUTO: 7 %
NEUTROPHILS # BLD AUTO: 3.3 10E3/UL (ref 1.6–8.3)
NEUTROPHILS NFR BLD AUTO: 57 %
NRBC # BLD AUTO: 0 10E3/UL
NRBC BLD AUTO-RTO: 0 /100
P AXIS - MUSE: -1 DEGREES
PLATELET # BLD AUTO: 289 10E3/UL (ref 150–450)
POTASSIUM BLD-SCNC: 4.4 MMOL/L (ref 3.5–5)
PR INTERVAL - MUSE: 108 MS
QRS DURATION - MUSE: 82 MS
QT - MUSE: 394 MS
QTC - MUSE: 457 MS
R AXIS - MUSE: 45 DEGREES
RBC # BLD AUTO: 4.65 10E6/UL (ref 3.8–5.2)
SARS-COV-2 RNA RESP QL NAA+PROBE: NEGATIVE
SODIUM SERPL-SCNC: 142 MMOL/L (ref 136–145)
SYSTOLIC BLOOD PRESSURE - MUSE: NORMAL MMHG
T AXIS - MUSE: 37 DEGREES
TROPONIN I SERPL-MCNC: <0.01 NG/ML (ref 0–0.29)
TROPONIN T BLD-MCNC: 0.01 UG/L
VENTRICULAR RATE- MUSE: 81 BPM
WBC # BLD AUTO: 5.9 10E3/UL (ref 4–11)

## 2022-05-05 PROCEDURE — 93005 ELECTROCARDIOGRAM TRACING: CPT | Performed by: EMERGENCY MEDICINE

## 2022-05-05 PROCEDURE — G0378 HOSPITAL OBSERVATION PER HR: HCPCS

## 2022-05-05 PROCEDURE — C9803 HOPD COVID-19 SPEC COLLECT: HCPCS

## 2022-05-05 PROCEDURE — 250N000013 HC RX MED GY IP 250 OP 250 PS 637

## 2022-05-05 PROCEDURE — 87635 SARS-COV-2 COVID-19 AMP PRB: CPT | Performed by: EMERGENCY MEDICINE

## 2022-05-05 PROCEDURE — 84484 ASSAY OF TROPONIN QUANT: CPT | Mod: 91 | Performed by: FAMILY MEDICINE

## 2022-05-05 PROCEDURE — 250N000013 HC RX MED GY IP 250 OP 250 PS 637: Performed by: INTERNAL MEDICINE

## 2022-05-05 PROCEDURE — 84484 ASSAY OF TROPONIN QUANT: CPT

## 2022-05-05 PROCEDURE — 99205 OFFICE O/P NEW HI 60 MIN: CPT | Performed by: INTERNAL MEDICINE

## 2022-05-05 PROCEDURE — 80048 BASIC METABOLIC PNL TOTAL CA: CPT | Performed by: EMERGENCY MEDICINE

## 2022-05-05 PROCEDURE — 85025 COMPLETE CBC W/AUTO DIFF WBC: CPT | Performed by: EMERGENCY MEDICINE

## 2022-05-05 PROCEDURE — 99215 OFFICE O/P EST HI 40 MIN: CPT | Performed by: FAMILY MEDICINE

## 2022-05-05 PROCEDURE — 36415 COLL VENOUS BLD VENIPUNCTURE: CPT | Performed by: EMERGENCY MEDICINE

## 2022-05-05 PROCEDURE — 250N000013 HC RX MED GY IP 250 OP 250 PS 637: Performed by: STUDENT IN AN ORGANIZED HEALTH CARE EDUCATION/TRAINING PROGRAM

## 2022-05-05 PROCEDURE — 84484 ASSAY OF TROPONIN QUANT: CPT | Performed by: EMERGENCY MEDICINE

## 2022-05-05 PROCEDURE — 250N000011 HC RX IP 250 OP 636: Performed by: EMERGENCY MEDICINE

## 2022-05-05 PROCEDURE — 70491 CT SOFT TISSUE NECK W/DYE: CPT

## 2022-05-05 PROCEDURE — 36415 COLL VENOUS BLD VENIPUNCTURE: CPT

## 2022-05-05 PROCEDURE — 71046 X-RAY EXAM CHEST 2 VIEWS: CPT

## 2022-05-05 PROCEDURE — 83880 ASSAY OF NATRIURETIC PEPTIDE: CPT

## 2022-05-05 PROCEDURE — 250N000013 HC RX MED GY IP 250 OP 250 PS 637: Performed by: EMERGENCY MEDICINE

## 2022-05-05 PROCEDURE — 36415 COLL VENOUS BLD VENIPUNCTURE: CPT | Performed by: FAMILY MEDICINE

## 2022-05-05 PROCEDURE — 99285 EMERGENCY DEPT VISIT HI MDM: CPT | Mod: 25

## 2022-05-05 RX ORDER — NALOXONE HYDROCHLORIDE 0.4 MG/ML
0.4 INJECTION, SOLUTION INTRAMUSCULAR; INTRAVENOUS; SUBCUTANEOUS
Status: DISCONTINUED | OUTPATIENT
Start: 2022-05-05 | End: 2022-05-06 | Stop reason: HOSPADM

## 2022-05-05 RX ORDER — TRAMADOL HYDROCHLORIDE 50 MG/1
50 TABLET ORAL EVERY 6 HOURS PRN
Status: DISCONTINUED | OUTPATIENT
Start: 2022-05-05 | End: 2022-05-06 | Stop reason: HOSPADM

## 2022-05-05 RX ORDER — MELOXICAM 7.5 MG/1
7.5 TABLET ORAL DAILY
Status: DISCONTINUED | OUTPATIENT
Start: 2022-05-06 | End: 2022-05-06 | Stop reason: HOSPADM

## 2022-05-05 RX ORDER — MELOXICAM 7.5 MG/1
7.5 TABLET ORAL DAILY
Status: DISCONTINUED | OUTPATIENT
Start: 2022-05-05 | End: 2022-05-05

## 2022-05-05 RX ORDER — ASPIRIN 81 MG/1
324 TABLET, CHEWABLE ORAL ONCE
Status: DISCONTINUED | OUTPATIENT
Start: 2022-05-05 | End: 2022-05-06 | Stop reason: HOSPADM

## 2022-05-05 RX ORDER — METOPROLOL TARTRATE 25 MG/1
25 TABLET, FILM COATED ORAL 2 TIMES DAILY
Status: DISCONTINUED | OUTPATIENT
Start: 2022-05-05 | End: 2022-05-06 | Stop reason: HOSPADM

## 2022-05-05 RX ORDER — LIDOCAINE 40 MG/G
CREAM TOPICAL
Status: DISCONTINUED | OUTPATIENT
Start: 2022-05-05 | End: 2022-05-06 | Stop reason: HOSPADM

## 2022-05-05 RX ORDER — ASPIRIN 81 MG/1
324 TABLET, CHEWABLE ORAL ONCE
Status: COMPLETED | OUTPATIENT
Start: 2022-05-05 | End: 2022-05-05

## 2022-05-05 RX ORDER — ONDANSETRON 2 MG/ML
4 INJECTION INTRAMUSCULAR; INTRAVENOUS EVERY 6 HOURS PRN
Status: DISCONTINUED | OUTPATIENT
Start: 2022-05-05 | End: 2022-05-05

## 2022-05-05 RX ORDER — IOPAMIDOL 755 MG/ML
100 INJECTION, SOLUTION INTRAVASCULAR ONCE
Status: COMPLETED | OUTPATIENT
Start: 2022-05-05 | End: 2022-05-05

## 2022-05-05 RX ORDER — NICOTINE 21 MG/24HR
1 PATCH, TRANSDERMAL 24 HOURS TRANSDERMAL DAILY
Status: DISCONTINUED | OUTPATIENT
Start: 2022-05-05 | End: 2022-05-06 | Stop reason: HOSPADM

## 2022-05-05 RX ORDER — NALOXONE HYDROCHLORIDE 0.4 MG/ML
0.2 INJECTION, SOLUTION INTRAMUSCULAR; INTRAVENOUS; SUBCUTANEOUS
Status: DISCONTINUED | OUTPATIENT
Start: 2022-05-05 | End: 2022-05-06 | Stop reason: HOSPADM

## 2022-05-05 RX ORDER — PROCHLORPERAZINE 25 MG
25 SUPPOSITORY, RECTAL RECTAL EVERY 12 HOURS PRN
Status: DISCONTINUED | OUTPATIENT
Start: 2022-05-05 | End: 2022-05-06 | Stop reason: HOSPADM

## 2022-05-05 RX ORDER — PROCHLORPERAZINE MALEATE 10 MG
10 TABLET ORAL EVERY 6 HOURS PRN
Status: DISCONTINUED | OUTPATIENT
Start: 2022-05-05 | End: 2022-05-06 | Stop reason: HOSPADM

## 2022-05-05 RX ORDER — HYDRALAZINE HYDROCHLORIDE 10 MG/1
10 TABLET, FILM COATED ORAL EVERY 6 HOURS PRN
Status: DISCONTINUED | OUTPATIENT
Start: 2022-05-05 | End: 2022-05-06 | Stop reason: HOSPADM

## 2022-05-05 RX ORDER — PANTOPRAZOLE SODIUM 20 MG/1
40 TABLET, DELAYED RELEASE ORAL 2 TIMES DAILY
Status: DISCONTINUED | OUTPATIENT
Start: 2022-05-05 | End: 2022-05-06 | Stop reason: HOSPADM

## 2022-05-05 RX ORDER — ONDANSETRON 4 MG/1
4 TABLET, ORALLY DISINTEGRATING ORAL EVERY 6 HOURS PRN
Status: DISCONTINUED | OUTPATIENT
Start: 2022-05-05 | End: 2022-05-05

## 2022-05-05 RX ADMIN — METOPROLOL TARTRATE 25 MG: 25 TABLET, FILM COATED ORAL at 20:48

## 2022-05-05 RX ADMIN — ASPIRIN 81 MG CHEWABLE TABLET 324 MG: 81 TABLET CHEWABLE at 10:14

## 2022-05-05 RX ADMIN — NICOTINE 1 PATCH: 21 PATCH, EXTENDED RELEASE TRANSDERMAL at 15:44

## 2022-05-05 RX ADMIN — PANTOPRAZOLE SODIUM 40 MG: 20 TABLET, DELAYED RELEASE ORAL at 20:48

## 2022-05-05 RX ADMIN — IOPAMIDOL 100 ML: 755 INJECTION, SOLUTION INTRAVENOUS at 09:19

## 2022-05-05 RX ADMIN — Medication 5 MG: at 20:48

## 2022-05-05 ASSESSMENT — ENCOUNTER SYMPTOMS
HEADACHES: 1
DIAPHORESIS: 0
CHILLS: 0
COUGH: 1
APPETITE CHANGE: 0
FEVER: 0
DIFFICULTY URINATING: 0
SHORTNESS OF BREATH: 1
LIGHT-HEADEDNESS: 0
NAUSEA: 1
CHEST TIGHTNESS: 1

## 2022-05-05 ASSESSMENT — ACTIVITIES OF DAILY LIVING (ADL): DEPENDENT_IADLS:: INDEPENDENT

## 2022-05-05 NOTE — ED TRIAGE NOTES
Patient has chest pain x1 week. Radiates to left jaw. HTN.      Triage Assessment     Row Name 05/05/22 0752       Triage Assessment (Adult)    Airway WDL WDL       Cardiac WDL    Cardiac WDL X;chest pain

## 2022-05-05 NOTE — H&P
[facility]    History and Physical - Hospitalist Service       Date of Admission:  5/5/2022    Assessment & Plan      Selina Parikh is a 56 year old female admitted on 5/5/2022. She has a history of chronic low back pain, GERD, tobacco use and is admitted for chest pain.    ACS rule-out  Chest discomfort  Presents with chest pain at rest. On arrival at the ED, she was afebrile and hypertensive. EKG revealed normal sinus rhythm with a short ND interval (stable from previous). Troponin negative x1; WBC 5.9. CXR unrevealing.  Heart score of 4, indicating 12-16% risk of major cardiovascular events. Cardiac risk factors include HTN, HLD, tobacco use, obesity and family hx of CVD. Differential includes unstable angina vs. hypertension related chest pain vs. GERD. Admitted for ACS rule-out.   - Cardiology consult  - NM Nuclear Stress Test  - Cardiac telemetry  - Serial troponins Q6H x2 for total of x3  - Aspirin 325 mg now followed by aspirin 81 mg daily  - Nitroglycerin 0.4 mg SL q5m PRN up to 3 doses  - Supplemental O2 PRN, goal SpO2 >92%     Hyperlipidemia  Lipid panel on 4/29 with  and total cholesterol 209. The 10-year ASCVD risk score ([place] DC Jr., et al., 2013) is: 8.7%.   - Can consider statin therapy prior to discharge or as outpatient    H/o Dyspnea  Per chart review, previously reported 3 weeks of mild dyspnea with activity and 6 pounds of weight gain. Reported lower extremity edema a few days ago, although not present on admission. Denies any orpthopnea or PND. Chest x-ray is clear.   - Cardiology consult  - BNP     Elevated blood pressure, history of hypertension  Labile  Reports prior use of metoprolol for hypertension though stopped due to labile blood pressures, including low values.  - monitor, likely outpatient follow up     Chronic Low Back Pain  Follows with [facility].  - Continue home tramadol prn, tizanidine prn     Gastroesophageal reflux  disease  Chronic. Patient says pain feels different than GERD.  - Continue home omeprazole prn     Tobacco Use Disorder  Reports smoking 6-10 cigarettes per day.  - Patch available     Overweight  Body mass index is 32.42 kg/m . Contributes to overall morbidity and mortality.        Diet: NPO for Medical/Clinical Reasons Except for: Meds    DVT Prophylaxis: Ambulate every shift  Pradhan Catheter: Not present  Fluids: None  Central Lines: None  Cardiac Monitoring: None  Code Status:  Full code; Son Gurvinder would be primary decision maker    Disposition Plan   Expected Discharge: 5/7/22, pending cardiology consult, ACS rule out  Anticipated discharge location:  Awaiting care coordination huddle  Delays:Pending cardiac work-up     The patient's care was discussed with the Attending Physician, Dr. Noel.    Minerva Jensen, MS4  Medical Student, [facility]    I have seen and examined the patient.  I have discussed the case with Student Doctor Minerva Jensen.  I agree with the findings, assessment and plan.    Mart Mercado MD PGY1 May 5, 2022   [facility]  Page Mart Mercado MD PGY1 at [phone] from 7:30 am to 5:30 pm.   Call the senior pager with any questions or concerns, 24/7: [phone]    ______________________________________________________________________    Chief Complaint   Chest pain    History is obtained from the patient    History of Present Illness      Recently admitted on 4/28/2022 for chest pain. Troponin negative x2. ECG negative x2. Chest x-ray clear. Consulted cardiology at that time and ordered stress test and echo. However, patient was discharged home due to difficulty coordinating stress test as an inpatient.     Selina Parikh is a 56 year old female who has a history of GERD, chronic low back pain, tobacco use, and a strong family history of ACS who is admitted for chest pain.    She says her chest pain has been  going on for 3 weeks. She presented to clinic this morning with chest pain and was sent to the ED. She says the pain occurs randomly, including at rest, and goes away after an hour or with nitroglycerin.The pain typically starts in the center of her chest and radiates to the jaw. The pain today started early this morning along with headache. Also reports some vision changes recently. Denies any fevers, new cough, tearing back pain, or syncope. She says that her ankles were more swollen a few days ago, but are improved today    Over the last few weeks, she has also had elevated BPs at home to the 210/100's. BP decreased and chest pain decreased after 2 doses of nitroglycerine. She previosuly took metoprolol for hypertension, but she stopped that over a year ago.    History of multiple family members with MI and coronary bypass surgery. Has a brother who had first MI at age 39.     Review of Systems    CONSTITUTIONAL: NEGATIVE for fever, chills, change in weight  EYES: NEGATIVE for vision changes or irritation  RESP: SOB/dyspnea  CV: chest pain/pressure  GI: Hx GERD    Past Medical History    I have reviewed this patient's medical history and updated it with pertinent information if needed.   Past Medical History:   Diagnosis Date     Cancer (H)      Hiatal hernia      Hypertension      Chronic neck and back pain    GERD    Past Surgical History   I have reviewed this patient's surgical history and updated it with pertinent information if needed.  Past Surgical History:   Procedure Laterality Date     BACK SURGERY       CHOLECYSTECTOMY       CHOLECYSTECTOMY, LAPOROSCOPIC  2/14/2000    Cholecystectomy, Laparoscopic     COLON SURGERY       CYSTOSCOPY, INSERT LIGHTED STENT URETER(S) N/A 4/10/2018    Procedure: CYSTOSCOPY, INSERT LIGHTED STENT URETER(S);  Lighted Stent Placement,Laparoscopic Assisted Sigmoid Colectomy;  Surgeon: ERVIN Reina MD;  Location: [facility] OR     LAPAROSCOPIC ASSISTED COLECTOMY LEFT (DESCENDING) N/A  4/10/2018    Procedure: LAPAROSCOPIC ASSISTED COLECTOMY LEFT (DESCENDING);;  Surgeon: Hill Murray MD;  Location: [facility] OR     NECK SURGERY       ORTHOPEDIC SURGERY  10/2010    Cut palm and reattched tendons and nerves in left hand forefinger.     KY ESOPHAGOGASTRODUODENOSCOPY TRANSORAL DIAGNOSTIC N/A 4/30/2021    Procedure: ESOPHAGOGASTRODUODENOSCOPY (EGD);  Surgeon: Souleymane Moreno DO;  Location: [facility];  Service: General     SURGICAL HISTORY OF -   1/4/2000    Esophagogastroduodenoscopy with biopsy     TUBAL LIGATION       TUBAL LIGATION          Social History   I have reviewed this patient's social history and updated it with pertinent information if needed. Selina reports that she smokes 1/2 pack of cigarettes per day. Has smoked since she was 9 years old. Does not drink alcohol or other drugs.    Family History   I have reviewed this patient's family history and updated it with pertinent information if needed.  Family History   Problem Relation Age of Onset     C.A.D. Father 50        50's     Diabetes Father      Diabetes Brother      C.A.D. Brother 42        quad bypass and MI     Diabetes Brother         2 brothers have DM     Cancer Brother 34        Melanoma     Aortic aneurysm Brother      Allergies Son         Meds     Allergies Daughter         Med     Cancer Mother      C.A.D. Brother 38        MI age 38     Diabetes Mother      Diabetes Brother        Prior to Admission Medications   Prior to Admission Medications   Prescriptions Last Dose Informant Patient Reported? Taking?   Melatonin 10 MG TABS tablet Unknown at Unknown time Self Yes Yes   Sig: Take 20 mg by mouth nightly as needed for sleep   Vitamin D (Cholecalciferol) 25 MCG (1000 UT) TABS 5/5/2022 at AM Self Yes Yes   Sig: Take 1,000 Units by mouth daily   acetaminophen (TYLENOL) 500 MG tablet 5/5/2022 at 0400 Self Yes Yes   Sig: Take 1,000 mg by mouth daily before breakfast And second dose as needed   clobetasol (TEMOVATE) 0.05  % external ointment Past Week at Unknown time Self No Yes   Sig: Apply topically 2 times daily   Patient taking differently: Apply topically 2 times daily as needed   fish oil-omega-3 fatty acids 1000 MG capsule 5/5/2022 at AM Self Yes Yes   Sig: Take 2 g by mouth daily   meloxicam (MOBIC) 7.5 MG tablet 5/5/2022 at AM Self No Yes   Sig: Take 1 tablet (7.5 mg) by mouth daily   omeprazole (PRILOSEC) 20 MG DR capsule 5/5/2022 at AM x1 Self No Yes   Sig: Take 1 capsule (20 mg) by mouth 3 times daily   traMADol (ULTRAM) 50 MG tablet Unknown at Unknown time Self No Yes   Sig: Take 1 tablet (50 mg) by mouth every 6 hours as needed for pain (back and neck pain)      Facility-Administered Medications: None     Allergies   Allergies   Allergen Reactions     Ciprofloxacin Anaphylaxis     Flagyl [Metronidazole] Anaphylaxis     Gabapentin Other (See Comments)     Suicidal thoughts.     Zofran [Ondansetron] Other (See Comments) and GI Disturbance     Causes bloating, moderately uncomfortable, abd pain       Augmentin Nausea and Vomiting     Benadryl [Diphenhydramine] Other (See Comments)     Muscle spasms     Percocet [Oxycodone-Acetaminophen] Other (See Comments)     Goes nuts - nasty mean irritated, can take Dilaudid     Vicodin [Hydrocodone-Acetaminophen] Other (See Comments)     Anxiety-agitation       Physical Exam   Vital Signs: Temp: 98.1  F (36.7  C) Temp src: Oral BP: (!) 162/95 Pulse: 77   Resp: 16 SpO2: 100 % O2 Device: None (Room air)    Weight: 151 lbs .24 oz    Constitutional: awake, alert, cooperative, no apparent distress, and appears stated age  Eyes: Lids and lashes normal, extra ocular muscles intact  ENT: Normocephalic, without obvious abnormality, atraumatic  Respiratory: Nasal canula in place. No increased work of breathing, good air exchange, clear to auscultation bilaterally, no crackles or wheezing  Cardiovascular: Normal apical impulse, regular rate and rhythm, normal S1 and S2, no S3 or S4, and no  murmur noted  GI: No scars, soft, non-distended, non-tender, no masses palpated, no hepatosplenomegaly  Extremities: Trace edema in bilateral lower extremities    Data   Data reviewed today: I reviewed all medications, new labs and imaging results over the last 24 hours. I personally reviewed.    EKG (5/5/2022): Normal sinus rhthym. Short WI interval- stable from previously.    CXR (5/5/2022):  IMPRESSION: Strand of fibrosis or linear atelectasis in the right middle lobe and lingula. Lungs are otherwise clear. No pleural effusion. Heart size and pulmonary vascularity within normal limits. Cholecystectomy clips. Bilateral ean and pedicle screw   fixation lumbar spine.    CT Neck (5/5/2022):  IMPRESSION:   1.  No evidence for inflammatory process or pathologic enhancement within the neck.  2.  No focal fluid collections  3.  Uncomplicated appearance to postoperative fusion changes C4-C7 anteriorly and posteriorly.  4.  Nonenlarged and top normal homogeneous enhancing lymph nodes within the carotid spaces of the neck right more than left are presumed physiologic/reactive  5.  Additional less significant details are provided above.    Recent Labs   Lab 05/05/22  0812   WBC 5.9   HGB 14.0   MCV 92         POTASSIUM 4.4   CHLORIDE 107   CO2 24   BUN 12   CR 0.77   ANIONGAP 11   MAXWELL 9.6   GLC 90

## 2022-05-05 NOTE — ED PROVIDER NOTES
EMERGENCY DEPARTMENT ENCOUNTER      NAME: Selina Parikh  AGE: 56 year old female  YOB: 1965  MRN: [id number]  EVALUATION DATE & TIME: No admission date for patient encounter.    PCP: Sera Solo    ED PROVIDER: Nancy Bean MD      Chief Complaint   Patient presents with     Chest Pain     Jaw Pain         FINAL IMPRESSION:  1. Chest pain, unspecified type          ED COURSE & MEDICAL DECISION MAKING:    Pertinent Labs & Imaging studies reviewed. (See chart for details)  56 year old female presents to the Emergency Department for evaluation of chest tightness.  She has a significant family history of premature CAD, history of hypertension, regular tobacco use.  For the last week, she has been having intermittent chest pain.  This appears to be occurring at rest.  Currently, her discomfort is improved.  The pain radiates up into the left side of her jaw.  On exam, she has mild amount of soft tissue edema on the left side of her neck that is tender to palpation.  Given her significant cardiac history, plan for cardiac work-up and also CT scan of the neck to evaluate for any acute infection or other etiology.    No acute findings on CT scan of the neck.  Laboratory studies are within normal limits, specifically with a normal troponin.  ECG with no acute ischemic changes.  Given the patient's significant cardiac risk factors, intermittent chest pain with a concerning story, the patient will be admitted for ACS rule out.      8:16 AM I met the patient and performed my initial interview and exam.  11:12 AM I rechecked and updated the patient on plan for admission.   11:34 AM I spoke with the [facility] resident regarding patient admission.     At the conclusion of the encounter I discussed the results of all of the tests and the disposition. The questions were answered. The patient or family acknowledged understanding and was agreeable with the care plan.       MEDICATIONS GIVEN IN THE  EMERGENCY:  Medications   aspirin (ASA) chewable tablet 324 mg ( Oral Canceled Entry 5/5/22 1042)   aspirin (ASA) chewable tablet 324 mg (324 mg Oral Given 5/5/22 1014)   iopamidol (ISOVUE-370) solution 100 mL (100 mLs Intravenous Given 5/5/22 1374)       NEW PRESCRIPTIONS STARTED AT TODAY'S ER VISIT  New Prescriptions    No medications on file          =================================================================    HPI    Patient information was obtained from: patient     Use of : N/A         Selina Parikh is a 56 year old female with a pertinent history of cancer, hypertension, and GERD who presents to this ED by walk in for evaluation of chest tightness.     Patient presents with a week of on and off central chest tightness.  She reports that it appears to be unprovoked with exertion, can come on with movement of her upper extremities or even when she is just sitting.  She denies any overt change in her pain with exertion.  It lasts for up to an hour and then resolves when she rests.  She reports associated shortness of breath.  She has also had headaches.  No lightheadedness.  She has been feeling nauseated as well.  She had not previously had any discomfort like this.  She reports that the pain goes up into the left side of her neck and the left side of her jaw.  She feels as if there is swelling and a lump in the left side of her neck.  She states that the discomfort is currently improving.  The pain is a 3 out of 10.  She reports a chronic cough, otherwise no change in her cough.  She denies any congestion, fever, chills, sweats, change in appetite, difficulty with urinating.  She does smoke a half a pack of cigarettes a day.  She has a significant family history of premature CAD.  Her brother had an MI when he was 39.  Another brother had an MI when he was 45.  Both her parents have a history of ACS.     REVIEW OF SYSTEMS   Review of Systems   Constitutional: Negative for appetite change,  chills, diaphoresis and fever.   HENT: Negative for congestion.    Respiratory: Positive for cough (chronic, no change), chest tightness and shortness of breath.    Gastrointestinal: Positive for nausea.   Genitourinary: Negative for difficulty urinating.   Neurological: Positive for headaches. Negative for light-headedness.   All other systems reviewed and are negative.     PAST MEDICAL HISTORY:  Past Medical History:   Diagnosis Date     Cancer (H)      Hiatal hernia      Hypertension        PAST SURGICAL HISTORY:  Past Surgical History:   Procedure Laterality Date     BACK SURGERY       CHOLECYSTECTOMY       CHOLECYSTECTOMY, LAPOROSCOPIC  2/14/2000    Cholecystectomy, Laparoscopic     COLON SURGERY       CYSTOSCOPY, INSERT LIGHTED STENT URETER(S) N/A 4/10/2018    Procedure: CYSTOSCOPY, INSERT LIGHTED STENT URETER(S);  Lighted Stent Placement,Laparoscopic Assisted Sigmoid Colectomy;  Surgeon: ERVIN Reina MD;  Location: [facility] OR     LAPAROSCOPIC ASSISTED COLECTOMY LEFT (DESCENDING) N/A 4/10/2018    Procedure: LAPAROSCOPIC ASSISTED COLECTOMY LEFT (DESCENDING);;  Surgeon: Hill Murray MD;  Location: [facility] OR     NECK SURGERY       ORTHOPEDIC SURGERY  10/2010    Cut palm and reattched tendons and nerves in left hand forefinger.     CA ESOPHAGOGASTRODUODENOSCOPY TRANSORAL DIAGNOSTIC N/A 4/30/2021    Procedure: ESOPHAGOGASTRODUODENOSCOPY (EGD);  Surgeon: Souleymane Moreno DO;  Location: [facility];  Service: General     SURGICAL HISTORY OF -   1/4/2000    Esophagogastroduodenoscopy with biopsy     TUBAL LIGATION       TUBAL LIGATION             CURRENT MEDICATIONS:    acetaminophen (TYLENOL) 500 MG tablet  clobetasol (TEMOVATE) 0.05 % external ointment  fish oil-omega-3 fatty acids 1000 MG capsule  Melatonin 10 MG TABS tablet  meloxicam (MOBIC) 7.5 MG tablet  omeprazole (PRILOSEC) 20 MG DR capsule  traMADol (ULTRAM) 50 MG tablet  Vitamin D (Cholecalciferol) 25 MCG (1000 UT) TABS        ALLERGIES:  Allergies  "  Allergen Reactions     Ciprofloxacin Anaphylaxis     Flagyl [Metronidazole] Anaphylaxis     Gabapentin Other (See Comments)     Suicidal thoughts.     Zofran [Ondansetron] Other (See Comments) and GI Disturbance     Causes bloating, moderately uncomfortable, abd pain       Augmentin Nausea and Vomiting     Benadryl [Diphenhydramine] Other (See Comments)     Muscle spasms     Percocet [Oxycodone-Acetaminophen] Other (See Comments)     Goes nuts - nasty mean irritated, can take Dilaudid     Vicodin [Hydrocodone-Acetaminophen] Other (See Comments)     Anxiety-agitation       FAMILY HISTORY:  Family History   Problem Relation Age of Onset     C.A.D. Father 50        50's     Diabetes Father      Diabetes Brother      C.A.D. Brother 42        quad bypass and MI     Diabetes Brother         2 brothers have DM     Cancer Brother 34        Melanoma     Aortic aneurysm Brother      Allergies Son         Meds     Allergies Daughter         Med     Cancer Mother      C.A.D. Brother 38        MI age 38     Diabetes Mother      Diabetes Brother        SOCIAL HISTORY:   Social History     Socioeconomic History     Marital status:    Tobacco Use     Smoking status: Current Every Day Smoker     Packs/day: 0.50     Years: 30.00     Pack years: 15.00     Types: Cigarettes     Last attempt to quit: 2020     Years since quittin.2     Smokeless tobacco: Never Used   Vaping Use     Vaping Use: Never used   Substance and Sexual Activity     Alcohol use: Not Currently     Comment: Alcoholic Drinks/day: 2x year      Drug use: Not Currently     Sexual activity: Not Currently     Partners: Male     Birth control/protection: Surgical   Other Topics Concern     Parent/sibling w/ CABG, MI or angioplasty before 65F 55M? Yes     Comment: brother- 38, MI, brother 42- quad bypass       VITALS:  BP (!) 162/95   Pulse 77   Temp 98.1  F (36.7  C) (Oral)   Resp 16   Ht 1.518 m (4' 11.75\")   Wt 68.5 kg (151 lb 0.2 oz)   LMP " 04/10/2016   SpO2 100%   BMI 29.74 kg/m      PHYSICAL EXAM    Constitutional: Well developed, Well nourished, NAD  HENT: Normocephalic, Atraumatic, Bilateral external ears normal, Oropharynx normal, mucous membranes moist, Nose normal.   Neck- Normal range of motion, palpable soft tissue edema of the left anterior neck with no erythema, warmth. Mildly tender. No stridor.  Eyes: PERRL, EOMI, Conjunctiva normal, No discharge.   Respiratory: Normal breath sounds, No respiratory distress  Cardiovascular: Normal heart rate, Regular rhythm  GI: Bowel sounds normal, Soft, No tenderness,   Musculoskeletal: No edema. No calf tenderness bilaterally. Good range of motion in all major joints. No tenderness to palpation or major deformities noted.   Integument: Warm, Dry, No erythema, No rash  Neurologic: Alert & oriented x 3, Normal motor function, Normal sensory function, No focal deficits noted. Normal gait.   Psychiatric: Affect normal, Judgment normal, Mood normal.      PE: , no calf tenderness bilaterally, otherwise normal    LAB:  All pertinent labs reviewed and interpreted.  Results for orders placed or performed during the hospital encounter of 05/05/22   XR Chest 2 Views    Impression    IMPRESSION: Strand of fibrosis or linear atelectasis in the right middle lobe and lingula. Lungs are otherwise clear. No pleural effusion. Heart size and pulmonary vascularity within normal limits. Cholecystectomy clips. Bilateral ean and pedicle screw   fixation lumbar spine.   Soft tissue neck CT w contrast    Impression    IMPRESSION:   1.  No evidence for inflammatory process or pathologic enhancement within the neck.    2.  No focal fluid collections    3.  Uncomplicated appearance to postoperative fusion changes C4-C7 anteriorly and posteriorly.    4.  Nonenlarged and top normal homogeneous enhancing lymph nodes within the carotid spaces of the neck right more than left are presumed physiologic/reactive    5.  Additional less  significant details are provided above.   Basic metabolic panel   Result Value Ref Range    Sodium 142 136 - 145 mmol/L    Potassium 4.4 3.5 - 5.0 mmol/L    Chloride 107 98 - 107 mmol/L    Carbon Dioxide (CO2) 24 22 - 31 mmol/L    Anion Gap 11 5 - 18 mmol/L    Urea Nitrogen 12 8 - 22 mg/dL    Creatinine 0.77 0.60 - 1.10 mg/dL    Calcium 9.6 8.5 - 10.5 mg/dL    Glucose 90 70 - 125 mg/dL    GFR Estimate 90 >60 mL/min/1.73m2   Result Value Ref Range    Troponin I <0.01 0.00 - 0.29 ng/mL   CBC with platelets and differential   Result Value Ref Range    WBC Count 5.9 4.0 - 11.0 10e3/uL    RBC Count 4.65 3.80 - 5.20 10e6/uL    Hemoglobin 14.0 11.7 - 15.7 g/dL    Hematocrit 42.8 35.0 - 47.0 %    MCV 92 78 - 100 fL    MCH 30.1 26.5 - 33.0 pg    MCHC 32.7 31.5 - 36.5 g/dL    RDW 14.2 10.0 - 15.0 %    Platelet Count 289 150 - 450 10e3/uL    % Neutrophils 57 %    % Lymphocytes 34 %    % Monocytes 7 %    % Eosinophils 2 %    % Basophils 0 %    % Immature Granulocytes 0 %    NRBCs per 100 WBC 0 <1 /100    Absolute Neutrophils 3.3 1.6 - 8.3 10e3/uL    Absolute Lymphocytes 2.0 0.8 - 5.3 10e3/uL    Absolute Monocytes 0.4 0.0 - 1.3 10e3/uL    Absolute Eosinophils 0.1 0.0 - 0.7 10e3/uL    Absolute Basophils 0.0 0.0 - 0.2 10e3/uL    Absolute Immature Granulocytes 0.0 <=0.4 10e3/uL    Absolute NRBCs 0.0 10e3/uL   Extra Blue Top Tube   Result Value Ref Range    Hold Specimen JIC    Extra Red Top Tube   Result Value Ref Range    Hold Specimen JIC    iStat Troponin, POCT   Result Value Ref Range    TROPPC POCT 0.01 <=0.12 ug/L   ECG 12-LEAD WITH MUSE (LHE)   Result Value Ref Range    Systolic Blood Pressure  mmHg    Diastolic Blood Pressure  mmHg    Ventricular Rate 81 BPM    Atrial Rate 81 BPM    DC Interval 108 ms    QRS Duration 82 ms     ms    QTc 457 ms    P Axis -1 degrees    R AXIS 45 degrees    T Axis 37 degrees    Interpretation ECG       Sinus rhythm with short DC  Otherwise normal ECG  When compared with ECG of  09-DEC-2011 18:56,  No significant change was found  Confirmed by SEE ED PROVIDER NOTE FOR, ECG INTERPRETATION ([id number]),  Barry Glasgow ([id number]) on 5/5/2022 10:13:31 AM         RADIOLOGY:  Reviewed all pertinent imaging. Please see official radiology report.  Soft tissue neck CT w contrast   Final Result   IMPRESSION:    1.  No evidence for inflammatory process or pathologic enhancement within the neck.      2.  No focal fluid collections      3.  Uncomplicated appearance to postoperative fusion changes C4-C7 anteriorly and posteriorly.      4.  Nonenlarged and top normal homogeneous enhancing lymph nodes within the carotid spaces of the neck right more than left are presumed physiologic/reactive      5.  Additional less significant details are provided above.      XR Chest 2 Views   Final Result   IMPRESSION: Strand of fibrosis or linear atelectasis in the right middle lobe and lingula. Lungs are otherwise clear. No pleural effusion. Heart size and pulmonary vascularity within normal limits. Cholecystectomy clips. Bilateral ean and pedicle screw    fixation lumbar spine.          EKG:    Performed at: 0758    Impression: Sinus rhythm with short MS. Otherwise normal ECG.    Rate: 81  Rhythm: Sinus rhythm with short MS  Axis: 45  MS Interval: 108  QRS Interval: 82  QTc Interval: 457  ST Changes: No ST elevations or depressions  Comparison: Compared with 12/9/11, no significant change.    I have independently reviewed and interpreted the EKG(s) documented above.      I, Edwin Romero, am serving as a scribe to document services personally performed by Nancy Bean, based on my observation and the provider's statements to me. I, Nancy Bean MD, attest that Edwin Romero is acting in a scribe capacity, has observed my performance of the services and has documented them in accordance with my direction.    Nancy Bean MD  Emergency Medicine  [facility] EMERGENCY  ROOM  [address] [place] [id number] [phone]     Nancy Bean MD  05/05/22 7446

## 2022-05-05 NOTE — CONSULTS
Care Management Initial Consult    General Information  Assessment completed with: Patient,    Type of CM/SW Visit: Initial Assessment  Primary Care Provider verified and updated as needed: Yes   Readmission within the last 30 days: previous discharge plan unsuccessful   Return Category: Exacerbation of disease  Reason for Consult: discharge planning, health care directive, transportation  Advance Care Planning: Advance Care Planning Reviewed: questions answered  Pt provided with materials.     Communication Assessment  Patient's communication style: spoken language (English or Bilingual)        Cognitive  Cognitive/Neuro/Behavioral: WDL                      Living Environment:   People in home: sibling(s) (Pt currently resides with a brother.)     Current living Arrangements: house      Able to return to prior arrangements: yes     Family/Social Support:  Care provided by: self  Provides care for: no one  Marital Status:   Sibling(s)          Description of Support System: Supportive, Involved    Support Assessment: Adequate social supports, Adequate family and caregiver support    Current Resources:   Patient receiving home care services: No  Community Resources: None  Equipment currently used at home: none  Supplies currently used at home: None    Employment/Financial:  Employment Status: other (see comments) (Undetermined.)     Financial Concerns: No concerns identified   Referral to Financial Worker: No     Lifestyle & Psychosocial Needs:  Social Determinants of Health     Tobacco Use: High Risk     Smoking Tobacco Use: Current Every Day Smoker     Smokeless Tobacco Use: Never Used   Alcohol Use: Not on file   Financial Resource Strain: Not on file   Food Insecurity: Not on file   Transportation Needs: Not on file   Physical Activity: Not on file   Stress: Not on file   Social Connections: Not on file   Intimate Partner Violence: Not on file   Depression: At risk     PHQ-2 Score: 5   Housing Stability:  "Not on file     Functional Status:  Prior to admission patient needed assistance:   Dependent ADLs:: Independent  Dependent IADLs:: Independent  Assessment of Functional Status: At functional baseline    Mental Health Status:  Mental Health Status: No Current Concerns       Chemical Dependency Status:  Chemical Dependency Status: No Current Concerns           Values/Beliefs:  Spiritual, Cultural Beliefs, [employer]ism Practices, Values that affect care: other (see comments) (None expressed by pt.)             Additional Information:  Writer met with pt to introduce Care Management service(CM) and obtain an initial assessment. Pt reports total independence with I/ADLs and expressed no concerns with returning home at time of discharge. No DME or in-home services.    5/5 per KEO Jeffers -\"56 year old female who has a history of GERD, chronic low back pain, tobacco use, and a strong family history of ACS who is admitted for chest pain.     She says her chest pain has been going on for 3 weeks. She presented to clinic this morning with chest pain and was sent to the ED. She says the pain occurs randomly, including at rest, and goes away after an hour or with nitroglycerin.The pain typically starts in the center of her chest and radiates to the jaw. The pain today started early this morning along with headache. Also reports some vision changes recently. Denies any fevers, new cough, tearing back pain, or syncope. She says that her ankles were more swollen a few days ago, but are improved today     Over the last few weeks, she has also had elevated BPs at home to the 210/100's. BP decreased and chest pain decreased after 2 doses of nitroglycerine. She previosuly took metoprolol for hypertension, but she stopped that over a year ago. History of multiple family members with MI and coronary bypass surgery. Has a brother who had first MI at age 39. \"     No consults pending currently. Anticipate return home via family without " issue or service referral. CM to follow and assist with any discharge service coordination needs that may arise.    Harman De Jesus RN

## 2022-05-05 NOTE — H&P (VIEW-ONLY)
[facility]  [phone]          Assessment/Recommendations   Patient with chest discomfort which has some atypical features but the description of her chest heaviness which goes up into her neck and jaw and can be associated with shortness of breath is quite worrisome.  She also has multiple risk factors with a dramatically positive family history of premature coronary artery disease, hypertension, tobacco abuse.  She has had scattered vascular calcifications on CT scans of her spine.  We talked about the possibility of repeat stress testing which she has had several in the distant past.  We talked about CT angiography and diagnostic coronary angiography.  Given her symptoms and dramatically positive family history and risk factors, I favored direct coronary angiography as a gold standard with the opportunity for percutaneous intervention if significant epicardial coronary artery disease is documented.  She and her daughter are in agreement.  We did discuss that there is a risk of stroke, myocardial infarction, or death associated with angiography.    We will plan on coronary angiography tomorrow.  We will start her on metoprolol 25 mg twice daily and continue her aspirin.    Thank you for allowing us to participate in her care.       History of Present Illness/Subjective    Ms. Selina Parikh is a 56 year old female with distant history of coronary angiography which she believes was between 10 and 12 years ago.  She believes it was done at [facility] and I am unable to find a record of that.  She states that her coronary angiogram did not show significant blockages but she does not remember if there were mild blockages or it was clean.    She has multiple risk factors for coronary artery disease including a dramatically positive family history of premature coronary artery disease.  She tells me she is the only sibling who has not had either stents or bypass surgery and her  mother has had multiple stents.  The patient continues to smoke 6 cigarettes a day and has hypertension which was treated up until last October and not treated thereafter without much monitoring.    The patient is not diabetic and has not been treated for hyperlipidemia.            In the last week and a half she has had difficulties with intermittent discomfort in her chest which is described as a heavy squeezing sensation across her upper anterior chest which can radiate up into her lower jaws.  He can move toward her left arm.  He can last 5 to 30 minutes and is associated with some shortness of breath.  She has a separate discomfort in her neck which comes at different times.  On 1 occasion she used one of her mother's nitroglycerin and it took the discomfort away and lowered her blood pressure which is quite high at the time.    She is not physically active and does note shortness of breath with activity.  She does notice this when walking the dog and this is been worse in the last 2 to 3 weeks.  She may have some intermittent paroxysmal nocturnal dyspnea but this may be related to some neck issues.  She gets occasional peripheral edema.  Occasional palpitations but no syncopal episodes.  She has no history rheumatic fever, cerebrovascular accident or TIA.    Patient is retired.  She has 2 children and several grandchildren.  She helps to care for the grandchildren.    ECG: Personally reviewed.  Reviewed the EKG from admission with sinus rhythm and no acute ST-T wave changes.     INDICATION: Chest pain  COMPARISON: 04/19/2021                                                                      IMPRESSION: Strand of fibrosis or linear atelectasis in the right middle lobe and lingula. Lungs are otherwise clear. No pleural effusion. Heart size and pulmonary vascularity within normal limits. Cholecystectomy clips. Bilateral ean and pedicle screw   fixation lumbar spine.     Physical Examination Review of Systems  "  BP (!) 156/79 (BP Location: Right arm, Cuff Size: Adult Regular)   Pulse 78   Temp 97.8  F (36.6  C) (Oral)   Resp 25   Ht 1.448 m (4' 9\")   Wt 67.9 kg (149 lb 12.8 oz)   LMP 04/10/2016   SpO2 98%   BMI 32.42 kg/m    Body mass index is 32.42 kg/m .  Wt Readings from Last 3 Encounters:   05/05/22 67.9 kg (149 lb 12.8 oz)   05/05/22 68.9 kg (152 lb)   04/28/22 66.7 kg (147 lb)     General Appearance:   Alert, cooperative and in no acute distress.   ENT/Mouth: Patient wearing a mask.      EYES:  no scleral icterus, normal conjunctivae   Neck: JVP normal. No Hepatojugular reflux. Thyroid not visualized.   Chest/Lungs:   Lungs are clear to auscultation, equal chest wall expansion.   Cardiovascular:   S1, S2 without murmur ,clicks or rubs. Brachial, radial and posterior tibial pulses are intact and symetric. No carotid bruits noted   Abdomen:  Nontender. BS+.    Extremities: No cyanosis, clubbing or edema   Skin: no xanthelasma, warm.    Neurologic: normal arm movement bilateral, no tremors     Psychiatric: Appropriate affect.      Enc Vitals  BP: (!) 156/79  Pulse: 78  Resp: 25  Temp: 97.8  F (36.6  C)  Temp src: Oral  SpO2: 98 %  Weight: 67.9 kg (149 lb 12.8 oz)  Height: 144.8 cm (4' 9\")                                           Medical History  Surgical History Family History Social History   Past Medical History:   Diagnosis Date     Cancer (H)      Hiatal hernia      Hypertension     Past Surgical History:   Procedure Laterality Date     BACK SURGERY       CHOLECYSTECTOMY       CHOLECYSTECTOMY, LAPOROSCOPIC  2/14/2000    Cholecystectomy, Laparoscopic     COLON SURGERY       CYSTOSCOPY, INSERT LIGHTED STENT URETER(S) N/A 4/10/2018    Procedure: CYSTOSCOPY, INSERT LIGHTED STENT URETER(S);  Lighted Stent Placement,Laparoscopic Assisted Sigmoid Colectomy;  Surgeon: ERVIN Reina MD;  Location: [facility]     LAPAROSCOPIC ASSISTED COLECTOMY LEFT (DESCENDING) N/A 4/10/2018    Procedure: LAPAROSCOPIC ASSISTED " COLECTOMY LEFT (DESCENDING);;  Surgeon: Hill Murray MD;  Location: [facility]     NECK SURGERY       ORTHOPEDIC SURGERY  10/2010    Cut palm and reattched tendons and nerves in left hand forefinger.     [place] ESOPHAGOGASTRODUODENOSCOPY TRANSORAL DIAGNOSTIC N/A 2021    Procedure: ESOPHAGOGASTRODUODENOSCOPY (EGD);  Surgeon: Souleymane Moreno DO;  Location: [facility];  Service: General     SURGICAL HISTORY OF -   2000    Esophagogastroduodenoscopy with biopsy     TUBAL LIGATION       TUBAL LIGATION      Family History   Problem Relation Age of Onset     C.A.D. Father 50        50's     Diabetes Father      Diabetes Brother      C.A.D. Brother 42        quad bypass and MI     Diabetes Brother         2 brothers have DM     Cancer Brother 34        Melanoma     Aortic aneurysm Brother      Allergies Son         Meds     Allergies Daughter         Med     Cancer Mother      C.A.D. Brother 38        MI age 38     Diabetes Mother      Diabetes Brother     Social History     Socioeconomic History     Marital status:      Spouse name: Not on file     Number of children: Not on file     Years of education: Not on file     Highest education level: Not on file   Occupational History     Not on file   Tobacco Use     Smoking status: Current Every Day Smoker     Packs/day: 0.50     Years: 30.00     Pack years: 15.00     Types: Cigarettes     Last attempt to quit: 2020     Years since quittin.2     Smokeless tobacco: Never Used   Vaping Use     Vaping Use: Never used   Substance and Sexual Activity     Alcohol use: Not Currently     Comment: Alcoholic Drinks/day: 2x year      Drug use: Not Currently     Sexual activity: Not Currently     Partners: Male     Birth control/protection: Surgical   Other Topics Concern     Parent/sibling w/ CABG, MI or angioplasty before 65F 55M? Yes     Comment: brother- 38, MI, brother 42- quad bypass   Social History Narrative     Not on file     Social Determinants of  Health     Financial Resource Strain: Not on file   Food Insecurity: Not on file   Transportation Needs: Not on file   Physical Activity: Not on file   Stress: Not on file   Social Connections: Not on file   Intimate Partner Violence: Not on file   Housing Stability: Not on file          Medications  Allergies   No current outpatient medications on file.    Allergies   Allergen Reactions     Ciprofloxacin Anaphylaxis     Flagyl [Metronidazole] Anaphylaxis     Gabapentin Other (See Comments)     Suicidal thoughts.     Zofran [Ondansetron] Other (See Comments) and GI Disturbance     Causes bloating, moderately uncomfortable, abd pain       Augmentin Nausea and Vomiting     Benadryl [Diphenhydramine] Other (See Comments)     Muscle spasms     Percocet [Oxycodone-Acetaminophen] Other (See Comments)     Goes nuts - nasty mean irritated, can take Dilaudid     Vicodin [Hydrocodone-Acetaminophen] Other (See Comments)     Anxiety-agitation         Lab Results    Chemistry/lipid CBC Cardiac Enzymes/BNP/TSH/INR   Lab Results   Component Value Date    CHOL 209 (H) 04/29/2022    HDL 53 04/29/2022    TRIG 95 04/29/2022    BUN 12 05/05/2022     05/05/2022    CO2 24 05/05/2022    Lab Results   Component Value Date    WBC 5.9 05/05/2022    HGB 14.0 05/05/2022    HCT 42.8 05/05/2022    MCV 92 05/05/2022     05/05/2022    Lab Results   Component Value Date    TROPONINI <0.01 05/05/2022    BNP 10 05/05/2022    TSH 0.95 11/29/2011    INR 0.96 12/30/2020

## 2022-05-05 NOTE — PHARMACY-ADMISSION MEDICATION HISTORY
Pharmacy Note - Admission Medication History    Pertinent Provider Information: N/A     ______________________________________________________________________    Prior To Admission (PTA) med list completed and updated in EMR.       PTA Med List   Medication Sig Last Dose     acetaminophen (TYLENOL) 500 MG tablet Take 1,000 mg by mouth daily before breakfast And second dose as needed 5/5/2022 at 0400     clobetasol (TEMOVATE) 0.05 % external ointment Apply topically 2 times daily (Patient taking differently: Apply topically 2 times daily as needed) Past Week at Unknown time     fish oil-omega-3 fatty acids 1000 MG capsule Take 2 g by mouth daily 5/5/2022 at AM     Melatonin 10 MG TABS tablet Take 20 mg by mouth nightly as needed for sleep Unknown at Unknown time     meloxicam (MOBIC) 7.5 MG tablet Take 1 tablet (7.5 mg) by mouth daily 5/5/2022 at AM     omeprazole (PRILOSEC) 20 MG DR capsule Take 1 capsule (20 mg) by mouth 3 times daily 5/5/2022 at AM x1     traMADol (ULTRAM) 50 MG tablet Take 1 tablet (50 mg) by mouth every 6 hours as needed for pain (back and neck pain) Unknown at Unknown time     Vitamin D (Cholecalciferol) 25 MCG (1000 UT) TABS Take 1,000 Units by mouth daily 5/5/2022 at AM       Information source(s): Patient and [facility]  Method of interview communication: in-person    Summary of Changes to PTA Med List  New: N/A  Discontinued: N/A  Changed: clobetasol prn    Patient was asked about OTC/herbal products specifically.  PTA med list reflects this.    In the past week, patient estimated taking medication this percent of the time:  greater than 90%.    Allergies were reviewed, assessed, and updated with the patient.      Patient does not use any multi-dose medications prior to admission.    The information provided in this note is only as accurate as the sources available at the time of the update(s).    Thank you for the opportunity to participate in the care of this  patient.    Darren Mora, [facility]  5/5/2022 11:24 AM

## 2022-05-05 NOTE — PLAN OF CARE
Problem: Plan of Care - These are the overarching goals to be used throughout the patient stay.    Goal: Optimal Comfort and Wellbeing  Outcome: Ongoing, Progressing  Intervention: Monitor Pain and Promote Comfort  Recent Flowsheet Documentation  Taken 5/5/2022 1518 by Diana Schulte RN  Pain Management Interventions: distraction     Problem: Plan of Care - These are the overarching goals to be used throughout the patient stay.    Goal: Optimal Comfort and Wellbeing  Intervention: Monitor Pain and Promote Comfort  Recent Flowsheet Documentation  Taken 5/5/2022 1518 by Diana Schulte RN  Pain Management Interventions: distraction   Goal Outcome Evaluation:        Pt Aaox4. Some pain in throat area. NSR on tele. Afebrile. On room air. Combo diet. X1 PIV. Angio 5/6

## 2022-05-05 NOTE — CONSULTS
[facility]  [phone]          Assessment/Recommendations   Patient with chest discomfort which has some atypical features but the description of her chest heaviness which goes up into her neck and jaw and can be associated with shortness of breath is quite worrisome.  She also has multiple risk factors with a dramatically positive family history of premature coronary artery disease, hypertension, tobacco abuse.  She has had scattered vascular calcifications on CT scans of her spine.  We talked about the possibility of repeat stress testing which she has had several in the distant past.  We talked about CT angiography and diagnostic coronary angiography.  Given her symptoms and dramatically positive family history and risk factors, I favored direct coronary angiography as a gold standard with the opportunity for percutaneous intervention if significant epicardial coronary artery disease is documented.  She and her daughter are in agreement.  We did discuss that there is a risk of stroke, myocardial infarction, or death associated with angiography.    We will plan on coronary angiography tomorrow.  We will start her on metoprolol 25 mg twice daily and continue her aspirin.    Thank you for allowing us to participate in her care.       History of Present Illness/Subjective    Ms. Selina Parikh is a 56 year old female with distant history of coronary angiography which she believes was between 10 and 12 years ago.  She believes it was done at [facility] [place] [place] and I am unable to find a record of that.  She states that her coronary angiogram did not show significant blockages but she does not remember if there were mild blockages or it was clean.    She has multiple risk factors for coronary artery disease including a dramatically positive family history of premature coronary artery disease.  She tells me she is the only sibling who has not had either stents or bypass surgery and her  mother has had multiple stents.  The patient continues to smoke 6 cigarettes a day and has hypertension which was treated up until last October and not treated thereafter without much monitoring.    The patient is not diabetic and has not been treated for hyperlipidemia.            In the last week and a half she has had difficulties with intermittent discomfort in her chest which is described as a heavy squeezing sensation across her upper anterior chest which can radiate up into her lower jaws.  He can move toward her left arm.  He can last 5 to 30 minutes and is associated with some shortness of breath.  She has a separate discomfort in her neck which comes at different times.  On 1 occasion she used one of her mother's nitroglycerin and it took the discomfort away and lowered her blood pressure which is quite high at the time.    She is not physically active and does note shortness of breath with activity.  She does notice this when walking the dog and this is been worse in the last 2 to 3 weeks.  She may have some intermittent paroxysmal nocturnal dyspnea but this may be related to some neck issues.  She gets occasional peripheral edema.  Occasional palpitations but no syncopal episodes.  She has no history rheumatic fever, cerebrovascular accident or TIA.    Patient is retired.  She has 2 children and several grandchildren.  She helps to care for the grandchildren.    ECG: Personally reviewed.  Reviewed the EKG from admission with sinus rhythm and no acute ST-T wave changes.     INDICATION: Chest pain  COMPARISON: 04/19/2021                                                                      IMPRESSION: Strand of fibrosis or linear atelectasis in the right middle lobe and lingula. Lungs are otherwise clear. No pleural effusion. Heart size and pulmonary vascularity within normal limits. Cholecystectomy clips. Bilateral ean and pedicle screw   fixation lumbar spine.     Physical Examination Review of Systems  "  BP (!) 156/79 (BP Location: Right arm, Cuff Size: Adult Regular)   Pulse 78   Temp 97.8  F (36.6  C) (Oral)   Resp 25   Ht 1.448 m (4' 9\")   Wt 67.9 kg (149 lb 12.8 oz)   LMP 04/10/2016   SpO2 98%   BMI 32.42 kg/m    Body mass index is 32.42 kg/m .  Wt Readings from Last 3 Encounters:   05/05/22 67.9 kg (149 lb 12.8 oz)   05/05/22 68.9 kg (152 lb)   04/28/22 66.7 kg (147 lb)     General Appearance:   Alert, cooperative and in no acute distress.   ENT/Mouth: Patient wearing a mask.      EYES:  no scleral icterus, normal conjunctivae   Neck: JVP normal. No Hepatojugular reflux. Thyroid not visualized.   Chest/Lungs:   Lungs are clear to auscultation, equal chest wall expansion.   Cardiovascular:   S1, S2 without murmur ,clicks or rubs. Brachial, radial and posterior tibial pulses are intact and symetric. No carotid bruits noted   Abdomen:  Nontender. BS+.    Extremities: No cyanosis, clubbing or edema   Skin: no xanthelasma, warm.    Neurologic: normal arm movement bilateral, no tremors     Psychiatric: Appropriate affect.      Enc Vitals  BP: (!) 156/79  Pulse: 78  Resp: 25  Temp: 97.8  F (36.6  C)  Temp src: Oral  SpO2: 98 %  Weight: 67.9 kg (149 lb 12.8 oz)  Height: 144.8 cm (4' 9\")                                           Medical History  Surgical History Family History Social History   Past Medical History:   Diagnosis Date     Cancer (H)      Hiatal hernia      Hypertension     Past Surgical History:   Procedure Laterality Date     BACK SURGERY       CHOLECYSTECTOMY       CHOLECYSTECTOMY, LAPOROSCOPIC  2/14/2000    Cholecystectomy, Laparoscopic     COLON SURGERY       CYSTOSCOPY, INSERT LIGHTED STENT URETER(S) N/A 4/10/2018    Procedure: CYSTOSCOPY, INSERT LIGHTED STENT URETER(S);  Lighted Stent Placement,Laparoscopic Assisted Sigmoid Colectomy;  Surgeon: ERVIN Reina MD;  Location: [facility]     LAPAROSCOPIC ASSISTED COLECTOMY LEFT (DESCENDING) N/A 4/10/2018    Procedure: LAPAROSCOPIC ASSISTED " COLECTOMY LEFT (DESCENDING);;  Surgeon: Hill Murray MD;  Location: [facility]     NECK SURGERY       ORTHOPEDIC SURGERY  10/2010    Cut palm and reattched tendons and nerves in left hand forefinger.     [place] ESOPHAGOGASTRODUODENOSCOPY TRANSORAL DIAGNOSTIC N/A 2021    Procedure: ESOPHAGOGASTRODUODENOSCOPY (EGD);  Surgeon: Souleymane Moreno DO;  Location: [facility];  Service: General     SURGICAL HISTORY OF -   2000    Esophagogastroduodenoscopy with biopsy     TUBAL LIGATION       TUBAL LIGATION      Family History   Problem Relation Age of Onset     C.A.D. Father 50        50's     Diabetes Father      Diabetes Brother      C.A.D. Brother 42        quad bypass and MI     Diabetes Brother         2 brothers have DM     Cancer Brother 34        Melanoma     Aortic aneurysm Brother      Allergies Son         Meds     Allergies Daughter         Med     Cancer Mother      C.A.D. Brother 38        MI age 38     Diabetes Mother      Diabetes Brother     Social History     Socioeconomic History     Marital status:      Spouse name: Not on file     Number of children: Not on file     Years of education: Not on file     Highest education level: Not on file   Occupational History     Not on file   Tobacco Use     Smoking status: Current Every Day Smoker     Packs/day: 0.50     Years: 30.00     Pack years: 15.00     Types: Cigarettes     Last attempt to quit: 2020     Years since quittin.2     Smokeless tobacco: Never Used   Vaping Use     Vaping Use: Never used   Substance and Sexual Activity     Alcohol use: Not Currently     Comment: Alcoholic Drinks/day: 2x year      Drug use: Not Currently     Sexual activity: Not Currently     Partners: Male     Birth control/protection: Surgical   Other Topics Concern     Parent/sibling w/ CABG, MI or angioplasty before 65F 55M? Yes     Comment: brother- 38, MI, brother 42- quad bypass   Social History Narrative     Not on file     Social Determinants of  Health     Financial Resource Strain: Not on file   Food Insecurity: Not on file   Transportation Needs: Not on file   Physical Activity: Not on file   Stress: Not on file   Social Connections: Not on file   Intimate Partner Violence: Not on file   Housing Stability: Not on file          Medications  Allergies   No current outpatient medications on file.    Allergies   Allergen Reactions     Ciprofloxacin Anaphylaxis     Flagyl [Metronidazole] Anaphylaxis     Gabapentin Other (See Comments)     Suicidal thoughts.     Zofran [Ondansetron] Other (See Comments) and GI Disturbance     Causes bloating, moderately uncomfortable, abd pain       Augmentin Nausea and Vomiting     Benadryl [Diphenhydramine] Other (See Comments)     Muscle spasms     Percocet [Oxycodone-Acetaminophen] Other (See Comments)     Goes nuts - nasty mean irritated, can take Dilaudid     Vicodin [Hydrocodone-Acetaminophen] Other (See Comments)     Anxiety-agitation         Lab Results    Chemistry/lipid CBC Cardiac Enzymes/BNP/TSH/INR   Lab Results   Component Value Date    CHOL 209 (H) 04/29/2022    HDL 53 04/29/2022    TRIG 95 04/29/2022    BUN 12 05/05/2022     05/05/2022    CO2 24 05/05/2022    Lab Results   Component Value Date    WBC 5.9 05/05/2022    HGB 14.0 05/05/2022    HCT 42.8 05/05/2022    MCV 92 05/05/2022     05/05/2022    Lab Results   Component Value Date    TROPONINI <0.01 05/05/2022    BNP 10 05/05/2022    TSH 0.95 11/29/2011    INR 0.96 12/30/2020

## 2022-05-05 NOTE — PROGRESS NOTES
"Assessment & Plan     Selina was seen today for hospital f/u.    Diagnoses and all orders for this visit:    Neck pain on left side, intermittent.  Current neck pain is rated as mild.  Patient was recently hospitalized for chest pain, with negative Troponin studies.  Patient has not had her Echocardiogram and NM Nuclear Stress Test, as recommended.  Family history is significant for CAD.  Rule out acute coronary syndrome, pulmonary embolus, CHF, and other life-threatening etiology.    Weight gain.  See the \"Neck pain on left side\" section above.    Patient Instructions   Proceed directly to [facility] for further evaluation, as discussed.    -Consider a Troponin, BNP, and D-Dimer during today's ER visit.  -Consider testing (e.g. Echocardiogram and NM Nuclear Stress Test), but patient may benefit from an angiogram, based on her personal and family history.  -Patient has a follow up/establish care visit in primary care tomorrow (5/6/2022), as scheduled.    Breann Trevizo MD  [facility]   Selina is a 56 year old female, who presents to clinic today for the following health issues:  Chief Complaint   Patient presents with     Hospital F/U     Pain in neck and throat up to jaw.     [facility]    Hospital Follow-up Visit:    Hospital/Nursing Home/IP Rehab Facility: [facility]  Date of Admission: 4/28/2022  Date of Discharge: 4/29/2022  Reason(s) for Admission: Acute Chest Pain, 3 weeks of Dyspnea with Activity (with 6 pound weight gain), Chronic Low Back Pain (on Tramadol, Tizanidine), and GERD  Was your hospitalization related to COVID-19? No     Summary of hospitalization:  Discharge summary was unavailable, but the History and Physical from 4/28/2022 was reviewed in Epic.  Patient signed an AMA (Leaving Against or without Medical Advice), as she was told that she would have to schedule her Echocardiogram and NM Nuclear Stress Test as " an outpatient.    Diagnostic Tests/Treatments reviewed:    -CBC, BMP, and Troponin x 4 within normal limits/negative.    -Lipid Panel with Cholesterol 209, , and Non .  -Chest X-ray within normal limits  -Echocardiogram ordered, but not completed  -NM Nuclear Stress Test ordered, but not completed    Provider History: The patient continues to have intermittent left-sided neck pain, described as a tightness involving her anterior neck.  Current episode of left-sided neck pain started this morning, currently rated as mild.  Episodes of left-sided neck pain typically last several hours per episode, as experienced 5-6 times per day.  Episodes of left-sided neck pain are not necessarily related to exertion, occasionally occurring at rest.  History includes previous neck surgery.  No radicular symptoms, weakness, or paresthesias.  Patient denies shortness of breath at rest.  Chronic cough continues, likely related to smoking.  Patient states she has been smoking since the age of 9.  Patient thinks that her weight has increased a few pounds since her hospital discharge, with orthopnea and ankle edema reported.  Family history is positive for CAD.    Blood pressures have normalized, but they were in the 200 systolic range prior to her hospitalization.    Review of Systems      Objective    /80 (BP Location: Right arm, Patient Position: Sitting, Cuff Size: Adult Regular)   Pulse 79   Wt 68.9 kg (152 lb)   LMP 04/10/2016   SpO2 100%   BMI 30.70 kg/m    Physical Exam   GENERAL APPEARANCE:  Awake, alert, and in no acute distress.  PSYCHIATRIC:  Pleasant affect.  HEENT:  Sclera anicteric.  No conjunctivitis.    NECK:  Spontaneous full range of motion.  No thyromegaly or mass.  No lymphadenopathy.  HEART:  Normal S1, S2.  Regular rate and rhythm.  No murmurs, rubs, or gallops.  LUNGS:  No respiratory distress.  Wheezes are noted involving the right lower posterior lung field.  Wheezes resolved with  subsequent respirations.  Good air entry throughout the remainder of the lung fields.  No rales or rhonchi.  ABDOMEN:  Not distended.    EXTREMITIES:  Moves 4 extremities.   1+ edema.  NEUROLOGIC:  Gait within normal limits.  No facial droop or acute neurologic deficits.  SKIN:  No rash or diaphoresis.    EKG: Declined by patient, as she plans to go directly to the ER, which is across the street.

## 2022-05-06 ENCOUNTER — HOSPITAL ENCOUNTER (OUTPATIENT)
Facility: HOSPITAL | Age: 57
Setting detail: OBSERVATION
Discharge: HOME | End: 2022-05-07
Attending: INTERNAL MEDICINE | Admitting: INTERNAL MEDICINE
Payer: COMMERCIAL

## 2022-05-06 VITALS
DIASTOLIC BLOOD PRESSURE: 77 MMHG | BODY MASS INDEX: 32.06 KG/M2 | OXYGEN SATURATION: 95 % | TEMPERATURE: 97.9 F | HEART RATE: 78 BPM | HEIGHT: 57 IN | WEIGHT: 148.6 LBS | SYSTOLIC BLOOD PRESSURE: 135 MMHG | RESPIRATION RATE: 20 BRPM

## 2022-05-06 DIAGNOSIS — R07.9 ACUTE CHEST PAIN: ICD-10-CM

## 2022-05-06 DIAGNOSIS — I25.10 CORONARY ARTERY DISEASE DUE TO LIPID RICH PLAQUE: Primary | ICD-10-CM

## 2022-05-06 DIAGNOSIS — Z72.0 TOBACCO ABUSE: Chronic | ICD-10-CM

## 2022-05-06 DIAGNOSIS — R07.9 CHEST PAIN, UNSPECIFIED TYPE: ICD-10-CM

## 2022-05-06 DIAGNOSIS — I25.83 CORONARY ARTERY DISEASE DUE TO LIPID RICH PLAQUE: Primary | ICD-10-CM

## 2022-05-06 DIAGNOSIS — I10 PRIMARY HYPERTENSION: ICD-10-CM

## 2022-05-06 PROBLEM — Z98.890 STATUS POST CORONARY ANGIOGRAM: Status: ACTIVE | Noted: 2022-05-06

## 2022-05-06 PROCEDURE — 93458 L HRT ARTERY/VENTRICLE ANGIO: CPT | Mod: 26 | Performed by: INTERNAL MEDICINE

## 2022-05-06 PROCEDURE — 255N000002 HC RX 255 OP 636: Performed by: INTERNAL MEDICINE

## 2022-05-06 PROCEDURE — 99218 PR INITIAL OBSERVATION CARE,LEVEL I: CPT | Mod: GC

## 2022-05-06 PROCEDURE — 272N000001 HC OR GENERAL SUPPLY STERILE: Performed by: INTERNAL MEDICINE

## 2022-05-06 PROCEDURE — 258N000003 HC RX IP 258 OP 636

## 2022-05-06 PROCEDURE — 250N000013 HC RX MED GY IP 250 OP 250 PS 637

## 2022-05-06 PROCEDURE — C1894 INTRO/SHEATH, NON-LASER: HCPCS | Performed by: INTERNAL MEDICINE

## 2022-05-06 PROCEDURE — 250N000009 HC RX 250: Performed by: INTERNAL MEDICINE

## 2022-05-06 PROCEDURE — 258N000003 HC RX IP 258 OP 636: Performed by: INTERNAL MEDICINE

## 2022-05-06 PROCEDURE — 250N000011 HC RX IP 250 OP 636: Performed by: INTERNAL MEDICINE

## 2022-05-06 PROCEDURE — 93458 L HRT ARTERY/VENTRICLE ANGIO: CPT | Performed by: INTERNAL MEDICINE

## 2022-05-06 PROCEDURE — 250N000013 HC RX MED GY IP 250 OP 250 PS 637: Performed by: INTERNAL MEDICINE

## 2022-05-06 PROCEDURE — 210N000001 HC R&B IMCU HEART CARE

## 2022-05-06 PROCEDURE — 250N000013 HC RX MED GY IP 250 OP 250 PS 637: Performed by: FAMILY MEDICINE

## 2022-05-06 PROCEDURE — C1725 CATH, TRANSLUMIN NON-LASER: HCPCS | Performed by: INTERNAL MEDICINE

## 2022-05-06 PROCEDURE — C1769 GUIDE WIRE: HCPCS | Performed by: INTERNAL MEDICINE

## 2022-05-06 PROCEDURE — G0378 HOSPITAL OBSERVATION PER HR: HCPCS

## 2022-05-06 PROCEDURE — 250N000013 HC RX MED GY IP 250 OP 250 PS 637: Performed by: NURSE PRACTITIONER

## 2022-05-06 RX ORDER — NALOXONE HYDROCHLORIDE 0.4 MG/ML
0.4 INJECTION, SOLUTION INTRAMUSCULAR; INTRAVENOUS; SUBCUTANEOUS
Status: DISCONTINUED | OUTPATIENT
Start: 2022-05-06 | End: 2022-05-07 | Stop reason: HOSPADM

## 2022-05-06 RX ORDER — NITROGLYCERIN 0.4 MG/1
TABLET SUBLINGUAL
Status: DISCONTINUED | OUTPATIENT
Start: 2022-05-06 | End: 2022-05-06 | Stop reason: HOSPADM

## 2022-05-06 RX ORDER — PROCHLORPERAZINE MALEATE 10 MG
10 TABLET ORAL EVERY 6 HOURS PRN
Status: DISCONTINUED | OUTPATIENT
Start: 2022-05-06 | End: 2022-05-07 | Stop reason: HOSPADM

## 2022-05-06 RX ORDER — NALOXONE HYDROCHLORIDE 0.4 MG/ML
0.2 INJECTION, SOLUTION INTRAMUSCULAR; INTRAVENOUS; SUBCUTANEOUS
Status: DISCONTINUED | OUTPATIENT
Start: 2022-05-06 | End: 2022-05-07 | Stop reason: HOSPADM

## 2022-05-06 RX ORDER — NICOTINE 21 MG/24HR
1 PATCH, TRANSDERMAL 24 HOURS TRANSDERMAL DAILY
Status: CANCELLED | OUTPATIENT
Start: 2022-05-06

## 2022-05-06 RX ORDER — MELOXICAM 7.5 MG/1
7.5 TABLET ORAL DAILY
Status: DISCONTINUED | OUTPATIENT
Start: 2022-05-07 | End: 2022-05-07 | Stop reason: HOSPADM

## 2022-05-06 RX ORDER — METOPROLOL TARTRATE 25 MG/1
25 TABLET, FILM COATED ORAL 2 TIMES DAILY
Status: CANCELLED | OUTPATIENT
Start: 2022-05-06

## 2022-05-06 RX ORDER — NALOXONE HYDROCHLORIDE 0.4 MG/ML
0.2 INJECTION, SOLUTION INTRAMUSCULAR; INTRAVENOUS; SUBCUTANEOUS
Status: DISCONTINUED | OUTPATIENT
Start: 2022-05-06 | End: 2022-05-06

## 2022-05-06 RX ORDER — HYDRALAZINE HYDROCHLORIDE 20 MG/ML
10 INJECTION INTRAMUSCULAR; INTRAVENOUS
Status: DISCONTINUED | OUTPATIENT
Start: 2022-05-06 | End: 2022-05-07 | Stop reason: HOSPADM

## 2022-05-06 RX ORDER — FLUMAZENIL 0.1 MG/ML
0.2 INJECTION, SOLUTION INTRAVENOUS
Status: ACTIVE | OUTPATIENT
Start: 2022-05-06 | End: 2022-05-06

## 2022-05-06 RX ORDER — SODIUM CHLORIDE 9 MG/ML
INJECTION, SOLUTION INTRAVENOUS CONTINUOUS
Status: CANCELLED | OUTPATIENT
Start: 2022-05-06

## 2022-05-06 RX ORDER — IODIXANOL 320 MG/ML
INJECTION, SOLUTION INTRAVASCULAR
Status: DISCONTINUED | OUTPATIENT
Start: 2022-05-06 | End: 2022-05-06 | Stop reason: HOSPADM

## 2022-05-06 RX ORDER — FENTANYL CITRATE 50 UG/ML
25 INJECTION, SOLUTION INTRAMUSCULAR; INTRAVENOUS
Status: DISCONTINUED | OUTPATIENT
Start: 2022-05-06 | End: 2022-05-06 | Stop reason: CLARIF

## 2022-05-06 RX ORDER — NALOXONE HYDROCHLORIDE 0.4 MG/ML
0.4 INJECTION, SOLUTION INTRAMUSCULAR; INTRAVENOUS; SUBCUTANEOUS
Status: CANCELLED | OUTPATIENT
Start: 2022-05-06

## 2022-05-06 RX ORDER — PROCHLORPERAZINE 25 MG
25 SUPPOSITORY, RECTAL RECTAL EVERY 12 HOURS PRN
Status: CANCELLED | OUTPATIENT
Start: 2022-05-06

## 2022-05-06 RX ORDER — SODIUM CHLORIDE 9 MG/ML
INJECTION, SOLUTION INTRAVENOUS CONTINUOUS
Status: DISCONTINUED | OUTPATIENT
Start: 2022-05-06 | End: 2022-05-07 | Stop reason: HOSPADM

## 2022-05-06 RX ORDER — NALOXONE HYDROCHLORIDE 0.4 MG/ML
0.2 INJECTION, SOLUTION INTRAMUSCULAR; INTRAVENOUS; SUBCUTANEOUS
Status: CANCELLED | OUTPATIENT
Start: 2022-05-06

## 2022-05-06 RX ORDER — LIDOCAINE 40 MG/G
CREAM TOPICAL
Status: CANCELLED | OUTPATIENT
Start: 2022-05-06

## 2022-05-06 RX ORDER — LIDOCAINE 40 MG/G
CREAM TOPICAL
Status: DISCONTINUED | OUTPATIENT
Start: 2022-05-06 | End: 2022-05-07 | Stop reason: HOSPADM

## 2022-05-06 RX ORDER — SODIUM CHLORIDE 9 MG/ML
75 INJECTION, SOLUTION INTRAVENOUS CONTINUOUS
Status: ACTIVE | OUTPATIENT
Start: 2022-05-06 | End: 2022-05-06

## 2022-05-06 RX ORDER — ATORVASTATIN CALCIUM 40 MG/1
40 TABLET, FILM COATED ORAL DAILY
Qty: 90 TABLET | Refills: 3 | Status: SHIPPED | OUTPATIENT
Start: 2022-05-06 | End: 2023-04-11

## 2022-05-06 RX ORDER — HYDRALAZINE HYDROCHLORIDE 10 MG/1
10 TABLET, FILM COATED ORAL EVERY 6 HOURS PRN
Status: DISCONTINUED | OUTPATIENT
Start: 2022-05-06 | End: 2022-05-07 | Stop reason: HOSPADM

## 2022-05-06 RX ORDER — DIAZEPAM 5 MG
10 TABLET ORAL ONCE
Status: COMPLETED | OUTPATIENT
Start: 2022-05-06 | End: 2022-05-06

## 2022-05-06 RX ORDER — NICOTINE 21 MG/24HR
1 PATCH, TRANSDERMAL 24 HOURS TRANSDERMAL EVERY EVENING
Status: DISCONTINUED | OUTPATIENT
Start: 2022-05-06 | End: 2022-05-07 | Stop reason: HOSPADM

## 2022-05-06 RX ORDER — HYDROMORPHONE HYDROCHLORIDE 2 MG/1
2 TABLET ORAL
Status: DISCONTINUED | OUTPATIENT
Start: 2022-05-06 | End: 2022-05-07 | Stop reason: HOSPADM

## 2022-05-06 RX ORDER — ACETAMINOPHEN 325 MG/1
650 TABLET ORAL EVERY 4 HOURS PRN
Status: DISCONTINUED | OUTPATIENT
Start: 2022-05-06 | End: 2022-05-07 | Stop reason: HOSPADM

## 2022-05-06 RX ORDER — MELOXICAM 7.5 MG/1
7.5 TABLET ORAL DAILY
Status: CANCELLED | OUTPATIENT
Start: 2022-05-06

## 2022-05-06 RX ORDER — NITROGLYCERIN 0.4 MG/1
TABLET SUBLINGUAL
Qty: 25 TABLET | Refills: 3 | Status: SHIPPED | OUTPATIENT
Start: 2022-05-06

## 2022-05-06 RX ORDER — METOPROLOL TARTRATE 25 MG/1
50 TABLET, FILM COATED ORAL 2 TIMES DAILY
Status: DISCONTINUED | OUTPATIENT
Start: 2022-05-06 | End: 2022-05-07 | Stop reason: HOSPADM

## 2022-05-06 RX ORDER — ASPIRIN 81 MG/1
81 TABLET ORAL DAILY
Status: DISCONTINUED | OUTPATIENT
Start: 2022-05-07 | End: 2022-05-07 | Stop reason: HOSPADM

## 2022-05-06 RX ORDER — SODIUM CHLORIDE 9 MG/ML
INJECTION, SOLUTION INTRAVENOUS CONTINUOUS
Status: DISCONTINUED | OUTPATIENT
Start: 2022-05-06 | End: 2022-05-06 | Stop reason: HOSPADM

## 2022-05-06 RX ORDER — OXYCODONE HYDROCHLORIDE 5 MG/1
10 TABLET ORAL EVERY 4 HOURS PRN
Status: DISCONTINUED | OUTPATIENT
Start: 2022-05-06 | End: 2022-05-06

## 2022-05-06 RX ORDER — TRAMADOL HYDROCHLORIDE 50 MG/1
50 TABLET ORAL EVERY 6 HOURS PRN
Status: CANCELLED | OUTPATIENT
Start: 2022-05-06

## 2022-05-06 RX ORDER — FENTANYL CITRATE 50 UG/ML
INJECTION, SOLUTION INTRAMUSCULAR; INTRAVENOUS
Status: DISCONTINUED | OUTPATIENT
Start: 2022-05-06 | End: 2022-05-06 | Stop reason: HOSPADM

## 2022-05-06 RX ORDER — PROCHLORPERAZINE 25 MG
25 SUPPOSITORY, RECTAL RECTAL EVERY 12 HOURS PRN
Status: DISCONTINUED | OUTPATIENT
Start: 2022-05-06 | End: 2022-05-07 | Stop reason: HOSPADM

## 2022-05-06 RX ORDER — ISOSORBIDE MONONITRATE 30 MG/1
30 TABLET, EXTENDED RELEASE ORAL EVERY EVENING
Status: DISCONTINUED | OUTPATIENT
Start: 2022-05-06 | End: 2022-05-07 | Stop reason: HOSPADM

## 2022-05-06 RX ORDER — NALOXONE HYDROCHLORIDE 0.4 MG/ML
0.4 INJECTION, SOLUTION INTRAMUSCULAR; INTRAVENOUS; SUBCUTANEOUS
Status: DISCONTINUED | OUTPATIENT
Start: 2022-05-06 | End: 2022-05-06

## 2022-05-06 RX ORDER — PROCHLORPERAZINE MALEATE 10 MG
10 TABLET ORAL EVERY 6 HOURS PRN
Status: CANCELLED | OUTPATIENT
Start: 2022-05-06

## 2022-05-06 RX ORDER — PANTOPRAZOLE SODIUM 20 MG/1
40 TABLET, DELAYED RELEASE ORAL 2 TIMES DAILY
Status: CANCELLED | OUTPATIENT
Start: 2022-05-06

## 2022-05-06 RX ORDER — PANTOPRAZOLE SODIUM 40 MG/1
40 TABLET, DELAYED RELEASE ORAL 2 TIMES DAILY
Status: DISCONTINUED | OUTPATIENT
Start: 2022-05-06 | End: 2022-05-07 | Stop reason: HOSPADM

## 2022-05-06 RX ORDER — NITROGLYCERIN 0.4 MG/1
TABLET SUBLINGUAL
Qty: 25 TABLET | Refills: 3 | Status: SHIPPED | OUTPATIENT
Start: 2022-05-06 | End: 2022-05-06

## 2022-05-06 RX ORDER — HYDRALAZINE HYDROCHLORIDE 10 MG/1
10 TABLET, FILM COATED ORAL EVERY 6 HOURS PRN
Status: CANCELLED | OUTPATIENT
Start: 2022-05-06

## 2022-05-06 RX ORDER — ATORVASTATIN CALCIUM 40 MG/1
40 TABLET, FILM COATED ORAL EVERY EVENING
Status: DISCONTINUED | OUTPATIENT
Start: 2022-05-06 | End: 2022-05-07 | Stop reason: HOSPADM

## 2022-05-06 RX ORDER — ATROPINE SULFATE 0.1 MG/ML
0.5 INJECTION INTRAVENOUS
Status: ACTIVE | OUTPATIENT
Start: 2022-05-06 | End: 2022-05-06

## 2022-05-06 RX ORDER — METOPROLOL TARTRATE 25 MG/1
25 TABLET, FILM COATED ORAL 2 TIMES DAILY
Status: DISCONTINUED | OUTPATIENT
Start: 2022-05-06 | End: 2022-05-06

## 2022-05-06 RX ORDER — OXYCODONE HYDROCHLORIDE 5 MG/1
5 TABLET ORAL EVERY 4 HOURS PRN
Status: DISCONTINUED | OUTPATIENT
Start: 2022-05-06 | End: 2022-05-06

## 2022-05-06 RX ORDER — TRAMADOL HYDROCHLORIDE 50 MG/1
50 TABLET ORAL EVERY 6 HOURS PRN
Status: DISCONTINUED | OUTPATIENT
Start: 2022-05-06 | End: 2022-05-07 | Stop reason: HOSPADM

## 2022-05-06 RX ADMIN — METOPROLOL TARTRATE 50 MG: 25 TABLET, FILM COATED ORAL at 21:32

## 2022-05-06 RX ADMIN — ISOSORBIDE MONONITRATE 30 MG: 30 TABLET, EXTENDED RELEASE ORAL at 21:18

## 2022-05-06 RX ADMIN — PANTOPRAZOLE SODIUM 40 MG: 20 TABLET, DELAYED RELEASE ORAL at 08:40

## 2022-05-06 RX ADMIN — DIAZEPAM 10 MG: 5 TABLET ORAL at 12:54

## 2022-05-06 RX ADMIN — SODIUM CHLORIDE: 9 INJECTION, SOLUTION INTRAVENOUS at 13:07

## 2022-05-06 RX ADMIN — ASPIRIN 325 MG: 325 TABLET ORAL at 08:39

## 2022-05-06 RX ADMIN — PANTOPRAZOLE SODIUM 40 MG: 40 TABLET, DELAYED RELEASE ORAL at 21:32

## 2022-05-06 RX ADMIN — METOPROLOL TARTRATE 25 MG: 25 TABLET, FILM COATED ORAL at 08:40

## 2022-05-06 RX ADMIN — MELOXICAM 7.5 MG: 7.5 TABLET ORAL at 08:39

## 2022-05-06 RX ADMIN — SODIUM CHLORIDE: 9 INJECTION, SOLUTION INTRAVENOUS at 07:58

## 2022-05-06 RX ADMIN — ATORVASTATIN CALCIUM 40 MG: 40 TABLET, FILM COATED ORAL at 21:17

## 2022-05-06 ASSESSMENT — ACTIVITIES OF DAILY LIVING (ADL)
ADLS_ACUITY_SCORE: 13
ADLS_ACUITY_SCORE: 12
ADLS_ACUITY_SCORE: 12
ADLS_ACUITY_SCORE: 13
ADLS_ACUITY_SCORE: 12
ADLS_ACUITY_SCORE: 13
ADLS_ACUITY_SCORE: 12

## 2022-05-06 ASSESSMENT — EJECTION FRACTION: EF_VALUE: .32

## 2022-05-06 NOTE — INTERVAL H&P NOTE
"I have reviewed the surgical (or preoperative) H&P that is linked to this encounter, and examined the patient. There are no significant changes    Clinical Conditions Present on Arrival:  Clinically Significant Risk Factors Present on Admission                   # Overweight: Estimated body mass index is 29.27 kg/m  as calculated from the following:    Height as of this encounter: 1.518 m (4' 11.75\").    Weight as of this encounter: 67.4 kg (148 lb 9.6 oz).       "

## 2022-05-06 NOTE — PHARMACY-ADMISSION MEDICATION HISTORY
Admission medication history completed at [facility]. Please see Pharmacy - Admission Medication History note from 05/05/2022.

## 2022-05-06 NOTE — PRE-PROCEDURE
GENERAL PRE-PROCEDURE:   Procedure:  Coronary angiogram with possible PCI  Date/Time:  5/6/2022 1:26 PM    Written consent obtained?: Yes    Risks and benefits: Risks, benefits and alternatives were discussed    Consent given by:  Patient  Patient states understanding of procedure being performed: Yes    Patient's understanding of procedure matches consent: Yes    Procedure consent matches procedure scheduled: Yes    Expected level of sedation:  Moderate  Appropriately NPO:  Yes  ASA Class:  3 (chest pain/jaw pain, strong family hx of premature CAD, HTN, smoker, overweight; BMI 29.27kg/m2)  Mallampati  :  Grade 2- soft palate, base of uvula, tonsillar pillars, and portion of posterior pharyngeal wall visible  Lungs:  Lungs clear with good breath sounds bilaterally  Heart:  Normal heart sounds and rate  History & Physical reviewed:  History and physical reviewed and no updates needed  Statement of review:  I have reviewed the lab findings, diagnostic data, medications, and the plan for sedation

## 2022-05-06 NOTE — PROGRESS NOTES
11:59 AM Report given to EMS and to RN at [facility] lab. Pt stable at the time of transport. Pt endorses all of her belongings are with her.    DISPLAY PLAN FREE TEXT

## 2022-05-06 NOTE — PLAN OF CARE
"PRIMARY DIAGNOSIS: \"GENERIC\" NURSING  OUTPATIENT/OBSERVATION GOALS TO BE MET BEFORE DISCHARGE:  1. ADLs back to baseline: Yes    2. Activity and level of assistance: Ambulating independently.    3. Pain status: Improved-controlled with oral pain medications.    4. Return to near baseline physical activity: Yes     Discharge Planner Nurse   Safe discharge environment identified: Yes  Barriers to discharge: Yes, patient scheduled to transfer to [facility] for angiography in the morning.        Entered by: Isabell Marion RN 05/06/2022 3:20 AM     Please review provider order for any additional goals.   Nurse to notify provider when observation goals have been met and patient is ready for discharge.  "

## 2022-05-06 NOTE — PLAN OF CARE
"Goal Outcome Evaluation:      Pt transferred from [facility] to [facility] room 7 for CA/P.PCI. Pt denies chest pain at this time but has left leg pain that she rates at a \"3\". She states she has the leg pain and numbness and tingling due to back issues. Pt prepped and sent to cath lab.      Rose Loya RN                  "

## 2022-05-06 NOTE — DISCHARGE SUMMARY
[facility]  Discharge Summary - Medicine & Pediatrics       Date of Admission:  5/5/2022  Date of Discharge:  5/6/2022  Discharging Provider: Dr. Byron Noel  Discharge Service: Hospitalist Service    Discharge Diagnoses   ACS rule out    Follow-ups Needed After Discharge   Follow-up with PCP in 7 days for follow-up after hospital stay and medication changes.  Follow-up labs and tests recommended: Pulse and blood pressure.  Consider starting statin medication.    Unresulted Labs Ordered in the Past 30 Days of this Admission     No orders found for last 31 day(s).          Discharge Disposition   Transferred to [facility]  Condition at discharge: Stable      Hospital Course   Selina Parikh is a 56-year-old female with a history of chronic low back pain, GERD, tobacco use and was admitted on 5/5/2022 for chest pain.  The following problems were addressed during her hospitalization:    ACS rule-out  Chest discomfort  Presented with chest pain at rest that radiated to the jaw and was relieved with nitro.  Chest pain and occurring randomly for last 3 weeks and typically goes away with rest or with nitroglycerin. On arrival at the ED, she was afebrile and hypertensive. EKG revealed normal sinus rhythm with a short MA interval (stable from previous). Troponin negative x3; WBC 5.9. CXR unrevealing.  Heart score of 4, indicating 12-16% risk of major cardiovascular events. Cardiac risk factors include HTN, HLD, tobacco use, obesity and family hx of CVD. Differential includes unstable angina vs. hypertension related chest pain vs. GERD.  Patient has extensive family history of coronary artery disease with some brothers with heart attacks as early as 39 years old.  She has no personal history of heart disease.  Cardiology was consulted who started metoprolol and recommended getting CT angiogram and is being transferred to [facility] for this procedure.  Patient was no longer having chest pain on the  day of discharge.     Hyperlipidemia  Lipid panel on 4/29 with  and total cholesterol 209. The 10-year ASCVD risk score (Kincaid JUDITH Jr., et al., 2013) is: 8.7%. Can consider statin therapy as outpatient.     H/o Dyspnea  Per chart review, previously reported 3 weeks of mild dyspnea with activity and 6 pounds of weight gain. Reported lower extremity edema a few days ago, although not present on admission. Denies any orpthopnea or PND. Chest x-ray is clear.   BMP was within normal limits.     Elevated blood pressure, history of hypertension  Reports prior use of metoprolol for hypertension though stopped due to labile blood pressures, including low values.  Started on metoprolol in the hospital which will be continued on discharge.  Will need follow-up outpatient.     Chronic Low Back Pain  Follows with [facility] spine center.  Patient was continued on home tramadol prn, tizanidine prn while in the hospital.     Gastroesophageal reflux disease  Continue home omeprazole prn     Tobacco Use Disorder  Reports smoking 6-10 cigarettes per day. Patch was available but the patient did not use and is trying to use this as opportunity to quit smoking.    Consultations This Hospital Stay   CARDIOLOGY IP CONSULT  CARE MANAGEMENT / SOCIAL WORK IP CONSULT    Code Status   Full Code       The patient was discussed with Dr. Byron Mercado MD  Teaching service  [place] [address] ICU  [address] [place] [id number]  Phone: [phone]  Fax: [phone]  ______________________________________________________________________    Physical Exam   Vital Signs: Temp: 97.9  F (36.6  C) Temp src: Oral BP: 135/77 Pulse: 78   Resp: 20 SpO2: 95 % O2 Device: None (Room air)    Weight: 148 lbs 9.6 oz  Constitutional: awake, alert, cooperative, no apparent distress, and appears stated age  Respiratory: No increased work of breathing, good air exchange, clear to auscultation bilaterally,  no crackles or wheezing  Cardiovascular: Regular rate and rhythm, normal S1 and S2, no S3 or S4, and no murmur noted  Skin: no bruising or bleeding, no rashes and no lesions  Musculoskeletal: There is no redness, warmth, or swelling of the joints.  No lower extremity edema.  Tone is normal.      Primary Care Physician   No primary care provider on file.    Discharge Orders      General info for SNF    Length of Stay Estimate: Short Term Care: Estimated # of Days 1-2  Condition at Discharge: Stable  Level of care: inpatient hospital  Admission H&P remains valid and up-to-date: Yes  Recent Chemotherapy: N/A  Use Nursing Home Standing Orders: Yes     Reason for your hospital stay    Chest pain.     Activity - Up ad evelia       Significant Results and Procedures   Most Recent 3 CBC's:Recent Labs   Lab Test 05/05/22  0812 04/28/22  1543 04/15/22  1116   WBC 5.9 7.2 9.1   HGB 14.0 14.1 14.3   MCV 92 92 94    286 264     Most Recent 3 BMP's:Recent Labs   Lab Test 05/05/22  0812 04/28/22  1543 04/15/22  1116    143 140   POTASSIUM 4.4 4.0 4.2   CHLORIDE 107 109 108   CO2 24 26 26   BUN 12 11 15   CR 0.77 0.70 0.80   ANIONGAP 11 8 6   MAXWELL 9.6 8.7 9.2   GLC 90 82 86     Most Recent 3 Troponin's:Recent Labs   Lab Test 04/19/21  2056 10/12/20  0842 10/12/20  0441   TROPI <0.015 <0.015 <0.015     Most Recent 3 BNP's:No lab results found.,   Results for orders placed or performed during the hospital encounter of 05/05/22   XR Chest 2 Views    Narrative    EXAM: XR CHEST 2 VW  LOCATION: [facility]  DATE/TIME: 5/5/2022 9:01 AM    INDICATION: Chest pain  COMPARISON: 04/19/2021      Impression    IMPRESSION: Strand of fibrosis or linear atelectasis in the right middle lobe and lingula. Lungs are otherwise clear. No pleural effusion. Heart size and pulmonary vascularity within normal limits. Cholecystectomy clips. Bilateral ean and pedicle screw   fixation lumbar spine.   Soft tissue neck CT w  contrast    Narrative    EXAM: CT SOFT TISSUE NECK W CONTRAST  LOCATION: [facility]  DATE/TIME: 5/5/2022 9:17 AM    INDICATION: Neck pain.  COMPARISON: None.  CONTRAST: 100 mL Isovue 370.  TECHNIQUE: Routine CT Soft Tissue Neck with IV contrast. Multiplanar reformats. Dose reduction techniques were used.    FINDINGS:     MUCOSAL SPACES/SOFT TISSUES: Normal mucosal spaces of the upper aerodigestive tract. No mucosal mass or inflammation identified. Normal vocal cords and infraglottic trachea. Normal parapharyngeal space and subcutaneous soft tissues. Normal oral cavity,    spaces, and floor of mouth structures. Normal caliber of the epiglottis. Normal cervical prevertebral soft tissues.    LYMPH NODES: No pathologic lymph nodes by size or morphology criteria. Scattered nonenlarged and top normal caliber homogeneous enhancing lymph nodes bilateral level II, III and IV chains right more prominent than left seen series 5 image 56. This is   presumed reactive. Nothing for abscess or suppurative adenopathy.    SALIVARY GLANDS: Normal parotid and submandibular glands.    THYROID: Normal.     VESSELS: Vascular structures of the neck are patent.    VISUALIZED INTRACRANIAL/ORBITS/SINUSES: No abnormality of the visualized intracranial compartment or orbits. Ethmoid air cell membrane thickening.    OTHER: No destructive osseous lesions. Postoperative changes of anterior/posterior fusion of the cervical spine C4-C7. Anterior subluxation C7-T1 with degenerative changes upper thoracic levels including interspace narrowing and endplate osteophytes.   Patient is edentulous. Upper lungs are clear without discrete pulmonary nodule. No mediastinal adenopathy noted. No airway narrowing.      Impression    IMPRESSION:   1.  No evidence for inflammatory process or pathologic enhancement within the neck.    2.  No focal fluid collections    3.  Uncomplicated appearance to postoperative fusion changes  C4-C7 anteriorly and posteriorly.    4.  Nonenlarged and top normal homogeneous enhancing lymph nodes within the carotid spaces of the neck right more than left are presumed physiologic/reactive    5.  Additional less significant details are provided above.       Discharge Medications   Current Discharge Medication List      CONTINUE these medications which have NOT CHANGED    Details   acetaminophen (TYLENOL) 500 MG tablet Take 1,000 mg by mouth daily before breakfast And second dose as needed      clobetasol (TEMOVATE) 0.05 % external ointment Apply topically 2 times daily  Qty: 60 g, Refills: 3    Associated Diagnoses: Psoriasis      fish oil-omega-3 fatty acids 1000 MG capsule Take 2 g by mouth daily      Melatonin 10 MG TABS tablet Take 20 mg by mouth nightly as needed for sleep      meloxicam (MOBIC) 7.5 MG tablet Take 1 tablet (7.5 mg) by mouth daily  Qty: 30 tablet, Refills: 1    Associated Diagnoses: Chronic low back pain with left-sided sciatica, unspecified back pain laterality      omeprazole (PRILOSEC) 20 MG DR capsule Take 1 capsule (20 mg) by mouth 3 times daily  Qty: 270 capsule, Refills: 3    Associated Diagnoses: Gastroesophageal reflux disease without esophagitis      traMADol (ULTRAM) 50 MG tablet Take 1 tablet (50 mg) by mouth every 6 hours as needed for pain (back and neck pain)  Qty: 10 tablet, Refills: 0    Associated Diagnoses: Chronic low back pain with left-sided sciatica, unspecified back pain laterality      Vitamin D (Cholecalciferol) 25 MCG (1000 UT) TABS Take 1,000 Units by mouth daily           Allergies   Allergies   Allergen Reactions     Ciprofloxacin Anaphylaxis     Flagyl [Metronidazole] Anaphylaxis     Gabapentin Other (See Comments)     Suicidal thoughts.     Zofran [Ondansetron] Other (See Comments) and GI Disturbance     Causes bloating, moderately uncomfortable, abd pain       Augmentin Nausea and Vomiting     Benadryl [Diphenhydramine] Other (See Comments)     Muscle  spasms     Percocet [Oxycodone-Acetaminophen] Other (See Comments)     Goes nuts - nasty mean irritated, can take Dilaudid     Vicodin [Hydrocodone-Acetaminophen] Other (See Comments)     Anxiety-agitation

## 2022-05-06 NOTE — PROGRESS NOTES
Patient was kept comfortable during post-procedure stay.VSS. Denies pain. Right radial access site remains dry & free from signs of bleeding. Appointment made & included in AVS. Dr. Spangler was able to speak with patient's family post-procedure. Reviewed activity restrictions with pt, specifically restrictions to right arm/wrist. Able to ask questions. Verbalized no concerns. Belongings returned. Report given to Cameron. Transferred to P3 in stable condition.

## 2022-05-06 NOTE — DISCHARGE INSTRUCTIONS
Interventional Cardiology  Coronary Angiogram/Angioplasty/Stent/Atherectomy Discharge  Instructions -   Radial (wrist) Approach     The instructions below are to help you understand how to take care of yourself. There is also information about when to call the doctor or emergency services.    **Do not stop your aspirin or platelet inhibitor unless directed by your Cardiologist.  These medications help to prevent platelets in your blood from sticking together and forming a clot.   Examples of these medications are: Ticagrelor (Brilinta), Clopidogrel (Plavix), Prasugrel (Effient)    For 24 hours after procedure:  Do not subject hand/arm to any forceful movements for 24 hours, such as supporting weight when rising from a chair or bed.  Do not drive a car for 24 hours.  The dressing on the puncture site may be removed after 24 hours and left open to air. If minor oozing, you may apply a Band-Aid and remove after 12 hours.   You may shower on the day after your procedure. Do not take a tub bath or wash dishes (no soaking wrist) with the puncture site in water for 3 days after the procedure.    For 48 hours following the procedure:  Do not operate a lawnmower, motorcycle, chainsaw or all-terrain vehicle.  Do not lift anything heavier than 5-10 pounds with affected arm for 5 days.  Avoid excessive bending (flexion/extension) wrist movement.  Do not engage in vigorous exercise (i.e. tennis, golf) using the affected arm for 5 days after discharge.  You may return to work in 72 hours if no complications and no heavy lifting.    If bleeding should occur following discharge:  Sit down and apply firm pressure with your thumb against the puncture site and fingers against back of wrist for 10 minutes.  If the bleeding stops, continue to rest, keeping your wrist still for 2 hours. Notify your doctor as soon as possible.  If bleeding does not stop after 10 minutes or if there is a large amount of bleeding or spurting, call 911  immediately. Do not drive yourself to the hospital.           Contact the Heart Clinic at [phone] if you develop:  Fever over 100.4, that lasts more than one day  Redness, heat, or pus at the puncture site  Change in color or temperature of the hand or arm    Expect mild tingling of hand and tenderness at the puncture site for up to 3 days. You may take Tylenol or a pain medicine recommended by your doctor.                       Our Cardiac Rehab staff may visit briefly with you while your in the hospital.   If they miss you, someone will contact you after you are home.  You are encouraged to enroll in an Outpatient Cardiac Rehab Program       Your Procedural Physician was: Dr. Stefanie Spangler  the phone number is: [phone]    [facility]:  [phone]  If you are calling after hours, please listen to the entire voicemail, a live  will answer at the end of the message    * Recovery After Conscious Sedation (Adult)  We gave you medicine by vein to make you sleepy or relaxed during your procedure. This may have included both a pain medicine and sleeping medicine. Most of the effects have worn off. But you may still feel sleepy for the next 6 to 8 hours.  Home care  Follow these guidelines when you get home:  You may feel sleepy and clumsy and have poor balance for the next few hours.  A responsible adult should stay with you for the next 8 hours. This person should make sure your condition doesn t get worse.  Don't drink any alcohol for the next 24 hours.  Don't drive, operate dangerous machinery, make important business or personal decisions or sign legal documents during the next 24 hours.  You may vomit (throw up) if you eat too soon after the procedure. If this happens, drink small amounts of water, juice or clear broth. Wait to try solid food until you no longer have nausea (upset stomach).  Note: Your care team may tell you not to take any medicine by mouth for pain  or sleep in the next 4 hours. These medicines may react with the medicines you had in the hospital. This could cause a much stronger response than usual.  Follow-up care  Follow up with your care team if you are not alert and back to your usual level of activity within 12 hours.  When to seek medical advice  Call your care team right away if any of these occur:  You still feel sleepy or clumsy after 12 hours, or your sleepiness gets worse  Weakness or dizziness gets worse  Repeated vomiting  If you can't be woken up and someone is staying with you, they should call 911.  Date Last Reviewed: 10/18/2016    [id number] [employer]. [address] [place] [address], [place] [address]. All rights reserved. This information is not intended as a substitute for professional medical care. Always follow your healthcare professional's instructions.  This information has been modified by your health care provider with permission from the publisher.

## 2022-05-07 VITALS
DIASTOLIC BLOOD PRESSURE: 64 MMHG | HEART RATE: 72 BPM | TEMPERATURE: 97.9 F | BODY MASS INDEX: 30.82 KG/M2 | WEIGHT: 152.9 LBS | SYSTOLIC BLOOD PRESSURE: 106 MMHG | RESPIRATION RATE: 20 BRPM | HEIGHT: 59 IN | OXYGEN SATURATION: 96 %

## 2022-05-07 PROBLEM — I10 PRIMARY HYPERTENSION: Status: ACTIVE | Noted: 2022-05-07

## 2022-05-07 LAB
ANION GAP SERPL CALCULATED.3IONS-SCNC: 7 MMOL/L (ref 5–18)
BUN SERPL-MCNC: 18 MG/DL (ref 8–22)
CALCIUM SERPL-MCNC: 8.4 MG/DL (ref 8.5–10.5)
CHLORIDE BLD-SCNC: 109 MMOL/L (ref 98–107)
CO2 SERPL-SCNC: 24 MMOL/L (ref 22–31)
CREAT SERPL-MCNC: 0.75 MG/DL (ref 0.6–1.1)
ERYTHROCYTE [DISTWIDTH] IN BLOOD BY AUTOMATED COUNT: 14.1 % (ref 10–15)
GFR SERPL CREATININE-BSD FRML MDRD: >90 ML/MIN/1.73M2
GLUCOSE BLD-MCNC: 105 MG/DL (ref 70–125)
HCT VFR BLD AUTO: 35.1 % (ref 35–47)
HGB BLD-MCNC: 11.4 G/DL (ref 11.7–15.7)
MCH RBC QN AUTO: 30.5 PG (ref 26.5–33)
MCHC RBC AUTO-ENTMCNC: 32.5 G/DL (ref 31.5–36.5)
MCV RBC AUTO: 94 FL (ref 78–100)
PLATELET # BLD AUTO: 241 10E3/UL (ref 150–450)
POTASSIUM BLD-SCNC: 4.2 MMOL/L (ref 3.5–5)
RBC # BLD AUTO: 3.74 10E6/UL (ref 3.8–5.2)
SODIUM SERPL-SCNC: 140 MMOL/L (ref 136–145)
WBC # BLD AUTO: 6.4 10E3/UL (ref 4–11)

## 2022-05-07 PROCEDURE — 36415 COLL VENOUS BLD VENIPUNCTURE: CPT | Performed by: INTERNAL MEDICINE

## 2022-05-07 PROCEDURE — 250N000013 HC RX MED GY IP 250 OP 250 PS 637: Performed by: INTERNAL MEDICINE

## 2022-05-07 PROCEDURE — 85027 COMPLETE CBC AUTOMATED: CPT | Performed by: INTERNAL MEDICINE

## 2022-05-07 PROCEDURE — 99207 PR NO CHARGE LOS: CPT | Performed by: INTERNAL MEDICINE

## 2022-05-07 PROCEDURE — G0378 HOSPITAL OBSERVATION PER HR: HCPCS

## 2022-05-07 PROCEDURE — 99220 PR INITIAL OBSERVATION CARE,LEVEL III: CPT | Mod: GC

## 2022-05-07 PROCEDURE — 250N000013 HC RX MED GY IP 250 OP 250 PS 637

## 2022-05-07 PROCEDURE — 80048 BASIC METABOLIC PNL TOTAL CA: CPT | Performed by: INTERNAL MEDICINE

## 2022-05-07 RX ORDER — METOPROLOL TARTRATE 50 MG
50 TABLET ORAL 2 TIMES DAILY
Qty: 60 TABLET | Refills: 1 | Status: SHIPPED | OUTPATIENT
Start: 2022-05-07 | End: 2022-06-02 | Stop reason: ALTCHOICE

## 2022-05-07 RX ORDER — ISOSORBIDE MONONITRATE 30 MG/1
30 TABLET, EXTENDED RELEASE ORAL EVERY EVENING
Qty: 30 TABLET | Refills: 1 | Status: SHIPPED | OUTPATIENT
Start: 2022-05-07 | End: 2022-08-11

## 2022-05-07 RX ADMIN — METOPROLOL TARTRATE 50 MG: 25 TABLET, FILM COATED ORAL at 07:57

## 2022-05-07 RX ADMIN — MELOXICAM 7.5 MG: 7.5 TABLET ORAL at 07:57

## 2022-05-07 RX ADMIN — Medication 5 MG: at 02:17

## 2022-05-07 RX ADMIN — ACETAMINOPHEN 650 MG: 325 TABLET ORAL at 02:17

## 2022-05-07 RX ADMIN — Medication 81 MG: at 07:57

## 2022-05-07 RX ADMIN — PANTOPRAZOLE SODIUM 40 MG: 40 TABLET, DELAYED RELEASE ORAL at 07:57

## 2022-05-07 ASSESSMENT — ACTIVITIES OF DAILY LIVING (ADL)
ADLS_ACUITY_SCORE: 13

## 2022-05-07 NOTE — PROGRESS NOTES
Pt. Got up about a half hour ago and took a walk . She did a few  Laps around the hills way and then back to bed.   Also while pt. Was up I marked where bruise is starting on wrist.

## 2022-05-07 NOTE — PROGRESS NOTES
Pt. Post radial angio.  Site looks great, band aid on and no swelling or bruising.  Pt. Is alert and oriented, is totally independent at home with no assistance needed.   Had to remind pt. To not move wrist around, to not push and pull over 10 pounds.  Pt. Verbalizes understanding.

## 2022-05-07 NOTE — DISCHARGE SUMMARY
[facility]  Discharge Summary - Medicine & Pediatrics       Date of Admission:  5/6/2022  Date of Discharge:  5/7/2022  Discharging Provider: Anthony Boykin MD.  Discharge Service: [facility].    Discharge Diagnoses   Chest pain.    Follow-ups Needed After Discharge   Follow-up Appointments     Follow-up and recommended labs and tests       Follow up with primary care provider, Kiarra Monreal, within 7 days for   hospital follow- up.  No follow-up lab tests are needed.         Discuss ZEKE and whether sleep study would be indicated.  Ensure cardiology follow-up.    Unresulted Labs Ordered in the Past 30 Days of this Admission     No orders found for last 31 day(s).      These results will be followed up by PCP.    Discharge Disposition   Discharged to home  Condition at discharge: Stable    Hospital Course   Selina Parikh is a 56 year old female transferred from [facility] to [facility] on 5/6/2022 for chest pain and coronary catheterization.  She has a history significant for chronic low back pain, GERD, HTN, tobacco abuse.     ACS rule out  Chest pain  Initially presented to [facility] for chest pain with radiation to jaw, relieved with nitroglycerin.  Troponin x3 negative.  ECG without acute ST or T wave changes.  Significant cardiac risk factors including hypertension, hyperlipidemia, tobacco abuse and family history of coronary disease.  Transferred to [facility] for coronary angiogram. Coronary angiogram on 5/6/2022 showing diffuse distal vessel tapering, particularly in the LAD.  No focal or severe atherosclerosis and no stents were placed.  Doing well post procedure, asymptomatic.  Stable for discharge 5/7/2022.  -Continue aspirin 81 mg daily.  -Continue atorvastatin 40 mg daily.  -Continue isosorbide mononitrate 30 mg daily.  -Continue metoprolol 50 mg 2 times daily.  -Tobacco cessation.  -Follow-up with cardiology in 8 to 10 weeks.  -Follow-up with PCP within  "7 days.     Hyperlipidemia  Lipid panel on 4/29 with LDL of 137, total cholesterol 209.  10-year ASCVD risk score 8.7.  -Continue atorvastatin 40 mg daily.     Tobacco use disorder.  Started smoking at 9 years of age.  Previously 2 pack a day smoker.  Currently smoking 6 cigarettes daily.  Planning to quit following hospitalization.  -Open to trying Chantix on discharge.    Concern for ZEKE  Endorses snoring loudly at night.  STOP-BANG score of at least 3.  Known history of hypertension.  Has had episodes where she feels her \" throat closes\" when changing positions.  -Follow-up with PCP, consider sleep study outpatient.     Chronic conditions:  Hypertension: Previously treated with metoprolol, has been off since October.  New regimen as above.  GERD: PTA omeprazole.  Chronic low back pain: Follows with [facility] [place].  Continue as needed tramadol, continue meloxicam.    Consultations This Hospital Stay   PHARMACY IP CONSULT  PHARMACY IP CONSULT  HOSPITALIST IP CONSULT  SMOKING CESSATION PROGRAM IP CONSULT    Code Status   Full Code       The patient was discussed with Dr. Hill Mauro III.    Vic Boykin MD  [facility], PGY-3  ______________________________________________________________________    Physical Exam   Vital Signs: Temp: 97.9  F (36.6  C) Temp src: Oral BP: 106/64 Pulse: 72   Resp: 20 SpO2: 96 % O2 Device: None (Room air) Oxygen Delivery: 2 LPM  Weight: 152 lbs 14.4 oz    General appearance: alert, appears stated age, cooperative and no distress.  Head: Normocephalic, atraumatic.  Eyes: conjunctivae/corneas clear.  Throat: MMM.  Neck: supple, symmetrical, trachea midline.  Lungs: clear to auscultation bilaterally, no increased respiratory effort.  Heart: regular rate and rhythm, no murmurs.  Abdomen: Nondistended.  Extremities: extremities atraumatic, no cyanosis, no edema.  Skin: Skin color, texture, turgor normal. No rashes or lesions.  Neurologic: " Alert and oriented X 3, normal strength and tone.  Psychiatric: mood and affect appropriate.      Primary Care Physician   Kiarra Monreal    Discharge Orders      Reason for your hospital stay    Chest pain.     Follow-up and recommended labs and tests     Follow up with primary care provider, Kiarra Monreal, within 7 days for hospital follow- up.  No follow-up lab tests are needed.     Activity    Your activity upon discharge: activity as tolerated     Discharge patient     Diet    Follow this diet upon discharge: Orders Placed This Encounter      Low Saturated Fat Na <2400 mg       Significant Results and Procedures   Results for orders placed or performed during the hospital encounter of 05/06/22   Cardiac Catheterization    Narrative      Angina in a long term smoker with early onset ischemic heart disease in   several first degree relatives.    Diffuse pronounced distal vessel tapering, particularly in the LAD.   There is a moderate mid-distal LAD myocardial bridge, otherwise there is   no focal or severe atherosclerosis.    LV EPD 12 mmHg. LV-Ao gradient 7 mmHg by pullback.            Discharge Medications   Current Discharge Medication List      START taking these medications    Details   aspirin (ASA) 81 MG EC tablet Take 1 tablet (81 mg) by mouth daily  Qty: 90 tablet, Refills: 3    Associated Diagnoses: Coronary artery disease due to lipid rich plaque      atorvastatin (LIPITOR) 40 MG tablet Take 1 tablet (40 mg) by mouth daily  Qty: 90 tablet, Refills: 3    Associated Diagnoses: Acute chest pain; Coronary artery disease due to lipid rich plaque      isosorbide mononitrate (IMDUR) 30 MG 24 hr tablet Take 1 tablet (30 mg) by mouth every evening  Qty: 30 tablet, Refills: 1    Associated Diagnoses: Primary hypertension      metoprolol tartrate (LOPRESSOR) 50 MG tablet Take 1 tablet (50 mg) by mouth 2 times daily  Qty: 60 tablet, Refills: 1    Associated Diagnoses: Primary hypertension      nitroGLYcerin  "(NITROSTAT) 0.4 MG sublingual tablet One tablet under the tongue every 5 minutes if needed for chest pain. May repeat every 5 minutes for a maximum of 3 doses in 15 minutes\"  Qty: 25 tablet, Refills: 3    Associated Diagnoses: Acute chest pain; Coronary artery disease due to lipid rich plaque      varenicline (CHANTIX BASIM) 0.5 MG X 11 & 1 MG X 42 tablet Take 0.5 mg tab daily for 3 days, THEN 0.5 mg tab twice daily for 4 days, THEN 1 mg twice daily.  Qty: 53 tablet, Refills: 0    Associated Diagnoses: Tobacco abuse         CONTINUE these medications which have NOT CHANGED    Details   acetaminophen (TYLENOL) 500 MG tablet Take 1,000 mg by mouth daily before breakfast And second dose as needed      clobetasol (TEMOVATE) 0.05 % external ointment Apply topically 2 times daily  Qty: 60 g, Refills: 3    Associated Diagnoses: Psoriasis      fish oil-omega-3 fatty acids 1000 MG capsule Take 2 g by mouth daily      Melatonin 10 MG TABS tablet Take 20 mg by mouth nightly as needed for sleep      meloxicam (MOBIC) 7.5 MG tablet Take 1 tablet (7.5 mg) by mouth daily  Qty: 30 tablet, Refills: 1    Associated Diagnoses: Chronic low back pain with left-sided sciatica, unspecified back pain laterality      omeprazole (PRILOSEC) 20 MG DR capsule Take 1 capsule (20 mg) by mouth 3 times daily  Qty: 270 capsule, Refills: 3    Associated Diagnoses: Gastroesophageal reflux disease without esophagitis      traMADol (ULTRAM) 50 MG tablet Take 1 tablet (50 mg) by mouth every 6 hours as needed for pain (back and neck pain)  Qty: 10 tablet, Refills: 0    Associated Diagnoses: Chronic low back pain with left-sided sciatica, unspecified back pain laterality      Vitamin D (Cholecalciferol) 25 MCG (1000 UT) TABS Take 1,000 Units by mouth daily           Allergies   Allergies   Allergen Reactions     Ciprofloxacin Anaphylaxis     Flagyl [Metronidazole] Anaphylaxis     Gabapentin Other (See Comments)     Suicidal thoughts.     Zofran [Ondansetron] " Other (See Comments) and GI Disturbance     Causes bloating, moderately uncomfortable, abd pain       Augmentin Nausea and Vomiting     Benadryl [Diphenhydramine] Other (See Comments)     Muscle spasms     Percocet [Oxycodone-Acetaminophen] Other (See Comments)     Goes nuts - nasty mean irritated, can take Dilaudid     Vicodin [Hydrocodone-Acetaminophen] Other (See Comments)     Anxiety-agitation

## 2022-05-07 NOTE — H&P
"[facility]    History and Physical - [facility]     Date of Admission:  5/6/2022    Assessment & Plan      Selina Parikh is a 56 year old female transferred from [facility] to [facility] on 5/6/2022.  She has a history significant for chronic low back pain, GERD, HTN, tobacco abuse.  Admitted to [facility] on 5/5/2022.    ACS rule out  Chest pain  Initially presented to [facility] for chest pain with radiation to jaw, relieved with nitroglycerin.  Troponin x3 negative.  EKG without acute ST or T wave changes.  Significant cardiac risk factors including hypertension, hyperlipidemia, tobacco abuse and family history of coronary disease.  Transferred to [facility] for coronary angiogram. Coronary angiogram on 5/6/2022 showing diffuse distal vessel tapering, particularly in the LAD.  No focal or severe atherosclerosis.  Doing well post procedure.  Asymptomatic  -Continue aspirin 81 mg daily  -Continue atorvastatin 40 mg daily  -Continue isosorbide mononitrate 30 mg daily  -Continue metoprolol 50 mg 2 times daily  -Tobacco cessation  -Likely discharge to home tomorrow  -Follow-up with Dr. Ferreira in 4 to 6 weeks    Hyperlipidemia  Lipid panel on 4/29 with LDL of 137, total cholesterol 209.  10-year ASCVD risk score 8.7.  -Continue atorvastatin 40 mg daily    Tobacco use disorder.  Started smoking at 9 years of age.  Previously 2 pack a day smoker.  Currently smoking 6 cigarettes daily.  Planning to quit following hospitalization.  Declining nicotine replacement.  Could consider starting Wellbutrin as an outpatient if needed    Concern for ZEKE  Endorses snoring loudly at night.  STOP-BANG score of at least 3.  Known history of hypertension.  Has had episodes where she feels her \" throat closes\" when changing positions.  Suggest excessive upper airway soft tissue. Consider outpatient evaluation.      Chronic conditions  Hypertension -previously treated with " "metoprolol, has been off since October.  Currently normotensive, slightly soft, asymptomatic.  GERD -PTA omeprazole  Chronic low back pain -follows with [facility].  Continue as needed tramadol, continue meloxicam       Diet: Low Saturated Fat Na <2400 mg    DVT Prophylaxis: Low Risk/Ambulatory with no VTE prophylaxis indicated  Pradhan Catheter: Not present  Fluids: None  Central Lines: None  Cardiac Monitoring: ACTIVE order. Indication: Post- PCI/ percutaneous cardiac intervention (24 hours)  Code Status: Full Code      Clinically Significant Risk Factors Present on Admission                  # Obesity: Estimated body mass index is 30.86 kg/m  as calculated from the following:    Height as of this encounter: 1.499 m (4' 11\").    Weight as of this encounter: 69.3 kg (152 lb 12.8 oz).      Disposition Plan   Expect discharge to home on 5/8/2022 following clearance from cardiology.       The patient's care was discussed with the attending physician at morning report.    Vic Gonzalez MD PGY-1  [facility]         ______________________________________________________________________    Chief Complaint   Chest pain, need for coronary angiogram.    History obtained from patient and chart review.    History of Present Illness   Selina Parikh is a 56 year old female who was transferred to [facility] on 5/6/2022 for coronary angiogram.      Presented to [facility] ED with chest pain, relieved with nitroglycerin.  Strong family history of early coronary artery disease, with most siblings requiring coronary intervention.  Troponin x3 were negative.  EKG without any acute ST or T wave changes.  Decision was made to transfer to [facility] for coronary angiogram.    Angiogram showing diffuse pronounced with distal vessel tapering, particularly in the LAD.  Moderate mid distal LAD myocardial bridge.  No focal or severe atherosclerosis.  Tolerated the procedure well.  Currently denies any " chest pain or shortness of breath.  No pain.  Hoping to return home early tomorrow to attend a family event.       Review of Systems    The 10 point Review of Systems is negative other than noted in the HPI    Past Medical History    I have reviewed this patient's medical history and updated it with pertinent information if needed.   Past Medical History:   Diagnosis Date     Cancer (H)      Hiatal hernia      Hypertension         Past Surgical History   I have reviewed this patient's surgical history and updated it with pertinent information if needed.  Past Surgical History:   Procedure Laterality Date     BACK SURGERY       CHOLECYSTECTOMY       CHOLECYSTECTOMY, LAPOROSCOPIC  2/14/2000    Cholecystectomy, Laparoscopic     COLON SURGERY       CYSTOSCOPY, INSERT LIGHTED STENT URETER(S) N/A 4/10/2018    Procedure: CYSTOSCOPY, INSERT LIGHTED STENT URETER(S);  Lighted Stent Placement,Laparoscopic Assisted Sigmoid Colectomy;  Surgeon: ERVIN Reina MD;  Location: [facility] OR     LAPAROSCOPIC ASSISTED COLECTOMY LEFT (DESCENDING) N/A 4/10/2018    Procedure: LAPAROSCOPIC ASSISTED COLECTOMY LEFT (DESCENDING);;  Surgeon: Hill Murray MD;  Location: [facility] OR     NECK SURGERY       ORTHOPEDIC SURGERY  10/2010    Cut palm and reattched tendons and nerves in left hand forefinger.     CO ESOPHAGOGASTRODUODENOSCOPY TRANSORAL DIAGNOSTIC N/A 4/30/2021    Procedure: ESOPHAGOGASTRODUODENOSCOPY (EGD);  Surgeon: Souleymane Moreno DO;  Location: [facility];  Service: General     SURGICAL HISTORY OF -   1/4/2000    Esophagogastroduodenoscopy with biopsy     TUBAL LIGATION       TUBAL LIGATION          Social History   I have reviewed this patient's social history and updated it with pertinent information if needed. Selina MARY Parikh  reports that she has been smoking cigarettes. She has a 15.00 pack-year smoking history. She has never used smokeless tobacco. She reports previous alcohol use. She reports previous drug use.    Family  History   I have reviewed this patient's family history and updated it with pertinent information if needed.  Family History   Problem Relation Age of Onset     C.A.D. Father 50        50's     Diabetes Father      Diabetes Brother      C.A.D. Brother 42        quad bypass and MI     Diabetes Brother         2 brothers have DM     Cancer Brother 34        Melanoma     Aortic aneurysm Brother      Allergies Son         Meds     Allergies Daughter         Med     Cancer Mother      C.A.D. Brother 38        MI age 38     Diabetes Mother      Diabetes Brother        Prior to Admission Medications   Prior to Admission Medications   Prescriptions Last Dose Informant Patient Reported? Taking?   Melatonin 10 MG TABS tablet  Self Yes Yes   Sig: Take 20 mg by mouth nightly as needed for sleep   Vitamin D (Cholecalciferol) 25 MCG (1000 UT) TABS 5/5/2022 at Unknown time Self Yes Yes   Sig: Take 1,000 Units by mouth daily   acetaminophen (TYLENOL) 500 MG tablet 5/5/2022 at Unknown time Self Yes Yes   Sig: Take 1,000 mg by mouth daily before breakfast And second dose as needed   clobetasol (TEMOVATE) 0.05 % external ointment   No Yes   Sig: Apply topically 2 times daily   Patient taking differently: Apply topically 2 times daily as needed   fish oil-omega-3 fatty acids 1000 MG capsule 5/5/2022 at Unknown time Self Yes Yes   Sig: Take 2 g by mouth daily   meloxicam (MOBIC) 7.5 MG tablet 5/5/2022 at Unknown time Self No Yes   Sig: Take 1 tablet (7.5 mg) by mouth daily   omeprazole (PRILOSEC) 20 MG DR capsule 5/5/2022 at Unknown time Self No Yes   Sig: Take 1 capsule (20 mg) by mouth 3 times daily   traMADol (ULTRAM) 50 MG tablet  Self No Yes   Sig: Take 1 tablet (50 mg) by mouth every 6 hours as needed for pain (back and neck pain)      Facility-Administered Medications: None     Allergies   Allergies   Allergen Reactions     Ciprofloxacin Anaphylaxis     Flagyl [Metronidazole] Anaphylaxis     Gabapentin Other (See Comments)      Suicidal thoughts.     Zofran [Ondansetron] Other (See Comments) and GI Disturbance     Causes bloating, moderately uncomfortable, abd pain       Augmentin Nausea and Vomiting     Benadryl [Diphenhydramine] Other (See Comments)     Muscle spasms     Percocet [Oxycodone-Acetaminophen] Other (See Comments)     Goes nuts - nasty mean irritated, can take Dilaudid     Vicodin [Hydrocodone-Acetaminophen] Other (See Comments)     Anxiety-agitation       Physical Exam   Vital Signs: Temp: 97.6  F (36.4  C) Temp src: Oral BP: 93/52 Pulse: 67   Resp: 18 SpO2: 97 % O2 Device: None (Room air) Oxygen Delivery: 2 LPM  Weight: 152 lbs 12.8 oz    General Appearance: Appears comfortable.  No acute distress.  Eyes: Pupils equal and round.  Extraocular muscles intact.  Clear sclera and conjunctiva.  HEENT: Moist mucous membranes.  No rhinorrhea.  No oral ulceration.  Respiratory: Clear to auscultation bilaterally.  Comfortable respiratory effort.  No crackles or wheezing.  Cardiovascular: Regular rate and rhythm.  No murmurs rubs or gallops.  GI: Nondistended.  Bowel sounds present.  Soft nontender.  Skin: No significant rash.  Skin is warm and well-perfused.  Arterial access in the right upper extremity.  No bleeding.  Nontender.  Musculoskeletal: 5 out of 5 strength in all 4 extremities.  Neurologic: Oriented to person place and time.  Cranial nerves grossly intact.  No focal deficits.  Psychiatric: Calm, cheerful affect.  Normal speech.    Data   Data reviewed today: I reviewed all medications, new labs and imaging results over the last 24 hours.

## 2022-05-07 NOTE — PROGRESS NOTES
[facility] DISCHARGE NOTE    Patient discharged to home at 11:36 AM via ambulation. Accompanied by brother and staff. Discharge instructions reviewed with patient, opportunity offered to ask questions. Prescriptions sent to patients preferred pharmacy. All belongings sent with patient.    Ryan Rodríguez RN

## 2022-05-07 NOTE — PROGRESS NOTES
Pt. Doing well, wrist site good.  She request melatonin but not ordered, page out to Dr. Lolis deal, moving  Independently around room.

## 2022-05-07 NOTE — PROGRESS NOTES
Pt. Woke up with back pain, took melatonin and tylenol, and given ice pack per her request.  Wrist is starting to have a bruise,  above her angio site.  Only discomfort in wrist is when touched there.

## 2022-05-07 NOTE — PROGRESS NOTES
Pt feels well, noted CAD on angiogrpahy but no severe dz, plan tob cessation, lipid therapy and follow up with primary care and Dr. Ferreira in Cardiology, discharge today

## 2022-05-09 ENCOUNTER — PATIENT OUTREACH (OUTPATIENT)
Dept: CARE COORDINATION | Facility: CLINIC | Age: 57
End: 2022-05-09
Payer: COMMERCIAL

## 2022-05-09 DIAGNOSIS — I25.10 CORONARY ARTERY DISEASE DUE TO LIPID RICH PLAQUE: Primary | ICD-10-CM

## 2022-05-09 DIAGNOSIS — I25.83 CORONARY ARTERY DISEASE DUE TO LIPID RICH PLAQUE: Primary | ICD-10-CM

## 2022-05-09 DIAGNOSIS — Z71.89 OTHER SPECIFIED COUNSELING: ICD-10-CM

## 2022-05-11 DIAGNOSIS — G89.29 CHRONIC LOW BACK PAIN WITH LEFT-SIDED SCIATICA, UNSPECIFIED BACK PAIN LATERALITY: ICD-10-CM

## 2022-05-11 DIAGNOSIS — M54.42 CHRONIC LOW BACK PAIN WITH LEFT-SIDED SCIATICA, UNSPECIFIED BACK PAIN LATERALITY: ICD-10-CM

## 2022-05-11 NOTE — TELEPHONE ENCOUNTER
Pending Prescriptions:                       Disp   Refills    meloxicam (MOBIC) 7.5 MG tablet [Pharmacy*90 tab*0            Sig: Take 1 tablet by mouth once daily    Routing refill request to provider for review/approval because:  Labs not current:    Lab Results   Component Value Date    ALT 22 10/12/2020     AST   Date Value Ref Range Status   10/12/2020 14 0 - 45 U/L Final     Lab Results   Component Value Date    WBC 6.4 05/07/2022    WBC 9.1 04/19/2021     Lab Results   Component Value Date    RBC 3.74 05/07/2022    RBC 4.78 04/19/2021     Lab Results   Component Value Date    HGB 11.4 05/07/2022    HGB 14.2 04/19/2021     Lab Results   Component Value Date    HCT 35.1 05/07/2022    HCT 43.9 04/19/2021     Lab Results   Component Value Date    MCV 94 05/07/2022    MCV 92 04/19/2021     Lab Results   Component Value Date    MCH 30.5 05/07/2022    MCH 29.7 04/19/2021     Lab Results   Component Value Date    MCHC 32.5 05/07/2022    MCHC 32.3 04/19/2021     Lab Results   Component Value Date    RDW 14.1 05/07/2022    RDW 14.7 04/19/2021     Lab Results   Component Value Date     05/07/2022     04/19/2021       Hilario Freed RN

## 2022-05-13 RX ORDER — MELOXICAM 7.5 MG/1
TABLET ORAL
Qty: 90 TABLET | Refills: 3 | Status: SHIPPED | OUTPATIENT
Start: 2022-05-13 | End: 2022-08-25

## 2022-06-01 NOTE — PROGRESS NOTES
Assessment/Recommendations   Assessment:    1.  Moderate nonobstructive coronary artery disease: Recent hospitalization with chest pain.  Current angiogram showed diffuse pronounced distal vessel tapering, particularly in the LAD.  Also noted moderate mid to distal myocardial bridge otherwise no focal or severe atherosclerosis noted.    Patient was recommended isosorbide mononitrate and Metroprolol.    On aspirin 81 mg daily and isosorbide mononitrate 30 mg daily.  Patient reports 1 episode of chest pain when she started taking Chantix.  She denies recurrent chest pain.    2.  Dyslipidemia with LDL goal <70: On atorvastatin 40 mg daily.  Most recent lipid check on 4/29/2022 showed total cholesterol 209, and .    3. Hypertension: Her blood pressure is 98/62.  She reports mild lightheadedness which she states is not new since started on isosorbide and metoprolol.. Currently on Metroprolol tartrate 50 mg twice a day and isosorbide mononitrate.  Sometimes she forgets to take her evening dose of Metroprolol.    4. Tobacco abuse: She has cut back on smoking from 6 cigarettes a day down to 3 a day.  She is using Chantix.    5.  Left carotid bruit: Noted left carotid bruit on exam.  She reports mild lightheadedness is more chronic.  She denies strokelike symptoms.  Sometimes she gets pain in her left neck.    Plan/Recommendation:  -  Continue medical therapy for coronary disease.  Encouraged to seek medical attention if recurrent chest pain or angina.    - Risk factor modification and lifestyle management topics were discussed including managing comorbidities, weight loss, heart healthy diet, exercise, smoking cessation and stress reduction.      -Stop metoprolol tartrate and changed to metoprolol succinate 50 mg daily.  Patient prefers to take it in the morning.  She was instructed to take it at bedtime if develops fatigue.  Call us back if worsening lightheadedness or excessive fatigue    - We discussed a  diet low in saturated fat, weight loss, and exercise along with medication for better control of cholesterol.     -Repeat fasting lipid profile and liver panel in 6-8 weeks.  Order placed.  Instructed to fast for 8 to 12 hours prior to lab work.    -Schedule carotid ultrasound.    Follow up with Dr. Ferreira in 6-8 weeks     History of Present Illness/Subjective    Ms. Selina Parikh is a 56 year old female with a past medical history of hypertension, hyperlipidemia, tobacco use disorder, GERD, chronic low back pain, family history of premature coronary artery disease, and recent diagnosis of coronary disease who is seen at [facility] for post hospitalization follow up.    Patient was hospitalized from May 6 through May 7 with chest pain radiating to the jaw relieved with nitroglycerin.  Troponin was negative x3.  EKG did not show any acute ST or T wave changes.  Patient underwent coronary angiogram on 5/6/2022 showed diffuse distal vessel disease tapering particularly in LAD but no focal or severe atherosclerosis or stent placed.    Patient was initiated on aspirin, atorvastatin, isosorbide mononitrate, Metroprolol tartrate, nitroglycerin, and Chantix.    Today, Selina denies shortness of breath, dyspnea on exertion, orthopnea, PND, palpitations, chest pain, abdominal fullness/bloating and lower extremity edema.  She does not report any bleeding complications.  She was following heart healthy diet she had 1 episode of chest pain when she started taking Chantix but has not had recurrent chest pain.  She reports chronic fatigue which is unchanged from baseline.  She also reports mild lightheadedness chronically.  This has not worsened since started on metoprolol and Imdur in the hospital.    Coronary Angiogram done on 5/6/22: reviewed:  Conclusion  Angina in a long term smoker with early onset ischemic heart disease in several first degree relatives.  Diffuse pronounced distal  vessel tapering, particularly in the LAD. There is a moderate mid-distal LAD myocardial bridge, otherwise there is no focal or severe atherosclerosis.  LV EPD 12 mmHg. LV-Ao gradient 7 mmHg by pullback.     Recommendations  General Recommendations:  - Recommended follow up with doctor Dr. Ferreira in 4-6 weeks.   - Arterial sheath removed from radial artery with TR Band placement.   - Discontinue tobacco smoking.     Medications:  - Continue high dose statin therapy indefinitely.   - Risk factor management for atherosclerosis.     Other:  - Titrate metoprolol for the distal LAD myocardial bridge and start imdur for diffuse distal vessel tapering/small vessel disease.        Physical Examination Review of Systems   BP 98/62 (BP Location: Left arm, Patient Position: Sitting, Cuff Size: Adult Regular)   Pulse 64   Resp 16   Ht 1.524 m (5')   Wt 68.7 kg (151 lb 6.4 oz)   LMP 04/10/2016   BMI 29.57 kg/m    Body mass index is 29.57 kg/m .  Wt Readings from Last 3 Encounters:   06/02/22 68.7 kg (151 lb 6.4 oz)   06/02/22 68.5 kg (151 lb)   05/07/22 69.4 kg (152 lb 14.4 oz)     General Appearance:   no distress, normal body habitus   ENT/Mouth: membranes moist, no oral lesions or bleeding gums.      EYES:  no scleral icterus, normal conjunctivae   Neck: no  thyromegaly except noted left carotid bruit   Chest/Lungs:   lungs are clear to auscultation, no rales or wheezing, equal chest wall expansion    Cardiovascular:   Heart rate regular. Normal first and second heart sounds with no murmurs, rubs, or gallops; the carotid, radial and posterior tibial pulses are intact, Jugular venous pressure flat with no edema bilaterally    Abdomen:  no organomegaly, masses, bruits, or tenderness; bowel sounds are present   Extremities   no cyanosis or clubbing   Radial pulses and Pedal pulses intact and symmetrical.  CMS intact.   Skin: no xanthelasma, warm.    Neurologic: normal  bilateral, no tremors     Psychiatric: alert and  oriented x3, calm              Negative unless noted in HPI     Medical History  Surgical History Family History Social History   Past Medical History:   Diagnosis Date     Cancer (H)     melanoma     Hiatal hernia      Hypertension     Past Surgical History:   Procedure Laterality Date     BACK SURGERY       CHOLECYSTECTOMY       CHOLECYSTECTOMY, LAPOROSCOPIC  2/14/2000    Cholecystectomy, Laparoscopic     COLON SURGERY       CV CORONARY ANGIOGRAM N/A 5/6/2022    Procedure: CV CORONARY ANGIOGRAM;  Surgeon: Stefanie Spangler MD;  Location: [facility] CV     CV LEFT HEART CATH N/A 5/6/2022    Procedure: Left Heart Catheterization;  Surgeon: Stefanie Spangler MD;  Location: [facility] CV     CYSTOSCOPY, INSERT LIGHTED STENT URETER(S) N/A 4/10/2018    Procedure: CYSTOSCOPY, INSERT LIGHTED STENT URETER(S);  Lighted Stent Placement,Laparoscopic Assisted Sigmoid Colectomy;  Surgeon: ERVIN Reina MD;  Location: [facility]     LAPAROSCOPIC ASSISTED COLECTOMY LEFT (DESCENDING) N/A 4/10/2018    Procedure: LAPAROSCOPIC ASSISTED COLECTOMY LEFT (DESCENDING);;  Surgeon: Hill Murray MD;  Location: [facility]     NECK SURGERY       ORTHOPEDIC SURGERY  10/2010    Cut palm and reattched tendons and nerves in left hand forefinger.     NM ESOPHAGOGASTRODUODENOSCOPY TRANSORAL DIAGNOSTIC N/A 4/30/2021    Procedure: ESOPHAGOGASTRODUODENOSCOPY (EGD);  Surgeon: Souleymane Moreno DO;  Location: [facility];  Service: General     SURGICAL HISTORY OF -   1/4/2000    Esophagogastroduodenoscopy with biopsy     TUBAL LIGATION       TUBAL LIGATION      Family History   Problem Relation Age of Onset     C.A.D. Father 50        50's     Diabetes Father      Diabetes Brother      C.A.REY Brother 42        quad bypass and MI     Diabetes Brother         2 brothers have DM     Cancer Brother 34        Melanoma     Aortic aneurysm Brother      Allergies Son         Meds     Allergies Daughter         Med     Cancer Mother      CMarco AAANIA  Brother 38        MI age 38     Diabetes Mother      Diabetes Brother     Social History     Socioeconomic History     Marital status:      Spouse name: Not on file     Number of children: Not on file     Years of education: Not on file     Highest education level: Not on file   Occupational History     Not on file   Tobacco Use     Smoking status: Current Every Day Smoker     Packs/day: 0.50     Years: 30.00     Pack years: 15.00     Types: Cigarettes     Last attempt to quit: 2020     Years since quittin.2     Smokeless tobacco: Never Used     Tobacco comment: 3 Cigs a day   Vaping Use     Vaping Use: Never used   Substance and Sexual Activity     Alcohol use: Not Currently     Comment: Alcoholic Drinks/day: 2x year      Drug use: Not Currently     Sexual activity: Not Currently     Partners: Male     Birth control/protection: Surgical   Other Topics Concern     Parent/sibling w/ CABG, MI or angioplasty before 65F 55M? Yes     Comment: brother- 38, MI, brother 42- quad bypass   Social History Narrative     Not on file     Social Determinants of Health     Financial Resource Strain: Not on file   Food Insecurity: Not on file   Transportation Needs: Not on file   Physical Activity: Not on file   Stress: Not on file   Social Connections: Not on file   Intimate Partner Violence: Not on file   Housing Stability: Not on file          Medications  Allergies   Current Outpatient Medications   Medication Sig Dispense Refill     acetaminophen (TYLENOL) 500 MG tablet Take 1,000 mg by mouth daily before breakfast And second dose as needed       aspirin (ASA) 81 MG EC tablet Take 1 tablet (81 mg) by mouth daily 90 tablet 3     atorvastatin (LIPITOR) 40 MG tablet Take 1 tablet (40 mg) by mouth daily 90 tablet 3     clobetasol (TEMOVATE) 0.05 % external ointment Apply topically 2 times daily (Patient taking differently: Apply topically 2 times daily as needed) 60 g 3     diclofenac (VOLTAREN) 50 MG EC tablet  "Take 1 tablet (50 mg) by mouth 3 times daily as needed for moderate pain 180 tablet 0     fish oil-omega-3 fatty acids 1000 MG capsule Take 2 g by mouth daily       isosorbide mononitrate (IMDUR) 30 MG 24 hr tablet Take 1 tablet (30 mg) by mouth every evening 30 tablet 1     Melatonin 10 MG TABS tablet Take 20 mg by mouth nightly as needed for sleep       meloxicam (MOBIC) 7.5 MG tablet Take 1 tablet by mouth once daily 90 tablet 3     metoprolol succinate ER (TOPROL XL) 50 MG 24 hr tablet Take 1 tablet (50 mg) by mouth daily 30 tablet 2     nitroGLYcerin (NITROSTAT) 0.4 MG sublingual tablet One tablet under the tongue every 5 minutes if needed for chest pain. May repeat every 5 minutes for a maximum of 3 doses in 15 minutes\" 25 tablet 3     omeprazole (PRILOSEC) 20 MG DR capsule Take 1 capsule (20 mg) by mouth 3 times daily 270 capsule 3     traMADol (ULTRAM) 50 MG tablet Take 1 tablet (50 mg) by mouth every 6 hours as needed for pain (back and neck pain) 10 tablet 0     varenicline (CHANTIX BASIM) 0.5 MG X 11 & 1 MG X 42 tablet Take 0.5 mg tab daily for 3 days, THEN 0.5 mg tab twice daily for 4 days, THEN 1 mg twice daily. 53 tablet 0     Vitamin D (Cholecalciferol) 25 MCG (1000 UT) TABS Take 1,000 Units by mouth daily      Allergies   Allergen Reactions     Ciprofloxacin Anaphylaxis     Flagyl [Metronidazole] Anaphylaxis     Gabapentin Other (See Comments)     Suicidal thoughts.     Zofran [Ondansetron] Other (See Comments) and GI Disturbance     Causes bloating, moderately uncomfortable, abd pain       Augmentin Nausea and Vomiting     Benadryl [Diphenhydramine] Other (See Comments)     Muscle spasms     Percocet [Oxycodone-Acetaminophen] Other (See Comments)     Goes nuts - nasty mean irritated, can take Dilaudid     Vicodin [Hydrocodone-Acetaminophen] Other (See Comments)     Anxiety-agitation         Lab Results    Chemistry/lipid CBC Cardiac Enzymes/BNP/TSH/INR   Lab Results   Component Value Date    CHOL 209 " (H) 04/29/2022    HDL 53 04/29/2022    TRIG 95 04/29/2022    BUN 18 05/07/2022     05/07/2022    CO2 24 05/07/2022    Lab Results   Component Value Date    WBC 6.4 05/07/2022    HGB 11.4 (L) 05/07/2022    HCT 35.1 05/07/2022    MCV 94 05/07/2022     05/07/2022    Lab Results   Component Value Date    TROPONINI <0.01 05/05/2022    BNP 10 05/05/2022    TSH 0.95 11/29/2011    INR 0.96 12/30/2020        40  minutes spent on the date of encounter doing chart review, review of test results, interpretation with above tests, patient visit and documentation.      This note has been dictated using voice recognition software. Any grammatical, typographical, or context distortions are unintentional and inherent to the software

## 2022-06-02 ENCOUNTER — OFFICE VISIT (OUTPATIENT)
Dept: FAMILY MEDICINE | Facility: CLINIC | Age: 57
End: 2022-06-02
Payer: COMMERCIAL

## 2022-06-02 ENCOUNTER — OFFICE VISIT (OUTPATIENT)
Dept: CARDIOLOGY | Facility: CLINIC | Age: 57
End: 2022-06-02
Payer: COMMERCIAL

## 2022-06-02 VITALS
SYSTOLIC BLOOD PRESSURE: 107 MMHG | HEART RATE: 59 BPM | BODY MASS INDEX: 29.64 KG/M2 | HEIGHT: 60 IN | DIASTOLIC BLOOD PRESSURE: 67 MMHG | OXYGEN SATURATION: 96 % | WEIGHT: 151 LBS

## 2022-06-02 VITALS
HEIGHT: 60 IN | RESPIRATION RATE: 16 BRPM | BODY MASS INDEX: 29.72 KG/M2 | SYSTOLIC BLOOD PRESSURE: 98 MMHG | HEART RATE: 64 BPM | WEIGHT: 151.4 LBS | DIASTOLIC BLOOD PRESSURE: 62 MMHG

## 2022-06-02 DIAGNOSIS — Z12.31 VISIT FOR SCREENING MAMMOGRAM: ICD-10-CM

## 2022-06-02 DIAGNOSIS — M54.42 CHRONIC LOW BACK PAIN WITH LEFT-SIDED SCIATICA, UNSPECIFIED BACK PAIN LATERALITY: ICD-10-CM

## 2022-06-02 DIAGNOSIS — I25.83 CORONARY ARTERY DISEASE DUE TO LIPID RICH PLAQUE: Primary | ICD-10-CM

## 2022-06-02 DIAGNOSIS — G89.29 CHRONIC LOW BACK PAIN WITH LEFT-SIDED SCIATICA, UNSPECIFIED BACK PAIN LATERALITY: ICD-10-CM

## 2022-06-02 DIAGNOSIS — I25.10 CORONARY ARTERY DISEASE DUE TO LIPID RICH PLAQUE: Primary | ICD-10-CM

## 2022-06-02 DIAGNOSIS — I25.10 CORONARY ARTERY DISEASE DUE TO LIPID RICH PLAQUE: ICD-10-CM

## 2022-06-02 DIAGNOSIS — I25.83 CORONARY ARTERY DISEASE DUE TO LIPID RICH PLAQUE: ICD-10-CM

## 2022-06-02 DIAGNOSIS — Z23 ENCOUNTER FOR VACCINATION: ICD-10-CM

## 2022-06-02 DIAGNOSIS — R07.9 CHEST PAIN, UNSPECIFIED TYPE: ICD-10-CM

## 2022-06-02 DIAGNOSIS — I10 PRIMARY HYPERTENSION: ICD-10-CM

## 2022-06-02 DIAGNOSIS — R42 LIGHTHEADEDNESS: ICD-10-CM

## 2022-06-02 DIAGNOSIS — Z72.0 TOBACCO ABUSE: Chronic | ICD-10-CM

## 2022-06-02 DIAGNOSIS — R09.89 LEFT CAROTID BRUIT: ICD-10-CM

## 2022-06-02 DIAGNOSIS — Z76.89 ENCOUNTER TO ESTABLISH CARE: Primary | ICD-10-CM

## 2022-06-02 DIAGNOSIS — E78.5 DYSLIPIDEMIA, GOAL LDL BELOW 70: ICD-10-CM

## 2022-06-02 DIAGNOSIS — Z98.890 STATUS POST CORONARY ANGIOGRAM: ICD-10-CM

## 2022-06-02 DIAGNOSIS — Z85.820 HISTORY OF MELANOMA: ICD-10-CM

## 2022-06-02 DIAGNOSIS — K21.00 GASTROESOPHAGEAL REFLUX DISEASE WITH ESOPHAGITIS WITHOUT HEMORRHAGE: Chronic | ICD-10-CM

## 2022-06-02 PROCEDURE — 0064A COVID-19,PF,MODERNA (18+ YRS BOOSTER .25ML): CPT | Performed by: NURSE PRACTITIONER

## 2022-06-02 PROCEDURE — 90677 PCV20 VACCINE IM: CPT | Performed by: NURSE PRACTITIONER

## 2022-06-02 PROCEDURE — 91306 COVID-19,PF,MODERNA (18+ YRS BOOSTER .25ML): CPT | Performed by: NURSE PRACTITIONER

## 2022-06-02 PROCEDURE — 99215 OFFICE O/P EST HI 40 MIN: CPT | Performed by: NURSE PRACTITIONER

## 2022-06-02 PROCEDURE — 90471 IMMUNIZATION ADMIN: CPT | Performed by: NURSE PRACTITIONER

## 2022-06-02 PROCEDURE — 99214 OFFICE O/P EST MOD 30 MIN: CPT | Mod: 25 | Performed by: NURSE PRACTITIONER

## 2022-06-02 RX ORDER — METOPROLOL SUCCINATE 50 MG/1
50 TABLET, EXTENDED RELEASE ORAL DAILY
Qty: 30 TABLET | Refills: 2 | Status: SHIPPED | OUTPATIENT
Start: 2022-06-02 | End: 2022-08-11

## 2022-06-02 ASSESSMENT — PATIENT HEALTH QUESTIONNAIRE - PHQ9
10. IF YOU CHECKED OFF ANY PROBLEMS, HOW DIFFICULT HAVE THESE PROBLEMS MADE IT FOR YOU TO DO YOUR WORK, TAKE CARE OF THINGS AT HOME, OR GET ALONG WITH OTHER PEOPLE: SOMEWHAT DIFFICULT
SUM OF ALL RESPONSES TO PHQ QUESTIONS 1-9: 15
SUM OF ALL RESPONSES TO PHQ QUESTIONS 1-9: 15

## 2022-06-02 NOTE — PATIENT INSTRUCTIONS
Selina Parikh,    It was a pleasure to see you today at the [facility].     My recommendations after this visit include:    - Stop Metoprolol tartrate     - Start Metoprolol Succinate 50 mg daily     - Please call if you develop lightheaded, dizziness or excessive fatigue.    - You may take Metoprolol Succinate at bed time if needed     - Please seek medical attention if you develop recurrent chest pain or shortness of breath or similar symptoms you experienced prior to recent cardiac event    - Please get your lipid level test and liver function test in 6-8 weeks. Make sure to fast for 8-12 hours prior to lab work. Okay to have plain water, black coffee or tea without creamer and sugar    -  Follow up with Dr. Ferreira in 6-8 weeks    - Schedule carotid ultrasound     - Please call [phone], if you have any questions or concerns    Alicja Rodrigez CNP    Medication     Take all your medications as prescribed  Do not stop any medications without talking with a healthcare provider    Exercise      Physical activity is important for overall health  Set a goal of 150 minutes of exercise each week  For example, 30 minutes of exercise 5 days each week.    These 30 minutes can be broken into shorter periods of 15 minutes twice daily or 10 minutes three times daily  Start any exercise program slowly and work towards the goal of 150 minutes each week  For example, you may start with 10 minutes and plan to add a few minutes each week as you get stronger   Examples of exercise include walking, swimming, or biking  Remember to stretch and stay hydrated with exercise    Diet     A heart healthy diet includes:  A variety of fruits and vegetables  Whole grains  Low-fat dairy (fat-free, 1% fat, and low-fat)  Lean meats and poultry without skin   Fish (eat fish 2 times each week)  Nuts  Limit saturated fat to about 13 grams each day (based on a 2000 calorie diet)  Limit red meat  Limit sugars (sweets  and sugary beverages)  Limit your portion sizes  Do not add salt to your food when cooking or at the table  Limit alcohol intake (no more than 1 drink each day for women or 2 drinks each day for men)    Weight Loss     Work on losing weight with diet and exercise  You BMI (body mass index) should be between 18.5-24.9  This is a calculation of your weight and height  Please ask your healthcare provider for your BMI    Manage Other Chronic Health Conditions     Control cholesterol  Eat a diet low in saturated fat  Exercise   Take a statin medication as prescribed  Manage blood pressure  Eat a diet low in sodium  Exercise  Reduce stress  Lose weight   Take blood pressure medications as prescribed  Control blood sugars if diabetic  Monitor sugars and carbohydrates in your diet  Lose weight   Take diabetes medications as prescribed  Follow-up with your primary care provider to make sure your blood sugars are well controlled    Stress Reduction     Find time each day to relax  Reading, listening to music, yoga, meditation, exercise, spending time with friends and family, volunteering   Get 6-8 hours of sleep each night    Smoking Cessation     Smoking causes numerous health problems including coronary artery disease  It is never too late to quit  Set realistic goals for quitting  Decrease the number of cigarettes used each week  Use nicotine gum or patches to help you quit    Information from the [employer].  Please visit their website at www.heart.org

## 2022-06-02 NOTE — LETTER
6/2/2022    Sera Solo, NP  [address] [place] [address]    RE: Selina Parikh       Dear Colleague,     I had the pleasure of seeing Selina Parikh in the [facility].          Assessment/Recommendations   Assessment:    1.  Moderate nonobstructive coronary artery disease: Recent hospitalization with chest pain.  Current angiogram showed diffuse pronounced distal vessel tapering, particularly in the LAD.  Also noted moderate mid to distal myocardial bridge otherwise no focal or severe atherosclerosis noted.    Patient was recommended isosorbide mononitrate and Metroprolol.    On aspirin 81 mg daily and isosorbide mononitrate 30 mg daily.  Patient reports 1 episode of chest pain when she started taking Chantix.  She denies recurrent chest pain.    2.  Dyslipidemia with LDL goal <70: On atorvastatin 40 mg daily.  Most recent lipid check on 4/29/2022 showed total cholesterol 209, and .    3. Hypertension: Her blood pressure is 98/62.  She reports mild lightheadedness which she states is not new since started on isosorbide and metoprolol.. Currently on Metroprolol tartrate 50 mg twice a day and isosorbide mononitrate.  Sometimes she forgets to take her evening dose of Metroprolol.    4. Tobacco abuse: She has cut back on smoking from 6 cigarettes a day down to 3 a day.  She is using Chantix.    5.  Left carotid bruit: Noted left carotid bruit on exam.  She reports mild lightheadedness is more chronic.  She denies strokelike symptoms.  Sometimes she gets pain in her left neck.    Plan/Recommendation:  -  Continue medical therapy for coronary disease.  Encouraged to seek medical attention if recurrent chest pain or angina.    - Risk factor modification and lifestyle management topics were discussed including managing comorbidities, weight loss, heart healthy diet, exercise, smoking cessation and stress reduction.      -Stop metoprolol tartrate and changed to metoprolol succinate 50  mg daily.  Patient prefers to take it in the morning.  She was instructed to take it at bedtime if develops fatigue.  Call us back if worsening lightheadedness or excessive fatigue    - We discussed a diet low in saturated fat, weight loss, and exercise along with medication for better control of cholesterol.     -Repeat fasting lipid profile and liver panel in 6-8 weeks.  Order placed.  Instructed to fast for 8 to 12 hours prior to lab work.    -Schedule carotid ultrasound.    Follow up with Dr. Ferreira in 6-8 weeks     History of Present Illness/Subjective    Ms. Selina Parikh is a 56 year old female with a past medical history of hypertension, hyperlipidemia, tobacco use disorder, GERD, chronic low back pain, family history of premature coronary artery disease, and recent diagnosis of coronary disease who is seen at [facility] for post hospitalization follow up.    Patient was hospitalized from May 6 through May 7 with chest pain radiating to the jaw relieved with nitroglycerin.  Troponin was negative x3.  EKG did not show any acute ST or T wave changes.  Patient underwent coronary angiogram on 5/6/2022 showed diffuse distal vessel disease tapering particularly in LAD but no focal or severe atherosclerosis or stent placed.    Patient was initiated on aspirin, atorvastatin, isosorbide mononitrate, Metroprolol tartrate, nitroglycerin, and Chantix.    Today, Selina denies shortness of breath, dyspnea on exertion, orthopnea, PND, palpitations, chest pain, abdominal fullness/bloating and lower extremity edema.  She does not report any bleeding complications.  She was following heart healthy diet she had 1 episode of chest pain when she started taking Chantix but has not had recurrent chest pain.  She reports chronic fatigue which is unchanged from baseline.  She also reports mild lightheadedness chronically.  This has not worsened since started on metoprolol and Imdur in the  [facility].    Coronary Angiogram done on 5/6/22: reviewed:  Conclusion    Angina in a long term smoker with early onset ischemic heart disease in several first degree relatives.    Diffuse pronounced distal vessel tapering, particularly in the LAD. There is a moderate mid-distal LAD myocardial bridge, otherwise there is no focal or severe atherosclerosis.    LV EPD 12 mmHg. LV-Ao gradient 7 mmHg by pullback.     Recommendations  General Recommendations:  - Recommended follow up with doctor Dr. Ferreira in 4-6 weeks.   - Arterial sheath removed from radial artery with TR Band placement.   - Discontinue tobacco smoking.     Medications:  - Continue high dose statin therapy indefinitely.   - Risk factor management for atherosclerosis.     Other:  - Titrate metoprolol for the distal LAD myocardial bridge and start imdur for diffuse distal vessel tapering/small vessel disease.        Physical Examination Review of Systems   BP 98/62 (BP Location: Left arm, Patient Position: Sitting, Cuff Size: Adult Regular)   Pulse 64   Resp 16   Ht 1.524 m (5')   Wt 68.7 kg (151 lb 6.4 oz)   LMP 04/10/2016   BMI 29.57 kg/m    Body mass index is 29.57 kg/m .  Wt Readings from Last 3 Encounters:   06/02/22 68.7 kg (151 lb 6.4 oz)   06/02/22 68.5 kg (151 lb)   05/07/22 69.4 kg (152 lb 14.4 oz)     General Appearance:   no distress, normal body habitus   ENT/Mouth: membranes moist, no oral lesions or bleeding gums.      EYES:  no scleral icterus, normal conjunctivae   Neck: no  thyromegaly except noted left carotid bruit   Chest/Lungs:   lungs are clear to auscultation, no rales or wheezing, equal chest wall expansion    Cardiovascular:   Heart rate regular. Normal first and second heart sounds with no murmurs, rubs, or gallops; the carotid, radial and posterior tibial pulses are intact, Jugular venous pressure flat with no edema bilaterally    Abdomen:  no organomegaly, masses, bruits, or tenderness; bowel sounds are present    Extremities   no cyanosis or clubbing   Radial pulses and Pedal pulses intact and symmetrical.  CMS intact.   Skin: no xanthelasma, warm.    Neurologic: normal  bilateral, no tremors     Psychiatric: alert and oriented x3, calm              Negative unless noted in HPI     Medical History  Surgical History Family History Social History   Past Medical History:   Diagnosis Date     Cancer (H)     melanoma     Hiatal hernia      Hypertension     Past Surgical History:   Procedure Laterality Date     BACK SURGERY       CHOLECYSTECTOMY       CHOLECYSTECTOMY, LAPOROSCOPIC  2/14/2000    Cholecystectomy, Laparoscopic     COLON SURGERY       CV CORONARY ANGIOGRAM N/A 5/6/2022    Procedure: CV CORONARY ANGIOGRAM;  Surgeon: Stefanie Spangler MD;  Location: [facility] CV     CV LEFT HEART CATH N/A 5/6/2022    Procedure: Left Heart Catheterization;  Surgeon: Stefanie Spangler MD;  Location: [facility] CV     CYSTOSCOPY, INSERT LIGHTED STENT URETER(S) N/A 4/10/2018    Procedure: CYSTOSCOPY, INSERT LIGHTED STENT URETER(S);  Lighted Stent Placement,Laparoscopic Assisted Sigmoid Colectomy;  Surgeon: ERVIN Reina MD;  Location: [facility] OR     LAPAROSCOPIC ASSISTED COLECTOMY LEFT (DESCENDING) N/A 4/10/2018    Procedure: LAPAROSCOPIC ASSISTED COLECTOMY LEFT (DESCENDING);;  Surgeon: Hill Murray MD;  Location: [facility] OR     NECK SURGERY       ORTHOPEDIC SURGERY  10/2010    Cut palm and reattched tendons and nerves in left hand forefinger.     CO ESOPHAGOGASTRODUODENOSCOPY TRANSORAL DIAGNOSTIC N/A 4/30/2021    Procedure: ESOPHAGOGASTRODUODENOSCOPY (EGD);  Surgeon: Souleymane Moreno DO;  Location: [facility];  Service: General     SURGICAL HISTORY OF -   1/4/2000    Esophagogastroduodenoscopy with biopsy     TUBAL LIGATION       TUBAL LIGATION      Family History   Problem Relation Age of Onset     C.A.D. Father 50        50's     Diabetes Father      Diabetes Brother      C.A.D. Brother 42        quad bypass and MI      Diabetes Brother         2 brothers have DM     Cancer Brother 34        Melanoma     Aortic aneurysm Brother      Allergies Son         Meds     Allergies Daughter         Med     Cancer Mother      C.A.D. Brother 38        MI age 38     Diabetes Mother      Diabetes Brother     Social History     Socioeconomic History     Marital status:      Spouse name: Not on file     Number of children: Not on file     Years of education: Not on file     Highest education level: Not on file   Occupational History     Not on file   Tobacco Use     Smoking status: Current Every Day Smoker     Packs/day: 0.50     Years: 30.00     Pack years: 15.00     Types: Cigarettes     Last attempt to quit: 2020     Years since quittin.2     Smokeless tobacco: Never Used     Tobacco comment: 3 Cigs a day   Vaping Use     Vaping Use: Never used   Substance and Sexual Activity     Alcohol use: Not Currently     Comment: Alcoholic Drinks/day: 2x year      Drug use: Not Currently     Sexual activity: Not Currently     Partners: Male     Birth control/protection: Surgical   Other Topics Concern     Parent/sibling w/ CABG, MI or angioplasty before 65F 55M? Yes     Comment: brother- 38, MI, brother 42- quad bypass   Social History Narrative     Not on file     Social Determinants of Health     Financial Resource Strain: Not on file   Food Insecurity: Not on file   Transportation Needs: Not on file   Physical Activity: Not on file   Stress: Not on file   Social Connections: Not on file   Intimate Partner Violence: Not on file   Housing Stability: Not on file          Medications  Allergies   Current Outpatient Medications   Medication Sig Dispense Refill     acetaminophen (TYLENOL) 500 MG tablet Take 1,000 mg by mouth daily before breakfast And second dose as needed       aspirin (ASA) 81 MG EC tablet Take 1 tablet (81 mg) by mouth daily 90 tablet 3     atorvastatin (LIPITOR) 40 MG tablet Take 1 tablet (40 mg) by mouth daily 90  "tablet 3     clobetasol (TEMOVATE) 0.05 % external ointment Apply topically 2 times daily (Patient taking differently: Apply topically 2 times daily as needed) 60 g 3     diclofenac (VOLTAREN) 50 MG EC tablet Take 1 tablet (50 mg) by mouth 3 times daily as needed for moderate pain 180 tablet 0     fish oil-omega-3 fatty acids 1000 MG capsule Take 2 g by mouth daily       isosorbide mononitrate (IMDUR) 30 MG 24 hr tablet Take 1 tablet (30 mg) by mouth every evening 30 tablet 1     Melatonin 10 MG TABS tablet Take 20 mg by mouth nightly as needed for sleep       meloxicam (MOBIC) 7.5 MG tablet Take 1 tablet by mouth once daily 90 tablet 3     metoprolol succinate ER (TOPROL XL) 50 MG 24 hr tablet Take 1 tablet (50 mg) by mouth daily 30 tablet 2     nitroGLYcerin (NITROSTAT) 0.4 MG sublingual tablet One tablet under the tongue every 5 minutes if needed for chest pain. May repeat every 5 minutes for a maximum of 3 doses in 15 minutes\" 25 tablet 3     omeprazole (PRILOSEC) 20 MG DR capsule Take 1 capsule (20 mg) by mouth 3 times daily 270 capsule 3     traMADol (ULTRAM) 50 MG tablet Take 1 tablet (50 mg) by mouth every 6 hours as needed for pain (back and neck pain) 10 tablet 0     varenicline (CHANTIX BASIM) 0.5 MG X 11 & 1 MG X 42 tablet Take 0.5 mg tab daily for 3 days, THEN 0.5 mg tab twice daily for 4 days, THEN 1 mg twice daily. 53 tablet 0     Vitamin D (Cholecalciferol) 25 MCG (1000 UT) TABS Take 1,000 Units by mouth daily      Allergies   Allergen Reactions     Ciprofloxacin Anaphylaxis     Flagyl [Metronidazole] Anaphylaxis     Gabapentin Other (See Comments)     Suicidal thoughts.     Zofran [Ondansetron] Other (See Comments) and GI Disturbance     Causes bloating, moderately uncomfortable, abd pain       Augmentin Nausea and Vomiting     Benadryl [Diphenhydramine] Other (See Comments)     Muscle spasms     Percocet [Oxycodone-Acetaminophen] Other (See Comments)     Goes nuts - nasty mean irritated, can take " Dilaudid     Vicodin [Hydrocodone-Acetaminophen] Other (See Comments)     Anxiety-agitation         Lab Results    Chemistry/lipid CBC Cardiac Enzymes/BNP/TSH/INR   Lab Results   Component Value Date    CHOL 209 (H) 04/29/2022    HDL 53 04/29/2022    TRIG 95 04/29/2022    BUN 18 05/07/2022     05/07/2022    CO2 24 05/07/2022    Lab Results   Component Value Date    WBC 6.4 05/07/2022    HGB 11.4 (L) 05/07/2022    HCT 35.1 05/07/2022    MCV 94 05/07/2022     05/07/2022    Lab Results   Component Value Date    TROPONINI <0.01 05/05/2022    BNP 10 05/05/2022    TSH 0.95 11/29/2011    INR 0.96 12/30/2020        40  minutes spent on the date of encounter doing chart review, review of test results, interpretation with above tests, patient visit and documentation.      This note has been dictated using voice recognition software. Any grammatical, typographical, or context distortions are unintentional and inherent to the software          Thank you for allowing me to participate in the care of your patient.      Sincerely,     CARLTON Metzger [facility]  cc:   No referring provider defined for this encounter.

## 2022-06-09 PROBLEM — M48.062 SPINAL STENOSIS OF LUMBAR REGION WITH NEUROGENIC CLAUDICATION: Status: ACTIVE | Noted: 2018-06-04

## 2022-06-09 PROBLEM — K44.9 HIATAL HERNIA: Status: ACTIVE | Noted: 2021-04-15

## 2022-06-09 NOTE — PROGRESS NOTES
Assessment & Plan     Encounter to establish care    Encounter for vaccination  - COVID-19,PF,MODERNA (18+ YRS BOOSTER .25ML)  - PNEUMOCOCCAL 20 VALENT CONJUGATE (PREVNAR 20)    Chronic low back pain with left-sided sciatica, unspecified back pain laterality  Follows at [facility] spine, Will trial patient on a different NSAID.  Referral to pain management for further recommendations.   - diclofenac (VOLTAREN) 50 MG EC tablet  Dispense: 180 tablet; Refill: 0  - Pain Management Referral    History of melanoma  - Adult Dermatology Referral    Gastroesophageal reflux disease with esophagitis without hemorrhage  History of hiatal hernia.  On Omeprazole chronically TID.     Tobacco abuse  Continue to decrease use.  On Chantix.    Chest pain, unspecified type  Improved since hospitalization.  Has follow up with cardiology today.    Coronary artery disease due to lipid rich plaque  On statin    Primary hypertension  Well controlled.    Visit for screening mammogram  - *MA Screening Digital Bilateral         Tobacco Cessation:   reports that she has been smoking cigarettes. She has a 15.00 pack-year smoking history. She has never used smokeless tobacco.      BMI:   Estimated body mass index is 29.49 kg/m  as calculated from the following:    Height as of this encounter: 1.524 m (5').    Weight as of this encounter: 68.5 kg (151 lb).       Depression Screening Follow Up    PHQ 6/2/2022   PHQ-9 Total Score 15   Q9: Thoughts of better off dead/self-harm past 2 weeks Not at all       Follow Up Actions Taken  Patient counseled, no additional follow up at this time.         Return in about 6 months (around 12/2/2022) for Routine preventive, with me, in person.    Sera Solo NP  [facility]    Delfin Marks is a 56 year old who presents to establish care.    Patient was recently admitted to the hospital with chest pain.  She had an angiogram without intervention.  Was started on  aspirin, atorvastatin, Imdur, and metoprolol.  She has a follow-up with cardiology later today.  Her chest pain had resolved, but restarted after taking Chantix for smoking cessation.  She is down to 3 cigarettes a day from 6-10.    History of chronic acid reflux.  She also has a hiatal hernia.  Patient was evaluated by surgery and had an EGD and swallow study.  She does have evidence of chronic oral pharyngeal dysphagia.  Currently on omeprazole 20 mg 3 times daily, which does seem to control her symptoms right now.    History of chronic neck and back pain.  She has had 2 fusions in her neck and one fusion in her low back.  Patient primarily follows at [facility] spine.  She has a lot of left low back and leg pain that is worse with standing and walking.  She was on Celebrex at one point, but went off of this.  Her chest pain seemed to get better when she went off of this.  She also uses heat and ice.  Currently on Mobic, but this does not seem to be helping at all and gets her headaches.    History of melanoma.  She is not regularly seeing a dermatologist.    Review of Systems   Pertinent items in HPI      Objective    /67 (BP Location: Right arm, Patient Position: Sitting, Cuff Size: Adult Regular)   Pulse 59   Ht 1.524 m (5')   Wt 68.5 kg (151 lb)   LMP 04/10/2016   SpO2 96%   BMI 29.49 kg/m    Body mass index is 29.49 kg/m .  Physical Exam   GENERAL: healthy, alert and no distress  RESP: lungs clear to auscultation - no rales, rhonchi or wheezes  CV: regular rate and rhythm, normal S1 S2, no S3 or S4, no murmur, click or rub, no peripheral edema

## 2022-07-13 ENCOUNTER — HOSPITAL ENCOUNTER (OUTPATIENT)
Dept: ULTRASOUND IMAGING | Facility: CLINIC | Age: 57
Discharge: HOME | End: 2022-07-13
Attending: NURSE PRACTITIONER
Payer: COMMERCIAL

## 2022-07-13 ENCOUNTER — HOSPITAL ENCOUNTER (OUTPATIENT)
Dept: MAMMOGRAPHY | Facility: CLINIC | Age: 57
Discharge: HOME | End: 2022-07-13
Attending: NURSE PRACTITIONER
Payer: COMMERCIAL

## 2022-07-13 DIAGNOSIS — R42 LIGHTHEADEDNESS: ICD-10-CM

## 2022-07-13 DIAGNOSIS — Z12.31 VISIT FOR SCREENING MAMMOGRAM: ICD-10-CM

## 2022-07-13 DIAGNOSIS — R09.89 LEFT CAROTID BRUIT: ICD-10-CM

## 2022-07-13 PROCEDURE — 93880 EXTRACRANIAL BILAT STUDY: CPT

## 2022-07-13 PROCEDURE — 77067 SCR MAMMO BI INCL CAD: CPT

## 2022-07-14 DIAGNOSIS — I25.10 CORONARY ARTERY DISEASE DUE TO LIPID RICH PLAQUE: Primary | ICD-10-CM

## 2022-07-14 DIAGNOSIS — I25.83 CORONARY ARTERY DISEASE DUE TO LIPID RICH PLAQUE: Primary | ICD-10-CM

## 2022-07-15 ENCOUNTER — TELEPHONE (OUTPATIENT)
Dept: CARDIOLOGY | Facility: CLINIC | Age: 57
End: 2022-07-15

## 2022-07-15 NOTE — TELEPHONE ENCOUNTER
Returning vmm from patient. Confirmed office visit of 07/25 is likely the soonest patient can be seen within the vascular clinic. While could not confirm how soon/nor how far their clinic is booked out they will address scheduling needs at office visit. Advised patient if any new or worsening symptoms to be evaluated in urgency room/ed and to contact clinic.     No further questions or concerns noted. Follow up with Dr. Jhon hunter 08/11/22-APPLE

## 2022-07-21 ENCOUNTER — HOSPITAL ENCOUNTER (EMERGENCY)
Facility: CLINIC | Age: 57
Discharge: HOME | End: 2022-07-22
Attending: EMERGENCY MEDICINE | Admitting: EMERGENCY MEDICINE
Payer: COMMERCIAL

## 2022-07-21 DIAGNOSIS — R53.83 OTHER FATIGUE: ICD-10-CM

## 2022-07-21 DIAGNOSIS — R51.9 ACUTE NONINTRACTABLE HEADACHE, UNSPECIFIED HEADACHE TYPE: ICD-10-CM

## 2022-07-21 LAB
ANION GAP SERPL CALCULATED.3IONS-SCNC: 10 MMOL/L (ref 5–18)
BUN SERPL-MCNC: 14 MG/DL (ref 8–22)
CALCIUM SERPL-MCNC: 10 MG/DL (ref 8.5–10.5)
CHLORIDE BLD-SCNC: 109 MMOL/L (ref 98–107)
CO2 SERPL-SCNC: 23 MMOL/L (ref 22–31)
CREAT SERPL-MCNC: 0.92 MG/DL (ref 0.6–1.1)
ERYTHROCYTE [DISTWIDTH] IN BLOOD BY AUTOMATED COUNT: 14.3 % (ref 10–15)
GFR SERPL CREATININE-BSD FRML MDRD: 73 ML/MIN/1.73M2
GLUCOSE BLD-MCNC: 54 MG/DL (ref 70–125)
GLUCOSE BLDC GLUCOMTR-MCNC: 110 MG/DL (ref 70–99)
HCT VFR BLD AUTO: 42.3 % (ref 35–47)
HGB BLD-MCNC: 13.9 G/DL (ref 11.7–15.7)
HOLD SPECIMEN: NORMAL
MCH RBC QN AUTO: 30.3 PG (ref 26.5–33)
MCHC RBC AUTO-ENTMCNC: 32.9 G/DL (ref 31.5–36.5)
MCV RBC AUTO: 92 FL (ref 78–100)
PLATELET # BLD AUTO: 265 10E3/UL (ref 150–450)
POTASSIUM BLD-SCNC: 4.4 MMOL/L (ref 3.5–5)
RBC # BLD AUTO: 4.59 10E6/UL (ref 3.8–5.2)
SODIUM SERPL-SCNC: 142 MMOL/L (ref 136–145)
WBC # BLD AUTO: 9.1 10E3/UL (ref 4–11)

## 2022-07-21 PROCEDURE — 258N000003 HC RX IP 258 OP 636: Performed by: EMERGENCY MEDICINE

## 2022-07-21 PROCEDURE — 96374 THER/PROPH/DIAG INJ IV PUSH: CPT | Mod: 59

## 2022-07-21 PROCEDURE — 85048 AUTOMATED LEUKOCYTE COUNT: CPT | Performed by: EMERGENCY MEDICINE

## 2022-07-21 PROCEDURE — 36415 COLL VENOUS BLD VENIPUNCTURE: CPT | Performed by: EMERGENCY MEDICINE

## 2022-07-21 PROCEDURE — 96361 HYDRATE IV INFUSION ADD-ON: CPT

## 2022-07-21 PROCEDURE — 250N000011 HC RX IP 250 OP 636: Performed by: EMERGENCY MEDICINE

## 2022-07-21 PROCEDURE — 99285 EMERGENCY DEPT VISIT HI MDM: CPT | Mod: 25

## 2022-07-21 PROCEDURE — 80048 BASIC METABOLIC PNL TOTAL CA: CPT | Performed by: EMERGENCY MEDICINE

## 2022-07-21 RX ORDER — METOCLOPRAMIDE HYDROCHLORIDE 5 MG/ML
10 INJECTION INTRAMUSCULAR; INTRAVENOUS ONCE
Status: COMPLETED | OUTPATIENT
Start: 2022-07-21 | End: 2022-07-21

## 2022-07-21 RX ORDER — IOPAMIDOL 755 MG/ML
100 INJECTION, SOLUTION INTRAVASCULAR ONCE
Status: COMPLETED | OUTPATIENT
Start: 2022-07-21 | End: 2022-07-22

## 2022-07-21 RX ADMIN — METOCLOPRAMIDE HYDROCHLORIDE 10 MG: 5 INJECTION INTRAMUSCULAR; INTRAVENOUS at 23:12

## 2022-07-21 RX ADMIN — SODIUM CHLORIDE 500 ML: 9 INJECTION, SOLUTION INTRAVENOUS at 23:10

## 2022-07-22 ENCOUNTER — APPOINTMENT (OUTPATIENT)
Dept: CT IMAGING | Facility: CLINIC | Age: 57
End: 2022-07-22
Attending: EMERGENCY MEDICINE
Payer: COMMERCIAL

## 2022-07-22 VITALS
TEMPERATURE: 97.7 F | OXYGEN SATURATION: 99 % | RESPIRATION RATE: 16 BRPM | SYSTOLIC BLOOD PRESSURE: 139 MMHG | HEIGHT: 59 IN | WEIGHT: 160 LBS | DIASTOLIC BLOOD PRESSURE: 79 MMHG | BODY MASS INDEX: 32.25 KG/M2 | HEART RATE: 70 BPM

## 2022-07-22 LAB — GLUCOSE BLDC GLUCOMTR-MCNC: 88 MG/DL (ref 70–99)

## 2022-07-22 PROCEDURE — 70496 CT ANGIOGRAPHY HEAD: CPT

## 2022-07-22 PROCEDURE — 250N000011 HC RX IP 250 OP 636: Performed by: EMERGENCY MEDICINE

## 2022-07-22 RX ADMIN — IOPAMIDOL 75 ML: 755 INJECTION, SOLUTION INTRAVENOUS at 00:57

## 2022-07-22 NOTE — DISCHARGE INSTRUCTIONS
Fortunately your CAT scan tonight does not show any new changes.  Keep your appointment on Monday with vascular surgery and return to the ER if you develop any worsening symptoms or if you have any other concerns.

## 2022-07-22 NOTE — ED PROVIDER NOTES
EMERGENCY DEPARTMENT ENCOUnter      NAME: Selina Parikh  AGE: 56 year old female  YOB: 1965  MRN: [id number]  EVALUATION DATE & TIME: 7/21/2022 10:36 PM    PCP: Sera Solo    ED PROVIDER: Eliazar Elizabeth DO      Chief Complaint   Patient presents with     Headache     Fatigue     Dizziness         FINAL IMPRESSION:  1. Acute nonintractable headache, unspecified headache type    2. Other fatigue          ED COURSE & MEDICAL DECISION MAKING:    10:37 PM I met with the patient, obtained history, performed an initial exam, and discussed options and plan for diagnostics and treatment here in the ED.   1:49 AM Reassessed and updated patient on her results.       The patient presented emerged department today with complaints of headache.  She was recently diagnosed with significant carotid stenosis and has a follow-up appointment with vascular surgery next week.  Given concern for a possible vascular etiology of her symptoms today, a CTA of the head and neck was obtained.  There are no acute findings from today's imaging tests and the patient is feeling better.  I feel she can be safely discharged home and have encouraged her to keep her vascular surgery follow-up appointment.  She is comfortable with this plan.      At the conclusion of the encounter I discussed the results of all of the tests and the disposition. The questions were answered. The patient or family acknowledged understanding and was agreeable with the care plan.         =================================================================    HPI        Selina Parikh is a 56 year old female with a pertinent history of HTN, CAD, cancer, s/p left heart catherization (5/06/2022) who presents to this ED by walk in for evaluation of headache, fatigue, and dizziness. Patient reports being seen by clinic 1.5 weeks ago and was told that she had 70-90% blockage in one of her arteries and 47% blockage of her heart. She notes she was told to come to  "the ED if she became symptomatic. Yesterday, patient reports developing increased fatigue, headache, dizziness, and \"funny\" vision. She states these symptoms have persisted to today and adds \"I just don't feel right\". She reports the headache begins from \"up my throat and goes all the way to the back of my head\". Headache is described to be as pressured and is not similar to her typical headaches from her s/p cervical fusions. Denies new numbness or weakness in her extremities. Patient has an upcoming appointment on Monday with vascular. She additionally reports nausea and decreased appetite. Patient with allergies to Zofran. She is able to tolerate Reglan. Patient denies any additional complaints at this time.     Patient's daughter reports, patient was staining her deck today and that it was taking her longer than usual. She reports patient usually takes 20 minutes to complete the task, but today took ~3 hours due to patient feeling very fatigued.       REVIEW OF SYSTEMS     Constitutional:  Denies fever or chills. Endorses fatigue and decreased appetite.   HENT:  Denies sore throat. Endorses visual disturbance.   Respiratory:  Denies cough or shortness of breath   Cardiovascular:  Denies chest pain or palpitations  GI:  Denies abdominal pain, vomiting. Endorses nausea.   Musculoskeletal:  Denies any new extremity pain   Skin:  Denies rash   Neurologic:  Denies focal weakness or sensory changes. Endorses headache.     All other systems reviewed and are negative      PAST MEDICAL HISTORY:  Past Medical History:   Diagnosis Date     Cancer (H)     melanoma     Hiatal hernia      Hypertension        PAST SURGICAL HISTORY:  Past Surgical History:   Procedure Laterality Date     BACK SURGERY       CHOLECYSTECTOMY       CHOLECYSTECTOMY, LAPOROSCOPIC  2/14/2000    Cholecystectomy, Laparoscopic     COLON SURGERY       CV CORONARY ANGIOGRAM N/A 5/6/2022    Procedure: CV CORONARY ANGIOGRAM;  Surgeon: Stefanie Spangler MD;  " Location: [facility] LAB CV     CV LEFT HEART CATH N/A 5/6/2022    Procedure: Left Heart Catheterization;  Surgeon: Stefanie Spangler MD;  Location: [facility] CV     CYSTOSCOPY, INSERT LIGHTED STENT URETER(S) N/A 4/10/2018    Procedure: CYSTOSCOPY, INSERT LIGHTED STENT URETER(S);  Lighted Stent Placement,Laparoscopic Assisted Sigmoid Colectomy;  Surgeon: ERVIN Reina MD;  Location: [facility] OR     LAPAROSCOPIC ASSISTED COLECTOMY LEFT (DESCENDING) N/A 4/10/2018    Procedure: LAPAROSCOPIC ASSISTED COLECTOMY LEFT (DESCENDING);;  Surgeon: Hill Murray MD;  Location: [facility] OR     NECK SURGERY       ORTHOPEDIC SURGERY  10/2010    Cut palm and reattched tendons and nerves in left hand forefinger.     OH ESOPHAGOGASTRODUODENOSCOPY TRANSORAL DIAGNOSTIC N/A 4/30/2021    Procedure: ESOPHAGOGASTRODUODENOSCOPY (EGD);  Surgeon: Souleymane Moreno DO;  Location: [facility];  Service: General     SURGICAL HISTORY OF -   1/4/2000    Esophagogastroduodenoscopy with biopsy     TUBAL LIGATION       TUBAL LIGATION             CURRENT MEDICATIONS:    acetaminophen (TYLENOL) 500 MG tablet  aspirin (ASA) 81 MG EC tablet  atorvastatin (LIPITOR) 40 MG tablet  clobetasol (TEMOVATE) 0.05 % external ointment  diclofenac (VOLTAREN) 50 MG EC tablet  fish oil-omega-3 fatty acids 1000 MG capsule  isosorbide mononitrate (IMDUR) 30 MG 24 hr tablet  Melatonin 10 MG TABS tablet  meloxicam (MOBIC) 7.5 MG tablet  metoprolol succinate ER (TOPROL XL) 50 MG 24 hr tablet  nitroGLYcerin (NITROSTAT) 0.4 MG sublingual tablet  omeprazole (PRILOSEC) 20 MG DR capsule  traMADol (ULTRAM) 50 MG tablet  varenicline (CHANTIX BASIM) 0.5 MG X 11 & 1 MG X 42 tablet  Vitamin D (Cholecalciferol) 25 MCG (1000 UT) TABS        ALLERGIES:  Allergies   Allergen Reactions     Ciprofloxacin Anaphylaxis     Flagyl [Metronidazole] Anaphylaxis     Gabapentin Other (See Comments)     Suicidal thoughts.     Zofran [Ondansetron] Other (See Comments) and GI Disturbance      Causes bloating, moderately uncomfortable, abd pain       Augmentin Nausea and Vomiting     Benadryl [Diphenhydramine] Other (See Comments)     Muscle spasms     Percocet [Oxycodone-Acetaminophen] Other (See Comments)     Goes nuts - nasty mean irritated, can take Dilaudid     Vicodin [Hydrocodone-Acetaminophen] Other (See Comments)     Anxiety-agitation       FAMILY HISTORY:  Family History   Problem Relation Age of Onset     C.A.D. Father 50        50's     Diabetes Father      Diabetes Brother      C.A.D. Brother 42        quad bypass and MI     Diabetes Brother         2 brothers have DM     Cancer Brother 34        Melanoma     Aortic aneurysm Brother      Allergies Son         Meds     Allergies Daughter         Med     Cancer Mother      C.A.D. Brother 38        MI age 38     Diabetes Mother      Diabetes Brother        SOCIAL HISTORY:   Social History     Socioeconomic History     Marital status:      Spouse name: None     Number of children: None     Years of education: None     Highest education level: None   Tobacco Use     Smoking status: Current Every Day Smoker     Packs/day: 0.50     Years: 30.00     Pack years: 15.00     Types: Cigarettes     Last attempt to quit: 2020     Years since quittin.4     Smokeless tobacco: Never Used     Tobacco comment: 3 Cigs a day   Vaping Use     Vaping Use: Never used   Substance and Sexual Activity     Alcohol use: Not Currently     Comment: Alcoholic Drinks/day: 2x year      Drug use: Not Currently     Sexual activity: Not Currently     Partners: Male     Birth control/protection: Surgical   Other Topics Concern     Parent/sibling w/ CABG, MI or angioplasty before 65F 55M? Yes     Comment: brother- 38, MI, brother 42- quad bypass       VITALS:  Patient Vitals for the past 24 hrs:   BP Temp Temp src Pulse Resp SpO2 Height Weight   22 0145 -- -- -- 76 -- 98 % -- --   22 0130 -- -- -- 77 -- 99 % -- --   22 0030 -- -- -- 80 -- 97 % --  "--   07/22/22 0000 (!) 142/75 -- -- -- -- -- -- --   07/21/22 2330 132/62 -- -- 80 -- 97 % -- --   07/21/22 2315 (!) 142/63 -- -- 71 -- 97 % -- --   07/21/22 2300 (!) 152/76 -- -- 66 16 98 % -- --   07/21/22 2245 (!) 170/80 -- -- 74 -- 98 % -- --   07/21/22 2242 (!) 192/87 -- -- -- -- 97 % -- --   07/21/22 2131 (!) 154/81 97.7  F (36.5  C) Temporal 74 20 97 % 1.505 m (4' 11.25\") 72.6 kg (160 lb)       PHYSICAL EXAM    Constitutional:  Well developed, Well nourished,  HENT:  Normocephalic, Atraumatic, Bilateral external ears normal, Oropharynx moist, Nose normal.   Neck:  Normal range of motion, No meningismus, No stridor.   Eyes:  EOMI, Conjunctiva normal, No discharge.   Respiratory:  Normal breath sounds, No respiratory distress, No wheezing, No chest tenderness.   Cardiovascular:  Normal heart rate, Normal rhythm, No murmurs  GI:  Soft, No tenderness, No guarding, No CVA tenderness.   Musculoskeletal:  Neurovascularly intact distally, No edema, No tenderness, No cyanosis, Good range of motion in all major joints. No tenderness to palpation or major deformities noted.   Integument:  Warm, Dry, No erythema, No rash.   Neurologic:  Alert & oriented x 3, Normal motor function, Normal sensory function, No focal deficits noted.   Psychiatric:  Affect normal, Judgment normal, Mood normal.      LAB:  All pertinent labs reviewed and interpreted.  Results for orders placed or performed during the hospital encounter of 07/21/22                                                                      CBC with platelets   Result Value Ref Range    WBC Count 9.1 4.0 - 11.0 10e3/uL    RBC Count 4.59 3.80 - 5.20 10e6/uL    Hemoglobin 13.9 11.7 - 15.7 g/dL    Hematocrit 42.3 35.0 - 47.0 %    MCV 92 78 - 100 fL    MCH 30.3 26.5 - 33.0 pg    MCHC 32.9 31.5 - 36.5 g/dL    RDW 14.3 10.0 - 15.0 %    Platelet Count 265 150 - 450 10e3/uL   Basic metabolic panel   Result Value Ref Range    Sodium 142 136 - 145 mmol/L    Potassium 4.4 3.5 - " 5.0 mmol/L    Chloride 109 (H) 98 - 107 mmol/L    Carbon Dioxide (CO2) 23 22 - 31 mmol/L    Anion Gap 10 5 - 18 mmol/L    Urea Nitrogen 14 8 - 22 mg/dL    Creatinine 0.92 0.60 - 1.10 mg/dL    Calcium 10.0 8.5 - 10.5 mg/dL    Glucose 54 (LL) 70 - 125 mg/dL    GFR Estimate 73 >60 mL/min/1.73m2   Glucose by meter   Result Value Ref Range    GLUCOSE BY METER POCT 110 (H) 70 - 99 mg/dL   Glucose by meter   Result Value Ref Range    GLUCOSE BY METER POCT 88 70 - 99 mg/dL       RADIOLOGY:  I have independently reviewed and interpreted the above imaging, pending the final radiology read.  CTA Head Neck with Contrast   Final Result   IMPRESSION:    HEAD CT:   1.  No acute intracranial process.      HEAD CTA:    1.  No significant stenosis, aneurysm, or high flow vascular malformation identified.      NECK CTA:   1.  Atherosclerotic plaque at the bilateral carotid bifurcations, resulting in 50-70% stenosis of the proximal internal carotid arteries and approximately 70% stenosis of the distal left common carotid artery.   2.  Luminal irregularity of the cervical vasculature is suggestive of fibromuscular dysplasia.            I, Dylon Hoover, am serving as a scribe to document services personally performed by Dr. Elizabeth based on my observation and the provider's statements to me. I, Eliazar Elizabeth, DO attest that Dylon Hoover is acting in a scribe capacity, has observed my performance of the services and has documented them in accordance with my direction.    Eliazar Elizabeth, DO  Emergency Medicine  [facility] EMERGENCY ROOM  [address] [facility] [address] [phone]  Dept: [phone]     Eliazar Elizabeth MD  07/22/22 4322

## 2022-07-22 NOTE — ED TRIAGE NOTES
Arrives to ED with c/o HA, fatigue and dizziness beginning yesterday. Dx with L carotid blockage 1.5 weeks ago. Has appt with vascular. Pain to L side of neck and HA. Constant pressure. Took tylenol without relief. Reports taking nitroglycerin x2 last night with some relief. Reports vision changes. Strength equal bilat.      Triage Assessment     Row Name 07/21/22 1140       Triage Assessment (Adult)    Airway WDL WDL       Respiratory WDL    Respiratory WDL WDL       Skin Circulation/Temperature WDL    Skin Circulation/Temperature WDL WDL       Cardiac WDL    Cardiac WDL WDL       Peripheral/Neurovascular WDL    Peripheral Neurovascular WDL WDL       Cognitive/Neuro/Behavioral WDL    Cognitive/Neuro/Behavioral WDL WDL       Atilio Coma Scale    Best Eye Response 4-->(E4) spontaneous    Best Motor Response 6-->(M6) obeys commands    Best Verbal Response 5-->(V5) oriented    Demorest Coma Scale Score 15

## 2022-07-25 ENCOUNTER — OFFICE VISIT (OUTPATIENT)
Dept: VASCULAR SURGERY | Facility: CLINIC | Age: 57
End: 2022-07-25
Attending: NURSE PRACTITIONER
Payer: COMMERCIAL

## 2022-07-25 VITALS — HEART RATE: 72 BPM | SYSTOLIC BLOOD PRESSURE: 108 MMHG | DIASTOLIC BLOOD PRESSURE: 64 MMHG

## 2022-07-25 DIAGNOSIS — I25.10 CORONARY ARTERY DISEASE DUE TO LIPID RICH PLAQUE: ICD-10-CM

## 2022-07-25 DIAGNOSIS — I65.22 ASYMPTOMATIC STENOSIS OF LEFT CAROTID ARTERY: Primary | ICD-10-CM

## 2022-07-25 DIAGNOSIS — I25.83 CORONARY ARTERY DISEASE DUE TO LIPID RICH PLAQUE: ICD-10-CM

## 2022-07-25 PROCEDURE — 99204 OFFICE O/P NEW MOD 45 MIN: CPT | Performed by: SURGERY

## 2022-07-25 PROCEDURE — G0463 HOSPITAL OUTPT CLINIC VISIT: HCPCS

## 2022-07-25 ASSESSMENT — PAIN SCALES - GENERAL: PAINLEVEL: NO PAIN (0)

## 2022-07-25 NOTE — PROGRESS NOTES
VASCULAR SURGERY CLINIC CONSULTATION    VASCULAR SURGEON: Eveline Manley MD, RPVI     LOCATION:    [facility]     Selina Parikh   Medical Record #:  [id number]  YOB: 1965  Age:  56 year old     Date of Service: 7/25/2022    PRIMARY CARE PROVIDER: Sera Solo      Reason for visit: Asymptomatic left carotid artery stenosis    IMPRESSION: 56-year-old female who comes to vascular surgery clinic for evaluation of asymptomatic left carotid artery stenosis.  CT scan did show 70% stenosis of the left carotid bulb and 60% stenosis of the proximal ICA.  Ultrasounds based on velocity is 228 but end-diastolic velocities and ratio relatively within acceptable limits.  Patient is currently on best medical management therapy.  Patient does have a history of C-spine surgery via left anterior approach.    RECOMMENDATION/RISKS: Currently would not recommend any surgical interventions.  Would recommend an intervention only if stenosis approaches 80%.  Most likely she would benefit from open operation since she is relatively young and transcribed revascularization would be a suboptimal option given with a history of left anterior neck exposure.  But I would like to see this patient on regular basis every 6 months.  Will order another ultrasound.  Continue optimal medical management.    HPI:  Selina Parikh is a 56 year old female who was seen today in consultation for asymptomatic left carotid artery stenosis.  Patient denies any history of stroke, TIA, or amaurosis.    REVIEW OF SYSTEMS:    A 12 point ROS was reviewed and is negative    PHH:    Past Medical History:   Diagnosis Date     Cancer (H)     melanoma     Hiatal hernia      Hypertension           Past Surgical History:   Procedure Laterality Date     BACK SURGERY       CHOLECYSTECTOMY       CHOLECYSTECTOMY, LAPOROSCOPIC  2/14/2000    Cholecystectomy, Laparoscopic     COLON SURGERY       CV CORONARY ANGIOGRAM N/A 5/6/2022     Procedure: CV CORONARY ANGIOGRAM;  Surgeon: Stefanie Spangler MD;  Location: [facility] CV     CV LEFT HEART CATH N/A 5/6/2022    Procedure: Left Heart Catheterization;  Surgeon: Stefanie Spangler MD;  Location: [facility] CV     CYSTOSCOPY, INSERT LIGHTED STENT URETER(S) N/A 4/10/2018    Procedure: CYSTOSCOPY, INSERT LIGHTED STENT URETER(S);  Lighted Stent Placement,Laparoscopic Assisted Sigmoid Colectomy;  Surgeon: ERVIN Reina MD;  Location: [facility] OR     LAPAROSCOPIC ASSISTED COLECTOMY LEFT (DESCENDING) N/A 4/10/2018    Procedure: LAPAROSCOPIC ASSISTED COLECTOMY LEFT (DESCENDING);;  Surgeon: Hill Murray MD;  Location: [facility] OR     NECK SURGERY       ORTHOPEDIC SURGERY  10/2010    Cut palm and reattched tendons and nerves in left hand forefinger.     ND ESOPHAGOGASTRODUODENOSCOPY TRANSORAL DIAGNOSTIC N/A 4/30/2021    Procedure: ESOPHAGOGASTRODUODENOSCOPY (EGD);  Surgeon: Souleymane Moreno DO;  Location: [facility];  Service: General     SURGICAL HISTORY OF -   1/4/2000    Esophagogastroduodenoscopy with biopsy     TUBAL LIGATION       TUBAL LIGATION         ALLERGIES:  Ciprofloxacin, Flagyl [metronidazole], Gabapentin, Zofran [ondansetron], Augmentin, Benadryl [diphenhydramine], Percocet [oxycodone-acetaminophen], and Vicodin [hydrocodone-acetaminophen]    MEDS:    Current Outpatient Medications:      acetaminophen (TYLENOL) 500 MG tablet, Take 1,000 mg by mouth daily before breakfast And second dose as needed, Disp: , Rfl:      aspirin (ASA) 81 MG EC tablet, Take 1 tablet (81 mg) by mouth daily, Disp: 90 tablet, Rfl: 3     atorvastatin (LIPITOR) 40 MG tablet, Take 1 tablet (40 mg) by mouth daily, Disp: 90 tablet, Rfl: 3     clobetasol (TEMOVATE) 0.05 % external ointment, Apply topically 2 times daily (Patient taking differently: Apply topically 2 times daily as needed), Disp: 60 g, Rfl: 3     diclofenac (VOLTAREN) 50 MG EC tablet, Take 1 tablet (50 mg) by mouth 3 times daily as needed for  "moderate pain, Disp: 180 tablet, Rfl: 0     fish oil-omega-3 fatty acids 1000 MG capsule, Take 2 g by mouth daily, Disp: , Rfl:      isosorbide mononitrate (IMDUR) 30 MG 24 hr tablet, Take 1 tablet (30 mg) by mouth every evening, Disp: 30 tablet, Rfl: 1     Melatonin 10 MG TABS tablet, Take 20 mg by mouth nightly as needed for sleep, Disp: , Rfl:      meloxicam (MOBIC) 7.5 MG tablet, Take 1 tablet by mouth once daily, Disp: 90 tablet, Rfl: 3     metoprolol succinate ER (TOPROL XL) 50 MG 24 hr tablet, Take 1 tablet (50 mg) by mouth daily, Disp: 30 tablet, Rfl: 2     nitroGLYcerin (NITROSTAT) 0.4 MG sublingual tablet, One tablet under the tongue every 5 minutes if needed for chest pain. May repeat every 5 minutes for a maximum of 3 doses in 15 minutes\", Disp: 25 tablet, Rfl: 3     omeprazole (PRILOSEC) 20 MG DR capsule, Take 1 capsule (20 mg) by mouth 3 times daily, Disp: 270 capsule, Rfl: 3     traMADol (ULTRAM) 50 MG tablet, Take 1 tablet (50 mg) by mouth every 6 hours as needed for pain (back and neck pain), Disp: 10 tablet, Rfl: 0     varenicline (CHANTIX BASIM) 0.5 MG X 11 & 1 MG X 42 tablet, Take 0.5 mg tab daily for 3 days, THEN 0.5 mg tab twice daily for 4 days, THEN 1 mg twice daily., Disp: 53 tablet, Rfl: 0     Vitamin D (Cholecalciferol) 25 MCG (1000 UT) TABS, Take 1,000 Units by mouth daily, Disp: , Rfl:     SOCIAL HABITS:    History   Smoking Status     Current Every Day Smoker     Packs/day: 0.50     Years: 30.00     Types: Cigarettes     Last attempt to quit: 2/14/2020   Smokeless Tobacco     Never Used     Comment: 3 Cigs a day     Social History    Substance and Sexual Activity      Alcohol use: Not Currently        Comment: Alcoholic Drinks/day: 2x year       History   Drug Use Unknown       FAMILY HISTORY:    Family History   Problem Relation Age of Onset     C.A.D. Father 50        50's     Diabetes Father      Diabetes Brother      C.A.D. Brother 42        quad bypass and MI     Diabetes Brother     "     2 brothers have DM     Cancer Brother 34        Melanoma     Aortic aneurysm Brother      Allergies Son         Meds     Allergies Daughter         Med     Cancer Mother      C.A.D. Brother 38        MI age 38     Diabetes Mother      Diabetes Brother        PE:  /64   Pulse 72   LMP 04/10/2016   Wt Readings from Last 1 Encounters:   07/21/22 72.6 kg (160 lb)     There is no height or weight on file to calculate BMI.    EXAM:  GENERAL: This is a well-developed 56 year old female who appears her stated age  EYES: Grossly normal.  MOUTH: Buccal mucosa normal   CARDIAC: Normal   CHEST/LUNG: Clear to auscultation bilaterally  GASTROINTESINAL soft nontender nondistended  MUSCULOSKELETAL: Grossly normal and both lower extremities are intact.  HEME/LYMPH: No lymphedema  NEUROLOGIC: Focally intact, Alert and oriented x 3.   PSYCH: appropriate affect  INTEGUMENT: No open lesions or ulcers            DIAGNOSTIC STUDIES:     Images:  *MA Screening Digital Bilateral    Result Date: 7/14/2022  BILATERAL FULL FIELD DIGITAL SCREENING MAMMOGRAM Performed on: 7/13/22 Compared to: 06/06/2017, 12/05/2016, 11/29/2016, and 11/10/2015 Technique: This study was evaluated with the assistance of Computer-Aided Detection. Findings: The breasts have scattered areas of fibroglandular density. There are benign findings, not significantly changed of the right breast. There is no radiographic evidence of malignancy. IMPRESSION: ACR BI-RADS Category 2: Benign RECOMMENDED FOLLOW-UP: Annual routine screening mammogram The results and recommendations of this examination will be communicated to the patient.     US Carotid Bilateral    Result Date: 7/13/2022  EXAM: US CAROTID BILATERAL LOCATION: [facility] DATE/TIME: 7/13/2022 2:36 PM INDICATION: Left carotid bruit, lightheadedness. COMPARISON: None. TECHNIQUE: Duplex exam performed utilizing 2D gray-scale imaging, Doppler interrogation with color-flow and spectral  waveform analysis. The percent diameter stenosis is determined using NASCET criteria and Society of Radiologists in Ultrasound Consensus Criteria. FINDINGS: RIGHT: Moderate plaque at the bifurcation. The peak systolic velocity in the ICA is 125-230 cm/sec, consistent with 50-69% stenosis. Normal velocities in the ECA. Antegrade flow within the vertebral artery. LEFT: Moderate plaque at the bifurcation. The peak systolic velocity in the ICA is 125-230 cm/sec, consistent with 50-69% stenosis. The ICA to CCA ratio is 3.7, more suggestive of a 70-99% stenosis. Normal velocities in the ECA. Antegrade flow within the  vertebral artery. VELOCITY CHART: VELOCITY CHART: The following velocities were obtained in the RIGHT carotid system. CCA: 71/26 cm/s ICA: 134/37 cm/s ECA: 116/23 cm/s ICA/CCA: PS 1.9, ED 1.4 The following velocities were obtained in the LEFT carotid system. CCA: 61/34 cm/s ICA: 228/89 cm/s ECA: 191/33 cm/s ICA/CCA: PS 3.7, ED 2.6     IMPRESSION: 1.  Moderate plaque formation, velocities consistent with 50-69% stenosis in the right internal carotid artery. 2.  Moderate plaque formation, velocities consistent with 50-69% stenosis in the left internal carotid artery, however the ICA to CCA ratio is more suggestive of a severe 70-99% stenosis. 3.  Flow within the vertebral arteries is antegrade.    CTA Head Neck with Contrast    Result Date: 7/22/2022  EXAM: CTA HEAD NECK W CONTRAST LOCATION: [facility] DATE/TIME: 7/21/2022 11:28 PM INDICATION: Dizziness. COMPARISON: 11/1/2019 CONTRAST: Isovue 370 100mL TECHNIQUE: Head and neck CT angiogram with IV contrast. Noncontrast head CT followed by axial helical CT images of the head and neck vessels obtained during the arterial phase of intravenous contrast administration. Axial 2D reconstructed images and multiplanar 3D MIP reconstructed images of the head and neck vessels were performed by the technologist. Dose reduction techniques were  used. All stenosis measurements made according to NASCET criteria unless otherwise specified. FINDINGS: NONCONTRAST HEAD CT: INTRACRANIAL CONTENTS: No intracranial hemorrhage, extraaxial collection, or mass effect.  No CT evidence of acute infarct. Normal parenchymal attenuation. Normal ventricles and sulci. VISUALIZED ORBITS/SINUSES/MASTOIDS: No intraorbital abnormality. No paranasal sinus mucosal disease. No middle ear or mastoid effusion. BONES/SOFT TISSUES: No acute abnormality. HEAD CTA: ANTERIOR CIRCULATION: No stenosis/occlusion, aneurysm, or high flow vascular malformation. Standard [place] [employer] anatomy. POSTERIOR CIRCULATION: No stenosis/occlusion, aneurysm, or high flow vascular malformation. Dominant left and smaller right vertebral artery contribute to a normal basilar artery. DURAL VENOUS SINUSES: Expected enhancement of the major dural venous sinuses. NECK CTA: RIGHT CAROTID: Atherosclerotic plaque results in 50-70% stenosis in the right ICA. No dissection. Luminal irregularity of the internal carotid artery is suggestive of fibromuscular dysplasia. LEFT CAROTID: Severe atherosclerotic plaque in the distal common carotid artery just proximal to the bifurcation results in approximately 70% stenosis. Atherosclerotic plaque results in 50-70% stenosis in the left ICA. No dissection. Mild luminal irregularity of the internal carotid artery is suggestive of fibromuscular dysplasia. VERTEBRAL ARTERIES: Mild luminal irregularity of the V3 segments of the vertebral arteries, again suggestive of fibromuscular dysplasia. No flow-limiting stenosis or evidence of dissection. Dominant left and smaller right vertebral arteries. AORTIC ARCH: Classic aortic arch anatomy with no significant stenosis at the origin of the great vessels. NONVASCULAR STRUCTURES: Visualized portions of the lungs are clear. Postoperative changes of an anterior posterior fusion of the cervical spine extending from C4 through C7. There is  mature osseous fusion across the fused vertebrae. Degenerative anterolisthesis of C7 on T1. Spinal canal is grossly patent. Bilateral neural foraminal narrowing is greatest at C3-C4 and C7-T1 where there is at least moderate stenosis.     IMPRESSION: HEAD CT: 1.  No acute intracranial process. HEAD CTA: 1.  No significant stenosis, aneurysm, or high flow vascular malformation identified. NECK CTA: 1.  Atherosclerotic plaque at the bilateral carotid bifurcations, resulting in 50-70% stenosis of the proximal internal carotid arteries and approximately 70% stenosis of the distal left common carotid artery. 2.  Luminal irregularity of the cervical vasculature is suggestive of fibromuscular dysplasia.          LABS:      Sodium   Date Value Ref Range Status   07/21/2022 142 136 - 145 mmol/L Final   05/07/2022 140 136 - 145 mmol/L Final   05/05/2022 142 136 - 145 mmol/L Final   04/19/2021 142 133 - 144 mmol/L Final   10/12/2020 142 133 - 144 mmol/L Final   01/25/2020 140 133 - 144 mmol/L Final     Urea Nitrogen   Date Value Ref Range Status   07/21/2022 14 8 - 22 mg/dL Final   05/07/2022 18 8 - 22 mg/dL Final   05/05/2022 12 8 - 22 mg/dL Final   04/19/2021 13 7 - 30 mg/dL Final   10/12/2020 15 7 - 30 mg/dL Final   01/25/2020 13 7 - 30 mg/dL Final     Hemoglobin   Date Value Ref Range Status   07/21/2022 13.9 11.7 - 15.7 g/dL Final   05/07/2022 11.4 (L) 11.7 - 15.7 g/dL Final   05/05/2022 14.0 11.7 - 15.7 g/dL Final   04/19/2021 14.2 11.7 - 15.7 g/dL Final   10/12/2020 13.6 11.7 - 15.7 g/dL Final   01/25/2020 14.6 11.7 - 15.7 g/dL Final     Platelet Count   Date Value Ref Range Status   07/21/2022 265 150 - 450 10e3/uL Final   05/07/2022 241 150 - 450 10e3/uL Final   05/05/2022 289 150 - 450 10e3/uL Final   04/19/2021 272 150 - 450 10e9/L Final   10/12/2020 243 150 - 450 10e9/L Final   01/25/2020 265 150 - 450 10e9/L Final     BNP   Date Value Ref Range Status   05/05/2022 10 0 - 84 pg/mL Final     INR   Date Value Ref  Range Status   12/30/2020 0.96 0.86 - 1.14 Final   03/25/2013 1.0  Final       50 minutes spent on the day of encounter doing chart review, history and exam, documentation, and further activities as noted.         Eveline Manley MD, [facility]  VASCULAR SURGERY

## 2022-07-25 NOTE — NURSING NOTE
[facility]        Patient is here for a consult to discuss Carotid stenosis. Patient has symptoms of headache, fatigue, dizziness.    Pt is currently taking Aspirin and Statin.    /64   Pulse 72   LMP 04/10/2016     The provider has been notified that the patient has no concerns.     Questions patient would like addressed today are: N/A.    Refills are needed: No    Has homecare services and agency name:  Liat Guaman LPN

## 2022-07-25 NOTE — PATIENT INSTRUCTIONS
Carotid Artery Problems: Stroke    The carotid arteries are large blood vessels that carry blood to the brain. When these arteries are healthy, the brain gets all the oxygen and nutrients it needs to function well. But if the carotid arteries are damaged, this can greatly increase your risk of a stroke. A stroke is a sudden loss of brain function caused by a lack of blood flow and oxygen.    Blood clot blocking carotid artery and emboli breaking off clot. Inset shows damage to brain.      Small pieces of a blood clot called emboli break off and can enter the bloodstream and travel to the brain. Brain tissue is damaged when emboli block arteries in the brain.    How artery damage can lead to a stroke  A healthy carotid artery is smooth on the inside, like a tube. But health problems such as high blood pressure, diabetes, and smoking can damage the inside of the artery wall and make it rough. This lets fatty deposits called plaque build up on the artery wall. Blood clots called emboli may also form on the plaque. If pieces of plaque or emboli break off, they can flow in the blood and get stuck in a small blood vessel in the brain. This blocks the blood flow to a part of the brain, and causes a stroke.      Symptoms of a stroke  Below are common symptoms of a stroke.  Call 911 right away if you have any of these symptoms. Fast medical treatment for a stroke is vital. The longer you wait to get help, the more damage a stroke can do.    Sudden numbness or weakness of the face, arm, or leg, especially on one side of the body  Sudden confusion or trouble speaking or understanding other people  Sudden trouble seeing in 1 or both eyes  Sudden trouble walking, dizziness, or loss of balance or coordination  Sudden, severe headache with no known cause  Sudden new seizures    Transient ischemic attack (TIA)  A transient ischemic attack (TIA) is a mini-stroke. It s a warning sign that a larger stroke may happen in the near  future. A TIA is when an artery to the brain is blocked for a brief time. This will cause symptoms just like those that happen with a stroke. But with a TIA, they last a short period of time, from a few seconds to a few hours. Never ignore any TIA or stroke symptoms.  Call 911 right away .      F.A.S.T.  F.A.S.T. is an easy way to remember the signs of a stroke or TIA. When you see these signs, call 911 fast.    F.A.S.T. stands for:    F is for face drooping. One side of the face is drooping or numb. When the person smiles, the smile is uneven.  A is for arm weakness. One arm is weak or numb. When the person lifts both arms at the same time, one arm may drift downward.  S is for speech difficulty. You may notice slurred speech or trouble speaking. The person can't repeat a simple sentence correctly when asked.  T is for time to call 911. If someone shows any of these symptoms, even if the symptoms go away, call 911 right away. Make note of the time the symptoms first appeared.          [id number] [employer]. [address] [place] [address], [place] [address]. All rights reserved. This information is not intended as a substitute for professional medical care. Always follow your healthcare professional's instructions.

## 2022-07-27 DIAGNOSIS — I10 PRIMARY HYPERTENSION: ICD-10-CM

## 2022-08-11 ENCOUNTER — OFFICE VISIT (OUTPATIENT)
Dept: CARDIOLOGY | Facility: CLINIC | Age: 57
End: 2022-08-11
Payer: COMMERCIAL

## 2022-08-11 VITALS
HEART RATE: 74 BPM | HEIGHT: 59 IN | BODY MASS INDEX: 30.82 KG/M2 | OXYGEN SATURATION: 96 % | WEIGHT: 152.9 LBS | DIASTOLIC BLOOD PRESSURE: 66 MMHG | SYSTOLIC BLOOD PRESSURE: 112 MMHG

## 2022-08-11 DIAGNOSIS — R07.9 CHEST PAIN, UNSPECIFIED TYPE: ICD-10-CM

## 2022-08-11 DIAGNOSIS — I10 PRIMARY HYPERTENSION: ICD-10-CM

## 2022-08-11 DIAGNOSIS — I25.83 CORONARY ARTERY DISEASE DUE TO LIPID RICH PLAQUE: ICD-10-CM

## 2022-08-11 DIAGNOSIS — I25.10 CORONARY ARTERY DISEASE DUE TO LIPID RICH PLAQUE: ICD-10-CM

## 2022-08-11 DIAGNOSIS — Z72.0 TOBACCO ABUSE: Chronic | ICD-10-CM

## 2022-08-11 DIAGNOSIS — Z98.890 STATUS POST CORONARY ANGIOGRAM: ICD-10-CM

## 2022-08-11 PROCEDURE — 99214 OFFICE O/P EST MOD 30 MIN: CPT | Performed by: INTERNAL MEDICINE

## 2022-08-11 RX ORDER — METOPROLOL SUCCINATE 50 MG/1
25 TABLET, EXTENDED RELEASE ORAL DAILY
Qty: 30 TABLET | Refills: 2 | Status: SHIPPED | OUTPATIENT
Start: 2022-08-11 | End: 2023-05-18

## 2022-08-11 NOTE — PROGRESS NOTES
"    [facility]  [phone]      Assessment/Recommendations   Patient with chest discomfort, known moderate coronary artery disease with distal tapering of her LAD, carotid artery disease which is being followed by a vascular surgeon.  After her recent coronary angiogram she was started on atorvastatin, metoprolol and she feels \"like crap \".  She just has no energy.  She does have some shortness of breath with exertion and occasional chest discomfort which has not changed much since prior to her angiogram.    Would like to start by reducing her metoprolol from 50 mg a day to 25 mg a day.  Have asked her to call in 7 to 10 days to see if she feels better.  If not I would stop her heart atorvastatin for 7 to 10 days and see if that would make her feel better.  If she did feel better off of that I would switch her to Crestor at low-dose.    Have also encouraged her to quit smoking if at all possible she still smokes 3 cigarettes a day.    Will set up a follow-up with Alicja in 4 months but of course we will be happy to see her sooner if questions or problems arise.    30 minutes spent with chart review, patient visit, further chart review, documentation and .       History of Present Illness/Subjective    Ms. Selina Parikh is a 56 year old female with known coronary artery disease based on recent angiogram but no critical lesions.  She did however have distal tapering of the LAD.  Isosorbide mononitrate and metoprolol were started and she has not felt any better in fact she probably feels worse.  She just has no energy.  Difficult for her to walk any length of time without just getting all and worn out.  Some shortness of breath.  Gets intermittent chest discomfort similar to pains that she had prior to angiogram and they are not necessarily provoked with physical activity.  She ran out of her isosorbide mononitrate and is not taking it but it did not make her feel any better.     " "    Physical Examination Review of Systems   /66 (BP Location: Left arm, Patient Position: Sitting, Cuff Size: Adult Regular)   Pulse 74   Ht 1.505 m (4' 11.25\")   Wt 69.4 kg (152 lb 14.4 oz)   LMP 04/10/2016   SpO2 96%   BMI 30.62 kg/m    Body mass index is 30.62 kg/m .  Wt Readings from Last 3 Encounters:   08/11/22 69.4 kg (152 lb 14.4 oz)   07/21/22 72.6 kg (160 lb)   06/02/22 68.7 kg (151 lb 6.4 oz)     General Appearance:   Alert, cooperative and in no acute distress.   ENT/Mouth: Patient wearing a mask.      EYES:  no scleral icterus, normal conjunctivae   Neck: JVP normal. No Hepatojugular reflux. Thyroid not visualized.   Chest/Lungs:   Lungs are clear to auscultation, equal chest wall expansion.   Cardiovascular:   S1, S2 without murmur ,clicks or rubs. Brachial, radial and posterior tibial pulses are intact and symetric.  Right carotid bruit noted   Abdomen:  Nontender. BS+.   Extremities: No cyanosis, clubbing or edema   Skin: no xanthelasma, warm.    Neurologic: normal arm movement bilateral, no tremors     Psychiatric: Appropriate affect.      Enc Vitals  BP: 112/66  Pulse: 74  SpO2: 96 %  Weight: 69.4 kg (152 lb 14.4 oz)  Height: 150.5 cm (4' 11.25\")                                           Medical History  Surgical History Family History Social History   Past Medical History:   Diagnosis Date     Cancer (H)     melanoma     Hiatal hernia      Hypertension     Past Surgical History:   Procedure Laterality Date     BACK SURGERY       CHOLECYSTECTOMY       CHOLECYSTECTOMY, LAPOROSCOPIC  2/14/2000    Cholecystectomy, Laparoscopic     COLON SURGERY       CV CORONARY ANGIOGRAM N/A 5/6/2022    Procedure: CV CORONARY ANGIOGRAM;  Surgeon: Stefanie Spangler MD;  Location: [facility] CATH LAB CV     CV LEFT HEART CATH N/A 5/6/2022    Procedure: Left Heart Catheterization;  Surgeon: Stefanie Spangler MD;  Location: [facility] CATH LAB CV     CYSTOSCOPY, INSERT LIGHTED STENT URETER(S) N/A 4/10/2018    " Procedure: CYSTOSCOPY, INSERT LIGHTED STENT URETER(S);  Lighted Stent Placement,Laparoscopic Assisted Sigmoid Colectomy;  Surgeon: ERVIN Reina MD;  Location: [facility]     LAPAROSCOPIC ASSISTED COLECTOMY LEFT (DESCENDING) N/A 4/10/2018    Procedure: LAPAROSCOPIC ASSISTED COLECTOMY LEFT (DESCENDING);;  Surgeon: Hill Murray MD;  Location: [facility] OR     NECK SURGERY       ORTHOPEDIC SURGERY  10/2010    Cut palm and reattched tendons and nerves in left hand forefinger.     MS ESOPHAGOGASTRODUODENOSCOPY TRANSORAL DIAGNOSTIC N/A 2021    Procedure: ESOPHAGOGASTRODUODENOSCOPY (EGD);  Surgeon: Souleymane Moreno DO;  Location: [facility];  Service: General     SURGICAL HISTORY OF -   2000    Esophagogastroduodenoscopy with biopsy     TUBAL LIGATION       TUBAL LIGATION      Family History   Problem Relation Age of Onset     C.A.D. Father 50        50's     Diabetes Father      Diabetes Brother      C.A.D. Brother 42        quad bypass and MI     Diabetes Brother         2 brothers have DM     Cancer Brother 34        Melanoma     Aortic aneurysm Brother      Allergies Son         Meds     Allergies Daughter         Med     Cancer Mother      C.A.D. Brother 38        MI age 38     Diabetes Mother      Diabetes Brother     Social History     Socioeconomic History     Marital status:      Spouse name: Not on file     Number of children: Not on file     Years of education: Not on file     Highest education level: Not on file   Occupational History     Not on file   Tobacco Use     Smoking status: Current Every Day Smoker     Packs/day: 0.50     Years: 30.00     Pack years: 15.00     Types: Cigarettes     Last attempt to quit: 2020     Years since quittin.4     Smokeless tobacco: Never Used     Tobacco comment: 3 Cigs a day   Vaping Use     Vaping Use: Never used   Substance and Sexual Activity     Alcohol use: Not Currently     Comment: Alcoholic Drinks/day: 2x year      Drug use: Not Currently      "Sexual activity: Not Currently     Partners: Male     Birth control/protection: Surgical   Other Topics Concern     Parent/sibling w/ CABG, MI or angioplasty before 65F 55M? Yes     Comment: brother- 38, MI, brother 42- quad bypass   Social History Narrative     Not on file     Social Determinants of Health     Financial Resource Strain: Not on file   Food Insecurity: Not on file   Transportation Needs: Not on file   Physical Activity: Not on file   Stress: Not on file   Social Connections: Not on file   Intimate Partner Violence: Not on file   Housing Stability: Not on file          Medications  Allergies   Current Outpatient Medications   Medication Sig Dispense Refill     acetaminophen (TYLENOL) 500 MG tablet Take 1,000 mg by mouth daily before breakfast And second dose as needed       aspirin (ASA) 81 MG EC tablet Take 1 tablet (81 mg) by mouth daily 90 tablet 3     atorvastatin (LIPITOR) 40 MG tablet Take 1 tablet (40 mg) by mouth daily 90 tablet 3     clobetasol (TEMOVATE) 0.05 % external ointment Apply topically 2 times daily (Patient taking differently: Apply topically 2 times daily as needed) 60 g 3     fish oil-omega-3 fatty acids 1000 MG capsule Take 2 g by mouth daily       Melatonin 10 MG TABS tablet Take 20 mg by mouth nightly as needed for sleep       meloxicam (MOBIC) 7.5 MG tablet Take 1 tablet by mouth once daily 90 tablet 3     metoprolol succinate ER (TOPROL XL) 50 MG 24 hr tablet Take 0.5 tablets (25 mg) by mouth daily 30 tablet 2     nitroGLYcerin (NITROSTAT) 0.4 MG sublingual tablet One tablet under the tongue every 5 minutes if needed for chest pain. May repeat every 5 minutes for a maximum of 3 doses in 15 minutes\" 25 tablet 3     omeprazole (PRILOSEC) 20 MG DR capsule Take 1 capsule (20 mg) by mouth 3 times daily 270 capsule 3     traMADol (ULTRAM) 50 MG tablet Take 1 tablet (50 mg) by mouth every 6 hours as needed for pain (back and neck pain) 10 tablet 0     varenicline (CHANTIX BASIM) 0.5 " MG X 11 & 1 MG X 42 tablet Take 0.5 mg tab daily for 3 days, THEN 0.5 mg tab twice daily for 4 days, THEN 1 mg twice daily. 53 tablet 0     Vitamin D (Cholecalciferol) 25 MCG (1000 UT) TABS Take 1,000 Units by mouth daily      Allergies   Allergen Reactions     Ciprofloxacin Anaphylaxis     Flagyl [Metronidazole] Anaphylaxis     Gabapentin Other (See Comments)     Suicidal thoughts.     Zofran [Ondansetron] Other (See Comments) and GI Disturbance     Causes bloating, moderately uncomfortable, abd pain       Augmentin Nausea and Vomiting     Benadryl [Diphenhydramine] Other (See Comments)     Muscle spasms     Percocet [Oxycodone-Acetaminophen] Other (See Comments)     Goes nuts - nasty mean irritated, can take Dilaudid     Vicodin [Hydrocodone-Acetaminophen] Other (See Comments)     Anxiety-agitation         Lab Results    Chemistry/lipid CBC Cardiac Enzymes/BNP/TSH/INR   Lab Results   Component Value Date    CHOL 209 (H) 04/29/2022    HDL 53 04/29/2022    TRIG 95 04/29/2022    BUN 14 07/21/2022     07/21/2022    CO2 23 07/21/2022    Lab Results   Component Value Date    WBC 9.1 07/21/2022    HGB 13.9 07/21/2022    HCT 42.3 07/21/2022    MCV 92 07/21/2022     07/21/2022    Lab Results   Component Value Date    TROPONINI <0.01 05/05/2022    BNP 10 05/05/2022    TSH 0.95 11/29/2011    INR 0.96 12/30/2020

## 2022-08-11 NOTE — LETTER
"8/11/2022    [place] NP  [address] [place] [address]    RE: Selina Parikh       Dear Colleague,     I had the pleasure of seeing Selina Parikh in the [facility].      [facility]  [phone]      Assessment/Recommendations   Patient with chest discomfort, known moderate coronary artery disease with distal tapering of her LAD, carotid artery disease which is being followed by a vascular surgeon.  After her recent coronary angiogram she was started on atorvastatin, metoprolol and she feels \"like crap \".  She just has no energy.  She does have some shortness of breath with exertion and occasional chest discomfort which has not changed much since prior to her angiogram.    Would like to start by reducing her metoprolol from 50 mg a day to 25 mg a day.  Have asked her to call in 7 to 10 days to see if she feels better.  If not I would stop her heart atorvastatin for 7 to 10 days and see if that would make her feel better.  If she did feel better off of that I would switch her to Crestor at low-dose.    Have also encouraged her to quit smoking if at all possible she still smokes 3 cigarettes a day.    Will set up a follow-up with Alicja in 4 months but of course we will be happy to see her sooner if questions or problems arise.    30 minutes spent with chart review, patient visit, further chart review, documentation and .       History of Present Illness/Subjective    Ms. Selina Parikh is a 56 year old female with known coronary artery disease based on recent angiogram but no critical lesions.  She did however have distal tapering of the LAD.  Isosorbide mononitrate and metoprolol were started and she has not felt any better in fact she probably feels worse.  She just has no energy.  Difficult for her to walk any length of time without just getting all and worn out.  Some shortness of breath.  Gets intermittent chest discomfort similar to pains that " "she had prior to angiogram and they are not necessarily provoked with physical activity.  She ran out of her isosorbide mononitrate and is not taking it but it did not make her feel any better.         Physical Examination Review of Systems   /66 (BP Location: Left arm, Patient Position: Sitting, Cuff Size: Adult Regular)   Pulse 74   Ht 1.505 m (4' 11.25\")   Wt 69.4 kg (152 lb 14.4 oz)   LMP 04/10/2016   SpO2 96%   BMI 30.62 kg/m    Body mass index is 30.62 kg/m .  Wt Readings from Last 3 Encounters:   08/11/22 69.4 kg (152 lb 14.4 oz)   07/21/22 72.6 kg (160 lb)   06/02/22 68.7 kg (151 lb 6.4 oz)     General Appearance:   Alert, cooperative and in no acute distress.   ENT/Mouth: Patient wearing a mask.      EYES:  no scleral icterus, normal conjunctivae   Neck: JVP normal. No Hepatojugular reflux. Thyroid not visualized.   Chest/Lungs:   Lungs are clear to auscultation, equal chest wall expansion.   Cardiovascular:   S1, S2 without murmur ,clicks or rubs. Brachial, radial and posterior tibial pulses are intact and symetric.  Right carotid bruit noted   Abdomen:  Nontender. BS+.   Extremities: No cyanosis, clubbing or edema   Skin: no xanthelasma, warm.    Neurologic: normal arm movement bilateral, no tremors     Psychiatric: Appropriate affect.      Enc Vitals  BP: 112/66  Pulse: 74  SpO2: 96 %  Weight: 69.4 kg (152 lb 14.4 oz)  Height: 150.5 cm (4' 11.25\")                                           Medical History  Surgical History Family History Social History   Past Medical History:   Diagnosis Date     Cancer (H)     melanoma     Hiatal hernia      Hypertension     Past Surgical History:   Procedure Laterality Date     BACK SURGERY       CHOLECYSTECTOMY       CHOLECYSTECTOMY, LAPOROSCOPIC  2/14/2000    Cholecystectomy, Laparoscopic     COLON SURGERY       CV CORONARY ANGIOGRAM N/A 5/6/2022    Procedure: CV CORONARY ANGIOGRAM;  Surgeon: Stefanie Spangler MD;  Location: [facility] CATH LAB CV     CV LEFT " HEART CATH N/A 2022    Procedure: Left Heart Catheterization;  Surgeon: Stefanie Spangler MD;  Location: [facility] CATH LAB CV     CYSTOSCOPY, INSERT LIGHTED STENT URETER(S) N/A 4/10/2018    Procedure: CYSTOSCOPY, INSERT LIGHTED STENT URETER(S);  Lighted Stent Placement,Laparoscopic Assisted Sigmoid Colectomy;  Surgeon: ERVIN Reina MD;  Location: [facility]     LAPAROSCOPIC ASSISTED COLECTOMY LEFT (DESCENDING) N/A 4/10/2018    Procedure: LAPAROSCOPIC ASSISTED COLECTOMY LEFT (DESCENDING);;  Surgeon: Hill Murray MD;  Location: [facility]     NECK SURGERY       ORTHOPEDIC SURGERY  10/2010    Cut palm and reattched tendons and nerves in left hand forefinger.     CO ESOPHAGOGASTRODUODENOSCOPY TRANSORAL DIAGNOSTIC N/A 2021    Procedure: ESOPHAGOGASTRODUODENOSCOPY (EGD);  Surgeon: Souleymane Moreno DO;  Location: [facility];  Service: General     SURGICAL HISTORY OF -   2000    Esophagogastroduodenoscopy with biopsy     TUBAL LIGATION       TUBAL LIGATION      Family History   Problem Relation Age of Onset     C.A.D. Father 50        50's     Diabetes Father      Diabetes Brother      C.A.D. Brother 42        quad bypass and MI     Diabetes Brother         2 brothers have DM     Cancer Brother 34        Melanoma     Aortic aneurysm Brother      Allergies Son         Meds     Allergies Daughter         Med     Cancer Mother      C.A.D. Brother 38        MI age 38     Diabetes Mother      Diabetes Brother     Social History     Socioeconomic History     Marital status:      Spouse name: Not on file     Number of children: Not on file     Years of education: Not on file     Highest education level: Not on file   Occupational History     Not on file   Tobacco Use     Smoking status: Current Every Day Smoker     Packs/day: 0.50     Years: 30.00     Pack years: 15.00     Types: Cigarettes     Last attempt to quit: 2020     Years since quittin.4     Smokeless tobacco: Never Used     Tobacco  "comment: 3 Cigs a day   Vaping Use     Vaping Use: Never used   Substance and Sexual Activity     Alcohol use: Not Currently     Comment: Alcoholic Drinks/day: 2x year      Drug use: Not Currently     Sexual activity: Not Currently     Partners: Male     Birth control/protection: Surgical   Other Topics Concern     Parent/sibling w/ CABG, MI or angioplasty before 65F 55M? Yes     Comment: brother- 38, MI, brother 42- quad bypass   Social History Narrative     Not on file     Social Determinants of Health     Financial Resource Strain: Not on file   Food Insecurity: Not on file   Transportation Needs: Not on file   Physical Activity: Not on file   Stress: Not on file   Social Connections: Not on file   Intimate Partner Violence: Not on file   Housing Stability: Not on file          Medications  Allergies   Current Outpatient Medications   Medication Sig Dispense Refill     acetaminophen (TYLENOL) 500 MG tablet Take 1,000 mg by mouth daily before breakfast And second dose as needed       aspirin (ASA) 81 MG EC tablet Take 1 tablet (81 mg) by mouth daily 90 tablet 3     atorvastatin (LIPITOR) 40 MG tablet Take 1 tablet (40 mg) by mouth daily 90 tablet 3     clobetasol (TEMOVATE) 0.05 % external ointment Apply topically 2 times daily (Patient taking differently: Apply topically 2 times daily as needed) 60 g 3     fish oil-omega-3 fatty acids 1000 MG capsule Take 2 g by mouth daily       Melatonin 10 MG TABS tablet Take 20 mg by mouth nightly as needed for sleep       meloxicam (MOBIC) 7.5 MG tablet Take 1 tablet by mouth once daily 90 tablet 3     metoprolol succinate ER (TOPROL XL) 50 MG 24 hr tablet Take 0.5 tablets (25 mg) by mouth daily 30 tablet 2     nitroGLYcerin (NITROSTAT) 0.4 MG sublingual tablet One tablet under the tongue every 5 minutes if needed for chest pain. May repeat every 5 minutes for a maximum of 3 doses in 15 minutes\" 25 tablet 3     omeprazole (PRILOSEC) 20 MG DR capsule Take 1 capsule (20 mg) by " mouth 3 times daily 270 capsule 3     traMADol (ULTRAM) 50 MG tablet Take 1 tablet (50 mg) by mouth every 6 hours as needed for pain (back and neck pain) 10 tablet 0     varenicline (CHANTIX BASIM) 0.5 MG X 11 & 1 MG X 42 tablet Take 0.5 mg tab daily for 3 days, THEN 0.5 mg tab twice daily for 4 days, THEN 1 mg twice daily. 53 tablet 0     Vitamin D (Cholecalciferol) 25 MCG (1000 UT) TABS Take 1,000 Units by mouth daily      Allergies   Allergen Reactions     Ciprofloxacin Anaphylaxis     Flagyl [Metronidazole] Anaphylaxis     Gabapentin Other (See Comments)     Suicidal thoughts.     Zofran [Ondansetron] Other (See Comments) and GI Disturbance     Causes bloating, moderately uncomfortable, abd pain       Augmentin Nausea and Vomiting     Benadryl [Diphenhydramine] Other (See Comments)     Muscle spasms     Percocet [Oxycodone-Acetaminophen] Other (See Comments)     Goes nuts - nasty mean irritated, can take Dilaudid     Vicodin [Hydrocodone-Acetaminophen] Other (See Comments)     Anxiety-agitation         Lab Results    Chemistry/lipid CBC Cardiac Enzymes/BNP/TSH/INR   Lab Results   Component Value Date    CHOL 209 (H) 04/29/2022    HDL 53 04/29/2022    TRIG 95 04/29/2022    BUN 14 07/21/2022     07/21/2022    CO2 23 07/21/2022    Lab Results   Component Value Date    WBC 9.1 07/21/2022    HGB 13.9 07/21/2022    HCT 42.3 07/21/2022    MCV 92 07/21/2022     07/21/2022    Lab Results   Component Value Date    TROPONINI <0.01 05/05/2022    BNP 10 05/05/2022    TSH 0.95 11/29/2011    INR 0.96 12/30/2020                Thank you for allowing me to participate in the care of your patient.      Sincerely,     Glenn Ferreira MD     [facility]  cc:   Glenn Ferreira MD  [address] [place] [address]

## 2022-08-25 ENCOUNTER — OFFICE VISIT (OUTPATIENT)
Dept: FAMILY MEDICINE | Facility: CLINIC | Age: 57
End: 2022-08-25
Payer: COMMERCIAL

## 2022-08-25 VITALS
WEIGHT: 151.8 LBS | HEART RATE: 75 BPM | SYSTOLIC BLOOD PRESSURE: 114 MMHG | TEMPERATURE: 98.2 F | HEIGHT: 59 IN | DIASTOLIC BLOOD PRESSURE: 77 MMHG | OXYGEN SATURATION: 96 % | BODY MASS INDEX: 30.6 KG/M2

## 2022-08-25 DIAGNOSIS — M54.41 CHRONIC BILATERAL LOW BACK PAIN WITH BILATERAL SCIATICA: Primary | ICD-10-CM

## 2022-08-25 DIAGNOSIS — G89.29 CHRONIC BILATERAL LOW BACK PAIN WITH BILATERAL SCIATICA: Primary | ICD-10-CM

## 2022-08-25 DIAGNOSIS — M54.42 CHRONIC BILATERAL LOW BACK PAIN WITH BILATERAL SCIATICA: Primary | ICD-10-CM

## 2022-08-25 DIAGNOSIS — Z98.1 HISTORY OF LUMBAR FUSION: ICD-10-CM

## 2022-08-25 DIAGNOSIS — R32 URINARY INCONTINENCE, UNSPECIFIED TYPE: ICD-10-CM

## 2022-08-25 PROCEDURE — 99214 OFFICE O/P EST MOD 30 MIN: CPT | Performed by: NURSE PRACTITIONER

## 2022-08-25 RX ORDER — CELECOXIB 100 MG/1
100 CAPSULE ORAL 2 TIMES DAILY
Qty: 180 CAPSULE | Refills: 3 | Status: ON HOLD | OUTPATIENT
Start: 2022-08-25 | End: 2023-06-09

## 2022-08-25 NOTE — PROGRESS NOTES
"  Assessment & Plan     Chronic bilateral low back pain with bilateral sciatica  This is chronic.  Has previously seen 2 other spine surgeons.  Given new urinary incontinence, will send for an MRI.  Refer to Spine to discuss options for treatment.  She may restart Celebrex.  Discussed symptoms that would warrant emergent care in the meantime - specifically bowel incontinence or complete urinary incontinence.   - Spine  Referral  - celecoxib (CELEBREX) 100 MG capsule  Dispense: 180 capsule; Refill: 3  - MR Lumbar Spine w/o & w Contrast    Urinary incontinence, unspecified type  - Spine  Referral  - MR Lumbar Spine w/o & w Contrast    History of lumbar fusion  - Spine  Referral  - MR Lumbar Spine w/o & w Contrast       Nicotine/Tobacco Cessation:  She reports that she has been smoking cigarettes. She has a 15.00 pack-year smoking history. She has never used smokeless tobacco.  Nicotine/Tobacco Cessation Plan:   Information offered: Patient not interested at this time      BMI:   Estimated body mass index is 30.4 kg/m  as calculated from the following:    Height as of this encounter: 1.505 m (4' 11.25\").    Weight as of this encounter: 68.9 kg (151 lb 12.8 oz).       Sera Solo NP  [facility]    Delfin Marks is a 56 year old who presents with concerns regarding back pain.  History of chronic back pain with history of lumbar fusion and multiple injections in the low back and neck.  Previously seen a [facility] and with another provider in the [facility] (Dr. Valles?).  Patient complains of bilateral low back pain, L>R.  Has bilateral radiculopathy that goes down the entire left leg and to the knee on the right leg.  Pain has been worse in her legs.  Walking makes her pain worse.  She is better when she is able to utilize a cart or other walking aid.  Reports urinary \"dribbling\" that is new and worse over the last 2 weeks.  She did have an " "episode of urgent diarrhea where she was incontinence.  Has been on Mobic and Voltaren.  Would like to go back on Celebrex, as she found this the most helpful.     Review of Systems   Pertinent items in HPI      Objective    /77   Pulse 75   Temp 98.2  F (36.8  C) (Oral)   Ht 1.505 m (4' 11.25\")   Wt 68.9 kg (151 lb 12.8 oz)   LMP 04/10/2016   SpO2 96%   BMI 30.40 kg/m    Body mass index is 30.4 kg/m .  Physical Exam   GENERAL: healthy, alert and no distress  BACK: normal gait. No lumbar spinal tenderness. Bilateral lower extremity strength grossly equal bilaterally. Patellar reflexes 1/4 bilaterally.  SLR negative bilaterally.       "

## 2022-08-31 ENCOUNTER — DOCUMENTATION ONLY (OUTPATIENT)
Dept: LAB | Facility: CLINIC | Age: 57
End: 2022-08-31

## 2022-08-31 NOTE — PROGRESS NOTES
Selina Parikh has an upcoming lab appointment.        Future Appointments   Date Time Provider Department Center   9/2/2022  4:30 PM [facility] LAB [facility]   9/2/2022  6:30 PM [facility]1 [facility]   9/9/2022  9:30 AM Taty Alfonso PA-C [facility]   9/15/2022  1:40 PM Godfrey Short DO [facility]   1/24/2023  3:00 PM [facility]   1/24/2023  3:40 PM [facility]       The appointment note says: Lab orders pers Dr Solo, Dr. Ferreira.    There is no Lab order available.  Please review and place future orders as appropriate.  If no Lab order will be placed please advise patient.      Thanks,    Trina Franz

## 2022-09-02 ENCOUNTER — LAB (OUTPATIENT)
Dept: LAB | Facility: CLINIC | Age: 57
End: 2022-09-02
Payer: COMMERCIAL

## 2022-09-02 ENCOUNTER — HOSPITAL ENCOUNTER (OUTPATIENT)
Dept: MRI IMAGING | Facility: HOSPITAL | Age: 57
Discharge: HOME | End: 2022-09-02
Attending: NURSE PRACTITIONER | Admitting: NURSE PRACTITIONER
Payer: COMMERCIAL

## 2022-09-02 DIAGNOSIS — M54.42 CHRONIC BILATERAL LOW BACK PAIN WITH BILATERAL SCIATICA: ICD-10-CM

## 2022-09-02 DIAGNOSIS — Z98.1 HISTORY OF LUMBAR FUSION: ICD-10-CM

## 2022-09-02 DIAGNOSIS — E78.5 DYSLIPIDEMIA, GOAL LDL BELOW 70: ICD-10-CM

## 2022-09-02 DIAGNOSIS — M54.41 CHRONIC BILATERAL LOW BACK PAIN WITH BILATERAL SCIATICA: ICD-10-CM

## 2022-09-02 DIAGNOSIS — G89.29 CHRONIC BILATERAL LOW BACK PAIN WITH BILATERAL SCIATICA: ICD-10-CM

## 2022-09-02 DIAGNOSIS — R32 URINARY INCONTINENCE, UNSPECIFIED TYPE: ICD-10-CM

## 2022-09-02 LAB
ALBUMIN SERPL BCG-MCNC: 4.5 G/DL (ref 3.5–5.2)
ALP SERPL-CCNC: 78 U/L (ref 35–104)
ALT SERPL W P-5'-P-CCNC: 12 U/L (ref 10–35)
AST SERPL W P-5'-P-CCNC: 22 U/L (ref 10–35)
BILIRUB DIRECT SERPL-MCNC: <0.2 MG/DL (ref 0–0.3)
BILIRUB SERPL-MCNC: 0.2 MG/DL
CHOLEST SERPL-MCNC: 152 MG/DL
HDLC SERPL-MCNC: 44 MG/DL
LDLC SERPL CALC-MCNC: 82 MG/DL
NONHDLC SERPL-MCNC: 108 MG/DL
PROT SERPL-MCNC: 6.9 G/DL (ref 6.4–8.3)
TRIGL SERPL-MCNC: 128 MG/DL

## 2022-09-02 PROCEDURE — 255N000002 HC RX 255 OP 636: Performed by: NURSE PRACTITIONER

## 2022-09-02 PROCEDURE — 36415 COLL VENOUS BLD VENIPUNCTURE: CPT

## 2022-09-02 PROCEDURE — 80061 LIPID PANEL: CPT

## 2022-09-02 PROCEDURE — 80076 HEPATIC FUNCTION PANEL: CPT

## 2022-09-02 PROCEDURE — 72158 MRI LUMBAR SPINE W/O & W/DYE: CPT

## 2022-09-02 PROCEDURE — A9585 GADOBUTROL INJECTION: HCPCS | Performed by: NURSE PRACTITIONER

## 2022-09-02 RX ORDER — GADOBUTROL 604.72 MG/ML
0.1 INJECTION INTRAVENOUS ONCE
Status: COMPLETED | OUTPATIENT
Start: 2022-09-02 | End: 2022-09-02

## 2022-09-02 RX ADMIN — GADOBUTROL 7 ML: 604.72 INJECTION INTRAVENOUS at 18:53

## 2022-09-02 NOTE — PROGRESS NOTES
The only orders from Dr. Solo in the chart recently are for MRI and Spine Referral, which are not lab tests.

## 2022-09-02 NOTE — TELEPHONE ENCOUNTER
I don't have any lab orders for patient.  It looks like she has some future orders from cardiology to be drawn.    Sera Solo NP

## 2022-09-09 ENCOUNTER — OFFICE VISIT (OUTPATIENT)
Dept: DERMATOLOGY | Facility: CLINIC | Age: 57
End: 2022-09-09
Attending: NURSE PRACTITIONER
Payer: COMMERCIAL

## 2022-09-09 DIAGNOSIS — D22.9 MULTIPLE BENIGN NEVI: ICD-10-CM

## 2022-09-09 DIAGNOSIS — L82.0 INFLAMED SEBORRHEIC KERATOSIS: ICD-10-CM

## 2022-09-09 DIAGNOSIS — Z85.820 HISTORY OF MELANOMA: ICD-10-CM

## 2022-09-09 DIAGNOSIS — L81.4 LENTIGO: ICD-10-CM

## 2022-09-09 DIAGNOSIS — D18.01 CHERRY ANGIOMA: ICD-10-CM

## 2022-09-09 DIAGNOSIS — L82.1 SEBORRHEIC KERATOSIS: Primary | ICD-10-CM

## 2022-09-09 PROCEDURE — 99203 OFFICE O/P NEW LOW 30 MIN: CPT | Performed by: PHYSICIAN ASSISTANT

## 2022-09-09 ASSESSMENT — PAIN SCALES - GENERAL: PAINLEVEL: NO PAIN (0)

## 2022-09-09 NOTE — NURSING NOTE
Chief Complaint   Patient presents with     Skin Check     Nasal tip, inside nose, back        There were no vitals filed for this visit.  Wt Readings from Last 1 Encounters:   08/25/22 68.9 kg (151 lb 12.8 oz)       Yesenia Strickland LPN .................9/9/2022

## 2022-09-09 NOTE — LETTER
9/9/2022         RE: Selina Parikh  [id number] [facility] [address]        Dear Colleague,    Thank you for referring your patient, Selina Parikh, to the [facility]. Please see a copy of my visit note below.    Selina Parikh is an extremely pleasant 56 year old year old female patient here today for skin check. She denies any painful or bleeding skin lesions. she denies any new or changing nevi.  Patient has no other skin complaints today.  Remainder of the HPI, Meds, PMH, Allergies, FH, and SH was reviewed in chart.    Pertinent Hx:  History of Melanoma on left lowe abdomen, no lymph nodes were taken.   Past Medical History:   Diagnosis Date     Cancer (H)     melanoma     Hiatal hernia      Hypertension        Past Surgical History:   Procedure Laterality Date     BACK SURGERY       CHOLECYSTECTOMY       CHOLECYSTECTOMY, LAPOROSCOPIC  2/14/2000    Cholecystectomy, Laparoscopic     COLON SURGERY       CV CORONARY ANGIOGRAM N/A 5/6/2022    Procedure: CV CORONARY ANGIOGRAM;  Surgeon: Stefanie Spangler MD;  Location: [facility] CV     CV LEFT HEART CATH N/A 5/6/2022    Procedure: Left Heart Catheterization;  Surgeon: Stefanie Spangler MD;  Location: [facility] CV     CYSTOSCOPY, INSERT LIGHTED STENT URETER(S) N/A 4/10/2018    Procedure: CYSTOSCOPY, INSERT LIGHTED STENT URETER(S);  Lighted Stent Placement,Laparoscopic Assisted Sigmoid Colectomy;  Surgeon: ERVIN Reina MD;  Location: [facility] OR     LAPAROSCOPIC ASSISTED COLECTOMY LEFT (DESCENDING) N/A 4/10/2018    Procedure: LAPAROSCOPIC ASSISTED COLECTOMY LEFT (DESCENDING);;  Surgeon: Hill Murray MD;  Location: [facility] OR     NECK SURGERY       ORTHOPEDIC SURGERY  10/2010    Cut palm and reattched tendons and nerves in left hand forefinger.     UT ESOPHAGOGASTRODUODENOSCOPY TRANSORAL DIAGNOSTIC N/A 4/30/2021    Procedure: ESOPHAGOGASTRODUODENOSCOPY (EGD);  Surgeon: Souleymane Moreno DO;  Location: [facility];  Service:  General     SURGICAL HISTORY OF -   2000    Esophagogastroduodenoscopy with biopsy     TUBAL LIGATION       TUBAL LIGATION          Family History   Problem Relation Age of Onset     C.A.D. Father 50        50's     Diabetes Father      Diabetes Brother      C.A.D. Brother 42        quad bypass and MI     Diabetes Brother         2 brothers have DM     Cancer Brother 34        Melanoma     Aortic aneurysm Brother      Allergies Son         Meds     Allergies Daughter         Med     Cancer Mother      C.A.D. Brother 38        MI age 38     Diabetes Mother      Diabetes Brother        Social History     Socioeconomic History     Marital status:      Spouse name: Not on file     Number of children: Not on file     Years of education: Not on file     Highest education level: Not on file   Occupational History     Not on file   Tobacco Use     Smoking status: Current Every Day Smoker     Packs/day: 0.50     Years: 30.00     Pack years: 15.00     Types: Cigarettes     Last attempt to quit: 2020     Years since quittin.5     Smokeless tobacco: Never Used     Tobacco comment: 3 Cigs a day   Vaping Use     Vaping Use: Never used   Substance and Sexual Activity     Alcohol use: Not Currently     Comment: Alcoholic Drinks/day: 2x year      Drug use: Not Currently     Sexual activity: Not Currently     Partners: Male     Birth control/protection: Surgical   Other Topics Concern     Parent/sibling w/ CABG, MI or angioplasty before 65F 55M? Yes     Comment: brother- 38, MI, brother 42- quad bypass   Social History Narrative     Not on file     Social Determinants of Health     Financial Resource Strain: Not on file   Food Insecurity: Not on file   Transportation Needs: Not on file   Physical Activity: Not on file   Stress: Not on file   Social Connections: Not on file   Intimate Partner Violence: Not on file   Housing Stability: Not on file       Outpatient Encounter Medications as of 2022   Medication  "Sig Dispense Refill     acetaminophen (TYLENOL) 500 MG tablet Take 1,000 mg by mouth daily before breakfast And second dose as needed       aspirin (ASA) 81 MG EC tablet Take 1 tablet (81 mg) by mouth daily 90 tablet 3     atorvastatin (LIPITOR) 40 MG tablet Take 1 tablet (40 mg) by mouth daily 90 tablet 3     celecoxib (CELEBREX) 100 MG capsule Take 1 capsule (100 mg) by mouth 2 times daily 180 capsule 3     clobetasol (TEMOVATE) 0.05 % external ointment Apply topically 2 times daily (Patient taking differently: Apply topically 2 times daily as needed) 60 g 3     fish oil-omega-3 fatty acids 1000 MG capsule Take 2 g by mouth daily       Melatonin 10 MG TABS tablet Take 20 mg by mouth nightly as needed for sleep       metoprolol succinate ER (TOPROL XL) 50 MG 24 hr tablet Take 0.5 tablets (25 mg) by mouth daily 30 tablet 2     nitroGLYcerin (NITROSTAT) 0.4 MG sublingual tablet One tablet under the tongue every 5 minutes if needed for chest pain. May repeat every 5 minutes for a maximum of 3 doses in 15 minutes\" 25 tablet 3     omeprazole (PRILOSEC) 20 MG DR capsule Take 1 capsule (20 mg) by mouth 3 times daily 270 capsule 3     traMADol (ULTRAM) 50 MG tablet Take 1 tablet (50 mg) by mouth every 6 hours as needed for pain (back and neck pain) 10 tablet 0     Vitamin D (Cholecalciferol) 25 MCG (1000 UT) TABS Take 1,000 Units by mouth daily       varenicline (CHANTIX BASIM) 0.5 MG X 11 & 1 MG X 42 tablet Take 0.5 mg tab daily for 3 days, THEN 0.5 mg tab twice daily for 4 days, THEN 1 mg twice daily. (Patient not taking: No sig reported) 53 tablet 0     No facility-administered encounter medications on file as of 9/9/2022.             O:   NAD, WDWN, Alert & Oriented, Mood & Affect wnl, Vitals stable   Here today alone   LMP 04/10/2016    General appearance normal   Vitals stable   Alert, oriented and in no acute distress     Stuck on papules and brown macules on trunk and ext   Red papules on trunk  Brown papules and " macules with regular pigment network and borders on torso and extremities    No lymphadenopathy of exam  The remainder of skin exam is normal     Eyes: Conjunctivae/lids:Normal     ENT: Lips: normal    MSK:Normal    Cardiovascular: peripheral edema none    Pulm: Breathing Normal    Neuro/Psych: Orientation:Alert and Orientedx3 ; Mood/Affect:normal   A/P:  1. History of Melanoma on left abdomen   MELANOMA DISCUSSED WITH PATIENT:  I discussed the specifics of tumor, prognosis, metachronous melanoma, self exam, and genetics with the patient. I explained the need for monthly skin exams including and taught the patient how to do this. Patient was asked about new or changing moles . I discussed with patient signs and symptoms that could arise in the setting of recurrent locoregional or metastatic disease. In addition, the need to undergo every 12 month dermatologic full skin survey and evaluation given that patients with a diagnosis of melanoma are at risk of recurrence (local and distant) and of subsequent de gabi melanoma.    2. Seborrheic keratosis, lentigo, angioma, benign nevi   It was a pleasure speaking to Selina Parikh today.  BENIGN LESIONS DISCUSSED WITH PATIENT:  I discussed the specifics of tumor, prognosis, and genetics of benign lesions.  I explained that treatment of these lesions would be purely cosmetic and not medically neccessary.  I discussed with patient different removal options including excision, cautery and /or laser.      Nature and genetics of benign skin lesions dicussed with patient.  Signs and Symptoms of skin cancer discussed with patient.  ABCDEs of melanoma reviewed with patient.  Patient encouraged to perform monthly skin exams.  UV precautions reviewed with patient  Risks of non-melanoma skin cancer discussed with patient   Return to clinic in one year or sooner if needed.         Again, thank you for allowing me to participate in the care of your patient.         Sincerely,        Taty Stubbs PA-C

## 2022-09-12 NOTE — PROGRESS NOTES
Selina Parikh is an extremely pleasant 56 year old year old female patient here today for skin check. She denies any painful or bleeding skin lesions. she denies any new or changing nevi.  Patient has no other skin complaints today.  Remainder of the HPI, Meds, PMH, Allergies, FH, and SH was reviewed in chart.    Pertinent Hx:  History of Melanoma on left lowe abdomen, no lymph nodes were taken.   Past Medical History:   Diagnosis Date     Cancer (H)     melanoma     Hiatal hernia      Hypertension        Past Surgical History:   Procedure Laterality Date     BACK SURGERY       CHOLECYSTECTOMY       CHOLECYSTECTOMY, LAPOROSCOPIC  2/14/2000    Cholecystectomy, Laparoscopic     COLON SURGERY       CV CORONARY ANGIOGRAM N/A 5/6/2022    Procedure: CV CORONARY ANGIOGRAM;  Surgeon: Stefanie Spangler MD;  Location: [facility] CV     CV LEFT HEART CATH N/A 5/6/2022    Procedure: Left Heart Catheterization;  Surgeon: Stefanie Spangler MD;  Location: [facility] CV     CYSTOSCOPY, INSERT LIGHTED STENT URETER(S) N/A 4/10/2018    Procedure: CYSTOSCOPY, INSERT LIGHTED STENT URETER(S);  Lighted Stent Placement,Laparoscopic Assisted Sigmoid Colectomy;  Surgeon: ERVIN Reina MD;  Location: [facility] OR     LAPAROSCOPIC ASSISTED COLECTOMY LEFT (DESCENDING) N/A 4/10/2018    Procedure: LAPAROSCOPIC ASSISTED COLECTOMY LEFT (DESCENDING);;  Surgeon: Hill Murray MD;  Location: [facility] OR     NECK SURGERY       ORTHOPEDIC SURGERY  10/2010    Cut palm and reattched tendons and nerves in left hand forefinger.     RI ESOPHAGOGASTRODUODENOSCOPY TRANSORAL DIAGNOSTIC N/A 4/30/2021    Procedure: ESOPHAGOGASTRODUODENOSCOPY (EGD);  Surgeon: Souleymane Moreno DO;  Location: [facility];  Service: General     SURGICAL HISTORY OF -   1/4/2000    Esophagogastroduodenoscopy with biopsy     TUBAL LIGATION       TUBAL LIGATION          Family History   Problem Relation Age of Onset     C.A.D. Father 50        50's     Diabetes Father       Diabetes Brother      C.A.D. Brother 42        quad bypass and MI     Diabetes Brother         2 brothers have DM     Cancer Brother 34        Melanoma     Aortic aneurysm Brother      Allergies Son         Meds     Allergies Daughter         Med     Cancer Mother      C.A.BIA. Brother 38        MI age 38     Diabetes Mother      Diabetes Brother        Social History     Socioeconomic History     Marital status:      Spouse name: Not on file     Number of children: Not on file     Years of education: Not on file     Highest education level: Not on file   Occupational History     Not on file   Tobacco Use     Smoking status: Current Every Day Smoker     Packs/day: 0.50     Years: 30.00     Pack years: 15.00     Types: Cigarettes     Last attempt to quit: 2020     Years since quittin.5     Smokeless tobacco: Never Used     Tobacco comment: 3 Cigs a day   Vaping Use     Vaping Use: Never used   Substance and Sexual Activity     Alcohol use: Not Currently     Comment: Alcoholic Drinks/day: 2x year      Drug use: Not Currently     Sexual activity: Not Currently     Partners: Male     Birth control/protection: Surgical   Other Topics Concern     Parent/sibling w/ CABG, MI or angioplasty before 65F 55M? Yes     Comment: brother- 38, MI, brother 42- quad bypass   Social History Narrative     Not on file     Social Determinants of Health     Financial Resource Strain: Not on file   Food Insecurity: Not on file   Transportation Needs: Not on file   Physical Activity: Not on file   Stress: Not on file   Social Connections: Not on file   Intimate Partner Violence: Not on file   Housing Stability: Not on file       Outpatient Encounter Medications as of 2022   Medication Sig Dispense Refill     acetaminophen (TYLENOL) 500 MG tablet Take 1,000 mg by mouth daily before breakfast And second dose as needed       aspirin (ASA) 81 MG EC tablet Take 1 tablet (81 mg) by mouth daily 90 tablet 3     atorvastatin  "(LIPITOR) 40 MG tablet Take 1 tablet (40 mg) by mouth daily 90 tablet 3     celecoxib (CELEBREX) 100 MG capsule Take 1 capsule (100 mg) by mouth 2 times daily 180 capsule 3     clobetasol (TEMOVATE) 0.05 % external ointment Apply topically 2 times daily (Patient taking differently: Apply topically 2 times daily as needed) 60 g 3     fish oil-omega-3 fatty acids 1000 MG capsule Take 2 g by mouth daily       Melatonin 10 MG TABS tablet Take 20 mg by mouth nightly as needed for sleep       metoprolol succinate ER (TOPROL XL) 50 MG 24 hr tablet Take 0.5 tablets (25 mg) by mouth daily 30 tablet 2     nitroGLYcerin (NITROSTAT) 0.4 MG sublingual tablet One tablet under the tongue every 5 minutes if needed for chest pain. May repeat every 5 minutes for a maximum of 3 doses in 15 minutes\" 25 tablet 3     omeprazole (PRILOSEC) 20 MG DR capsule Take 1 capsule (20 mg) by mouth 3 times daily 270 capsule 3     traMADol (ULTRAM) 50 MG tablet Take 1 tablet (50 mg) by mouth every 6 hours as needed for pain (back and neck pain) 10 tablet 0     Vitamin D (Cholecalciferol) 25 MCG (1000 UT) TABS Take 1,000 Units by mouth daily       varenicline (CHANTIX BASIM) 0.5 MG X 11 & 1 MG X 42 tablet Take 0.5 mg tab daily for 3 days, THEN 0.5 mg tab twice daily for 4 days, THEN 1 mg twice daily. (Patient not taking: No sig reported) 53 tablet 0     No facility-administered encounter medications on file as of 9/9/2022.             O:   NAD, WDWN, Alert & Oriented, Mood & Affect wnl, Vitals stable   Here today alone   LMP 04/10/2016    General appearance normal   Vitals stable   Alert, oriented and in no acute distress     Stuck on papules and brown macules on trunk and ext   Red papules on trunk  Brown papules and macules with regular pigment network and borders on torso and extremities    No lymphadenopathy of exam  The remainder of skin exam is normal     Eyes: Conjunctivae/lids:Normal     ENT: Lips: normal    MSK:Normal    Cardiovascular: " peripheral edema none    Pulm: Breathing Normal    Neuro/Psych: Orientation:Alert and Orientedx3 ; Mood/Affect:normal   A/P:  1. History of Melanoma on left abdomen   MELANOMA DISCUSSED WITH PATIENT:  I discussed the specifics of tumor, prognosis, metachronous melanoma, self exam, and genetics with the patient. I explained the need for monthly skin exams including and taught the patient how to do this. Patient was asked about new or changing moles . I discussed with patient signs and symptoms that could arise in the setting of recurrent locoregional or metastatic disease. In addition, the need to undergo every 12 month dermatologic full skin survey and evaluation given that patients with a diagnosis of melanoma are at risk of recurrence (local and distant) and of subsequent de gabi melanoma.    2 inflamed seborrheic keratosis on right upper back  x1  LN2:  Treated with LN2 for 5s for 1-2 cycles. Warned risks of blistering, pain, pigment change, scarring, and incomplete resolution.  Advised patient to return if lesions do not completely resolve.  Wound care sheet given.  3. Seborrheic keratosis, lentigo, angioma, benign nevi   It was a pleasure speaking to Selina Parikh today.  BENIGN LESIONS DISCUSSED WITH PATIENT:  I discussed the specifics of tumor, prognosis, and genetics of benign lesions.  I explained that treatment of these lesions would be purely cosmetic and not medically neccessary.  I discussed with patient different removal options including excision, cautery and /or laser.      Nature and genetics of benign skin lesions dicussed with patient.  Signs and Symptoms of skin cancer discussed with patient.  ABCDEs of melanoma reviewed with patient.  Patient encouraged to perform monthly skin exams.  UV precautions reviewed with patient  Risks of non-melanoma skin cancer discussed with patient   Return to clinic in one year or sooner if needed.

## 2022-09-15 ENCOUNTER — OFFICE VISIT (OUTPATIENT)
Dept: PHYSICAL MEDICINE AND REHAB | Facility: CLINIC | Age: 57
End: 2022-09-15
Attending: NURSE PRACTITIONER
Payer: COMMERCIAL

## 2022-09-15 VITALS
WEIGHT: 152 LBS | HEART RATE: 71 BPM | SYSTOLIC BLOOD PRESSURE: 128 MMHG | HEIGHT: 59 IN | DIASTOLIC BLOOD PRESSURE: 73 MMHG | BODY MASS INDEX: 30.64 KG/M2

## 2022-09-15 DIAGNOSIS — Z98.1 HISTORY OF LUMBAR FUSION: ICD-10-CM

## 2022-09-15 DIAGNOSIS — R32 URINARY INCONTINENCE, UNSPECIFIED TYPE: ICD-10-CM

## 2022-09-15 DIAGNOSIS — M51.26 LUMBAR DISC HERNIATION: Primary | ICD-10-CM

## 2022-09-15 DIAGNOSIS — M48.04 THORACIC SPINAL STENOSIS: ICD-10-CM

## 2022-09-15 PROCEDURE — 99215 OFFICE O/P EST HI 40 MIN: CPT | Performed by: PHYSICAL MEDICINE & REHABILITATION

## 2022-09-15 ASSESSMENT — PAIN SCALES - GENERAL: PAINLEVEL: SEVERE PAIN (7)

## 2022-09-15 NOTE — LETTER
9/15/2022         RE: Selina Parikh  [id number] [facility] [id number]        Dear Colleague,    Thank you for referring your patient, Selina Parikh, to the [facility]. Please see a copy of my visit note below.    Assessment/Plan:      Selina was seen today for back pain.    Diagnoses and all orders for this visit:    Lumbar disc herniation  -     Neurosurgery Referral; Future    Thoracic spinal stenosis    History of lumbar fusion  -     Spine  Referral  -     Neurosurgery Referral; Future    Urinary incontinence, unspecified type  -     Spine  Referral  -     Neurosurgery Referral; Future         Assessment: Pleasant 56 year old female with a history of melanoma, hypertension, Status post L3-5 posterior fusion intensity spine in 2020 with:    1.  8-month history of progressive lumbar spine pain with bilateral, left greater than right lower extremity paresthesias.  Progressive urinary incontinence and poor control of bowel over the past couple of weeks.  This is slowly progressive over the past weeks and months.  She has large disc herniation at T12-L1 resulting in significant left lateral recess stenosis as well as compressing left L1 nerve.  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    2.  Paresthesias of the feet and hands.  Cannot exclude polyneuropathy.  EMG of the upper extremities in 2020 was negative.    Discussion:    1.  I discussed the diagnosis and treatment options.  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  Slowly progressive over the last few weeks and months.  I discussed the case with Dr. Kent and with a slower progression they will get her into the clinic on a more urgent basis.  I discussed with the patient that if she has any worsening bowel or bladder symptoms abruptly she should present to the emergency  department and she voiced understanding.    2.  She is not interested in any medications at this time.    3.  She does have paresthesias in the hands and feet can consider updating EMG in the future.    4.  Follow-up with me as needed after seeing neurosurgery    It was our pleasure caring for your patient today, if there any questions or concerns please do not hesitate to contact us.    Over 50 minutes were spent on the date of the encounter performing chart review, patient visit and documentation in addition to any procedure.    Subjective:   Patient ID: Selina Parikh is a 56 year old female.    History of Present Illness: Patient presents at the request evaluation for evaluation of low back pain.  She has a longstanding history of low back pain dating back at least 5 to 6 years.  No trauma.  Beginning pain in the low back left groin and leg greater than right.  Had some paresthesias of the feet.  She tells me eventually she was seen by [facility] spine and underwent L3-5 fusion no change in her symptoms afterwards.  Continue to have back pain and bilateral lower extremity paresthesias.  About 8 months ago she had an episode in the kitchen where she ended up having severe pain in her back which caused her to drop to the floor.  Since that time she has had significant progressive lower extremity pain and even over the past few weeks some paresthesias in her saddle region and progressive loss of control of bladder bowel but this is very slow.  The left leg is much worse than the right leg and the legs are much worse than the back.  Constant pain worse with standing and walking nothing makes it better.  Laying down even increases pain and sitting does not significantly help.  She feels her pelvis is very tight that is going to snap on her.  She has had longstanding issues with numbness and tingling in the hands and feet as well EMG done in 2020 reviewed which was normal of the upper extremities lower extremities  was not done.      Imaging: MRI report and images were personally reviewed and discussed with the patient.  A plastic model was utilized during the discussion.  MRI of the lumbar spine personally reviewed Severe degenerative disc height loss L1-2 L2-3.  Postoperative posterior fusion L3-L5.  L5-S1 moderate broad-based disc bulge with moderate facet arthropathy moderate to severe right and severe left foraminal stenosis.  Moderate central stenosis.  T12-L1 there is a large left central disc extrusion in the left lateral recess central disc extrusion as well.  This does displace the conus medullaris and impinges on the left L1 nerve moderate spinal stenosis with rightward deviation of the inferior cord..        Review of Systems: Patient complains of weight loss, headache, hoarseness, difficulty swallowing, change in vision, chest pain, swelling of the feet, shortness of breath, wheezing, abdominal pain, nausea, vomiting, reflux, bowel or bladder incontinence, joint pain muscle pain muscle fatigue, poor balance falls dizziness, bruising easily, poor sleep anxiety.  Denies fevers, ringing in the ears, eye pain, palpitations, cough, constipation, skin itching. Remainder of 12 point review systems negative unless listed above.    Past Medical History:   Diagnosis Date     Cancer (H)     melanoma     Hiatal hernia      Hypertension        Family History   Problem Relation Age of Onset     C.A.D. Father 50        50's     Diabetes Father      Diabetes Brother      C.A.D. Brother 42        quad bypass and MI     Diabetes Brother         2 brothers have DM     Cancer Brother 34        Melanoma     Aortic aneurysm Brother      Allergies Son         Meds     Allergies Daughter         Med     Cancer Mother      C.A.D. Brother 38        MI age 38     Diabetes Mother      Diabetes Brother          Social History     Socioeconomic History     Marital status:      Spouse name: None     Number of children: None     Years of  education: None     Highest education level: None   Tobacco Use     Smoking status: Current Every Day Smoker     Packs/day: 0.50     Years: 30.00     Pack years: 15.00     Types: Cigarettes     Last attempt to quit: 2020     Years since quittin.5     Smokeless tobacco: Never Used     Tobacco comment: 3 Cigs a day   Vaping Use     Vaping Use: Never used   Substance and Sexual Activity     Alcohol use: Not Currently     Comment: Alcoholic Drinks/day: 2x year      Drug use: Not Currently     Sexual activity: Not Currently     Partners: Male     Birth control/protection: Surgical   Other Topics Concern     Parent/sibling w/ CABG, MI or angioplasty before 65F 55M? Yes     Comment: brother- 38, MI, brother 42- quad bypass     Social history: Retired.  Smokes 6 to 10 cigarettes/day.  No alcohol.  Encourage smoking cessation.    The following portions of the patient's history were reviewed and updated as appropriate: allergies, current medications, past family history, past medical history, past social history, past surgical history and problem list.    Oswestry (MAKEDA) Questionnaire    OSWESTRY DISABILITY INDEX 2020   Count 9   Sum 24   Oswestry Score (%) 53.33   Some recent data might be hidden       Neck Disability Index:  Neck Disability Index (  Frandy H. and Odalis C. 1991. All rights reserved.; used with permission) 2020   SECTION 1 - PAIN INTENSITY 1   SECTION 2 - PERSONAL CARE 2   SECTION 3 - LIFTING 2   SECTION 4 - READING 4   SECTION 5 - HEADACHES 3   SECTION 6 - CONCENTRATION 4   SECTION 7 - WORK 2   SECTION 8 - DRIVING 2   SECTION 9 - SLEEPING 4   SECTION 10 - RECREATION 3   Count 10   Sum 27   Raw Score: /50 27   Neck Disability Index Score: (%) 54          PHQ-2 Score:     PHQ-2 (  [employer]) 4/15/2022 4/15/2022   Q1: Little interest or pleasure in doing things 3 3   Q2: Feeling down, depressed or hopeless 2 2   PHQ-2 Score 5 5   PHQ-2 Total Score (12-17 Years)- Positive if 3 or more points;  "Administer PHQ-A if positive - -   Q1: Little interest or pleasure in doing things Nearly every day Nearly every day   Q2: Feeling down, depressed or hopeless More than half the days More than half the days   PHQ-2 Score 5 5                  Objective:   Physical Exam:    /73   Pulse 71   Ht 4' 11.25\" (1.505 m)   Wt 152 lb (68.9 kg)   LMP 04/10/2016   BMI 30.44 kg/m    Body mass index is 30.44 kg/m .      General:  Well-appearing female in no acute distress.  Pleasant, cooperative, and interactive throughout the examination and interview.  CV: No lower extremity edema on inspection or paltation.  2+ distal pulses bilateral lower extremities.  Left foot cool to touch.  Lymphatics: No cervical lymphadenopathy palpated. Eyes: sclera clear. Skin: No rashes or lesions seen over the head/neck, hairline, arms, legs, .  Respirations unlabored.  MSK: Gait is cautious, pelvic.  Able to heel-toe stand without difficulty.  Negative Romberg.  Spine: normal AP curves of the C, T, and L spine.  Decreased range of motion mildly in flexion finger to floor testing severely reduced extension with reproduction of pain in the back and legs..  Palpation: Tenderness to palpation over lumbar paraspinals and gluteal tissues.  Extremities: Full range of motion of the elbows, and wrists with no effusions or tenderness to palpation.   Reduced range of motion of the hips and internal rotation.  Patient declined supine exam due to pain in the hips.  Full range of motion of the  , knees, and ankles with no effusions or tenderness to palpation.  N . No hypermobility of the upper or lower extremities.  Neurologic exam: Mental status: Patient is alert and oriented with normal affect.  Attention, knowledge, memory, and language are intact.  Normal coordination throughout the examination.  Reflexes are 2+ and symmetric biceps, triceps, brachioradialis, patellar, and 0 Achilles with equivocal toes and Negative Robbie's.  Sensation is " intact to light touch throughout the upper and lower extremities bilaterally.  Manual muscle testing reveals 5 out of 5 in the hip flexors, knee flexors/extensors, ankle plantar flexors, ankle  dorsiflexors, and EHL.  Upper extremities: Grossly normal strength . Normal muscle bulk and tone in the arms and legs.    Negative seated  straight leg raise bilaterally.            Again, thank you for allowing me to participate in the care of your patient.        Sincerely,        Godfrey Short, DO

## 2022-09-15 NOTE — PROGRESS NOTES
Assessment/Plan:      Selina was seen today for back pain.    Diagnoses and all orders for this visit:    Lumbar disc herniation  -     Neurosurgery Referral; Future    Thoracic spinal stenosis    History of lumbar fusion  -     Spine  Referral  -     Neurosurgery Referral; Future    Urinary incontinence, unspecified type  -     Spine  Referral  -     Neurosurgery Referral; Future         Assessment: Pleasant 56 year old female with a history of melanoma, hypertension, Status post L3-5 posterior fusion intensity spine in 2020 with:    1.  8-month history of progressive lumbar spine pain with bilateral, left greater than right lower extremity paresthesias.  Progressive urinary incontinence and poor control of bowel over the past couple of weeks.  This is slowly progressive over the past weeks and months.  She has large disc herniation at T12-L1 resulting in significant left lateral recess stenosis as well as compressing left L1 nerve.  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    2.  Paresthesias of the feet and hands.  Cannot exclude polyneuropathy.  EMG of the upper extremities in 2020 was negative.    Discussion:    1.  I discussed the diagnosis and treatment options.  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  Slowly progressive over the last few weeks and months.  I discussed the case with Dr. Kent and with a slower progression they will get her into the clinic on a more urgent basis.  I discussed with the patient that if she has any worsening bowel or bladder symptoms abruptly she should present to the emergency department and she voiced understanding.    2.  She is not interested in any medications at this time.    3.  She does have paresthesias in the hands and feet can consider updating EMG in the future.    4.  Follow-up with me as needed after seeing  neurosurgery    It was our pleasure caring for your patient today, if there any questions or concerns please do not hesitate to contact us.    Over 50 minutes were spent on the date of the encounter performing chart review, patient visit and documentation in addition to any procedure.    Subjective:   Patient ID: Selina Parikh is a 56 year old female.    History of Present Illness: Patient presents at the request evaluation for evaluation of low back pain.  She has a longstanding history of low back pain dating back at least 5 to 6 years.  No trauma.  Beginning pain in the low back left groin and leg greater than right.  Had some paresthesias of the feet.  She tells me eventually she was seen by [facility] spine and underwent L3-5 fusion no change in her symptoms afterwards.  Continue to have back pain and bilateral lower extremity paresthesias.  About 8 months ago she had an episode in the kitchen where she ended up having severe pain in her back which caused her to drop to the floor.  Since that time she has had significant progressive lower extremity pain and even over the past few weeks some paresthesias in her saddle region and progressive loss of control of bladder bowel but this is very slow.  The left leg is much worse than the right leg and the legs are much worse than the back.  Constant pain worse with standing and walking nothing makes it better.  Laying down even increases pain and sitting does not significantly help.  She feels her pelvis is very tight that is going to snap on her.  She has had longstanding issues with numbness and tingling in the hands and feet as well EMG done in 2020 reviewed which was normal of the upper extremities lower extremities was not done.      Imaging: MRI report and images were personally reviewed and discussed with the patient.  A plastic model was utilized during the discussion.  MRI of the lumbar spine personally reviewed Severe degenerative disc height loss L1-2  L2-3.  Postoperative posterior fusion L3-L5.  L5-S1 moderate broad-based disc bulge with moderate facet arthropathy moderate to severe right and severe left foraminal stenosis.  Moderate central stenosis.  T12-L1 there is a large left central disc extrusion in the left lateral recess central disc extrusion as well.  This does displace the conus medullaris and impinges on the left L1 nerve moderate spinal stenosis with rightward deviation of the inferior cord..        Review of Systems: Patient complains of weight loss, headache, hoarseness, difficulty swallowing, change in vision, chest pain, swelling of the feet, shortness of breath, wheezing, abdominal pain, nausea, vomiting, reflux, bowel or bladder incontinence, joint pain muscle pain muscle fatigue, poor balance falls dizziness, bruising easily, poor sleep anxiety.  Denies fevers, ringing in the ears, eye pain, palpitations, cough, constipation, skin itching. Remainder of 12 point review systems negative unless listed above.    Past Medical History:   Diagnosis Date     Cancer (H)     melanoma     Hiatal hernia      Hypertension        Family History   Problem Relation Age of Onset     C.A.REY Father 50        50's     Diabetes Father      Diabetes Brother      C.A.D. Brother 42        quad bypass and MI     Diabetes Brother         2 brothers have DM     Cancer Brother 34        Melanoma     Aortic aneurysm Brother      Allergies Son         Meds     Allergies Daughter         Med     Cancer Mother      C.A.D. Brother 38        MI age 38     Diabetes Mother      Diabetes Brother          Social History     Socioeconomic History     Marital status:      Spouse name: None     Number of children: None     Years of education: None     Highest education level: None   Tobacco Use     Smoking status: Current Every Day Smoker     Packs/day: 0.50     Years: 30.00     Pack years: 15.00     Types: Cigarettes     Last attempt to quit: 2/14/2020     Years since  quittin.5     Smokeless tobacco: Never Used     Tobacco comment: 3 Cigs a day   Vaping Use     Vaping Use: Never used   Substance and Sexual Activity     Alcohol use: Not Currently     Comment: Alcoholic Drinks/day: 2x year      Drug use: Not Currently     Sexual activity: Not Currently     Partners: Male     Birth control/protection: Surgical   Other Topics Concern     Parent/sibling w/ CABG, MI or angioplasty before 65F 55M? Yes     Comment: brother- 38, MI, brother 42- quad bypass     Social history: Retired.  Smokes 6 to 10 cigarettes/day.  No alcohol.  Encourage smoking cessation.    The following portions of the patient's history were reviewed and updated as appropriate: allergies, current medications, past family history, past medical history, past social history, past surgical history and problem list.    Oswestry (MAKEDA) Questionnaire    OSWESTRY DISABILITY INDEX 2020   Count 9   Sum 24   Oswestry Score (%) 53.33   Some recent data might be hidden       Neck Disability Index:  Neck Disability Index (  Frandy H. and Odalis C. 1991. All rights reserved.; used with permission) 2020   SECTION 1 - PAIN INTENSITY 1   SECTION 2 - PERSONAL CARE 2   SECTION 3 - LIFTING 2   SECTION 4 - READING 4   SECTION 5 - HEADACHES 3   SECTION 6 - CONCENTRATION 4   SECTION 7 - WORK 2   SECTION 8 - DRIVING 2   SECTION 9 - SLEEPING 4   SECTION 10 - RECREATION 3   Count 10   Sum 27   Raw Score: /50 27   Neck Disability Index Score: (%) 54          PHQ-2 Score:     PHQ-2 (  [employer]) 4/15/2022 4/15/2022   Q1: Little interest or pleasure in doing things 3 3   Q2: Feeling down, depressed or hopeless 2 2   PHQ-2 Score 5 5   PHQ-2 Total Score (12-17 Years)- Positive if 3 or more points; Administer PHQ-A if positive - -   Q1: Little interest or pleasure in doing things Nearly every day Nearly every day   Q2: Feeling down, depressed or hopeless More than half the days More than half the days   PHQ-2 Score 5 5       "            Objective:   Physical Exam:    /73   Pulse 71   Ht 4' 11.25\" (1.505 m)   Wt 152 lb (68.9 kg)   LMP 04/10/2016   BMI 30.44 kg/m    Body mass index is 30.44 kg/m .      General:  Well-appearing female in no acute distress.  Pleasant, cooperative, and interactive throughout the examination and interview.  CV: No lower extremity edema on inspection or paltation.  2+ distal pulses bilateral lower extremities.  Left foot cool to touch.  Lymphatics: No cervical lymphadenopathy palpated. Eyes: sclera clear. Skin: No rashes or lesions seen over the head/neck, hairline, arms, legs, .  Respirations unlabored.  MSK: Gait is cautious, pelvic.  Able to heel-toe stand without difficulty.  Negative Romberg.  Spine: normal AP curves of the C, T, and L spine.  Decreased range of motion mildly in flexion finger to floor testing severely reduced extension with reproduction of pain in the back and legs..  Palpation: Tenderness to palpation over lumbar paraspinals and gluteal tissues.  Extremities: Full range of motion of the elbows, and wrists with no effusions or tenderness to palpation.   Reduced range of motion of the hips and internal rotation.  Patient declined supine exam due to pain in the hips.  Full range of motion of the  , knees, and ankles with no effusions or tenderness to palpation.  N . No hypermobility of the upper or lower extremities.  Neurologic exam: Mental status: Patient is alert and oriented with normal affect.  Attention, knowledge, memory, and language are intact.  Normal coordination throughout the examination.  Reflexes are 2+ and symmetric biceps, triceps, brachioradialis, patellar, and 0 Achilles with equivocal toes and Negative Robbie's.  Sensation is intact to light touch throughout the upper and lower extremities bilaterally.  Manual muscle testing reveals 5 out of 5 in the hip flexors, knee flexors/extensors, ankle plantar flexors, ankle  dorsiflexors, and EHL.  Upper " extremities: Grossly normal strength . Normal muscle bulk and tone in the arms and legs.    Negative seated  straight leg raise bilaterally.

## 2022-09-16 ENCOUNTER — MEDICAL CORRESPONDENCE (OUTPATIENT)
Dept: NEUROSURGERY | Facility: CLINIC | Age: 57
End: 2022-09-16

## 2022-09-19 ENCOUNTER — LAB (OUTPATIENT)
Dept: LAB | Facility: HOSPITAL | Age: 57
End: 2022-09-19
Payer: COMMERCIAL

## 2022-09-19 ENCOUNTER — DOCUMENTATION ONLY (OUTPATIENT)
Dept: NEUROSURGERY | Facility: CLINIC | Age: 57
End: 2022-09-19

## 2022-09-19 ENCOUNTER — OFFICE VISIT (OUTPATIENT)
Dept: NEUROSURGERY | Facility: CLINIC | Age: 57
End: 2022-09-19
Attending: PHYSICAL MEDICINE & REHABILITATION

## 2022-09-19 VITALS
OXYGEN SATURATION: 97 % | HEART RATE: 72 BPM | DIASTOLIC BLOOD PRESSURE: 67 MMHG | HEIGHT: 59 IN | SYSTOLIC BLOOD PRESSURE: 119 MMHG | WEIGHT: 152 LBS | BODY MASS INDEX: 30.64 KG/M2

## 2022-09-19 DIAGNOSIS — Z01.818 PRE-OP TESTING: ICD-10-CM

## 2022-09-19 DIAGNOSIS — Z98.1 HISTORY OF LUMBAR FUSION: ICD-10-CM

## 2022-09-19 DIAGNOSIS — T50.905A MEDICATION REACTION: ICD-10-CM

## 2022-09-19 DIAGNOSIS — G83.4 CAUDA EQUINA COMPRESSION (H): ICD-10-CM

## 2022-09-19 DIAGNOSIS — M51.26 LUMBAR DISC HERNIATION: ICD-10-CM

## 2022-09-19 DIAGNOSIS — Z01.818 PRE-OP TESTING: Primary | ICD-10-CM

## 2022-09-19 DIAGNOSIS — R32 URINARY INCONTINENCE, UNSPECIFIED TYPE: ICD-10-CM

## 2022-09-19 LAB
ANION GAP SERPL CALCULATED.3IONS-SCNC: 5 MMOL/L (ref 5–18)
APTT PPP: 29 SECONDS (ref 22–38)
BUN SERPL-MCNC: 13 MG/DL (ref 8–22)
CALCIUM SERPL-MCNC: 9.3 MG/DL (ref 8.5–10.5)
CHLORIDE BLD-SCNC: 106 MMOL/L (ref 98–107)
CLOSURE TME COLL+ADP BLD: 105 SECONDS
CLOSURE TME COLL+EPINEP BLD: >300 SECONDS
CO2 SERPL-SCNC: 29 MMOL/L (ref 22–31)
CREAT SERPL-MCNC: 0.76 MG/DL (ref 0.6–1.1)
ERYTHROCYTE [DISTWIDTH] IN BLOOD BY AUTOMATED COUNT: 14.1 % (ref 10–15)
GFR SERPL CREATININE-BSD FRML MDRD: >90 ML/MIN/1.73M2
GLUCOSE BLD-MCNC: 107 MG/DL (ref 70–125)
HCT VFR BLD AUTO: 41.9 % (ref 35–47)
HGB BLD-MCNC: 13.9 G/DL (ref 11.7–15.7)
INR PPP: 0.93 (ref 0.85–1.15)
MCH RBC QN AUTO: 30.8 PG (ref 26.5–33)
MCHC RBC AUTO-ENTMCNC: 33.2 G/DL (ref 31.5–36.5)
MCV RBC AUTO: 93 FL (ref 78–100)
PLATELET # BLD AUTO: 275 10E3/UL (ref 150–450)
POTASSIUM BLD-SCNC: 4.4 MMOL/L (ref 3.5–5)
RBC # BLD AUTO: 4.51 10E6/UL (ref 3.8–5.2)
SODIUM SERPL-SCNC: 140 MMOL/L (ref 136–145)
WBC # BLD AUTO: 7.3 10E3/UL (ref 4–11)

## 2022-09-19 PROCEDURE — 85576 BLOOD PLATELET AGGREGATION: CPT

## 2022-09-19 PROCEDURE — 85730 THROMBOPLASTIN TIME PARTIAL: CPT

## 2022-09-19 PROCEDURE — 99204 OFFICE O/P NEW MOD 45 MIN: CPT | Performed by: PHYSICIAN ASSISTANT

## 2022-09-19 PROCEDURE — 85014 HEMATOCRIT: CPT

## 2022-09-19 PROCEDURE — 36415 COLL VENOUS BLD VENIPUNCTURE: CPT

## 2022-09-19 PROCEDURE — 80048 BASIC METABOLIC PNL TOTAL CA: CPT

## 2022-09-19 PROCEDURE — 85610 PROTHROMBIN TIME: CPT

## 2022-09-19 NOTE — LETTER
9/19/2022         RE: Selina Parikh  [id number] [facility] [address]        Dear Colleague,    Thank you for referring your patient, Selnia Parikh, to the [facility]. Please see a copy of my visit note below.    NEUROSURGERY CONSULTATION NOTE      Neurosurgery was asked to see this patient by Godfrey Short DO for evaluation of worsening back and leg pain with gait instability and urinary incontinence.       CONSULTATION ASSESSMENT AND PLAN:    Patient discussed with Dr. Galvan who then discussed with Dr. Kumari and plans for patient to be further evaluated by Dr. Kumari in clinic tomorrow and tentative surgical intervention for later in the week. Long discussion of surgical intervention was discussed. Risks and benefits of thoracic 12 to lumbar 1 laminectomy and microdiscectomy were discussed including but not limited to infection, hematoma, nerve damage including paralysis, post op radiculitis, durotomy, risks associated with the use of general anesthesia, blood clots in the lungs or legs. They agreed to proceed.      I spent more than 45 minutes in this apt, examining the pt, reviewing the scans, reviewing notes from chart, discussing treatment options with risks and benefits and coordinating care.        HPI:   Ms. Parikh is a pleasant 56 year old right handed female who presents for further evaluation of ongoing progressive worsening low back pain and bilateral lower extremity pain and paresthesia. She has history of L3-L5 decompression and fusion at [facility] in January 2021 and states that following surgery she noted worsened left radicular leg pain into her groin and posterolateral leg. She notes continued left leg pain and back pain since but states that over 6 months ago she was reaching over head to put a dish into a cabinet and felt that her back and leg pain had worsened since. She describes her pain as a constant sharp stabbing pain that radiates into her groin  bilaterally (L>R) and that when she is ambulatory she feels that her hips could just give out on her. She notes that ambulation worsens her pain and has slight relief with leaning forward and laying down. She states that over the past two months she has had bilateral lower extremity paresthesias as well as saddle anesthesia that has been progressive. She still notes light sensation to her genitilia but does not feel the passing of stool. She states that over the past two months she has had progressive worsening of control of both urinary and bowel habits. Stating that she has lost urine on a few occurences and that she will at times get the urge but has no time to get to the bathroom prior to urinating. She also notes that she constantly feels the urge to pass stool and that at times with just standing up will loss her bowel movement.     Past Medical History:   Diagnosis Date     Cancer (H)     melanoma     Hiatal hernia      Hypertension        Past Surgical History:   Procedure Laterality Date     BACK SURGERY       CHOLECYSTECTOMY       CHOLECYSTECTOMY, LAPOROSCOPIC  2/14/2000    Cholecystectomy, Laparoscopic     COLON SURGERY       CV CORONARY ANGIOGRAM N/A 5/6/2022    Procedure: CV CORONARY ANGIOGRAM;  Surgeon: Stefanie Spangler MD;  Location: [facility] CV     CV LEFT HEART CATH N/A 5/6/2022    Procedure: Left Heart Catheterization;  Surgeon: Stefanie Spangler MD;  Location: [facility] CV     CYSTOSCOPY, INSERT LIGHTED STENT URETER(S) N/A 4/10/2018    Procedure: CYSTOSCOPY, INSERT LIGHTED STENT URETER(S);  Lighted Stent Placement,Laparoscopic Assisted Sigmoid Colectomy;  Surgeon: ERVIN Reina MD;  Location: [facility] OR     LAPAROSCOPIC ASSISTED COLECTOMY LEFT (DESCENDING) N/A 4/10/2018    Procedure: LAPAROSCOPIC ASSISTED COLECTOMY LEFT (DESCENDING);;  Surgeon: Hill Murray MD;  Location: [facility] OR     NECK SURGERY       ORTHOPEDIC SURGERY  10/2010    Cut palm and reattched tendons and nerves in left  "hand forefinger.     VA ESOPHAGOGASTRODUODENOSCOPY TRANSORAL DIAGNOSTIC N/A 4/30/2021    Procedure: ESOPHAGOGASTRODUODENOSCOPY (EGD);  Surgeon: Souleymane Moreno DO;  Location: [facility];  Service: General     SURGICAL HISTORY OF -   1/4/2000    Esophagogastroduodenoscopy with biopsy     TUBAL LIGATION       TUBAL LIGATION         REVIEW OF SYSTEMS:  14 point review of systems negative with exception to HPI     MEDICATIONS:  Current Outpatient Medications   Medication Sig Dispense Refill     acetaminophen (TYLENOL) 500 MG tablet Take 1,000 mg by mouth daily before breakfast And second dose as needed       aspirin (ASA) 81 MG EC tablet Take 1 tablet (81 mg) by mouth daily 90 tablet 3     atorvastatin (LIPITOR) 40 MG tablet Take 1 tablet (40 mg) by mouth daily 90 tablet 3     celecoxib (CELEBREX) 100 MG capsule Take 1 capsule (100 mg) by mouth 2 times daily 180 capsule 3     clobetasol (TEMOVATE) 0.05 % external ointment Apply topically 2 times daily (Patient taking differently: Apply topically 2 times daily as needed) 60 g 3     fish oil-omega-3 fatty acids 1000 MG capsule Take 2 g by mouth daily       Melatonin 10 MG TABS tablet Take 20 mg by mouth nightly as needed for sleep       metoprolol succinate ER (TOPROL XL) 50 MG 24 hr tablet Take 0.5 tablets (25 mg) by mouth daily 30 tablet 2     nitroGLYcerin (NITROSTAT) 0.4 MG sublingual tablet One tablet under the tongue every 5 minutes if needed for chest pain. May repeat every 5 minutes for a maximum of 3 doses in 15 minutes\" 25 tablet 3     omeprazole (PRILOSEC) 20 MG DR capsule Take 1 capsule (20 mg) by mouth 3 times daily 270 capsule 3     traMADol (ULTRAM) 50 MG tablet Take 1 tablet (50 mg) by mouth every 6 hours as needed for pain (back and neck pain) 10 tablet 0     varenicline (CHANTIX BASIM) 0.5 MG X 11 & 1 MG X 42 tablet Take 0.5 mg tab daily for 3 days, THEN 0.5 mg tab twice daily for 4 days, THEN 1 mg twice daily. (Patient not taking: No sig " reported) 53 tablet 0     Vitamin D (Cholecalciferol) 25 MCG (1000 UT) TABS Take 1,000 Units by mouth daily           ALLERGIES/SENSITIVITIES:     Allergies   Allergen Reactions     Ciprofloxacin Anaphylaxis     Flagyl [Metronidazole] Anaphylaxis     Gabapentin Other (See Comments)     Suicidal thoughts.     Zofran [Ondansetron] Other (See Comments) and GI Disturbance     Causes bloating, moderately uncomfortable, abd pain       Augmentin Nausea and Vomiting     Benadryl [Diphenhydramine] Other (See Comments)     Muscle spasms     Percocet [Oxycodone-Acetaminophen] Other (See Comments)     Goes nuts - nasty mean irritated, can take Dilaudid     Vicodin [Hydrocodone-Acetaminophen] Other (See Comments)     Anxiety-agitation       PERTINENT SOCIAL HISTORY:   Social History     Socioeconomic History     Marital status:    Tobacco Use     Smoking status: Current Every Day Smoker     Packs/day: 0.50     Years: 30.00     Pack years: 15.00     Types: Cigarettes     Last attempt to quit: 2020     Years since quittin.5     Smokeless tobacco: Never Used     Tobacco comment: 3 Cigs a day   Vaping Use     Vaping Use: Never used   Substance and Sexual Activity     Alcohol use: Not Currently     Comment: Alcoholic Drinks/day: 2x year      Drug use: Not Currently     Sexual activity: Not Currently     Partners: Male     Birth control/protection: Surgical   Other Topics Concern     Parent/sibling w/ CABG, MI or angioplasty before 65F 55M? Yes     Comment: brother- 38, MI, brother 42- quad bypass         FAMILY HISTORY:  Family History   Problem Relation Age of Onset     C.A.D. Father 50        50's     Diabetes Father      Diabetes Brother      C.A.D. Brother 42        quad bypass and MI     Diabetes Brother         2 brothers have DM     Cancer Brother 34        Melanoma     Aortic aneurysm Brother      Allergies Son         Meds     Allergies Daughter         Med     Cancer Mother      C.A.D. Brother 38        MI  age 38     Diabetes Mother      Diabetes Brother         PHYSICAL EXAM:   Constitutional: LMP 04/10/2016      Mental Status: A & O in no acute distress.  Affect is appropriate.  Speech is fluent.  Recent and remote memory are intact.  Attention span and concentration are normal.     Cranial Nerves: CN1: grossly intact per patient recall. CN2: No funduscopic exam performed. CN3,4 & 6: Pupillary light response, lateral and vertical gaze normal.  No nystagmus.  Visual fields are full to confrontation. CN5: Intact to touch CN7: No facial weakness, smile, facial symmetry intact. CN8: Intact to spoken voice. CN9&10: Gag reflex, uvula midline, palate rises with phonation. CN11: Shoulder shrug 5/5 intact bilaterally. CN12: Tongue midline and moves freely from side to side.     Motor: No pronator drift of upper extremity. Normal bulk and tone all muscle groups of upper and lower extremities.    Strength: weakness noted of left hip flexor 4-/5, and left knee extension 4-/5 otherwise good strength throughout    Positive straight leg raise bilaterally     Decreased rectal tone      Sensory: Sensation intact bilaterally to light touch with exception to decreased left groin and perianal, does states sensation with rectal tone check      Coordination; finger to nose, heel to shin, rapid alternating movements smooth and rhythmic. Romberg intact. Heel/toe/tandem gait intact.  Normal gait and station.     Reflexes; supinator, biceps, triceps, knee/ ankle jerk intact. No hoffmans/babinski/clonus    IMAGING:  I personally reviewed all radiographic images   Noted left sided disc extrusion at T12-L1 with resultant moderate spinal stenosis, otherwise degenerative changes noted worst at L1-L2 and L2-L3 with postoperative changes of L3-L5 laminectomy and fusion         Cc:   Sera Solo                Again, thank you for allowing me to participate in the care of your patient.        Sincerely,        Sandra Rivas PA-C

## 2022-09-19 NOTE — PROGRESS NOTES
Dr. Kumari requests pt be scheduled with him tomorrow 9/20 at 12:00 for surgical consultation. Message sent to scheduling team.

## 2022-09-19 NOTE — PROGRESS NOTES
NEUROSURGERY CONSULTATION NOTE      Neurosurgery was asked to see this patient by Godfrey Short DO for evaluation of worsening back and leg pain with gait instability and urinary incontinence.       CONSULTATION ASSESSMENT AND PLAN:    Patient discussed with Dr. Galvan who then discussed with Dr. Kumari and plans for patient to be further evaluated by Dr. Kumari in clinic tomorrow and tentative surgical intervention for later in the week. Long discussion of surgical intervention was discussed. Risks and benefits of thoracic 12 to lumbar 1 laminectomy and microdiscectomy were discussed including but not limited to infection, hematoma, nerve damage including paralysis, post op radiculitis, durotomy, risks associated with the use of general anesthesia, blood clots in the lungs or legs. They agreed to proceed.      I spent more than 45 minutes in this apt, examining the pt, reviewing the scans, reviewing notes from chart, discussing treatment options with risks and benefits and coordinating care.        HPI:   Ms. Parikh is a pleasant 56 year old right handed female who presents for further evaluation of ongoing progressive worsening low back pain and bilateral lower extremity pain and paresthesia. She has history of L3-L5 decompression and fusion at [facility] in January 2021 and states that following surgery she noted worsened left radicular leg pain into her groin and posterolateral leg. She notes continued left leg pain and back pain since but states that over 6 months ago she was reaching over head to put a dish into a cabinet and felt that her back and leg pain had worsened since. She describes her pain as a constant sharp stabbing pain that radiates into her groin bilaterally (L>R) and that when she is ambulatory she feels that her hips could just give out on her. She notes that ambulation worsens her pain and has slight relief with leaning forward and laying down. She states that over the past two months she  has had bilateral lower extremity paresthesias as well as saddle anesthesia that has been progressive. She still notes light sensation to her genitilia but does not feel the passing of stool. She states that over the past two months she has had progressive worsening of control of both urinary and bowel habits. Stating that she has lost urine on a few occurences and that she will at times get the urge but has no time to get to the bathroom prior to urinating. She also notes that she constantly feels the urge to pass stool and that at times with just standing up will loss her bowel movement.     Past Medical History:   Diagnosis Date     Cancer (H)     melanoma     Hiatal hernia      Hypertension        Past Surgical History:   Procedure Laterality Date     BACK SURGERY       CHOLECYSTECTOMY       CHOLECYSTECTOMY, LAPOROSCOPIC  2/14/2000    Cholecystectomy, Laparoscopic     COLON SURGERY       CV CORONARY ANGIOGRAM N/A 5/6/2022    Procedure: CV CORONARY ANGIOGRAM;  Surgeon: Stefanie Spangler MD;  Location: [facility] CV     CV LEFT HEART CATH N/A 5/6/2022    Procedure: Left Heart Catheterization;  Surgeon: Stefanie Spangler MD;  Location: [facility] CV     CYSTOSCOPY, INSERT LIGHTED STENT URETER(S) N/A 4/10/2018    Procedure: CYSTOSCOPY, INSERT LIGHTED STENT URETER(S);  Lighted Stent Placement,Laparoscopic Assisted Sigmoid Colectomy;  Surgeon: ERVIN Reina MD;  [facility]: [facility]     LAPAROSCOPIC ASSISTED COLECTOMY LEFT (DESCENDING) N/A 4/10/2018    Procedure: LAPAROSCOPIC ASSISTED COLECTOMY LEFT (DESCENDING);;  Surgeon: Hill Murray MD;  Location: [facility]     NECK SURGERY       ORTHOPEDIC SURGERY  10/2010    Cut palm and reattched tendons and nerves in left hand forefinger.     ME ESOPHAGOGASTRODUODENOSCOPY TRANSORAL DIAGNOSTIC N/A 4/30/2021    Procedure: ESOPHAGOGASTRODUODENOSCOPY (EGD);  Surgeon: Soulyemane Moreno DO;  Location: [facility];  Service: General     SURGICAL HISTORY OF -   1/4/2000  "   Esophagogastroduodenoscopy with biopsy     TUBAL LIGATION       TUBAL LIGATION         REVIEW OF SYSTEMS:  14 point review of systems negative with exception to HPI     MEDICATIONS:  Current Outpatient Medications   Medication Sig Dispense Refill     acetaminophen (TYLENOL) 500 MG tablet Take 1,000 mg by mouth daily before breakfast And second dose as needed       aspirin (ASA) 81 MG EC tablet Take 1 tablet (81 mg) by mouth daily 90 tablet 3     atorvastatin (LIPITOR) 40 MG tablet Take 1 tablet (40 mg) by mouth daily 90 tablet 3     celecoxib (CELEBREX) 100 MG capsule Take 1 capsule (100 mg) by mouth 2 times daily 180 capsule 3     clobetasol (TEMOVATE) 0.05 % external ointment Apply topically 2 times daily (Patient taking differently: Apply topically 2 times daily as needed) 60 g 3     fish oil-omega-3 fatty acids 1000 MG capsule Take 2 g by mouth daily       Melatonin 10 MG TABS tablet Take 20 mg by mouth nightly as needed for sleep       metoprolol succinate ER (TOPROL XL) 50 MG 24 hr tablet Take 0.5 tablets (25 mg) by mouth daily 30 tablet 2     nitroGLYcerin (NITROSTAT) 0.4 MG sublingual tablet One tablet under the tongue every 5 minutes if needed for chest pain. May repeat every 5 minutes for a maximum of 3 doses in 15 minutes\" 25 tablet 3     omeprazole (PRILOSEC) 20 MG DR capsule Take 1 capsule (20 mg) by mouth 3 times daily 270 capsule 3     traMADol (ULTRAM) 50 MG tablet Take 1 tablet (50 mg) by mouth every 6 hours as needed for pain (back and neck pain) 10 tablet 0     varenicline (CHANTIX BASIM) 0.5 MG X 11 & 1 MG X 42 tablet Take 0.5 mg tab daily for 3 days, THEN 0.5 mg tab twice daily for 4 days, THEN 1 mg twice daily. (Patient not taking: No sig reported) 53 tablet 0     Vitamin D (Cholecalciferol) 25 MCG (1000 UT) TABS Take 1,000 Units by mouth daily           ALLERGIES/SENSITIVITIES:     Allergies   Allergen Reactions     Ciprofloxacin Anaphylaxis     Flagyl [Metronidazole] Anaphylaxis     " Gabapentin Other (See Comments)     Suicidal thoughts.     Zofran [Ondansetron] Other (See Comments) and GI Disturbance     Causes bloating, moderately uncomfortable, abd pain       Augmentin Nausea and Vomiting     Benadryl [Diphenhydramine] Other (See Comments)     Muscle spasms     Percocet [Oxycodone-Acetaminophen] Other (See Comments)     Goes nuts - nasty mean irritated, can take Dilaudid     Vicodin [Hydrocodone-Acetaminophen] Other (See Comments)     Anxiety-agitation       PERTINENT SOCIAL HISTORY:   Social History     Socioeconomic History     Marital status:    Tobacco Use     Smoking status: Current Every Day Smoker     Packs/day: 0.50     Years: 30.00     Pack years: 15.00     Types: Cigarettes     Last attempt to quit: 2020     Years since quittin.5     Smokeless tobacco: Never Used     Tobacco comment: 3 Cigs a day   Vaping Use     Vaping Use: Never used   Substance and Sexual Activity     Alcohol use: Not Currently     Comment: Alcoholic Drinks/day: 2x year      Drug use: Not Currently     Sexual activity: Not Currently     Partners: Male     Birth control/protection: Surgical   Other Topics Concern     Parent/sibling w/ CABG, MI or angioplasty before 65F 55M? Yes     Comment: brother- 38, MI, brother 42- quad bypass         FAMILY HISTORY:  Family History   Problem Relation Age of Onset     C.A.D. Father 50        50's     Diabetes Father      Diabetes Brother      C.A.D. Brother 42        quad bypass and MI     Diabetes Brother         2 brothers have DM     Cancer Brother 34        Melanoma     Aortic aneurysm Brother      Allergies Son         Meds     Allergies Daughter         Med     Cancer Mother      C.A.D. Brother 38        MI age 38     Diabetes Mother      Diabetes Brother         PHYSICAL EXAM:   Constitutional: LMP 04/10/2016      Mental Status: A & O in no acute distress.  Affect is appropriate.  Speech is fluent.  Recent and remote memory are intact.  Attention span  and concentration are normal.     Cranial Nerves: CN1: grossly intact per patient recall. CN2: No funduscopic exam performed. CN3,4 & 6: Pupillary light response, lateral and vertical gaze normal.  No nystagmus.  Visual fields are full to confrontation. CN5: Intact to touch CN7: No facial weakness, smile, facial symmetry intact. CN8: Intact to spoken voice. CN9&10: Gag reflex, uvula midline, palate rises with phonation. CN11: Shoulder shrug 5/5 intact bilaterally. CN12: Tongue midline and moves freely from side to side.     Motor: No pronator drift of upper extremity. Normal bulk and tone all muscle groups of upper and lower extremities.    Strength: weakness noted of left hip flexor 4-/5, and left knee extension 4-/5 otherwise good strength throughout    Positive straight leg raise bilaterally     Decreased rectal tone      Sensory: Sensation intact bilaterally to light touch with exception to decreased left groin and perianal, does states sensation with rectal tone check      Coordination; finger to nose, heel to shin, rapid alternating movements smooth and rhythmic. Romberg intact. Heel/toe/tandem gait intact.  Normal gait and station.     Reflexes; supinator, biceps, triceps, knee/ ankle jerk intact. No hoffmans/babinski/clonus    IMAGING:  I personally reviewed all radiographic images   Noted left sided disc extrusion at T12-L1 with resultant moderate spinal stenosis, otherwise degenerative changes noted worst at L1-L2 and L2-L3 with postoperative changes of L3-L5 laminectomy and fusion         Cc:   Sera Solo

## 2022-09-20 ENCOUNTER — PREP FOR PROCEDURE (OUTPATIENT)
Dept: NEUROSURGERY | Facility: CLINIC | Age: 57
End: 2022-09-20

## 2022-09-20 ENCOUNTER — VIRTUAL VISIT (OUTPATIENT)
Dept: NEUROSURGERY | Facility: CLINIC | Age: 57
End: 2022-09-20
Payer: COMMERCIAL

## 2022-09-20 ENCOUNTER — TELEPHONE (OUTPATIENT)
Dept: NEUROSURGERY | Facility: CLINIC | Age: 57
End: 2022-09-20

## 2022-09-20 ENCOUNTER — TELEPHONE (OUTPATIENT)
Dept: FAMILY MEDICINE | Facility: CLINIC | Age: 57
End: 2022-09-20

## 2022-09-20 DIAGNOSIS — G83.4 CAUDA EQUINA SYNDROME (H): ICD-10-CM

## 2022-09-20 DIAGNOSIS — G83.4 CAUDA EQUINA SYNDROME (H): Primary | ICD-10-CM

## 2022-09-20 DIAGNOSIS — M51.26 LUMBAR DISC HERNIATION: Primary | ICD-10-CM

## 2022-09-20 PROCEDURE — 99441 PR PHYSICIAN TELEPHONE EVALUATION 5-10 MIN: CPT | Performed by: SURGERY

## 2022-09-20 NOTE — PROGRESS NOTES
Patient is a 56 yo female who presents with progressive low back pain. Pain worsened with standing and lying down. Improved by laying on her right side only. She has not had a bowel movement for 3 days. No fecal incontinence. Also denies saddle anesthesia as well. Does have overflow incontinence which is new. She tries to competely empty but this is happening fairly regularly. Left leg feels weak. Imbalance with ambulation.     We reviewed patients MRI which shows a left thoracic 12-lumbar 1 disc herniation which results in moderate spinal stenosis with cord compression left to right. She also has a h/o L3-5 decompression and fusion.   Patient has had no improvement in her symptoms since onset and given symptoms recommend left thoracic 12-lumbar 1 microdiscectomy. Risks and benefits of lumbar decompression with microdiscectomy discussed including but not limited to infection, hematoma, recurrent disc herniation, instability, nerve damage including paralysis, post op radiculitis, durotomy, risks associated with the use of general anesthesia, blood clots in the lungs or legs. We will get a flexion and extension film prior to surgery as well. She agreed to proceed.     Telephone visit lasting > 10 minutes     Suha Kent MD

## 2022-09-20 NOTE — LETTER
9/20/2022         RE: Selina Parikh  [address] [facility] [address]        Dear Colleague,    Thank you for referring your patient, Selina Parikh, to the [facility]ERY. Please see a copy of my visit note below.    Patient is a 56 yo female who presents with progressive low back pain. Pain worsened with standing and lying down. Improved by laying on her right side only. She has not had a bowel movement for 3 days. No fecal incontinence. Also denies saddle anesthesia as well. Does have overflow incontinence which is new. She tries to competely empty but this is happening fairly regularly. Left leg feels weak. Imbalance with ambulation.     We reviewed patients MRI which shows a left thoracic 12-lumbar 1 disc herniation which results in moderate spinal stenosis with cord compression left to right. She also has a h/o L3-5 decompression and fusion.   Patient has had no improvement in her symptoms since onset and given symptoms recommend left thoracic 12-lumbar 1 microdiscectomy. Risks and benefits of lumbar decompression with microdiscectomy discussed including but not limited to infection, hematoma, recurrent disc herniation, instability, nerve damage including paralysis, post op radiculitis, durotomy, risks associated with the use of general anesthesia, blood clots in the lungs or legs. We will get a flexion and extension film prior to surgery as well. She agreed to proceed.     Telephone visit lasting > 10 minutes     Suha Kent MD       Again, thank you for allowing me to participate in the care of your patient.        Sincerely,        Suha Kent MD

## 2022-09-20 NOTE — TELEPHONE ENCOUNTER
Reason for call:  Other   Patient called regarding (reason for call): appointment  Additional comments: PT needs a stat preop appt, Preop: DOS: 09/23/2022: Lumbar Thoracic Hemicolectomy, Dr. Suha Kent @ [facility]. No openings at any clinic in time frame. Please contact pt.     Phone number to reach patient:  Cell number on file:    Telephone Information:   Mobile [phone]       Best Time:  Anytime    Can we leave a detailed message on this number?  Not Applicable    Travel screening: Not Applicable

## 2022-09-20 NOTE — TELEPHONE ENCOUNTER
ORDER FROM: Dr. Kent    PRE AUTHORIZATION: PA PENDING    METHOD OF PATIENT CONTACT: spoke with patient over the phone #[phone]    PROCEDURE: left thoracic 12-lumbar 1 hemilaminectomy, medial facetectomy, foraminotomy and microdiscectomy     SURGICAL DATE: 09/23/2022 at  at 3:10pm    COVID TEST: 1-2 days prior to surgery    READINESS VISIT: Please call.    PCP, CLINIC, PHONE#: Sera Solo [facility] #[phone]    FILM INFO: MRI Lumbar Johns 09/02/2022    SURGICAL LETTER: Declined

## 2022-09-21 ENCOUNTER — OFFICE VISIT (OUTPATIENT)
Dept: INTERNAL MEDICINE | Facility: CLINIC | Age: 57
End: 2022-09-21
Payer: COMMERCIAL

## 2022-09-21 VITALS
OXYGEN SATURATION: 96 % | WEIGHT: 154.1 LBS | HEART RATE: 66 BPM | BODY MASS INDEX: 30.86 KG/M2 | DIASTOLIC BLOOD PRESSURE: 82 MMHG | SYSTOLIC BLOOD PRESSURE: 133 MMHG

## 2022-09-21 DIAGNOSIS — I10 PRIMARY HYPERTENSION: ICD-10-CM

## 2022-09-21 DIAGNOSIS — Z72.0 TOBACCO ABUSE: Chronic | ICD-10-CM

## 2022-09-21 DIAGNOSIS — I25.10 CORONARY ARTERY DISEASE DUE TO LIPID RICH PLAQUE: ICD-10-CM

## 2022-09-21 DIAGNOSIS — I25.83 CORONARY ARTERY DISEASE DUE TO LIPID RICH PLAQUE: ICD-10-CM

## 2022-09-21 DIAGNOSIS — M51.26 LUMBAR DISC HERNIATION: ICD-10-CM

## 2022-09-21 DIAGNOSIS — Z01.818 PREOPERATIVE EXAMINATION: Primary | ICD-10-CM

## 2022-09-21 PROCEDURE — 99214 OFFICE O/P EST MOD 30 MIN: CPT | Performed by: NURSE PRACTITIONER

## 2022-09-21 RX ORDER — LACTOBACILLUS RHAMNOSUS GG 10B CELL
1 CAPSULE ORAL DAILY
COMMUNITY
End: 2023-06-01

## 2022-09-21 ASSESSMENT — PATIENT HEALTH QUESTIONNAIRE - PHQ9
SUM OF ALL RESPONSES TO PHQ QUESTIONS 1-9: 18
10. IF YOU CHECKED OFF ANY PROBLEMS, HOW DIFFICULT HAVE THESE PROBLEMS MADE IT FOR YOU TO DO YOUR WORK, TAKE CARE OF THINGS AT HOME, OR GET ALONG WITH OTHER PEOPLE: EXTREMELY DIFFICULT
SUM OF ALL RESPONSES TO PHQ QUESTIONS 1-9: 18

## 2022-09-21 NOTE — PATIENT INSTRUCTIONS
Hold all supplements, aspirin and NSAIDs for 7 days prior to surgery.    Do not take your Celebrex.     Follow your surgeon's direction on when to stop eating and drinking prior to surgery.    Your surgeon will be managing your pain after your surgery.      Remove all jewelry and metal piercings before your surgery.     Remove nail polish from fingers before surgery.    If you use a CPAP machine, bring this with you to surgery.

## 2022-09-21 NOTE — PROGRESS NOTES
[facility]  [address] [address]  Phone: [phone]  Fax: [phone]  Primary Provider: Sera Solo  Pre-op Performing Provider: NICO JONES      PREOPERATIVE EVALUATION:  Today's date: 9/21/2022    Selina Parikh is a 57 year old female who presents for a preoperative evaluation.    Surgical Information:  Surgery/Procedure: Lumbar Thoracic hemilaminectomy  Surgery Location: [facility]  Surgeon:   Surgery Date: 09/23/2022  Time of Surgery: Unknown  Where patient plans to recover: At home with family  Fax number for surgical facility: Note does not need to be faxed, will be available electronically in Epic.    Type of Anesthesia Anticipated: to be determined    Assessment & Plan     The proposed surgical procedure is considered INTERMEDIATE risk.    Preoperative examination  No contraindications for planned procedure. She had an EKG done this past May which revealed sinus rhythm    Lumbar disc herniation  Planned hemilaminectomy.  She is on chronic tramadol    Primary hypertension  Controlled on metoprolol.    Coronary artery disease due to lipid rich plaque  Distal tapering of her LAD.  She has not had any chest pain or shortness of breath recently.  On atorvastatin and baby aspirin; she will hold her aspirin in preparation for her surgery    Tobacco abuse  Still smoking 6 cigarettes/day.  No history of COPD.  No history of sleep apnea.    Patient Instructions   Hold all supplements, aspirin and NSAIDs for 7 days prior to surgery.    Do not take your Celebrex.     Follow your surgeon's direction on when to stop eating and drinking prior to surgery.    Your surgeon will be managing your pain after your surgery.      Remove all jewelry and metal piercings before your surgery.     Remove nail polish from fingers before surgery.    If you use a CPAP machine, bring this with you to surgery.               Risks and Recommendations:  The  patient has the following additional risks and recommendations for perioperative complications:   - No identified additional risk factors other than previously addressed    Medication Instructions:  As above    RECOMMENDATION:  APPROVAL GIVEN to proceed with proposed procedure, without further diagnostic evaluation.      20 minutes spent on the date of the encounter doing chart review, history and exam, documentation and further activities per the note      Subjective     HPI related to upcoming procedure: As above    Preop Questions 9/21/2022   1. Have you ever had a heart attack or stroke? No   2. Have you ever had surgery on your heart or blood vessels, such as a stent placement, a coronary artery bypass, or surgery on an artery in your head, neck, heart, or legs? No   3. Do you have chest pain with activity? No   4. Do you have a history of  heart failure? No   5. Do you currently have a cold, bronchitis or symptoms of other infection? No   6. Do you have a cough, shortness of breath, or wheezing? YES - Chronic   7. Do you or anyone in your family have previous history of blood clots? No   8. Do you or does anyone in your family have a serious bleeding problem such as prolonged bleeding following surgeries or cuts? No   9. Have you ever had problems with anemia or been told to take iron pills? YES -    10. Have you had any abnormal blood loss such as black, tarry or bloody stools, or abnormal vaginal bleeding? No   11. Have you ever had a blood transfusion? No   12. Are you willing to have a blood transfusion if it is medically needed before, during, or after your surgery? Yes   13. Have you or any of your relatives ever had problems with anesthesia? No   14. Do you have sleep apnea, excessive snoring or daytime drowsiness? No   14a. Do you have a CPAP machine? -   15. Do you have any artifical heart valves or other implanted medical devices like a pacemaker, defibrillator, or continuous glucose monitor? No    16. Do you have artificial joints? No   17. Are you allergic to latex? No   18. Is there any chance that you may be pregnant? No       Health Care Directive:  Patient does not have a Health Care Directive or Living Will: Discussed advance care planning with patient; however, patient declined at this time.    Preoperative Review of :   reviewed - controlled substances reflected in medication list.    Status of Chronic Conditions:  See problem list for active medical problems.  Problems all longstanding and stable, except as noted/documented.  See ROS for pertinent symptoms related to these conditions.      Review of Systems  CONSTITUTIONAL: NEGATIVE for fever, chills, change in weight  INTEGUMENTARY/SKIN: NEGATIVE for worrisome rashes, moles or lesions  EYES: NEGATIVE for vision changes or irritation  ENT/MOUTH: NEGATIVE for ear, mouth and throat problems  RESP: NEGATIVE for significant cough or SOB  CV: NEGATIVE for chest pain, palpitations or peripheral edema  GI: NEGATIVE for nausea, abdominal pain, heartburn, or change in bowel habits  : NEGATIVE for frequency, dysuria, or hematuria  MUSCULOSKELETAL: NEGATIVE for significant arthralgias or myalgia  NEURO: NEGATIVE for weakness, dizziness or paresthesias  ENDOCRINE: NEGATIVE for temperature intolerance, skin/hair changes  HEME: NEGATIVE for bleeding problems  PSYCHIATRIC: NEGATIVE for changes in mood or affect    Patient Active Problem List    Diagnosis Date Noted     Primary hypertension 05/07/2022     Priority: Medium     Status post coronary angiogram 05/06/2022     Priority: Medium     Coronary artery disease due to lipid rich plaque      Priority: Medium     Chest pain, unspecified type 05/05/2022     Priority: Medium     Acute chest pain 04/28/2022     Priority: Medium     Hiatal hernia 04/15/2021     Priority: Medium     Formatting of this note might be different from the original.  Added automatically from request for surgery [id number]       Spinal  stenosis of lumbar region with neurogenic claudication 06/04/2018     Priority: Medium     S/P partial colectomy 04/10/2018     Priority: Medium     Diverticulitis 01/28/2018     Priority: Medium     Psoriasis 06/20/2012     Priority: Medium     GERD (gastroesophageal reflux disease) 03/15/2011     Priority: Medium     Tobacco abuse 02/17/2011     Priority: Medium     Health Care Home 01/27/2011     Priority: Medium     DX V65.8 REPLACED WITH [facility] C[facility] (04/08/2013)       CARDIOVASCULAR SCREENING; LDL GOAL LESS THAN 160 10/31/2010     Priority: Medium     R Occipital Neuralgia 10/07/2009     Priority: Medium     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 On fiber, senna, and MOM. Advised to d/c MOM, start Miralax and titer to regular BM's. Pt has been seen by GI.        Brain syndrome, posttraumatic 03/06/2009     Priority: Medium     Work comp.  Has seen Dr. Ahmadi, [facility] of neurology.  MRIs, EMG unremarkalbe, some foraminal stenosis on C5/C6  Sent her to physiatrist, trigger point injections didn't help.  After some neck traction, had intense unremitting HA, neck pain.  Off/on tingling in arms.  Pain clinic and/or spine surg for more eval probable next steps.    Has failed multiple rescue and preventive HA meds. On no narcotics        Past Medical History:   Diagnosis Date     Cancer (H)     melanoma     Coronary artery disease      Hiatal hernia      Hypertension      Other chronic pain      Past Surgical History:   Procedure Laterality Date     BACK SURGERY       CHOLECYSTECTOMY       CHOLECYSTECTOMY, LAPOROSCOPIC  2/14/2000    Cholecystectomy, Laparoscopic     COLON SURGERY       CV CORONARY ANGIOGRAM N/A 5/6/2022    Procedure: CV CORONARY ANGIOGRAM;  Surgeon: Stefanie Spangler MD;  Location: [facility] CATH LAB CV     CV LEFT  HEART CATH N/A 5/6/2022    Procedure: Left Heart Catheterization;  Surgeon: Stefanie Spangler MD;  Location: [facility] CATH LAB CV     CYSTOSCOPY, INSERT LIGHTED STENT URETER(S) N/A 4/10/2018    Procedure: CYSTOSCOPY, INSERT LIGHTED STENT URETER(S);  Lighted Stent Placement,Laparoscopic Assisted Sigmoid Colectomy;  Surgeon: ERVIN Reina MD;  Location: [facility] OR     LAPAROSCOPIC ASSISTED COLECTOMY LEFT (DESCENDING) N/A 4/10/2018    Procedure: LAPAROSCOPIC ASSISTED COLECTOMY LEFT (DESCENDING);;  Surgeon: Hill Murray MD;  Location: [facility] OR     NECK SURGERY       ORTHOPEDIC SURGERY  10/2010    Cut palm and reattched tendons and nerves in left hand forefinger.     MS ESOPHAGOGASTRODUODENOSCOPY TRANSORAL DIAGNOSTIC N/A 4/30/2021    Procedure: ESOPHAGOGASTRODUODENOSCOPY (EGD);  Surgeon: Souleymane Moreno DO;  Location: [facility];  Service: General     SURGICAL HISTORY OF -   1/4/2000    Esophagogastroduodenoscopy with biopsy     TUBAL LIGATION       TUBAL LIGATION       Current Outpatient Medications   Medication Sig Dispense Refill     acetaminophen (TYLENOL) 500 MG tablet Take 1,000 mg by mouth daily before breakfast And second dose as needed       aspirin (ASA) 81 MG EC tablet Take 1 tablet (81 mg) by mouth daily 90 tablet 3     atorvastatin (LIPITOR) 40 MG tablet Take 1 tablet (40 mg) by mouth daily 90 tablet 3     celecoxib (CELEBREX) 100 MG capsule Take 1 capsule (100 mg) by mouth 2 times daily 180 capsule 3     clobetasol (TEMOVATE) 0.05 % external ointment Apply topically 2 times daily (Patient taking differently: Apply topically 2 times daily as needed) 60 g 3     fish oil-omega-3 fatty acids 1000 MG capsule Take 2 g by mouth daily       lactobacillus rhamnosus, GG, (CULTURELL) capsule Take 1 capsule by mouth 2 times daily       Melatonin 10 MG TABS tablet Take 20 mg by mouth nightly as needed for sleep       metoprolol succinate ER (TOPROL XL) 50 MG 24 hr tablet Take 0.5 tablets (25 mg) by mouth daily 30  "tablet 2     nitroGLYcerin (NITROSTAT) 0.4 MG sublingual tablet One tablet under the tongue every 5 minutes if needed for chest pain. May repeat every 5 minutes for a maximum of 3 doses in 15 minutes\" 25 tablet 3     omeprazole (PRILOSEC) 20 MG DR capsule Take 1 capsule (20 mg) by mouth 3 times daily 270 capsule 3     traMADol (ULTRAM) 50 MG tablet Take 1 tablet (50 mg) by mouth every 6 hours as needed for pain (back and neck pain) 10 tablet 0     Vitamin D (Cholecalciferol) 25 MCG (1000 UT) TABS Take 1,000 Units by mouth daily         Allergies   Allergen Reactions     Ciprofloxacin Anaphylaxis     Flagyl [Metronidazole] Anaphylaxis     Gabapentin Other (See Comments)     Suicidal thoughts.     Zofran [Ondansetron] Other (See Comments) and GI Disturbance     Causes bloating, moderately uncomfortable, abd pain       Augmentin Nausea and Vomiting     Benadryl [Diphenhydramine] Other (See Comments)     Muscle spasms     Percocet [Oxycodone-Acetaminophen] Other (See Comments)     Goes nuts - nasty mean irritated, can take Dilaudid     Vicodin [Hydrocodone-Acetaminophen] Other (See Comments)     Anxiety-agitation        Social History     Tobacco Use     Smoking status: Current Every Day Smoker     Packs/day: 0.50     Years: 30.00     Pack years: 15.00     Types: Cigarettes     Smokeless tobacco: Never Used     Tobacco comment: 3 Cigs a day   Substance Use Topics     Alcohol use: Not Currently     Comment: Alcoholic Drinks/day: 2x year      Family History   Problem Relation Age of Onset     C.A.D. Father 50        50's     Diabetes Father      Diabetes Brother      C.A.D. Brother 42        quad bypass and MI     Diabetes Brother         2 brothers have DM     Cancer Brother 34        Melanoma     Aortic aneurysm Brother      Allergies Son         Meds     Allergies Daughter         Med     Cancer Mother      C.A.D. Brother 38        MI age 38     Diabetes Mother      Diabetes Brother      History   Drug Use Unknown      "    Objective     /82   Pulse 66   Wt 69.9 kg (154 lb 1.6 oz)   LMP 04/10/2016   SpO2 96%   BMI 30.86 kg/m      Physical Exam    GENERAL APPEARANCE: healthy, alert and no distress     EYES: EOMI, PERRL     HENT: ear canals and TM's normal and nose and mouth without ulcers or lesions     NECK: no adenopathy, no asymmetry, masses, or scars and thyroid normal to palpation     RESP: lungs clear to auscultation - no rales, rhonchi or wheezes     CV: regular rates and rhythm, normal S1 S2, no S3 or S4 and no murmur, click or rub     ABDOMEN:  soft, nontender, no HSM or masses and bowel sounds normal     MS: extremities normal- no gross deformities noted, no evidence of inflammation in joints, FROM in all extremities.     SKIN: no suspicious lesions or rashes     NEURO: Normal strength and tone, sensory exam grossly normal, mentation intact and speech normal     PSYCH: mentation appears normal. and affect normal/bright     LYMPHATICS: No cervical adenopathy    Recent Labs   Lab Test 09/19/22  1416 07/21/22  2159 05/05/22  0812 04/28/22  1543 04/19/21  2056 12/30/20  1613   HGB 13.9 13.9   < > 14.1   < >  --     265   < > 286   < >  --    INR 0.93  --   --   --   --  0.96    142   < > 143   < >  --    POTASSIUM 4.4 4.4   < > 4.0   < >  --    CR 0.76 0.92   < > 0.70   < >  --    A1C  --   --   --  5.6  --   --     < > = values in this interval not displayed.        Diagnostics:  Labs pending at this time.  Results will be reviewed when available.       Revised Cardiac Risk Index (RCRI):  The patient has the following serious cardiovascular risks for perioperative complications:   - No serious cardiac risks = 0 points     RCRI Interpretation: 1 point: Class II (low risk - 0.9% complication rate)           Signed Electronically by: Alexey Virk CNP  Copy of this evaluation report is provided to requesting physician.      Answers for HPI/ROS submitted by the patient on 9/21/2022  If you checked off  any problems, how difficult have these problems made it for you to do your work, take care of things at home, or get along with other people?: Extremely difficult  PHQ9 TOTAL SCORE: 18

## 2022-09-21 NOTE — H&P (VIEW-ONLY)
[facility]  [address] [address]  Phone: [phone]  Fax: [phone]  Primary Provider: Sera Solo  Pre-op Performing Provider: NICO JONES      PREOPERATIVE EVALUATION:  Today's date: 9/21/2022    Selina Parikh is a 57 year old female who presents for a preoperative evaluation.    Surgical Information:  Surgery/Procedure: Lumbar Thoracic hemilaminectomy  Surgery Location: [facility]  Surgeon:   Surgery Date: 09/23/2022  Time of Surgery: Unknown  Where patient plans to recover: At home with family  Fax number for surgical facility: Note does not need to be faxed, will be available electronically in Epic.    Type of Anesthesia Anticipated: to be determined    Assessment & Plan     The proposed surgical procedure is considered INTERMEDIATE risk.    Preoperative examination  No contraindications for planned procedure. She had an EKG done this past May which revealed sinus rhythm    Lumbar disc herniation  Planned hemilaminectomy.  She is on chronic tramadol    Primary hypertension  Controlled on metoprolol.    Coronary artery disease due to lipid rich plaque  Distal tapering of her LAD.  She has not had any chest pain or shortness of breath recently.  On atorvastatin and baby aspirin; she will hold her aspirin in preparation for her surgery    Tobacco abuse  Still smoking 6 cigarettes/day.  No history of COPD.  No history of sleep apnea.    Patient Instructions   Hold all supplements, aspirin and NSAIDs for 7 days prior to surgery.    Do not take your Celebrex.     Follow your surgeon's direction on when to stop eating and drinking prior to surgery.    Your surgeon will be managing your pain after your surgery.      Remove all jewelry and metal piercings before your surgery.     Remove nail polish from fingers before surgery.    If you use a CPAP machine, bring this with you to surgery.               Risks and Recommendations:  The  patient has the following additional risks and recommendations for perioperative complications:   - No identified additional risk factors other than previously addressed    Medication Instructions:  As above    RECOMMENDATION:  APPROVAL GIVEN to proceed with proposed procedure, without further diagnostic evaluation.      20 minutes spent on the date of the encounter doing chart review, history and exam, documentation and further activities per the note      Subjective     HPI related to upcoming procedure: As above    Preop Questions 9/21/2022   1. Have you ever had a heart attack or stroke? No   2. Have you ever had surgery on your heart or blood vessels, such as a stent placement, a coronary artery bypass, or surgery on an artery in your head, neck, heart, or legs? No   3. Do you have chest pain with activity? No   4. Do you have a history of  heart failure? No   5. Do you currently have a cold, bronchitis or symptoms of other infection? No   6. Do you have a cough, shortness of breath, or wheezing? YES - Chronic   7. Do you or anyone in your family have previous history of blood clots? No   8. Do you or does anyone in your family have a serious bleeding problem such as prolonged bleeding following surgeries or cuts? No   9. Have you ever had problems with anemia or been told to take iron pills? YES -    10. Have you had any abnormal blood loss such as black, tarry or bloody stools, or abnormal vaginal bleeding? No   11. Have you ever had a blood transfusion? No   12. Are you willing to have a blood transfusion if it is medically needed before, during, or after your surgery? Yes   13. Have you or any of your relatives ever had problems with anesthesia? No   14. Do you have sleep apnea, excessive snoring or daytime drowsiness? No   14a. Do you have a CPAP machine? -   15. Do you have any artifical heart valves or other implanted medical devices like a pacemaker, defibrillator, or continuous glucose monitor? No    16. Do you have artificial joints? No   17. Are you allergic to latex? No   18. Is there any chance that you may be pregnant? No       Health Care Directive:  Patient does not have a Health Care Directive or Living Will: Discussed advance care planning with patient; however, patient declined at this time.    Preoperative Review of :   reviewed - controlled substances reflected in medication list.    Status of Chronic Conditions:  See problem list for active medical problems.  Problems all longstanding and stable, except as noted/documented.  See ROS for pertinent symptoms related to these conditions.      Review of Systems  CONSTITUTIONAL: NEGATIVE for fever, chills, change in weight  INTEGUMENTARY/SKIN: NEGATIVE for worrisome rashes, moles or lesions  EYES: NEGATIVE for vision changes or irritation  ENT/MOUTH: NEGATIVE for ear, mouth and throat problems  RESP: NEGATIVE for significant cough or SOB  CV: NEGATIVE for chest pain, palpitations or peripheral edema  GI: NEGATIVE for nausea, abdominal pain, heartburn, or change in bowel habits  : NEGATIVE for frequency, dysuria, or hematuria  MUSCULOSKELETAL: NEGATIVE for significant arthralgias or myalgia  NEURO: NEGATIVE for weakness, dizziness or paresthesias  ENDOCRINE: NEGATIVE for temperature intolerance, skin/hair changes  HEME: NEGATIVE for bleeding problems  PSYCHIATRIC: NEGATIVE for changes in mood or affect    Patient Active Problem List    Diagnosis Date Noted     Primary hypertension 05/07/2022     Priority: Medium     Status post coronary angiogram 05/06/2022     Priority: Medium     Coronary artery disease due to lipid rich plaque      Priority: Medium     Chest pain, unspecified type 05/05/2022     Priority: Medium     Acute chest pain 04/28/2022     Priority: Medium     Hiatal hernia 04/15/2021     Priority: Medium     Formatting of this note might be different from the original.  Added automatically from request for surgery [id number]       Spinal  stenosis of lumbar region with neurogenic claudication 06/04/2018     Priority: Medium     S/P partial colectomy 04/10/2018     Priority: Medium     Diverticulitis 01/28/2018     Priority: Medium     Psoriasis 06/20/2012     Priority: Medium     GERD (gastroesophageal reflux disease) 03/15/2011     Priority: Medium     Tobacco abuse 02/17/2011     Priority: Medium     Health Care Home 01/27/2011     Priority: Medium     DX V65.8 REPLACED WITH 76369 HEALTH CARE HOME (04/08/2013)       CARDIOVASCULAR SCREENING; LDL GOAL LESS THAN 160 10/31/2010     Priority: Medium     R Occipital Neuralgia 10/07/2009     Priority: Medium     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 On fiber, senna, and MOM. Advised to d/c MOM, start Miralax and titer to regular BM's. Pt has been seen by GI.        Brain syndrome, posttraumatic 03/06/2009     Priority: Medium     Work comp.  Has seen Dr. Ahmadi, [facility] clinic of neurology.  MRIs, EMG unremarkalbe, some foraminal stenosis on C5/C6  Sent her to physiatrist, trigger point injections didn't help.  After some neck traction, had intense unremitting HA, neck pain.  Off/on tingling in arms.  Pain clinic and/or spine surg for more eval probable next steps.    Has failed multiple rescue and preventive HA meds. On no narcotics        Past Medical History:   Diagnosis Date     Cancer (H)     melanoma     Coronary artery disease      Hiatal hernia      Hypertension      Other chronic pain      Past Surgical History:   Procedure Laterality Date     BACK SURGERY       CHOLECYSTECTOMY       CHOLECYSTECTOMY, LAPOROSCOPIC  2/14/2000    Cholecystectomy, Laparoscopic     COLON SURGERY       CV CORONARY ANGIOGRAM N/A 5/6/2022    Procedure: CV CORONARY ANGIOGRAM;  Surgeon: Stefanie Spangler MD;  Location: [facility] CATH LAB CV     CV LEFT  HEART CATH N/A 5/6/2022    Procedure: Left Heart Catheterization;  Surgeon: Stefanie Spangler MD;  Location: [facility] CATH LAB CV     CYSTOSCOPY, INSERT LIGHTED STENT URETER(S) N/A 4/10/2018    Procedure: CYSTOSCOPY, INSERT LIGHTED STENT URETER(S);  Lighted Stent Placement,Laparoscopic Assisted Sigmoid Colectomy;  Surgeon: ERVIN Reina MD;  Location: [facility] OR     LAPAROSCOPIC ASSISTED COLECTOMY LEFT (DESCENDING) N/A 4/10/2018    Procedure: LAPAROSCOPIC ASSISTED COLECTOMY LEFT (DESCENDING);;  Surgeon: Hill Murray MD;  Location: [facility] OR     NECK SURGERY       ORTHOPEDIC SURGERY  10/2010    Cut palm and reattched tendons and nerves in left hand forefinger.     CO ESOPHAGOGASTRODUODENOSCOPY TRANSORAL DIAGNOSTIC N/A 4/30/2021    Procedure: ESOPHAGOGASTRODUODENOSCOPY (EGD);  Surgeon: Souleymane Moreno DO;  Location: [facility];  Service: General     SURGICAL HISTORY OF -   1/4/2000    Esophagogastroduodenoscopy with biopsy     TUBAL LIGATION       TUBAL LIGATION       Current Outpatient Medications   Medication Sig Dispense Refill     acetaminophen (TYLENOL) 500 MG tablet Take 1,000 mg by mouth daily before breakfast And second dose as needed       aspirin (ASA) 81 MG EC tablet Take 1 tablet (81 mg) by mouth daily 90 tablet 3     atorvastatin (LIPITOR) 40 MG tablet Take 1 tablet (40 mg) by mouth daily 90 tablet 3     celecoxib (CELEBREX) 100 MG capsule Take 1 capsule (100 mg) by mouth 2 times daily 180 capsule 3     clobetasol (TEMOVATE) 0.05 % external ointment Apply topically 2 times daily (Patient taking differently: Apply topically 2 times daily as needed) 60 g 3     fish oil-omega-3 fatty acids 1000 MG capsule Take 2 g by mouth daily       lactobacillus rhamnosus, GG, (CULTURELL) capsule Take 1 capsule by mouth 2 times daily       Melatonin 10 MG TABS tablet Take 20 mg by mouth nightly as needed for sleep       metoprolol succinate ER (TOPROL XL) 50 MG 24 hr tablet Take 0.5 tablets (25 mg) by mouth daily 30  "tablet 2     nitroGLYcerin (NITROSTAT) 0.4 MG sublingual tablet One tablet under the tongue every 5 minutes if needed for chest pain. May repeat every 5 minutes for a maximum of 3 doses in 15 minutes\" 25 tablet 3     omeprazole (PRILOSEC) 20 MG DR capsule Take 1 capsule (20 mg) by mouth 3 times daily 270 capsule 3     traMADol (ULTRAM) 50 MG tablet Take 1 tablet (50 mg) by mouth every 6 hours as needed for pain (back and neck pain) 10 tablet 0     Vitamin D (Cholecalciferol) 25 MCG (1000 UT) TABS Take 1,000 Units by mouth daily         Allergies   Allergen Reactions     Ciprofloxacin Anaphylaxis     Flagyl [Metronidazole] Anaphylaxis     Gabapentin Other (See Comments)     Suicidal thoughts.     Zofran [Ondansetron] Other (See Comments) and GI Disturbance     Causes bloating, moderately uncomfortable, abd pain       Augmentin Nausea and Vomiting     Benadryl [Diphenhydramine] Other (See Comments)     Muscle spasms     Percocet [Oxycodone-Acetaminophen] Other (See Comments)     Goes nuts - nasty mean irritated, can take Dilaudid     Vicodin [Hydrocodone-Acetaminophen] Other (See Comments)     Anxiety-agitation        Social History     Tobacco Use     Smoking status: Current Every Day Smoker     Packs/day: 0.50     Years: 30.00     Pack years: 15.00     Types: Cigarettes     Smokeless tobacco: Never Used     Tobacco comment: 3 Cigs a day   Substance Use Topics     Alcohol use: Not Currently     Comment: Alcoholic Drinks/day: 2x year      Family History   Problem Relation Age of Onset     C.A.D. Father 50        50's     Diabetes Father      Diabetes Brother      C.A.D. Brother 42        quad bypass and MI     Diabetes Brother         2 brothers have DM     Cancer Brother 34        Melanoma     Aortic aneurysm Brother      Allergies Son         Meds     Allergies Daughter         Med     Cancer Mother      C.A.D. Brother 38        MI age 38     Diabetes Mother      Diabetes Brother      History   Drug Use Unknown      "    Objective     /82   Pulse 66   Wt 69.9 kg (154 lb 1.6 oz)   LMP 04/10/2016   SpO2 96%   BMI 30.86 kg/m      Physical Exam    GENERAL APPEARANCE: healthy, alert and no distress     EYES: EOMI, PERRL     HENT: ear canals and TM's normal and nose and mouth without ulcers or lesions     NECK: no adenopathy, no asymmetry, masses, or scars and thyroid normal to palpation     RESP: lungs clear to auscultation - no rales, rhonchi or wheezes     CV: regular rates and rhythm, normal S1 S2, no S3 or S4 and no murmur, click or rub     ABDOMEN:  soft, nontender, no HSM or masses and bowel sounds normal     MS: extremities normal- no gross deformities noted, no evidence of inflammation in joints, FROM in all extremities.     SKIN: no suspicious lesions or rashes     NEURO: Normal strength and tone, sensory exam grossly normal, mentation intact and speech normal     PSYCH: mentation appears normal. and affect normal/bright     LYMPHATICS: No cervical adenopathy    Recent Labs   Lab Test 09/19/22  1416 07/21/22  2159 05/05/22  0812 04/28/22  1543 04/19/21  2056 12/30/20  1613   HGB 13.9 13.9   < > 14.1   < >  --     265   < > 286   < >  --    INR 0.93  --   --   --   --  0.96    142   < > 143   < >  --    POTASSIUM 4.4 4.4   < > 4.0   < >  --    CR 0.76 0.92   < > 0.70   < >  --    A1C  --   --   --  5.6  --   --     < > = values in this interval not displayed.        Diagnostics:  Labs pending at this time.  Results will be reviewed when available.       Revised Cardiac Risk Index (RCRI):  The patient has the following serious cardiovascular risks for perioperative complications:   - No serious cardiac risks = 0 points     RCRI Interpretation: 1 point: Class II (low risk - 0.9% complication rate)           Signed Electronically by: Alexey Virk CNP  Copy of this evaluation report is provided to requesting physician.      Answers for HPI/ROS submitted by the patient on 9/21/2022  If you checked off  any problems, how difficult have these problems made it for you to do your work, take care of things at home, or get along with other people?: Extremely difficult  PHQ9 TOTAL SCORE: 18

## 2022-09-22 ENCOUNTER — ANESTHESIA EVENT (OUTPATIENT)
Dept: SURGERY | Facility: CLINIC | Age: 57
End: 2022-09-22
Payer: COMMERCIAL

## 2022-09-22 ENCOUNTER — LAB (OUTPATIENT)
Dept: LAB | Facility: HOSPITAL | Age: 57
End: 2022-09-22
Payer: COMMERCIAL

## 2022-09-22 DIAGNOSIS — G83.4 CAUDA EQUINA SYNDROME (H): ICD-10-CM

## 2022-09-22 LAB — CLOSURE TME COLL+EPINEP BLD: 138 SECONDS

## 2022-09-22 PROCEDURE — 36415 COLL VENOUS BLD VENIPUNCTURE: CPT

## 2022-09-22 PROCEDURE — 85576 BLOOD PLATELET AGGREGATION: CPT

## 2022-09-23 ENCOUNTER — TELEPHONE (OUTPATIENT)
Dept: NEUROSURGERY | Facility: CLINIC | Age: 57
End: 2022-09-23

## 2022-09-23 ENCOUNTER — APPOINTMENT (OUTPATIENT)
Dept: RADIOLOGY | Facility: CLINIC | Age: 57
End: 2022-09-23
Attending: PHYSICIAN ASSISTANT
Payer: COMMERCIAL

## 2022-09-23 ENCOUNTER — ANESTHESIA (OUTPATIENT)
Dept: SURGERY | Facility: CLINIC | Age: 57
End: 2022-09-23
Payer: COMMERCIAL

## 2022-09-23 ENCOUNTER — HOSPITAL ENCOUNTER (OUTPATIENT)
Facility: CLINIC | Age: 57
Discharge: HOME | End: 2022-09-23
Attending: SURGERY | Admitting: SURGERY
Payer: COMMERCIAL

## 2022-09-23 VITALS
DIASTOLIC BLOOD PRESSURE: 66 MMHG | SYSTOLIC BLOOD PRESSURE: 142 MMHG | HEART RATE: 66 BPM | HEIGHT: 60 IN | OXYGEN SATURATION: 98 % | RESPIRATION RATE: 18 BRPM | BODY MASS INDEX: 30.08 KG/M2 | TEMPERATURE: 97.9 F | WEIGHT: 153.2 LBS

## 2022-09-23 DIAGNOSIS — M48.062 SPINAL STENOSIS OF LUMBAR REGION WITH NEUROGENIC CLAUDICATION: Primary | ICD-10-CM

## 2022-09-23 PROCEDURE — 250N000005 HC OR RX SURGIFLO HEMOSTATIC MATRIX 10ML 199102S OPNP: Performed by: SURGERY

## 2022-09-23 PROCEDURE — 258N000003 HC RX IP 258 OP 636: Performed by: NURSE ANESTHETIST, CERTIFIED REGISTERED

## 2022-09-23 PROCEDURE — 258N000003 HC RX IP 258 OP 636: Performed by: ANESTHESIOLOGY

## 2022-09-23 PROCEDURE — 250N000009 HC RX 250: Performed by: SURGERY

## 2022-09-23 PROCEDURE — 250N000011 HC RX IP 250 OP 636: Performed by: NURSE ANESTHETIST, CERTIFIED REGISTERED

## 2022-09-23 PROCEDURE — 272N000001 HC OR GENERAL SUPPLY STERILE: Performed by: SURGERY

## 2022-09-23 PROCEDURE — 250N000009 HC RX 250: Performed by: PHYSICIAN ASSISTANT

## 2022-09-23 PROCEDURE — P9045 ALBUMIN (HUMAN), 5%, 250 ML: HCPCS | Performed by: NURSE ANESTHETIST, CERTIFIED REGISTERED

## 2022-09-23 PROCEDURE — 250N000009 HC RX 250: Performed by: NURSE ANESTHETIST, CERTIFIED REGISTERED

## 2022-09-23 PROCEDURE — 250N000011 HC RX IP 250 OP 636: Performed by: SURGERY

## 2022-09-23 PROCEDURE — 710N000012 HC RECOVERY PHASE 2, PER MINUTE: Performed by: SURGERY

## 2022-09-23 PROCEDURE — 250N000011 HC RX IP 250 OP 636: Performed by: PHYSICIAN ASSISTANT

## 2022-09-23 PROCEDURE — 999N000063 XR CROSSTABLE LATERAL LUMBAR SPINE PORTABLE: Mod: TC

## 2022-09-23 PROCEDURE — 250N000011 HC RX IP 250 OP 636: Performed by: ANESTHESIOLOGY

## 2022-09-23 PROCEDURE — 360N000077 HC SURGERY LEVEL 4, PER MIN: Performed by: SURGERY

## 2022-09-23 PROCEDURE — 999N000127 HC STATISTIC PERIPHERAL IV START W US GUIDANCE

## 2022-09-23 PROCEDURE — 250N000025 HC SEVOFLURANE, PER MIN: Performed by: SURGERY

## 2022-09-23 PROCEDURE — 370N000017 HC ANESTHESIA TECHNICAL FEE, PER MIN: Performed by: SURGERY

## 2022-09-23 PROCEDURE — 250N000013 HC RX MED GY IP 250 OP 250 PS 637: Performed by: PHYSICIAN ASSISTANT

## 2022-09-23 PROCEDURE — 999N000141 HC STATISTIC PRE-PROCEDURE NURSING ASSESSMENT: Performed by: SURGERY

## 2022-09-23 PROCEDURE — 63030 LAMOT DCMPRN NRV RT 1 LMBR: CPT | Mod: LT | Performed by: SURGERY

## 2022-09-23 PROCEDURE — 63030 LAMOT DCMPRN NRV RT 1 LMBR: CPT | Mod: AS | Performed by: PHYSICIAN ASSISTANT

## 2022-09-23 PROCEDURE — 710N000010 HC RECOVERY PHASE 1, LEVEL 2, PER MIN: Performed by: SURGERY

## 2022-09-23 PROCEDURE — 2894A VOIDCORRECT: CPT | Mod: 59 | Performed by: SURGERY

## 2022-09-23 RX ORDER — FENTANYL CITRATE 50 UG/ML
INJECTION, SOLUTION INTRAMUSCULAR; INTRAVENOUS PRN
Status: DISCONTINUED | OUTPATIENT
Start: 2022-09-23 | End: 2022-09-23

## 2022-09-23 RX ORDER — NALOXONE HYDROCHLORIDE 0.4 MG/ML
0.4 INJECTION, SOLUTION INTRAMUSCULAR; INTRAVENOUS; SUBCUTANEOUS
Status: DISCONTINUED | OUTPATIENT
Start: 2022-09-23 | End: 2022-09-24 | Stop reason: HOSPADM

## 2022-09-23 RX ORDER — PROCHLORPERAZINE MALEATE 10 MG
10 TABLET ORAL EVERY 6 HOURS PRN
Status: DISCONTINUED | OUTPATIENT
Start: 2022-09-23 | End: 2022-09-24 | Stop reason: HOSPADM

## 2022-09-23 RX ORDER — TRAMADOL HYDROCHLORIDE 50 MG/1
50 TABLET ORAL EVERY 4 HOURS PRN
Qty: 42 TABLET | Refills: 0 | Status: SHIPPED | OUTPATIENT
Start: 2022-09-23 | End: 2022-09-25

## 2022-09-23 RX ORDER — ONDANSETRON 4 MG/1
4 TABLET, ORALLY DISINTEGRATING ORAL EVERY 6 HOURS PRN
Status: DISCONTINUED | OUTPATIENT
Start: 2022-09-23 | End: 2022-09-24 | Stop reason: HOSPADM

## 2022-09-23 RX ORDER — SODIUM CHLORIDE, SODIUM LACTATE, POTASSIUM CHLORIDE, CALCIUM CHLORIDE 600; 310; 30; 20 MG/100ML; MG/100ML; MG/100ML; MG/100ML
INJECTION, SOLUTION INTRAVENOUS CONTINUOUS
Status: DISCONTINUED | OUTPATIENT
Start: 2022-09-23 | End: 2022-09-24 | Stop reason: HOSPADM

## 2022-09-23 RX ORDER — KETOROLAC TROMETHAMINE 30 MG/ML
15 INJECTION, SOLUTION INTRAMUSCULAR; INTRAVENOUS EVERY 6 HOURS PRN
Status: DISCONTINUED | OUTPATIENT
Start: 2022-09-23 | End: 2022-09-23

## 2022-09-23 RX ORDER — DEXAMETHASONE SODIUM PHOSPHATE 10 MG/ML
10 INJECTION, SOLUTION INTRAMUSCULAR; INTRAVENOUS ONCE
Status: DISCONTINUED | OUTPATIENT
Start: 2022-09-23 | End: 2022-09-24 | Stop reason: HOSPADM

## 2022-09-23 RX ORDER — NALOXONE HYDROCHLORIDE 0.4 MG/ML
0.2 INJECTION, SOLUTION INTRAMUSCULAR; INTRAVENOUS; SUBCUTANEOUS
Status: DISCONTINUED | OUTPATIENT
Start: 2022-09-23 | End: 2022-09-24 | Stop reason: HOSPADM

## 2022-09-23 RX ORDER — HYDROMORPHONE HCL IN WATER/PF 6 MG/30 ML
0.2 PATIENT CONTROLLED ANALGESIA SYRINGE INTRAVENOUS EVERY 5 MIN PRN
Status: DISCONTINUED | OUTPATIENT
Start: 2022-09-23 | End: 2022-09-23 | Stop reason: HOSPADM

## 2022-09-23 RX ORDER — BUPIVACAINE HYDROCHLORIDE AND EPINEPHRINE 2.5; 5 MG/ML; UG/ML
INJECTION, SOLUTION EPIDURAL; INFILTRATION; INTRACAUDAL; PERINEURAL
Status: DISCONTINUED
Start: 2022-09-23 | End: 2022-09-23 | Stop reason: HOSPADM

## 2022-09-23 RX ORDER — GINSENG 100 MG
CAPSULE ORAL
Status: DISCONTINUED
Start: 2022-09-23 | End: 2022-09-23 | Stop reason: HOSPADM

## 2022-09-23 RX ORDER — METHOCARBAMOL 500 MG/1
500 TABLET, FILM COATED ORAL EVERY 6 HOURS PRN
Qty: 42 TABLET | Refills: 0 | Status: SHIPPED | OUTPATIENT
Start: 2022-09-23 | End: 2022-09-25

## 2022-09-23 RX ORDER — KETAMINE HYDROCHLORIDE 10 MG/ML
INJECTION INTRAMUSCULAR; INTRAVENOUS PRN
Status: DISCONTINUED | OUTPATIENT
Start: 2022-09-23 | End: 2022-09-23

## 2022-09-23 RX ORDER — FENTANYL CITRATE 50 UG/ML
25 INJECTION, SOLUTION INTRAMUSCULAR; INTRAVENOUS EVERY 5 MIN PRN
Status: DISCONTINUED | OUTPATIENT
Start: 2022-09-23 | End: 2022-09-23 | Stop reason: HOSPADM

## 2022-09-23 RX ORDER — MEPERIDINE HYDROCHLORIDE 25 MG/ML
12.5 INJECTION INTRAMUSCULAR; INTRAVENOUS; SUBCUTANEOUS
Status: DISCONTINUED | OUTPATIENT
Start: 2022-09-23 | End: 2022-09-24 | Stop reason: HOSPADM

## 2022-09-23 RX ORDER — PROPOFOL 10 MG/ML
INJECTION, EMULSION INTRAVENOUS PRN
Status: DISCONTINUED | OUTPATIENT
Start: 2022-09-23 | End: 2022-09-23

## 2022-09-23 RX ORDER — ACETAMINOPHEN 325 MG/1
975 TABLET ORAL EVERY 8 HOURS
Status: DISCONTINUED | OUTPATIENT
Start: 2022-09-23 | End: 2022-09-24 | Stop reason: HOSPADM

## 2022-09-23 RX ORDER — DEXAMETHASONE SODIUM PHOSPHATE 10 MG/ML
INJECTION, SOLUTION INTRAMUSCULAR; INTRAVENOUS PRN
Status: DISCONTINUED | OUTPATIENT
Start: 2022-09-23 | End: 2022-09-23

## 2022-09-23 RX ORDER — CEFAZOLIN SODIUM/WATER 2 G/20 ML
2 SYRINGE (ML) INTRAVENOUS
Status: COMPLETED | OUTPATIENT
Start: 2022-09-23 | End: 2022-09-23

## 2022-09-23 RX ORDER — DEXAMETHASONE SODIUM PHOSPHATE 4 MG/ML
10 INJECTION, SOLUTION INTRA-ARTICULAR; INTRALESIONAL; INTRAMUSCULAR; INTRAVENOUS; SOFT TISSUE ONCE
Status: COMPLETED | OUTPATIENT
Start: 2022-09-23 | End: 2022-09-23

## 2022-09-23 RX ORDER — ACETAMINOPHEN 325 MG/1
650 TABLET ORAL EVERY 4 HOURS PRN
Status: DISCONTINUED | OUTPATIENT
Start: 2022-09-26 | End: 2022-09-24 | Stop reason: HOSPADM

## 2022-09-23 RX ORDER — DEXAMETHASONE SODIUM PHOSPHATE 10 MG/ML
10 INJECTION, SOLUTION INTRAMUSCULAR; INTRAVENOUS ONCE
Status: DISCONTINUED | OUTPATIENT
Start: 2022-09-23 | End: 2022-09-23 | Stop reason: HOSPADM

## 2022-09-23 RX ORDER — HALOPERIDOL 5 MG/ML
1 INJECTION INTRAMUSCULAR ONCE
Status: COMPLETED | OUTPATIENT
Start: 2022-09-23 | End: 2022-09-23

## 2022-09-23 RX ORDER — BUPIVACAINE HYDROCHLORIDE AND EPINEPHRINE 2.5; 5 MG/ML; UG/ML
INJECTION, SOLUTION EPIDURAL; INFILTRATION; INTRACAUDAL; PERINEURAL PRN
Status: DISCONTINUED | OUTPATIENT
Start: 2022-09-23 | End: 2022-09-23 | Stop reason: HOSPADM

## 2022-09-23 RX ORDER — LIDOCAINE HYDROCHLORIDE 10 MG/ML
INJECTION, SOLUTION INFILTRATION; PERINEURAL PRN
Status: DISCONTINUED | OUTPATIENT
Start: 2022-09-23 | End: 2022-09-23

## 2022-09-23 RX ORDER — TRAMADOL HYDROCHLORIDE 50 MG/1
50 TABLET ORAL EVERY 6 HOURS PRN
Status: DISCONTINUED | OUTPATIENT
Start: 2022-09-23 | End: 2022-09-24 | Stop reason: HOSPADM

## 2022-09-23 RX ORDER — FENTANYL CITRATE 50 UG/ML
25 INJECTION, SOLUTION INTRAMUSCULAR; INTRAVENOUS
Status: DISCONTINUED | OUTPATIENT
Start: 2022-09-23 | End: 2022-09-24 | Stop reason: HOSPADM

## 2022-09-23 RX ORDER — CEFAZOLIN SODIUM/WATER 2 G/20 ML
2 SYRINGE (ML) INTRAVENOUS SEE ADMIN INSTRUCTIONS
Status: DISCONTINUED | OUTPATIENT
Start: 2022-09-23 | End: 2022-09-23 | Stop reason: HOSPADM

## 2022-09-23 RX ORDER — FAMOTIDINE 20 MG/1
20 TABLET, FILM COATED ORAL ONCE
Status: COMPLETED | OUTPATIENT
Start: 2022-09-23 | End: 2022-09-23

## 2022-09-23 RX ORDER — LIDOCAINE 40 MG/G
CREAM TOPICAL
Status: DISCONTINUED | OUTPATIENT
Start: 2022-09-23 | End: 2022-09-23 | Stop reason: HOSPADM

## 2022-09-23 RX ORDER — SODIUM CHLORIDE, SODIUM LACTATE, POTASSIUM CHLORIDE, CALCIUM CHLORIDE 600; 310; 30; 20 MG/100ML; MG/100ML; MG/100ML; MG/100ML
INJECTION, SOLUTION INTRAVENOUS CONTINUOUS
Status: DISCONTINUED | OUTPATIENT
Start: 2022-09-23 | End: 2022-09-23 | Stop reason: HOSPADM

## 2022-09-23 RX ORDER — ONDANSETRON 2 MG/ML
4 INJECTION INTRAMUSCULAR; INTRAVENOUS EVERY 6 HOURS PRN
Status: DISCONTINUED | OUTPATIENT
Start: 2022-09-23 | End: 2022-09-24 | Stop reason: HOSPADM

## 2022-09-23 RX ADMIN — PHENYLEPHRINE HYDROCHLORIDE 100 MCG: 10 INJECTION INTRAVENOUS at 16:29

## 2022-09-23 RX ADMIN — DEXAMETHASONE SODIUM PHOSPHATE 10 MG: 10 INJECTION, SOLUTION INTRAMUSCULAR; INTRAVENOUS at 16:22

## 2022-09-23 RX ADMIN — ROCURONIUM BROMIDE 10 MG: 10 INJECTION, SOLUTION INTRAVENOUS at 16:39

## 2022-09-23 RX ADMIN — LIDOCAINE HYDROCHLORIDE 2 ML: 10 INJECTION, SOLUTION INFILTRATION; PERINEURAL at 16:22

## 2022-09-23 RX ADMIN — HYDROMORPHONE HYDROCHLORIDE 0.5 MG: 1 INJECTION, SOLUTION INTRAMUSCULAR; INTRAVENOUS; SUBCUTANEOUS at 16:25

## 2022-09-23 RX ADMIN — FENTANYL CITRATE 25 MCG: 50 INJECTION, SOLUTION INTRAMUSCULAR; INTRAVENOUS at 18:35

## 2022-09-23 RX ADMIN — Medication 2 G: at 16:19

## 2022-09-23 RX ADMIN — ROCURONIUM BROMIDE 50 MG: 10 INJECTION, SOLUTION INTRAVENOUS at 16:22

## 2022-09-23 RX ADMIN — KETAMINE HYDROCHLORIDE 30 MG: 10 INJECTION, SOLUTION INTRAMUSCULAR; INTRAVENOUS at 17:06

## 2022-09-23 RX ADMIN — HALOPERIDOL LACTATE 1 MG: 5 INJECTION, SOLUTION INTRAMUSCULAR at 20:15

## 2022-09-23 RX ADMIN — ACETAMINOPHEN 975 MG: 325 TABLET ORAL at 20:00

## 2022-09-23 RX ADMIN — SUGAMMADEX 140 MG: 100 INJECTION, SOLUTION INTRAVENOUS at 17:46

## 2022-09-23 RX ADMIN — FENTANYL CITRATE 25 MCG: 50 INJECTION, SOLUTION INTRAMUSCULAR; INTRAVENOUS at 18:40

## 2022-09-23 RX ADMIN — SODIUM CHLORIDE, POTASSIUM CHLORIDE, SODIUM LACTATE AND CALCIUM CHLORIDE: 600; 310; 30; 20 INJECTION, SOLUTION INTRAVENOUS at 16:15

## 2022-09-23 RX ADMIN — FENTANYL CITRATE 50 MCG: 50 INJECTION, SOLUTION INTRAMUSCULAR; INTRAVENOUS at 17:59

## 2022-09-23 RX ADMIN — HYDROMORPHONE HYDROCHLORIDE 0.2 MG: 0.2 INJECTION, SOLUTION INTRAMUSCULAR; INTRAVENOUS; SUBCUTANEOUS at 18:58

## 2022-09-23 RX ADMIN — FENTANYL CITRATE 100 MCG: 50 INJECTION, SOLUTION INTRAMUSCULAR; INTRAVENOUS at 16:22

## 2022-09-23 RX ADMIN — PROPOFOL 200 MG: 10 INJECTION, EMULSION INTRAVENOUS at 16:22

## 2022-09-23 RX ADMIN — TRAMADOL HYDROCHLORIDE 50 MG: 50 TABLET, COATED ORAL at 20:00

## 2022-09-23 RX ADMIN — HYDROMORPHONE HYDROCHLORIDE 0.2 MG: 0.2 INJECTION, SOLUTION INTRAMUSCULAR; INTRAVENOUS; SUBCUTANEOUS at 19:10

## 2022-09-23 RX ADMIN — DEXAMETHASONE SODIUM PHOSPHATE 10 MG: 4 INJECTION, SOLUTION INTRAMUSCULAR; INTRAVENOUS at 20:18

## 2022-09-23 RX ADMIN — HYDROMORPHONE HYDROCHLORIDE 0.2 MG: 0.2 INJECTION, SOLUTION INTRAMUSCULAR; INTRAVENOUS; SUBCUTANEOUS at 19:04

## 2022-09-23 RX ADMIN — FENTANYL CITRATE 50 MCG: 50 INJECTION, SOLUTION INTRAMUSCULAR; INTRAVENOUS at 17:55

## 2022-09-23 RX ADMIN — ALBUMIN (HUMAN): 12.5 SOLUTION INTRAVENOUS at 16:40

## 2022-09-23 RX ADMIN — PHENYLEPHRINE HYDROCHLORIDE 0.3 MCG/KG/MIN: 10 INJECTION INTRAVENOUS at 16:38

## 2022-09-23 RX ADMIN — HYDROMORPHONE HYDROCHLORIDE 0.2 MG: 0.2 INJECTION, SOLUTION INTRAMUSCULAR; INTRAVENOUS; SUBCUTANEOUS at 18:52

## 2022-09-23 RX ADMIN — HYDROMORPHONE HYDROCHLORIDE 0.2 MG: 0.2 INJECTION, SOLUTION INTRAMUSCULAR; INTRAVENOUS; SUBCUTANEOUS at 18:45

## 2022-09-23 RX ADMIN — MIDAZOLAM 2 MG: 1 INJECTION INTRAMUSCULAR; INTRAVENOUS at 16:15

## 2022-09-23 RX ADMIN — HYDROMORPHONE HYDROCHLORIDE 0.5 MG: 1 INJECTION, SOLUTION INTRAMUSCULAR; INTRAVENOUS; SUBCUTANEOUS at 16:40

## 2022-09-23 RX ADMIN — SODIUM CHLORIDE, POTASSIUM CHLORIDE, SODIUM LACTATE AND CALCIUM CHLORIDE: 600; 310; 30; 20 INJECTION, SOLUTION INTRAVENOUS at 17:37

## 2022-09-23 RX ADMIN — FAMOTIDINE 20 MG: 20 TABLET ORAL at 14:11

## 2022-09-23 RX ADMIN — PHENYLEPHRINE HYDROCHLORIDE 100 MCG: 10 INJECTION INTRAVENOUS at 16:51

## 2022-09-23 ASSESSMENT — ACTIVITIES OF DAILY LIVING (ADL)
ADLS_ACUITY_SCORE: 36
ADLS_ACUITY_SCORE: 40

## 2022-09-23 NOTE — INTERVAL H&P NOTE
I have reviewed the surgical (or preoperative) H&P that is linked to this encounter, and examined the patient. There are no significant changes    Clinical Conditions Present on Arrival:  Clinically Significant Risk Factors Present on Admission                   # Overweight: Estimated body mass index is 29.92 kg/m  as calculated from the following:    Height as of this encounter: 5' (1.524 m).    Weight as of this encounter: 153 lb 3.2 oz (69.5 kg).

## 2022-09-23 NOTE — ANESTHESIA PROCEDURE NOTES
Airway       Patient location during procedure: OR       Procedure Start/Stop Times: 9/23/2022 4:28 PM  Staff -        Anesthesiologist:  Burak Robison MD       CRNA: Amandeep Noyola APRN CRNA       Performed By: CRNA  Consent for Airway        Urgency: elective  Indications and Patient Condition       Indications for airway management: cherry-procedural       Induction type:intravenous       Mask difficulty assessment: 1 - vent by mask    Final Airway Details       Final airway type: endotracheal airway       Successful airway: ETT - single  Endotracheal Airway Details        ETT size (mm): 7.0       Cuffed: yes       Cuff volume (mL): 4       Successful intubation technique: direct laryngoscopy       DL Blade Type: [place] 2       Grade View of Cords: 1       Adjucts: stylet       Position: Right       Measured from: lips       Secured at (cm): 21    Post intubation assessment        Placement verified by: capnometry, equal breath sounds and chest rise        Number of attempts at approach: 1       Ease of procedure: easy       Dentition: Unchanged and Intact (edentulous)    Medication(s) Administered   Medication Administration Time: 9/23/2022 4:28 PM

## 2022-09-23 NOTE — ANESTHESIA CARE TRANSFER NOTE
Patient: Selina Parikh    Procedure: Procedure(s):  left thoracic 12-lumbar 1 hemilaminectomy, medial facetectomy, foraminotomy and microdiscectomy       Diagnosis: Lumbar disc herniation [M51.26]  Cauda equina syndrome (H) [G83.4]  Diagnosis Additional Information: No value filed.    Anesthesia Type:   General     Note:    Oropharynx: oropharynx clear of all foreign objects and spontaneously breathing  Level of Consciousness: awake and drowsy  Oxygen Supplementation: face mask      Dentition: dentition unchanged  Vital Signs Stable: post-procedure vital signs reviewed and stable  Report to RN Given: handoff report given  Patient transferred to: PACU    Handoff Report: Identifed the Patient, Identified the Reponsible Provider, Reviewed the pertinent medical history, Discussed the surgical course, Reviewed Intra-OP anesthesia mangement and issues during anesthesia, Set expectations for post-procedure period and Allowed opportunity for questions and acknowledgement of understanding      Vitals:  Vitals Value Taken Time   /63 09/23/22 1756   Temp 36.2  C (97.1  F) 09/23/22 1755   Pulse 81 09/23/22 1759   Resp 10 09/23/22 1759   SpO2 100 % 09/23/22 1759   Vitals shown include unvalidated device data.    Electronically Signed By: CARLTON Watson CRNA  September 23, 2022  6:00 PM

## 2022-09-23 NOTE — ANESTHESIA PREPROCEDURE EVALUATION
Anesthesia Pre-Procedure Evaluation    Patient: Selina Parikh   MRN: [id number] : 1965        Procedure : Procedure(s):  left thoracic 12-lumbar 1 hemilaminectomy, medial facetectomy, foraminotomy and microdiscectomy          Past Medical History:   Diagnosis Date     Cancer (H)     melanoma     Coronary artery disease      Hiatal hernia      Hypertension      Other chronic pain       Past Surgical History:   Procedure Laterality Date     BACK SURGERY       CHOLECYSTECTOMY       CHOLECYSTECTOMY, LAPOROSCOPIC  2000    Cholecystectomy, Laparoscopic     COLON SURGERY       CV CORONARY ANGIOGRAM N/A 2022    Procedure: CV CORONARY ANGIOGRAM;  Surgeon: Stefanie Spangler MD;  Location: [facility] CV     CV LEFT HEART CATH N/A 2022    Procedure: Left Heart Catheterization;  Surgeon: Stefanie Spangler MD;  Location: [facility] CV     CYSTOSCOPY, INSERT LIGHTED STENT URETER(S) N/A 4/10/2018    Procedure: CYSTOSCOPY, INSERT LIGHTED STENT URETER(S);  Lighted Stent Placement,Laparoscopic Assisted Sigmoid Colectomy;  Surgeon: ERVIN Reina MD;  Location: [facility] OR     LAPAROSCOPIC ASSISTED COLECTOMY LEFT (DESCENDING) N/A 4/10/2018    Procedure: LAPAROSCOPIC ASSISTED COLECTOMY LEFT (DESCENDING);;  Surgeon: Hill Murray MD;  Location: [facility] OR     NECK SURGERY       ORTHOPEDIC SURGERY  10/2010    Cut palm and reattched tendons and nerves in left hand forefinger.     NH ESOPHAGOGASTRODUODENOSCOPY TRANSORAL DIAGNOSTIC N/A 2021    Procedure: ESOPHAGOGASTRODUODENOSCOPY (EGD);  Surgeon: Souleymane Moreno DO;  Location: [facility];  Service: General     SURGICAL HISTORY OF -   2000    Esophagogastroduodenoscopy with biopsy     TUBAL LIGATION       TUBAL LIGATION        Allergies   Allergen Reactions     Ciprofloxacin Anaphylaxis     Flagyl [Metronidazole] Anaphylaxis     Gabapentin Other (See Comments)     Suicidal thoughts.     Zofran [Ondansetron] Other (See Comments) and GI Disturbance      Causes bloating, moderately uncomfortable, abd pain       Augmentin Nausea and Vomiting     Benadryl [Diphenhydramine] Other (See Comments)     Muscle spasms     Percocet [Oxycodone-Acetaminophen] Other (See Comments)     Goes nuts - nasty mean irritated, can take Dilaudid     Vicodin [Hydrocodone-Acetaminophen] Other (See Comments)     Anxiety-agitation      Social History     Tobacco Use     Smoking status: Current Every Day Smoker     Packs/day: 0.50     Years: 30.00     Pack years: 15.00     Types: Cigarettes     Smokeless tobacco: Never Used     Tobacco comment: 3 Cigs a day   Substance Use Topics     Alcohol use: Not Currently     Comment: Alcoholic Drinks/day: 2x year       Wt Readings from Last 1 Encounters:   09/23/22 69.5 kg (153 lb 3.2 oz)        Anesthesia Evaluation            ROS/MED HX  ENT/Pulmonary:  - neg pulmonary ROS     Neurologic:  - neg neurologic ROS     Cardiovascular:     (+) hypertension--CAD ---    METS/Exercise Tolerance:     Hematologic:  - neg hematologic  ROS     Musculoskeletal:       GI/Hepatic:     (+) GERD, hiatal hernia,     Renal/Genitourinary:  - neg Renal ROS     Endo:  - neg endo ROS     Psychiatric/Substance Use:  - neg psychiatric ROS     Infectious Disease:  - neg infectious disease ROS     Malignancy:  - neg malignancy ROS     Other:  - neg other ROS    (+) , H/O Chronic Pain,        Physical Exam    Airway  airway exam normal           Respiratory Devices and Support         Dental  no notable dental history         Cardiovascular   cardiovascular exam normal          Pulmonary   pulmonary exam normal                OUTSIDE LABS:  CBC:   Lab Results   Component Value Date    WBC 7.3 09/19/2022    WBC 9.1 07/21/2022    HGB 13.9 09/19/2022    HGB 13.9 07/21/2022    HCT 41.9 09/19/2022    HCT 42.3 07/21/2022     09/19/2022     07/21/2022     BMP:   Lab Results   Component Value Date     09/19/2022     07/21/2022    POTASSIUM 4.4 09/19/2022     POTASSIUM 4.4 07/21/2022    CHLORIDE 106 09/19/2022    CHLORIDE 109 (H) 07/21/2022    CO2 29 09/19/2022    CO2 23 07/21/2022    BUN 13 09/19/2022    BUN 14 07/21/2022    CR 0.76 09/19/2022    CR 0.92 07/21/2022     09/19/2022    GLC 88 07/22/2022     COAGS:   Lab Results   Component Value Date    PTT 29 09/19/2022    INR 0.93 09/19/2022     POC:   Lab Results   Component Value Date     (H) 04/14/2018    HCG Negative 06/16/2012     HEPATIC:   Lab Results   Component Value Date    ALBUMIN 4.5 09/02/2022    PROTTOTAL 6.9 09/02/2022    ALT 12 09/02/2022    AST 22 09/02/2022    ALKPHOS 78 09/02/2022    BILITOTAL 0.2 09/02/2022    BILIDIRECT 0.2 03/25/2013     OTHER:   Lab Results   Component Value Date    LACT 0.5 (L) 01/28/2018    A1C 5.6 04/28/2022    MAXWELL 9.3 09/19/2022    PHOS 3.6 04/14/2018    MAG 2.0 04/14/2018    LIPASE 113 01/25/2020    TSH 0.95 11/29/2011    T4 1.06 11/29/2011    SED 9 11/01/2019       Anesthesia Plan    ASA Status:  3      Anesthesia Type: General.     - Airway: ETT              Consents         - Extended Intubation/Ventilatory Support Discussed: Yes.         Postoperative Care    Pain management: Multi-modal analgesia.        Comments:                Burak Robison MD

## 2022-09-23 NOTE — PHARMACY-ADMISSION MEDICATION HISTORY
Pharmacy Note - Admission Medication History    Pertinent Provider Information:    ______________________________________________________________________    Prior To Admission (PTA) med list completed and updated in EMR.       PTA Med List   Medication Sig Last Dose     acetaminophen (TYLENOL) 500 MG tablet Take 1,000 mg by mouth daily before breakfast And second dose as needed 9/22/2022 at Unknown time     aspirin (ASA) 81 MG EC tablet Take 1 tablet (81 mg) by mouth daily 9/20/2022     atorvastatin (LIPITOR) 40 MG tablet Take 1 tablet (40 mg) by mouth daily 9/22/2022 at Unknown time     celecoxib (CELEBREX) 100 MG capsule Take 1 capsule (100 mg) by mouth 2 times daily 9/20/2022     clobetasol (TEMOVATE) 0.05 % external ointment Apply topically 2 times daily (Patient taking differently: Apply topically 2 times daily as needed) Past Week at Unknown time     fish oil-omega-3 fatty acids 1000 MG capsule Take 2 g by mouth daily 9/20/2022     lactobacillus rhamnosus (GG) (CULTURELL) capsule Take 1 capsule by mouth daily 9/22/2022 at Unknown time     Melatonin 10 MG TABS tablet Take 20 mg by mouth nightly as needed for sleep 9/22/2022 at Unknown time     metoprolol succinate ER (TOPROL XL) 50 MG 24 hr tablet Take 0.5 tablets (25 mg) by mouth daily 9/22/2022 at 0730     omeprazole (PRILOSEC) 20 MG DR capsule Take 1 capsule (20 mg) by mouth 3 times daily 9/23/2022 at Unknown time     traMADol (ULTRAM) 50 MG tablet Take 1 tablet (50 mg) by mouth every 6 hours as needed for pain (back and neck pain) Past Week at Unknown time     Vitamin D (Cholecalciferol) 25 MCG (1000 UT) TABS Take 1,000 Units by mouth daily 9/22/2022 at Unknown time       Information source(s): Patient and CareEverywhere/SureScripts    Method of interview communication: in-person    Patient was asked about OTC/herbal products specifically.  PTA med list reflects this.    Based on the pharmacist's assessment, the PTA med list information appears  reliable    Allergies were reviewed, assessed, and updated with the patient.      Patient did not bring any medications to the hospital and can't retrieve from home. No multi-dose medications are available for use during hospital stay.      Thank you for the opportunity to participate in the care of this patient.      [facility]     9/23/2022     2:03 PM

## 2022-09-23 NOTE — BRIEF OP NOTE
[facility] Brief Operative Note    Pre-operative diagnosis: Lumbar disc herniation [M51.26]  Cauda equina syndrome (H) [G83.4]   Post-operative diagnosis same   Procedure: Procedure(s):  left thoracic 12-lumbar 1 hemilaminectomy, medial facetectomy, foraminotomy and microdiscectomy   Surgeon(s): Surgeon(s) and Role:     * Suha Kent MD - Primary     * Sandra Rivas PA-C - Assisting   Estimated blood loss: 15 mL    Specimens: * No specimens in log *   Findings: Large disc herniation with conus compression

## 2022-09-23 NOTE — TELEPHONE ENCOUNTER
Patient states that she is returning a call to the nurse from yesterday.  Best contact for patient is [phone]

## 2022-09-24 NOTE — ANESTHESIA POSTPROCEDURE EVALUATION
Patient: Selina Parikh    Procedure: Procedure(s):  left thoracic 12-lumbar 1 hemilaminectomy, medial facetectomy, foraminotomy and microdiscectomy       Anesthesia Type:  General    Note:  Disposition: Outpatient   Postop Pain Control: Uneventful            Sign Out: Well controlled pain   PONV: No   Neuro/Psych: Uneventful            Sign Out: Acceptable/Baseline neuro status   Airway/Respiratory: Uneventful            Sign Out: Acceptable/Baseline resp. status   CV/Hemodynamics: Uneventful            Sign Out: Acceptable CV status; No obvious hypovolemia; No obvious fluid overload   Other NRE: NONE   DID A NON-ROUTINE EVENT OCCUR? No           Last vitals:  Vitals Value Taken Time   /67 09/23/22 1920   Temp 36.5  C (97.7  F) 09/23/22 1900   Pulse 69 09/23/22 1922   Resp 14 09/23/22 1920   SpO2 95 % 09/23/22 1922   Vitals shown include unvalidated device data.    Electronically Signed By: Burak Robison MD  September 23, 2022  7:33 PM

## 2022-09-24 NOTE — OR NURSING
1940 patient able to void.  She states her left shin down to her big toe is painful,numb and tingly. She feels unbalanced and weak in the left leg as she walks from the BR to her chair in her room.  2000 Patient very nauseated. 2010 Patient vomits clear liquid.    2020 Sandra AYALA called inquiring about patient.  Update of back pain, left leg pain,numbness and luis and vomiting given.  Orders received.     2025 Dr Robison notified of need for antiemetic, received and given. Brother given prescriptions to be filled.    2100 Discharge instructions given to patient and mother, all questions answered.    2130 Patient states left leg remains painful numb and luis but states it is tolerable. Aware she must not walk unaccompanied due to her fear of feeling unbalanced with her left leg.  She states she understands and so does her mother. Also understands when to call surgeon with worsening feelings.

## 2022-09-25 ENCOUNTER — APPOINTMENT (OUTPATIENT)
Dept: MRI IMAGING | Facility: CLINIC | Age: 57
End: 2022-09-25
Attending: EMERGENCY MEDICINE
Payer: COMMERCIAL

## 2022-09-25 ENCOUNTER — HOSPITAL ENCOUNTER (OUTPATIENT)
Facility: CLINIC | Age: 57
Setting detail: OBSERVATION
Discharge: HOME | End: 2022-09-25
Attending: EMERGENCY MEDICINE | Admitting: EMERGENCY MEDICINE
Payer: COMMERCIAL

## 2022-09-25 ENCOUNTER — APPOINTMENT (OUTPATIENT)
Dept: CT IMAGING | Facility: CLINIC | Age: 57
End: 2022-09-25
Attending: EMERGENCY MEDICINE
Payer: COMMERCIAL

## 2022-09-25 ENCOUNTER — APPOINTMENT (OUTPATIENT)
Dept: PHYSICAL THERAPY | Facility: CLINIC | Age: 57
End: 2022-09-25
Attending: PHYSICIAN ASSISTANT
Payer: COMMERCIAL

## 2022-09-25 VITALS
HEART RATE: 80 BPM | SYSTOLIC BLOOD PRESSURE: 118 MMHG | DIASTOLIC BLOOD PRESSURE: 60 MMHG | HEIGHT: 59 IN | OXYGEN SATURATION: 93 % | WEIGHT: 153 LBS | BODY MASS INDEX: 30.84 KG/M2 | RESPIRATION RATE: 16 BRPM | TEMPERATURE: 98 F

## 2022-09-25 DIAGNOSIS — M48.062 SPINAL STENOSIS OF LUMBAR REGION WITH NEUROGENIC CLAUDICATION: Primary | ICD-10-CM

## 2022-09-25 DIAGNOSIS — M54.42 ACUTE BILATERAL LOW BACK PAIN WITH LEFT-SIDED SCIATICA: ICD-10-CM

## 2022-09-25 DIAGNOSIS — M54.10 ACUTE BACK PAIN WITH RADICULOPATHY: ICD-10-CM

## 2022-09-25 LAB
ALBUMIN UR-MCNC: NEGATIVE MG/DL
ANION GAP SERPL CALCULATED.3IONS-SCNC: 6 MMOL/L (ref 5–18)
APPEARANCE UR: CLEAR
BASOPHILS # BLD AUTO: 0 10E3/UL (ref 0–0.2)
BASOPHILS NFR BLD AUTO: 0 %
BILIRUB UR QL STRIP: NEGATIVE
BUN SERPL-MCNC: 15 MG/DL (ref 8–22)
C REACTIVE PROTEIN LHE: 0.6 MG/DL (ref 0–?)
CALCIUM SERPL-MCNC: 9.5 MG/DL (ref 8.5–10.5)
CHLORIDE BLD-SCNC: 105 MMOL/L (ref 98–107)
CO2 SERPL-SCNC: 25 MMOL/L (ref 22–31)
COLOR UR AUTO: ABNORMAL
CREAT SERPL-MCNC: 0.84 MG/DL (ref 0.6–1.1)
EOSINOPHIL # BLD AUTO: 0 10E3/UL (ref 0–0.7)
EOSINOPHIL NFR BLD AUTO: 0 %
ERYTHROCYTE [DISTWIDTH] IN BLOOD BY AUTOMATED COUNT: 14.4 % (ref 10–15)
GFR SERPL CREATININE-BSD FRML MDRD: 81 ML/MIN/1.73M2
GLUCOSE BLD-MCNC: 130 MG/DL (ref 70–125)
GLUCOSE UR STRIP-MCNC: NEGATIVE MG/DL
HCT VFR BLD AUTO: 39.6 % (ref 35–47)
HGB BLD-MCNC: 13.3 G/DL (ref 11.7–15.7)
HGB UR QL STRIP: NEGATIVE
IMM GRANULOCYTES # BLD: 0.1 10E3/UL
IMM GRANULOCYTES NFR BLD: 1 %
KETONES UR STRIP-MCNC: NEGATIVE MG/DL
LEUKOCYTE ESTERASE UR QL STRIP: NEGATIVE
LYMPHOCYTES # BLD AUTO: 3.2 10E3/UL (ref 0.8–5.3)
LYMPHOCYTES NFR BLD AUTO: 16 %
MCH RBC QN AUTO: 30.8 PG (ref 26.5–33)
MCHC RBC AUTO-ENTMCNC: 33.6 G/DL (ref 31.5–36.5)
MCV RBC AUTO: 92 FL (ref 78–100)
MONOCYTES # BLD AUTO: 0.7 10E3/UL (ref 0–1.3)
MONOCYTES NFR BLD AUTO: 4 %
MUCOUS THREADS #/AREA URNS LPF: PRESENT /LPF
NEUTROPHILS # BLD AUTO: 15.7 10E3/UL (ref 1.6–8.3)
NEUTROPHILS NFR BLD AUTO: 79 %
NITRATE UR QL: NEGATIVE
NRBC # BLD AUTO: 0 10E3/UL
NRBC BLD AUTO-RTO: 0 /100
PH UR STRIP: 7 [PH] (ref 5–7)
PLATELET # BLD AUTO: 297 10E3/UL (ref 150–450)
POTASSIUM BLD-SCNC: 4.1 MMOL/L (ref 3.5–5)
RBC # BLD AUTO: 4.32 10E6/UL (ref 3.8–5.2)
RBC URINE: 1 /HPF
SARS-COV-2 RNA RESP QL NAA+PROBE: NEGATIVE
SODIUM SERPL-SCNC: 136 MMOL/L (ref 136–145)
SP GR UR STRIP: 1.01 (ref 1–1.03)
SQUAMOUS EPITHELIAL: 2 /HPF
UROBILINOGEN UR STRIP-MCNC: <2 MG/DL
WBC # BLD AUTO: 19.8 10E3/UL (ref 4–11)
WBC URINE: 1 /HPF

## 2022-09-25 PROCEDURE — G0378 HOSPITAL OBSERVATION PER HR: HCPCS

## 2022-09-25 PROCEDURE — 250N000013 HC RX MED GY IP 250 OP 250 PS 637: Performed by: STUDENT IN AN ORGANIZED HEALTH CARE EDUCATION/TRAINING PROGRAM

## 2022-09-25 PROCEDURE — 96361 HYDRATE IV INFUSION ADD-ON: CPT

## 2022-09-25 PROCEDURE — 81001 URINALYSIS AUTO W/SCOPE: CPT | Performed by: EMERGENCY MEDICINE

## 2022-09-25 PROCEDURE — 258N000003 HC RX IP 258 OP 636: Performed by: STUDENT IN AN ORGANIZED HEALTH CARE EDUCATION/TRAINING PROGRAM

## 2022-09-25 PROCEDURE — 250N000012 HC RX MED GY IP 250 OP 636 PS 637: Performed by: EMERGENCY MEDICINE

## 2022-09-25 PROCEDURE — 250N000011 HC RX IP 250 OP 636: Performed by: STUDENT IN AN ORGANIZED HEALTH CARE EDUCATION/TRAINING PROGRAM

## 2022-09-25 PROCEDURE — 86140 C-REACTIVE PROTEIN: CPT | Performed by: EMERGENCY MEDICINE

## 2022-09-25 PROCEDURE — 96376 TX/PRO/DX INJ SAME DRUG ADON: CPT

## 2022-09-25 PROCEDURE — 72158 MRI LUMBAR SPINE W/O & W/DYE: CPT

## 2022-09-25 PROCEDURE — C9803 HOPD COVID-19 SPEC COLLECT: HCPCS

## 2022-09-25 PROCEDURE — 96375 TX/PRO/DX INJ NEW DRUG ADDON: CPT | Mod: XU

## 2022-09-25 PROCEDURE — 255N000002 HC RX 255 OP 636: Performed by: FAMILY MEDICINE

## 2022-09-25 PROCEDURE — 99285 EMERGENCY DEPT VISIT HI MDM: CPT | Mod: 25

## 2022-09-25 PROCEDURE — 99024 POSTOP FOLLOW-UP VISIT: CPT | Performed by: PHYSICIAN ASSISTANT

## 2022-09-25 PROCEDURE — 96374 THER/PROPH/DIAG INJ IV PUSH: CPT | Mod: XU

## 2022-09-25 PROCEDURE — 85025 COMPLETE CBC W/AUTO DIFF WBC: CPT | Performed by: EMERGENCY MEDICINE

## 2022-09-25 PROCEDURE — 36415 COLL VENOUS BLD VENIPUNCTURE: CPT | Performed by: EMERGENCY MEDICINE

## 2022-09-25 PROCEDURE — 72131 CT LUMBAR SPINE W/O DYE: CPT

## 2022-09-25 PROCEDURE — 97161 PT EVAL LOW COMPLEX 20 MIN: CPT | Mod: GP

## 2022-09-25 PROCEDURE — 97116 GAIT TRAINING THERAPY: CPT | Mod: GP

## 2022-09-25 PROCEDURE — 99236 HOSP IP/OBS SAME DATE HI 85: CPT | Mod: GC | Performed by: STUDENT IN AN ORGANIZED HEALTH CARE EDUCATION/TRAINING PROGRAM

## 2022-09-25 PROCEDURE — 96375 TX/PRO/DX INJ NEW DRUG ADDON: CPT | Mod: 59

## 2022-09-25 PROCEDURE — 250N000011 HC RX IP 250 OP 636: Performed by: PHYSICIAN ASSISTANT

## 2022-09-25 PROCEDURE — A9585 GADOBUTROL INJECTION: HCPCS | Performed by: FAMILY MEDICINE

## 2022-09-25 PROCEDURE — U0003 INFECTIOUS AGENT DETECTION BY NUCLEIC ACID (DNA OR RNA); SEVERE ACUTE RESPIRATORY SYNDROME CORONAVIRUS 2 (SARS-COV-2) (CORONAVIRUS DISEASE [COVID-19]), AMPLIFIED PROBE TECHNIQUE, MAKING USE OF HIGH THROUGHPUT TECHNOLOGIES AS DESCRIBED BY CMS-2020-01-R: HCPCS | Performed by: EMERGENCY MEDICINE

## 2022-09-25 PROCEDURE — 80048 BASIC METABOLIC PNL TOTAL CA: CPT | Performed by: EMERGENCY MEDICINE

## 2022-09-25 PROCEDURE — 250N000013 HC RX MED GY IP 250 OP 250 PS 637

## 2022-09-25 PROCEDURE — 250N000011 HC RX IP 250 OP 636: Performed by: EMERGENCY MEDICINE

## 2022-09-25 RX ORDER — KETOROLAC TROMETHAMINE 15 MG/ML
15 INJECTION, SOLUTION INTRAMUSCULAR; INTRAVENOUS ONCE
Status: COMPLETED | OUTPATIENT
Start: 2022-09-25 | End: 2022-09-25

## 2022-09-25 RX ORDER — HYDROMORPHONE HYDROCHLORIDE 2 MG/1
2-4 TABLET ORAL EVERY 4 HOURS PRN
Status: DISCONTINUED | OUTPATIENT
Start: 2022-09-25 | End: 2022-09-25 | Stop reason: HOSPADM

## 2022-09-25 RX ORDER — ACETAMINOPHEN 325 MG/1
975 TABLET ORAL EVERY 8 HOURS
Status: DISCONTINUED | OUTPATIENT
Start: 2022-09-25 | End: 2022-09-25 | Stop reason: HOSPADM

## 2022-09-25 RX ORDER — DIAZEPAM 10 MG/2ML
5 INJECTION, SOLUTION INTRAMUSCULAR; INTRAVENOUS ONCE
Status: COMPLETED | OUTPATIENT
Start: 2022-09-25 | End: 2022-09-25

## 2022-09-25 RX ORDER — AMOXICILLIN 250 MG
1 CAPSULE ORAL 2 TIMES DAILY PRN
Status: DISCONTINUED | OUTPATIENT
Start: 2022-09-25 | End: 2022-09-25 | Stop reason: HOSPADM

## 2022-09-25 RX ORDER — GADOBUTROL 604.72 MG/ML
7 INJECTION INTRAVENOUS ONCE
Status: COMPLETED | OUTPATIENT
Start: 2022-09-25 | End: 2022-09-25

## 2022-09-25 RX ORDER — HYDROMORPHONE HYDROCHLORIDE 2 MG/1
2 TABLET ORAL EVERY 4 HOURS PRN
Qty: 42 TABLET | Refills: 0 | Status: SHIPPED | OUTPATIENT
Start: 2022-09-25 | End: 2022-10-18

## 2022-09-25 RX ORDER — METHYLPREDNISOLONE 4 MG
TABLET, DOSE PACK ORAL
Qty: 21 TABLET | Refills: 0 | Status: SHIPPED | OUTPATIENT
Start: 2022-09-25 | End: 2022-11-15

## 2022-09-25 RX ORDER — DIAZEPAM 5 MG
5 TABLET ORAL EVERY 4 HOURS PRN
Status: DISCONTINUED | OUTPATIENT
Start: 2022-09-25 | End: 2022-09-25 | Stop reason: HOSPADM

## 2022-09-25 RX ORDER — POLYETHYLENE GLYCOL 3350 17 G/17G
17 POWDER, FOR SOLUTION ORAL DAILY PRN
Status: DISCONTINUED | OUTPATIENT
Start: 2022-09-25 | End: 2022-09-25 | Stop reason: HOSPADM

## 2022-09-25 RX ORDER — CLOBETASOL PROPIONATE 0.5 MG/G
OINTMENT TOPICAL 2 TIMES DAILY
Status: DISCONTINUED | OUTPATIENT
Start: 2022-09-25 | End: 2022-09-25 | Stop reason: HOSPADM

## 2022-09-25 RX ORDER — SODIUM CHLORIDE, SODIUM LACTATE, POTASSIUM CHLORIDE, CALCIUM CHLORIDE 600; 310; 30; 20 MG/100ML; MG/100ML; MG/100ML; MG/100ML
INJECTION, SOLUTION INTRAVENOUS CONTINUOUS
Status: DISCONTINUED | OUTPATIENT
Start: 2022-09-25 | End: 2022-09-25 | Stop reason: HOSPADM

## 2022-09-25 RX ORDER — METHOCARBAMOL 500 MG/1
500 TABLET, FILM COATED ORAL EVERY 6 HOURS PRN
Status: DISCONTINUED | OUTPATIENT
Start: 2022-09-25 | End: 2022-09-25

## 2022-09-25 RX ORDER — CELECOXIB 200 MG/1
200 CAPSULE ORAL DAILY
Status: DISCONTINUED | OUTPATIENT
Start: 2022-09-25 | End: 2022-09-25 | Stop reason: HOSPADM

## 2022-09-25 RX ORDER — HYDROMORPHONE HYDROCHLORIDE 2 MG/1
4 TABLET ORAL EVERY 4 HOURS PRN
Status: DISCONTINUED | OUTPATIENT
Start: 2022-09-25 | End: 2022-09-25 | Stop reason: HOSPADM

## 2022-09-25 RX ORDER — PREDNISONE 20 MG/1
40 TABLET ORAL ONCE
Status: COMPLETED | OUTPATIENT
Start: 2022-09-25 | End: 2022-09-25

## 2022-09-25 RX ORDER — ATORVASTATIN CALCIUM 40 MG/1
40 TABLET, FILM COATED ORAL DAILY
Status: DISCONTINUED | OUTPATIENT
Start: 2022-09-25 | End: 2022-09-25 | Stop reason: HOSPADM

## 2022-09-25 RX ORDER — ONDANSETRON 2 MG/ML
4 INJECTION INTRAMUSCULAR; INTRAVENOUS ONCE
Status: DISCONTINUED | OUTPATIENT
Start: 2022-09-25 | End: 2022-09-25

## 2022-09-25 RX ORDER — PROCHLORPERAZINE 25 MG
25 SUPPOSITORY, RECTAL RECTAL EVERY 12 HOURS PRN
Status: DISCONTINUED | OUTPATIENT
Start: 2022-09-25 | End: 2022-09-25 | Stop reason: HOSPADM

## 2022-09-25 RX ORDER — ACETAMINOPHEN 500 MG
1000 TABLET ORAL EVERY 6 HOURS PRN
COMMUNITY

## 2022-09-25 RX ORDER — METOPROLOL SUCCINATE 25 MG/1
25 TABLET, EXTENDED RELEASE ORAL DAILY
Status: DISCONTINUED | OUTPATIENT
Start: 2022-09-25 | End: 2022-09-25 | Stop reason: HOSPADM

## 2022-09-25 RX ORDER — PROCHLORPERAZINE MALEATE 10 MG
10 TABLET ORAL EVERY 6 HOURS PRN
Status: DISCONTINUED | OUTPATIENT
Start: 2022-09-25 | End: 2022-09-25 | Stop reason: HOSPADM

## 2022-09-25 RX ORDER — DIAZEPAM 5 MG
5 TABLET ORAL EVERY 4 HOURS PRN
Qty: 42 TABLET | Refills: 0 | Status: SHIPPED | OUTPATIENT
Start: 2022-09-25 | End: 2022-11-15

## 2022-09-25 RX ORDER — HYDROMORPHONE HYDROCHLORIDE 1 MG/ML
0.5 INJECTION, SOLUTION INTRAMUSCULAR; INTRAVENOUS; SUBCUTANEOUS
Status: DISCONTINUED | OUTPATIENT
Start: 2022-09-25 | End: 2022-09-25 | Stop reason: HOSPADM

## 2022-09-25 RX ORDER — HYDROMORPHONE HCL IN WATER/PF 6 MG/30 ML
0.2 PATIENT CONTROLLED ANALGESIA SYRINGE INTRAVENOUS ONCE
Status: COMPLETED | OUTPATIENT
Start: 2022-09-25 | End: 2022-09-25

## 2022-09-25 RX ORDER — AMOXICILLIN 250 MG
2 CAPSULE ORAL 2 TIMES DAILY PRN
Status: DISCONTINUED | OUTPATIENT
Start: 2022-09-25 | End: 2022-09-25 | Stop reason: HOSPADM

## 2022-09-25 RX ORDER — CALCIUM CARBONATE 500 MG/1
1000 TABLET, CHEWABLE ORAL 4 TIMES DAILY PRN
Status: DISCONTINUED | OUTPATIENT
Start: 2022-09-25 | End: 2022-09-25 | Stop reason: HOSPADM

## 2022-09-25 RX ORDER — DIAZEPAM 10 MG/2ML
5 INJECTION, SOLUTION INTRAMUSCULAR; INTRAVENOUS EVERY 4 HOURS PRN
Status: DISCONTINUED | OUTPATIENT
Start: 2022-09-25 | End: 2022-09-25

## 2022-09-25 RX ADMIN — ACETAMINOPHEN 975 MG: 325 TABLET ORAL at 10:11

## 2022-09-25 RX ADMIN — HYDROMORPHONE HYDROCHLORIDE 4 MG: 2 TABLET ORAL at 12:19

## 2022-09-25 RX ADMIN — HYDROMORPHONE HYDROCHLORIDE 0.2 MG: 0.2 INJECTION, SOLUTION INTRAMUSCULAR; INTRAVENOUS; SUBCUTANEOUS at 10:00

## 2022-09-25 RX ADMIN — METOPROLOL SUCCINATE 25 MG: 25 TABLET, EXTENDED RELEASE ORAL at 10:11

## 2022-09-25 RX ADMIN — HYDROMORPHONE HYDROCHLORIDE 1 MG: 1 INJECTION, SOLUTION INTRAMUSCULAR; INTRAVENOUS; SUBCUTANEOUS at 03:23

## 2022-09-25 RX ADMIN — PREDNISONE 40 MG: 20 TABLET ORAL at 05:22

## 2022-09-25 RX ADMIN — DIAZEPAM 5 MG: 5 INJECTION, SOLUTION INTRAMUSCULAR; INTRAVENOUS at 06:06

## 2022-09-25 RX ADMIN — KETOROLAC TROMETHAMINE 15 MG: 15 INJECTION, SOLUTION INTRAMUSCULAR; INTRAVENOUS at 10:00

## 2022-09-25 RX ADMIN — NICOTINE 7 MG/24 HR DAILY TRANSDERMAL PATCH 1 PATCH: at 08:09

## 2022-09-25 RX ADMIN — DIAZEPAM 5 MG: 5 INJECTION, SOLUTION INTRAMUSCULAR; INTRAVENOUS at 03:20

## 2022-09-25 RX ADMIN — HYDROMORPHONE HYDROCHLORIDE 0.5 MG: 1 INJECTION, SOLUTION INTRAMUSCULAR; INTRAVENOUS; SUBCUTANEOUS at 06:49

## 2022-09-25 RX ADMIN — OMEPRAZOLE 20 MG: 20 CAPSULE, DELAYED RELEASE ORAL at 10:12

## 2022-09-25 RX ADMIN — ATORVASTATIN CALCIUM 40 MG: 40 TABLET, FILM COATED ORAL at 10:11

## 2022-09-25 RX ADMIN — GADOBUTROL 7 ML: 604.72 INJECTION INTRAVENOUS at 07:19

## 2022-09-25 RX ADMIN — ACETAMINOPHEN 975 MG: 325 TABLET ORAL at 15:40

## 2022-09-25 RX ADMIN — SODIUM CHLORIDE, POTASSIUM CHLORIDE, SODIUM LACTATE AND CALCIUM CHLORIDE: 600; 310; 30; 20 INJECTION, SOLUTION INTRAVENOUS at 08:19

## 2022-09-25 RX ADMIN — HYDROMORPHONE HYDROCHLORIDE 1 MG: 1 INJECTION, SOLUTION INTRAMUSCULAR; INTRAVENOUS; SUBCUTANEOUS at 04:20

## 2022-09-25 ASSESSMENT — ACTIVITIES OF DAILY LIVING (ADL)
DEPENDENT_IADLS:: INDEPENDENT
ADLS_ACUITY_SCORE: 35
ADLS_ACUITY_SCORE: 34
ADLS_ACUITY_SCORE: 41
ADLS_ACUITY_SCORE: 41
ADLS_ACUITY_SCORE: 35

## 2022-09-25 ASSESSMENT — ENCOUNTER SYMPTOMS
FEVER: 0
BACK PAIN: 1
CHILLS: 0

## 2022-09-25 NOTE — DISCHARGE SUMMARY
[facility]  Discharge Summary - Medicine & Pediatrics       Date of Admission:  9/25/2022  Date of Discharge:  9/25/2022  Discharging Provider: Sindhu Red MD  Discharge Service: Hospitalist Service    Discharge Diagnoses    Postoperative pain  T12-L1 herniation with cauda equina syndrome s/p L T12-L1 hemilaminectomy, medial facetectomy, foraminotomy and microdiscectomy 9/23/22    Follow-ups Needed After Discharge   Follow-up Appointments     Follow-up and recommended labs and tests       Our office will call you in 2-3 business days after discharge to schedule   your follow-up appointments. If you have not received a call in 2-3   business days, please call our office at [phone].    Your follow up appointment may be with the surgeon or the nurse   practitioner.             Discharge Disposition   Discharged to home  Condition at discharge: Stable    Hospital Course   Selina Parikh is a 57 year old female w/ PMH of chronic back pain, T12-L1 herniation with cauda equina syndrome, CAD, UBALDO, HLD, HTN, tobacco use disorder, and GERD admitted on 9/25/2022 for L > R inguinal and leg pain on POD#1 after T12-L1 left hemilaminectomy, medial facetectomy, foraminotomy and microdiscectomy on 9/23/22. Pain site consistent with L1 distribution pattern. MRI lumbar spine showed postoperative changes with mild spinal stenosis which is improved from preop images. Leukocytosis noted but likely stress reaction as pt was afebrile during admission and CRP normal. Neurosurgery consulted and she was started on prednisone in ED in addition to pain regimen (including Dilaudid, Valium). Pt reports pain improved with prednisone and controlled with regimen. Her diet and activity were advanced as tolerated and she was cleared for discharge with increased pain control and WBAT per Neurosurgery. At discharge, pain controlled and she was able to transfer and ambulate using walker. She was discharged with pain  medications and Medrol dose pack with plan for close outpatient Neurosurgery follow up.    Consultations This Hospital Stay   NEUROSURGERY IP CONSULT  PHYSICAL THERAPY ADULT IP CONSULT  CARE MANAGEMENT / SOCIAL WORK IP CONSULT    Code Status    Full Code    The patient was discussed with Dr. Vic Cardenas.    Sindhu Red MD PGY-1  [facility] Medicine Resident Team  [facility] EMERGENCY ROOM  [address] [place] [id number]  Phone: [phone]  Fax: [phone]  ______________________________________________________________________    Physical Exam   Vital Signs: Temp: 98  F (36.7  C)   BP: 118/60 Pulse: 80   Resp: 16 SpO2: 93 % O2 Device: None (Room air)    Weight: 153 lbs 0 oz  General Appearance: Alert, oriented, sitting in bed  Respiratory: lungs CTAB  Cardiovascular: RRR, normal S1/S2, no murmurs. No LE edema bilaterally  GI: + BS, nontender to palpation, soft  Extremities: Sensation intact bilaterally in lower extremities, +DP pulses bilaterally      Primary Care Physician   Sera Solo    Discharge Orders      Reason for your hospital stay    Postoperative pain     Follow-up and recommended labs and tests     Our office will call you in 2-3 business days after discharge to schedule your follow-up appointments. If you have not received a call in 2-3 business days, please call our office at [phone].    Your follow up appointment may be with the surgeon or the nurse practitioner.     Activity    *No lifting, pushing or pulling greater than 5-10 pound (this is about a gallon of milk).  *No repetitive bending, twisting, or jarring activities  *No overhead work  *No aerobic or strenuous activity  *No activities with increased risk of falls  *You may move about your home as tolerated  *You may walk up and down stairs as tolerated  *You may increase your activity slowly over the next 4-6 weeks    WALKING PROGRAM: As you can tolerate, walk daily-start  with 5-10 minutes of continuous walking. This is in addition to the walking that you do as part of your daily activities. Increase the time that you walk by 5 minutes every couple of days. Do not exceed 30-45 minutes of continuous walking until seen in follow-up. Walking is the best exercise after surgery.  **Listen to your body, if you find that you are more painful or fatigued, you may need to proceed more slowly.    **Do not smoke or expose yourself to second hand smoke. Cigarette smoke can delay healing and cause complications.     DRIVING:  We recommend that you do not drive while taking medications for pain or muscle spasms. Always read and follow the advice on your prescription bottle. If you have questions, speak with your pharmacist.  We recommend that you do not drive while wearing a brace, as it could limit your range of motion.    WORK: If you plan to return to work before you 4-6 weeks appointment, call and discuss with one of the nurses in the neurosurgery office.     USE OF ICE OR  HEAT:  *Icing can decrease post op swelling, thereby helping with post op pain control. Always protect your skin to prevent frostbite or burn.    *For the first 48 hours we recommend ice to the surgical area for 15-20 minutes at a time several times a day.      *Any longer than 15-20 minutes can cause damage to the tissues, including frostbite.     *Allow area to warm for at least 45 minutes or an hour between treatments.    *Ice as frequently as you wish, so long as the area is warm to touch and has normal sensation before repeating.    *After 48 hours, you may use heat or ice, which ever feels best to you.  Always remember to protect your skin, and to use no greater than 15-20 minutes at a time.     *You can make your own ice bags at home by mixing 3 parts water with 1 part rubbing alcohol in a Ziplock bag and placing in the freezer.  It will not freeze solid.      BOWEL MOVEMENT:  If you did not have a bowel movement  (pooped) before you were discharged from the hospital, and do not have a bowel movement within 2 days of discharge. Please call our office and request to speak to a nurse.    Your insurance may not cover medications to treat or prevent  constipation.      There are many  over the counter medications for constipation including: Colace, Senakot, Dulcolax, and Miralax. In addition to medications eat a high fiber diet and drink plenty of water.    If you are taking narcotics, you should being taking medication daily for constipation.      If you develop loose or runny stools, stop taking the medications for constipation.     Wound care and dressings    WOUND CARE:  A dressing is not required.      Wash your hands before touching the wound.  Ensure that anyone assisting you in the care of your wound washes her/his hands before touching the wound. Good handwashing can decrease the risk of serious infection.    If you are unable to see your wound, have someone check the wound daily for redness, swelling,or drainage.   You may shower. Wash your wound daily.  Apply mild soap directly to the wound, form a light lather and rinse with running water. Pat the wound dry.  Do not rub, pick, or otherwise help the dermabond come off more quickly.  Do not place tape or adhesive over the dermabond.    Do not submerge the wound under water. No baths or swimming for 6 weeks.     If you develop redness, swelling, drainage, or temp 101 or greater, call our office at [phone]    You may have an appointment in 7-10 days to have your wound checked.     When to contact your care team    Call [phone] with the following symptoms:    *Temperature 101(fever) or greater or chills  *Redness, swelling or increased drainage from the wound  *Worsening pain not relieved by the pain prescription given  *Worsening or new onset of weakness, or numbness and tingling  *Loss or change in your ability to control bowel or bladder function  *Change  in your ability to walk, talk, see or think  *If you did not have a bowel movement before leaving the hospital and your bowels have not moved within 48 hours of your discharge    !!!! IF YOU HAVE A SERIOUS OR THREATENING EMERGENCY, CALL 911 OR COME TO THE EMERGENCY ROOM.  IF YOUR CONDITIONS ALLOWS COME TO THE HOSPITAL WHERE YOUR SURGERY WAS PERFORMED.    QUESTIONS OR CONCERNS:   OUR OFFICE HOURS ARE FROM 9:00 A.M -4:30 P.M. MONDAY-FRIDAY.  AFTER OFFICE HOURS, CALLS ARE ANSWERED BY THE ON-CALL PROVIDER. PLEASE CALL WITH ROUTINE QUESTIONS DURING OFFICE HOURS. THE ON-CALL PROVIDER WILL NOT REFILL PRESCRIPTIONS.     Walker Order    I, the undersigned, certify that the above prescribed supplies are medically necessary for this patient and is both reasonable and necessary in reference to accepted standards of medical and necessary in reference to accepted standards of medical practice in the treatment of this patient's condition and is not prescribed as a convenience.      Diet    Follow this diet upon discharge: Regular       Significant Results and Procedures   Most Recent 3 CBC's:  Recent Labs   Lab Test 09/25/22 0317 09/19/22  1416 07/21/22  2159   WBC 19.8* 7.3 9.1   HGB 13.3 13.9 13.9   MCV 92 93 92    275 265     Most Recent 3 BMP's:  Recent Labs   Lab Test 09/25/22 0317 09/19/22  1416 07/22/22  0116 07/21/22  2257 07/21/22  2159    140  --   --  142   POTASSIUM 4.1 4.4  --   --  4.4   CHLORIDE 105 106  --   --  109*   CO2 25 29  --   --  23   BUN 15 13  --   --  14   CR 0.84 0.76  --   --  0.92   ANIONGAP 6 5  --   --  10   MAXWELL 9.5 9.3  --   --  10.0   * 107 88   < > 54*    < > = values in this interval not displayed.   ,   Results for orders placed or performed during the hospital encounter of 09/25/22   Lumbar spine CT w/o contrast    Narrative    EXAM: CT LUMBAR SPINE WITHOUT CONTRAST  LOCATION: [facility]  DATE/TIME: 09/25/2022, 3:56 AM    INDICATION: Recent  surgery, now with worsening back pain. T12-L1 hemilaminectomy and medial facetectomy and foraminotomy.  COMPARISON: MRI 09/02/2022, CT 09/29/2021.  TECHNIQUE: Routine CT Lumbar Spine without IV contrast. Multiplanar reformats. Dose reduction techniques were used.     FINDINGS:  VERTEBRA: Recent postsurgical changes of left T12-L1 hemilaminectomy and medial facetectomy are demonstrated. There is a small amount of gas within the operative bed and extending along the left aspect of the spinal canal. Additional postsurgical changes   of posterior and anterior fusion at L3-L4 and L4-L5 with left L4-L5 foraminotomy again demonstrated. Artifact from indwelling hardware limits evaluation of the central canal contents. Lumbar vertebra are grossly normal in height. There are stable mild   alterations in the lateral alignment. No acute fracture or subluxation.     CANAL/FORAMINA: High-grade foraminal stenosis on the right at L1-L2.    PARASPINAL: No extraspinal abnormality.      Impression    IMPRESSION:  1.  Recent postsurgical changes at the T12-L1 level and chronic postsurgical changes from L3 through L5.  2.  Underlying degenerative changes without appreciable osseous central canal stenosis. There is high-grade foraminal stenosis on the right at L1-L2.  3.  No acute fracture or subluxation.     MR Lumbar Spine w/o & w Contrast    Narrative    EXAM: MR LUMBAR SPINE WITHOUT AND WITH CONTRAST  LOCATION: [facility]  DATE/TIME: 09/25/2022, 7:20 AM    INDICATION: Acute back pain with radiculopathy.  COMPARISON: Lumbar spine CT 09/25/2022. Lumbar spine MR 09/02/2022.  CONTRAST: 7 mL Gadavist.  TECHNIQUE: Routine Lumbar Spine MRI without and with IV contrast.    FINDINGS:   Nomenclature is based on five lumbar-type vertebral bodies. The tip of the conus terminates at the level of L2-L3. No abnormal enhancement of the conus or cauda equina nerve roots.    New surgical changes with left-sided laminectomy at  L1 and microdiscectomy at that level. No abnormal fluid collection near the laminectomy bed. Smooth epidural enhancement at this area is likely postsurgical in nature.    Again seen are surgical changes with posterior ean and screw fixation hardware from L3 to L5. Metal hardware causes artifact and limits evaluation in this area.    Lumbar spine alignment is unchanged with persistent mild retrolisthesis of L1 on L2 and L2 on L3. No new loss of vertebral body height. Several presumed Schmorl's node deformities throughout the lumbar spine. No focal destructive bony lesions   appreciated.    T11-T12: Only seen on the sagittal images. Mild loss of disc height. Posterior disc bulge. Mild facet hypertrophy. Mild spinal canal narrowing. No significant neural foraminal narrowing.    T12-L1: New postoperative changes at this level with left-sided laminectomy and discectomy. Moderate loss of disc height. Shallow circumferential disc bulge is unchanged. Previous extruded disc material extending to the left lateral aspect of the cord   has been removed. Residual enhancing granulation tissue is seen at this area particularly along the ventral aspect of the canal. Mild spinal canal narrowing although this is improved since prior. Mild right neural foraminal narrowing. No significant left   neural foraminal narrowing.    L1-L2: Marked loss of disc height and signal with degenerative endplate changes. Circumferential disc bulge with endplate osteophytic spurring. Mild facet hypertrophy. Moderate spinal canal narrowing. Severe right neural foraminal narrowing. Moderate   left neural foraminal narrowing.    L2-L3: Moderate loss of disc height and signal. Circumferential disc bulge with endplate osteophytic spurring. Mild facet hypertrophy. Mild spinal canal narrowing. Moderate bilateral neural foraminal narrowing.     L3-L4: Postoperative changes at this level with metal hardware that causes artifact and limits evaluation. No obvious  spinal canal or neural foraminal narrowing.    L4-L5: Postoperative changes at this level with metal hardware that causes artifact and limits evaluation. No obvious spinal canal or neural foraminal narrowing.    L5-S1: Moderate loss of disc height and signal. Posterior disc bulge. Moderate bilateral facet hypertrophy with bilateral facet joint effusions. Mild spinal canal narrowing. Moderate to severe bilateral neural foraminal narrowing.      Impression    IMPRESSION:  1.  New surgical changes with left-sided laminectomy and discectomy at T12-L1 compared to prior MR 09/02/2022. The majority of the extruded disc which was seen extending caudally and in the left lateral aspect of the canal has been resected. There is   enhancing granulation tissue at this postoperative bed around the spinal canal. Mild spinal canal narrowing at this level is improved compared to preoperative MR. No epidural hematoma appreciated.  2.  Postoperative changes with posterior ean and screw fixation hardware from L3 to L5 without significant change since 09/02/2022. No obvious spinal canal or neural foraminal narrowing at these levels.  3.  Degenerative changes at the nonoperative levels particularly L1-L2, L2-L3, and L5-S1 are not significantly changed since 09/02/2022.  4.  At L1-L2, there is moderate spinal canal narrowing as well as severe right and moderate left neural foraminal narrowing.  5.  At L2-L3, there is mild spinal canal narrowing as well as moderate bilateral neural foraminal narrowing.  6.  At L5-S1, there is mild spinal canal narrowing as well as moderate to severe bilateral neural foraminal narrowing.           Discharge Medications   Discharge Medication List as of 9/25/2022  3:44 PM      START taking these medications    Details   diazepam (VALIUM) 5 MG tablet Take 1 tablet (5 mg) by mouth every 4 hours as needed for anxiety, Disp-42 tablet, R-0, E-Prescribe      HYDROmorphone (DILAUDID) 2 MG tablet Take 1 tablet (2 mg)  "by mouth every 4 hours as needed for moderate to severe pain, Disp-42 tablet, R-0, E-Prescribe      methylPREDNISolone (MEDROL DOSEPAK) 4 MG tablet therapy pack Follow Package Directions, Disp-21 tablet, R-0, E-Prescribe         CONTINUE these medications which have NOT CHANGED    Details   !! acetaminophen (TYLENOL) 500 MG tablet Take 1,000 mg by mouth daily as needed for mild pain, Historical      !! acetaminophen (TYLENOL) 500 MG tablet Take 1,000 mg by mouth daily before breakfast, Historical      atorvastatin (LIPITOR) 40 MG tablet Take 1 tablet (40 mg) by mouth daily, Disp-90 tablet, R-3, E-Prescribe      celecoxib (CELEBREX) 100 MG capsule Take 1 capsule (100 mg) by mouth 2 times daily, Disp-180 capsule, R-3, E-Prescribe      clobetasol (TEMOVATE) 0.05 % external ointment Apply topically 2 times dailyDisp-60 g, [id number]      fish oil-omega-3 fatty acids 1000 MG capsule Take 1 g by mouth daily, Historical      lactobacillus rhamnosus (GG) (CULTURELL) capsule Take 1 capsule by mouth daily, Historical      Melatonin 10 MG TABS tablet Take 20 mg by mouth nightly as needed for sleep, Historical      metoprolol succinate ER (TOPROL XL) 50 MG 24 hr tablet Take 0.5 tablets (25 mg) by mouth daily, Disp-30 tablet, R-2, E-Prescribe      nitroGLYcerin (NITROSTAT) 0.4 MG sublingual tablet One tablet under the tongue every 5 minutes if needed for chest pain. May repeat every 5 minutes for a maximum of 3 doses in 15 minutes\", Disp-25 tablet, R-3, E-Prescribe      omeprazole (PRILOSEC) 20 MG DR capsule Take 1 capsule (20 mg) by mouth 3 times daily, Disp-270 capsule, R-3, E-Prescribe      Vitamin D (Cholecalciferol) 25 MCG (1000 UT) TABS Take 1,000 Units by mouth daily, Historical       !! - Potential duplicate medications found. Please discuss with provider.      STOP taking these medications       methocarbamol (ROBAXIN) 500 MG tablet Comments:   Reason for Stopping:         traMADol (ULTRAM) 50 MG tablet Comments: "   Reason for Stopping:             Allergies   Allergies   Allergen Reactions     Ciprofloxacin Anaphylaxis     Flagyl [Metronidazole] Anaphylaxis     Gabapentin Other (See Comments)     Suicidal thoughts.     Zofran [Ondansetron] Other (See Comments) and GI Disturbance     Causes bloating, moderately uncomfortable, abd pain       Benadryl [Diphenhydramine] Other (See Comments)     Muscle spasms     Percocet [Oxycodone-Acetaminophen] Other (See Comments)     Goes nuts - nasty mean irritated, can take Dilaudid     Vicodin [Hydrocodone-Acetaminophen] Other (See Comments)     Anxiety-agitation

## 2022-09-25 NOTE — PROGRESS NOTES
[facility]      OUTPATIENT PHYSICAL THERAPY EVALUATION  PLAN OF TREATMENT FOR OUTPATIENT REHABILITATION  (COMPLETE FOR INITIAL CLAIMS ONLY)  Patient's Last Name, First Name, M.I.  YOB: 1965  Selina Parikh                        Provider's Name  [facility] Medical Record No.  [id number]                               Onset Date:  09/25/22   Start of Care Date:  09/25/22      Type:     _X_PT   ___OT   ___SLP Medical Diagnosis:                           PT Diagnosis:  impaired functional moblity   Visits from SOC:  1   _________________________________________________________________________________  Plan of Treatment/Functional Goals    Planned Interventions: stair training, transfer training, home exercise program, gait training     Goals: See Physical Therapy Goals on Care Plan in [employer]th aspect electronic health record.    Therapy Frequency: Daily  Predicted Duration of Therapy Intervention: 09/28/22  _________________________________________________________________________________    I CERTIFY THE NEED FOR THESE SERVICES FURNISHED UNDER        THIS PLAN OF TREATMENT AND WHILE UNDER MY CARE     (Physician co-signature of this document indicates review and certification of the therapy plan).              Certification date from: 09/25/22, Certification date to: 10/25/22                 Initial Assessment        See Physical Therapy evaluation dated 09/25/22 in Epic electronic health record.

## 2022-09-25 NOTE — ED NOTES
Pt tearful reporting severe muscle spasms to left lower back radiating to left groin and down leg

## 2022-09-25 NOTE — ED NOTES
Patient returns from radiology.  Aware of approximate wait time for results.  Will continue to monitor.  kyleigh

## 2022-09-25 NOTE — ED TRIAGE NOTES
Patient had back surgery Friday on T12-L1, was doing well until 1700 last evening when it started worsening, became much worse tonight; pain radiates into groin and down left leg, states she is having numbness in left leg as well.      Triage Assessment     Row Name 09/25/22 0243       Triage Assessment (Adult)    Airway WDL WDL       Respiratory WDL    Respiratory WDL WDL       Skin Circulation/Temperature WDL    Skin Circulation/Temperature WDL WDL       Cardiac WDL    Cardiac WDL WDL       Peripheral/Neurovascular WDL    Peripheral Neurovascular WDL WDL       Cognitive/Neuro/Behavioral WDL    Cognitive/Neuro/Behavioral WDL WDL

## 2022-09-25 NOTE — ED NOTES
"States \"oh youre going to have to use that machine to even get blood on me, I am very hard\"  Kevin RN called to start an ultrasound IV  "

## 2022-09-25 NOTE — CONSULTS
Care Management Initial Consult    General Information  Assessment completed with: Patient,    Type of CM/SW Visit: Initial Assessment    Primary Care Provider verified and updated as needed: Yes   Readmission within the last 30 days: no previous admission in last 30 days      Reason for Consult: discharge planning  Advance Care Planning: Advance Care Planning Reviewed: other (see comments) (Declined [employer] has it at home)        Communication Assessment  Patient's communication style: spoken language (English or Bilingual)    Hearing Difficulty or Deaf: no   Wear Glasses or Blind: yes    Cognitive  Cognitive/Neuro/Behavioral: WDL                      Living Environment:   People in home: sibling(s)     Current living Arrangements: house      Able to return to prior arrangements: yes  Living Arrangement Comments: Lives with brother Emmanuel    Family/Social Support:  Care provided by: self  Provides care for: no one  Marital Status:   Sibling(s)          Description of Support System: Supportive    Support Assessment: Adequate family and caregiver support    Current Resources:   Patient receiving home care services:       Community Resources:    Equipment currently used at home: none  Supplies currently used at home:      Employment/Financial:  Employment Status: disabled, unemployed        Financial Concerns: No concerns identified           Lifestyle & Psychosocial Needs:  Social Determinants of Health     Tobacco Use: High Risk     Smoking Tobacco Use: Current Every Day Smoker     Smokeless Tobacco Use: Never Used   Alcohol Use: Not on file   Financial Resource Strain: Not on file   Food Insecurity: Not on file   Transportation Needs: Not on file   Physical Activity: Not on file   Stress: Not on file   Social Connections: Not on file   Intimate Partner Violence: Not on file   Depression: At risk     PHQ-2 Score: 4   Housing Stability: Not on file       Functional Status:  Prior to admission patient  needed assistance:   Dependent ADLs:: Ambulation-walker  Dependent IADLs:: Independent  Assesssment of Functional Status: Not at  functional baseline    Mental Health Status:          Chemical Dependency Status:              Values/Beliefs:  Spiritual, Cultural Beliefs, Islam Practices, Values that affect care:                 Additional Information:  Alert oriented patient lives in a private residence with her brother Emmanuel. Presents to  ED for groin pain, back pain, and leg pain following back surgery on 9/23.  Per chart review, the patient had a left thoracic 12-lumbar 1 hemilaminectomy, medial facetectomy, foraminotomy and microdiscectomy operation on 9/23 .    Reports at baseline patient is independent with I/ADLs. Uses a walker when she has pain; has no home care services; and does drive.  Lives in a house with her brother Emmanuel. Declined [employer] and has a copy at home. Explained FORBES/Observation Status to patient who verbalized understanding. Plans to return home with her brother Emmanuel transporting patient. Other needs pending clinical progress.      JESSICA SAXENA RN/CM

## 2022-09-25 NOTE — ED NOTES
Received a phone call from MRI, pt is in severe pain and cannot lay flat on back prior to MRI scan.   Will attempt PRN dilaudid

## 2022-09-25 NOTE — ED PROVIDER NOTES
EMERGENCY DEPARTMENT ENCOUNTER      NAME: Selina Parikh  AGE: 57 year old female  YOB: 1965  MRN: [id number]  EVALUATION DATE & TIME: 9/25/2022  2:38 AM    PCP: Sera Solo    ED PROVIDER: Annika Lubin M.D.      Chief Complaint   Patient presents with     Back Pain     Groin Pain     Leg Pain         FINAL IMPRESSION:  1. Acute bilateral low back pain with left-sided sciatica        MEDICAL DECISION MAKING:    Pertinent Labs & Imaging studies reviewed. (See chart for details)  ED Course as of 09/25/22 0522   Sun Sep 25, 2022   0308 Afebrile.  Vital signs are unremarkable.  Patient is coming to emerge department today for evaluation of low back pain.  His pain is radiating down towards her left groin and down to her foot.  She recently had decompressive surgery done on   0311 Recently had T 12 through L1 hemilaminectomy, medial facetectomy, foraminotomy and microdiscectomy on 9/23.  Was doing well postop at home.  Today at 5 PM she developed significant discomfort.  She states she is unable to feel if her bladder is full but has not had any loss of bowel or bladder function.  She says the pain is radiating down to her left groin and down into her foot.  She now has feelings of her foot falling asleep, these were all feeling she had previous to her surgery where she had herniation resulting in cauda equina syndrome found.    Exam for patient here sitting in bed, appears moderately uncomfortable.  No cardiopulmonary distress.  Surgical scar healing appropriately without erythema, purulence or discharge.  She does have mild tenderness paraspinally and slightly lower lumbar on palpation of this area.   0317 She does have some weakness with extension flexion of her left foot.  She reports slight numbness and tingling down the inside of her leg and to her left foot.    Certainly concern here for increased swelling, possible surgical issue.  Will get CT scan, check labs, pain medication, antinausea  medication and Valium.       CT scan here without any significant acute findings.  Pain is much improved with medications.  I did call and speak with neurosurgery, they are recommending steroids, MRI with and without contrast.  Will bring patient in for observation for pain control, neurosurgery evaluation.  Patient updated.  She is in agreement with plan.      Critical care: 0 minutes excluding separately billable procedures.  Includes bedside management, time reviewing test results, review of records, discussing the case with staff, documenting the medical record and time spent with family members (or surrogate decision makers) discussing specific treatment issues.          ED COURSE:  2:48 AM I met with the patient to gather history and to perform my initial exam. We discussed plans for the ED course, including diagnostic testing and treatment.   5:05 AM I spoke with Dr. Rivas of Neurosurgery concerning the patient.  5:18 AM I rechecked and updated the patient with Neurosurgery consult, laboratory, and imaging results.     The importance of close follow up was discussed. We reviewed warning signs and symptoms, and I instructed Ms. Parikh to return to the emergency department immediately if she develops any new or worsening symptoms. I provided additional verbal discharge instructions. Ms. Parikh expressed understanding and agreement with this plan of care, her questions were answered, and she was discharged in stable condition.     MEDICATIONS GIVEN IN THE EMERGENCY:  Medications   promethazine (PHENERGAN) 12.5 mg in sodium chloride 0.9 % 50 mL intermittent infusion (has no administration in time range)   predniSONE (DELTASONE) tablet 40 mg (has no administration in time range)   HYDROmorphone (DILAUDID) injection 1 mg (1 mg Intravenous Given 9/25/22 0323)   diazepam (VALIUM) injection 5 mg (5 mg Intravenous Given 9/25/22 0320)   HYDROmorphone (DILAUDID) injection 1 mg (1 mg Intravenous Given 9/25/22 0420)  "      NEW PRESCRIPTIONS STARTED AT TODAY'S ER VISIT:  New Prescriptions    No medications on file          =================================================================    HPI    Patient information was obtained from: patient     Use of : N/A        Selina Parikh is a 57 year old female who presents for groin pain, back pain, and leg pain following back surgery on 9/23.     Per chart review, the patient had a left thoracic 12-lumbar 1 hemilaminectomy, medial facetectomy, foraminotomy and microdiscectomy operation on 9/23.    Patient states she ws doing well post-op until 1700 today. She reports pain that \"goes through [her] groin and into [her] left leg.\" She also states she can't walk as a result of the surgery and pain. Patient additionally states that her foot is falling asleep and she hasn't lost control of her bladder, but she feels like she needs to pee. She hasn't had a bowel movement yet.    Patient denies any recent falls or trauma. No fever, chills, or any other medical concerns are expressed at this time.      REVIEW OF SYSTEMS   Review of Systems   Constitutional: Negative for chills and fever.   Musculoskeletal: Positive for back pain.        Positive for left leg and groin pain.   All other systems reviewed and are negative.        PAST MEDICAL HISTORY:  Past Medical History:   Diagnosis Date     Cancer (H)     melanoma     Coronary artery disease      Hiatal hernia      Hypertension      Other chronic pain        PAST SURGICAL HISTORY:  Past Surgical History:   Procedure Laterality Date     BACK SURGERY       CHOLECYSTECTOMY       CHOLECYSTECTOMY, LAPOROSCOPIC  2/14/2000    Cholecystectomy, Laparoscopic     COLON SURGERY       CV CORONARY ANGIOGRAM N/A 5/6/2022    Procedure: CV CORONARY ANGIOGRAM;  Surgeon: Stefanie Spangler MD;  [facility] CATH LAB CV     CV LEFT HEART CATH N/A 5/6/2022    Procedure: Left Heart Catheterization;  Surgeon: Stefanie Spangler MD;  [facility] CATH LAB CV     CYSTOSCOPY, INSERT LIGHTED STENT URETER(S) N/A 4/10/2018    Procedure: CYSTOSCOPY, INSERT LIGHTED STENT URETER(S);  Lighted Stent Placement,Laparoscopic Assisted Sigmoid Colectomy;  Surgeon: ERVIN Reina MD;  Location: [facility] OR     LAPAROSCOPIC ASSISTED COLECTOMY LEFT (DESCENDING) N/A 4/10/2018    Procedure: LAPAROSCOPIC ASSISTED COLECTOMY LEFT (DESCENDING);;  Surgeon: Hill Murray MD;  Location: [facility] OR     NECK SURGERY       ORTHOPEDIC SURGERY  10/2010    Cut palm and reattched tendons and nerves in left hand forefinger.     HI ESOPHAGOGASTRODUODENOSCOPY TRANSORAL DIAGNOSTIC N/A 4/30/2021    Procedure: ESOPHAGOGASTRODUODENOSCOPY (EGD);  Surgeon: Souleymane Moreno DO;  Location: [facility];  Service: General     SURGICAL HISTORY OF -   1/4/2000    Esophagogastroduodenoscopy with biopsy     TUBAL LIGATION       TUBAL LIGATION         CURRENT MEDICATIONS:      Current Facility-Administered Medications:      predniSONE (DELTASONE) tablet 40 mg, 40 mg, Oral, Once, Annika Lubin MD     promethazine (PHENERGAN) 12.5 mg in sodium chloride 0.9 % 50 mL intermittent infusion, 12.5 mg, Intravenous, Q6H PRN, Annika Lubin MD    Current Outpatient Medications:      acetaminophen (TYLENOL) 500 MG tablet, Take 1,000 mg by mouth daily before breakfast And second dose as needed, Disp: , Rfl:      atorvastatin (LIPITOR) 40 MG tablet, Take 1 tablet (40 mg) by mouth daily, Disp: 90 tablet, Rfl: 3     celecoxib (CELEBREX) 100 MG capsule, Take 1 capsule (100 mg) by mouth 2 times daily, Disp: 180 capsule, Rfl: 3     clobetasol (TEMOVATE) 0.05 % external ointment, Apply topically 2 times daily (Patient taking differently: Apply topically 2 times daily as needed), Disp: 60 g, Rfl: 3     fish oil-omega-3 fatty acids 1000 MG capsule, Take 2 g by mouth daily, Disp: , Rfl:      lactobacillus rhamnosus (GG) (CULTURELL) capsule, Take 1 capsule by mouth daily, Disp: , Rfl:      Melatonin 10 MG TABS tablet, Take 20  "mg by mouth nightly as needed for sleep, Disp: , Rfl:      methocarbamol (ROBAXIN) 500 MG tablet, Take 1 tablet (500 mg) by mouth every 6 hours as needed for muscle spasms (muscle spasm), Disp: 42 tablet, Rfl: 0     metoprolol succinate ER (TOPROL XL) 50 MG 24 hr tablet, Take 0.5 tablets (25 mg) by mouth daily, Disp: 30 tablet, Rfl: 2     nitroGLYcerin (NITROSTAT) 0.4 MG sublingual tablet, One tablet under the tongue every 5 minutes if needed for chest pain. May repeat every 5 minutes for a maximum of 3 doses in 15 minutes\", Disp: 25 tablet, Rfl: 3     omeprazole (PRILOSEC) 20 MG DR capsule, Take 1 capsule (20 mg) by mouth 3 times daily, Disp: 270 capsule, Rfl: 3     traMADol (ULTRAM) 50 MG tablet, Take 1 tablet (50 mg) by mouth every 4 hours as needed for moderate pain (4-6) or severe pain (7-10), Disp: 42 tablet, Rfl: 0     Vitamin D (Cholecalciferol) 25 MCG (1000 UT) TABS, Take 1,000 Units by mouth daily, Disp: , Rfl:     ALLERGIES:  Allergies   Allergen Reactions     Ciprofloxacin Anaphylaxis     Flagyl [Metronidazole] Anaphylaxis     Gabapentin Other (See Comments)     Suicidal thoughts.     Zofran [Ondansetron] Other (See Comments) and GI Disturbance     Causes bloating, moderately uncomfortable, abd pain       Augmentin Nausea and Vomiting     Benadryl [Diphenhydramine] Other (See Comments)     Muscle spasms     Percocet [Oxycodone-Acetaminophen] Other (See Comments)     Goes nuts - nasty mean irritated, can take Dilaudid     Vicodin [Hydrocodone-Acetaminophen] Other (See Comments)     Anxiety-agitation       FAMILY HISTORY:  Family History   Problem Relation Age of Onset     C.A.D. Father 50        50's     Diabetes Father      Diabetes Brother      C.A.D. Brother 42        quad bypass and MI     Diabetes Brother         2 brothers have DM     Cancer Brother 34        Melanoma     Aortic aneurysm Brother      Allergies Son         Meds     Allergies Daughter         Med     Cancer Mother      C.A.D. Brother " "38        MI age 38     Diabetes Mother      Diabetes Brother        SOCIAL HISTORY:   Social History     Socioeconomic History     Marital status:    Tobacco Use     Smoking status: Current Every Day Smoker     Packs/day: 0.50     Years: 30.00     Pack years: 15.00     Types: Cigarettes     Smokeless tobacco: Never Used     Tobacco comment: 3 Cigs a day   Vaping Use     Vaping Use: Never used   Substance and Sexual Activity     Alcohol use: Not Currently     Comment: Alcoholic Drinks/day: 2x year      Drug use: Not Currently     Sexual activity: Not Currently     Partners: Male     Birth control/protection: Surgical   Other Topics Concern     Parent/sibling w/ CABG, MI or angioplasty before 65F 55M? Yes     Comment: brother- 38, MI, brother 42- quad bypass       PHYSICAL EXAM:    Vitals: /73   Pulse 73   Temp 98  F (36.7  C)   Resp 16   Ht 1.499 m (4' 11\")   Wt 69.4 kg (153 lb)   LMP 04/10/2016   SpO2 96%   BMI 30.90 kg/m     General:. Alert and interactive, moderately uncomfortable.  HENT: Oropharynx without erythema or exudates. MMM.  TMs clear bilaterally.  Eyes: Pupils mid-sized and equally reactive.   Neck: Full AROM.  No midline tenderness to palpation.  Cardiovascular: Regular rate and rhythm. Peripheral pulses 2+ bilaterally. No cardiopulmonary distress.  Chest/Pulmonary: Normal work of breathing. Lung sounds clear and equal throughout, no wheezes or crackles. No chest wall tenderness or deformities.  Abdomen: Soft, nondistended. Nontender without guarding or rebound.  Back/Spine: No CVA or midline tenderness.  Extremities: Normal ROM of all major joints. No lower extremity edema.   Skin: Warm and dry. Normal skin color. Surgical scar healing appropriately without erythema, purulence or discharge. Mild tenderness paraspinally and slightly lower lumbar on palpation of this area.  Neuro: Some strength deficit to left lower extremity with flex extend extension of her foot as well as hip " flexion extension.  Reports numbness to left inner thigh and down to inner left foot.  Psych: Normal affect/mood, cooperative, memory appropriate.         LAB:  All pertinent labs reviewed and interpreted.  Labs Ordered and Resulted from Time of ED Arrival to Time of ED Departure   BASIC METABOLIC PANEL - Abnormal       Result Value    Sodium 136      Potassium 4.1      Chloride 105      Carbon Dioxide (CO2) 25      Anion Gap 6      Urea Nitrogen 15      Creatinine 0.84      Calcium 9.5      Glucose 130 (*)     GFR Estimate 81     ROUTINE UA WITH MICROSCOPIC REFLEX TO CULTURE - Abnormal    Color Urine Light Yellow      Appearance Urine Clear      Glucose Urine Negative      Bilirubin Urine Negative      Ketones Urine Negative      Specific Gravity Urine 1.008      Blood Urine Negative      pH Urine 7.0      Protein Albumin Urine Negative      Urobilinogen Urine <2.0      Nitrite Urine Negative      Leukocyte Esterase Urine Negative      Mucus Urine Present (*)     RBC Urine 1      WBC Urine 1      Squamous Epithelials Urine 2 (*)    CBC WITH PLATELETS AND DIFFERENTIAL - Abnormal    WBC Count 19.8 (*)     RBC Count 4.32      Hemoglobin 13.3      Hematocrit 39.6      MCV 92      MCH 30.8      MCHC 33.6      RDW 14.4      Platelet Count 297      % Neutrophils 79      % Lymphocytes 16      % Monocytes 4      % Eosinophils 0      % Basophils 0      % Immature Granulocytes 1      NRBCs per 100 WBC 0      Absolute Neutrophils 15.7 (*)     Absolute Lymphocytes 3.2      Absolute Monocytes 0.7      Absolute Eosinophils 0.0      Absolute Basophils 0.0      Absolute Immature Granulocytes 0.1      Absolute NRBCs 0.0     CRP INFLAMMATION - Normal    CRP 0.6     COVID-19 VIRUS (CORONAVIRUS) BY PCR       RADIOLOGY:  Lumbar spine CT w/o contrast   Final Result   IMPRESSION:   1.  Recent postsurgical changes at the T12-L1 level and chronic postsurgical changes from L3 through L5.   2.  Underlying degenerative changes without  appreciable osseous central canal stenosis. There is high-grade foraminal stenosis on the right at L1-L2.   3.  No acute fracture or subluxation.         MR Lumbar Spine w/o & w Contrast    (Results Pending)     I, Anabelle Sams , am serving as a scribe to document services personally performed by Dr. Annika Lubin  based on my observation and the provider's statements to me. I, Annika Lubin MD attest that Anabelle Sams is acting in a scribe capacity, has observed my performance of the services and has documented them in accordance with my direction.      Annika Lubin M.D.  Emergency Medicine  [facility] EMERGENCY ROOM  [address] [address] [phone]  Dept: [phone]       Annika Lubin MD  09/25/22 0592

## 2022-09-25 NOTE — ED NOTES
Back from CT   Unable to provide a urine sample, when asked   Pt reporting now pain and numbness going down right leg in addition to left leg   Would like more pain medication if possible  Dr Lubin updated

## 2022-09-25 NOTE — PROGRESS NOTES
Physical Therapy Discharge Summary    Reason for therapy discharge:    Discharged to home.    Progress towards therapy goal(s). See goals on Care Plan in [facility] electronic health record for goal details.  Goals not met.  Barriers to achieving goals:   discharge on same date as initial evaluation.    Therapy recommendation(s):    Continue home exercise program.

## 2022-09-25 NOTE — PHARMACY-ADMISSION MEDICATION HISTORY
"Pharmacy Note - Admission Medication History    Pertinent Provider Information: None.     ______________________________________________________________________    Prior To Admission (PTA) med list completed and updated in EMR.       PTA Med List   Medication Sig Last Dose     acetaminophen (TYLENOL) 500 MG tablet Take 1,000 mg by mouth daily as needed for mild pain Past Week at Unknown time     acetaminophen (TYLENOL) 500 MG tablet Take 1,000 mg by mouth daily before breakfast 9/24/2022 at AM     atorvastatin (LIPITOR) 40 MG tablet Take 1 tablet (40 mg) by mouth daily 9/24/2022 at AM     celecoxib (CELEBREX) 100 MG capsule Take 1 capsule (100 mg) by mouth 2 times daily (Patient taking differently: Take 200 mg by mouth daily) 9/20/2022 at Unknown time     clobetasol (TEMOVATE) 0.05 % external ointment Apply topically 2 times daily (Patient taking differently: Apply topically 2 times daily as needed) 9/21/2022 at Unknown time     fish oil-omega-3 fatty acids 1000 MG capsule Take 1 g by mouth daily 9/20/2022 at Unknown time     lactobacillus rhamnosus (GG) (CULTURELL) capsule Take 1 capsule by mouth daily 9/20/2022 at Unknown time     Melatonin 10 MG TABS tablet Take 20 mg by mouth nightly as needed for sleep 9/24/2022 at HS     methocarbamol (ROBAXIN) 500 MG tablet Take 1 tablet (500 mg) by mouth every 6 hours as needed for muscle spasms (muscle spasm) 9/24/2022 at 2030     metoprolol succinate ER (TOPROL XL) 50 MG 24 hr tablet Take 0.5 tablets (25 mg) by mouth daily 9/24/2022 at AM     nitroGLYcerin (NITROSTAT) 0.4 MG sublingual tablet One tablet under the tongue every 5 minutes if needed for chest pain. May repeat every 5 minutes for a maximum of 3 doses in 15 minutes\" Never Used     omeprazole (PRILOSEC) 20 MG DR capsule Take 1 capsule (20 mg) by mouth 3 times daily 9/24/2022 at 2000     traMADol (ULTRAM) 50 MG tablet Take 1 tablet (50 mg) by mouth every 4 hours as needed for moderate pain (4-6) or severe pain " (7-10) 9/24/2022 at 1700     Vitamin D (Cholecalciferol) 25 MCG (1000 UT) TABS Take 1,000 Units by mouth daily 9/20/2022 at Unknown time       Information source(s): Patient and CareEverywhere/SureScripts  Method of interview communication: in-person    Summary of Changes to PTA Med List  New: None.  Discontinued: None.  Changed: None.    Patient was asked about OTC/herbal products specifically.  PTA med list reflects this.    In the past week, patient estimated taking medication this percent of the time:  greater than 90%.    Allergies were reviewed, assessed, and updated with the patient.      Patient does not anticipate needing any multi-use medications during admission.    The information provided in this note is only as accurate as the sources available at the time of the update(s).    Thank you for the opportunity to participate in the care of this patient.    SRINIVASAN CACERES [facility]  9/25/2022 8:02 AM

## 2022-09-25 NOTE — H&P
[facility]    History and Physical - Teaching Service       Date of Admission:  9/25/2022    Assessment & Plan      Selina Parikh is a 57 year old female w/ PMH of chronic back pain, T12-L1 herniation with cauda equina syndrome, CAD, UBALDO, HLD, HTN, tobacco use disorder, and GERD admitted on 9/25/2022 for left > right lower leg pain with left leg numbness.    Left > right inguinal pain  Recent T12-L1 herniation with cauda equina syndrome s/p  S/p L T12-L1 hemilaminectomy, medial facetectomy, foraminotomy and microdiscectomy 9/23/22  Chronic back pain  S/p L3-L5 decompression and fusion 1/2021  Patient presents for L>R inguinal pain on POD1. Pain site consistent with L1 distribution pattern, so concern for compression. Unlikely abscess, caudia equina syndrome, fracture, etc. Possibly on muscle spasms. Neurosurgery was consulted from the ED and recommended prednisone.   - admit for observation  - neurosurgery consulted, recs and cares appreciated  - consider PT and OT after neurosurgery evaluation  - follow up MRI lumbar spine w/ and w/o constrast  - NPO  - LR at 100 mL/hr  - continue PTA methocarbamol 500mg PO q6hr PRN  - hold PTA celecoxib in case of procedural intervention  - additional pain management: hot/cold, acetaminophen 975mg TID, dilaudid 4mg PO q4hr PRN, dilaudid 0.5mg IV q2hr PRN    Leukocytosis  WBC 19.8 w/ left shift, though, CRP normal. Possibly secondary to steroid use, post surgical inflammation, or severe pain; no obvious sign of infection.    Constipation  - Miralax and senna-docusate PRN    CAD  UBALDO 50-70% stenosis bilaterally  HLD  - continue PTA atorvastatin 40mg daily after med rec    HTN  - continue PTA metoprolol succinate 25mg daily after med rec    Tobacco use disorder  - start nicotine patch PRN    GERD  Hiatal hernia  - continue PPI after med rec    Psoriasis  - continue PTA clobetasol 0.05% after med rec       Diet: NPO per Anesthesia Guidelines for  "Procedure/Surgery Except for: Meds    DVT Prophylaxis: Pneumatic Compression Devices  Pradhan Catheter: Not present  Central Lines: None  Cardiac Monitoring: None  Code Status: Full Code      Clinically Significant Risk Factors Present on Admission                    # Obesity: Estimated body mass index is 30.9 kg/m  as calculated from the following:    Height as of this encounter: 1.499 m (4' 11\").    Weight as of this encounter: 69.4 kg (153 lb).        Disposition Plan      Expected Discharge Date: 09/26/2022                The patient's care was discussed with the Attending Physician, Dr. Cardenas.    Collin Mo MD PGY3  Teaching Service  [facility]  Securely message with the Vocera Web Console (learn more here)  Text page via Written Paging/Directory     ______________________________________________________________________    Chief Complaint   Left lower extremity pain    History is obtained from the patient and chart review    History of Present Illness   Selina Parikh is a 57 year old female w/ PMH of chronic back pain, T12-L1 herniation with cauda equina syndrome, CAD, UBALDO, HLD, HTN, tobacco use disorder, and GERD who presents for left inguinal pain on POD#1.    Per patient and chart review, she was diagnosed with T12-L1 disc herniation with cauda equina syndrome and underwent left thoracic 12-lumbar 1 hemilaminectomy, medial facetectomy, foraminotomy and microdiscectomy with neurosurgeon Dr. Kent on 9/23/222. She notes doing well postoperative course until around 1700 on 9/24 when she developed sharp left inguinal pain. She reports pain that \"goes through [her] groin and into [her] left leg\" up to her left knee.  She also states she can't walk as a result of the surgery and pain. Patient additionally states that her left foot is falling asleep and she hasn't had bowel or urinary incontinence, yet does not feel she is able to void. No dysuria or frequency. She hasn't had a " bowel movement yet. After returning from CT imaging, she notes milder development of right inguinal pain, but again no back pain. No fever, chills, body aches, fall, trauma, or IV drug use. Has been keeping surgical site clean and changing bandages.    Please see neurosurgery progress note from 9/19/2022 for description of patient's prior back surgical history and constellation of symptoms.    Workup in the ED included BMP, CRP, CBC, UA, COVID-19, and lumbar CT w/o contrast. Findings included WBC 19.8, CRP 0.6, normal UA; CT findings noted below. Neurosurgery     Review of Systems    The 10 point Review of Systems is negative other than noted in the HPI or here.    Past Medical History    I have reviewed this patient's medical history and updated it with pertinent information if needed.   Past Medical History:   Diagnosis Date     Cancer (H)     melanoma     Coronary artery disease      Hiatal hernia      Hypertension      Other chronic pain        Past Surgical History   I have reviewed this patient's surgical history and updated it with pertinent information if needed.  Past Surgical History:   Procedure Laterality Date     BACK SURGERY       CHOLECYSTECTOMY       CHOLECYSTECTOMY, LAPOROSCOPIC  2/14/2000    Cholecystectomy, Laparoscopic     COLON SURGERY       CV CORONARY ANGIOGRAM N/A 5/6/2022    Procedure: CV CORONARY ANGIOGRAM;  Surgeon: Stefanie Spangler MD;  [facility]: [facility]     CV LEFT HEART CATH N/A 5/6/2022    Procedure: Left Heart Catheterization;  Surgeon: Stefanie Spangler MD;  Location: [facility] CV     CYSTOSCOPY, INSERT LIGHTED STENT URETER(S) N/A 4/10/2018    Procedure: CYSTOSCOPY, INSERT LIGHTED STENT URETER(S);  Lighted Stent Placement,Laparoscopic Assisted Sigmoid Colectomy;  Surgeon: ERVIN Reina MD;  Location: [facility] OR     LAPAROSCOPIC ASSISTED COLECTOMY LEFT (DESCENDING) N/A 4/10/2018    Procedure: LAPAROSCOPIC ASSISTED COLECTOMY LEFT (DESCENDING);;  Surgeon: Hill Murray  MD;  Location: [facility] OR     NECK SURGERY       ORTHOPEDIC SURGERY  10/2010    Cut palm and reattched tendons and nerves in left hand forefinger.     MT ESOPHAGOGASTRODUODENOSCOPY TRANSORAL DIAGNOSTIC N/A 4/30/2021    Procedure: ESOPHAGOGASTRODUODENOSCOPY (EGD);  Surgeon: Souleymane Moreno DO;  Location: [facility];  Service: General     SURGICAL HISTORY OF -   1/4/2000    Esophagogastroduodenoscopy with biopsy     TUBAL LIGATION       TUBAL LIGATION         Social History   I have reviewed this patient's social history and updated it with pertinent information if needed.  Social History     Tobacco Use     Smoking status: Current Every Day Smoker     Packs/day: 0.50     Years: 30.00     Pack years: 15.00     Types: Cigarettes     Smokeless tobacco: Never Used     Tobacco comment: 3 Cigs a day   Vaping Use     Vaping Use: Never used   Substance Use Topics     Alcohol use: Not Currently     Comment: Alcoholic Drinks/day: 2x year      Drug use: Not Currently       Family History   I have reviewed this patient's family history and updated it with pertinent information if needed.  Family History   Problem Relation Age of Onset     C.A.D. Father 50        50's     Diabetes Father      Diabetes Brother      C.A.D. Brother 42        quad bypass and MI     Diabetes Brother         2 brothers have DM     Cancer Brother 34        Melanoma     Aortic aneurysm Brother      Allergies Son         Meds     Allergies Daughter         Med     Cancer Mother      C.A.D. Brother 38        MI age 38     Diabetes Mother      Diabetes Brother        Prior to Admission Medications   Prior to Admission Medications   Prescriptions Last Dose Informant Patient Reported? Taking?   Melatonin 10 MG TABS tablet  Self Yes No   Sig: Take 20 mg by mouth nightly as needed for sleep   Vitamin D (Cholecalciferol) 25 MCG (1000 UT) TABS  Self Yes No   Sig: Take 1,000 Units by mouth daily   acetaminophen (TYLENOL) 500 MG tablet  Self Yes No   Sig: Take  "1,000 mg by mouth daily before breakfast And second dose as needed   atorvastatin (LIPITOR) 40 MG tablet   No No   Sig: Take 1 tablet (40 mg) by mouth daily   celecoxib (CELEBREX) 100 MG capsule   No No   Sig: Take 1 capsule (100 mg) by mouth 2 times daily   clobetasol (TEMOVATE) 0.05 % external ointment   No No   Sig: Apply topically 2 times daily   Patient taking differently: Apply topically 2 times daily as needed   fish oil-omega-3 fatty acids 1000 MG capsule  Self Yes No   Sig: Take 2 g by mouth daily   lactobacillus rhamnosus (GG) (CULTURELL) capsule   Yes No   Sig: Take 1 capsule by mouth daily   methocarbamol (ROBAXIN) 500 MG tablet   No No   Sig: Take 1 tablet (500 mg) by mouth every 6 hours as needed for muscle spasms (muscle spasm)   metoprolol succinate ER (TOPROL XL) 50 MG 24 hr tablet   No No   Sig: Take 0.5 tablets (25 mg) by mouth daily   nitroGLYcerin (NITROSTAT) 0.4 MG sublingual tablet   No No   Sig: One tablet under the tongue every 5 minutes if needed for chest pain. May repeat every 5 minutes for a maximum of 3 doses in 15 minutes\"   omeprazole (PRILOSEC) 20 MG DR capsule  Self No No   Sig: Take 1 capsule (20 mg) by mouth 3 times daily   traMADol (ULTRAM) 50 MG tablet   No No   Sig: Take 1 tablet (50 mg) by mouth every 4 hours as needed for moderate pain (4-6) or severe pain (7-10)      Facility-Administered Medications: None     Allergies   Allergies   Allergen Reactions     Ciprofloxacin Anaphylaxis     Flagyl [Metronidazole] Anaphylaxis     Gabapentin Other (See Comments)     Suicidal thoughts.     Zofran [Ondansetron] Other (See Comments) and GI Disturbance     Causes bloating, moderately uncomfortable, abd pain       Augmentin Nausea and Vomiting     Benadryl [Diphenhydramine] Other (See Comments)     Muscle spasms     Percocet [Oxycodone-Acetaminophen] Other (See Comments)     Goes nuts - nasty mean irritated, can take Dilaudid     Vicodin [Hydrocodone-Acetaminophen] Other (See Comments) "     Anxiety-agitation       Physical Exam   Vital Signs: Temp: 98  F (36.7  C)   BP: 131/73 Pulse: 72   Resp: 16 SpO2: 93 % O2 Device: None (Room air)    Weight: 153 lbs 0 oz    Exam:  Constitutional: healthy, alert, moderate distress and cooperative  Head: Normocephalic. No masses, lesions, tenderness or abnormalities  Neck: Neck supple. No adenopathy. Bilateral carotids with bruits.  ENT: ENT exam normal, has dentures  Cardiovascular: negative, PMI normal. No lifts, heaves, or thrills. RRR. No murmurs, clicks gallops or rub  Respiratory: Good diaphragmatic excursion, though, faint crackle diffusely. Breathing comfortably on room air and speaking in full sentences  Gastrointestinal: Abdomen soft, non-tender.  : Deferred  Musculoskeletal: extremities normal- no gross deformities noted and normal muscle tone; pain with bilateral hip flexion or extension, as well as flexion of left knee and plantar & dorsal pedal flexion  Skin: no suspicious lesions or rashes on exposed skin; surgical site appeared clean when bandage lifted  Neurologic: CN II-XII grossly intact; sensation grossly WNL. BUE strength 5/5; BLE strength limited by pain, but 5/5 plantar & dorsal flexion  Psychiatric: mentation appears normal and judgment and insight intact      Data   Data reviewed today: I reviewed all medications, new labs and imaging results over the last 24 hours.    Most Recent 3 CBC's:Recent Labs   Lab Test 09/25/22 0317 09/19/22  1416 07/21/22  2159   WBC 19.8* 7.3 9.1   HGB 13.3 13.9 13.9   MCV 92 93 92    275 265     Most Recent 3 BMP's:Recent Labs   Lab Test 09/25/22 0317 09/19/22  1416 07/22/22  0116 07/21/22  2257 07/21/22  2159    140  --   --  142   POTASSIUM 4.1 4.4  --   --  4.4   CHLORIDE 105 106  --   --  109*   CO2 25 29  --   --  23   BUN 15 13  --   --  14   CR 0.84 0.76  --   --  0.92   ANIONGAP 6 5  --   --  10   MAXWELL 9.5 9.3  --   --  10.0   * 107 88   < > 54*    < > = values in this interval  not displayed.     Most Recent Urinalysis:Recent Labs   Lab Test 09/25/22  0454   COLOR Light Yellow   APPEARANCE Clear   URINEGLC Negative   URINEBILI Negative   URINEKETONE Negative   SG 1.008   UBLD Negative   URINEPH 7.0   PROTEIN Negative   NITRITE Negative   LEUKEST Negative   RBCU 1   WBCU 1     Recent Results (from the past 24 hour(s))   Lumbar spine CT w/o contrast    Narrative    EXAM: CT LUMBAR SPINE WITHOUT CONTRAST  LOCATION: [facility]  DATE/TIME: 09/25/2022, 3:56 AM    INDICATION: Recent surgery, now with worsening back pain. T12-L1 hemilaminectomy and medial facetectomy and foraminotomy.  COMPARISON: MRI 09/02/2022, CT 09/29/2021.  TECHNIQUE: Routine CT Lumbar Spine without IV contrast. Multiplanar reformats. Dose reduction techniques were used.     FINDINGS:  VERTEBRA: Recent postsurgical changes of left T12-L1 hemilaminectomy and medial facetectomy are demonstrated. There is a small amount of gas within the operative bed and extending along the left aspect of the spinal canal. Additional postsurgical changes   of posterior and anterior fusion at L3-L4 and L4-L5 with left L4-L5 foraminotomy again demonstrated. Artifact from indwelling hardware limits evaluation of the central canal contents. Lumbar vertebra are grossly normal in height. There are stable mild   alterations in the lateral alignment. No acute fracture or subluxation.     CANAL/FORAMINA: High-grade foraminal stenosis on the right at L1-L2.    PARASPINAL: No extraspinal abnormality.      Impression    IMPRESSION:  1.  Recent postsurgical changes at the T12-L1 level and chronic postsurgical changes from L3 through L5.  2.  Underlying degenerative changes without appreciable osseous central canal stenosis. There is high-grade foraminal stenosis on the right at L1-L2.  3.  No acute fracture or subluxation.

## 2022-09-25 NOTE — CONSULTS
[facility]    Neurosurgery Consultation     Date of Admission:  9/25/2022  Date of Consult (When I saw the patient): 09/25/22    Assessment & Plan   Selina Parikh is a 57 year old female who was admitted on 9/25/2022. I was asked to see the patient for postoperative pain control     Active Problems:  Postoperative pain     Plan:  Toradol 15mg IV once  Dilaudid IV 0.2mg once  Will continue with PO dilaudid 2-4 mg q4hrs, prn  Valium 5mg q4hrs, prn   Okay to advance diet  PVRs to be completed  Advance activity as tolerated with PT  If patient pain better controlled can discharge home later today and will be sent to pain medications and medrol dospak with plans for close follow up in office       I have discussed the following assessment and plan with Dr. Kent who is in agreement with initial plan and will follow up with further consultation recommendations.    Sandra Rivas PA-C  [facility] Neurosurgery  O: [phone]          Code Status    Full Code    Reason for Consult   Reason for consult: I was asked by Dr. Mo to evaluate this patient for postoperative pain.    Primary Care Physician   Sera Solo    Chief Complaint   Left radicular pain and numbness    History is obtained from the patient    History of Present Illness   Selina Parikh is a 57 year old female who is POD#2 T12-L1 left hemilaminectomy microdiscectomy by Dr. Kent presented to ED over night with severe complaint of left leg pain and numbness into groin and anterior thigh. States that pain is so severe she was not able to ambulate. Also notes decreased urge to urinate over night but has episodes of urinary and bowel incontinence prior to surgery. Has not had incontinence since surgery just states does not have the urge to go. Denies any radicular weakness presently but states that she is severely limited by pain. Denies radicular pain or numbness into right lower extremity. Able to get relief when  laying flat. MRI completed with postoperative changes but only mild spinal stenosis which is improved from preop images.     Past Medical History   I have reviewed this patient's medical history and updated it with pertinent information if needed.   Past Medical History:   Diagnosis Date     Cancer (H)     melanoma     Coronary artery disease      Hiatal hernia      Hypertension      Other chronic pain        Past Surgical History   I have reviewed this patient's surgical history and updated it with pertinent information if needed.  Past Surgical History:   Procedure Laterality Date     BACK SURGERY       CHOLECYSTECTOMY       CHOLECYSTECTOMY, LAPOROSCOPIC  2/14/2000    Cholecystectomy, Laparoscopic     COLON SURGERY       CV CORONARY ANGIOGRAM N/A 5/6/2022    Procedure: CV CORONARY ANGIOGRAM;  Surgeon: Stefanie Spangler MD;  Location: [facility] CV     CV LEFT HEART CATH N/A 5/6/2022    Procedure: Left Heart Catheterization;  Surgeon: Stefanie Spangler MD;  Location: [facility] CV     CYSTOSCOPY, INSERT LIGHTED STENT URETER(S) N/A 4/10/2018    Procedure: CYSTOSCOPY, INSERT LIGHTED STENT URETER(S);  Lighted Stent Placement,Laparoscopic Assisted Sigmoid Colectomy;  Surgeon: ERVIN Reina MD;  Location: [facility] OR     LAPAROSCOPIC ASSISTED COLECTOMY LEFT (DESCENDING) N/A 4/10/2018    Procedure: LAPAROSCOPIC ASSISTED COLECTOMY LEFT (DESCENDING);;  Surgeon: Hill Murray MD;  Location: [facility] OR     NECK SURGERY       ORTHOPEDIC SURGERY  10/2010    Cut palm and reattched tendons and nerves in left hand forefinger.     MN ESOPHAGOGASTRODUODENOSCOPY TRANSORAL DIAGNOSTIC N/A 4/30/2021    Procedure: ESOPHAGOGASTRODUODENOSCOPY (EGD);  Surgeon: Souleymane Moreno DO;  Location: [facility];  Service: General     SURGICAL HISTORY OF -   1/4/2000    Esophagogastroduodenoscopy with biopsy     TUBAL LIGATION       TUBAL LIGATION         Prior to Admission Medications   Prior to Admission Medications   Prescriptions  "Last Dose Informant Patient Reported? Taking?   Melatonin 10 MG TABS tablet 9/24/2022 at HS Self Yes Yes   Sig: Take 20 mg by mouth nightly as needed for sleep   Vitamin D (Cholecalciferol) 25 MCG (1000 UT) TABS 9/20/2022 at Unknown time Self Yes Yes   Sig: Take 1,000 Units by mouth daily   acetaminophen (TYLENOL) 500 MG tablet 9/24/2022 at AM Self Yes Yes   Sig: Take 1,000 mg by mouth daily before breakfast   acetaminophen (TYLENOL) 500 MG tablet Past Week at Unknown time  Yes Yes   Sig: Take 1,000 mg by mouth daily as needed for mild pain   atorvastatin (LIPITOR) 40 MG tablet 9/24/2022 at AM  No Yes   Sig: Take 1 tablet (40 mg) by mouth daily   celecoxib (CELEBREX) 100 MG capsule 9/20/2022 at Unknown time  No Yes   Sig: Take 1 capsule (100 mg) by mouth 2 times daily   Patient taking differently: Take 200 mg by mouth daily   clobetasol (TEMOVATE) 0.05 % external ointment 9/21/2022 at Unknown time  No Yes   Sig: Apply topically 2 times daily   Patient taking differently: Apply topically 2 times daily as needed   fish oil-omega-3 fatty acids 1000 MG capsule 9/20/2022 at Unknown time Self Yes Yes   Sig: Take 1 g by mouth daily   lactobacillus rhamnosus (GG) (CULTURELL) capsule 9/20/2022 at Unknown time  Yes Yes   Sig: Take 1 capsule by mouth daily   methocarbamol (ROBAXIN) 500 MG tablet 9/24/2022 at 2030  No Yes   Sig: Take 1 tablet (500 mg) by mouth every 6 hours as needed for muscle spasms (muscle spasm)   metoprolol succinate ER (TOPROL XL) 50 MG 24 hr tablet 9/24/2022 at AM  No Yes   Sig: Take 0.5 tablets (25 mg) by mouth daily   nitroGLYcerin (NITROSTAT) 0.4 MG sublingual tablet Never Used  No Yes   Sig: One tablet under the tongue every 5 minutes if needed for chest pain. May repeat every 5 minutes for a maximum of 3 doses in 15 minutes\"   omeprazole (PRILOSEC) 20 MG DR capsule 9/24/2022 at 2000 Self No Yes   Sig: Take 1 capsule (20 mg) by mouth 3 times daily   traMADol (ULTRAM) 50 MG tablet 9/24/2022 at 1700  " No Yes   Sig: Take 1 tablet (50 mg) by mouth every 4 hours as needed for moderate pain (4-6) or severe pain (7-10)      Facility-Administered Medications: None     Allergies   Allergies   Allergen Reactions     Ciprofloxacin Anaphylaxis     Flagyl [Metronidazole] Anaphylaxis     Gabapentin Other (See Comments)     Suicidal thoughts.     Zofran [Ondansetron] Other (See Comments) and GI Disturbance     Causes bloating, moderately uncomfortable, abd pain       Benadryl [Diphenhydramine] Other (See Comments)     Muscle spasms     Percocet [Oxycodone-Acetaminophen] Other (See Comments)     Goes nuts - nasty mean irritated, can take Dilaudid     Vicodin [Hydrocodone-Acetaminophen] Other (See Comments)     Anxiety-agitation       Social History   I have reviewed this patient's social history and updated it with pertinent information if needed. Selina Parikh  reports that she has been smoking cigarettes. She has a 15.00 pack-year smoking history. She has never used smokeless tobacco. She reports previous alcohol use. She reports previous drug use.    Family History   I have reviewed this patient's family history and updated it with pertinent information if needed.   Family History   Problem Relation Age of Onset     C.A.D. Father 50        50's     Diabetes Father      Diabetes Brother      C.A.D. Brother 42        quad bypass and MI     Diabetes Brother         2 brothers have DM     Cancer Brother 34        Melanoma     Aortic aneurysm Brother      Allergies Son         Meds     Allergies Daughter         Med     Cancer Mother      C.A.D. Brother 38        MI age 38     Diabetes Mother      Diabetes Brother        Review of Systems   14 point review of systems negative with exception to HPI     Physical Exam   Temp: 98  F (36.7  C)   BP: 131/73 Pulse: 62   Resp: 16 SpO2: 95 % O2 Device: None (Room air)    Vital Signs with Ranges  Temp:  [98  F (36.7  C)] 98  F (36.7  C)  Pulse:  [62-82] 62  Resp:  [16] 16  BP:  "(131-141)/(69-91) 131/73  SpO2:  [93 %-98 %] 95 %  153 lbs 0 oz     , Blood pressure 131/73, pulse 62, temperature 98  F (36.7  C), resp. rate 16, height 1.499 m (4' 11\"), weight 69.4 kg (153 lb), last menstrual period 04/10/2016, SpO2 95 %, not currently breastfeeding.  153 lbs 0 oz  HEENT:  Normocephalic, atraumatic.  PERRLA.  EOM s intact.   Neck:  Supple, non-tender, without lymphadenopathy.  Heart:  No peripheral edema  Lungs:  No SOB  Abdomen:  Soft, non-tender, non-distended.  Normal bowel sounds.  Skin:  Warm and dry, good capillary refill.  Extremities:  Good radial and dorsalis pedis pulses bilaterally, no edema, cyanosis or clubbing.    NEUROLOGICAL EXAMINATION:   Mental status:  Alert and Oriented x 3, speech is fluent.  Cranial nerves:  II-XII intact.   Motor:  Strength is 5/5 throughout the upper and lower extremities  Deltoids:  Right: 5/5   Left:  5/5  Biceps:  Right: 5/5   Left:  5/5  Triceps: Right: 5/5   Left:  55/5                       Wrist Extensors: Right: 5/5   Left:  5/5        Wrist Flexors:Right: 5/5   Left:  5/5             Hand : Right: 5/5   Left:  5/5         Hip Flexor: Right: 5/5   Left:  5/5     Give way weakness secondary to pain             Hip Adductor:Right: 5/5   Left:  5/5               Hip Abductor: Right: 5/5   Left:  5/5              Gastroc Soleus:Right: 5/5   Left:  5/5        Tib/Ant:Right: 5/5   Left:  5/5                        EHL:Right: 5/5   Left:  5/5                     Sensation:  Decreased to left groin and anterior thigh otherwise intact to LT throughout   Reflexes:  Negative Babinski.  Negative Clonus.    Coordination:  Smooth finger to nose testing.   Negative pronator drift.    Gait:  Not tested secondary to pain     Cervical examination reveals good range of motion.  No tenderness to palpation of the cervical spine or paraspinous muscles bilaterally.     Lumbar examination reveals no tenderness of the spine or paraspinous muscles. Straight leg raise is " POSITIVE ON LEFT     Incision flat without surrounding erythema, induration, or drainage        Data     CBC RESULTS:   Recent Labs   Lab Test 09/25/22  0317   WBC 19.8*   RBC 4.32   HGB 13.3   HCT 39.6   MCV 92   MCH 30.8   MCHC 33.6   RDW 14.4        Basic Metabolic Panel:  Lab Results   Component Value Date     09/25/2022     04/19/2021      Lab Results   Component Value Date    POTASSIUM 4.1 09/25/2022    POTASSIUM 4.0 04/19/2021     Lab Results   Component Value Date    CHLORIDE 105 09/25/2022    CHLORIDE 111 04/19/2021     Lab Results   Component Value Date    MAXWELL 9.5 09/25/2022    MAXWELL 8.8 04/19/2021     Lab Results   Component Value Date    CO2 25 09/25/2022    CO2 27 04/19/2021     Lab Results   Component Value Date    BUN 15 09/25/2022    BUN 13 04/19/2021     Lab Results   Component Value Date    CR 0.84 09/25/2022    CR 0.73 04/19/2021     Lab Results   Component Value Date     09/25/2022    GLC 89 04/19/2021     INR:  Lab Results   Component Value Date    INR 0.93 09/19/2022    INR 0.96 12/30/2020    INR 1.0 03/25/2013     CT and MRI lumbar spine reviewed personally   Postoperative changes with improvement of disc herniation and previous spinal stenosis   1.  New surgical changes with left-sided laminectomy and discectomy at T12-L1 compared to prior MR 09/02/2022. The majority of the extruded disc which was seen extending caudally and in the left lateral aspect of the canal has been resected. There is   enhancing granulation tissue at this postoperative bed around the spinal canal. Mild spinal canal narrowing at this level is improved compared to preoperative MR. No epidural hematoma appreciated.  2.  Postoperative changes with posterior ean and screw fixation hardware from L3 to L5 without significant change since 09/02/2022. No obvious spinal canal or neural foraminal narrowing at these levels.  3.  Degenerative changes at the nonoperative levels particularly L1-L2, L2-L3, and  L5-S1 are not significantly changed since 09/02/2022.  4.  At L1-L2, there is moderate spinal canal narrowing as well as severe right and moderate left neural foraminal narrowing.  5.  At L2-L3, there is mild spinal canal narrowing as well as moderate bilateral neural foraminal narrowing.  6.  At L5-S1, there is mild spinal canal narrowing as well as moderate to severe bilateral neural foraminal narrowing.  1.  Recent postsurgical changes at the T12-L1 level and chronic postsurgical changes from L3 through L5.  2.  Underlying degenerative changes without appreciable osseous central canal stenosis. There is high-grade foraminal stenosis on the right at L1-L2.  3.  No acute fracture or subluxation.

## 2022-09-26 ENCOUNTER — TELEPHONE (OUTPATIENT)
Dept: NEUROSURGERY | Facility: CLINIC | Age: 57
End: 2022-09-26

## 2022-09-26 ENCOUNTER — PATIENT OUTREACH (OUTPATIENT)
Dept: CARE COORDINATION | Facility: CLINIC | Age: 57
End: 2022-09-26

## 2022-09-26 NOTE — TELEPHONE ENCOUNTER
"Selina Parikh is status post left thoracic 12-lumbar 1 hemilaminectomy, medial facetectomy, foraminotomy and microdiscectomy on 09/23/2022. Was discharged home same day. Was presented to  Ed on 09/25/2022 with acute left sided sciatica. Was discharged home same day on Dilaudid 2 mg, Valium 5 mg and Medrol Dosepak.   Call placed this morning to Selina to check in. \"I am doing much better since I've started om steroids, my leg pain is better now\". Denies sensory or motor issues in LE. Reported no red flag symptoms. Encouraged Selina to keep us updated.  Will follow up in clinic next week for a wound check.  Diana Holbrook, RN, CNRN          "

## 2022-09-26 NOTE — PROGRESS NOTES
[facility]    Background: Transitional Care Management program auto-identified and prompting a chart review by [facility] team.    Assessment: Upon chart review, CCR Team member will cancel/close this episode of Transitional Care Management program due to reason below:    Patient declined to answer all  post hospital discharge questions with CCRC team member and disconnected call.    Plan: Transitional Care Management episode closed per reason above.      Jessica Fox  Community Health Worker  [facility] [place]  [phone]      *Connected Care Resource Team does NOT follow patient ongoing. Referrals are identified based on internal discharge reports and the outreach is to ensure patient has an understanding of their discharge instructions.

## 2022-09-26 NOTE — OP NOTE
NEUROSURGERY OPERATIVE REPORT    DATE OF SERVICE: 9/23/2022    PREOPERATIVE DIAGNOSES:  Lumbar disc herniation   Cauda equina syndrome    POSTOPERATIVE DIAGNOSES:  same       PROCEDURES:  1.  Left thoracic 12-lumbar 1 hemilaminectomy, medial facetectomy, foraminotomy and microdiscectomy  2.  Use of operating room microscope.     SURGEON:  Suha Kent MD    ASSISTANT: Sandra Rivas PA-C    INDICATIONS:  Selina Parikh is a 58 yo female who presents with progressive low back pain, urinary incontinence, weakness, difficulties ambulating. MRI of her lumbar spine shows a left thoracic 12-lumbar 1 disc herniation which results in moderate spinal stenosis with cord compression left to right. She also has a h/o L3-5 decompression and fusion. Patient has had no improvement in her symptoms since onset and given symptoms recommend left thoracic 12-lumbar 1 microdiscectomy. Risks and benefits of lumbar decompression with microdiscectomy discussed including but not limited to infection, hematoma, recurrent disc herniation, instability, nerve damage including paralysis, post op radiculitis, durotomy, risks associated with the use of general anesthesia, blood clots in the lungs or legs. We will get a flexion and extension film prior to surgery as well. She agreed to proceed.     PROCEDURE:  After obtaining informed consent, the patient was brought to the operating room with  pneumatic stockings in place.  IV antibiotics was administered.  She was intubated under general  endotracheal anesthesia.  She was turned into the radiolucent laminectomy frame with all pressure points well padded.  She was prepped and draped in the usual sterile fashion.  A preincisional infiltration of local anesthetic was performed along the midline and the incision was opened sharply and extended down to the fascia.  The fascia was opened in one layer with monopolar electrocautery.  A subperiosteal dissection was undertaken to expose the  lamina at left thoracic 12-lumbar 1.   We verified levels with a lateral x-ray.      Once levels were verified, we then proceeded to remove the inferior portion of the lamina of left thoracic 12 with Kerrisons and a Midas drill after bringing in the intraoperative microscope.   We drilled down to the ligamentum elevated the ligamentum from the underlying thecal sac and removed it with a combination of Kerrisons and curettes.  We extended the dissection superiorly and inferiorly until there was no compression by the lamina or ligamentum on the underlying thecal sac. We next drilled a partial medial facetectomy and foraminotomies opening up the lateral recess and expose the underlying impinged lateral recess and nerve roots. We next exposed the annulus of the disc space protected the nerve and cut the annulus as needed to evacuate the disc bulge to release impingement on the nerve.  We used a combination of pituitaries and curettes to remove sufficient disc loose in the canal and from the interdiscal space.  We used blunt hooks under the thecal sac and in the foramen to verify that there was no further disc material to milk out into the dissection site.  Once the thecal sac and nerve roots were completely decompressed we used a nerve hook to verify no further pressure either in the canal or in the foramina.  The lateral recess was nicely decompressed.   We then proceeded to copiously irrigate the incision with antibiotic-containing saline and achieved hemostasis.    An assistant was needed for positioning, retraction and closure.     The incisions were closed in layers with interrupted 0 Vicryl to approximate the muscle and fascia, inverted 2-0 Vicryl to approximate the subcutaneous tissues and monocryl to approximate the skin edges.  Exofin was then placed. Sponge and needle counts were correct prior to closure x2.  Sterile dressings were placed.      Patient tolerated the procedure well, was taken to the recovery  room where she was extubated and found to be at her neurological baseline with no new deficits appreciated.    Estimated blood loss: 15 cc    Specimens: none    Implants: none    Findings: Large disc herniation with conus compression    Suha Kent MD    Cc: Sera Solo

## 2022-09-26 NOTE — TELEPHONE ENCOUNTER
PATIENT NAME:  Selina Parikh  YOB: 1965  MRN: [id number]  SURGEON: Dr. eKnt  DATE of SURGERY: 09/23/2022  PROCEDURE: left thoracic 12-lumbar 1 hemilaminectomy, medial facetectomy, foraminotomy and microdiscectomy    FOLLOW-UP:  Wound Check:  7-10 days  Staples/Sutures Out: n/a  Post Op Visit: 6 weeks  Post-op Provider: STACEY on Dr. Kent clinic day  DIAGNOSTICS: n/a  DISPOSITION: Home same day    ADDITIONAL INSTRUCTIONS FOR MEDICAL STAFF:        Diana Holbrook RN, CNRN

## 2022-09-30 ENCOUNTER — ALLIED HEALTH/NURSE VISIT (OUTPATIENT)
Dept: NEUROSURGERY | Facility: CLINIC | Age: 57
End: 2022-09-30
Payer: COMMERCIAL

## 2022-09-30 VITALS — DIASTOLIC BLOOD PRESSURE: 82 MMHG | SYSTOLIC BLOOD PRESSURE: 134 MMHG | HEART RATE: 76 BPM | RESPIRATION RATE: 18 BRPM

## 2022-09-30 DIAGNOSIS — M51.26 LUMBAR DISC HERNIATION: Primary | ICD-10-CM

## 2022-09-30 PROCEDURE — 99207 PR NO CHARGE NURSE ONLY: CPT

## 2022-09-30 NOTE — PROGRESS NOTES
Selina Parikh is status post Left thoracic 12-lumbar 1 hemilaminectomy, medial facetectomy, foraminotomy and microdiscectomy on 09/23/2022 with Dr. Kent.  Preoperatively presented with progressive low back pain, urinary incontinence, weakness, difficulties ambulating.  Today she returns in follow up for a wound check. She is very pleased with her outcome - reports much improved back pain. Denies urinary incontinence. Notes improved strength in LE. Gait and balance are normal. No pain in legs, but slight residual numbness in feet. Takes Tylenol and Valium prn.     Surgical wound WNL - CDI, no signs of infection or skin breakdown.  Incision well-healed: good skin approximation, no redness or visible/palpable edema, no tenderness to palpation.  PT. AF, denies fever, chills or sweats.  Pt. reports that the symptoms are improved from pre-op.    Diana Holbrook, RN, CNRN

## 2022-10-12 ENCOUNTER — TELEPHONE (OUTPATIENT)
Dept: PHYSICAL MEDICINE AND REHAB | Facility: CLINIC | Age: 57
End: 2022-10-12

## 2022-10-12 DIAGNOSIS — M48.04 THORACIC SPINAL STENOSIS: ICD-10-CM

## 2022-10-12 DIAGNOSIS — M51.26 LUMBAR DISC HERNIATION: Primary | ICD-10-CM

## 2022-10-12 RX ORDER — METHOCARBAMOL 750 MG/1
750 TABLET, FILM COATED ORAL 4 TIMES DAILY PRN
Qty: 40 TABLET | Refills: 0 | Status: SHIPPED | OUTPATIENT
Start: 2022-10-12 | End: 2022-11-15

## 2022-10-12 NOTE — TELEPHONE ENCOUNTER
Called and spoke with Selina who verbalized concerns about recent flare up of pain in mid and low back. No red flag symptoms. On Celebrex prn.  eRx Robaxin 750 mg qid. Reminded about ice/heat, positioning.  Follow up on 11/3/2022.  Diana Holbrook RN, CNRN

## 2022-10-12 NOTE — TELEPHONE ENCOUNTER
Patient called and reports she is experiencing pain similar to what she experienced prior to surgery.   Would like a call back.

## 2022-10-18 ENCOUNTER — TELEPHONE (OUTPATIENT)
Dept: NEUROSURGERY | Facility: CLINIC | Age: 57
End: 2022-10-18

## 2022-10-18 DIAGNOSIS — M48.062 SPINAL STENOSIS OF LUMBAR REGION WITH NEUROGENIC CLAUDICATION: ICD-10-CM

## 2022-10-18 RX ORDER — DEXAMETHASONE 2 MG/1
2 TABLET ORAL
Qty: 9 TABLET | Refills: 0 | Status: SHIPPED | OUTPATIENT
Start: 2022-10-18 | End: 2022-12-20

## 2022-10-18 RX ORDER — HYDROMORPHONE HYDROCHLORIDE 2 MG/1
2 TABLET ORAL EVERY 6 HOURS PRN
Qty: 28 TABLET | Refills: 0 | Status: SHIPPED | OUTPATIENT
Start: 2022-10-18 | End: 2022-10-25

## 2022-10-18 NOTE — TELEPHONE ENCOUNTER
10/18/22 Patient states that she is in pain without relief.   She states that pain is not improving.  Has tried icing it.  Would like a call back regarding solutions on pain control.  Best contact is [phone]

## 2022-10-18 NOTE — TELEPHONE ENCOUNTER
Refilled dilaudid.   Wrote for short course of dexamethasone  If no benefit, would recommend coming in for an appointment, maybe x-rays prior but can confirm with EB.   Called and discussed with Selina, she is in agreement.

## 2022-10-18 NOTE — TELEPHONE ENCOUNTER
"Selina is s/p left thoracic 12-lumbar 1 hemilaminectomy, medial facetectomy, foraminotomy and microdiscectomy on 9/23/22 with Dr. Kent.     Returned call to pt who reports that the MDP provided her with 3 days of relief but now is having \"popping all the time\" in her low back and the LLE pain has returned and is severe when she does any movement. Denies new numbness/tingling.     Currently taking: out of dilaudid, was using 1 tablet Q6 hours of dilaudid, robaxin 750mg QID, tylenol 1000 mg BID (will increase to TID), Celebrex BID, icing is not helping.      She has tried tizanidine and flexeril in the past. Flexeril did not work. Tizanidine made her very \"edgy\" and she did not tolerate it well. Robaxin seems to work the best.    She would like a refill of dilaudid (other pain meds she's either allergic to or they don't work). And she'd like to know if we can provide more steroids or increase the Celebrex?     Per : dilaudid 2 mg last filled on 9/25, 42#, 7 day supply. No discrepancies.     Socorro Kimball RN   "

## 2022-11-04 ENCOUNTER — TELEPHONE (OUTPATIENT)
Dept: FAMILY MEDICINE | Facility: CLINIC | Age: 57
End: 2022-11-04

## 2022-11-04 NOTE — TELEPHONE ENCOUNTER
Pt calls & states she watched her 2 grandkids Sunday & they now have RSV. Pt started with cold sx on Monday. Pt states has family coming into town & have 3mos old. Wondering if needs to be tested for RSV.  Pt told she would not need to test, can assume she has RSV. Told that she can be infected a few days before sx start & for 4-5 days into it. (7-10days). Pt told RSV very contagious & children under 3mos are most at risk. Pt states she will call & have family not make the drive.  Pt states she does not have [employer]-was given [employer] helpline number to register. Told to call scheduling if needing to make appt & could do virtual telephone appt.  Pt verbalized understanding.    Dee West RN

## 2022-11-15 ENCOUNTER — OFFICE VISIT (OUTPATIENT)
Dept: NEUROSURGERY | Facility: CLINIC | Age: 57
End: 2022-11-15
Payer: COMMERCIAL

## 2022-11-15 VITALS
BODY MASS INDEX: 32.25 KG/M2 | OXYGEN SATURATION: 96 % | HEIGHT: 59 IN | WEIGHT: 160 LBS | SYSTOLIC BLOOD PRESSURE: 111 MMHG | HEART RATE: 81 BPM | DIASTOLIC BLOOD PRESSURE: 74 MMHG

## 2022-11-15 DIAGNOSIS — G95.9 MYELOPATHY (H): ICD-10-CM

## 2022-11-15 DIAGNOSIS — R32 URINARY INCONTINENCE, UNSPECIFIED TYPE: Primary | ICD-10-CM

## 2022-11-15 PROCEDURE — 99024 POSTOP FOLLOW-UP VISIT: CPT | Performed by: NURSE PRACTITIONER

## 2022-11-15 NOTE — PATIENT INSTRUCTIONS
We will repeat images of your cervical and thoracic spine to look for ongoing issues  Your leg symptoms could still be related to your ongoing degenerative changes in your back  Follow up after imaging completed    You can do PT in the meantime.     You can increase activity as tolerated but okay to hold off on until imaging completed.   We may need to get an EMG to see where your symptoms are coming from.

## 2022-11-15 NOTE — LETTER
"    11/15/2022         RE: Selina Parikh  [address] [facility] [address]        Dear Colleague,    Thank you for referring your patient, Selina Parikh, to the [facility]. Please see a copy of my visit note below.    Neurosurgery Progress Note  11/15/22    A/P: Selina Parikh is a 57 year old female who is  S/p left T12-L1 HLMD for progressive symptoms of myelopathy with Dr. Kent on 9/23/2022. . Also with some lumbar radicular type symptoms that haven't changed. Selina notes she is no longer incontinent but does feel like she cannot pass stool well at all, like there is no strength to do so. She notes ongoing pain into bilateral hips, anterior thigh and groin. Knee feels like it will give out. Balance maybe a bit better.      Plan:  Repeat thoracic MRI, add cervical to look for other compression lesion  If nothing new - suspect this is just ongoing sequella from her previous compression  Okay for PT in the meantime  May consider EMG     Neurosurgery Attending: Dr. Kent    S: Fatigue is better, some symptoms improved but overall was hoping for more. Bottom of the feet are both numb. She is no longer incontinent which is better but feels like she cannot pass stool. Has ongoing lumbar djd symptoms.     PMH: No interval change  PSH: No interval change    ROS: A full 14 point review of systems was otherwise completed and is negative aside from that mentioned above in the HPI    O:  /74   Pulse 81   Ht 1.499 m (4' 11\")   Wt 72.6 kg (160 lb)   LMP 04/10/2016   SpO2 96%   BMI 32.32 kg/m    General: Awake, alert, NAD  HEENT: AT/NC, EOMI, face symmetric, oral mucosa moist and pink  Heart: RRR: Nonlabored respirations without accessory muscle use.  Abd: S, NT, ND  Neuro: Awake, alert, speech is clear and content is appropriate. MAEW x 4  Coordination: poor tandem gait   Musculoskeletal: Negative straight leg raise bl  Psych: Appropriatemood and affect, pleasant, " cooperative, alert and oriented x 3  Skin: Incision C/D/I - well healed     I personally visualized all imaging.   None new.   Davida Delvalle CNP  [facility]surgery  O: [phone]                Again, thank you for allowing me to participate in the care of your patient.        Sincerely,        Davida Delvalle, NP

## 2022-11-23 ENCOUNTER — HOSPITAL ENCOUNTER (OUTPATIENT)
Dept: MRI IMAGING | Facility: CLINIC | Age: 57
Discharge: HOME | End: 2022-11-23
Attending: NURSE PRACTITIONER
Payer: COMMERCIAL

## 2022-11-23 DIAGNOSIS — G95.9 MYELOPATHY (H): ICD-10-CM

## 2022-11-23 DIAGNOSIS — R32 URINARY INCONTINENCE, UNSPECIFIED TYPE: ICD-10-CM

## 2022-11-23 PROCEDURE — A9585 GADOBUTROL INJECTION: HCPCS | Performed by: NURSE PRACTITIONER

## 2022-11-23 PROCEDURE — 255N000002 HC RX 255 OP 636: Performed by: NURSE PRACTITIONER

## 2022-11-23 PROCEDURE — 72157 MRI CHEST SPINE W/O & W/DYE: CPT

## 2022-11-23 PROCEDURE — 72156 MRI NECK SPINE W/O & W/DYE: CPT

## 2022-11-23 RX ORDER — GADOBUTROL 604.72 MG/ML
7 INJECTION INTRAVENOUS ONCE
Status: COMPLETED | OUTPATIENT
Start: 2022-11-23 | End: 2022-11-23

## 2022-11-23 RX ADMIN — GADOBUTROL 7 ML: 604.72 INJECTION INTRAVENOUS at 19:17

## 2022-12-15 ENCOUNTER — VIRTUAL VISIT (OUTPATIENT)
Dept: NEUROSURGERY | Facility: CLINIC | Age: 57
End: 2022-12-15
Payer: COMMERCIAL

## 2022-12-15 DIAGNOSIS — M48.062 SPINAL STENOSIS OF LUMBAR REGION WITH NEUROGENIC CLAUDICATION: Primary | ICD-10-CM

## 2022-12-15 PROCEDURE — 99024 POSTOP FOLLOW-UP VISIT: CPT | Performed by: NURSE PRACTITIONER

## 2022-12-15 NOTE — LETTER
12/15/2022         RE: Selina Parikh  [address] [facility] [address]        Dear Colleague,    Thank you for referring your patient, Selina Parikh, to the [facility]. Please see a copy of my visit note below.    Selina is a 57 year old who is being evaluated via a billable telephone visit.      What phone number would you like to be contacted at? Number listed   How would you like to obtain your AVS? Mail a copy  Distant Location (provider location):  On-site    Subjective   Selina is a 57 year old S/P left T12-L1 HLMD for progressive symptoms of myelopathy with Dr. Kent on 9/23/2022. Since her last visit in November she has completed Cervical and Thoracic MRI to evaluate on going lumbar radicular symptoms. Today she reports left leg pain is very flared up after shoveling yesterday. She states she has had left leg pain fairly persistent since waking up from L3-5 fusion surgery two years ago with [facility] surgeon. Her leg is numb and tingling. She feels like someone is stabbing her in the foot. She sometimes gets pain in her right leg as well. Urinary incontinence is better since most recent surgery. Bowel function still leans towards constipation. She takes stool softeners every other day and feels things are improving. Her leg bothers her so much, she has difficulty walking her dogs. MRI's reviewed with patient and Dr Kent. Dr Kent would like to see patient in clinic next Tuesday to discuss possibility of surgery for L1 stenosis with disc herniation not necessarily appreciated on prior films with cuts on axial not lining up with upright views. Selina will be here Tuesday.     Vitals:  No vitals were obtained today due to virtual visit.    Physical Exam   alert  PSYCH: Alert and oriented times 3; coherent speech, normal   rate and volume, able to articulate logical thoughts, able   to abstract reason, no tangential thoughts, no hallucinations   or delusions  Her affect  is normal  RESP: No cough, no audible wheezing, able to talk in full sentences  Remainder of exam unable to be completed due to telephone visits    Phone call duration: 5 minutes    ENIL Hall  [facility]  O: [phone]          Again, thank you for allowing me to participate in the care of your patient.        Sincerely,        CARLTON Hamilton CNP

## 2022-12-15 NOTE — PROGRESS NOTES
Selina is a 57 year old who is being evaluated via a billable telephone visit.      What phone number would you like to be contacted at? Number listed   How would you like to obtain your AVS? Mail a copy  Distant Location (provider location):  On-site    Subjective   Selina is a 57 year old S/P left T12-L1 HLMD for progressive symptoms of myelopathy with Dr. Kent on 9/23/2022. Since her last visit in November she has completed Cervical and Thoracic MRI to evaluate on going lumbar radicular symptoms. Today she reports left leg pain is very flared up after shoveling yesterday. She states she has had left leg pain fairly persistent since waking up from L3-5 fusion surgery two years ago with [facility] surgeon. Her leg is numb and tingling. She feels like someone is stabbing her in the foot. She sometimes gets pain in her right leg as well. Urinary incontinence is better since most recent surgery. Bowel function still leans towards constipation. She takes stool softeners every other day and feels things are improving. Her leg bothers her so much, she has difficulty walking her dogs. MRI's reviewed with patient and Dr Kent. Dr Kent would like to see patient in clinic next Tuesday to discuss possibility of surgery for L1 stenosis with disc herniation not necessarily appreciated on prior films with cuts on axial not lining up with upright views. Selina will be here Tuesday.     Vitals:  No vitals were obtained today due to virtual visit.    Physical Exam   alert  PSYCH: Alert and oriented times 3; coherent speech, normal   rate and volume, able to articulate logical thoughts, able   to abstract reason, no tangential thoughts, no hallucinations   or delusions  Her affect is normal  RESP: No cough, no audible wheezing, able to talk in full sentences  Remainder of exam unable to be completed due to telephone visits    Phone call duration: 5 minutes    NEIL Hall  [facility]surgery  O:  [phone]

## 2022-12-20 ENCOUNTER — OFFICE VISIT (OUTPATIENT)
Dept: NEUROSURGERY | Facility: CLINIC | Age: 57
End: 2022-12-20
Payer: COMMERCIAL

## 2022-12-20 VITALS
OXYGEN SATURATION: 97 % | BODY MASS INDEX: 32.25 KG/M2 | SYSTOLIC BLOOD PRESSURE: 143 MMHG | HEART RATE: 77 BPM | DIASTOLIC BLOOD PRESSURE: 73 MMHG | HEIGHT: 59 IN | WEIGHT: 160 LBS

## 2022-12-20 DIAGNOSIS — M51.26 LUMBAR DISC HERNIATION: ICD-10-CM

## 2022-12-20 DIAGNOSIS — M48.062 SPINAL STENOSIS OF LUMBAR REGION WITH NEUROGENIC CLAUDICATION: Primary | ICD-10-CM

## 2022-12-20 DIAGNOSIS — Z98.1 HISTORY OF LUMBAR FUSION: ICD-10-CM

## 2022-12-20 PROCEDURE — 99024 POSTOP FOLLOW-UP VISIT: CPT | Performed by: SURGERY

## 2022-12-20 NOTE — PROGRESS NOTES
The medrol dose pack helped her immediately after surgery then the pain returned again. Pain today is in the low back into buttock and into left hip down anterior leg to her ankle and foot. Also into right hip. Foot feels cold and numb .  Laying in bed ad sitting worsen her symptoms. Walking helps but then hips burn. Constipated. Takes stool softeners but then at times wont know she defecated. urinary incontinence improvement.     Celebrex and tylenol. No injections or PT    Strength 5/5. [place] except diminished in left L5 distribution . 1+/4 in LE    We reviewed recent imaging. 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 Recommend trial of left lumbar 5-sacral 1 TFESI and physical therapy. If no relief return to clinic.    Suha Kent MD

## 2022-12-20 NOTE — LETTER
12/20/2022         RE: Selina Parikh  [address] [facility] [address]        Dear Colleague,    Thank you for referring your patient, Selina Parikh, to the [facility]UR[facility]UR[facility]ERY. Please see a copy of my visit note below.    The medrol dose pack helped her immediately after surgery then the pain returned again. Pain today is in the low back into buttock and into left hip down anterior leg to her ankle and foot. Also into right hip. Foot feels cold and numb .  Laying in bed ad sitting worsen her symptoms. Walking helps but then hips burn. Constipated. Takes stool softeners but then at times wont know she defecated. urinary incontinence improvement.     Celebrex and tylenol. No injections or PT    Strength 5/5. [place] except diminished in left L5 distribution . 1+/4 in LE    We reviewed recent imaging. 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 Recommend trial of left lumbar 5-sacral 1 TFESI and physical therapy. If no relief return to clinic.    Suha Kent MD         Again, thank you for allowing me to participate in the care of your patient.        Sincerely,        Suha Kent MD

## 2023-01-12 ENCOUNTER — RADIOLOGY INJECTION OFFICE VISIT (OUTPATIENT)
Dept: PHYSICAL MEDICINE AND REHAB | Facility: CLINIC | Age: 58
End: 2023-01-12
Attending: SURGERY
Payer: COMMERCIAL

## 2023-01-12 VITALS
SYSTOLIC BLOOD PRESSURE: 124 MMHG | RESPIRATION RATE: 16 BRPM | TEMPERATURE: 98.1 F | DIASTOLIC BLOOD PRESSURE: 74 MMHG | OXYGEN SATURATION: 95 % | HEART RATE: 70 BPM

## 2023-01-12 DIAGNOSIS — Z98.1 HISTORY OF LUMBAR FUSION: ICD-10-CM

## 2023-01-12 DIAGNOSIS — M48.062 SPINAL STENOSIS OF LUMBAR REGION WITH NEUROGENIC CLAUDICATION: ICD-10-CM

## 2023-01-12 DIAGNOSIS — M51.26 LUMBAR DISC HERNIATION: ICD-10-CM

## 2023-01-12 PROCEDURE — 64483 NJX AA&/STRD TFRM EPI L/S 1: CPT | Mod: LT | Performed by: PAIN MEDICINE

## 2023-01-12 RX ORDER — LIDOCAINE HYDROCHLORIDE 10 MG/ML
INJECTION, SOLUTION EPIDURAL; INFILTRATION; INTRACAUDAL; PERINEURAL
Status: COMPLETED | OUTPATIENT
Start: 2023-01-12 | End: 2023-01-12

## 2023-01-12 RX ORDER — DEXAMETHASONE SODIUM PHOSPHATE 10 MG/ML
INJECTION, SOLUTION INTRAMUSCULAR; INTRAVENOUS
Status: COMPLETED | OUTPATIENT
Start: 2023-01-12 | End: 2023-01-12

## 2023-01-12 RX ADMIN — DEXAMETHASONE SODIUM PHOSPHATE 10 MG: 10 INJECTION, SOLUTION INTRAMUSCULAR; INTRAVENOUS at 13:18

## 2023-01-12 RX ADMIN — LIDOCAINE HYDROCHLORIDE 2 ML: 10 INJECTION, SOLUTION EPIDURAL; INFILTRATION; INTRACAUDAL; PERINEURAL at 13:18

## 2023-01-12 ASSESSMENT — PAIN SCALES - GENERAL
PAINLEVEL: MILD PAIN (2)
PAINLEVEL: MILD PAIN (2)

## 2023-01-12 NOTE — PATIENT INSTRUCTIONS
Follow-up visit with Dr. Kent of [facility] Neurosurgery (#[phone]) in 2 weeks to discuss injection outcome and determine care plan going forward.       DISCHARGE INSTRUCTIONS    During office hours (8:00 a.m.- 4:00 p.m.) questions or concerns may be answered  by calling Spine Center Navigation Nurses at  [phone].  Messages received after hours will be returned the following business day.      In the case of an emergency, please dial 911 or seek assistance at the nearest Emergency Room/Urgent Care facility.     All Patients:    You may experience an increase in your symptoms for the first 2 days (It may take anywhere between 2 days- 2 weeks for the steroid to have maximum effect).    You may use ice on the injection site, as frequently as 20 minutes each hour if needed.    You may take your pain medicine.    You may continue taking your regular medication after your injection. If you have had a Medial Branch Block you may resume pain medication once your pain diary is completed.    You may shower. No swimming, tub bath or hot tub for 48 hours.  You may remove your bandaid/bandage as soon as you are home.    You may resume light activities, as tolerated.    Resume your usual diet as tolerated.    It is strongly advised that you do not drive for 1-3 hours post injection.    If you have had oral sedation:  Do not drive for 8 hours post injection.      If you have had IV sedation:  Do not drive for 24 hours post injection.  Do not operate hazardous machinery or make important personal/business decisions for 24 hours.      POSSIBLE STEROID SIDE EFFECTS (If steroid/cortisone was used for your procedure)    -If you experience these symptoms, it should only last for a short period    Swelling of the legs              Skin redness (flushing)     Mouth (oral) irritation   Blood sugar (glucose) levels            Sweats                    Mood changes  Headache  Sleeplessness  Weakened immune system for up  to 14 days, which could increase the risk of sylwia the COVID-19 virus and/or experiencing more severe symptoms of the disease, if exposed.  Decreased effectiveness of the flu vaccine if given within 2 weeks of the steroid.         POSSIBLE PROCEDURE SIDE EFFECTS  -Call the Spine Center if you are concerned  Increased Pain           Increased numbness/tingling      Nausea/Vomiting          Bruising/bleeding at site      Redness or swelling                                              Difficulty walking      Weakness           Fever greater than 100.5    *In the event of a severe headache after an epidural steroid injection that is relieved by lying down, please call the [facility] to speak with a clinical staff member*

## 2023-01-16 NOTE — PROGRESS NOTES
Patient is in clinic today to see MD Jimena Manley.      Patient is here for a follow up to discuss Carotid Stenosis.    The patient does smoke.    Pt is currently taking statin.    The patient states he/she are NOT wearing compression .    Swelling is observed in N/A.    Pt rates pain 0/10 located at .    Pts symptoms include leg cramps, numbness or weakness, discoloration of leg, pain with walking at a distance of 300 ft and shade or floaters in eyes in Bilateral  extremity.    Patients condition is worse.    The provider has been notified that the patient has been non compliant, worsenes neck pain, swishing sound still in ear..

## 2023-01-17 ENCOUNTER — HOSPITAL ENCOUNTER (OUTPATIENT)
Dept: CT IMAGING | Facility: CLINIC | Age: 58
Discharge: HOME | End: 2023-01-17
Attending: SURGERY
Payer: COMMERCIAL

## 2023-01-17 ENCOUNTER — HOSPITAL ENCOUNTER (OUTPATIENT)
Dept: ULTRASOUND IMAGING | Facility: CLINIC | Age: 58
Discharge: HOME | End: 2023-01-17
Attending: SURGERY
Payer: COMMERCIAL

## 2023-01-17 ENCOUNTER — OFFICE VISIT (OUTPATIENT)
Dept: VASCULAR SURGERY | Facility: CLINIC | Age: 58
End: 2023-01-17
Attending: SURGERY
Payer: COMMERCIAL

## 2023-01-17 VITALS — HEART RATE: 79 BPM | SYSTOLIC BLOOD PRESSURE: 127 MMHG | DIASTOLIC BLOOD PRESSURE: 87 MMHG | TEMPERATURE: 96.5 F

## 2023-01-17 DIAGNOSIS — I25.83 CORONARY ARTERY DISEASE DUE TO LIPID RICH PLAQUE: ICD-10-CM

## 2023-01-17 DIAGNOSIS — I65.22 ASYMPTOMATIC STENOSIS OF LEFT CAROTID ARTERY: ICD-10-CM

## 2023-01-17 DIAGNOSIS — I25.10 CORONARY ARTERY DISEASE DUE TO LIPID RICH PLAQUE: Primary | ICD-10-CM

## 2023-01-17 DIAGNOSIS — I25.10 CORONARY ARTERY DISEASE DUE TO LIPID RICH PLAQUE: ICD-10-CM

## 2023-01-17 DIAGNOSIS — I25.83 CORONARY ARTERY DISEASE DUE TO LIPID RICH PLAQUE: Primary | ICD-10-CM

## 2023-01-17 PROCEDURE — 99215 OFFICE O/P EST HI 40 MIN: CPT | Performed by: SURGERY

## 2023-01-17 PROCEDURE — 93880 EXTRACRANIAL BILAT STUDY: CPT

## 2023-01-17 PROCEDURE — 70496 CT ANGIOGRAPHY HEAD: CPT

## 2023-01-17 PROCEDURE — 250N000009 HC RX 250: Performed by: RADIOLOGY

## 2023-01-17 PROCEDURE — 250N000011 HC RX IP 250 OP 636: Performed by: RADIOLOGY

## 2023-01-17 PROCEDURE — 70498 CT ANGIOGRAPHY NECK: CPT

## 2023-01-17 RX ORDER — CEFAZOLIN SODIUM/WATER 2 G/20 ML
2 SYRINGE (ML) INTRAVENOUS
Status: CANCELLED | OUTPATIENT
Start: 2023-04-21

## 2023-01-17 RX ORDER — CLOPIDOGREL BISULFATE 75 MG/1
75 TABLET ORAL DAILY
Qty: 30 TABLET | Refills: 2 | Status: SHIPPED | OUTPATIENT
Start: 2023-01-17 | End: 2023-04-17

## 2023-01-17 RX ORDER — IOPAMIDOL 755 MG/ML
68 INJECTION, SOLUTION INTRAVASCULAR ONCE
Status: COMPLETED | OUTPATIENT
Start: 2023-01-17 | End: 2023-01-17

## 2023-01-17 RX ORDER — CEFAZOLIN SODIUM/WATER 2 G/20 ML
2 SYRINGE (ML) INTRAVENOUS SEE ADMIN INSTRUCTIONS
Status: CANCELLED | OUTPATIENT
Start: 2023-04-21

## 2023-01-17 RX ADMIN — IOPAMIDOL 68 ML: 755 INJECTION, SOLUTION INTRAVENOUS at 16:13

## 2023-01-17 RX ADMIN — SODIUM CHLORIDE 100 ML: 9 INJECTION, SOLUTION INTRAVENOUS at 16:13

## 2023-01-17 ASSESSMENT — PAIN SCALES - GENERAL: PAINLEVEL: NO PAIN (0)

## 2023-01-17 NOTE — PATIENT INSTRUCTIONS
Marlin Marks,    Thank you for entrusting your care with us today. After your visit today with MD Eveline Manley this is the plan that was discussed at your appointment.    Repeat your CTA of your head and neck    We will call you to assist with scheduling surgery after that is done    Start on Plavix and baby aspirin    I am including additional information on these things and our contact information if you have any questions or concerns.   Please do not hesitate to reach out to us if you felt we did not answer your questions or you are unsure of the treatment plan after your visit today. Our number is [phone].Thank you for trusting us with your care.         Again thank you for your time.     Lashae Burton RN      TransCarotid Arterial Revascularization (TCAR)    Transfemoral Carotid Angioplasty and Stenting  For patients at high risk for the open surgical procedure, another option is transfemoral carotid angioplasty and stenting (TF-UBALDO). This minimally invasive alternative allows your physician to complete the procedure through a tube placed into the artery in your thigh.    First, a small umbrella-like filter is placed beyond the diseased area of the carotid artery to help limit fragments of plaque from traveling towards your brain throughout the procedure. Then, a small balloon is advanced to the narrowed part of your artery. Your surgeon will inflate the balloon, compressing the plaque, and then place a small mesh-like tube called a stent to cover the plaque and keep the artery open. Stabilizing the plaque with the stent is designed to reduce the potential for a stroke after the procedure.    The TCAR Procedure: TransCarotid Artery Revascularization  A potential complication of both surgery and stenting is a stroke during the procedure itself. Studies have shown a higher risk of stroke during stenting as compared to surgery. For patients at higher risk for the open surgical procedure, the TCAR  Procedure, using the ENROUTE  Transcarotid Neuroprotection System, is designed to reduce the risk of stroke while inserting the ENROUTE  Transcarotid Stent.    The TCAR procedure is performed through a small incision at your neckline just above your clavicle. This incision is much smaller than a typical CEA incision. Your surgeon will place a tube directly into your carotid artery and connect it to a system that will direct blood flow away from your brain, to protect against plaque that may come loose reaching your brain. Your blood will flow through the system and any material will be captured in a filter outside the body. Your filtered blood will then be returned through a second tube in your upper leg. After the stent is placed successfully, flow reversal is turned off and blood flow resumes in its normal direction.        Incision in comparison to a carotid endarterectomy (CEA)    Currently this procedure is being done at:  [facility]  [address] [place] [address]    [place] [address] [place] [address]    You will need to be on these medications prior to the procedure.   Plavix  Aspirin  Statin (for cholesterol)     If you are on Warfarin (Coumadin), you will need to STOP it 5 days prior to surgery and START Plavix 75mg and Aspirin 81mg 5 days before surgery. You will remain on aspirin and Plavix for up to 3 months after surgery.       What to expect after your procedure     Most patients are able to go home the day following the procedure     You may experience some pain after this procedure, but it should be minimal     Follow all of your physician instructions for taking care of your incision site     If you experience unusual headaches, dizziness or other unusual symptoms, call the physician who performed your procedure immediately or dial 911

## 2023-01-18 ENCOUNTER — TELEPHONE (OUTPATIENT)
Dept: VASCULAR SURGERY | Facility: CLINIC | Age: 58
End: 2023-01-18
Payer: COMMERCIAL

## 2023-01-24 NOTE — PROGRESS NOTES
VASCULAR SURGERY PROGRESS NOTE   VASCULAR SURGEON: Eveline Manley MD, RPVI     LOCATION:  [facility]     Selina Parikh   Medical Record #:  [id number]  YOB: 1965  Age:  57 year old     Date of Service: 1/17/2023    PRIMARY CARE PROVIDER: Sera Solo      Reason for visit: Follow-up    IMPRESSION: Worsening left carotid artery stenosis compared to previous study.  CT scan did show 80% stenosis of distal common carotid and 70% stenosis in the proximal ICA.  Unfortunately, patient has a history of spinal fusion via anterior neck approach.  Patient also has a history of removal of the hardware via distal approach.  Given the surgical history, proceeding with open endarterectomy would be very challenging.    RECOMMENDATION/RISKS: Would recommend proceeding with left chest carotid revascularization.  We will start this patient on Plavix.  We will schedule platelet function test prior to the operation.  Risk and benefits of this operation were explained, patient agrees to proceed.    HPI:  Selina Parikh is a 57 year old female who was seen today for follow-up.  Patient is doing well in all regards and denies any significant complaints.    REVIEW OF SYSTEMS:    A 12 point ROS was reviewed and is negative     PHH:    Past Medical History:   Diagnosis Date     Cancer (H)     melanoma     Coronary artery disease      Hiatal hernia      Hypertension      Other chronic pain           Past Surgical History:   Procedure Laterality Date     BACK SURGERY       CHOLECYSTECTOMY       CHOLECYSTECTOMY, LAPOROSCOPIC  2/14/2000    Cholecystectomy, Laparoscopic     COLON SURGERY       CV CORONARY ANGIOGRAM N/A 5/6/2022    Procedure: CV CORONARY ANGIOGRAM;  Surgeon: Stefanie Spangler MD;  Location: [facility] CV     CV LEFT HEART CATH N/A 5/6/2022    Procedure: Left Heart Catheterization;  Surgeon: Stefanie Spangler MD;  Location: [facility] CV     CYSTOSCOPY, INSERT LIGHTED STENT  URETER(S) N/A 4/10/2018    Procedure: CYSTOSCOPY, INSERT LIGHTED STENT URETER(S);  Lighted Stent Placement,Laparoscopic Assisted Sigmoid Colectomy;  Surgeon: ERVIN Reina MD;  Location: [facility] OR     LAPAROSCOPIC ASSISTED COLECTOMY LEFT (DESCENDING) N/A 4/10/2018    Procedure: LAPAROSCOPIC ASSISTED COLECTOMY LEFT (DESCENDING);;  Surgeon: Hill Murray MD;  Location: [facility] OR     LUMBAR DISCECTOMY Left 9/23/2022    Procedure: left thoracic 12-lumbar 1 hemilaminectomy, medial facetectomy, foraminotomy and microdiscectomy;  Surgeon: Suha Kent MD;  Location: [facility]     NECK SURGERY       ORTHOPEDIC SURGERY  10/2010    Cut palm and reattched tendons and nerves in left hand forefinger.     ND ESOPHAGOGASTRODUODENOSCOPY TRANSORAL DIAGNOSTIC N/A 4/30/2021    Procedure: ESOPHAGOGASTRODUODENOSCOPY (EGD);  Surgeon: Souleymane Moreno DO;  Location: [facility];  Service: General     SURGICAL HISTORY OF -   1/4/2000    Esophagogastroduodenoscopy with biopsy     TUBAL LIGATION       TUBAL LIGATION         ALLERGIES:  Ciprofloxacin, Flagyl [metronidazole], Gabapentin, Zofran [ondansetron], Benadryl [diphenhydramine], Percocet [oxycodone-acetaminophen], and Vicodin [hydrocodone-acetaminophen]    MEDS:    Current Outpatient Medications:      acetaminophen (TYLENOL) 500 MG tablet, Take 1,000 mg by mouth daily as needed for mild pain, Disp: , Rfl:      acetaminophen (TYLENOL) 500 MG tablet, Take 1,000 mg by mouth daily before breakfast, Disp: , Rfl:      aspirin (ASA) 81 MG EC tablet, Take 1 tablet (81 mg) by mouth daily, Disp: 30 tablet, Rfl: 3     atorvastatin (LIPITOR) 40 MG tablet, Take 1 tablet (40 mg) by mouth daily, Disp: 90 tablet, Rfl: 3     celecoxib (CELEBREX) 100 MG capsule, Take 1 capsule (100 mg) by mouth 2 times daily (Patient taking differently: Take 200 mg by mouth daily), Disp: 180 capsule, Rfl: 3     clobetasol (TEMOVATE) 0.05 % external ointment, Apply topically 2 times daily (Patient  "taking differently: Apply topically 2 times daily as needed), Disp: 60 g, Rfl: 3     clopidogrel (PLAVIX) 75 MG tablet, Take 1 tablet (75 mg) by mouth daily, Disp: 30 tablet, Rfl: 2     fish oil-omega-3 fatty acids 1000 MG capsule, Take 1 g by mouth daily, Disp: , Rfl:      lactobacillus rhamnosus (GG) (CULTURELL) capsule, Take 1 capsule by mouth daily, Disp: , Rfl:      Melatonin 10 MG TABS tablet, Take 20 mg by mouth nightly as needed for sleep, Disp: , Rfl:      metoprolol succinate ER (TOPROL XL) 50 MG 24 hr tablet, Take 0.5 tablets (25 mg) by mouth daily, Disp: 30 tablet, Rfl: 2     omeprazole (PRILOSEC) 20 MG DR capsule, Take 1 capsule (20 mg) by mouth 3 times daily, Disp: 270 capsule, Rfl: 3     Vitamin D (Cholecalciferol) 25 MCG (1000 UT) TABS, Take 1,000 Units by mouth daily, Disp: , Rfl:      naloxone (NARCAN) 4 MG/0.1ML nasal spray, See Admin Instructions, Disp: , Rfl:      nitroGLYcerin (NITROSTAT) 0.4 MG sublingual tablet, One tablet under the tongue every 5 minutes if needed for chest pain. May repeat every 5 minutes for a maximum of 3 doses in 15 minutes\" (Patient not taking: Reported on 1/17/2023), Disp: 25 tablet, Rfl: 3    SOCIAL HABITS:    History   Smoking Status     Every Day     Packs/day: 0.50     Years: 30.00     Types: Cigarettes   Smokeless Tobacco     Never     Social History    Substance and Sexual Activity      Alcohol use: Not Currently        Comment: Alcoholic Drinks/day: 2x year       History   Drug Use Unknown       FAMILY HISTORY:    Family History   Problem Relation Age of Onset     C.A.D. Father 50        50's     Diabetes Father      Diabetes Brother      C.A.D. Brother 42        quad bypass and MI     Diabetes Brother         2 brothers have DM     Cancer Brother 34        Melanoma     Aortic aneurysm Brother      Allergies Son         Meds     Allergies Daughter         Med     Cancer Mother      C.A.D. Brother 38        MI age 38     Diabetes Mother      Diabetes Brother  "       PE:  /87   Pulse 79   Temp (!) 96.5  F (35.8  C) (Tympanic)   LMP 04/10/2016   Wt Readings from Last 1 Encounters:   12/20/22 72.6 kg (160 lb)     There is no height or weight on file to calculate BMI.    EXAM:  GENERAL: This is a well-developed 57 year old female who appears her stated age  EYES: Grossly normal.  MOUTH: Buccal mucosa normal   CARDIAC: Normal   CHEST/LUNG: Clear to auscultation bilaterally  GASTROINTESINAL soft nontender nondistended  MUSCULOSKELETAL: Grossly normal and both lower extremities are intact.  HEME/LYMPH: No lymphedema  NEUROLOGIC: Focally intact, Alert and oriented x 3.   PSYCH: appropriate affect  INTEGUMENT: No open lesions or ulcers            DIAGNOSTIC STUDIES:     Images:  US Carotid Bilateral    Result Date: 1/17/2023  BILATERAL CAROTID ULTRASOUND   1/17/2023 2:42 PM HISTORY: Asymptomatic stenosis of left carotid artery. COMPARISON: None. RIGHT CAROTID FINDINGS: Mild to moderate atherosclerotic plaque at the carotid bifurcation and proximal internal carotid artery, mixed, with relatively smooth margins. Right ICA PSV:  116 cm/sec. Right ICA EDV:  38 cm/sec. Right ICA/CCA PSV Ratio:  1.4  These indicate less than 50% diameter stenosis of the right ICA.  Right Vertebral: Antegrade flow. Right ECA: Antegrade flow. LEFT CAROTID FINDINGS:  Severe atherosclerotic plaque noted at the carotid bifurcation with some extent into the internal carotid artery. Left ICA PSV:  318 cm/sec. Obtained at the carotid bulb. Left ICA EDV:  119 cm/sec. Obtained at the carotid bulb. Left ICA/CCA PSV Ratio:  4.3  These indicate greater than 70% diameter stenosis of the carotid bulb. Left Vertebral: Antegrade flow. Left ECA: Antegrade flow. Causes of Decreased Accuracy:  Velocities and ratios typically not obtained at the carotid bulb, though this does appear to be a significant stenosis, therefore measurements obtained at this level, technically just proximal to the internal carotid artery.      IMPRESSION:  1. Less than 50% diameter stenosis of the right ICA relative to the distal ICA diameter. 2. Greater than 70% diameter stenosis of the left carotid bulb relative to the distal ICA diameter. Of note, not typical to measure velocities and ratios at the carotid bulb, though these values from that location given concern for hemodynamically significant stenosis at this location. MARCELINO GALLEGOS MD   SYSTEM ID:  [id number]    CTA Head Neck w Contrast    Result Date: 1/18/2023  HEAD AND NECK CT ANGIOGRAM WITH IV CONTRAST 01/17/2023, 4:54 PM INDICATION: Coronary artery disease due to lipid rich plaque.  Asymptomatic stenosis of left carotid artery. TECHNIQUE: Head and neck CT angiogram with IV contrast. CT images of the head and neck vessels obtained during the arterial phase of intravenous contrast administration. Axial helical 2D reconstructed images and multiplanar 3D MIP reconstructed images of  the head and neck vessels were performed by the technologist. Dose reduction techniques were used. CONTRAST: 68 mL Isovue 370. COMPARISON: Carotid ultrasound 01/17/2023, head and neck CTA 07/22/2022. FINDINGS: HEAD CTA: The intracranial circulation is patent without evidence for aneurysm, proximal vessel occlusion, high-grade intracranial stenosis or arteriovenous malformation. Patent dural venous sinuses. NECK CTA: Three vessel arch.  Advanced atherosclerotic disease of the distal common carotid arteries, carotid bifurcations and proximal internal carotid arteries. This is more advanced on the left. Residual luminal diameter of the distal left common carotid artery of 1 to 1.5 mm indicating a stenosis in the 75-80% range. Approximately 60% stenosis of the left internal carotid artery origin by NASCET criteria. No interval change. Less than 50% stenosis of the right carotid bifurcation and proximal internal carotid artery. Subtle undulating irregularity of the distal right cervical internal carotid artery is  unchanged. Patent vertebral arteries. No dissection. Prior cervical spine fusion appears solid C4-C7.     CONCLUSION: HEAD CTA: 1.  Negative. No vessel stenosis, occlusion or aneurysm. NECK CTA: 1.  Narrowing of the distal left common carotid artery estimated in the 75-80% range. Approximately 60% stenosis at the origin of the left internal carotid artery. No interval change versus 07/22/2022. 2.  Less than 50% stenosis of the right carotid bifurcation and proximal internal carotid artery. Subtle irregularity of the right cervical internal carotid artery could reflect minor changes of underlying fibromuscular dysplasia. This is unchanged. 3.  Patent vertebral arteries. No dissection.     Pain Transforaminal Madeline Inj Cervical 1 Lvl Lt    Result Date: 1/12/2023  LUMBAR EPIDURAL STEROID INJECTION TRANSFORAMINAL APPROACH WITH FLUOROSCOPIC GUIDANCE Performed on: 1/12/23 Pre Procedure Diagnosis:  LBP, Lumbar radiculitis Post Procedure Diagnosis:  Same Procedure Performed:  Lumbar Transforaminal Epidural Steroid Injection with Fluoroscopic Guidance Clinical Scenario:  As per office notes Anesthesia/Fluids:  As per intra-procedure documentation Vital Signs:  As per intra-procedure documentation Level Injected: L5-S1 Side Injected: Left CC: Selina Parikh is a 57 year-old female who presents today for a left L5-S1 transforaminal epidural steroid injection as ordered by Dr. Kent.  The patient complains of left lower extremity pain.   Lumbar MRI was reviewed today and shows L5-S1 foraminal stenosis.  The patient wanted to proceed with the injection today.  The patient denies feeling ill today or having an active infection (denies taking antibiotics).  The patient does not take any prescription blood thinning medications.  The patient denies any allergies to contrast.    The procedure of epidural steroid injection was discussed in detail along with the attendant risks, including but not limited to: infection, bleeding, nerve  "injury, paralysis.   The patient's questions were answered and they understood the information given.   The patient elected to proceed and signed informed consent.  . The patient was placed in a prone position on the fluoroscopy table. A procedural pause was conducted to verify: correct patient identity, procedure to be performed and as applicable, correct side and site, correct patient position, and availability of implants, special equipment and special requirements.  The lower back was prepped and draped in usual fashion.  After anesthetizing the skin with 1% lidocaine, a 5\", 22 gauge needle was introduced at the Left L5 pedicle under fluoroscopic guidance.  After aspiration was negative, 1 mL of  Omnipaque 300 was injected under continuous fluoroscopy.  Epidural flow without vascular uptake was seen.  1 mL of 1% lidocaine was injected as a test dose. After an appropriate period of observation, a directed neurological exam was performed which revealed no new neurologic deficits.    Subsequently, 10 mgs of Dexamethasone was slowly injected. Following the injection the needle was withdrawn slightly and flushed with local anesthetic as it was fully extracted.  The patient tolerated the procedure well and there were no apparent complications.  The patient did not develop any new neurologic deficits.  After appropriate observation, the patient was dismissed in good condition under their own power. PRE-PROCEDURE PAIN SCORE:  2/10 POST-PROCEDURE PAIN SCORE:  2/10 The patient tolerated the procedure well.  The patient was instructed to call the [facility] if any questions or concerns arise after the procedure. After a short period of observation the patient was discharged.  Patient will follow up with Dr. Kent in 2 to 4 weeks.          LABS:      Sodium   Date Value Ref Range Status   09/25/2022 136 136 - 145 mmol/L Final   09/19/2022 140 136 - 145 mmol/L Final   07/21/2022 142 136 - 145 mmol/L Final "   04/19/2021 142 133 - 144 mmol/L Final   10/12/2020 142 133 - 144 mmol/L Final   01/25/2020 140 133 - 144 mmol/L Final     Urea Nitrogen   Date Value Ref Range Status   09/25/2022 15 8 - 22 mg/dL Final   09/19/2022 13 8 - 22 mg/dL Final   07/21/2022 14 8 - 22 mg/dL Final   04/19/2021 13 7 - 30 mg/dL Final   10/12/2020 15 7 - 30 mg/dL Final   01/25/2020 13 7 - 30 mg/dL Final     Hemoglobin   Date Value Ref Range Status   09/25/2022 13.3 11.7 - 15.7 g/dL Final   09/19/2022 13.9 11.7 - 15.7 g/dL Final   07/21/2022 13.9 11.7 - 15.7 g/dL Final   04/19/2021 14.2 11.7 - 15.7 g/dL Final   10/12/2020 13.6 11.7 - 15.7 g/dL Final   01/25/2020 14.6 11.7 - 15.7 g/dL Final     Platelet Count   Date Value Ref Range Status   09/25/2022 297 150 - 450 10e3/uL Final   09/19/2022 275 150 - 450 10e3/uL Final   07/21/2022 265 150 - 450 10e3/uL Final   04/19/2021 272 150 - 450 10e9/L Final   10/12/2020 243 150 - 450 10e9/L Final   01/25/2020 265 150 - 450 10e9/L Final     BNP   Date Value Ref Range Status   05/05/2022 10 0 - 84 pg/mL Final     INR   Date Value Ref Range Status   09/19/2022 0.93 0.85 - 1.15 Final   12/30/2020 0.96 0.86 - 1.14 Final   03/25/2013 1.0  Final       45 minutes spent on the day of encounter doing chart review, history and exam, documentation, and further activities as noted.         Eveline Manley MD, [facility]  VASCULAR SURGERY

## 2023-02-02 ENCOUNTER — TELEPHONE (OUTPATIENT)
Dept: NEUROSURGERY | Facility: CLINIC | Age: 58
End: 2023-02-02

## 2023-02-02 ENCOUNTER — OFFICE VISIT (OUTPATIENT)
Dept: NEUROSURGERY | Facility: CLINIC | Age: 58
End: 2023-02-02
Payer: COMMERCIAL

## 2023-02-02 ENCOUNTER — PREP FOR PROCEDURE (OUTPATIENT)
Dept: NEUROSURGERY | Facility: CLINIC | Age: 58
End: 2023-02-02

## 2023-02-02 VITALS
HEIGHT: 59 IN | HEART RATE: 88 BPM | OXYGEN SATURATION: 98 % | BODY MASS INDEX: 32.25 KG/M2 | WEIGHT: 160 LBS | DIASTOLIC BLOOD PRESSURE: 86 MMHG | SYSTOLIC BLOOD PRESSURE: 142 MMHG

## 2023-02-02 DIAGNOSIS — M54.16 LUMBAR RADICULOPATHY: Primary | ICD-10-CM

## 2023-02-02 DIAGNOSIS — Z98.1 S/P LUMBAR FUSION: ICD-10-CM

## 2023-02-02 PROCEDURE — 99215 OFFICE O/P EST HI 40 MIN: CPT | Performed by: SURGERY

## 2023-02-02 NOTE — PROGRESS NOTES
"NEUROSURGERY FOLLOWUP  NOTE  Selina Parikh comes today in f/u. Patient has a h/o L3-5 fusion at OSH. Previously left thoracic 12-lumbar 1 microdiscectomy with improvement of those symptoms following. She has ongoing bilateral L5 radiculopathy.   She underwent a left lumbar 5-sacral 1 TFESI. Ongoing approximately 50 % relief from injection. Symptoms bilateral L>>R.  Feels weak with lifting up feet. Had physical therapy historically which never helped this problem. Also continues Celebrex.    PHYSICAL EXAM:   Constitutional: BP (!) 142/86   Pulse 88   Ht 4' 11\" (1.499 m)   Wt 160 lb (72.6 kg)   LMP 04/10/2016   SpO2 98%   BMI 32.32 kg/m       Mental Status: A & O in no acute distress.  Affect is appropriate.  Speech is fluent.  Recent and remote memory are intact.  Attention span and concentration are normal.     Motor:  Normal bulk and tone all muscle groups of upper and lower extremities.     Sensory: Sensation intact bilaterally to light touch except diminished in distal left L5 distribution    Reflexes;  knee/ ankle jerk intact.      IMAGING:   I personally reviewed all radiographic images        CONSULTATION ASSESSMENT AND PLAN:    58 yo female who is s/p L3-5 fusion at OSH and recent left thoracic 12-lumbar 1 microdiscectomy in 9/22. Ongoing L5 radiculopathy. MRI lumbar spine shows moderate facet hypertrophy with effusions and moderate to severe foraminal narrowing at lumbar 5-sacral 1 likely contributing to her symptoms. Recommend exploration and extension of her fusion to S1 with a left lumbar 5-sacral 1 TILF.  Risks and benefits were discussed in detail including but not limited to infection, hematoma, nerve damage including paralysis, post op radiculitis, durotomy, lack of a sold bone fusion, hardware malfunction, risks associated with the use of general anesthesia, blood clots in the lungs or legs. She is an active smoker and will need complete smoking cessation prior. Also will get new vitamin d " level. She is scheduled for a carotid stent soon. Will need clearance by vascular surgery in addition to primary and cardiology prior to surgery. She will call to schedule surgery once cleared following her upcoming surgery.     I spent more than 40 minutes in this apt, examining the pt, reviewing the scans, reviewing notes from chart, discussing treatment options with risks and benefits and coordinating care.     Suha Kent MD      CC:     Sera Solo  [address] [place] [address]

## 2023-02-02 NOTE — LETTER
"    2/2/2023         RE: Selina Parikh  [address] [address]        Dear Colleague,    Thank you for referring your patient, Selina Parikh, to the [facility]. Please see a copy of my visit note below.    NEUROSURGERY FOLLOWUP  NOTE  Selina Parikh comes today in f/u. Patient has a h/o L3-5 fusion at OSH. Previously left thoracic 12-lumbar 1 microdiscectomy with improvement of those symptoms following. She has ongoing bilateral L5 radiculopathy.   She underwent a left lumbar 5-sacral 1 TFESI. Ongoing approximately 50 % relief from injection. Symptoms bilateral L>>R.  Feels weak with lifting up feet. Had physical therapy historically which never helped this problem. Also continues Celebrex.    PHYSICAL EXAM:   Constitutional: BP (!) 142/86   Pulse 88   Ht 4' 11\" (1.499 m)   Wt 160 lb (72.6 kg)   LMP 04/10/2016   SpO2 98%   BMI 32.32 kg/m       Mental Status: A & O in no acute distress.  Affect is appropriate.  Speech is fluent.  Recent and remote memory are intact.  Attention span and concentration are normal.     Motor:  Normal bulk and tone all muscle groups of upper and lower extremities.     Sensory: Sensation intact bilaterally to light touch except diminished in distal left L5 distribution    Reflexes;  knee/ ankle jerk intact.      IMAGING:   I personally reviewed all radiographic images        CONSULTATION ASSESSMENT AND PLAN:    58 yo female who is s/p L3-5 fusion at OSH and recent left thoracic 12-lumbar 1 microdiscectomy in 9/22. Ongoing L5 radiculopathy. MRI lumbar spine shows moderate facet hypertrophy with effusions and moderate to severe foraminal narrowing at lumbar 5-sacral 1 likely contributing to her symptoms. Recommend exploration and extension of her fusion to S1 with a left lumbar 5-sacral 1 TILF.  Risks and benefits were discussed in detail including but not limited to infection, hematoma, nerve damage including paralysis, post op radiculitis, " durotomy, lack of a sold bone fusion, hardware malfunction, risks associated with the use of general anesthesia, blood clots in the lungs or legs. She is an active smoker and will need complete smoking cessation prior. Also will get new vitamin d level. She is scheduled for a carotid stent soon. Will need clearance by vascular surgery in addition to primary and cardiology prior to surgery. She will call to schedule surgery once cleared following her upcoming surgery.     I spent more than 40 minutes in this apt, examining the pt, reviewing the scans, reviewing notes from chart, discussing treatment options with risks and benefits and coordinating care.     Suha Kent MD      CC:     Sera Solo  [address] [place] [address]        Again, thank you for allowing me to participate in the care of your patient.        Sincerely,        Suha Kent MD

## 2023-02-02 NOTE — PATIENT INSTRUCTIONS
Exploration and extension of fusion to S1 - L5-S1 DONNAF         Dr. Kent recommendations are :   Nicotine test prior to surgery. Smoking cessation.   Vitamin D level  Clearance by vascular surgery in addition to primary and cardiology.    Please review COMPLETE information about your proposed surgery, pre-operative requirements, post-operative course and expectations - available in a folder provided to you in clinic!    Your surgery scheduler will call you within 3 business days to begin the process of scheduling your surgery and appointments.     Pre-Operative    Pre-operative physical / Lab work with primary care physician within 30 days of surgical date. We prefer the pre-op exam to be done 2 weeks prior to surgery. We also prefer pre-op lab work be done at [facility] or [facility] outpatient lab, 2 weeks prior to surgery.     If all pre-op appointments/test are not completed prior to your surgery date, you will be asked to reschedule your surgery.           As part of preparation for your upcoming procedure your primary care doctor may order a test to rule out a COVID-19 infection. This is no longer a requirement prior to surgery.     Readiness Visits    Prior to surgery, you may have a telephone or in person readiness visit with our RN team to discuss your upcomming surgery, results of your pre-op physical, and lab work.   If you will require a collar/neck brace after surgery, you will be fitted for one at your readiness visit prior to surgery (scheduled by the surgery scheduler).     Shower procedure    Hibiclens shower: Use one packet the night before surgery and one packet the morning of surgery for a whole body shower. Avoid face, hair, and genitals.      Eating/Drinking    Stop all solid foods 8 hours before surgery.  Stop all clear liquids 2 hours prior to arrival time     Clear liquids include water, clear juice, black coffee, or clear tea without milk, Gatorade, clear soda.     Medications -  please refer to the pre-operative medication instructions sheet in your folder    Hold Aspirin, NSAIDs (Advil/Ibuprofen, Indocin, Naproxen,Nuprin,Relafen/Nabumetone, Diclofenac,Meloxicam, Aleve, Celebrex) and all vitamins and supplements x 7-10 days prior to surgical date  You can take Tylenol (Acetaminophen) for pain up until the date of your surgery   Do not exceed 3,000 mg per day   Any other medications prescribed, please discuss with your primary care provider at your pre-operative physical. Please discuss when/if it is safe for you to stop taking blood thinners with your primary care provider.   We will NOT provide pain medications prior to surgery. We will prescribe post-op pain medications for up to 6 weeks after surgery.       FMLA/Short-term disability    If you are currently employed, you will likely need to be off work for 4-6 weeks for post-op recovery and healing.  Please fax any FMLA/short term disability paperwork to [phone], mail it into the clinic, drop it off in person, or send via a Ceon message.   You may also call our clinic with the date in which you'd like to return to work, and we can provide a work letter to your employer  We will support Short-Term Disability up to 12 weeks, beginning the date of your surgery. We do NOT support Long-Term Disability/Social Security Benefits.     Pain Management after surgery    Dealing with pain    As your body heals, you might feel a stabbing, burning, or aching pain. You may also have some numbness.  Everyone feels pain differently, we may ask you to rate your pain using a pain scale. This will let us know how much pain you feel.   Keep in mind that medicine won't take away all of your pain. It helps to try other ways to relax and ease pain.     Things to help with pain    After surgery, we will give you medicine for your pain. These medications work well, but they can make you drowsy, itchy, or sick to your stomach, and constipated. Try to take  narcotics with food if they cause nausea.   For mild to moderate pain, you can take medication such as Tylenol or Ibuprofen. These can be used with narcotics and muscle relaxants. *If you have had a fusion: do NOT use NSAIDs for 6 months after surgery.   Do NOT drive while taking narcotic pain medication  Do NOT drink alcohol while using narcotic pain medication  You can utilize ice as needed (no longer than 20 minutes at one time) you may apply this over your covered incision  Utilize heat for muscle spasms, do not apply heat over your incision  If a muscle relaxer is prescribed, please do NOT take it at the same time as your narcotic pain medication. Take them at least 90 minutes apart.   You may also use pain cream/patches on sore muscles. Do NOT apply these on your incision. Patches may be cut in 1/2 if needed.     *After your surgery, if you will be staying in-patient, a nursing team will be monitoring you closely throughout your stay and communicate your health status to your surgeon and other providers.  You will be seen by Advanced Practice Providers (e.g., nurse practitioners, clinic nurse specialists, and physician assistants) who will check on you regularly to assess the status of your surgical recovery.     Incision Care    Look at your incision site every day. You  may need a mirror, or family member to help you.   Do not submerge your incision in water such as pools, hot tubs, baths for at least 6 weeks or until incision is healed  You may get your incision wet in the shower. Allow water and soap to run over incision, and gently pat dry.   Remove the dressing the day after you are discharged from the hospital. Keep the incision clean and dry at all times. This may require several bandage changes.   Contact us right away if you have:   Fever over 101 degrees farenheit  Green or yellow drainage (pus) from your incision or increased bloody drainage   Redness, swelling, or warmth at your surgery site    Notify the clinic [phone].    Activity Restrictions    For the first 6 weeks, no lifting,pushing, or pulling > 5-10 pounds, no bending, twisting.  Use the stairs in moderation   Walking: Walking is the best way to start exercise after surgery. Take short frequent walks. You may gradually increase the distance as tolerated. If you feel pain, decrease your activity, but DO NOT stop walking. Walking will help you regain strength.  Avoid prolonged positioning for longer than 30 minutes (change positions frequently while awake)  No contact sports until after follow up visit  No high impact activities such as; running/jogging, snowmobile or 4 gamez riding or any other recreational vehicles until deemed safe by your surgeon/care team.   Please call the clinic if you develop any of the following symptoms:  Swelling and/or warmth in one or both legs  Pain or tenderness in your leg, ankle, foot, or arm   Red or discolored/pale skin     Post-Op Follow Up Appointments    We will call you to schedule these appointments after your discharge from the hospital.   Incision assessment within 2 weeks with a Registered Nurse   6 week post-op with a Nurse Practitioner/Physician Assistant. Your surgeon will be available on this day.

## 2023-02-03 ENCOUNTER — TELEPHONE (OUTPATIENT)
Dept: FAMILY MEDICINE | Facility: CLINIC | Age: 58
End: 2023-02-03
Payer: COMMERCIAL

## 2023-02-03 DIAGNOSIS — K21.00 GASTROESOPHAGEAL REFLUX DISEASE WITH ESOPHAGITIS WITHOUT HEMORRHAGE: Primary | Chronic | ICD-10-CM

## 2023-02-03 RX ORDER — FAMOTIDINE 20 MG/1
20 TABLET, FILM COATED ORAL 2 TIMES DAILY
Qty: 180 TABLET | Refills: 1 | Status: SHIPPED | OUTPATIENT
Start: 2023-02-03 | End: 2023-04-11

## 2023-02-03 NOTE — TELEPHONE ENCOUNTER
----- Message from Maddy Huerta RN sent at 1/26/2023  1:09 PM CST -----  Regarding: FW: omeprazole  Hello,  You are listed as the PCP for this patient in [facility]. Dr. Manley received an alert about a medication interaction for this patient between the plavix and the omeprazole. He asked me to notify you if you would like to change the omeprazole to another medication.  Thanks!  Maddy WARD  ----- Message -----  From: Kiarra Monreal APRN CNP  Sent: 1/26/2023  12:22 PM CST  To: Maddy Huerta RN  Subject: RE: omeprazole                                   Patient has moved and I am no longer her PCP.  Please send message to her new PCP    Kiarra Mckeon CNP    ----- Message -----  From: Maddy Huerta RN  Sent: 1/26/2023  10:58 AM CST  To: CARLTON Mckeon CNP  Subject: omeprazole                                       Hello,  Dr. Manley received an alert about a medication interaction for this patient between the plavix, and the omeprazole which it says you prescribed for her. He is wondering if you would consider changing the omeprazole to a different medication, or would you like me to forward to her PCP?  Maddy Mckeon RN

## 2023-02-03 NOTE — TELEPHONE ENCOUNTER
Generic message left to return call.    May share the following message:    Patient recently started on Plavix by vascular doctor.  This interacts with her Omeprazole, and the Plavix may not work effectively.    I recommend that patient discontinue the Omeprazole and replace with Famotidine twice daily.  I will send in a prescription to the pharmacy.    Sera Solo NP

## 2023-02-06 NOTE — TELEPHONE ENCOUNTER
Patient called back, I relayed messaged. She had no further questions. I confirmed medication was sent to correct pharmacy.     Courtney Prince RN

## 2023-03-01 ENCOUNTER — TELEPHONE (OUTPATIENT)
Dept: VASCULAR SURGERY | Facility: CLINIC | Age: 58
End: 2023-03-01
Payer: COMMERCIAL

## 2023-03-01 NOTE — TELEPHONE ENCOUNTER
Updated pt regarding time-table to schedule TCAR surgery with Dr. Manley at [place].      Pt was wondering if she can go ahead and have her back fusion surgery done in the meantime prior to her TCAR.  Routing message to Dr. Manley.

## 2023-03-02 NOTE — TELEPHONE ENCOUNTER
"Updated patient that per Dr. Manley, patient would be at \"a slightly high risk for stroke\" if back surgery is done under general anesthesia, and before patient has TCAR.   "

## 2023-03-05 NOTE — PROGRESS NOTES
Clinic Care Coordination Contact  [facility]: Post-Discharge Note  SITUATION                                                      Admission:    Admission Date: 05/06/22   Reason for Admission: Coronary artery disease due to lipid rich plaque  Discharge:   Discharge Date: 05/07/22  Discharge Diagnosis: Coronary artery disease due to lipid rich plaque    BACKGROUND                                                      Per hospital discharge summary and inpatient provider notes:  Selina Parikh is a 56 year old female transferred from [facility] to [facility] on 5/6/2022 for chest pain and coronary catheterization.  She has a history significant for chronic low back pain, GERD, HTN, tobacco abuse.     ACS rule out  Chest pain  Initially presented to [facility] for chest pain with radiation to jaw, relieved with nitroglycerin.  Troponin x3 negative.  ECG without acute ST or T wave changes.  Significant cardiac risk factors including hypertension, hyperlipidemia, tobacco abuse and family history of coronary disease.  Transferred to [facility] for coronary angiogram. Coronary angiogram on 5/6/2022 showing diffuse distal vessel tapering, particularly in the LAD.  No focal or severe atherosclerosis and no stents were placed.  Doing well post procedure, asymptomatic.  Stable for discharge 5/7/2022.  -Continue aspirin 81 mg daily.  -Continue atorvastatin 40 mg daily.  -Continue isosorbide mononitrate 30 mg daily.  -Continue metoprolol 50 mg 2 times daily.  -Tobacco cessation.  -Follow-up with cardiology in 8 to 10 weeks.  -Follow-up with PCP within 7 days.     Hyperlipidemia  Lipid panel on 4/29 with LDL of 137, total cholesterol 209.  10-year ASCVD risk score 8.7.  -Continue atorvastatin 40 mg daily.     Tobacco use disorder.  Started smoking at 9 years of age.  Previously 2 pack a day smoker.  Currently smoking 6 cigarettes daily.  Planning to quit following hospitalization.  -Open to trying Chantix on  "discharge.     Concern for ZEKE  Endorses snoring loudly at night.  STOP-BANG score of at least 3.  Known history of hypertension.  Has had episodes where she feels her \" throat closes\" when changing positions.  -Follow-up with PCP, consider sleep study outpatient.     Chronic conditions:  Hypertension: Previously treated with metoprolol, has been off since October.  New regimen as above.  GERD: PTA omeprazole.  Chronic low back pain: Follows with [facility] [place].  Continue as needed tramadol, continue meloxicam.    ASSESSMENT      Enrollment  Primary Care Care Coordination Status: Not a Candidate    Discharge Assessment  How are you doing now that you are home?: \" Okay but have a Migraine\"  How are your symptoms? (Red Flag symptoms escalate to triage hotline per guidelines): Improved;Worsening (\" Migraine \" Didnt want to connect with Triage will just call Dr if need too.)  Do you feel your condition is stable enough to be safe at home until your provider visit?: Yes  Does the patient have their discharge instructions? : Yes  Does the patient have questions regarding their discharge instructions? : No  Were you started on any new medications or were there changes to any of your previous medications? : Yes  Does the patient have all of their medications?: Yes  Do you have questions regarding any of your medications? : Yes (see comment) (\" Can I just stop taking the pill that gives me the Migraine\")  Do you have all of your needed medical supplies or equipment (DME)?  (i.e. oxygen tank, CPAP, cane, etc.): Yes  Discharge follow-up appointment scheduled within 14 calendar days? : No  Is patient agreeable to assistance with scheduling? : No    Post-op ([facility] Only)  If the patient had a surgery or procedure, do they have any questions for a nurse?: No             PLAN                                                      Outpatient Plan:    Follow up with primary care provider, Kiarra Monreal, within 7 days for " hospital follow- up.  No follow-up lab tests are needed.          Activity     Your activity upon discharge: activity as tolerated      Discharge patient          Diet     Follow this diet upon discharge: Orders Placed This Encounter      Low Saturated Fat Na <2400 mg          Future Appointments   Date Time Provider Department Center   6/2/2022 11:00 AM Sera Solo NP [facility]   6/2/2022  2:10 PM Alicja Rodrigez APRN CNP [facility]   6/8/2022 11:45 AM JUD CARLSON         For any urgent concerns, please contact our 24 hour nurse triage line: [phone] ([phone])         Elyssa Walker MA                 No

## 2023-03-06 ENCOUNTER — TELEPHONE (OUTPATIENT)
Dept: NEUROSURGERY | Facility: CLINIC | Age: 58
End: 2023-03-06
Payer: COMMERCIAL

## 2023-03-06 NOTE — TELEPHONE ENCOUNTER
Patient is calling because she says she has hit her breaking point with her pain. They haven't been able to schedule surgery yet because she hasn't been able to schedule her vascular surgery.   She is wondering what she can do.

## 2023-03-06 NOTE — TELEPHONE ENCOUNTER
"Called and spoke with Selina who inquired about \"another injection\" she may benefit from since she has not been cleared for surgery from a vascular stand point - she was referred to Spine clinic, the number was provided.  Diana Holbrook RN, CNRN    "

## 2023-03-07 ENCOUNTER — VIRTUAL VISIT (OUTPATIENT)
Dept: NEUROSURGERY | Facility: CLINIC | Age: 58
End: 2023-03-07
Payer: COMMERCIAL

## 2023-03-07 DIAGNOSIS — M54.16 LUMBAR RADICULOPATHY: Primary | ICD-10-CM

## 2023-03-07 PROCEDURE — 99441 PR PHYSICIAN TELEPHONE EVALUATION 5-10 MIN: CPT | Mod: 95 | Performed by: SURGERY

## 2023-03-07 NOTE — LETTER
3/7/2023         RE: Selina Parikh  [id number] [facility] [address]        Dear Colleague,    Thank you for referring your patient, Selina Parikh, to the [facility]. Please see a copy of my visit note below.    Billable Telephone Visit:    56 yo female who is s/p L3-5 fusion at OSH and recent left thoracic 12-lumbar 1 microdiscectomy in 9/22. Ongoing L5 radiculopathy. MRI lumbar spine shows moderate facet hypertrophy with effusions and moderate to severe foraminal narrowing at lumbar 5-sacral 1 likely contributing to her symptoms. Recommend exploration and extension of her fusion to S1 with a left lumbar 5-sacral 1 TILF.  Risks and benefits were discussed in detail including but not limited to infection, hematoma, nerve damage including paralysis, post op radiculitis, durotomy, lack of a sold bone fusion, hardware malfunction, risks associated with the use of general anesthesia, blood clots in the lungs or legs. She is an active smoker and will need complete smoking cessation prior. Also will get new vitamin d level. She is scheduled for a carotid stent soon. Will need clearance by vascular surgery in addition to primary and cardiology prior to surgery. She will call to schedule surgery once cleared following her upcoming surgery.     Last few weeks, she has been feeling constant numbness in her leg. Increased leg pain. Bladder is getting more urgent with urinary incontinence. This all worsened after shoveling episode. Recommend urgent MRI lumbar wwo contrast to rule out worsening central stenosis. If no worsening we discussed getting a repeat TFESI for pain control prior to her upcoming carotid surgery.     Location of patient: [place], [place]   Location of Dr Kent: Clinic in [place], [place]   Length of phone conversation: 6:41 cathleen Kent MD      Again, thank you for allowing me to participate in the care of your patient.         Sincerely,        Suha Kent MD

## 2023-03-10 ENCOUNTER — HOSPITAL ENCOUNTER (OUTPATIENT)
Dept: MRI IMAGING | Facility: CLINIC | Age: 58
Discharge: HOME | End: 2023-03-10
Attending: SURGERY | Admitting: SURGERY
Payer: COMMERCIAL

## 2023-03-10 ENCOUNTER — HOSPITAL ENCOUNTER (EMERGENCY)
Facility: CLINIC | Age: 58
Discharge: HOME | End: 2023-03-10
Attending: NURSE PRACTITIONER | Admitting: NURSE PRACTITIONER
Payer: COMMERCIAL

## 2023-03-10 VITALS
TEMPERATURE: 98 F | SYSTOLIC BLOOD PRESSURE: 140 MMHG | RESPIRATION RATE: 20 BRPM | HEART RATE: 80 BPM | DIASTOLIC BLOOD PRESSURE: 80 MMHG | OXYGEN SATURATION: 97 %

## 2023-03-10 DIAGNOSIS — B97.89 VIRAL SINUSITIS: ICD-10-CM

## 2023-03-10 DIAGNOSIS — J32.9 VIRAL SINUSITIS: ICD-10-CM

## 2023-03-10 DIAGNOSIS — M54.16 LUMBAR RADICULOPATHY: ICD-10-CM

## 2023-03-10 LAB
FLUAV RNA SPEC QL NAA+PROBE: NEGATIVE
FLUBV RNA RESP QL NAA+PROBE: NEGATIVE
RSV RNA SPEC NAA+PROBE: NEGATIVE
SARS-COV-2 RNA RESP QL NAA+PROBE: NEGATIVE

## 2023-03-10 PROCEDURE — 87637 SARSCOV2&INF A&B&RSV AMP PRB: CPT | Performed by: NURSE PRACTITIONER

## 2023-03-10 PROCEDURE — G0463 HOSPITAL OUTPT CLINIC VISIT: HCPCS | Mod: CS,25 | Performed by: NURSE PRACTITIONER

## 2023-03-10 PROCEDURE — 99213 OFFICE O/P EST LOW 20 MIN: CPT | Mod: CS | Performed by: NURSE PRACTITIONER

## 2023-03-10 PROCEDURE — C9803 HOPD COVID-19 SPEC COLLECT: HCPCS | Performed by: NURSE PRACTITIONER

## 2023-03-10 PROCEDURE — 72148 MRI LUMBAR SPINE W/O DYE: CPT

## 2023-03-10 RX ORDER — PREDNISONE 20 MG/1
TABLET ORAL
Qty: 10 TABLET | Refills: 0 | Status: SHIPPED | OUTPATIENT
Start: 2023-03-10 | End: 2023-04-11

## 2023-03-10 ASSESSMENT — ENCOUNTER SYMPTOMS
NAUSEA: 0
FATIGUE: 0
SORE THROAT: 0
EYE DISCHARGE: 0
CHILLS: 0
SHORTNESS OF BREATH: 0
DIZZINESS: 0
LIGHT-HEADEDNESS: 0
COUGH: 0
WEAKNESS: 0
RHINORRHEA: 0
VOMITING: 0
NUMBNESS: 0
EYE REDNESS: 0
HEADACHES: 0
FEVER: 0
ARTHRALGIAS: 0
MYALGIAS: 0
ABDOMINAL PAIN: 0
SINUS PRESSURE: 1
JOINT SWELLING: 0
SINUS PAIN: 1
TROUBLE SWALLOWING: 0

## 2023-03-10 NOTE — ED PROVIDER NOTES
History     Chief Complaint   Patient presents with     Sinusitis     Sinusitis for the last week and a half.     HPI  Selina Parikh is a 57 year old female who presents to the urgent care for evaluation of sinus congestion and pressure for 10 days. Reports thick rhinorrhea. Using Neti-bottle. Not using any over the counter medications. Denies fever, headache, dizziness, sore throat, cough, shortness of breath, chest pain, abdominal pain, nausea, vomiting, diarrhea, dysuria, hematuria and rash. No known sick contacts.       Allergies:  Allergies   Allergen Reactions     Ciprofloxacin Anaphylaxis     Flagyl [Metronidazole] Anaphylaxis     Gabapentin Other (See Comments)     Suicidal thoughts.     Zofran [Ondansetron] Other (See Comments) and GI Disturbance     Causes bloating, moderately uncomfortable, abd pain       Benadryl [Diphenhydramine] Other (See Comments)     Muscle spasms     Percocet [Oxycodone-Acetaminophen] Other (See Comments)     Goes nuts - nasty mean irritated, can take Dilaudid     Vicodin [Hydrocodone-Acetaminophen] Other (See Comments)     Anxiety-agitation       Problem List:    Patient Active Problem List    Diagnosis Date Noted     Primary hypertension 05/07/2022     Priority: Medium     Status post coronary angiogram 05/06/2022     Priority: Medium     Coronary artery disease due to lipid rich plaque      Priority: Medium     Chest pain, unspecified type 05/05/2022     Priority: Medium     Acute chest pain 04/28/2022     Priority: Medium     Hiatal hernia 04/15/2021     Priority: Medium     Formatting of this note might be different from the original.  Added automatically from request for surgery [id number]       Spinal stenosis of lumbar region with neurogenic claudication 06/04/2018     Priority: Medium     S/P partial colectomy 04/10/2018     Priority: Medium     Diverticulitis 01/28/2018     Priority: Medium     Psoriasis 06/20/2012     Priority: Medium     GERD (gastroesophageal reflux  disease) 03/15/2011     Priority: Medium     Tobacco abuse 02/17/2011     Priority: Medium     Health Care Home 01/27/2011     Priority: Medium     DX V65.8 REPLACED WITH 45502 [facility]ALTH CARE HOME (04/08/2013)       CARDIOVASCULAR SCREENING; LDL GOAL LESS THAN 160 10/31/2010     Priority: Medium     R Occipital Neuralgia 10/07/2009     Priority: Medium     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 On fiber, senna, and MOM. Advised to d/c MOM, start Miralax and titer to regular BM's. Pt has been seen by GI.        Brain syndrome, posttraumatic 03/06/2009     Priority: Medium     Work comp.  Has seen Dr. Ahmadi, [facility] of neurology.  MRIs, EMG unremarkalbe, some foraminal stenosis on C5/C6  Sent her to physiatrist, trigger point injections didn't help.  After some neck traction, had intense unremitting HA, neck pain.  Off/on tingling in arms.  Pain clinic and/or spine surg for more eval probable next steps.    Has failed multiple rescue and preventive HA meds. On no narcotics          Past Medical History:    Past Medical History:   Diagnosis Date     Cancer (H)      Coronary artery disease      Hiatal hernia      Hypertension      Other chronic pain        Past Surgical History:    Past Surgical History:   Procedure Laterality Date     BACK SURGERY       CHOLECYSTECTOMY       CHOLECYSTECTOMY, LAPOROSCOPIC  2/14/2000    Cholecystectomy, Laparoscopic     COLON SURGERY       CV CORONARY ANGIOGRAM N/A 5/6/2022    Procedure: CV CORONARY ANGIOGRAM;  Surgeon: Stefanie Spangler MD;  Location: [facility] CATH LAB CV     CV LEFT HEART CATH N/A 5/6/2022    Procedure: Left Heart Catheterization;  Surgeon: Stefanie Spangler MD;  Location: [facility] CATH LAB CV     CYSTOSCOPY, INSERT LIGHTED STENT URETER(S) N/A 4/10/2018    Procedure: CYSTOSCOPY, INSERT LIGHTED  STENT URETER(S);  Lighted Stent Placement,Laparoscopic Assisted Sigmoid Colectomy;  Surgeon: ERVIN Reina MD;  Location: [facility] OR     LAPAROSCOPIC ASSISTED COLECTOMY LEFT (DESCENDING) N/A 4/10/2018    Procedure: LAPAROSCOPIC ASSISTED COLECTOMY LEFT (DESCENDING);;  Surgeon: Hill Murray MD;  Location: [facility] OR     LUMBAR DISCECTOMY Left 2022    Procedure: left thoracic 12-lumbar 1 hemilaminectomy, medial facetectomy, foraminotomy and microdiscectomy;  Surgeon: Suha Kent MD;  Location: [facility]     NECK SURGERY       ORTHOPEDIC SURGERY  10/2010    Cut palm and reattched tendons and nerves in left hand forefinger.     IL ESOPHAGOGASTRODUODENOSCOPY TRANSORAL DIAGNOSTIC N/A 2021    Procedure: ESOPHAGOGASTRODUODENOSCOPY (EGD);  Surgeon: Souleymane Moreno DO;  Location: [facility];  Service: General     SURGICAL HISTORY OF -   2000    Esophagogastroduodenoscopy with biopsy     TUBAL LIGATION       TUBAL LIGATION         Family History:    Family History   Problem Relation Age of Onset     C.A.D. Father 50        50's     Diabetes Father      Diabetes Brother      C.A.D. Brother 42        quad bypass and MI     Diabetes Brother         2 brothers have DM     Cancer Brother 34        Melanoma     Aortic aneurysm Brother      Allergies Son         Meds     Allergies Daughter         Med     Cancer Mother      C.A.D. Brother 38        MI age 38     Diabetes Mother      Diabetes Brother        Social History:  Marital Status:   [4]  Social History     Tobacco Use     Smoking status: Former     Packs/day: 0.50     Years: 30.00     Pack years: 15.00     Types: Cigarettes     Quit date: 2023     Years since quittin.0     Smokeless tobacco: Never     Tobacco comments:     3 Cigs a day   Vaping Use     Vaping Use: Never used   Substance Use Topics     Alcohol use: Not Currently     Comment: Alcoholic Drinks/day: 2x year      Drug use: Not Currently        Medications:     predniSONE (DELTASONE) 20 MG tablet  acetaminophen (TYLENOL) 500 MG tablet  acetaminophen (TYLENOL) 500 MG tablet  aspirin (ASA) 81 MG EC tablet  atorvastatin (LIPITOR) 40 MG tablet  celecoxib (CELEBREX) 100 MG capsule  clobetasol (TEMOVATE) 0.05 % external ointment  clopidogrel (PLAVIX) 75 MG tablet  famotidine (PEPCID) 20 MG tablet  fish oil-omega-3 fatty acids 1000 MG capsule  lactobacillus rhamnosus (GG) (CULTURELL) capsule  Melatonin 10 MG TABS tablet  metoprolol succinate ER (TOPROL XL) 50 MG 24 hr tablet  naloxone (NARCAN) 4 MG/0.1ML nasal spray  nitroGLYcerin (NITROSTAT) 0.4 MG sublingual tablet  omeprazole (PRILOSEC) 20 MG DR capsule  Vitamin D (Cholecalciferol) 25 MCG (1000 UT) TABS          Review of Systems   Constitutional: Negative for chills, fatigue and fever.   HENT: Positive for congestion, sinus pressure and sinus pain. Negative for ear discharge, ear pain, rhinorrhea, sore throat and trouble swallowing.    Eyes: Negative for discharge and redness.   Respiratory: Negative for cough and shortness of breath.    Cardiovascular: Negative for chest pain.   Gastrointestinal: Negative for abdominal pain, nausea and vomiting.   Musculoskeletal: Negative for arthralgias, joint swelling and myalgias.   Skin: Negative for rash.   Neurological: Negative for dizziness, weakness, light-headedness, numbness and headaches.   All other systems reviewed and are negative.      Physical Exam   BP: (!) 140/80  Pulse: 80  Temp: 98  F (36.7  C)  Resp: 20  SpO2: 97 %      Physical Exam  Constitutional:       General: She is not in acute distress.     Appearance: She is well-developed. She is not diaphoretic.   HENT:      Nose: Congestion and rhinorrhea present.   Eyes:      Conjunctiva/sclera: Conjunctivae normal.      Pupils: Pupils are equal, round, and reactive to light.   Cardiovascular:      Rate and Rhythm: Normal rate and regular rhythm.      Pulses: Normal pulses.   Pulmonary:      Effort: Pulmonary effort is  normal. No respiratory distress.      Breath sounds: Normal breath sounds and air entry. No decreased air movement. No decreased breath sounds, wheezing or rhonchi.   Musculoskeletal:         General: Normal range of motion.      Cervical back: Normal range of motion and neck supple.   Skin:     General: Skin is warm.      Capillary Refill: Capillary refill takes less than 2 seconds.   Neurological:      General: No focal deficit present.      Mental Status: She is alert and oriented to person, place, and time.   Psychiatric:         Mood and Affect: Mood normal.         ED Course                 Procedures    Results for orders placed or performed during the hospital encounter of 03/10/23 (from the past 24 hour(s))   Asymptomatic Influenza A/B, RSV, & SARS-CoV2 PCR (COVID-19) Nasopharyngeal    Specimen: Nasopharyngeal; Swab   Result Value Ref Range    Influenza A PCR Negative Negative    Influenza B PCR Negative Negative    RSV PCR Negative Negative    SARS CoV2 PCR Negative Negative    Narrative    Testing was performed using the Xpert Xpress CoV2/Flu/RSV Assay on the [employer] GeneXpert Instrument. This test should be ordered for the detection of SARS-CoV-2, influenza, and RSV viruses in individuals who meet clinical and/or epidemiological criteria. Test performance is unknown in asymptomatic patients. This test is for in vitro diagnostic use under the FDA EUA for laboratories certified under CLIA to perform high or moderate complexity testing. This test has not been FDA cleared or approved. A negative result does not rule out the presence of PCR inhibitors in the specimen or target RNA in concentration below the limit of detection for the assay. If only one viral target is positive but coinfection with multiple targets is suspected, the sample should be re-tested with another FDA cleared, approved, or authorized test, if coinfection would change clinical management. This test was validated by the [facility]  Laboratories. These laboratories are certified under the Clinical Laboratory Improvement Amendments of 1988 (CLIA-88) as qualified to perform high complexity laboratory testing.       Medications - No data to display    Assessments & Plan (with Medical Decision Making)   Selina Parikh is a 57 year old female who presents to the urgent care for evaluation of sinus congestion and pressure for 10 days. Reports thick rhinorrhea. Slightly hypertensive, remaining vitals normal. Exam and history consistent with sinusitis. Discussed likely viral etiology of symptoms and average course of illness. Would like to try prednisone. May use over the counter medications as needed and appropriate. Increase rest and hydration. Return precautions reviewed, all questions answered. Patient is agreeable to plan of care and discharged in good condition.    I have reviewed the nursing notes.    I have reviewed the findings, diagnosis, plan and need for follow up with the patient.    Discharge Medication List as of 3/10/2023  2:28 PM      START taking these medications    Details   predniSONE (DELTASONE) 20 MG tablet Take two tablets (= 40mg) each day for 5 (five) days, Disp-10 tablet, R-0, E-Prescribe           Final diagnoses:   Viral sinusitis     3/10/2023   [facility] EMERGENCY DEPT     Mary Grace Magaña, APRN CNP  03/10/23 [id number]

## 2023-03-13 ENCOUNTER — TELEPHONE (OUTPATIENT)
Dept: NEUROSURGERY | Facility: CLINIC | Age: 58
End: 2023-03-13
Payer: COMMERCIAL

## 2023-03-13 NOTE — TELEPHONE ENCOUNTER
Writer called patient to offer appt this week to go over imaging results per MD Kent. LVM offered an opening this Thursday.

## 2023-03-17 ENCOUNTER — OFFICE VISIT (OUTPATIENT)
Dept: NEUROSURGERY | Facility: CLINIC | Age: 58
End: 2023-03-17
Payer: COMMERCIAL

## 2023-03-17 VITALS — OXYGEN SATURATION: 95 % | BODY MASS INDEX: 32.32 KG/M2 | HEART RATE: 85 BPM | WEIGHT: 160 LBS

## 2023-03-17 DIAGNOSIS — M54.16 LUMBAR RADICULOPATHY: Primary | ICD-10-CM

## 2023-03-17 PROCEDURE — 99213 OFFICE O/P EST LOW 20 MIN: CPT | Performed by: NURSE PRACTITIONER

## 2023-03-17 ASSESSMENT — PAIN SCALES - GENERAL: PAINLEVEL: MODERATE PAIN (4)

## 2023-03-17 ASSESSMENT — PATIENT HEALTH QUESTIONNAIRE - PHQ9: SUM OF ALL RESPONSES TO PHQ QUESTIONS 1-9: 19

## 2023-03-17 NOTE — PROGRESS NOTES
Neurosurgery clinic note:      A/P:  Selina is a 58yo patient s/p L3-5 fusion at OSH and recent left thoracic 12-lumbar 1 microdiscectomy with Dr Kent in sept 2022. She has experienced ongoing lumbar radiculopathy and is here for a follow up appt to review her new lumbar MRI. For greater detail, please see Dr Kent's note from telephone visit 3/7/23    Selina reports continued left lower back pain down the left leg, wrapping around the left posterolateral thigh and down the leg into the foot. She has some pain down the right thigh as well, but not as bad as the left side. Her left lower back feels bruised, painful and sore to touch, even with wearing pants. She has pain in both hip joints and sonal horses in the bottoms of both feet. She has stable chronic significant numbness/tingling in both legs. Legs feel like theyre going to give out. She feels like she's back to where she was preoperatively in terms of pain. She denies any symptoms down her arms. Sinusitis recently, just finished 5 days of prednisone 40mg. The medication did not improve her leg symptoms. She still has sinus pain and plans to get seen again in the next few days for her symptoms.    We reviewed her new lumbar MRI. The central canal stenosis has not significantly progressed at L1-2 or become severe at any other levels in comparison to her previous MRIs. She has significant left SI joint tenderness on exam, but would not expect the pain from sacroiliitis to extend all the way down the leg. Her Left L5-S1 TF MALIK did help for a few weeks last time. Per Dr Kent's previous note, would proceed with another inj while she waits for her vascular surgery. She tells me today that it is not yet booked because the surgeon needs to get credentialing at [facility].  Will need vascular surgery and cardiology clearance prior to proceeding with exploration of prior fusion with extension to L5-S1 with left lumbar L5-1 TLIF with Dr Kent.    Plan:  1.  Repeat left L5-S1 TF MALIK.   2. Follow up telephone visit with STACEY on Dr Kent day 2-3wks sp injection to talk about next steps  3. Could try left SI joint injection if no improvement with MALIK  4. Surgery with Dr Kent after her vascular surgery. / [facility] surgery & cardiology approval      Exam:    General: seated in NAD, appears comfortable    Strength:  Deltoids: 5/5 right, 5/5 left  Triceps: 5/5 right, 5/5 left  Biceps: 5/5 right, 5/5 left  Handgrips: 5/5 right, 5/5 left  Intrinsics: 5/5 right, 5/5 left  Extensors: 5/5 right, 5/5 left  Hip flexors: 5/5 right, 5/5 left  Quads: 5/5 right, 5/5 left  Hamstrings: 5/5 right, 5/5 left  Dorsiflexion: 5/5 right, 5/5 left  Plantarflexion 5/5 right, 5/5 left  EHL: 5/5 right, 5/5 left    Sensation is grossly intact to light touch throughout upper and lower extremities bilaterally    Reflexes:  0+ bilaterally throughout the upper and lower extremities    No clonus, babinski, or keller    Gait is careful, antalgic. Able to walk on heels/toes/tandem    Positive left SI joint tenderness. Negative right SI joint tenderness  No hip joint tenderness    Negative straight leg raises    Positive lumbar point tenderness  Negative paraspinal musculature tenderness      Imaging:  Personally reviewed the following imaging studies today. Imaging also shown to patient at time of appt.    MRI OF THE LUMBAR SPINE WITHOUT CONTRAST   3/10/2023 1:17 PM      COMPARISON: Lumbar spine MRI 03/08/2021. Lumbar spine CT 09/29/2021.     HISTORY: Lumbar radiculopathy     TECHNIQUE: Multiplanar, multisequence MRI images of the lumbar spine  were acquired without IV contrast.     FINDINGS: There are five lumbar-type vertebrae for the purposes of  this dictation. The conus ends at L2-L3. 19 degrees of levocurvature  from L1 to L3. 4 mm retrolisthesis of L1 on L2. Postsurgical changes  of posterior instrumented fusion from L3 to L5. Interbody fusion of  L3-L4 and L4-L5. Vertebral body heights are  maintained. Nonpathologic  marrow signal. The visualized bony pelvis is grossly within normal  limits.     Moderate symmetric atrophy of the posterior paraspinal musculature.  Normal diameter of the distal abdominal aorta. Left hemilaminectomy  and left medial facetectomy changes at L3-L4 and L4-L5. Small amount  of edema in the posterior paraspinal soft tissues from L3 to L5,  decreased since the prior lumbar spine MRI.     T12-L1: Moderate intervertebral disc height loss. Loss of the normal  T2 signal within the disc. Broad-based disc bulge with superimposed  small central disc extrusion. Moderate bilateral facet arthropathy.  Moderate spinal canal narrowing. No right neural foraminal narrowing.  No left neural foraminal narrowing.     L1-L2: Moderate intervertebral disc height loss. Loss of the normal T2  signal within the disc. Broad-based disc bulge with superimposed small  central disc protrusion. Moderate bilateral facet arthropathy.  Moderate to severe spinal canal narrowing. Moderate to severe right  neuroforaminal narrowing. No left neuroforaminal narrowing.     L2-L3: Mild intervertebral disc height loss. Loss of the normal T2  signal within the disc. Broad-based disc bulge with superimposed right  foraminal disc protrusion. Moderate bilateral facet arthropathy.  Moderate spinal canal narrowing. Mild to moderate right neural  foraminal narrowing. No left neural foraminal narrowing.     L3-L4: Postsurgical changes of 360 degree fusion. Left hemilaminectomy  changes. No spinal canal narrowing. No right neural foraminal  narrowing. No left neural foraminal narrowing.     L4-L5: Postsurgical changes of 360 degree fusion. Left hemilaminectomy  changes. No spinal canal narrowing. No neural foraminal narrowing.     L5-S1: Mild intervertebral disc height loss. Loss of the normal T2  signal within the disc. Broad-based disc bulge with superimposed small  central disc protrusion. Moderate bilateral facet  arthropathy. Mild to  moderate spinal canal narrowing. No right neuroforaminal narrowing.  Moderate left neuroforaminal narrowing.                                                                      IMPRESSION:  1.  Postsurgical changes of posterior instrumented fusion from L3 to  L5. Interbody fusion of L3-L4 and L4-L5.   2.  Left hemilaminectomy and left medial facetectomy changes at L3-L4  and L4-L5. Small amount of edema in the posterior paraspinal soft  tissues from L3 to L5, decreased since the prior lumbar spine MRI  03/08/2021.  3.  Moderate multilevel degenerative changes of the lumbar spine,  worsened since prior lumbar spine MRI.  4.  Moderate to severe spinal canal narrowing at L1-L2.  5.  Moderate spinal canal narrowing at T12-L1 and L2-L3.  6.  Moderate to severe right neural foraminal narrowing at L1-L2.  7.  Moderate left neuroforaminal narrowing at L5-S1.           Zahra Erazo FNP-C  [facility]  O. [phone]

## 2023-03-17 NOTE — PATIENT INSTRUCTIONS
[facility] Injection Requirements    A  is required for all fluoroscopically-guided injections.  Injection appointments may be cancelled if there are signs/symptoms of an active infection or if the patient is being actively treated with antibiotics for a diagnosed infection.  Patients may have their steroid injection cancelled if they have had another steroid injection within 2 weeks.  Diabetic patients will have their blood glucose levels checked the day of their injection and the appointment will be rescheduled if the blood glucose level is 300 or higher.  Patients with allergies to cortisone, local anesthetics, iodine, or contrast dye should contact the Spine Center to further discuss these considerations.  Patients scheduled for medial branch block diagnostic injections should refrain from taking pain medication the day of the procedure.  The medial branch block injection appointment will be rescheduled if the patient's pain rating is not 5/10 or greater at the time of the procedure.  Patients taking warfarin/Coumadin will have their INR checked the day of the procedure and the procedure may be rescheduled if the INR is greater than 3.0.  Please contact the Spine Center (#[phone]) if you are taking any prescription blood-thinning medications (warfarin, Plavix, Lovenox, Eliquis, Brilinta, Effient, etc.) as special dosing adjustments may need to be made depending on the type of injection you are scheduled to receive.  It is recommended that you delay having your steroid injection if you have received any vaccines within 2 weeks.

## 2023-03-17 NOTE — NURSING NOTE
"Selina Parikh is a 57 year old female who presents for:  Chief Complaint   Patient presents with     Follow Up     Imaging     Back Pain     Low back - hips - legs  Mid back      Neurologic Problem     Numbness in both legs  Shooting pain from LE up calves          Initial Vitals:  Pulse 85   Wt 160 lb (72.6 kg)   LMP 04/10/2016   SpO2 95%   BMI 32.32 kg/m   Estimated body mass index is 32.32 kg/m  as calculated from the following:    Height as of 2/2/23: 4' 11\" (1.499 m).    Weight as of this encounter: 160 lb (72.6 kg).. Body surface area is 1.74 meters squared. BP completed using cuff size: regular  Moderate Pain (4)      Vic Garland    "

## 2023-03-17 NOTE — LETTER
3/17/2023         RE: Selina Parikh  [address] [facility] [address]        Dear Colleague,    Thank you for referring your patient, Selina Parikh, to the [facility]. Please see a copy of my visit note below.    Neurosurgery clinic note:      A/P:  Selina is a 56yo patient s/p L3-5 fusion at [facility] and recent left thoracic 12-lumbar 1 microdiscectomy with Dr Kent in sept 2022. She has experienced ongoing lumbar radiculopathy and is here for a follow up appt to review her new lumbar MRI. For greater detail, please see Dr Kent's note from telephone visit 3/7/23    Selina reports continued left lower back pain down the left leg, wrapping around the left posterolateral thigh and down the leg into the foot. She has some pain down the right thigh as well, but not as bad as the left side. Her left lower back feels bruised, painful and sore to touch, even with wearing pants. She has pain in both hip joints and sonal horses in the bottoms of both feet. She has stable chronic significant numbness/tingling in both legs. Legs feel like theyre going to give out. She feels like she's back to where she was preoperatively in terms of pain. She denies any symptoms down her arms. Sinusitis recently, just finished 5 days of prednisone 40mg. The medication did not improve her leg symptoms. She still has sinus pain and plans to get seen again in the next few days for her symptoms.    We reviewed her new lumbar MRI. The central canal stenosis has not significantly progressed at L1-2 or become severe at any other levels in comparison to her previous MRIs. She has significant left SI joint tenderness on exam, but would not expect the pain from sacroiliitis to extend all the way down the leg. Her Left L5-S1 TF MALIK did help for a few weeks last time. Per Dr Kent's previous note, would proceed with another inj while she waits for her vascular surgery. She tells me today that it is not yet  booked because the surgeon needs to get credentialing at [facility].  Will need vascular surgery and cardiology clearance prior to proceeding with exploration of prior fusion with extension to L5-S1 with left lumbar L5-1 TLIF with Dr Kent.    Plan:  1. Repeat left L5-S1 TF MALIK.   2. Follow up telephone visit with STACEY on Dr Kent day 2-3wks sp injection to talk about next steps  3. Could try left SI joint injection if no improvement with MALIK  4. Surgery with Dr Kent after her vascular surgery. w/ [facility] surgery & cardiology approval      Exam:    General: seated in NAD, appears comfortable    Strength:  Deltoids: 5/5 right, 5/5 left  Triceps: 5/5 right, 5/5 left  Biceps: 5/5 right, 5/5 left  Handgrips: 5/5 right, 5/5 left  Intrinsics: 5/5 right, 5/5 left  Extensors: 5/5 right, 5/5 left  Hip flexors: 5/5 right, 5/5 left  Quads: 5/5 right, 5/5 left  Hamstrings: 5/5 right, 5/5 left  Dorsiflexion: 5/5 right, 5/5 left  Plantarflexion 5/5 right, 5/5 left  EHL: 5/5 right, 5/5 left    Sensation is grossly intact to light touch throughout upper and lower extremities bilaterally    Reflexes:  0+ bilaterally throughout the upper and lower extremities    No clonus, babinski, or keller    Gait is careful, antalgic. Able to walk on heels/toes/tandem    Positive left SI joint tenderness. Negative right SI joint tenderness  No hip joint tenderness    Negative straight leg raises    Positive lumbar point tenderness  Negative paraspinal musculature tenderness      Imaging:  Personally reviewed the following imaging studies today. Imaging also shown to patient at time of appt.    MRI OF THE LUMBAR SPINE WITHOUT CONTRAST   3/10/2023 1:17 PM      COMPARISON: Lumbar spine MRI 03/08/2021. Lumbar spine CT 09/29/2021.     HISTORY: Lumbar radiculopathy     TECHNIQUE: Multiplanar, multisequence MRI images of the lumbar spine  were acquired without IV contrast.     FINDINGS: There are five lumbar-type vertebrae for the purposes of  this  dictation. The conus ends at L2-L3. 19 degrees of levocurvature  from L1 to L3. 4 mm retrolisthesis of L1 on L2. Postsurgical changes  of posterior instrumented fusion from L3 to L5. Interbody fusion of  L3-L4 and L4-L5. Vertebral body heights are maintained. Nonpathologic  marrow signal. The visualized bony pelvis is grossly within normal  limits.     Moderate symmetric atrophy of the posterior paraspinal musculature.  Normal diameter of the distal abdominal aorta. Left hemilaminectomy  and left medial facetectomy changes at L3-L4 and L4-L5. Small amount  of edema in the posterior paraspinal soft tissues from L3 to L5,  decreased since the prior lumbar spine MRI.     T12-L1: Moderate intervertebral disc height loss. Loss of the normal  T2 signal within the disc. Broad-based disc bulge with superimposed  small central disc extrusion. Moderate bilateral facet arthropathy.  Moderate spinal canal narrowing. No right neural foraminal narrowing.  No left neural foraminal narrowing.     L1-L2: Moderate intervertebral disc height loss. Loss of the normal T2  signal within the disc. Broad-based disc bulge with superimposed small  central disc protrusion. Moderate bilateral facet arthropathy.  Moderate to severe spinal canal narrowing. Moderate to severe right  neuroforaminal narrowing. No left neuroforaminal narrowing.     L2-L3: Mild intervertebral disc height loss. Loss of the normal T2  signal within the disc. Broad-based disc bulge with superimposed right  foraminal disc protrusion. Moderate bilateral facet arthropathy.  Moderate spinal canal narrowing. Mild to moderate right neural  foraminal narrowing. No left neural foraminal narrowing.     L3-L4: Postsurgical changes of 360 degree fusion. Left hemilaminectomy  changes. No spinal canal narrowing. No right neural foraminal  narrowing. No left neural foraminal narrowing.     L4-L5: Postsurgical changes of 360 degree fusion. Left hemilaminectomy  changes. No spinal  canal narrowing. No neural foraminal narrowing.     L5-S1: Mild intervertebral disc height loss. Loss of the normal T2  signal within the disc. Broad-based disc bulge with superimposed small  central disc protrusion. Moderate bilateral facet arthropathy. Mild to  moderate spinal canal narrowing. No right neuroforaminal narrowing.  Moderate left neuroforaminal narrowing.                                                                      IMPRESSION:  1.  Postsurgical changes of posterior instrumented fusion from L3 to  L5. Interbody fusion of L3-L4 and L4-L5.   2.  Left hemilaminectomy and left medial facetectomy changes at L3-L4  and L4-L5. Small amount of edema in the posterior paraspinal soft  tissues from L3 to L5, decreased since the prior lumbar spine MRI  03/08/2021.  3.  Moderate multilevel degenerative changes of the lumbar spine,  worsened since prior lumbar spine MRI.  4.  Moderate to severe spinal canal narrowing at L1-L2.  5.  Moderate spinal canal narrowing at T12-L1 and L2-L3.  6.  Moderate to severe right neural foraminal narrowing at L1-L2.  7.  Moderate left neuroforaminal narrowing at L5-S1.           Zahra Erazo FNP-C  [facility]  O. [phone]          Again, thank you for allowing me to participate in the care of your patient.        Sincerely,        Zahra Erazo, CARLTON CNP

## 2023-03-21 NOTE — TELEPHONE ENCOUNTER
Notified patient that care coordinator will reach out to her tomorrow. He is out of office today.

## 2023-03-21 NOTE — TELEPHONE ENCOUNTER
Patient would like to schedule her surgery with Dr. Manley as soon as possible She states she needs to have vascular surgery before she can schedule her back fusion, and she is in a significant amount of pain. PH: [phone]

## 2023-03-27 ENCOUNTER — PREP FOR PROCEDURE (OUTPATIENT)
Dept: VASCULAR SURGERY | Facility: CLINIC | Age: 58
End: 2023-03-27
Payer: COMMERCIAL

## 2023-03-29 ENCOUNTER — DOCUMENTATION ONLY (OUTPATIENT)
Dept: VASCULAR SURGERY | Facility: CLINIC | Age: 58
End: 2023-03-29
Payer: COMMERCIAL

## 2023-03-29 DIAGNOSIS — I65.23 CAROTID STENOSIS, ASYMPTOMATIC, BILATERAL: ICD-10-CM

## 2023-03-29 NOTE — PROGRESS NOTES
Surgery Scheduled    Reviewed with pt - pt will schedule preop exam. Writer will mail instructions to pt.      Surgery/Procedure: Left transcarotid revasculariation    Special Equipment: IR tech    Location: [facility]:  [facility] [place] [address] (phone: [phone], Fax: [phone])    Date: 4/21/23    Time: 11:10 AM    Admission Type: Inpatient    Surgeon: Dr. Manley    OR Confirmed & :  Yes with Maddy on 3/29/2023    IR Confirmed & : Yes with Souza on 3/29/2023    Entered on provider calendar:  Yes    Post Op:   5/1/2023 11:15 AM (Arrive by 11:00 AM) JOHNW [address] [facility]   5/1/2023 12:00 PM (Arrive by 11:45 AM) [facility]         Wound Vac Needed: No    Home Care Needed: No    Reps Contacted: Writer emailed Gurvinder Mcgee on 3/29/23.    Blood Thinners Addressed: N/a    Stress Test Clearance: NO

## 2023-04-04 ENCOUNTER — TELEPHONE (OUTPATIENT)
Dept: NEUROSURGERY | Facility: CLINIC | Age: 58
End: 2023-04-04
Payer: COMMERCIAL

## 2023-04-04 NOTE — TELEPHONE ENCOUNTER
Pt was updated per Dr. Manley to postpone spine injection and surgery until after her TCAR. She is wondering how long she will need to wait  until after the TCAR to have an injection and surgery. Please advise and update the patient.

## 2023-04-04 NOTE — TELEPHONE ENCOUNTER
Patient is scheduled for a left L5-S1 TFESI on 4/6/2023. Pt is also scheduled for left transcarotid revascularization on 4/21/2023.    Call placed to patient requesting that she check with her heart and vascular surgeon's office to ensure that it is alright for her to have steroid injection 15 days prior to surgery. Pt was informed that if they are alright with her proceeding with the injection that she does not have to contact us, however if they would like to her wait and reschedule her spine injection to give us a call back so that we can reschedule her. Pt was provided with the phone number for the spine center nurse navigation line should she have any questions or concerns or need to reschedule.     No further questions at this time.

## 2023-04-05 ENCOUNTER — TELEPHONE (OUTPATIENT)
Dept: NEUROSURGERY | Facility: CLINIC | Age: 58
End: 2023-04-05
Payer: COMMERCIAL

## 2023-04-05 NOTE — TELEPHONE ENCOUNTER
Updated patient on below:  Eveline Manley MD  You; Enriqueta Dukes RN 1 hour ago (8:44 AM)     YT  Depending if they stop plavix for injections. If they do -6 weeks then

## 2023-04-05 NOTE — TELEPHONE ENCOUNTER
Called and let Selina know she should discuss pain meds options with her PCP  - she verbalized agreement.  Diana Holbrook RN, CNRN

## 2023-04-05 NOTE — TELEPHONE ENCOUNTER
Patients injection was denied, but when we called patient, she informed us that her vascular surgeon denied her having anything with steroids.     She is wondering if there is anything she can take for the pain prior to 4/21 date of surgery with vascular surgeon.     Please call patient back.

## 2023-04-10 ENCOUNTER — TELEPHONE (OUTPATIENT)
Dept: VASCULAR SURGERY | Facility: CLINIC | Age: 58
End: 2023-04-10
Payer: COMMERCIAL

## 2023-04-10 DIAGNOSIS — I65.22 ASYMPTOMATIC STENOSIS OF LEFT CAROTID ARTERY: Primary | ICD-10-CM

## 2023-04-10 NOTE — TELEPHONE ENCOUNTER
TCAR surgery scheduled for 4/21 with Dr. Manley.     Medications patient must be on prior to surgery: [x] Aspirin   [x] Plavix    [x] Statin    Other blood thinners that may need to be addressed: N/A    Plavix Resistance Lab (P2Y12) completed:  [] Yes, Result:   (If > 180, notify surgeon. Will need to change Plavix prescription to Brilinta)

## 2023-04-11 ENCOUNTER — LAB (OUTPATIENT)
Dept: LAB | Facility: HOSPITAL | Age: 58
End: 2023-04-11
Payer: COMMERCIAL

## 2023-04-11 ENCOUNTER — OFFICE VISIT (OUTPATIENT)
Dept: FAMILY MEDICINE | Facility: CLINIC | Age: 58
End: 2023-04-11
Payer: COMMERCIAL

## 2023-04-11 VITALS
TEMPERATURE: 97.4 F | SYSTOLIC BLOOD PRESSURE: 132 MMHG | HEART RATE: 79 BPM | WEIGHT: 159 LBS | DIASTOLIC BLOOD PRESSURE: 80 MMHG | HEIGHT: 59 IN | BODY MASS INDEX: 32.05 KG/M2 | OXYGEN SATURATION: 96 %

## 2023-04-11 DIAGNOSIS — I65.22 ASYMPTOMATIC STENOSIS OF LEFT CAROTID ARTERY: ICD-10-CM

## 2023-04-11 DIAGNOSIS — J01.90 ACUTE SINUSITIS WITH SYMPTOMS > 10 DAYS: ICD-10-CM

## 2023-04-11 DIAGNOSIS — Z01.818 PREOP GENERAL PHYSICAL EXAM: ICD-10-CM

## 2023-04-11 DIAGNOSIS — R07.9 ACUTE CHEST PAIN: ICD-10-CM

## 2023-04-11 DIAGNOSIS — Z72.0 TOBACCO ABUSE: Chronic | ICD-10-CM

## 2023-04-11 DIAGNOSIS — I25.83 CORONARY ARTERY DISEASE DUE TO LIPID RICH PLAQUE: ICD-10-CM

## 2023-04-11 DIAGNOSIS — Z01.818 PREOP GENERAL PHYSICAL EXAM: Primary | ICD-10-CM

## 2023-04-11 DIAGNOSIS — G89.29 CHRONIC LOW BACK PAIN WITH LEFT-SIDED SCIATICA, UNSPECIFIED BACK PAIN LATERALITY: ICD-10-CM

## 2023-04-11 DIAGNOSIS — I10 PRIMARY HYPERTENSION: ICD-10-CM

## 2023-04-11 DIAGNOSIS — M54.42 CHRONIC LOW BACK PAIN WITH LEFT-SIDED SCIATICA, UNSPECIFIED BACK PAIN LATERALITY: ICD-10-CM

## 2023-04-11 DIAGNOSIS — I25.10 CORONARY ARTERY DISEASE DUE TO LIPID RICH PLAQUE: ICD-10-CM

## 2023-04-11 LAB
ANION GAP SERPL CALCULATED.3IONS-SCNC: 10 MMOL/L (ref 7–15)
BUN SERPL-MCNC: 17.4 MG/DL (ref 6–20)
CALCIUM SERPL-MCNC: 9.7 MG/DL (ref 8.6–10)
CHLORIDE SERPL-SCNC: 104 MMOL/L (ref 98–107)
CREAT SERPL-MCNC: 0.71 MG/DL (ref 0.51–0.95)
DEPRECATED HCO3 PLAS-SCNC: 24 MMOL/L (ref 22–29)
ERYTHROCYTE [DISTWIDTH] IN BLOOD BY AUTOMATED COUNT: 13.7 % (ref 10–15)
GFR SERPL CREATININE-BSD FRML MDRD: >90 ML/MIN/1.73M2
GLUCOSE SERPL-MCNC: 100 MG/DL (ref 70–99)
HCT VFR BLD AUTO: 40.8 % (ref 35–47)
HGB BLD-MCNC: 13.4 G/DL (ref 11.7–15.7)
MCH RBC QN AUTO: 30.6 PG (ref 26.5–33)
MCHC RBC AUTO-ENTMCNC: 32.8 G/DL (ref 31.5–36.5)
MCV RBC AUTO: 93 FL (ref 78–100)
PA ADP BLD-ACNC: 33 PRU
PLATELET # BLD AUTO: 275 10E3/UL (ref 150–450)
POTASSIUM SERPL-SCNC: 3.9 MMOL/L (ref 3.4–5.3)
RBC # BLD AUTO: 4.38 10E6/UL (ref 3.8–5.2)
SODIUM SERPL-SCNC: 138 MMOL/L (ref 136–145)
WBC # BLD AUTO: 7.4 10E3/UL (ref 4–11)

## 2023-04-11 PROCEDURE — 80048 BASIC METABOLIC PNL TOTAL CA: CPT

## 2023-04-11 PROCEDURE — 93010 ELECTROCARDIOGRAM REPORT: CPT | Performed by: INTERNAL MEDICINE

## 2023-04-11 PROCEDURE — 36415 COLL VENOUS BLD VENIPUNCTURE: CPT

## 2023-04-11 PROCEDURE — 85027 COMPLETE CBC AUTOMATED: CPT

## 2023-04-11 PROCEDURE — 99214 OFFICE O/P EST MOD 30 MIN: CPT | Performed by: NURSE PRACTITIONER

## 2023-04-11 PROCEDURE — 85576 BLOOD PLATELET AGGREGATION: CPT

## 2023-04-11 PROCEDURE — 93005 ELECTROCARDIOGRAM TRACING: CPT | Performed by: NURSE PRACTITIONER

## 2023-04-11 RX ORDER — ATORVASTATIN CALCIUM 40 MG/1
TABLET, FILM COATED ORAL
Qty: 90 TABLET | Refills: 0 | Status: SHIPPED | OUTPATIENT
Start: 2023-04-11 | End: 2023-06-30

## 2023-04-11 RX ORDER — BACLOFEN 10 MG/1
10 TABLET ORAL 3 TIMES DAILY
Qty: 90 TABLET | Refills: 1 | Status: ON HOLD | OUTPATIENT
Start: 2023-04-11 | End: 2023-04-21 | Stop reason: SINTOL

## 2023-04-11 NOTE — PROGRESS NOTES
[facility]  [address] [address]  Phone: [phone]  Fax: [phone]  Primary Provider: Sera Solo  Pre-op Performing Provider: SERA SOLO    PREOPERATIVE EVALUATION:  Today's date: 4/11/2023    Selina Parikh is a 57 year old female who presents for a preoperative evaluation.    Surgical Information:  Surgery/Procedure: Left transcarotid revascularization  Surgery Location: [facility]  Surgeon: Rivas Manley MD  Surgery Date: 04/21/2023  Time of Surgery: 11:10 AM  Where patient plans to recover: At home with family  Fax number for surgical facility: Note does not need to be faxed, will be available electronically in Epic.    Assessment & Plan     The proposed surgical procedure is considered INTERMEDIATE risk.    Preop general physical exam  EKG personally reviewed as NSR.  Labs stable.   - EKG 12-lead, tracing only  - CBC with platelets  - Basic metabolic panel    Asymptomatic stenosis of left carotid artery    Chronic low back pain with left-sided sciatica, unspecified back pain laterality  Patient is quite uncomfortable as back procedure had to be postponed for current surgery.  Medication management is difficult, as she has allergies to multiple medications.  I recommend a consult with pain management, as this will likely be chronic for her.  Will restart Baclofen, as she has benefited from this in the past.   - Pain Management  Referral  - baclofen (LIORESAL) 10 MG tablet  Dispense: 90 tablet; Refill: 1    Acute sinusitis with symptoms > 10 days  Prescribed Augmentin x10 days.  Assuming her symptoms are resolved prior to surgery, I think she is just fine to go through surgery.  - amoxicillin-clavulanate (AUGMENTIN) 875-125 MG tablet  Dispense: 20 tablet; Refill: 0    Tobacco abuse  Still smokes intermittent cigarettes, not daily.     Primary hypertension  Optimal control on Metoprolol.             Medication  Instructions:  - continue Plavix and aspirin  - HOLD supplements day of sugery   - celecoxib (Celebrex): HOLD 3 days before surgery. May continue without modification for management of severe pain.   - continue Metoprolol the day of surgery    RECOMMENDATION:  APPROVAL GIVEN to proceed with proposed procedure, without further diagnostic evaluation.      Subjective     HPI related to upcoming procedure:       Patient is scheduled for a left carotid artery revascularization.  Most recent ultrasound shows >70% stenosis.  She has a history of smoking and moderate CAD.  On a statin, aspirin, and Plavix.  Hypertension well controlled on Metoprolol.     Patient has a history of chronic back pain.  She is following with neurosurgery for this and had surgery in September 2022.  She was supposed to have another spine procedure, but this as postponed until after this surgery.  Patient is really struggling with pain control.  Currently utilizes Tylenol and Celebrex for pain.  She has allergies/side effects to gabapentin, oxycodone, and hydrocodone.  She has tolerated Dilaudid and morphine in the past.  States she has tried tizanidine and tramadol in the past without much benefit.  Baclofen has been beneficial to her in the past.  She is not currently utilizing this.  Patient was previously referred to pain management, but did not follow through with this referral.  She is agreeable to this today.    Patient complains of sinus congestion for the last 2 months.  She was seen in the ER on 3/10 for the symptoms and prescribed a course of prednisone.  She did not find that it was beneficial at all.  She continues to have very thick sinus drainage and a lot of sinus congestion.  She does have a mild cough due to the drainage.  No fevers, ear pain, or sore throat.  Currently utilizing a [place] pot.          4/11/2023     1:50 PM   Preop Questions   1. Have you ever had a heart attack or stroke? No   2. Have you ever had surgery on  your heart or blood vessels, such as a stent placement, a coronary artery bypass, or surgery on an artery in your head, neck, heart, or legs? No   3. Do you have chest pain with activity? YES - chronic   4. Do you have a history of  heart failure? No   5. Do you currently have a cold, bronchitis or symptoms of other infection? YES - sinus congestion   6. Do you have a cough, shortness of breath, or wheezing? YES - mild cough with sinus congestion     7. Do you or anyone in your family have previous history of blood clots? No   8. Do you or does anyone in your family have a serious bleeding problem such as prolonged bleeding following surgeries or cuts? No   9. Have you ever had problems with anemia or been told to take iron pills? YES - history of anemia   10. Have you had any abnormal blood loss such as black, tarry or bloody stools, or abnormal vaginal bleeding? No   11. Have you ever had a blood transfusion? No   12. Are you willing to have a blood transfusion if it is medically needed before, during, or after your surgery? Yes   13. Have you or any of your relatives ever had problems with anesthesia? No   14. Do you have sleep apnea, excessive snoring or daytime drowsiness? YES - history of snoring when lay on back   14a. Do you have a CPAP machine? No   15. Do you have any artifical heart valves or other implanted medical devices like a pacemaker, defibrillator, or continuous glucose monitor? No   16. Do you have artificial joints? No   17. Are you allergic to latex? No   18. Is there any chance that you may be pregnant? No       Health Care Directive:  Patient does not have a Health Care Directive or Living Will: Discussed advance care planning with patient; however, patient declined at this time.    Preoperative Review of :   reviewed - no record of controlled substances prescribed.      Review of Systems  CONSTITUTIONAL: NEGATIVE for fever, chills, change in weight  INTEGUMENTARY/SKIN: NEGATIVE for  worrisome rashes, moles or lesions  EYES: NEGATIVE for vision changes or irritation  ENT/MOUTH: POSITIVE for sinus congestion  RESP: NEGATIVE for significant cough or SOB  CV: NEGATIVE for chest pain, palpitations or peripheral edema  GI: NEGATIVE for nausea, abdominal pain, heartburn, or change in bowel habits  : NEGATIVE for frequency, dysuria, or hematuria  MUSCULOSKELETAL: POSITIVE for back pain  NEURO: NEGATIVE for weakness, dizziness or paresthesias  ENDOCRINE: NEGATIVE for temperature intolerance, skin/hair changes  HEME: NEGATIVE for bleeding problems  PSYCHIATRIC: NEGATIVE for changes in mood or affect    Patient Active Problem List    Diagnosis Date Noted     Primary hypertension 05/07/2022     Priority: Medium     Status post coronary angiogram 05/06/2022     Priority: Medium     Coronary artery disease due to lipid rich plaque      Priority: Medium     Chest pain, unspecified type 05/05/2022     Priority: Medium     Acute chest pain 04/28/2022     Priority: Medium     Hiatal hernia 04/15/2021     Priority: Medium     Formatting of this note might be different from the original.  Added automatically from request for surgery [id number]       Spinal stenosis of lumbar region with neurogenic claudication 06/04/2018     Priority: Medium     S/P partial colectomy 04/10/2018     Priority: Medium     Diverticulitis 01/28/2018     Priority: Medium     Psoriasis 06/20/2012     Priority: Medium     GERD (gastroesophageal reflux disease) 03/15/2011     Priority: Medium     Tobacco abuse 02/17/2011     Priority: Medium     Health Care Home 01/27/2011     Priority: Medium     DX V65.8 REPLACED WITH [facility] CARE HOME (04/08/2013)       CARDIOVASCULAR SCREENING; LDL GOAL LESS THAN 160 10/31/2010     Priority: Medium     R Occipital Neuralgia 10/07/2009     Priority: Medium     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 On fiber, senna, and MOM. Advised to d/c MOM, start Miralax and titer to regular BM's. Pt has been seen by GI.        Brain syndrome, posttraumatic 03/06/2009     Priority: Medium     Work comp.  Has seen Dr. Ahmadi, [facility] of neurology.  MRIs, EMG unremarkalbe, some foraminal stenosis on C5/C6  Sent her to physiatrist, trigger point injections didn't help.  After some neck traction, had intense unremitting HA, neck pain.  Off/on tingling in arms.  Pain clinic and/or spine surg for more eval probable next steps.    Has failed multiple rescue and preventive HA meds. On no narcotics        Past Medical History:   Diagnosis Date     Cancer (H)     melanoma     Coronary artery disease      Hiatal hernia      Hypertension      Other chronic pain      Past Surgical History:   Procedure Laterality Date     BACK SURGERY       CHOLECYSTECTOMY       CHOLECYSTECTOMY, LAPOROSCOPIC  2/14/2000    Cholecystectomy, Laparoscopic     COLON SURGERY       CV CORONARY ANGIOGRAM N/A 5/6/2022    Procedure: CV CORONARY ANGIOGRAM;  Surgeon: Stefanie Spangler MD;  Location: [facility] CV     CV LEFT HEART CATH N/A 5/6/2022    Procedure: Left Heart Catheterization;  Surgeon: Stefanie Spangler MD;  Location: [facility] CATH LAB CV     CYSTOSCOPY, INSERT LIGHTED STENT URETER(S) N/A 4/10/2018    Procedure: CYSTOSCOPY, INSERT LIGHTED STENT URETER(S);  Lighted Stent Placement,Laparoscopic Assisted Sigmoid Colectomy;  Surgeon: ERVIN Reina MD;  Location: [facility] OR     LAPAROSCOPIC ASSISTED COLECTOMY LEFT (DESCENDING) N/A 4/10/2018    Procedure: LAPAROSCOPIC ASSISTED COLECTOMY LEFT (DESCENDING);;  Surgeon: Hill Murray MD;  Location: [facility] OR     LUMBAR DISCECTOMY Left 9/23/2022    Procedure: left thoracic 12-lumbar 1 hemilaminectomy, medial facetectomy, foraminotomy and microdiscectomy;  Surgeon: Suha Kent MD;  Location: [facility] OR     NECK SURGERY   "     ORTHOPEDIC SURGERY  10/2010    Cut palm and reattched tendons and nerves in left hand forefinger.     MD ESOPHAGOGASTRODUODENOSCOPY TRANSORAL DIAGNOSTIC N/A 4/30/2021    Procedure: ESOPHAGOGASTRODUODENOSCOPY (EGD);  Surgeon: Souleymane Moreno DO;  Location: [facility];  Service: General     SURGICAL HISTORY OF -   1/4/2000    Esophagogastroduodenoscopy with biopsy     TUBAL LIGATION       TUBAL LIGATION       Current Outpatient Medications   Medication Sig Dispense Refill     acetaminophen (TYLENOL) 500 MG tablet Take 1,000 mg by mouth daily as needed for mild pain       amoxicillin-clavulanate (AUGMENTIN) 875-125 MG tablet Take 1 tablet by mouth 2 times daily for 10 days 20 tablet 0     aspirin (ASA) 81 MG EC tablet Take 1 tablet (81 mg) by mouth daily 30 tablet 3     baclofen (LIORESAL) 10 MG tablet Take 1 tablet (10 mg) by mouth 3 times daily 90 tablet 1     celecoxib (CELEBREX) 100 MG capsule Take 1 capsule (100 mg) by mouth 2 times daily (Patient taking differently: Take 200 mg by mouth daily) 180 capsule 3     clobetasol (TEMOVATE) 0.05 % external ointment Apply topically 2 times daily (Patient taking differently: Apply topically 2 times daily as needed) 60 g 3     clopidogrel (PLAVIX) 75 MG tablet Take 1 tablet (75 mg) by mouth daily 30 tablet 2     fish oil-omega-3 fatty acids 1000 MG capsule Take 1 g by mouth daily       lactobacillus rhamnosus (GG) (CULTURELL) capsule Take 1 capsule by mouth daily       Melatonin 10 MG TABS tablet Take 20 mg by mouth nightly as needed for sleep       metoprolol succinate ER (TOPROL XL) 50 MG 24 hr tablet Take 0.5 tablets (25 mg) by mouth daily 30 tablet 2     nitroGLYcerin (NITROSTAT) 0.4 MG sublingual tablet One tablet under the tongue every 5 minutes if needed for chest pain. May repeat every 5 minutes for a maximum of 3 doses in 15 minutes\" 25 tablet 3     omeprazole (PRILOSEC) 20 MG DR capsule Take 1 capsule (20 mg) by mouth 3 times daily (Patient taking " differently: Take 20 mg by mouth daily) 270 capsule 3     Vitamin D (Cholecalciferol) 25 MCG (1000 UT) TABS Take 1,000 Units by mouth daily       atorvastatin (LIPITOR) 40 MG tablet Take 1 tablet by mouth once daily 90 tablet 0     naloxone (NARCAN) 4 MG/0.1ML nasal spray See Admin Instructions (Patient not taking: Reported on 2023)         Allergies   Allergen Reactions     Ciprofloxacin Anaphylaxis     Flagyl [Metronidazole] Anaphylaxis     Gabapentin Other (See Comments)     Suicidal thoughts.     Zofran [Ondansetron] Other (See Comments) and GI Disturbance     Causes bloating, moderately uncomfortable, abd pain       Benadryl [Diphenhydramine] Other (See Comments)     Muscle spasms     Percocet [Oxycodone-Acetaminophen] Other (See Comments)     Goes nuts - nasty mean irritated, can take Dilaudid     Vicodin [Hydrocodone-Acetaminophen] Other (See Comments)     Anxiety-agitation        Social History     Tobacco Use     Smoking status: Former     Packs/day: 0.50     Years: 30.00     Pack years: 15.00     Types: Cigarettes     Quit date: 2023     Years since quittin.1     Smokeless tobacco: Never     Tobacco comments:     3 Cigs a day   Vaping Use     Vaping status: Never Used   Substance Use Topics     Alcohol use: Not Currently     Comment: Alcoholic Drinks/day: 2x year      Family History   Problem Relation Age of Onset     C.A.DMarco A Father 50        50's     Diabetes Father      Diabetes Brother      C.A.D. Brother 42        quad bypass and MI     Diabetes Brother         2 brothers have DM     Cancer Brother 34        Melanoma     Aortic aneurysm Brother      Allergies Son         Meds     Allergies Daughter         Med     Cancer Mother      C.A.D. Brother 38        MI age 38     Diabetes Mother      Diabetes Brother      History   Drug Use Unknown         Objective     /80 (BP Location: Left arm, Patient Position: Sitting, Cuff Size: Adult Regular)   Pulse 79   Temp 97.4  F (36.3  C)  "(Temporal)   Ht 1.499 m (4' 11\")   Wt 72.1 kg (159 lb)   LMP 04/10/2016   SpO2 96%   BMI 32.11 kg/m      Physical Exam    GENERAL APPEARANCE: healthy, alert and no distress     EYES: EOMI, PERRL     HENT: ear canals and TM's normal and nose and mouth without ulcers or lesions     NECK: no adenopathy, no asymmetry, masses, or scars and thyroid normal to palpation     RESP: lungs clear to auscultation - no rales, rhonchi or wheezes     CV: regular rates and rhythm, normal S1 S2, no S3 or S4 and no murmur, click or rub     ABDOMEN:  soft, nontender, no HSM or masses and bowel sounds normal     MS: extremities normal- no gross deformities noted, no evidence of inflammation in joints, FROM in all extremities.     SKIN: no suspicious lesions or rashes     NEURO: Normal strength and tone, sensory exam grossly normal, mentation intact and speech normal     PSYCH: mentation appears normal. and affect normal/bright     LYMPHATICS: No cervical adenopathy    Recent Labs   Lab Test 09/25/22  0317 09/19/22  1416 05/05/22  0812 04/28/22  1543   HGB 13.3 13.9   < > 14.1    275   < > 286   INR  --  0.93  --   --     140   < > 143   POTASSIUM 4.1 4.4   < > 4.0   CR 0.84 0.76   < > 0.70   A1C  --   --   --  5.6    < > = values in this interval not displayed.        Diagnostics:  Recent Results (from the past 48 hour(s))   EKG 12-lead, tracing only    Collection Time: 04/11/23  2:15 PM   Result Value Ref Range    Systolic Blood Pressure  mmHg    Diastolic Blood Pressure  mmHg    Ventricular Rate 79 BPM    Atrial Rate 79 BPM    WI Interval 134 ms    QRS Duration 86 ms     ms    QTc 451 ms    P Axis 62 degrees    R AXIS 49 degrees    T Axis 37 degrees    Interpretation ECG       Sinus rhythm  Normal ECG  When compared with ECG of 05-MAY-2022 07:58,  Nonspecific T wave abnormality no longer evident in Anterior leads     Platelet Function P2Y12    Collection Time: 04/11/23  4:07 PM   Result Value Ref Range    " Platelet Function P2Y12 33 PRU   CBC with platelets    Collection Time: 04/11/23  4:07 PM   Result Value Ref Range    WBC Count 7.4 4.0 - 11.0 10e3/uL    RBC Count 4.38 3.80 - 5.20 10e6/uL    Hemoglobin 13.4 11.7 - 15.7 g/dL    Hematocrit 40.8 35.0 - 47.0 %    MCV 93 78 - 100 fL    MCH 30.6 26.5 - 33.0 pg    MCHC 32.8 31.5 - 36.5 g/dL    RDW 13.7 10.0 - 15.0 %    Platelet Count 275 150 - 450 10e3/uL   Basic metabolic panel    Collection Time: 04/11/23  4:07 PM   Result Value Ref Range    Sodium 138 136 - 145 mmol/L    Potassium 3.9 3.4 - 5.3 mmol/L    Chloride 104 98 - 107 mmol/L    Carbon Dioxide (CO2) 24 22 - 29 mmol/L    Anion Gap 10 7 - 15 mmol/L    Urea Nitrogen 17.4 6.0 - 20.0 mg/dL    Creatinine 0.71 0.51 - 0.95 mg/dL    Calcium 9.7 8.6 - 10.0 mg/dL    Glucose 100 (H) 70 - 99 mg/dL    GFR Estimate >90 >60 mL/min/1.73m2      EKG: appears normal, NSR, normal axis, normal intervals, no acute ST/T changes c/w ischemia, no LVH by voltage criteria, unchanged from previous tracings    Revised Cardiac Risk Index (RCRI):  The patient has the following serious cardiovascular risks for perioperative complications:   - Coronary Artery Disease (MI, positive stress test, angina, Qs on EKG) = 1 point     RCRI Interpretation: 1 point: Class II (low risk - 0.9% complication rate)           Signed Electronically by: Sera Solo NP  Copy of this evaluation report is provided to requesting physician.

## 2023-04-11 NOTE — H&P (VIEW-ONLY)
[facility]  [address] [address]  Phone: [phone]  Fax: [phone]  Primary Provider: Sera Solo  Pre-op Performing Provider: SERA SOLO    PREOPERATIVE EVALUATION:  Today's date: 4/11/2023    Selina Parikh is a 57 year old female who presents for a preoperative evaluation.    Surgical Information:  Surgery/Procedure: Left transcarotid revascularization  Surgery Location: [facility]  Surgeon: Rivas Manley MD  Surgery Date: 04/21/2023  Time of Surgery: 11:10 AM  Where patient plans to recover: At home with family  Fax number for surgical facility: Note does not need to be faxed, will be available electronically in Epic.    Assessment & Plan     The proposed surgical procedure is considered INTERMEDIATE risk.    Preop general physical exam  EKG personally reviewed as NSR.  Labs stable.   - EKG 12-lead, tracing only  - CBC with platelets  - Basic metabolic panel    Asymptomatic stenosis of left carotid artery    Chronic low back pain with left-sided sciatica, unspecified back pain laterality  Patient is quite uncomfortable as back procedure had to be postponed for current surgery.  Medication management is difficult, as she has allergies to multiple medications.  I recommend a consult with pain management, as this will likely be chronic for her.  Will restart Baclofen, as she has benefited from this in the past.   - Pain Management  Referral  - baclofen (LIORESAL) 10 MG tablet  Dispense: 90 tablet; Refill: 1    Acute sinusitis with symptoms > 10 days  Prescribed Augmentin x10 days.  Assuming her symptoms are resolved prior to surgery, I think she is just fine to go through surgery.  - amoxicillin-clavulanate (AUGMENTIN) 875-125 MG tablet  Dispense: 20 tablet; Refill: 0    Tobacco abuse  Still smokes intermittent cigarettes, not daily.     Primary hypertension  Optimal control on Metoprolol.             Medication  Instructions:  - continue Plavix and aspirin  - HOLD supplements day of sugery   - celecoxib (Celebrex): HOLD 3 days before surgery. May continue without modification for management of severe pain.   - continue Metoprolol the day of surgery    RECOMMENDATION:  APPROVAL GIVEN to proceed with proposed procedure, without further diagnostic evaluation.      Subjective     HPI related to upcoming procedure:       Patient is scheduled for a left carotid artery revascularization.  Most recent ultrasound shows >70% stenosis.  She has a history of smoking and moderate CAD.  On a statin, aspirin, and Plavix.  Hypertension well controlled on Metoprolol.     Patient has a history of chronic back pain.  She is following with neurosurgery for this and had surgery in September 2022.  She was supposed to have another spine procedure, but this as postponed until after this surgery.  Patient is really struggling with pain control.  Currently utilizes Tylenol and Celebrex for pain.  She has allergies/side effects to gabapentin, oxycodone, and hydrocodone.  She has tolerated Dilaudid and morphine in the past.  States she has tried tizanidine and tramadol in the past without much benefit.  Baclofen has been beneficial to her in the past.  She is not currently utilizing this.  Patient was previously referred to pain management, but did not follow through with this referral.  She is agreeable to this today.    Patient complains of sinus congestion for the last 2 months.  She was seen in the ER on 3/10 for the symptoms and prescribed a course of prednisone.  She did not find that it was beneficial at all.  She continues to have very thick sinus drainage and a lot of sinus congestion.  She does have a mild cough due to the drainage.  No fevers, ear pain, or sore throat.  Currently utilizing a [place] pot.          4/11/2023     1:50 PM   Preop Questions   1. Have you ever had a heart attack or stroke? No   2. Have you ever had surgery on  your heart or blood vessels, such as a stent placement, a coronary artery bypass, or surgery on an artery in your head, neck, heart, or legs? No   3. Do you have chest pain with activity? YES - chronic   4. Do you have a history of  heart failure? No   5. Do you currently have a cold, bronchitis or symptoms of other infection? YES - sinus congestion   6. Do you have a cough, shortness of breath, or wheezing? YES - mild cough with sinus congestion     7. Do you or anyone in your family have previous history of blood clots? No   8. Do you or does anyone in your family have a serious bleeding problem such as prolonged bleeding following surgeries or cuts? No   9. Have you ever had problems with anemia or been told to take iron pills? YES - history of anemia   10. Have you had any abnormal blood loss such as black, tarry or bloody stools, or abnormal vaginal bleeding? No   11. Have you ever had a blood transfusion? No   12. Are you willing to have a blood transfusion if it is medically needed before, during, or after your surgery? Yes   13. Have you or any of your relatives ever had problems with anesthesia? No   14. Do you have sleep apnea, excessive snoring or daytime drowsiness? YES - history of snoring when lay on back   14a. Do you have a CPAP machine? No   15. Do you have any artifical heart valves or other implanted medical devices like a pacemaker, defibrillator, or continuous glucose monitor? No   16. Do you have artificial joints? No   17. Are you allergic to latex? No   18. Is there any chance that you may be pregnant? No       Health Care Directive:  Patient does not have a Health Care Directive or Living Will: Discussed advance care planning with patient; however, patient declined at this time.    Preoperative Review of :   reviewed - no record of controlled substances prescribed.      Review of Systems  CONSTITUTIONAL: NEGATIVE for fever, chills, change in weight  INTEGUMENTARY/SKIN: NEGATIVE for  worrisome rashes, moles or lesions  EYES: NEGATIVE for vision changes or irritation  ENT/MOUTH: POSITIVE for sinus congestion  RESP: NEGATIVE for significant cough or SOB  CV: NEGATIVE for chest pain, palpitations or peripheral edema  GI: NEGATIVE for nausea, abdominal pain, heartburn, or change in bowel habits  : NEGATIVE for frequency, dysuria, or hematuria  MUSCULOSKELETAL: POSITIVE for back pain  NEURO: NEGATIVE for weakness, dizziness or paresthesias  ENDOCRINE: NEGATIVE for temperature intolerance, skin/hair changes  HEME: NEGATIVE for bleeding problems  PSYCHIATRIC: NEGATIVE for changes in mood or affect    Patient Active Problem List    Diagnosis Date Noted     Primary hypertension 05/07/2022     Priority: Medium     Status post coronary angiogram 05/06/2022     Priority: Medium     Coronary artery disease due to lipid rich plaque      Priority: Medium     Chest pain, unspecified type 05/05/2022     Priority: Medium     Acute chest pain 04/28/2022     Priority: Medium     Hiatal hernia 04/15/2021     Priority: Medium     Formatting of this note might be different from the original.  Added automatically from request for surgery [id number]       Spinal stenosis of lumbar region with neurogenic claudication 06/04/2018     Priority: Medium     S/P partial colectomy 04/10/2018     Priority: Medium     Diverticulitis 01/28/2018     Priority: Medium     Psoriasis 06/20/2012     Priority: Medium     GERD (gastroesophageal reflux disease) 03/15/2011     Priority: Medium     Tobacco abuse 02/17/2011     Priority: Medium     Health Care Home 01/27/2011     Priority: Medium     DX V65.8 REPLACED WITH 77134 [facility]ALTH CARE HOME (04/08/2013)       CARDIOVASCULAR SCREENING; LDL GOAL LESS THAN 160 10/31/2010     Priority: Medium     R Occipital Neuralgia 10/07/2009     Priority: Medium     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 On fiber, senna, and MOM. Advised to d/c MOM, start Miralax and titer to regular BM's. Pt has been seen by GI.        Brain syndrome, posttraumatic 03/06/2009     Priority: Medium     Work comp.  Has seen Dr. Ahmadi, [facility] of neurology.  MRIs, EMG unremarkalbe, some foraminal stenosis on C5/C6  Sent her to physiatrist, trigger point injections didn't help.  After some neck traction, had intense unremitting HA, neck pain.  Off/on tingling in arms.  Pain clinic and/or spine surg for more eval probable next steps.    Has failed multiple rescue and preventive HA meds. On no narcotics        Past Medical History:   Diagnosis Date     Cancer (H)     melanoma     Coronary artery disease      Hiatal hernia      Hypertension      Other chronic pain      Past Surgical History:   Procedure Laterality Date     BACK SURGERY       CHOLECYSTECTOMY       CHOLECYSTECTOMY, LAPOROSCOPIC  2/14/2000    Cholecystectomy, Laparoscopic     COLON SURGERY       CV CORONARY ANGIOGRAM N/A 5/6/2022    Procedure: CV CORONARY ANGIOGRAM;  Surgeon: Stefanie Spangler MD;  Location: [facility] CV     CV LEFT HEART CATH N/A 5/6/2022    Procedure: Left Heart Catheterization;  Surgeon: Stefanie Spangler MD;  Location: [facility] CATH LAB CV     CYSTOSCOPY, INSERT LIGHTED STENT URETER(S) N/A 4/10/2018    Procedure: CYSTOSCOPY, INSERT LIGHTED STENT URETER(S);  Lighted Stent Placement,Laparoscopic Assisted Sigmoid Colectomy;  Surgeon: ERVIN Reina MD;  Location: [facility] OR     LAPAROSCOPIC ASSISTED COLECTOMY LEFT (DESCENDING) N/A 4/10/2018    Procedure: LAPAROSCOPIC ASSISTED COLECTOMY LEFT (DESCENDING);;  Surgeon: Hill Murray MD;  Location: [facility] OR     LUMBAR DISCECTOMY Left 9/23/2022    Procedure: left thoracic 12-lumbar 1 hemilaminectomy, medial facetectomy, foraminotomy and microdiscectomy;  Surgeon: Suha Kent MD;  Location: [facility] OR     NECK SURGERY   "     ORTHOPEDIC SURGERY  10/2010    Cut palm and reattched tendons and nerves in left hand forefinger.     AK ESOPHAGOGASTRODUODENOSCOPY TRANSORAL DIAGNOSTIC N/A 4/30/2021    Procedure: ESOPHAGOGASTRODUODENOSCOPY (EGD);  Surgeon: Souleymane Moreno DO;  Location: [facility];  Service: General     SURGICAL HISTORY OF -   1/4/2000    Esophagogastroduodenoscopy with biopsy     TUBAL LIGATION       TUBAL LIGATION       Current Outpatient Medications   Medication Sig Dispense Refill     acetaminophen (TYLENOL) 500 MG tablet Take 1,000 mg by mouth daily as needed for mild pain       amoxicillin-clavulanate (AUGMENTIN) 875-125 MG tablet Take 1 tablet by mouth 2 times daily for 10 days 20 tablet 0     aspirin (ASA) 81 MG EC tablet Take 1 tablet (81 mg) by mouth daily 30 tablet 3     baclofen (LIORESAL) 10 MG tablet Take 1 tablet (10 mg) by mouth 3 times daily 90 tablet 1     celecoxib (CELEBREX) 100 MG capsule Take 1 capsule (100 mg) by mouth 2 times daily (Patient taking differently: Take 200 mg by mouth daily) 180 capsule 3     clobetasol (TEMOVATE) 0.05 % external ointment Apply topically 2 times daily (Patient taking differently: Apply topically 2 times daily as needed) 60 g 3     clopidogrel (PLAVIX) 75 MG tablet Take 1 tablet (75 mg) by mouth daily 30 tablet 2     fish oil-omega-3 fatty acids 1000 MG capsule Take 1 g by mouth daily       lactobacillus rhamnosus (GG) (CULTURELL) capsule Take 1 capsule by mouth daily       Melatonin 10 MG TABS tablet Take 20 mg by mouth nightly as needed for sleep       metoprolol succinate ER (TOPROL XL) 50 MG 24 hr tablet Take 0.5 tablets (25 mg) by mouth daily 30 tablet 2     nitroGLYcerin (NITROSTAT) 0.4 MG sublingual tablet One tablet under the tongue every 5 minutes if needed for chest pain. May repeat every 5 minutes for a maximum of 3 doses in 15 minutes\" 25 tablet 3     omeprazole (PRILOSEC) 20 MG DR capsule Take 1 capsule (20 mg) by mouth 3 times daily (Patient taking " differently: Take 20 mg by mouth daily) 270 capsule 3     Vitamin D (Cholecalciferol) 25 MCG (1000 UT) TABS Take 1,000 Units by mouth daily       atorvastatin (LIPITOR) 40 MG tablet Take 1 tablet by mouth once daily 90 tablet 0     naloxone (NARCAN) 4 MG/0.1ML nasal spray See Admin Instructions (Patient not taking: Reported on 2023)         Allergies   Allergen Reactions     Ciprofloxacin Anaphylaxis     Flagyl [Metronidazole] Anaphylaxis     Gabapentin Other (See Comments)     Suicidal thoughts.     Zofran [Ondansetron] Other (See Comments) and GI Disturbance     Causes bloating, moderately uncomfortable, abd pain       Benadryl [Diphenhydramine] Other (See Comments)     Muscle spasms     Percocet [Oxycodone-Acetaminophen] Other (See Comments)     Goes nuts - nasty mean irritated, can take Dilaudid     Vicodin [Hydrocodone-Acetaminophen] Other (See Comments)     Anxiety-agitation        Social History     Tobacco Use     Smoking status: Former     Packs/day: 0.50     Years: 30.00     Pack years: 15.00     Types: Cigarettes     Quit date: 2023     Years since quittin.1     Smokeless tobacco: Never     Tobacco comments:     3 Cigs a day   Vaping Use     Vaping status: Never Used   Substance Use Topics     Alcohol use: Not Currently     Comment: Alcoholic Drinks/day: 2x year      Family History   Problem Relation Age of Onset     C.A.DMarco A Father 50        50's     Diabetes Father      Diabetes Brother      C.A.D. Brother 42        quad bypass and MI     Diabetes Brother         2 brothers have DM     Cancer Brother 34        Melanoma     Aortic aneurysm Brother      Allergies Son         Meds     Allergies Daughter         Med     Cancer Mother      C.A.D. Brother 38        MI age 38     Diabetes Mother      Diabetes Brother      History   Drug Use Unknown         Objective     /80 (BP Location: Left arm, Patient Position: Sitting, Cuff Size: Adult Regular)   Pulse 79   Temp 97.4  F (36.3  C)  "(Temporal)   Ht 1.499 m (4' 11\")   Wt 72.1 kg (159 lb)   LMP 04/10/2016   SpO2 96%   BMI 32.11 kg/m      Physical Exam    GENERAL APPEARANCE: healthy, alert and no distress     EYES: EOMI, PERRL     HENT: ear canals and TM's normal and nose and mouth without ulcers or lesions     NECK: no adenopathy, no asymmetry, masses, or scars and thyroid normal to palpation     RESP: lungs clear to auscultation - no rales, rhonchi or wheezes     CV: regular rates and rhythm, normal S1 S2, no S3 or S4 and no murmur, click or rub     ABDOMEN:  soft, nontender, no HSM or masses and bowel sounds normal     MS: extremities normal- no gross deformities noted, no evidence of inflammation in joints, FROM in all extremities.     SKIN: no suspicious lesions or rashes     NEURO: Normal strength and tone, sensory exam grossly normal, mentation intact and speech normal     PSYCH: mentation appears normal. and affect normal/bright     LYMPHATICS: No cervical adenopathy    Recent Labs   Lab Test 09/25/22  0317 09/19/22  1416 05/05/22  0812 04/28/22  1543   HGB 13.3 13.9   < > 14.1    275   < > 286   INR  --  0.93  --   --     140   < > 143   POTASSIUM 4.1 4.4   < > 4.0   CR 0.84 0.76   < > 0.70   A1C  --   --   --  5.6    < > = values in this interval not displayed.        Diagnostics:  Recent Results (from the past 48 hour(s))   EKG 12-lead, tracing only    Collection Time: 04/11/23  2:15 PM   Result Value Ref Range    Systolic Blood Pressure  mmHg    Diastolic Blood Pressure  mmHg    Ventricular Rate 79 BPM    Atrial Rate 79 BPM    VT Interval 134 ms    QRS Duration 86 ms     ms    QTc 451 ms    P Axis 62 degrees    R AXIS 49 degrees    T Axis 37 degrees    Interpretation ECG       Sinus rhythm  Normal ECG  When compared with ECG of 05-MAY-2022 07:58,  Nonspecific T wave abnormality no longer evident in Anterior leads     Platelet Function P2Y12    Collection Time: 04/11/23  4:07 PM   Result Value Ref Range    " Platelet Function P2Y12 33 PRU   CBC with platelets    Collection Time: 04/11/23  4:07 PM   Result Value Ref Range    WBC Count 7.4 4.0 - 11.0 10e3/uL    RBC Count 4.38 3.80 - 5.20 10e6/uL    Hemoglobin 13.4 11.7 - 15.7 g/dL    Hematocrit 40.8 35.0 - 47.0 %    MCV 93 78 - 100 fL    MCH 30.6 26.5 - 33.0 pg    MCHC 32.8 31.5 - 36.5 g/dL    RDW 13.7 10.0 - 15.0 %    Platelet Count 275 150 - 450 10e3/uL   Basic metabolic panel    Collection Time: 04/11/23  4:07 PM   Result Value Ref Range    Sodium 138 136 - 145 mmol/L    Potassium 3.9 3.4 - 5.3 mmol/L    Chloride 104 98 - 107 mmol/L    Carbon Dioxide (CO2) 24 22 - 29 mmol/L    Anion Gap 10 7 - 15 mmol/L    Urea Nitrogen 17.4 6.0 - 20.0 mg/dL    Creatinine 0.71 0.51 - 0.95 mg/dL    Calcium 9.7 8.6 - 10.0 mg/dL    Glucose 100 (H) 70 - 99 mg/dL    GFR Estimate >90 >60 mL/min/1.73m2      EKG: appears normal, NSR, normal axis, normal intervals, no acute ST/T changes c/w ischemia, no LVH by voltage criteria, unchanged from previous tracings    Revised Cardiac Risk Index (RCRI):  The patient has the following serious cardiovascular risks for perioperative complications:   - Coronary Artery Disease (MI, positive stress test, angina, Qs on EKG) = 1 point     RCRI Interpretation: 1 point: Class II (low risk - 0.9% complication rate)           Signed Electronically by: Sera Solo NP  Copy of this evaluation report is provided to requesting physician.

## 2023-04-11 NOTE — PATIENT INSTRUCTIONS
For informational purposes only. Not to replace the advice of your health care provider. Copyright   2003,  [employer] [facility]. All rights reserved. Clinically reviewed by Yadira Hernandez MD. [employer] [id number] - REV .  Preparing for Your Surgery  Getting started  A nurse will call you to review your health history and instructions. They will give you an arrival time based on your scheduled surgery time. Please be ready to share:    Your doctor's clinic name and phone number    Your medical, surgical, and anesthesia history    A list of allergies and sensitivities    A list of medicines, including herbal treatments and over-the-counter drugs    Whether the patient has a legal guardian (ask how to send us the papers in advance)  Please tell us if you're pregnant--or if there's any chance you might be pregnant. Some surgeries may injure a fetus (unborn baby), so they require a pregnancy test. Surgeries that are safe for a fetus don't always need a test, and you can choose whether to have one.   If you have a child who's having surgery, please ask for a copy of Preparing for Your Child's Surgery.    Preparing for surgery    Within 10 to 30 days of surgery: Have a pre-op exam (sometimes called an H&P, or History and Physical). This can be done at a clinic or pre-operative center.  ? If you're having a , you may not need this exam. Talk to your care team.    At your pre-op exam, talk to your care team about all medicines you take. If you need to stop any medicines before surgery, ask when to start taking them again.  ? We do this for your safety. Many medicines can make you bleed too much during surgery. Some change how well surgery (anesthesia) drugs work.    Call your insurance company to let them know you're having surgery. (If you don't have insurance, call [phone].)    Call your clinic if there's any change in your health. This includes signs of a cold or flu (sore throat, runny nose,  cough, rash, fever). It also includes a scrape or scratch near the surgery site.    If you have questions on the day of surgery, call your hospital or surgery center.  Eating and drinking guidelines  For your safety: Unless your surgeon tells you otherwise, follow the guidelines below.    Eat and drink as usual until 8 hours before you arrive for surgery. After that, no food or milk.    Drink clear liquids until 2 hours before you arrive. These are liquids you can see through, like water, Gatorade, and Propel Water. They also include plain black coffee and tea (no cream or milk), candy, and breath mints. You can spit out gum when you arrive.    If you drink alcohol: Stop drinking it the night before surgery.    If your care team tells you to take medicine on the morning of surgery, it's okay to take it with a sip of water.  Preventing infection    Shower or bathe the night before and morning of your surgery. Follow the instructions your clinic gave you. (If no instructions, use regular soap.)    Don't shave or clip hair near your surgery site. We'll remove the hair if needed.    Don't smoke or vape the morning of surgery. You may chew nicotine gum up to 2 hours before surgery. A nicotine patch is okay.  ? Note: Some surgeries require you to completely quit smoking and nicotine. Check with your surgeon.    Your care team will make every effort to keep you safe from infection. We will:  ? Clean our hands often with soap and water (or an alcohol-based hand rub).  ? Clean the skin at your surgery site with a special soap that kills germs.  ? Give you a special gown to keep you warm. (Cold raises the risk of infection.)  ? Wear special hair covers, masks, gowns and gloves during surgery.  ? Give antibiotic medicine, if prescribed. Not all surgeries need antibiotics.  What to bring on the day of surgery    Photo ID and insurance card    Copy of your health care directive, if you have one    Glasses and hearing aids (bring  cases)  ? You can't wear contacts during surgery    Inhaler and eye drops, if you use them (tell us about these when you arrive)    CPAP machine or breathing device, if you use them    A few personal items, if spending the night    If you have . . .  ? A pacemaker, ICD (cardiac defibrillator) or other implant: Bring the ID card.  ? An implanted stimulator: Bring the remote control.  ? A legal guardian: Bring a copy of the certified (court-stamped) guardianship papers.  Please remove any jewelry, including body piercings. Leave jewelry and other valuables at home.  If you're going home the day of surgery    You must have a responsible adult drive you home. They should stay with you overnight as well.    If you don't have someone to stay with you, and you aren't safe to go home alone, we may keep you overnight. Insurance often won't pay for this.  After surgery  If it's hard to control your pain or you need more pain medicine, please call your surgeon's office.  Questions?   If you have any questions for your care team, list them here: _________________________________________________________________________________________________________________________________________________________________________ ____________________________________ ____________________________________ ____________________________________

## 2023-04-11 NOTE — TELEPHONE ENCOUNTER
Spoke with patient. She is coming down to get pre-op done today and will go to [facility] to do the lab draw. She states she has been taking the aspirin, plavix, and statin. Educated her that she needs to remain on these medications prior to surgery. DO NOT HOLD ASPIRIN AND PLAVIX PRIOR TO SURGERY. Also notified her that surgery nurse will call her 1-2 days prior to confirm arrival time, npo times, prep instructions, etc.

## 2023-04-11 NOTE — TELEPHONE ENCOUNTER
Please call the pharmacy.    Penicillin is not a listed allergy in our medical system.  Contacted patient and she reports she has tolerated Augmentin before and has no known Penicillin allergy.    Sera Solo NP

## 2023-04-12 LAB
ATRIAL RATE - MUSE: 79 BPM
DIASTOLIC BLOOD PRESSURE - MUSE: NORMAL MMHG
INTERPRETATION ECG - MUSE: NORMAL
P AXIS - MUSE: 62 DEGREES
PR INTERVAL - MUSE: 134 MS
QRS DURATION - MUSE: 86 MS
QT - MUSE: 394 MS
QTC - MUSE: 451 MS
R AXIS - MUSE: 49 DEGREES
SYSTOLIC BLOOD PRESSURE - MUSE: NORMAL MMHG
T AXIS - MUSE: 37 DEGREES
VENTRICULAR RATE- MUSE: 79 BPM

## 2023-04-13 NOTE — TELEPHONE ENCOUNTER
Message left that Plavix Resistance Lab (P2Y12) completed and normal.     Told to call back if she had any further questions.

## 2023-04-14 ENCOUNTER — TELEPHONE (OUTPATIENT)
Dept: NEUROSURGERY | Facility: CLINIC | Age: 58
End: 2023-04-14

## 2023-04-14 NOTE — TELEPHONE ENCOUNTER
Patient is scheduled for surgery on 6/9/23. I gave the patient surgery details, and she verbalized understanding.

## 2023-04-17 ENCOUNTER — TELEPHONE (OUTPATIENT)
Dept: VASCULAR SURGERY | Facility: CLINIC | Age: 58
End: 2023-04-17
Payer: COMMERCIAL

## 2023-04-17 DIAGNOSIS — I25.83 CORONARY ARTERY DISEASE DUE TO LIPID RICH PLAQUE: ICD-10-CM

## 2023-04-17 DIAGNOSIS — I65.22 ASYMPTOMATIC STENOSIS OF LEFT CAROTID ARTERY: ICD-10-CM

## 2023-04-17 DIAGNOSIS — I25.10 CORONARY ARTERY DISEASE DUE TO LIPID RICH PLAQUE: ICD-10-CM

## 2023-04-17 RX ORDER — CLOPIDOGREL BISULFATE 75 MG/1
75 TABLET ORAL DAILY
Qty: 30 TABLET | Refills: 3 | Status: ON HOLD | OUTPATIENT
Start: 2023-04-17 | End: 2023-06-11

## 2023-04-17 NOTE — TELEPHONE ENCOUNTER
Caller: Selina    Provider: MD Eveline Manley    Detailed reason for call: Selina is calling stating she needs a refill of Plavix, she doesn't have enough to get through today.  Please send to [facility] pharmacy in [place]    Best phone number to contact: [phone]    Best time to contact: any    Ok to leave a detailed message: Yes    Ok to speak to authorized person if needed: No      (Noted to patient if reason is related to wound or incision, to please send a photo via email or [employer].)

## 2023-04-20 ENCOUNTER — ANESTHESIA EVENT (OUTPATIENT)
Dept: SURGERY | Facility: HOSPITAL | Age: 58
DRG: 036 | End: 2023-04-20
Payer: COMMERCIAL

## 2023-04-21 ENCOUNTER — ANESTHESIA (OUTPATIENT)
Dept: SURGERY | Facility: HOSPITAL | Age: 58
DRG: 036 | End: 2023-04-21
Payer: COMMERCIAL

## 2023-04-21 ENCOUNTER — ANCILLARY PROCEDURE (OUTPATIENT)
Dept: VASCULAR ULTRASOUND | Facility: CLINIC | Age: 58
DRG: 036 | End: 2023-04-21
Attending: SURGERY
Payer: COMMERCIAL

## 2023-04-21 ENCOUNTER — HOSPITAL ENCOUNTER (OUTPATIENT)
Dept: INTERVENTIONAL RADIOLOGY/VASCULAR | Facility: HOSPITAL | Age: 58
Discharge: HOME | DRG: 036 | End: 2023-04-21
Attending: SURGERY | Admitting: SURGERY
Payer: COMMERCIAL

## 2023-04-21 ENCOUNTER — HOSPITAL ENCOUNTER (INPATIENT)
Facility: HOSPITAL | Age: 58
LOS: 1 days | Discharge: HOME | DRG: 036 | End: 2023-04-22
Attending: SURGERY | Admitting: SURGERY
Payer: COMMERCIAL

## 2023-04-21 ENCOUNTER — APPOINTMENT (OUTPATIENT)
Dept: RADIOLOGY | Facility: HOSPITAL | Age: 58
DRG: 036 | End: 2023-04-21
Attending: SURGERY
Payer: COMMERCIAL

## 2023-04-21 DIAGNOSIS — I65.22 STENOSIS OF LEFT CAROTID ARTERY: Primary | ICD-10-CM

## 2023-04-21 DIAGNOSIS — I65.23 CAROTID STENOSIS, ASYMPTOMATIC, BILATERAL: ICD-10-CM

## 2023-04-21 DIAGNOSIS — I65.22 ASYMPTOMATIC STENOSIS OF LEFT CAROTID ARTERY: ICD-10-CM

## 2023-04-21 PROBLEM — I65.29 CAROTID STENOSIS: Status: ACTIVE | Noted: 2023-04-21

## 2023-04-21 LAB
ABO/RH(D): NORMAL
ACT BLD: 424 SECONDS (ref 74–150)
ANTIBODY SCREEN: NEGATIVE
ATRIAL RATE - MUSE: 58 BPM
BASOPHILS # BLD AUTO: 0 10E3/UL (ref 0–0.2)
BASOPHILS NFR BLD AUTO: 0 %
CREAT SERPL-MCNC: 0.79 MG/DL (ref 0.51–0.95)
DIASTOLIC BLOOD PRESSURE - MUSE: NORMAL MMHG
EOSINOPHIL # BLD AUTO: 0.1 10E3/UL (ref 0–0.7)
EOSINOPHIL NFR BLD AUTO: 2 %
ERYTHROCYTE [DISTWIDTH] IN BLOOD BY AUTOMATED COUNT: 14 % (ref 10–15)
GFR SERPL CREATININE-BSD FRML MDRD: 87 ML/MIN/1.73M2
GLUCOSE SERPL-MCNC: 101 MG/DL (ref 70–99)
HCT VFR BLD AUTO: 39.7 % (ref 35–47)
HGB BLD-MCNC: 13 G/DL (ref 11.7–15.7)
IMM GRANULOCYTES # BLD: 0 10E3/UL
IMM GRANULOCYTES NFR BLD: 0 %
INTERPRETATION ECG - MUSE: NORMAL
LYMPHOCYTES # BLD AUTO: 1.9 10E3/UL (ref 0.8–5.3)
LYMPHOCYTES NFR BLD AUTO: 28 %
MCH RBC QN AUTO: 30.7 PG (ref 26.5–33)
MCHC RBC AUTO-ENTMCNC: 32.7 G/DL (ref 31.5–36.5)
MCV RBC AUTO: 94 FL (ref 78–100)
MONOCYTES # BLD AUTO: 0.4 10E3/UL (ref 0–1.3)
MONOCYTES NFR BLD AUTO: 6 %
NEUTROPHILS # BLD AUTO: 4.4 10E3/UL (ref 1.6–8.3)
NEUTROPHILS NFR BLD AUTO: 64 %
NRBC # BLD AUTO: 0 10E3/UL
NRBC BLD AUTO-RTO: 0 /100
P AXIS - MUSE: 55 DEGREES
PA ADP BLD-ACNC: 42 PRU
PLATELET # BLD AUTO: 249 10E3/UL (ref 150–450)
POTASSIUM SERPL-SCNC: 4.4 MMOL/L (ref 3.4–5.3)
PR INTERVAL - MUSE: 136 MS
QRS DURATION - MUSE: 94 MS
QT - MUSE: 508 MS
QTC - MUSE: 498 MS
R AXIS - MUSE: 40 DEGREES
RBC # BLD AUTO: 4.24 10E6/UL (ref 3.8–5.2)
SPECIMEN EXPIRATION DATE: NORMAL
SYSTOLIC BLOOD PRESSURE - MUSE: NORMAL MMHG
T AXIS - MUSE: 25 DEGREES
VENTRICULAR RATE- MUSE: 58 BPM
WBC # BLD AUTO: 6.9 10E3/UL (ref 4–11)

## 2023-04-21 PROCEDURE — 037J3DZ DILATION OF LEFT COMMON CAROTID ARTERY WITH INTRALUMINAL DEVICE, PERCUTANEOUS APPROACH: ICD-10-PCS | Performed by: SURGERY

## 2023-04-21 PROCEDURE — 360N000087 HC SURGERY LEVEL 7 W/ FLUORO, PER MIN: Performed by: SURGERY

## 2023-04-21 PROCEDURE — 200N000001 HC R&B ICU

## 2023-04-21 PROCEDURE — 250N000009 HC RX 250: Performed by: ANESTHESIOLOGY

## 2023-04-21 PROCEDURE — 85347 COAGULATION TIME ACTIVATED: CPT

## 2023-04-21 PROCEDURE — 82947 ASSAY GLUCOSE BLOOD QUANT: CPT | Performed by: SURGERY

## 2023-04-21 PROCEDURE — 258N000003 HC RX IP 258 OP 636: Performed by: NURSE ANESTHETIST, CERTIFIED REGISTERED

## 2023-04-21 PROCEDURE — 93005 ELECTROCARDIOGRAM TRACING: CPT

## 2023-04-21 PROCEDURE — C1894 INTRO/SHEATH, NON-LASER: HCPCS | Performed by: SURGERY

## 2023-04-21 PROCEDURE — 250N000011 HC RX IP 250 OP 636: Performed by: NURSE ANESTHETIST, CERTIFIED REGISTERED

## 2023-04-21 PROCEDURE — 710N000010 HC RECOVERY PHASE 1, LEVEL 2, PER MIN: Performed by: SURGERY

## 2023-04-21 PROCEDURE — 37215 TRANSCATH STENT CCA W/EPS: CPT | Mod: 80 | Performed by: SURGERY

## 2023-04-21 PROCEDURE — C1874 STENT, COATED/COV W/DEL SYS: HCPCS | Performed by: SURGERY

## 2023-04-21 PROCEDURE — 250N000011 HC RX IP 250 OP 636: Performed by: SURGERY

## 2023-04-21 PROCEDURE — 250N000025 HC SEVOFLURANE, PER MIN: Performed by: SURGERY

## 2023-04-21 PROCEDURE — 999N000063 US INTRAOPERATIVE

## 2023-04-21 PROCEDURE — 999N000180 XR SURGERY CARM FLUORO LESS THAN 5 MIN

## 2023-04-21 PROCEDURE — 999N000141 HC STATISTIC PRE-PROCEDURE NURSING ASSESSMENT: Performed by: SURGERY

## 2023-04-21 PROCEDURE — 999N000203 HC STATISTICAL VASC ACCESS NURSE TIME, 16-31 MINUTES

## 2023-04-21 PROCEDURE — 250N000013 HC RX MED GY IP 250 OP 250 PS 637: Performed by: SURGERY

## 2023-04-21 PROCEDURE — 36415 COLL VENOUS BLD VENIPUNCTURE: CPT | Performed by: SURGERY

## 2023-04-21 PROCEDURE — 255N000002 HC RX 255 OP 636: Performed by: SURGERY

## 2023-04-21 PROCEDURE — 85576 BLOOD PLATELET AGGREGATION: CPT

## 2023-04-21 PROCEDURE — 93010 ELECTROCARDIOGRAM REPORT: CPT | Performed by: INTERNAL MEDICINE

## 2023-04-21 PROCEDURE — 272N000001 HC OR GENERAL SUPPLY STERILE: Performed by: SURGERY

## 2023-04-21 PROCEDURE — 999N000127 HC STATISTIC PERIPHERAL IV START W US GUIDANCE

## 2023-04-21 PROCEDURE — 272N000004 HC RX 272: Performed by: SURGERY

## 2023-04-21 PROCEDURE — 76937 US GUIDE VASCULAR ACCESS: CPT | Mod: 26 | Performed by: SURGERY

## 2023-04-21 PROCEDURE — 370N000017 HC ANESTHESIA TECHNICAL FEE, PER MIN: Performed by: SURGERY

## 2023-04-21 PROCEDURE — 85025 COMPLETE CBC W/AUTO DIFF WBC: CPT | Performed by: SURGERY

## 2023-04-21 PROCEDURE — 250N000009 HC RX 250: Performed by: NURSE ANESTHETIST, CERTIFIED REGISTERED

## 2023-04-21 PROCEDURE — 258N000001 HC RX 258: Performed by: SURGERY

## 2023-04-21 PROCEDURE — 258N000003 HC RX IP 258 OP 636: Performed by: ANESTHESIOLOGY

## 2023-04-21 PROCEDURE — 84132 ASSAY OF SERUM POTASSIUM: CPT | Performed by: SURGERY

## 2023-04-21 PROCEDURE — 82565 ASSAY OF CREATININE: CPT | Performed by: SURGERY

## 2023-04-21 PROCEDURE — 86850 RBC ANTIBODY SCREEN: CPT | Performed by: SURGERY

## 2023-04-21 PROCEDURE — 250N000009 HC RX 250: Performed by: SURGERY

## 2023-04-21 PROCEDURE — 37215 TRANSCATH STENT CCA W/EPS: CPT | Mod: LT | Performed by: SURGERY

## 2023-04-21 RX ORDER — LABETALOL HYDROCHLORIDE 5 MG/ML
10 INJECTION, SOLUTION INTRAVENOUS EVERY 10 MIN PRN
Status: DISCONTINUED | OUTPATIENT
Start: 2023-04-21 | End: 2023-04-22 | Stop reason: HOSPADM

## 2023-04-21 RX ORDER — CELECOXIB 100 MG/1
100 CAPSULE ORAL 2 TIMES DAILY
Status: DISCONTINUED | OUTPATIENT
Start: 2023-04-21 | End: 2023-04-22 | Stop reason: HOSPADM

## 2023-04-21 RX ORDER — BISACODYL 10 MG
10 SUPPOSITORY, RECTAL RECTAL DAILY PRN
Status: DISCONTINUED | OUTPATIENT
Start: 2023-04-21 | End: 2023-04-22 | Stop reason: HOSPADM

## 2023-04-21 RX ORDER — PROCHLORPERAZINE MALEATE 10 MG
10 TABLET ORAL EVERY 6 HOURS PRN
Status: DISCONTINUED | OUTPATIENT
Start: 2023-04-21 | End: 2023-04-22 | Stop reason: HOSPADM

## 2023-04-21 RX ORDER — CEFAZOLIN SODIUM/WATER 2 G/20 ML
2 SYRINGE (ML) INTRAVENOUS
Status: COMPLETED | OUTPATIENT
Start: 2023-04-21 | End: 2023-04-21

## 2023-04-21 RX ORDER — DEXMEDETOMIDINE HYDROCHLORIDE 4 UG/ML
INJECTION, SOLUTION INTRAVENOUS
Status: DISCONTINUED
Start: 2023-04-21 | End: 2023-04-21 | Stop reason: HOSPADM

## 2023-04-21 RX ORDER — OXYCODONE HYDROCHLORIDE 5 MG/1
10 TABLET ORAL EVERY 4 HOURS PRN
Status: DISCONTINUED | OUTPATIENT
Start: 2023-04-21 | End: 2023-04-22 | Stop reason: HOSPADM

## 2023-04-21 RX ORDER — NALOXONE HYDROCHLORIDE 1 MG/ML
0.2 INJECTION INTRAMUSCULAR; INTRAVENOUS; SUBCUTANEOUS
Status: DISCONTINUED | OUTPATIENT
Start: 2023-04-21 | End: 2023-04-21

## 2023-04-21 RX ORDER — NALOXONE HYDROCHLORIDE 0.4 MG/ML
0.2 INJECTION, SOLUTION INTRAMUSCULAR; INTRAVENOUS; SUBCUTANEOUS
Status: DISCONTINUED | OUTPATIENT
Start: 2023-04-21 | End: 2023-04-22 | Stop reason: HOSPADM

## 2023-04-21 RX ORDER — AMOXICILLIN 250 MG
1 CAPSULE ORAL 2 TIMES DAILY
Status: DISCONTINUED | OUTPATIENT
Start: 2023-04-21 | End: 2023-04-22 | Stop reason: HOSPADM

## 2023-04-21 RX ORDER — PHENYLEPHRINE HCL IN 0.9% NACL 50MG/250ML
.5-1.25 PLASTIC BAG, INJECTION (ML) INTRAVENOUS CONTINUOUS
Status: DISCONTINUED | OUTPATIENT
Start: 2023-04-21 | End: 2023-04-22

## 2023-04-21 RX ORDER — EPHEDRINE SULFATE 50 MG/ML
INJECTION, SOLUTION INTRAMUSCULAR; INTRAVENOUS; SUBCUTANEOUS PRN
Status: DISCONTINUED | OUTPATIENT
Start: 2023-04-21 | End: 2023-04-21

## 2023-04-21 RX ORDER — ONDANSETRON 4 MG/1
4 TABLET, ORALLY DISINTEGRATING ORAL EVERY 6 HOURS PRN
Status: DISCONTINUED | OUTPATIENT
Start: 2023-04-21 | End: 2023-04-22 | Stop reason: HOSPADM

## 2023-04-21 RX ORDER — PROPOFOL 10 MG/ML
INJECTION, EMULSION INTRAVENOUS PRN
Status: DISCONTINUED | OUTPATIENT
Start: 2023-04-21 | End: 2023-04-21

## 2023-04-21 RX ORDER — ONDANSETRON 4 MG/1
4 TABLET, ORALLY DISINTEGRATING ORAL EVERY 30 MIN PRN
Status: DISCONTINUED | OUTPATIENT
Start: 2023-04-21 | End: 2023-04-21

## 2023-04-21 RX ORDER — ATORVASTATIN CALCIUM 40 MG/1
40 TABLET, FILM COATED ORAL DAILY
Status: DISCONTINUED | OUTPATIENT
Start: 2023-04-22 | End: 2023-04-22 | Stop reason: HOSPADM

## 2023-04-21 RX ORDER — HEPARIN SODIUM 5000 [USP'U]/.5ML
5000 INJECTION, SOLUTION INTRAVENOUS; SUBCUTANEOUS EVERY 8 HOURS
Status: DISCONTINUED | OUTPATIENT
Start: 2023-04-22 | End: 2023-04-22 | Stop reason: HOSPADM

## 2023-04-21 RX ORDER — NITROGLYCERIN 10 MG/100ML
INJECTION INTRAVENOUS PRN
Status: DISCONTINUED | OUTPATIENT
Start: 2023-04-21 | End: 2023-04-21

## 2023-04-21 RX ORDER — LIDOCAINE 40 MG/G
CREAM TOPICAL
Status: DISCONTINUED | OUTPATIENT
Start: 2023-04-21 | End: 2023-04-22 | Stop reason: HOSPADM

## 2023-04-21 RX ORDER — ASPIRIN 81 MG/1
81 TABLET ORAL DAILY
Status: DISCONTINUED | OUTPATIENT
Start: 2023-04-22 | End: 2023-04-22 | Stop reason: HOSPADM

## 2023-04-21 RX ORDER — ONDANSETRON 2 MG/ML
4 INJECTION INTRAMUSCULAR; INTRAVENOUS EVERY 6 HOURS PRN
Status: DISCONTINUED | OUTPATIENT
Start: 2023-04-21 | End: 2023-04-22 | Stop reason: HOSPADM

## 2023-04-21 RX ORDER — ACETAMINOPHEN 325 MG/1
975 TABLET ORAL EVERY 8 HOURS
Status: DISCONTINUED | OUTPATIENT
Start: 2023-04-21 | End: 2023-04-22 | Stop reason: HOSPADM

## 2023-04-21 RX ORDER — NALOXONE HYDROCHLORIDE 1 MG/ML
0.4 INJECTION INTRAMUSCULAR; INTRAVENOUS; SUBCUTANEOUS
Status: DISCONTINUED | OUTPATIENT
Start: 2023-04-21 | End: 2023-04-21

## 2023-04-21 RX ORDER — ONDANSETRON 2 MG/ML
4 INJECTION INTRAMUSCULAR; INTRAVENOUS EVERY 30 MIN PRN
Status: DISCONTINUED | OUTPATIENT
Start: 2023-04-21 | End: 2023-04-21

## 2023-04-21 RX ORDER — GLYCOPYRROLATE 0.2 MG/ML
INJECTION, SOLUTION INTRAMUSCULAR; INTRAVENOUS PRN
Status: DISCONTINUED | OUTPATIENT
Start: 2023-04-21 | End: 2023-04-21

## 2023-04-21 RX ORDER — FENTANYL CITRATE 50 UG/ML
50 INJECTION, SOLUTION INTRAMUSCULAR; INTRAVENOUS EVERY 5 MIN PRN
Status: DISCONTINUED | OUTPATIENT
Start: 2023-04-21 | End: 2023-04-21 | Stop reason: HOSPADM

## 2023-04-21 RX ORDER — PROTAMINE SULFATE 10 MG/ML
INJECTION, SOLUTION INTRAVENOUS PRN
Status: DISCONTINUED | OUTPATIENT
Start: 2023-04-21 | End: 2023-04-21

## 2023-04-21 RX ORDER — FENTANYL CITRATE 50 UG/ML
25 INJECTION, SOLUTION INTRAMUSCULAR; INTRAVENOUS EVERY 5 MIN PRN
Status: DISCONTINUED | OUTPATIENT
Start: 2023-04-21 | End: 2023-04-21 | Stop reason: HOSPADM

## 2023-04-21 RX ORDER — EPHEDRINE SULFATE 50 MG/ML
10 INJECTION, SOLUTION INTRAMUSCULAR; INTRAVENOUS; SUBCUTANEOUS ONCE
Status: COMPLETED | OUTPATIENT
Start: 2023-04-21 | End: 2023-04-21

## 2023-04-21 RX ORDER — OXYCODONE HYDROCHLORIDE 5 MG/1
10 TABLET ORAL
Status: CANCELLED | OUTPATIENT
Start: 2023-04-21

## 2023-04-21 RX ORDER — PANTOPRAZOLE SODIUM 40 MG/1
40 TABLET, DELAYED RELEASE ORAL
Status: DISCONTINUED | OUTPATIENT
Start: 2023-04-22 | End: 2023-04-22 | Stop reason: HOSPADM

## 2023-04-21 RX ORDER — CEFAZOLIN SODIUM/WATER 2 G/20 ML
2 SYRINGE (ML) INTRAVENOUS SEE ADMIN INSTRUCTIONS
Status: DISCONTINUED | OUTPATIENT
Start: 2023-04-21 | End: 2023-04-21 | Stop reason: HOSPADM

## 2023-04-21 RX ORDER — CLOPIDOGREL BISULFATE 75 MG/1
75 TABLET ORAL DAILY
Status: DISCONTINUED | OUTPATIENT
Start: 2023-04-22 | End: 2023-04-22 | Stop reason: HOSPADM

## 2023-04-21 RX ORDER — FENTANYL CITRATE 50 UG/ML
INJECTION, SOLUTION INTRAMUSCULAR; INTRAVENOUS PRN
Status: DISCONTINUED | OUTPATIENT
Start: 2023-04-21 | End: 2023-04-21

## 2023-04-21 RX ORDER — SODIUM CHLORIDE 9 MG/ML
INJECTION, SOLUTION INTRAVENOUS CONTINUOUS PRN
Status: DISCONTINUED | OUTPATIENT
Start: 2023-04-21 | End: 2023-04-21

## 2023-04-21 RX ORDER — NITROGLYCERIN 0.4 MG/1
0.4 TABLET SUBLINGUAL EVERY 5 MIN PRN
Status: DISCONTINUED | OUTPATIENT
Start: 2023-04-21 | End: 2023-04-22 | Stop reason: HOSPADM

## 2023-04-21 RX ORDER — LIDOCAINE HYDROCHLORIDE 20 MG/ML
INJECTION, SOLUTION INFILTRATION; PERINEURAL PRN
Status: DISCONTINUED | OUTPATIENT
Start: 2023-04-21 | End: 2023-04-21

## 2023-04-21 RX ORDER — MULTIPLE VITAMINS W/ MINERALS TAB 9MG-400MCG
1 TAB ORAL DAILY
COMMUNITY

## 2023-04-21 RX ORDER — LIDOCAINE HYDROCHLORIDE 10 MG/ML
INJECTION, SOLUTION INFILTRATION; PERINEURAL
Status: COMPLETED | OUTPATIENT
Start: 2023-04-21 | End: 2023-04-21

## 2023-04-21 RX ORDER — SODIUM CHLORIDE, SODIUM LACTATE, POTASSIUM CHLORIDE, CALCIUM CHLORIDE 600; 310; 30; 20 MG/100ML; MG/100ML; MG/100ML; MG/100ML
INJECTION, SOLUTION INTRAVENOUS CONTINUOUS
Status: DISCONTINUED | OUTPATIENT
Start: 2023-04-21 | End: 2023-04-21 | Stop reason: HOSPADM

## 2023-04-21 RX ORDER — BUPIVACAINE HYDROCHLORIDE AND EPINEPHRINE 2.5; 5 MG/ML; UG/ML
INJECTION, SOLUTION EPIDURAL; INFILTRATION; INTRACAUDAL; PERINEURAL PRN
Status: DISCONTINUED | OUTPATIENT
Start: 2023-04-21 | End: 2023-04-21 | Stop reason: HOSPADM

## 2023-04-21 RX ORDER — SODIUM CHLORIDE, SODIUM LACTATE, POTASSIUM CHLORIDE, CALCIUM CHLORIDE 600; 310; 30; 20 MG/100ML; MG/100ML; MG/100ML; MG/100ML
INJECTION, SOLUTION INTRAVENOUS CONTINUOUS PRN
Status: DISCONTINUED | OUTPATIENT
Start: 2023-04-21 | End: 2023-04-21

## 2023-04-21 RX ORDER — HEPARIN SODIUM 1000 [USP'U]/ML
INJECTION, SOLUTION INTRAVENOUS; SUBCUTANEOUS PRN
Status: DISCONTINUED | OUTPATIENT
Start: 2023-04-21 | End: 2023-04-21

## 2023-04-21 RX ORDER — OXYCODONE HYDROCHLORIDE 5 MG/1
15 TABLET ORAL EVERY 4 HOURS PRN
Status: DISCONTINUED | OUTPATIENT
Start: 2023-04-21 | End: 2023-04-22 | Stop reason: HOSPADM

## 2023-04-21 RX ORDER — OXYCODONE HYDROCHLORIDE 5 MG/1
5 TABLET ORAL
Status: CANCELLED | OUTPATIENT
Start: 2023-04-21

## 2023-04-21 RX ORDER — SODIUM CHLORIDE, SODIUM LACTATE, POTASSIUM CHLORIDE, CALCIUM CHLORIDE 600; 310; 30; 20 MG/100ML; MG/100ML; MG/100ML; MG/100ML
INJECTION, SOLUTION INTRAVENOUS CONTINUOUS
Status: ACTIVE | OUTPATIENT
Start: 2023-04-21 | End: 2023-04-21

## 2023-04-21 RX ORDER — LIDOCAINE 40 MG/G
CREAM TOPICAL
Status: DISCONTINUED | OUTPATIENT
Start: 2023-04-21 | End: 2023-04-21 | Stop reason: HOSPADM

## 2023-04-21 RX ORDER — POLYETHYLENE GLYCOL 3350 17 G/17G
17 POWDER, FOR SOLUTION ORAL DAILY
Status: DISCONTINUED | OUTPATIENT
Start: 2023-04-22 | End: 2023-04-22 | Stop reason: HOSPADM

## 2023-04-21 RX ORDER — DEXAMETHASONE SODIUM PHOSPHATE 10 MG/ML
INJECTION, SOLUTION INTRAMUSCULAR; INTRAVENOUS PRN
Status: DISCONTINUED | OUTPATIENT
Start: 2023-04-21 | End: 2023-04-21

## 2023-04-21 RX ORDER — PHENYLEPHRINE HCL IN 0.9% NACL 50MG/250ML
PLASTIC BAG, INJECTION (ML) INTRAVENOUS
Status: DISCONTINUED
Start: 2023-04-21 | End: 2023-04-21 | Stop reason: HOSPADM

## 2023-04-21 RX ORDER — ACETAMINOPHEN 325 MG/1
650 TABLET ORAL EVERY 4 HOURS PRN
Status: DISCONTINUED | OUTPATIENT
Start: 2023-04-24 | End: 2023-04-22 | Stop reason: HOSPADM

## 2023-04-21 RX ORDER — MULTIVIT-MIN/FOLIC ACID/BIOTIN 66.7 MCG
1 TABLET ORAL DAILY
COMMUNITY

## 2023-04-21 RX ORDER — FENTANYL CITRATE 50 UG/ML
25-100 INJECTION, SOLUTION INTRAMUSCULAR; INTRAVENOUS
Status: DISCONTINUED | OUTPATIENT
Start: 2023-04-21 | End: 2023-04-21 | Stop reason: HOSPADM

## 2023-04-21 RX ORDER — NALOXONE HYDROCHLORIDE 0.4 MG/ML
0.4 INJECTION, SOLUTION INTRAMUSCULAR; INTRAVENOUS; SUBCUTANEOUS
Status: DISCONTINUED | OUTPATIENT
Start: 2023-04-21 | End: 2023-04-22 | Stop reason: HOSPADM

## 2023-04-21 RX ORDER — CEFAZOLIN SODIUM 2 G/100ML
2 INJECTION, SOLUTION INTRAVENOUS EVERY 8 HOURS
Status: DISCONTINUED | OUTPATIENT
Start: 2023-04-21 | End: 2023-04-22 | Stop reason: HOSPADM

## 2023-04-21 RX ADMIN — Medication 2.5 MG: at 14:31

## 2023-04-21 RX ADMIN — PHENYLEPHRINE HYDROCHLORIDE 100 MCG: 10 INJECTION INTRAVENOUS at 13:29

## 2023-04-21 RX ADMIN — DEXMEDETOMIDINE HYDROCHLORIDE 0.5 MCG/KG/HR: 100 INJECTION, SOLUTION INTRAVENOUS at 12:25

## 2023-04-21 RX ADMIN — LIDOCAINE HYDROCHLORIDE 100 MG: 20 INJECTION, SOLUTION INFILTRATION; PERINEURAL at 12:13

## 2023-04-21 RX ADMIN — SODIUM CHLORIDE, POTASSIUM CHLORIDE, SODIUM LACTATE AND CALCIUM CHLORIDE: 600; 310; 30; 20 INJECTION, SOLUTION INTRAVENOUS at 11:30

## 2023-04-21 RX ADMIN — PHENYLEPHRINE HYDROCHLORIDE 50 MCG: 10 INJECTION INTRAVENOUS at 13:12

## 2023-04-21 RX ADMIN — FENTANYL CITRATE 50 MCG: 50 INJECTION, SOLUTION INTRAMUSCULAR; INTRAVENOUS at 12:09

## 2023-04-21 RX ADMIN — PHENYLEPHRINE HYDROCHLORIDE 0.3 MCG/KG/MIN: 10 INJECTION INTRAVENOUS at 12:30

## 2023-04-21 RX ADMIN — Medication 5 MG: at 13:15

## 2023-04-21 RX ADMIN — SODIUM CHLORIDE, POTASSIUM CHLORIDE, SODIUM LACTATE AND CALCIUM CHLORIDE: 600; 310; 30; 20 INJECTION, SOLUTION INTRAVENOUS at 11:19

## 2023-04-21 RX ADMIN — Medication 5 MG: at 14:33

## 2023-04-21 RX ADMIN — NITROGLYCERIN 10 MCG: 10 INJECTION INTRAVENOUS at 12:49

## 2023-04-21 RX ADMIN — Medication 2 G: at 12:00

## 2023-04-21 RX ADMIN — OXYCODONE HYDROCHLORIDE 10 MG: 5 TABLET ORAL at 20:36

## 2023-04-21 RX ADMIN — PHENYLEPHRINE HYDROCHLORIDE 50 MCG: 10 INJECTION INTRAVENOUS at 13:00

## 2023-04-21 RX ADMIN — PHENYLEPHRINE HYDROCHLORIDE 50 MCG: 10 INJECTION INTRAVENOUS at 12:56

## 2023-04-21 RX ADMIN — PROPOFOL 10 MG: 10 INJECTION, EMULSION INTRAVENOUS at 12:47

## 2023-04-21 RX ADMIN — PHENYLEPHRINE HYDROCHLORIDE 50 MCG: 10 INJECTION INTRAVENOUS at 12:36

## 2023-04-21 RX ADMIN — HEPARIN SODIUM 8000 UNITS: 1000 INJECTION INTRAVENOUS; SUBCUTANEOUS at 12:55

## 2023-04-21 RX ADMIN — Medication 2.5 MG: at 13:29

## 2023-04-21 RX ADMIN — PHENYLEPHRINE HYDROCHLORIDE 100 MCG: 10 INJECTION INTRAVENOUS at 12:28

## 2023-04-21 RX ADMIN — LIDOCAINE HYDROCHLORIDE 2 ML: 10 INJECTION, SOLUTION INFILTRATION; PERINEURAL at 12:05

## 2023-04-21 RX ADMIN — PROTAMINE SULFATE 40 MG: 10 INJECTION, SOLUTION INTRAVENOUS at 13:42

## 2023-04-21 RX ADMIN — PHENYLEPHRINE HYDROCHLORIDE 50 MCG: 10 INJECTION INTRAVENOUS at 13:17

## 2023-04-21 RX ADMIN — FENTANYL CITRATE 50 MCG: 50 INJECTION, SOLUTION INTRAMUSCULAR; INTRAVENOUS at 12:20

## 2023-04-21 RX ADMIN — ROCURONIUM BROMIDE 50 MG: 50 INJECTION, SOLUTION INTRAVENOUS at 12:13

## 2023-04-21 RX ADMIN — DEXAMETHASONE SODIUM PHOSPHATE 4 MG: 10 INJECTION, SOLUTION INTRAMUSCULAR; INTRAVENOUS at 12:30

## 2023-04-21 RX ADMIN — ROCURONIUM BROMIDE 20 MG: 50 INJECTION, SOLUTION INTRAVENOUS at 13:09

## 2023-04-21 RX ADMIN — CEFAZOLIN SODIUM 2 G: 2 INJECTION, SOLUTION INTRAVENOUS at 20:19

## 2023-04-21 RX ADMIN — CELECOXIB 100 MG: 100 CAPSULE ORAL at 21:41

## 2023-04-21 RX ADMIN — GLYCOPYRROLATE 0.1 MG: 0.2 INJECTION INTRAMUSCULAR; INTRAVENOUS at 13:02

## 2023-04-21 RX ADMIN — NITROGLYCERIN 10 MCG: 10 INJECTION INTRAVENOUS at 12:48

## 2023-04-21 RX ADMIN — MIDAZOLAM 2 MG: 1 INJECTION INTRAMUSCULAR; INTRAVENOUS at 11:53

## 2023-04-21 RX ADMIN — GLYCOPYRROLATE 0.2 MG: 0.2 INJECTION INTRAMUSCULAR; INTRAVENOUS at 12:35

## 2023-04-21 RX ADMIN — GLYCOPYRROLATE 0.1 MG: 0.2 INJECTION INTRAMUSCULAR; INTRAVENOUS at 12:30

## 2023-04-21 RX ADMIN — Medication 10 MG: at 15:36

## 2023-04-21 RX ADMIN — SENNOSIDES AND DOCUSATE SODIUM 1 TABLET: 50; 8.6 TABLET ORAL at 20:28

## 2023-04-21 RX ADMIN — SODIUM CHLORIDE, POTASSIUM CHLORIDE, SODIUM LACTATE AND CALCIUM CHLORIDE: 600; 310; 30; 20 INJECTION, SOLUTION INTRAVENOUS at 15:48

## 2023-04-21 RX ADMIN — SODIUM CHLORIDE: 9 INJECTION, SOLUTION INTRAVENOUS at 12:20

## 2023-04-21 RX ADMIN — SUGAMMADEX 200 MG: 100 INJECTION, SOLUTION INTRAVENOUS at 14:17

## 2023-04-21 RX ADMIN — ACETAMINOPHEN 975 MG: 325 TABLET ORAL at 20:27

## 2023-04-21 RX ADMIN — OXYCODONE HYDROCHLORIDE 5 MG: 5 TABLET ORAL at 23:43

## 2023-04-21 RX ADMIN — PHENYLEPHRINE HYDROCHLORIDE 100 MCG: 10 INJECTION INTRAVENOUS at 12:30

## 2023-04-21 RX ADMIN — PHENYLEPHRINE HYDROCHLORIDE 50 MCG: 10 INJECTION INTRAVENOUS at 12:33

## 2023-04-21 RX ADMIN — PROPOFOL 200 MG: 10 INJECTION, EMULSION INTRAVENOUS at 12:13

## 2023-04-21 ASSESSMENT — ACTIVITIES OF DAILY LIVING (ADL)
ADLS_ACUITY_SCORE: 18

## 2023-04-21 NOTE — ANESTHESIA PROCEDURE NOTES
Arterial Line Procedure Note    Pre-Procedure   Staff -        Anesthesiologist:  Antonio Guaman MD       Performed By: anesthesiologist       Location: OR       Pre-Anesthestic Checklist: patient identified, IV checked, risks and benefits discussed, informed consent, monitors and equipment checked, pre-op evaluation and at physician/surgeon's request  Timeout:       Correct Patient: Yes        Correct Procedure: Yes        Correct Site: Yes        Correct Position: Yes   Line Placement:   This line was placed Pre Induction starting at 4/21/2023 12:05 PM and ending at 4/21/2023 12:10 PM  Procedure   Procedure: arterial line  Sterile Prep        Standard elements of sterile barrier followed       Skin prep: Chloraprep  Insertion/Injection        Technique: ultrasound guided and Seldinger Technique        1. Ultrasound was used to evaluate the access site.       2. Artery evaluated via ultrasound for patency/adequacy.       3. Using real-time ultrasound the needle/catheter was observed entering the artery/vein.       4. Permanent image was captured and entered into the patient's record.       Catheter Type/Size: 20 G, 12 cm  Narrative         Secured by: suture       Tegaderm dressing used.       Complications: None apparent,        Arterial waveform: Yes

## 2023-04-21 NOTE — OP NOTE
Vascular Surgery Operative Note     PREOPERATIVE DIAGNOSIS:   Left carotid stenosis     POSTOPERATIVE DIAGNOSIS:  Same     PROCEDURE: Left trans-carotid artery revascularization (TCAR )                            Intraoperative ultrasound     SURGEON:  MD Aylin Jaeger MD       ASSISTANT:   Sharifa Goodwin MD, vascular surgery fellow     ANESTHESIA:  General Endotracheal     ESTIMATED BLOOD LOSS:  50 mL    HEPARIN DOSE: 8000 Units  PROTAMINE DOSE: 40 mg  CONTRAST: 20 mL    FLUORO TIME: 6.9 min  FLUORO DOSE: 1327 mGy/cm2  SPECIMENS: none    FLOW REVERSAL TIME:15 min     INDICATIONS:   57-year-old female with worsening left carotid artery stenosis, asymptomatic.  Given patient's history of multiple neck surgeries, we recommended transcarotid revascularization.  He should not was noted on pre-operative CTA to have a type 2, non-bovid aortic arch with mild atherosclerotic disease.     INTRAOPERATIVE FINDINGS:    Eccentric lesion noted in the carotid bulb causing 90% stenosis of the proximal ICA     DESCRIPTION OF TECHNIQUE:   Patient was brought into the operating room and transferred to the operating table in the supine position. After uneventful endotracheal intubation, general anesthesia was initiated. Antibiotics were administered. The arms were tucked and a shoulder roll was placed, with pressure points carefully padded. The left neck and anterior chest along with bilateral groins were prepped and draped in standard sterile fashion. Timeout was performed and the entire surgical team was in agreement with the planned procedure and correctly marked side.      A small transverse incision was made just above the bony ridge of the clavicle. The incision was created sharply and carried through the subcutaneous fat and platysma with bovie electrocautery. The sternal and clavicular heads of the sternocleidomastoid muscle were bluntly , with cautery used to control  muscle-edge bleeding. The internal jugular vein was visualized and was gently retracted medially; the vagus was not visualized . Additional areolar tissue overlying the artery was divided and the periadventitial tissue was sharply dissected to circumferentially clear the common carotid artery proximally. The artery was encircled with an umbilical tape.    Attention was turned to the venous access. Ultrasonography was used to evaluate and document patency of the right right common femoral vein. Under direct ultrasound visualization, the right common femoral vein was cannulated with a needle and images were saved for documentation. The needle was exchanged and upsized to a 8-Fr sheath using Seldinger technique over J-wires.     Systemic heparinization was administered. A purse-string suture was placed in the anterior wall of the common carotid artery with a prolene suture. The micropuncture access kit was used to directly cannulate the common carotid artery and the [address] microaccess wire advanced into the artery. The needle was exchanged for the micropuncture sheath; the cannula was removed and an initial angiographic image was obtained, demonstrating the common carotid artery, bifurcation, and internal and external carotid arteries, along with the degree and location of the stenosis. The lesion size was approximately 26 mm in length and represented approximately 90% stenosis.     The floppy-tipped stiff wire was advanced into the common carotid artery and the microsheath exchanged for the [address] 8Fr carotid sheath. Gentle retraction was held on the common carotid artery via elevation of the umbilical tape to facilitate this maneuver. Two fluoroscopic views were obtained to demonstrate adequate sheath-tip position in the center of the vessel lumen; the sheath was then sutured to the anterior chest wall to maintain positioning. The sheath was aspirated and gently flushed with saline, and the flow-reversal system  was connected via a carotid artery-to-femoral venous sheath tubing system with an in-line filter. Flow was confirmed with additional saline flushing of the venous sheath.     A TCAR time-out was completed, ensuring adequate balloon and stent availability. The common carotid artery was clamped, with ongoing flow reversal. A repeat angiogram was taken, again localizing the lesion. The lesion was crossed with the [address] 0.014 wire. A 6 mm x 25 mm balloon was advanced over the wire to the level of the stenosis and used to pre-dilate the lesion. The balloon was removed and the 8 mm x 40 mm En-Route stent was advanced to this level, taking care to maintain the wire in the extracranial portions of the internal carotid artery. The stent was deployed, with 40% residual waist required additional balloon angioplasty using 6 mm x 25 mm balloon.. Three minutes of ongoing flow reversal were maintained and then an angiographic picture obtained, demonstrating excellent stent opening, resolution of the stenosis, good flow through the internal carotid artery, and no dissection planes. The wire was removed.    The common carotid clamp was released. The flow reversal system was stopped and blood allowed to drain into the venous system. Both sheaths were flushed with heparinized saline. The venous sheath was removed and pressure held over the groin for approximately 10 minutes. The carotid sheath was then removed and the purse-string suture tied to close the arteriotomy. Protamine was administered. The wound was irrigated and hemostasis confirmed, with topical hemostatic agents and bovie electrocautery of any bleeding muscle tissue. The subcutaneous tissues were closed with interrupted absorbable sutures, followed by skin closure in a running subcuticular fashion with a 4-0 Monocryl suture. The skin was cleaned and dried and steri-strips and a sterile bandage applied.     At the end of the case all needle, sponge and instrument  counts were correct.  Patient was then extubated in stable condition, was found to be globally neuro-intact, and was taken to the postoperative care unit.      Dr. Whitaker's assistance was necessary  receive credentialing to perform TCAR procedures    Eveline Manley MD

## 2023-04-21 NOTE — PHARMACY-ADMISSION MEDICATION HISTORY
Pharmacist Admission Medication History    Admission medication history is complete. The information provided in this note is only as accurate as the sources available at the time of the update.    Medication reconciliation/reorder completed by provider prior to medication history? No    Information Source(s): Patient, Clinic records and CareEverywhere/SureScripts via in-person    Pertinent Information: Patient completed 10 day course of Augmentin last night. Patient is taking both melatonin & Qunol Sleep at bedtime.     Changes made to PTA medication list:    Added: Hair vitamin, Qunol    Deleted: baclofen    Changed: None    Medication Affordability:  Not including over the counter (OTC) medications, was there a time in the past 12 months when you did not take your medications as prescribed because of cost?: No    Allergies reviewed with patient and updates made in EHR: yes    Medication History Completed By: Kris Erazo [facility] 4/21/2023 10:35 AM    Prior to Admission medications    Medication Sig Last Dose Taking? Auth Provider Long Term End Date   acetaminophen (TYLENOL) 500 MG tablet Take 1,000 mg by mouth every 6 hours as needed for mild pain 4/20/2023 Yes Unknown, Entered By History     amoxicillin-clavulanate (AUGMENTIN) 875-125 MG tablet Take 1 tablet by mouth 2 times daily for 10 days 4/20/2023 at pm Yes Sera Solo NP  4/21/23   aspirin (ASA) 81 MG EC tablet Take 1 tablet (81 mg) by mouth daily 4/20/2023 Yes Eveline Manley MD     atorvastatin (LIPITOR) 40 MG tablet Take 1 tablet by mouth once daily 4/21/2023 at 0500 Yes Glenn Ferreira MD Yes    celecoxib (CELEBREX) 100 MG capsule Take 1 capsule (100 mg) by mouth 2 times daily  Patient taking differently: Take 200 mg by mouth daily 4/12/2023 Yes Sera Solo NP Yes    clobetasol (TEMOVATE) 0.05 % external ointment Apply topically 2 times daily  Patient taking differently: Apply topically 2 times daily as needed Past Week Yes  "Kiarra Monreal APRN CNP No    clopidogrel (PLAVIX) 75 MG tablet Take 1 tablet (75 mg) by mouth daily 4/21/2023 at 0500 Yes Eveline Manley MD Yes    fish oil-omega-3 fatty acids 1000 MG capsule Take 1 g by mouth daily Past Week Yes Reported, Patient     lactobacillus rhamnosus (GG) (CULTURELL) capsule Take 1 capsule by mouth daily Past Week Yes Reported, Patient     Melatonin 10 MG TABS tablet Take 10 mg by mouth At Bedtime 4/20/2023 Yes Reported, Patient     metoprolol succinate ER (TOPROL XL) 50 MG 24 hr tablet Take 0.5 tablets (25 mg) by mouth daily  Patient taking differently: Take 12.5 mg by mouth daily 4/21/2023 at 0500 Yes Glenn Ferreira MD Yes    Multiple Vitamins-Minerals (HAIR/SKIN/NAILS) TABS Take 1 tablet by mouth daily Past Week Yes Unknown, Entered By History     multivitamin w/minerals (THERA-VIT-M) tablet Take 1 tablet by mouth daily Past Week Yes Unknown, Entered By History     naloxone (NARCAN) 4 MG/0.1ML nasal spray See Admin Instructions  Yes Reported, Patient     nitroGLYcerin (NITROSTAT) 0.4 MG sublingual tablet One tablet under the tongue every 5 minutes if needed for chest pain. May repeat every 5 minutes for a maximum of 3 doses in 15 minutes\"  Yes Kanika Daniels, CNP Yes    omeprazole (PRILOSEC) 20 MG DR capsule Take 1 capsule (20 mg) by mouth 3 times daily  Patient taking differently: Take 20 mg by mouth daily 4/21/2023 Yes Kiarra Monreal APRN CNP     UNABLE TO FIND Take 1 tablet by mouth At Bedtime MEDICATION NAME: Qunol Sleep - melatonin 5 mg, Ashwagandha, L-Theanine 4/20/2023 Yes Unknown, Entered By History     Vitamin D (Cholecalciferol) 25 MCG (1000 UT) TABS Take 1,000 Units by mouth daily Past Week Yes Reported, Patient No        "

## 2023-04-21 NOTE — ANESTHESIA PREPROCEDURE EVALUATION
Anesthesia Pre-Procedure Evaluation    Patient: Selina Parikh   MRN: [id number] : 1965        Procedure : Procedure(s):  Left transcarotid revascularization          Past Medical History:   Diagnosis Date     Anemia      Asymptomatic stenosis of left carotid artery      Cancer (H)     melanoma     Coronary artery disease      Hiatal hernia      Hypertension      Irritable bowel syndrome      Other chronic pain       Past Surgical History:   Procedure Laterality Date     BACK SURGERY       CHOLECYSTECTOMY, LAPOROSCOPIC  2000    Cholecystectomy, Laparoscopic     COLON SURGERY       CV CORONARY ANGIOGRAM N/A 2022    Procedure: CV CORONARY ANGIOGRAM;  Surgeon: Stefanie Spangler MD;  Location: [facility] [place] CV     CV LEFT HEART CATH N/A 2022    Procedure: Left Heart Catheterization;  Surgeon: Stefanie Spangler MD;  Location: [facility] [place] CV     CYSTOSCOPY, INSERT LIGHTED STENT URETER(S) N/A 04/10/2018    Procedure: CYSTOSCOPY, INSERT LIGHTED STENT URETER(S);  Lighted Stent Placement,Laparoscopic Assisted Sigmoid Colectomy;  Surgeon: ERVIN Reina MD;  Location: [facility] OR     LAPAROSCOPIC ASSISTED COLECTOMY LEFT (DESCENDING) N/A 04/10/2018    Procedure: LAPAROSCOPIC ASSISTED COLECTOMY LEFT (DESCENDING);;  Surgeon: Hill Murray MD;  Location: [facility] OR     LUMBAR DISCECTOMY Left 2022    Procedure: left thoracic 12-lumbar 1 hemilaminectomy, medial facetectomy, foraminotomy and microdiscectomy;  Surgeon: Suha Kent MD;  Location: [facility]     NECK SURGERY       ORTHOPEDIC SURGERY  10/01/2010    Cut palm and reattched tendons and nerves in left hand forefinger.     OH ESOPHAGOGASTRODUODENOSCOPY TRANSORAL DIAGNOSTIC N/A 2021    Procedure: ESOPHAGOGASTRODUODENOSCOPY (EGD);  Surgeon: Souleymane Moreno DO;  Location: [facility];  Service: General     SURGICAL HISTORY OF -   2000    Esophagogastroduodenoscopy with biopsy     TUBAL LIGATION        Allergies    Allergen Reactions     Ciprofloxacin Anaphylaxis     Flagyl [Metronidazole] Anaphylaxis     Gabapentin Other (See Comments)     Suicidal thoughts.     Zofran [Ondansetron] Other (See Comments) and GI Disturbance     Causes bloating, moderately uncomfortable, abd pain       Benadryl [Diphenhydramine] Other (See Comments)     Muscle spasms     Percocet [Oxycodone-Acetaminophen] Other (See Comments) and Headache     Goes nuts - nasty mean irritated, can take Dilaudid     Vicodin [Hydrocodone-Acetaminophen] Other (See Comments)     Anxiety-agitation      Social History     Tobacco Use     Smoking status: Some Days     Packs/day: 0.05     Years: 30.00     Pack years: 1.50     Types: Cigarettes     Last attempt to quit: 2023     Years since quittin.1     Smokeless tobacco: Never     Tobacco comments:     3 Cigs a day   Vaping Use     Vaping status: Never Used   Substance Use Topics     Alcohol use: Not Currently     Comment: Alcoholic Drinks/day: 2x year       Wt Readings from Last 1 Encounters:   23 72.1 kg (158 lb 14.4 oz)        Anesthesia Evaluation   Pt has had prior anesthetic.         ROS/MED HX  ENT/Pulmonary:  - neg pulmonary ROS     Neurologic:  - neg neurologic ROS     Cardiovascular: Comment: EKG  Sinus rhythm   Normal ECG   When compared with ECG of 05-MAY-2022 07:58,   Nonspecific T wave abnormality no longer evident in Anterior leads   Confirmed by MIRTA TOBIN MD LOC:[facility] ([id number]) on 2023 3:54:11 PM    (+) hypertension-Peripheral Vascular Disease-- Carotid Stenosis. CAD --- (-) wheezes   METS/Exercise Tolerance: >4 METS    Hematologic:  - neg hematologic  ROS     Musculoskeletal:       GI/Hepatic:     (+) GERD, hiatal hernia,     Renal/Genitourinary:  - neg Renal ROS     Endo:  - neg endo ROS     Psychiatric/Substance Use:  - neg psychiatric ROS     Infectious Disease:  - neg infectious disease ROS     Malignancy:  - neg malignancy ROS     Other:  - neg other ROS    (+) , H/O Chronic Pain,         Physical Exam    Airway        Mallampati: II   TM distance: > 3 FB   Neck ROM: limited   Mouth opening: > 3 cm    Respiratory Devices and Support         Dental       (+) Edentulous      Cardiovascular   cardiovascular exam normal          Pulmonary   pulmonary exam normal        (-) no wheezes    Other findings: PEERLA bilaterally    OUTSIDE LABS:  CBC:   Lab Results   Component Value Date    WBC 7.4 04/11/2023    WBC 19.8 (H) 09/25/2022    HGB 13.4 04/11/2023    HGB 13.3 09/25/2022    HCT 40.8 04/11/2023    HCT 39.6 09/25/2022     04/11/2023     09/25/2022     BMP:   Lab Results   Component Value Date     04/11/2023     09/25/2022    POTASSIUM 3.9 04/11/2023    POTASSIUM 4.1 09/25/2022    CHLORIDE 104 04/11/2023    CHLORIDE 105 09/25/2022    CO2 24 04/11/2023    CO2 25 09/25/2022    BUN 17.4 04/11/2023    BUN 15 09/25/2022    CR 0.71 04/11/2023    CR 0.84 09/25/2022     (H) 04/11/2023     (H) 09/25/2022     COAGS:   Lab Results   Component Value Date    PTT 29 09/19/2022    INR 0.93 09/19/2022     POC:   Lab Results   Component Value Date     (H) 04/14/2018    HCG Negative 06/16/2012     HEPATIC:   Lab Results   Component Value Date    ALBUMIN 4.5 09/02/2022    PROTTOTAL 6.9 09/02/2022    ALT 12 09/02/2022    AST 22 09/02/2022    ALKPHOS 78 09/02/2022    BILITOTAL 0.2 09/02/2022    BILIDIRECT 0.2 03/25/2013     OTHER:   Lab Results   Component Value Date    LACT 0.5 (L) 01/28/2018    A1C 5.6 04/28/2022    MAXWELL 9.7 04/11/2023    PHOS 3.6 04/14/2018    MAG 2.0 04/14/2018    LIPASE 113 01/25/2020    TSH 0.95 11/29/2011    T4 1.06 11/29/2011    CRP 0.6 09/25/2022    SED 9 11/01/2019       Anesthesia Plan    ASA Status:  3   NPO Status:  NPO Appropriate    Anesthesia Type: General.     - Airway: ETT   Induction: Intravenous, Propofol.   Maintenance: Balanced.   Techniques and Equipment:     - Airway: Video-Laryngoscope     - Lines/Monitors: Arterial Line, 2nd IV      Consents    Anesthesia Plan(s) and associated risks, benefits, and realistic alternatives discussed. Questions answered and patient/representative(s) expressed understanding.     - Discussed: Risks, Benefits and Alternatives for BOTH SEDATION and the PROCEDURE were discussed     - Discussed with:  Patient      - Patient is DNR/DNI Status: No    Use of blood products discussed: Yes.     - Discussed with: Patient.     - Consented: consented to blood products            Reason for refusal: other.     Postoperative Care    Pain management: IV analgesics, Oral pain medications, Multi-modal analgesia.   PONV prophylaxis: Ondansetron (or other 5HT-3), Dexamethasone or Solumedrol, Background Propofol Infusion     Comments:    Other Comments: GETA  Pre induction A line  Precedex infusion  Decadron/Zofran for PONV            Antonio Guaman MD

## 2023-04-21 NOTE — ANESTHESIA CARE TRANSFER NOTE
Patient: Selina Parikh    Procedure: Procedure(s):  Left transcarotid revascularization       Diagnosis: Asymptomatic stenosis of left carotid artery [I65.22]  Diagnosis Additional Information: No value filed.    Anesthesia Type:   General     Note:    Oropharynx: oropharynx clear of all foreign objects and spontaneously breathing  Level of Consciousness: drowsy  Oxygen Supplementation: face mask  Level of Supplemental Oxygen (L/min / FiO2): 6  Independent Airway: airway patency satisfactory and stable  Dentition: dentition unchanged  Vital Signs Stable: post-procedure vital signs reviewed and stable  Report to RN Given: handoff report given  Patient transferred to: PACU  Comments: Oropharynx suctioned. spont resp. Extubated. Exchanging well. To PACU. Report to RN at bedside.   Handoff Report: Identifed the Patient, Identified the Reponsible Provider, Reviewed the pertinent medical history, Discussed the surgical course, Reviewed Intra-OP anesthesia mangement and issues during anesthesia, Set expectations for post-procedure period and Allowed opportunity for questions and acknowledgement of understanding      Vitals:  Vitals Value Taken Time   /55 04/21/23 1434   Temp 36.3  C (97.4  F) 04/21/23 1434   Pulse 81 04/21/23 1444   Resp 19 04/21/23 1444   SpO2 94 % 04/21/23 1444   Vitals shown include unvalidated device data.    Electronically Signed By: CARLTON Lima CRNA  April 21, 2023  2:45 PM

## 2023-04-21 NOTE — ANESTHESIA POSTPROCEDURE EVALUATION
Patient: Selina Parikh    Procedure: Procedure(s):  Left transcarotid revascularization       Anesthesia Type:  General    Note:  Disposition: Inpatient   Postop Pain Control: Uneventful            Sign Out: Well controlled pain   PONV: No   Neuro/Psych: Uneventful            Sign Out: Acceptable/Baseline neuro status   Airway/Respiratory: Uneventful            Sign Out: Acceptable/Baseline resp. status   CV/Hemodynamics: Uneventful            Sign Out: Acceptable CV status; No obvious hypovolemia; No obvious fluid overload   Other NRE: NONE   DID A NON-ROUTINE EVENT OCCUR? No    Event details/Postop Comments:  Blood pressures treated successfully with 10mg of ephedrine. Chest pain worse with palpation in the OR. 12 lead EKG demonstrated NSR with no ischemic changes.           Last vitals:  Vitals Value Taken Time   BP 99/58 04/21/23 1600   Temp 36.3  C (97.4  F) 04/21/23 1434   Pulse 77 04/21/23 1610   Resp 18 04/21/23 1610   SpO2 98 % 04/21/23 1610   Vitals shown include unvalidated device data.    Electronically Signed By: Antonio Guaman MD  April 21, 2023  4:11 PM

## 2023-04-21 NOTE — INTERVAL H&P NOTE
"I have reviewed the surgical (or preoperative) H&P that is linked to this encounter, and examined the patient. There are no significant changes    Clinical Conditions Present on Arrival:  Clinically Significant Risk Factors Present on Admission                 # Drug Induced Platelet Defect: home medication list includes an antiplatelet medication  # Obesity: Estimated body mass index is 32.09 kg/m  as calculated from the following:    Height as of this encounter: 1.499 m (4' 11\").    Weight as of this encounter: 72.1 kg (158 lb 14.4 oz).       "

## 2023-04-21 NOTE — PROGRESS NOTES
Transferred to ICU at 1845. VSS, minor pain in throat and L neck at drain site.  Oriented to room, call light.  Meal delivered, pt eating. Alert, oriented, pleasant with staff.

## 2023-04-21 NOTE — ANESTHESIA PROCEDURE NOTES
Airway       Patient location during procedure: OR       Procedure Start/Stop Times: 4/21/2023 12:15 PM  Staff -        Anesthesiologist:  Antonio Guaman MD       CRNA: Edwin Jeffries APRN CRNA       Performed By: CRNA  Consent for Airway        Urgency: elective  Indications and Patient Condition       Indications for airway management: cherry-procedural       Induction type:intravenous       Mask difficulty assessment: 1 - vent by mask    Final Airway Details       Final airway type: endotracheal airway       Successful airway: ETT - single  Endotracheal Airway Details        ETT size (mm): 7.0       Cuffed: yes       Successful intubation technique: video laryngoscopy       VL Blade Size: Glidescope 3       Grade View of Cords: 1       Adjucts: stylet       Position: Right       Measured from: lips       Secured at (cm): 22       Bite block used: None    Post intubation assessment        Placement verified by: capnometry, equal breath sounds and chest rise        Number of attempts at approach: 1       Secured with: silk tape       Ease of procedure: easy       Dentition: Intact    Medication(s) Administered   Medication Administration Time: 4/21/2023 12:15 PM    Additional Comments       Head in neutral position for intubation. Vocal cords open and clear. Atraumatic.

## 2023-04-22 VITALS
HEIGHT: 59 IN | SYSTOLIC BLOOD PRESSURE: 131 MMHG | BODY MASS INDEX: 33.99 KG/M2 | WEIGHT: 168.6 LBS | TEMPERATURE: 98.4 F | RESPIRATION RATE: 18 BRPM | HEART RATE: 66 BPM | OXYGEN SATURATION: 97 % | DIASTOLIC BLOOD PRESSURE: 68 MMHG

## 2023-04-22 LAB
ATRIAL RATE - MUSE: 57 BPM
DIASTOLIC BLOOD PRESSURE - MUSE: NORMAL MMHG
GLUCOSE BLDC GLUCOMTR-MCNC: 121 MG/DL (ref 70–99)
INTERPRETATION ECG - MUSE: NORMAL
P AXIS - MUSE: 58 DEGREES
PR INTERVAL - MUSE: 146 MS
QRS DURATION - MUSE: 92 MS
QT - MUSE: 444 MS
QTC - MUSE: 432 MS
R AXIS - MUSE: 42 DEGREES
SYSTOLIC BLOOD PRESSURE - MUSE: NORMAL MMHG
T AXIS - MUSE: 35 DEGREES
TROPONIN T SERPL HS-MCNC: 7 NG/L
TROPONIN T SERPL HS-MCNC: 8 NG/L
VENTRICULAR RATE- MUSE: 57 BPM

## 2023-04-22 PROCEDURE — 84484 ASSAY OF TROPONIN QUANT: CPT | Performed by: SURGERY

## 2023-04-22 PROCEDURE — 93010 ELECTROCARDIOGRAM REPORT: CPT | Performed by: INTERNAL MEDICINE

## 2023-04-22 PROCEDURE — 99239 HOSP IP/OBS DSCHRG MGMT >30: CPT | Performed by: INTERNAL MEDICINE

## 2023-04-22 PROCEDURE — 250N000013 HC RX MED GY IP 250 OP 250 PS 637: Performed by: SURGERY

## 2023-04-22 PROCEDURE — 36415 COLL VENOUS BLD VENIPUNCTURE: CPT | Performed by: INTERNAL MEDICINE

## 2023-04-22 PROCEDURE — 84484 ASSAY OF TROPONIN QUANT: CPT | Performed by: INTERNAL MEDICINE

## 2023-04-22 PROCEDURE — 93005 ELECTROCARDIOGRAM TRACING: CPT

## 2023-04-22 PROCEDURE — 250N000011 HC RX IP 250 OP 636: Performed by: SURGERY

## 2023-04-22 RX ORDER — ACETAMINOPHEN 325 MG/1
975 TABLET ORAL EVERY 8 HOURS
Qty: 27 TABLET | Refills: 0 | COMMUNITY
Start: 2023-04-22 | End: 2023-04-25

## 2023-04-22 RX ADMIN — HEPARIN SODIUM 5000 UNITS: 10000 INJECTION, SOLUTION INTRAVENOUS; SUBCUTANEOUS at 08:51

## 2023-04-22 RX ADMIN — CEFAZOLIN SODIUM 2 G: 2 INJECTION, SOLUTION INTRAVENOUS at 11:01

## 2023-04-22 RX ADMIN — SENNOSIDES AND DOCUSATE SODIUM 1 TABLET: 50; 8.6 TABLET ORAL at 08:51

## 2023-04-22 RX ADMIN — PANTOPRAZOLE SODIUM 40 MG: 40 TABLET, DELAYED RELEASE ORAL at 06:43

## 2023-04-22 RX ADMIN — ACETAMINOPHEN 975 MG: 325 TABLET ORAL at 03:30

## 2023-04-22 RX ADMIN — CEFAZOLIN SODIUM 2 G: 2 INJECTION, SOLUTION INTRAVENOUS at 03:30

## 2023-04-22 RX ADMIN — ASPIRIN 81 MG: 81 TABLET, COATED ORAL at 08:51

## 2023-04-22 RX ADMIN — CELECOXIB 100 MG: 100 CAPSULE ORAL at 08:51

## 2023-04-22 RX ADMIN — POLYETHYLENE GLYCOL 3350 17 G: 17 POWDER, FOR SOLUTION ORAL at 08:49

## 2023-04-22 RX ADMIN — ATORVASTATIN CALCIUM 40 MG: 40 TABLET, FILM COATED ORAL at 08:51

## 2023-04-22 RX ADMIN — ACETAMINOPHEN 975 MG: 325 TABLET ORAL at 11:00

## 2023-04-22 RX ADMIN — CLOPIDOGREL BISULFATE 75 MG: 75 TABLET ORAL at 08:51

## 2023-04-22 ASSESSMENT — ACTIVITIES OF DAILY LIVING (ADL)
ADLS_ACUITY_SCORE: 18
DEPENDENT_IADLS:: INDEPENDENT
ADLS_ACUITY_SCORE: 18

## 2023-04-22 NOTE — PROGRESS NOTES
Care Management Initial Consult    General Information  Assessment completed with: -chart review,    Type of CM/SW Visit: Chart Assessment    Primary Care Provider verified and updated as needed: Yes   Readmission within the last 30 days: no previous admission in last 30 days      Reason for Consult: discharge planning  Advance Care Planning: Advance Care Planning Reviewed: no concerns identified          Communication Assessment  Patient's communication style: spoken language (English or Bilingual)    Hearing Difficulty or Deaf: no   Wear Glasses or Blind: no    Cognitive  Cognitive/Neuro/Behavioral: WDL                 Speech: clear    Living Environment:   People in home: sibling(s)     Current living Arrangements: house      Able to return to prior arrangements: yes       Family/Social Support:  Care provided by: self  Provides care for: no one  Marital Status:   Sibling(s)          Description of Support System: Supportive, Involved    Support Assessment: Adequate family and caregiver support    Current Resources:   Patient receiving home care services: No     Community Resources: None  Equipment currently used at home: none  Supplies currently used at home:      Employment/Financial:  Employment Status: disabled        Financial Concerns:               Lifestyle & Psychosocial Needs:  Social Determinants of Health     Tobacco Use: High Risk (4/21/2023)    Patient History      Smoking Tobacco Use: Some Days      Smokeless Tobacco Use: Never      Passive Exposure: Not on file   Alcohol Use: Not on file   Financial Resource Strain: Not on file   Food Insecurity: Not on file   Transportation Needs: Not on file   Physical Activity: Not on file   Stress: Not on file   Social Connections: Not on file   Intimate Partner Violence: Not on file   Depression: At risk (3/17/2023)    PHQ-2      PHQ-2 Score: 3   Housing Stability: Not on file       Functional Status:  Prior to admission patient needed assistance:    Dependent ADLs:: Independent  Dependent IADLs:: Independent  Assesssment of Functional Status: At functional baseline    Mental Health Status:  Mental Health Status: No Current Concerns       Chemical Dependency Status:                Values/Beliefs:  Spiritual, Cultural Beliefs, Taoist Practices, Values that affect care:                 Additional Information:  Pt is a 57 year old  femalewith a PMH of post traumatic brain syndrome, GERD, Diverticulitis, CAD, chest pain, HTN, spinal stenosis and carotid stenosis. She was admitted on 04/21/23 for a left transcarotid revascularization.     Pt does not have a health care directive on file and does not have a designated health care agent.  Primary contact is son Gurvinder.     Pt lives independently in home with siblings.  Plan is to return home at discharge.  Pt family can provide transportation.  CM will continue to follow for any discharge needs.     Awilda Hugo RN

## 2023-04-22 NOTE — PROGRESS NOTES
Patient examined at bedside.  Status post left transcarotid revascularization.  Overall has been doing well, however, currently she is complaining of significant left-sided chest pain which feels like pressure.  The drain is removed.  Will order EKG and troponin stat.  Most likely will keep this patient in the hospital for 1 more day.

## 2023-04-22 NOTE — PLAN OF CARE
"[facility] - ICU    RN Progress Note:            Pertinent Assessments:      Please refer to flowsheet rows for full assessment     Neuro:  Stable neuro status without deficits. Patient admits to baseline slight diminished sensation of right side extremities. Denies sensation changes since procedure.  NIH 0.  RASS at most a +1 due to anxiety/chronic back pain. Resolved with correct positioning and celebrex (patient states oxycodone \"did not help much\").    CV:  Procedural site intact without evidence of hematoma (left neck and right groin sites). No fever or expanding redness from sites. Skin not warmer than distal tissues at procedural site as well.  SBP goal> 100 mmHg and MAP>70. Inially stable and about to pull a-line. As I was about to pull, patient BP dropped. Patient asymptomatic. Notified provider and was instructed to give 500 ml bolus and start peripheral IV phenylephrine if BP & MAP did not improve. Was also given the okay to keep a-ellyn in place until BP stabilizes.  Patient restless with chronic back pain and a-line reading fluctuating with movement; cycling BP cuff to compare/also monitor hemodynamic status.   Thus far, BP & MAP just hovering over parameter. HR 50s to 60s while sleeping.           Key Events - This Shift:       BP fluctuating. Neuro status unchanged.           Barriers to Discharge / Downgrade:     Stable BP & removal of a-line         Point of Contact Update YES-OR-NO: Yes  If No, reason: no significant events requiring awakening of point of contact to update about.  Name:  Phone Number:  Summary of Conversation:         "

## 2023-04-24 ENCOUNTER — PATIENT OUTREACH (OUTPATIENT)
Dept: CARE COORDINATION | Facility: CLINIC | Age: 58
End: 2023-04-24
Payer: COMMERCIAL

## 2023-04-24 NOTE — PROGRESS NOTES
Clinic Care Coordination Contact  [facility]: Post-Discharge Note  SITUATION                                                      Admission:    Admission Date: 04/21/23   Reason for Admission: Left carotid stenosis now status post transcarotid  revascularization (TCAR)  Discharge:   Discharge Date: 04/22/23  Discharge Diagnosis: Left carotid stenosis now status post transcarotid  revascularization (TCAR)    BACKGROUND                                                      Per hospital discharge summary and inpatient provider notes:    Pt is a 57 year old  femalewith a PMH of post traumatic brain syndrome, GERD, Diverticulitis, CAD, chest pain, HTN, spinal stenosis and carotid stenosis. She was admitted on 04/21/23 for a left transcarotid revascularization.     ASSESSMENT           Discharge Assessment  How are you doing now that you are home?: I am doing much better.  How are your symptoms? (Red Flag symptoms escalate to triage hotline per guidelines): Improved  Do you feel your condition is stable enough to be safe at home until your provider visit?: Yes  Does the patient have their discharge instructions? : Yes  Does the patient have questions regarding their discharge instructions? : No  Were you started on any new medications or were there changes to any of your previous medications? : Yes (Patient had a change to one of her medications.)  Does the patient have all of their medications?: Yes  Do you have questions regarding any of your medications? : No  Do you have all of your needed medical supplies or equipment (DME)?  (i.e. oxygen tank, CPAP, cane, etc.): Yes  Discharge follow-up appointment scheduled within 14 calendar days? : Yes  Discharge Follow Up Appointment Date: 05/01/23  Discharge Follow Up Appointment Scheduled with?: Specialty Care Provider    Post-op ([facility] Only)  If the patient had a surgery or procedure, do they have any questions for a nurse?: No             PLAN                                                       Outpatient Plan: Follow up with Dr. Manley as scheduled on May 1 with preclinic  ultrasound    Future Appointments   Date Time Provider Department Center   5/1/2023 10:00 AM Gill Valderrama APRN CNP [facility]CR [facility]   5/1/2023 11:15 AM [facility] VC US 1 MDVSUS [facility]   5/1/2023 12:00 PM Eveline Manley MD MDVSCR [facility]   6/1/2023 10:50 AM Glenn Ferreira MD HRSTWT [facility]   6/22/2023  1:00 PM JOAN [facility] NURSE [facility]G [facility]   7/20/2023  1:00 PM Zahra Erazo APRN CNP [facility]         For any urgent concerns, please contact our 24 hour nurse triage line: [phone] ([address] [place] [address])         EMILY Acosta  [phone]  [facility]

## 2023-04-28 NOTE — PROGRESS NOTES
"Date:5/01/2023      COMPREHENSIVE PAIN CLINIC INITIAL EVALUATION    I had the pleasure of meeting Ms. Selina Parikh on 5/1/2023 in the [facility] in consult for CARLTON Sanchez with regards to her pain.    Subjective:  The patient is a 57 year old female with past medical history of post traumatic brain syndrome, GERD, Diverticulitis, CAD, occipital neuralgia, chest pain, HTN, L3-5 fusion and left T12-L1 microdiscectomy with Dr. Kent 09/2022, and carotid stenosis who presents for evaluation of chronic pain.      Patient reports chronic left low back pain that radiates down the L) leg, wrapping around the L) posterolateral thigh and down into the foot. She has some pain down the right thigh as well but the left is worse then the right. She has b/l hip pain.  She reports spasms in L) foot.  She complains of numbness and tingling in both legs. Oral steroids did not improve her pain.The patient describes the pain as \"toothache\", constant, burning.  She reports that the pain is made worse by standing in one spot, prolong walking, picking up heavy items.  Her pain is improved with ice, heat, frequent position changes.  She rates her pain at a 5 today, best 3 worst 9 on a 0/10 VAS. Surgery is pending for extension of the fusion to L5-S1 with Dr. Kent in June 2023. 2021 L) [facility] LESI L[facility]-S1 [facility] without much benefit.  She does not want to be prescribed opiates.  She continues to do the exercises learned at PT on a daily basis.    She denies any new problems with falls or balance, any new numbness or weakness of the arms or legs, any new bowel or bladder incontinence, any night sweats or unexplained fevers, or any sudden or unexpected weight loss.     Follows with cardiology and neurology.  03/07/2023 Dr. Suha Kent progress note reviewed.    Selina Parikh has not been seen at a pain clinic in the past.      Patient reported symptoms:  Patient Supplied Answers To the  Pain " Questionnaire       View : No data to display.                  Current Treatments:  Acetaminophen 1,000mg q 6 hrs  Celebrex 100mg BID  Plavix 75mg daily  Melatonin      Previous Medication Treatments:  Anti-convulsants: Gabapentin causes suicidal thoughts  Muscle relaxors: Baclofen causes light headedness, zanaflex, cyclobenzaprine, methocarbamol  Anti-depressants: no  Acetaminophen/NSAIDs: yes- helpful  Topicals: Voltaren gel, Biofreeze, lidocaine patch  Opioids: tramadol and hydromorphone,   percocet and vicodin cause anxiety.  Other:  Diazepam 5mg     Other Treatments:  [facility] [place], last session 3 yrs ago.  She doesn't find physical therapy very helpfu  Pain Psychology: no  Chiropractic: yes-not helpful  Acupuncture: no  TENs Unit: yes - helpful  Injections: cervical TPI at [facility]  Surgeries: 2 lumbar sugeries    Past Medical History:  Medical history reviewed.   Past Medical History:   Diagnosis Date     Anemia      Asymptomatic stenosis of left carotid artery      Cancer (H)     melanoma     Coronary artery disease      Hiatal hernia      Hypertension      Irritable bowel syndrome      Other chronic pain       Patient Active Problem List   Diagnosis     Brain syndrome, posttraumatic     IBS (irritable bowel syndrome)     R Occipital Neuralgia     Scapulocostal syndrome     CARDIOVASCULAR SCREENING; LDL GOAL LESS THAN 160     Health Care Home     Tobacco abuse     GERD (gastroesophageal reflux disease)     Psoriasis     Diverticulitis     S/P partial colectomy     Acute chest pain     Chest pain, unspecified type     Coronary artery disease due to lipid rich plaque     Status post coronary angiogram     Primary hypertension     Spinal stenosis of lumbar region with neurogenic claudication     Hiatal hernia     Carotid stenosis       Past Surgical History:  Pertinent surgical history reviewed.   Past Surgical History:   Procedure Laterality Date     BACK  SURGERY       CHOLECYSTECTOMY, LAPOROSCOPIC  02/14/2000    Cholecystectomy, Laparoscopic     COLON SURGERY       CV CORONARY ANGIOGRAM N/A 05/06/2022    Procedure: CV CORONARY ANGIOGRAM;  Surgeon: Stefanie Spangler MD;  Location: [facility] CATH LAB CV     CV LEFT HEART CATH N/A 05/06/2022    Procedure: Left Heart Catheterization;  Surgeon: Stefanie Spangler MD;  Location: [facility] CATH LAB CV     CYSTOSCOPY, INSERT LIGHTED STENT URETER(S) N/A 04/10/2018    Procedure: CYSTOSCOPY, INSERT LIGHTED STENT URETER(S);  Lighted Stent Placement,Laparoscopic Assisted Sigmoid Colectomy;  Surgeon: ERVIN Reina MD;  Location: [facility] OR     ENDOVASCULAR CAROTID STENT PLACEMENT Left 4/21/2023    Procedure: Left transcarotid revascularization;  Surgeon: Eveline Manley MD;  Location: [facility]     IR TCAR TRANSCAROTID ARTERY REVASCULARIZATION  4/21/2023     LAPAROSCOPIC ASSISTED COLECTOMY LEFT (DESCENDING) N/A 04/10/2018    Procedure: LAPAROSCOPIC ASSISTED COLECTOMY LEFT (DESCENDING);;  Surgeon: Hill Murray MD;  Location: [facility] OR     LUMBAR DISCECTOMY Left 09/23/2022    Procedure: left thoracic 12-lumbar 1 hemilaminectomy, medial facetectomy, foraminotomy and microdiscectomy;  Surgeon: Suha Kent MD;  Location: [facility]     NECK SURGERY       ORTHOPEDIC SURGERY  10/01/2010    Cut palm and reattched tendons and nerves in left hand forefinger.     IA ESOPHAGOGASTRODUODENOSCOPY TRANSORAL DIAGNOSTIC N/A 04/30/2021    Procedure: ESOPHAGOGASTRODUODENOSCOPY (EGD);  Surgeon: Souleymane Moreno DO;  Location: [facility];  Service: General     SURGICAL HISTORY OF -   01/04/2000    Esophagogastroduodenoscopy with biopsy     TUBAL LIGATION          Medications: Pertinent medications reviewed.  Current Outpatient Medications   Medication Sig Dispense Refill     acetaminophen (TYLENOL) 500 MG tablet Take 1,000 mg by mouth every 6 hours as needed for mild pain       aspirin (ASA) 81 MG EC tablet Take 1  "tablet (81 mg) by mouth daily 30 tablet 3     atorvastatin (LIPITOR) 40 MG tablet Take 1 tablet by mouth once daily 90 tablet 0     celecoxib (CELEBREX) 100 MG capsule Take 1 capsule (100 mg) by mouth 2 times daily (Patient taking differently: Take 200 mg by mouth daily) 180 capsule 3     clobetasol (TEMOVATE) 0.05 % external ointment Apply topically 2 times daily (Patient taking differently: Apply topically 2 times daily as needed) 60 g 3     clopidogrel (PLAVIX) 75 MG tablet Take 1 tablet (75 mg) by mouth daily 30 tablet 3     fish oil-omega-3 fatty acids 1000 MG capsule Take 1 g by mouth daily       lactobacillus rhamnosus (GG) (CULTURELL) capsule Take 1 capsule by mouth daily       Melatonin 10 MG TABS tablet Take 10 mg by mouth At Bedtime       metoprolol succinate ER (TOPROL XL) 50 MG 24 hr tablet Take 0.5 tablets (25 mg) by mouth daily (Patient taking differently: Take 12.5 mg by mouth daily) 30 tablet 2     Multiple Vitamins-Minerals (HAIR/SKIN/NAILS) TABS Take 1 tablet by mouth daily       multivitamin w/minerals (THERA-VIT-M) tablet Take 1 tablet by mouth daily       naloxone (NARCAN) 4 MG/0.1ML nasal spray See Admin Instructions       nitroGLYcerin (NITROSTAT) 0.4 MG sublingual tablet One tablet under the tongue every 5 minutes if needed for chest pain. May repeat every 5 minutes for a maximum of 3 doses in 15 minutes\" 25 tablet 3     omeprazole (PRILOSEC) 20 MG DR capsule Take 1 capsule (20 mg) by mouth 3 times daily (Patient taking differently: Take 20 mg by mouth daily) 270 capsule 3     UNABLE TO FIND Take 1 tablet by mouth At Bedtime MEDICATION NAME: Qunol Sleep - melatonin 5 mg, Ashwagandha, L-Theanine       Vitamin D (Cholecalciferol) 25 MCG (1000 UT) TABS Take 1,000 Units by mouth daily         [place] Prescription Monitoring Program reviewed 4/28/2023.  No concern for abuse or misuse of controlled medications based on this report.  Last opioid script was filled on 10/18/2022 Hydromorphone 2mg 28 tabs " for 7 days per Davida Delvalle.  Current calculated MME: 0        Allergies: Pertinent allergies reviewed.     Allergies   Allergen Reactions     Ciprofloxacin Anaphylaxis     Flagyl [Metronidazole] Anaphylaxis     Gabapentin Other (See Comments)     Suicidal thoughts.     Zofran [Ondansetron] Other (See Comments) and GI Disturbance     Causes bloating, moderately uncomfortable, abd pain       Benadryl [Diphenhydramine] Other (See Comments)     Muscle spasms     Percocet [Oxycodone-Acetaminophen] Other (See Comments) and Headache     Goes nuts - nasty mean irritated, can take Dilaudid     Vicodin [Hydrocodone-Acetaminophen] Other (See Comments)     Anxiety-agitation       Family History:   family history includes Allergies in her daughter and son; Aortic aneurysm in her brother; C.A.D. (age of onset: 38) in her brother; C.A.D. (age of onset: 42) in her brother; C.A.D. (age of onset: 50) in her father; Cancer in her mother; Cancer (age of onset: 34) in her brother; Diabetes in her brother, brother, brother, father, and mother.    Social History:   She is  and lives with her brother, Emmanuel, in a house.  She is independent in ADL's.  She smoking 3 cigs daily. She is on SSD.   reports that she has been smoking cigarettes. She has a 1.50 pack-year smoking history. She has never used smokeless tobacco. She reports that she does not currently use alcohol. She reports that she does not currently use drugs.  Social History     Social History Narrative     Not on file         Review of Systems:      (Positive responses bolded)  GENERAL: fever/chills, fatigue, general unwell feeling, weight gain/loss.  HEAD/EYES:  headache, dizziness, or vision changes.    EARS/NOSE/THROAT: nosebleeds, hearing loss, sinus infection, earache, tinnitus.  IMMUNE:  allergies, cancer, immune deficiency, or infections.  SKIN:  itching, rash, hives  HEME/Lymphatic: anemia, easy bruising, easy bleeding.  RESPIRATORY: cough, wheezing, or shortness  of breath  CARDIOVASCULAR/Circulation: extremity edema, syncope, hypertension, tachycardia, or angina.  GASTROINTESTINAL: abdominal pain, nausea/emesis, diarrhea, constipation,  hematochezia, or melena.  ENDOCRINE:  diabetes, steroid use,  thyroid disease or osteoporosis.  MUSCULOSKELETAL: joint pain, stiffness, neck pain, back pain, arthritis, or gout.  GENITOURINARY: frequency, urgency, dysuria, difficulty voiding, hematuria or incontinence.  NEUROLOGIC: weakness, numbness, paresthesias, seizure, tremor, stroke or memory loss.  PSYCHIATRIC: depression, anxiety, stress, suicidal thoughts or mood swings.     Physical Exam:  Constitutional: She is oriented to person, place, and time.  She appears well-developed and well-nourished. She is not in acute distress.   HENT:     Head: Normocephalic and atraumatic.     Eyes: Pupils are equal, round, and reactive to light. EOM are normal. No scleral icterus.  Pulmonary/Chest:  NWOB. No respiratory distress.   Neurological: She is alert and oriented to person, place, and time. Coordination grossly normal.  Romberg test negative.    Skin: Skin is warm and dry. She is not diaphoretic.   Psychiatric: She has a normal mood and affect. Her behavior is normal. Judgment and thought content normal.  She answers questions appropriately.  MSK: Gait is antalgic.      Lumbar/Thoracic spine:   ROM: flexion 90 degrees, extension 10 degrees, left lateral 10 degrees and right lateral 10 degrees  Rotation/ext to right: painful   Rotation/ext to left: painful   Myofascial tenderness:left parathoracic muscles, right parathoracic muscles, left para lumbar muscles, right para lumbar muscles, left SI joint, right SI joint  Normal 5/5 LE strength bilaterally   Normal sensation to light touch in the  R) lower extremity. Diminished sensation on L) L5 distribution.  Reflexes: Lower extremity reflexes within normal limits bilaterally  Straight leg raise: Negative on the right  Positive on the  left    Neurologic exam abnormalities:     Strength  Shoulder abduction (deltoid C5,6)   R: 5/5 L: 5/5  Elbow flexion (bicepts C5,6)     R: 5/5 L: 5/5  Elbow extension (tricepts C7)    R: 5/5 L: 5/5  Finger abduction (C8)     R: 5/5 L: 5/5  Thumb flexion (C8)      R: 5/5 L: 5/5    .    Hip flexion (Iliosoas L1,2,3)    R: 5/5 L: 5/5  Knee extension (quadricepts L2,3,4)   R: 5/5 L: 5/5  Ankle dorsiflexion (tibialis anterior L4,5)  R: 5/5 L: 5/5    Ankle plantarflexion (gastrocnemius, soleus S1-2) R: 5/5 L: 5/5  Big toe extension (ext. Dyer lungus L5,S1)  R: 5/5 L: 5/5   Hip extension (gluteus maxiumus L5, S1,2)   R: 5/5 L: 5/5  Hip abduction (gluteus medius L4,5, S1)  R: 5/5 L: 5/5  Knee flexion (hamstrings L5, S1-2)   R: 5/5 L: 5/5  Reflexes:    Patella:  R:  2/4 L: 0/4  Achilles:  R:  0/4 L: 0/4  Sensory:  Light touch: normal bilateral upper and lower extremities         Imaging:  MRI OF THE LUMBAR SPINE WITHOUT CONTRAST   3/10/2023 1:17 PM      COMPARISON: Lumbar spine MRI 03/08/2021. Lumbar spine CT 09/29/2021.     HISTORY: Lumbar radiculopathy     TECHNIQUE: Multiplanar, multisequence MRI images of the lumbar spine  were acquired without IV contrast.     FINDINGS: There are five lumbar-type vertebrae for the purposes of  this dictation. The conus ends at L2-L3. 19 degrees of levocurvature  from L1 to L3. 4 mm retrolisthesis of L1 on L2. Postsurgical changes  of posterior instrumented fusion from L3 to L5. Interbody fusion of  L3-L4 and L4-L5. Vertebral body heights are maintained. Nonpathologic  marrow signal. The visualized bony pelvis is grossly within normal  limits.     Moderate symmetric atrophy of the posterior paraspinal musculature.  Normal diameter of the distal abdominal aorta. Left hemilaminectomy  and left medial facetectomy changes at L3-L4 and L4-L5. Small amount  of edema in the posterior paraspinal soft tissues from L3 to L5,  decreased since the prior lumbar spine MRI.     T12-L1: Moderate  intervertebral disc height loss. Loss of the normal  T2 signal within the disc. Broad-based disc bulge with superimposed  small central disc extrusion. Moderate bilateral facet arthropathy.  Moderate spinal canal narrowing. No right neural foraminal narrowing.  No left neural foraminal narrowing.     L1-L2: Moderate intervertebral disc height loss. Loss of the normal T2  signal within the disc. Broad-based disc bulge with superimposed small  central disc protrusion. Moderate bilateral facet arthropathy.  Moderate to severe spinal canal narrowing. Moderate to severe right  neuroforaminal narrowing. No left neuroforaminal narrowing.     L2-L3: Mild intervertebral disc height loss. Loss of the normal T2  signal within the disc. Broad-based disc bulge with superimposed right  foraminal disc protrusion. Moderate bilateral facet arthropathy.  Moderate spinal canal narrowing. Mild to moderate right neural  foraminal narrowing. No left neural foraminal narrowing.     L3-L4: Postsurgical changes of 360 degree fusion. Left hemilaminectomy  changes. No spinal canal narrowing. No right neural foraminal  narrowing. No left neural foraminal narrowing.     L4-L5: Postsurgical changes of 360 degree fusion. Left hemilaminectomy  changes. No spinal canal narrowing. No neural foraminal narrowing.     L5-S1: Mild intervertebral disc height loss. Loss of the normal T2  signal within the disc. Broad-based disc bulge with superimposed small  central disc protrusion. Moderate bilateral facet arthropathy. Mild to  moderate spinal canal narrowing. No right neuroforaminal narrowing.  Moderate left neuroforaminal narrowing.                                                                      IMPRESSION:  1.  Postsurgical changes of posterior instrumented fusion from L3 to  L5. Interbody fusion of L3-L4 and L4-L5.   2.  Left hemilaminectomy and left medial facetectomy changes at L3-L4  and L4-L5. Small amount of edema in the posterior  paraspinal soft  tissues from L3 to L5, decreased since the prior lumbar spine MRI  03/08/2021.  3.  Moderate multilevel degenerative changes of the lumbar spine,  worsened since prior lumbar spine MRI.  4.  Moderate to severe spinal canal narrowing at L1-L2.  5.  Moderate spinal canal narrowing at T12-L1 and L2-L3.  6.  Moderate to severe right neural foraminal narrowing at L1-L2.  7.  Moderate left neuroforaminal narrowing at L5-S1.         Diagnosis:  (M48.062) Spinal stenosis of lumbar region with neurogenic claudication  (primary encounter diagnosis)  Comment: Refer to pain psychology, acupuncture, pool therapy  Plan: Extension of lumbar fusion planned 06/2023    (M54.42,  G89.29) Chronic low back pain with left-sided sciatica, unspecified back pain laterality      (G89.4) Chronic pain syndrome            Plan: A multimodal plan was developed to treat your pain.      Diagnostics: Reviewed MRI above.        Medications:  She was not interested in any medications.        Therapies:  Refer for Pain Psychology at [facility]    Refer for acupuntcure at [facility]     Refer for pool therapy - [facility]    TENS        Interventions:  She was not interested in any injections        Follow up: as needed      CARLTON Colon, RN, CNP, FNP  [facility]        BILLING TIME DOCUMENTATION:   The total TIME spent on this patient on the date of the encounter/appointment was 80 minutes.            Answers for HPI/ROS submitted by the patient on 5/1/2023  JOSSELYN 7 TOTAL SCORE: 16

## 2023-05-01 ENCOUNTER — VIRTUAL VISIT (OUTPATIENT)
Dept: VASCULAR SURGERY | Facility: CLINIC | Age: 58
End: 2023-05-01
Attending: SURGERY
Payer: COMMERCIAL

## 2023-05-01 ENCOUNTER — ANCILLARY PROCEDURE (OUTPATIENT)
Dept: VASCULAR ULTRASOUND | Facility: CLINIC | Age: 58
End: 2023-05-01
Attending: SURGERY
Payer: COMMERCIAL

## 2023-05-01 ENCOUNTER — OFFICE VISIT (OUTPATIENT)
Dept: PALLIATIVE MEDICINE | Facility: OTHER | Age: 58
End: 2023-05-01
Attending: NURSE PRACTITIONER
Payer: COMMERCIAL

## 2023-05-01 VITALS
OXYGEN SATURATION: 97 % | HEART RATE: 94 BPM | WEIGHT: 156 LBS | BODY MASS INDEX: 31.51 KG/M2 | SYSTOLIC BLOOD PRESSURE: 122 MMHG | DIASTOLIC BLOOD PRESSURE: 72 MMHG

## 2023-05-01 DIAGNOSIS — I65.23 CAROTID STENOSIS, ASYMPTOMATIC, BILATERAL: ICD-10-CM

## 2023-05-01 DIAGNOSIS — G89.29 CHRONIC LOW BACK PAIN WITH LEFT-SIDED SCIATICA, UNSPECIFIED BACK PAIN LATERALITY: ICD-10-CM

## 2023-05-01 DIAGNOSIS — M54.42 CHRONIC LOW BACK PAIN WITH LEFT-SIDED SCIATICA, UNSPECIFIED BACK PAIN LATERALITY: ICD-10-CM

## 2023-05-01 DIAGNOSIS — G89.4 CHRONIC PAIN SYNDROME: ICD-10-CM

## 2023-05-01 DIAGNOSIS — I65.22 ASYMPTOMATIC STENOSIS OF LEFT CAROTID ARTERY: Primary | ICD-10-CM

## 2023-05-01 DIAGNOSIS — M48.062 SPINAL STENOSIS OF LUMBAR REGION WITH NEUROGENIC CLAUDICATION: Primary | ICD-10-CM

## 2023-05-01 PROCEDURE — G0463 HOSPITAL OUTPT CLINIC VISIT: HCPCS

## 2023-05-01 PROCEDURE — 99215 OFFICE O/P EST HI 40 MIN: CPT | Performed by: NURSE PRACTITIONER

## 2023-05-01 PROCEDURE — 99024 POSTOP FOLLOW-UP VISIT: CPT | Mod: 95 | Performed by: SURGERY

## 2023-05-01 PROCEDURE — 93882 EXTRACRANIAL UNI/LTD STUDY: CPT | Mod: LT

## 2023-05-01 PROCEDURE — 93882 EXTRACRANIAL UNI/LTD STUDY: CPT | Mod: 26 | Performed by: SURGERY

## 2023-05-01 ASSESSMENT — ANXIETY QUESTIONNAIRES
1. FEELING NERVOUS, ANXIOUS, OR ON EDGE: NEARLY EVERY DAY
7. FEELING AFRAID AS IF SOMETHING AWFUL MIGHT HAPPEN: NOT AT ALL
6. BECOMING EASILY ANNOYED OR IRRITABLE: NEARLY EVERY DAY
GAD7 TOTAL SCORE: 16
4. TROUBLE RELAXING: NEARLY EVERY DAY
3. WORRYING TOO MUCH ABOUT DIFFERENT THINGS: MORE THAN HALF THE DAYS
5. BEING SO RESTLESS THAT IT IS HARD TO SIT STILL: NEARLY EVERY DAY
IF YOU CHECKED OFF ANY PROBLEMS ON THIS QUESTIONNAIRE, HOW DIFFICULT HAVE THESE PROBLEMS MADE IT FOR YOU TO DO YOUR WORK, TAKE CARE OF THINGS AT HOME, OR GET ALONG WITH OTHER PEOPLE: SOMEWHAT DIFFICULT
8. IF YOU CHECKED OFF ANY PROBLEMS, HOW DIFFICULT HAVE THESE MADE IT FOR YOU TO DO YOUR WORK, TAKE CARE OF THINGS AT HOME, OR GET ALONG WITH OTHER PEOPLE?: SOMEWHAT DIFFICULT
GAD7 TOTAL SCORE: 16
2. NOT BEING ABLE TO STOP OR CONTROL WORRYING: MORE THAN HALF THE DAYS
7. FEELING AFRAID AS IF SOMETHING AWFUL MIGHT HAPPEN: NOT AT ALL

## 2023-05-01 ASSESSMENT — PAIN SCALES - PAIN ENJOYMENT GENERAL ACTIVITY SCALE (PEG)
PEG_TOTALSCORE: 4.67
INTERFERED_ENJOYMENT_LIFE: 5
INTERFERED_ENJOYMENT_LIFE: 5
INTERFERED_GENERAL_ACTIVITY: 5
AVG_PAIN_PASTWEEK: 4
AVG_PAIN_PASTWEEK: 4
INTERFERED_GENERAL_ACTIVITY: 5
PEG_TOTALSCORE: 4.67

## 2023-05-01 ASSESSMENT — PAIN SCALES - GENERAL: PAINLEVEL: MODERATE PAIN (5)

## 2023-05-01 NOTE — PATIENT INSTRUCTIONS
[facility] Number:  [phone]  Call with any questions about your care and for scheduling assistance.   Calls are returned Monday through Friday between 8 AM and 4:30 PM. We usually get back to you within 2 business days depending on the issue/request.    If we are prescribing your medications:  For opioid medication refills, call the clinic or send a Ce[employer] message 7 days in advance.  Please include:  Name of requested medication  Name of the pharmacy.  For non-opioid medications, call your pharmacy directly to request a refill. Please allow 3-4 days to be processed.   Per [place] State Law:  All controlled substance prescriptions must be filled within 30 days of being written.    For those controlled substances allowing refills, pickup must occur within 30 days of last fill.      We believe regular attendance is key to your success in our program!    Any time you are unable to keep your appointment we ask that you call us at least 24 hours in advance to cancel.This will allow us to offer the appointment time to another patient.   Multiple missed appointments may lead to dismissal from the clinic.

## 2023-05-01 NOTE — DISCHARGE SUMMARY
[facility]  Hospitalist Discharge Summary      Date of Admission:  4/21/2023  Date of Discharge:  4/22/2023  6:40 PM  Discharging Provider: Renny Boles MD  Discharge Service: Hospitalist Service    Discharge Diagnoses             H/o CAD    CP, chronic : for a year, Comes anytime, not connected with activity, Doesn't get worse with activity, Not changed for a year, troponin did not show any elevation, EKG : no ischemic changes, discussed with cardiology and plan for outpatient cardio f/u.           left carotid artery stenosis and s/p Left trans-carotid artery revascularization (TCAR ) 4/21 : on baby aspirin, statin, plavix, bb               GERD : On ppi       Follow-ups Needed After Discharge   Follow-up Appointments     Follow-up and recommended labs and tests       Follow up with Dr. Manley as scheduled on May 1 with preclinic   ultrasound         {Additional follow-up instructions/to-do's for PCP    : none    Unresulted Labs Ordered in the Past 30 Days of this Admission     No orders found from 3/22/2023 to 4/22/2023.      These results will be followed up by pcp    Discharge Disposition   Discharged to home  Condition at discharge: Stable      Consultations This Hospital Stay   HOSPITALIST IP CONSULT    Code Status   Prior    Time Spent on this Encounter   I, Renny Boles MD, personally saw the patient today and spent greater than 30 minutes discharging this patient.       Renny Boles MD  [address] [address]  Phone: [phone]  Fax: [phone]  ______________________________________________________________________    Physical Exam   Vital Signs:                    Weight: 168 lbs 9.6 oz     GENERAL: The patient is not in any acute distressed. Awake and alert.  HEENT: Nonicteric sclerae, PERRLA, EOMI. Oropharynx clear. Moist mucous membranes. Conjunctivae appear well perfused.  HEART: Regular rate and  rhythm without murmurs.  LUNGS: Clear to auscultation bilaterally. No wheezing or crackles.  ABDOMEN: Soft, positive bowel sounds, nontender.  SKIN: No rash, no excessive bruising, petechiae, or purpura.  EXTREMITIES : no rashes, no swelling in legs.  NEUROLOGIC: conscious and oriented, follows commands, no obvious focal deficits.  ROS: All other systems negative          Primary Care Physician   Sera Solo    Discharge Orders      Reason for your hospital stay    Left carotid stenosis now status post transcarotid revascularization (TCAR)     Activity    Your activity upon discharge: activity as tolerated but no heavy lifting, driving, or strenuous exercise for at least two weeks     Follow-up and recommended labs and tests     Follow up with Dr. Manley as scheduled on May 1 with preclinic ultrasound     Discharge Instructions    Your wound is covered with surgical glue. It will peel and flake off over the coming weeks. Do not pick at it.     You may have some drainage from the drain site. It is okay to keep it covered with gauze until the drainage stops.     You may shower normally, washing the area gently with soap and water. Dry it thoroughly before dressing.    If you develop a severe headache in the next week or two, present to the emergency room. This may be a sign of cerebral hyperperfusion.    It is imperative that you continue to take your aspirin, Plavix, and atorvastatin postoperatively.     Diet    Follow this diet upon discharge: Regular       Significant Results and Procedures   Most Recent 3 CBC's:Recent Labs   Lab Test 04/21/23  1025 04/11/23  1607 09/25/22  0317   WBC 6.9 7.4 19.8*   HGB 13.0 13.4 13.3   MCV 94 93 92    275 297     Most Recent 3 BMP's:Recent Labs   Lab Test 04/22/23  0012 04/21/23  1025 04/11/23  1607 09/25/22  0317 09/19/22  1416   NA  --   --  138 136 140   POTASSIUM  --  4.4 3.9 4.1 4.4   CHLORIDE  --   --  104 105 106   CO2  --   --  24 25 29   BUN  --   --  17.4 15  13   CR  --  0.79 0.71 0.84 0.76   ANIONGAP  --   --  10 6 5   MAXWELL  --   --  9.7 9.5 9.3   * 101* 100* 130* 107     Most Recent 2 LFT's:Recent Labs   Lab Test 09/02/22  1629 10/12/20  0441   AST 22 14   ALT 12 22   ALKPHOS 78 94   BILITOTAL 0.2 0.2       Discharge Medications   Discharge Medication List as of 4/22/2023  5:45 PM      START taking these medications    Details   !! acetaminophen (TYLENOL) 325 MG tablet Take 3 tablets (975 mg) by mouth every 8 hours for 3 days, Disp-27 tablet, R-0, OTC       !! - Potential duplicate medications found. Please discuss with provider.      CONTINUE these medications which have NOT CHANGED    Details   !! acetaminophen (TYLENOL) 500 MG tablet Take 1,000 mg by mouth every 6 hours as needed for mild pain, Historical      aspirin (ASA) 81 MG EC tablet Take 1 tablet (81 mg) by mouth daily, Disp-30 tablet, R-3, E-Prescribe      atorvastatin (LIPITOR) 40 MG tablet Take 1 tablet by mouth once daily, Disp-90 tablet, R-0, E-PrescribeNeeds cardiology follow-up for further refills      celecoxib (CELEBREX) 100 MG capsule Take 1 capsule (100 mg) by mouth 2 times daily, Disp-180 capsule, R-3, E-Prescribe      clobetasol (TEMOVATE) 0.05 % external ointment Apply topically 2 times dailyDisp-60 g, [id number]      clopidogrel (PLAVIX) 75 MG tablet Take 1 tablet (75 mg) by mouth daily, Disp-30 tablet, R-3, E-Prescribe      fish oil-omega-3 fatty acids 1000 MG capsule Take 1 g by mouth daily, Historical      lactobacillus rhamnosus (GG) (CULTURELL) capsule Take 1 capsule by mouth daily, Historical      Melatonin 10 MG TABS tablet Take 10 mg by mouth At Bedtime, Historical      metoprolol succinate ER (TOPROL XL) 50 MG 24 hr tablet Take 0.5 tablets (25 mg) by mouth daily, Disp-30 tablet, R-2, E-Prescribe      !! Multiple Vitamins-Minerals (HAIR/SKIN/NAILS) TABS Take 1 tablet by mouth daily, Historical      !! multivitamin w/minerals (THERA-VIT-M) tablet Take 1 tablet by mouth daily,  "Historical      naloxone (NARCAN) 4 MG/0.1ML nasal spray See Admin Instructions, Historical      nitroGLYcerin (NITROSTAT) 0.4 MG sublingual tablet One tablet under the tongue every 5 minutes if needed for chest pain. May repeat every 5 minutes for a maximum of 3 doses in 15 minutes\", Disp-25 tablet, R-3, E-Prescribe      omeprazole (PRILOSEC) 20 MG DR capsule Take 1 capsule (20 mg) by mouth 3 times daily, Disp-270 capsule, R-3, E-Prescribe      UNABLE TO FIND Take 1 tablet by mouth At Bedtime MEDICATION NAME: Qunol Sleep - melatonin 5 mg, Ashwagandha, L-Theanine, Historical      Vitamin D (Cholecalciferol) 25 MCG (1000 UT) TABS Take 1,000 Units by mouth daily, Historical       !! - Potential duplicate medications found. Please discuss with provider.        Allergies   Allergies   Allergen Reactions     Ciprofloxacin Anaphylaxis     Flagyl [Metronidazole] Anaphylaxis     Gabapentin Other (See Comments)     Suicidal thoughts.     Zofran [Ondansetron] Other (See Comments) and GI Disturbance     Causes bloating, moderately uncomfortable, abd pain       Benadryl [Diphenhydramine] Other (See Comments)     Muscle spasms     Percocet [Oxycodone-Acetaminophen] Other (See Comments) and Headache     Goes nuts - nasty mean irritated, can take Dilaudid     Vicodin [Hydrocodone-Acetaminophen] Other (See Comments)     Anxiety-agitation     "

## 2023-05-01 NOTE — NURSING NOTE
Patient confirms medications and allergies are accurate via patients echeck in completion, and or denies any changes since last reviewed/verified.     Is the patient currently in the state of [place]? YES    Visit mode:VIDEO    If the visit is dropped, the patient can be reconnected by: VIDEO VISIT: Text to cell phone: [phone]    Will anyone else be joining the visit? NO      How would you like to obtain your AVS? MyChart    Are changes needed to the allergy or medication list? NO    Reason for visit: Follow Up            Montserrat Monaco, Virtual Facilitator       [phone]

## 2023-05-01 NOTE — PATIENT INSTRUCTIONS
Marlin Marks,    Thank you for entrusting your care with us today. After your visit today with MD Eveline Manley this is the plan that was discussed at your appointment.    Ultrasound is normal.     Continue Plavix for until 6/2/23 then you can stop this medication.     Return back in 6 months with a repeat ultrasound of your carotid arteries.     I am including additional information on these things and our contact information if you have any questions or concerns.   Please do not hesitate to reach out to us if you felt we did not answer your questions or you are unsure of the treatment plan after your visit today. Our number is [phone].Thank you for trusting us with your care.         Again thank you for your time.

## 2023-05-01 NOTE — PROGRESS NOTES
Patient presents to the clinic today for a visit with CARLTON Ireland CNP            4/15/2022    10:41 AM 5/1/2023    10:25 AM   PEG Score   PEG Total Score 8.33 5       UDS/CSA-    Medications-    QUESTIONS:    Janae Ambrose MA  [facility] [place]

## 2023-05-01 NOTE — PROGRESS NOTES
[facility]        Patient is here for a virtual visit to discuss 2 week follow up    Pt is currently taking Aspirin, Statin and Plavix.    LMP 04/10/2016     The provider has been notified that the patient has no concerns.     Questions patient would like addressed today are: N/A.    Refills are needed: No    Has homecare services and agency name:  [employer]

## 2023-05-08 NOTE — PROGRESS NOTES
Virtual Visit Details:    Type of service: Telephone     Length of call: 10 minutes    Originating Location (Pt. Location): Home     Distant Location (Provider location):  [facility]     Platform used for visit:  Vic     Received verbal consent for virtual visit.  57-year-old female status post left transcarotid revascularization.  Ultrasound today is unremarkable.  Follow-up in 6 months with repeat ultrasound.  Continue Plavix for another 4 weeks

## 2023-05-10 DIAGNOSIS — L40.9 PSORIASIS: ICD-10-CM

## 2023-05-10 RX ORDER — CLOBETASOL PROPIONATE 0.5 MG/G
OINTMENT TOPICAL
Qty: 60 G | Refills: 0 | Status: SHIPPED | OUTPATIENT
Start: 2023-05-10 | End: 2023-10-30

## 2023-05-18 ENCOUNTER — OFFICE VISIT (OUTPATIENT)
Dept: FAMILY MEDICINE | Facility: CLINIC | Age: 58
End: 2023-05-18
Payer: COMMERCIAL

## 2023-05-18 VITALS
HEART RATE: 81 BPM | TEMPERATURE: 98 F | DIASTOLIC BLOOD PRESSURE: 80 MMHG | OXYGEN SATURATION: 99 % | BODY MASS INDEX: 32.16 KG/M2 | HEIGHT: 59 IN | RESPIRATION RATE: 22 BRPM | WEIGHT: 159.5 LBS | SYSTOLIC BLOOD PRESSURE: 123 MMHG

## 2023-05-18 DIAGNOSIS — Z01.818 PREOP GENERAL PHYSICAL EXAM: Primary | ICD-10-CM

## 2023-05-18 DIAGNOSIS — I10 PRIMARY HYPERTENSION: ICD-10-CM

## 2023-05-18 DIAGNOSIS — Z72.0 TOBACCO ABUSE: ICD-10-CM

## 2023-05-18 DIAGNOSIS — M54.16 LUMBAR RADICULOPATHY: ICD-10-CM

## 2023-05-18 DIAGNOSIS — M89.9 DISORDER OF BONE, UNSPECIFIED: ICD-10-CM

## 2023-05-18 DIAGNOSIS — I65.22 ASYMPTOMATIC STENOSIS OF LEFT CAROTID ARTERY: ICD-10-CM

## 2023-05-18 LAB
ANION GAP SERPL CALCULATED.3IONS-SCNC: 11 MMOL/L (ref 7–15)
BUN SERPL-MCNC: 13.7 MG/DL (ref 6–20)
CALCIUM SERPL-MCNC: 9.9 MG/DL (ref 8.6–10)
CHLORIDE SERPL-SCNC: 106 MMOL/L (ref 98–107)
CREAT SERPL-MCNC: 0.75 MG/DL (ref 0.51–0.95)
DEPRECATED HCO3 PLAS-SCNC: 25 MMOL/L (ref 22–29)
ERYTHROCYTE [DISTWIDTH] IN BLOOD BY AUTOMATED COUNT: 13.9 % (ref 10–15)
GFR SERPL CREATININE-BSD FRML MDRD: >90 ML/MIN/1.73M2
GLUCOSE SERPL-MCNC: 87 MG/DL (ref 70–99)
HCT VFR BLD AUTO: 40.8 % (ref 35–47)
HGB BLD-MCNC: 13.5 G/DL (ref 11.7–15.7)
MCH RBC QN AUTO: 31.2 PG (ref 26.5–33)
MCHC RBC AUTO-ENTMCNC: 33.1 G/DL (ref 31.5–36.5)
MCV RBC AUTO: 94 FL (ref 78–100)
PLATELET # BLD AUTO: 300 10E3/UL (ref 150–450)
POTASSIUM SERPL-SCNC: 4.4 MMOL/L (ref 3.4–5.3)
RBC # BLD AUTO: 4.33 10E6/UL (ref 3.8–5.2)
SODIUM SERPL-SCNC: 142 MMOL/L (ref 136–145)
WBC # BLD AUTO: 7.2 10E3/UL (ref 4–11)

## 2023-05-18 PROCEDURE — 82306 VITAMIN D 25 HYDROXY: CPT | Performed by: NURSE PRACTITIONER

## 2023-05-18 PROCEDURE — 85027 COMPLETE CBC AUTOMATED: CPT | Performed by: NURSE PRACTITIONER

## 2023-05-18 PROCEDURE — 99214 OFFICE O/P EST MOD 30 MIN: CPT | Performed by: NURSE PRACTITIONER

## 2023-05-18 PROCEDURE — 36415 COLL VENOUS BLD VENIPUNCTURE: CPT | Performed by: NURSE PRACTITIONER

## 2023-05-18 PROCEDURE — 80048 BASIC METABOLIC PNL TOTAL CA: CPT | Performed by: NURSE PRACTITIONER

## 2023-05-18 RX ORDER — BUPROPION HYDROCHLORIDE 150 MG/1
150 TABLET, FILM COATED, EXTENDED RELEASE ORAL 2 TIMES DAILY
Qty: 60 TABLET | Refills: 0 | Status: SHIPPED | OUTPATIENT
Start: 2023-05-18 | End: 2023-06-17

## 2023-05-18 RX ORDER — METOPROLOL SUCCINATE 25 MG/1
12.5 TABLET, EXTENDED RELEASE ORAL DAILY
Status: ON HOLD
Start: 2023-05-18 | End: 2023-06-09

## 2023-05-18 NOTE — H&P (VIEW-ONLY)
[facility]  [address] [address]  Phone: [phone]  Fax: [phone]  Primary Provider: Sera Solo  Pre-op Performing Provider: MARISOL CULP      PREOPERATIVE EVALUATION:  Today's date: 5/18/2023    Selina Parikh is a 57 year old female who presents for a preoperative evaluation.               Surgical Information:  Surgery/Procedure: Back Fusion-L5/S1  Surgery Location: [facility]  Surgeon: Dr. Suha Kent  Surgery Date: 6/9  Time of Surgery: TBD  Where patient plans to recover: At home with family  Fax number for surgical facility: Note does not need to be faxed, will be available electronically in Epic.    Assessment & Plan     The proposed surgical procedure is considered INTERMEDIATE risk.    Preop general physical exam    - Basic metabolic panel  - CBC with platelets-normal no concerns for anemia       Disorder of bone, unspecified  Ordering per neurosurgery note-looks as though this was previously ordered on 2/2/23. Likely wants to optimize vitamin D for healing. Will replace if deficient.   - Vitamin D Deficiency    Primary hypertension  Patient reports is taking 12.5 mg   - metoprolol succinate ER (TOPROL XL) 25 MG 24 hr tablet    Tobacco abuse  Patient has previously used in the past for smoking cessation sucesfully but has been several year.   - buPROPion (ZYBAN) 150 MG 12 hr tablet  Dispense: 60 tablet; Refill: 0    Lumbar radiculopathy  Ongoing for several years-surgery is for fusion of L5/S1    Asymptomatic stenosis of left carotid artery  Recent stent placement on 4/21. Currently on plavix and aspirin.  Per vascular surgery notes plavix will be discontinued on 6/1 or 6/2 which would be 7 days prior to the surgery so should be ok for surgery on 6/9 for antiplatelet medication      - No identified additional risk factors other than previously addressed    Antiplatelet or Anticoagulation Medication Instructions:   -  aspirin: Patient is at increased risk of thrombosis (e.g. stenting within the last year), continue aspirin without modification. Consider consultation with cardiology.    - clopidrogel (Plavix), prasugrel (Effient), ticagrelor (Brilinta): Patient has a cardiac stent. Medication will NOT be stopped until cleared by cardiology.       Additional Medication Instructions:   - Beta Blockers: Continue taking on the day of surgery.   - Statins: Continue taking on the day of surgery.    - celecoxib (Celebrex): HOLD 3 days before surgery. May continue without modification for management of severe pain.    - Herbal medications and vitamins: Hold fish oil     RECOMMENDATION:  Patient referred to cardiology for evaluation before surgery. Surgery approval pending completion of consultation. Due to recent cardiac procedure would like patient to have clearance from cardiology.     Patient has appointment with cardiology already scheduled on 6/1.             Subjective       HPI related to upcoming procedure: patient has been having ongoing back pain. This will be her 6th spine surgery. History of lumbar radiculopathy. Was initially worked up for this in February of this year, but needed to have carotid stent placed first. Is currently on plavix with estimated end date 6/2/23 per vascular surgery documentation.         5/18/2023     1:19 PM   Preop Questions   1. Have you ever had a heart attack or stroke? No   2. Have you ever had surgery on your heart or blood vessels, such as a stent placement, a coronary artery bypass, or surgery on an artery in your head, neck, heart, or legs? YES - 4/21 left carotid artery stent placed   3. Do you have chest pain with activity? No   4. Do you have a history of  heart failure? No   5. Do you currently have a cold, bronchitis or symptoms of other infection? No   6. Do you have a cough, shortness of breath, or wheezing? YES - cough due to smoking/dysphagia    7. Do you or anyone in your family  have previous history of blood clots? YES - family history-brother, unsure if familial clotting disorder    8. Do you or does anyone in your family have a serious bleeding problem such as prolonged bleeding following surgeries or cuts? No   9. Have you ever had problems with anemia or been told to take iron pills? YES - was told she was anemic about 10 years. Was still getting periods at that time and was really irregular and    10. Have you had any abnormal blood loss such as black, tarry or bloody stools, or abnormal vaginal bleeding? No   11. Have you ever had a blood transfusion? No   12. Are you willing to have a blood transfusion if it is medically needed before, during, or after your surgery? Yes   13. Have you or any of your relatives ever had problems with anesthesia? No   14. Do you have sleep apnea, excessive snoring or daytime drowsiness? No   15. Do you have any artifical heart valves or other implanted medical devices like a pacemaker, defibrillator, or continuous glucose monitor? No   16. Do you have artificial joints? No   17. Are you allergic to latex? No   18. Is there any chance that you may be pregnant? No       Health Care Directive:  Patient does not have a Health Care Directive or Living Will: Discussed advance care planning with patient; however, patient declined at this time.    Preoperative Review of :   reviewed - controlled substances prescribed by other outside provider(s).   10/18/22 last prescription hydromorphone           Review of Systems  Constitutional, neuro, ENT, endocrine, pulmonary, cardiac, gastrointestinal, genitourinary, musculoskeletal, integument and psychiatric systems are negative, except as otherwise noted.    Patient Active Problem List    Diagnosis Date Noted     Carotid stenosis 04/21/2023     Priority: Medium     Primary hypertension 05/07/2022     Priority: Medium     Status post coronary angiogram 05/06/2022     Priority: Medium     Coronary artery disease  due to lipid rich plaque      Priority: Medium     Chest pain, unspecified type 05/05/2022     Priority: Medium     Acute chest pain 04/28/2022     Priority: Medium     Hiatal hernia 04/15/2021     Priority: Medium     Formatting of this note might be different from the original.  Added automatically from request for surgery [id number]       Spinal stenosis of lumbar region with neurogenic claudication 06/04/2018     Priority: Medium     S/P partial colectomy 04/10/2018     Priority: Medium     Diverticulitis 01/28/2018     Priority: Medium     Psoriasis 06/20/2012     Priority: Medium     GERD (gastroesophageal reflux disease) 03/15/2011     Priority: Medium     Tobacco abuse 02/17/2011     Priority: Medium     Health Care Home 01/27/2011     Priority: Medium     DX V65.8 REPLACED WITH [facility] (04/08/2013)       CARDIOVASCULAR SCREENING; LDL GOAL LESS THAN 160 10/31/2010     Priority: Medium     R Occipital Neuralgia 10/07/2009     Priority: Medium     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 On fiber, senna, and MOM. Advised to d/c MOM, start Miralax and titer to regular BM's. Pt has been seen by GI.        Brain syndrome, posttraumatic 03/06/2009     Priority: Medium     Work comp.  Has seen Dr. Ahmadi, [facility] clinic of neurology.  MRIs, EMG unremarkalbe, some foraminal stenosis on C5/C6  Sent her to physiatrist, trigger point injections didn't help.  After some neck traction, had intense unremitting HA, neck pain.  Off/on tingling in arms.  Pain clinic and/or spine surg for more eval probable next steps.    Has failed multiple rescue and preventive HA meds. On no narcotics        Past Medical History:   Diagnosis Date     Anemia      Asymptomatic stenosis of left carotid artery      Cancer (H)     melanoma     Coronary artery  disease      Hiatal hernia      Hypertension      Irritable bowel syndrome      Other chronic pain      Past Surgical History:   Procedure Laterality Date     BACK SURGERY       CHOLECYSTECTOMY, LAPOROSCOPIC  02/14/2000    Cholecystectomy, Laparoscopic     COLON SURGERY       CV CORONARY ANGIOGRAM N/A 05/06/2022    Procedure: CV CORONARY ANGIOGRAM;  Surgeon: Stefanie Spangler MD;  Location: [facility] CV     CV LEFT HEART CATH N/A 05/06/2022    Procedure: Left Heart Catheterization;  Surgeon: Stefanie Spangler MD;  Location: [facility] CATH LAB CV     CYSTOSCOPY, INSERT LIGHTED STENT URETER(S) N/A 04/10/2018    Procedure: CYSTOSCOPY, INSERT LIGHTED STENT URETER(S);  Lighted Stent Placement,Laparoscopic Assisted Sigmoid Colectomy;  Surgeon: ERVIN Reina MD;  Location: [facility] OR     ENDOVASCULAR CAROTID STENT PLACEMENT Left 4/21/2023    Procedure: Left transcarotid revascularization;  Surgeon: Eveline Mnaley MD;  Location: [facility]     IR TCAR TRANSCAROTID ARTERY REVASCULARIZATION  4/21/2023     LAPAROSCOPIC ASSISTED COLECTOMY LEFT (DESCENDING) N/A 04/10/2018    Procedure: LAPAROSCOPIC ASSISTED COLECTOMY LEFT (DESCENDING);;  Surgeon: Hill Murray MD;  Location: [facility] OR     LUMBAR DISCECTOMY Left 09/23/2022    Procedure: left thoracic 12-lumbar 1 hemilaminectomy, medial facetectomy, foraminotomy and microdiscectomy;  Surgeon: Suha Kent MD;  Location: [facility]     NECK SURGERY       ORTHOPEDIC SURGERY  10/01/2010    Cut palm and reattched tendons and nerves in left hand forefinger.     [place] ESOPHAGOGASTRODUODENOSCOPY TRANSORAL DIAGNOSTIC N/A 04/30/2021    Procedure: ESOPHAGOGASTRODUODENOSCOPY (EGD);  Surgeon: Souleymane Moreno DO;  Location: [facility];  Service: General     SURGICAL HISTORY OF -   01/04/2000    Esophagogastroduodenoscopy with biopsy     TUBAL LIGATION       Current Outpatient Medications   Medication Sig Dispense Refill     acetaminophen (TYLENOL) 500 MG  "tablet Take 1,000 mg by mouth every 6 hours as needed for mild pain       aspirin (ASA) 81 MG EC tablet Take 1 tablet (81 mg) by mouth daily 30 tablet 3     atorvastatin (LIPITOR) 40 MG tablet Take 1 tablet by mouth once daily 90 tablet 0     celecoxib (CELEBREX) 100 MG capsule Take 1 capsule (100 mg) by mouth 2 times daily (Patient taking differently: Take 200 mg by mouth daily) 180 capsule 3     clobetasol (TEMOVATE) 0.05 % external ointment APPLY  OINTMENT TOPICALLY TWICE DAILY 60 g 0     clopidogrel (PLAVIX) 75 MG tablet Take 1 tablet (75 mg) by mouth daily 30 tablet 3     fish oil-omega-3 fatty acids 1000 MG capsule Take 1 g by mouth daily       lactobacillus rhamnosus (GG) (CULTURELL) capsule Take 1 capsule by mouth daily       Melatonin 10 MG TABS tablet Take 10 mg by mouth At Bedtime (Patient not taking: Reported on 5/1/2023)       metoprolol succinate ER (TOPROL XL) 50 MG 24 hr tablet Take 0.5 tablets (25 mg) by mouth daily (Patient taking differently: Take 12.5 mg by mouth daily) 30 tablet 2     Multiple Vitamins-Minerals (HAIR/SKIN/NAILS) TABS Take 1 tablet by mouth daily       multivitamin w/minerals (THERA-VIT-M) tablet Take 1 tablet by mouth daily       naloxone (NARCAN) 4 MG/0.1ML nasal spray See Admin Instructions (Patient not taking: Reported on 5/1/2023)       nitroGLYcerin (NITROSTAT) 0.4 MG sublingual tablet One tablet under the tongue every 5 minutes if needed for chest pain. May repeat every 5 minutes for a maximum of 3 doses in 15 minutes\" 25 tablet 3     omeprazole (PRILOSEC) 20 MG DR capsule Take 1 capsule (20 mg) by mouth 3 times daily (Patient taking differently: Take 20 mg by mouth daily) 270 capsule 3     UNABLE TO FIND Take 1 tablet by mouth At Bedtime MEDICATION NAME: Qunol Sleep - melatonin 5 mg, Ashwagandha, L-Theanine       Vitamin D (Cholecalciferol) 25 MCG (1000 UT) TABS Take 1,000 Units by mouth daily         Allergies   Allergen Reactions     Ciprofloxacin Anaphylaxis     Flagyl " "[Metronidazole] Anaphylaxis     Gabapentin Other (See Comments)     Suicidal thoughts.     Zofran [Ondansetron] Other (See Comments) and GI Disturbance     Causes bloating, moderately uncomfortable, abd pain       Benadryl [Diphenhydramine] Other (See Comments)     Muscle spasms     Percocet [Oxycodone-Acetaminophen] Other (See Comments) and Headache     Goes nuts - nasty mean irritated, can take Dilaudid     Vicodin [Hydrocodone-Acetaminophen] Other (See Comments)     Anxiety-agitation        Social History     Tobacco Use     Smoking status: Some Days     Packs/day: 0.05     Years: 30.00     Pack years: 1.50     Types: Cigarettes     Last attempt to quit: 2023     Years since quittin.2     Smokeless tobacco: Never     Tobacco comments:     3 Cigs a day   Vaping Use     Vaping status: Never Used   Substance Use Topics     Alcohol use: Not Currently     Comment: Alcoholic Drinks/day: 2x year        History   Drug Use Unknown         Objective     /80 (BP Location: Right arm, Patient Position: Sitting, Cuff Size: Adult Regular)   Pulse 81   Temp 98  F (36.7  C) (Oral)   Resp 22   Ht 1.499 m (4' 11.02\")   Wt 72.3 kg (159 lb 8 oz)   LMP 04/10/2016   SpO2 99%   BMI 32.20 kg/m      Physical Exam    GENERAL APPEARANCE: healthy, alert and no distress     EYES: EOMI, PERRL     HENT: ear canals and TM's normal and nose and mouth without ulcers or lesions     NECK: no adenopathy, no asymmetry, masses, or scars and thyroid normal to palpation     RESP: lungs clear to auscultation - no rales, rhonchi or wheezes     CV: regular rates and rhythm, normal S1 S2, no S3 or S4 and no murmur, click or rub     ABDOMEN:  soft, nontender, no HSM or masses and bowel sounds normal     MS: normal muscle tone, slightly decreased range of motion in legs due to pain     SKIN: no suspicious lesions or rashes     NEURO: Normal strength and tone, sensory exam grossly normal, mentation intact and speech normal     PSYCH: " mentation appears normal. and affect normal/bright     LYMPHATICS: No cervical adenopathy    Recent Labs   Lab Test 04/21/23  1025 04/11/23  1607 09/25/22  0317 09/19/22  1416 05/05/22  0812 04/28/22  1543   HGB 13.0 13.4 13.3 13.9   < > 14.1    275 297 275   < > 286   INR  --   --   --  0.93  --   --    NA  --  138 136 140   < > 143   POTASSIUM 4.4 3.9 4.1 4.4   < > 4.0   CR 0.79 0.71 0.84 0.76   < > 0.70   A1C  --   --   --   --   --  5.6    < > = values in this interval not displayed.        Diagnostics:  Labs pending at this time.  Results will be reviewed when available.   No EKG this visit, completed in the last 90 days.    Revised Cardiac Risk Index (RCRI):  The patient has the following serious cardiovascular risks for perioperative complications:   - Coronary Artery Disease (MI, positive stress test, angina, Qs on EKG) = 1 point     RCRI Interpretation: 1 point: Class II (low risk - 0.9% complication rate)           Signed Electronically by: CARLTON PEREZ CNP  Copy of this evaluation report is provided to requesting physician.

## 2023-05-18 NOTE — PROGRESS NOTES
[facility]  [address] [place] [address]  Phone: [phone]  Fax: [phone]  Primary Provider: Sera Solo  Pre-op Performing Provider: MARISOL CULP      PREOPERATIVE EVALUATION:  Today's date: 5/18/2023    Selina Parikh is a 57 year old female who presents for a preoperative evaluation.               Surgical Information:  Surgery/Procedure: Back Fusion-L5/S1  Surgery Location: [facility]  Surgeon: Dr. Suha Kent  Surgery Date: 6/9  Time of Surgery: TBD  Where patient plans to recover: At home with family  Fax number for surgical facility: Note does not need to be faxed, will be available electronically in Epic.    Assessment & Plan     The proposed surgical procedure is considered INTERMEDIATE risk.    Preop general physical exam    - Basic metabolic panel  - CBC with platelets-normal no concerns for anemia       Disorder of bone, unspecified  Ordering per neurosurgery note-looks as though this was previously ordered on 2/2/23. Likely wants to optimize vitamin D for healing. Will replace if deficient.   - Vitamin D Deficiency    Primary hypertension  Patient reports is taking 12.5 mg   - metoprolol succinate ER (TOPROL XL) 25 MG 24 hr tablet    Tobacco abuse  Patient has previously used in the past for smoking cessation sucesfully but has been several year.   - buPROPion (ZYBAN) 150 MG 12 hr tablet  Dispense: 60 tablet; Refill: 0    Lumbar radiculopathy  Ongoing for several years-surgery is for fusion of L5/S1    Asymptomatic stenosis of left carotid artery  Recent stent placement on 4/21. Currently on plavix and aspirin.  Per vascular surgery notes plavix will be discontinued on 6/1 or 6/2 which would be 7 days prior to the surgery so should be ok for surgery on 6/9 for antiplatelet medication      - No identified additional risk factors other than previously addressed    Antiplatelet or Anticoagulation Medication Instructions:   -  aspirin: Patient is at increased risk of thrombosis (e.g. stenting within the last year), continue aspirin without modification. Consider consultation with cardiology.    - clopidrogel (Plavix), prasugrel (Effient), ticagrelor (Brilinta): Patient has a cardiac stent. Medication will NOT be stopped until cleared by cardiology.       Additional Medication Instructions:   - Beta Blockers: Continue taking on the day of surgery.   - Statins: Continue taking on the day of surgery.    - celecoxib (Celebrex): HOLD 3 days before surgery. May continue without modification for management of severe pain.    - Herbal medications and vitamins: Hold fish oil     RECOMMENDATION:  Patient referred to cardiology for evaluation before surgery. Surgery approval pending completion of consultation. Due to recent cardiac procedure would like patient to have clearance from cardiology.     Patient has appointment with cardiology already scheduled on 6/1.             Subjective       HPI related to upcoming procedure: patient has been having ongoing back pain. This will be her 6th spine surgery. History of lumbar radiculopathy. Was initially worked up for this in February of this year, but needed to have carotid stent placed first. Is currently on plavix with estimated end date 6/2/23 per vascular surgery documentation.         5/18/2023     1:19 PM   Preop Questions   1. Have you ever had a heart attack or stroke? No   2. Have you ever had surgery on your heart or blood vessels, such as a stent placement, a coronary artery bypass, or surgery on an artery in your head, neck, heart, or legs? YES - 4/21 left carotid artery stent placed   3. Do you have chest pain with activity? No   4. Do you have a history of  heart failure? No   5. Do you currently have a cold, bronchitis or symptoms of other infection? No   6. Do you have a cough, shortness of breath, or wheezing? YES - cough due to smoking/dysphagia    7. Do you or anyone in your family  have previous history of blood clots? YES - family history-brother, unsure if familial clotting disorder    8. Do you or does anyone in your family have a serious bleeding problem such as prolonged bleeding following surgeries or cuts? No   9. Have you ever had problems with anemia or been told to take iron pills? YES - was told she was anemic about 10 years. Was still getting periods at that time and was really irregular and    10. Have you had any abnormal blood loss such as black, tarry or bloody stools, or abnormal vaginal bleeding? No   11. Have you ever had a blood transfusion? No   12. Are you willing to have a blood transfusion if it is medically needed before, during, or after your surgery? Yes   13. Have you or any of your relatives ever had problems with anesthesia? No   14. Do you have sleep apnea, excessive snoring or daytime drowsiness? No   15. Do you have any artifical heart valves or other implanted medical devices like a pacemaker, defibrillator, or continuous glucose monitor? No   16. Do you have artificial joints? No   17. Are you allergic to latex? No   18. Is there any chance that you may be pregnant? No       Health Care Directive:  Patient does not have a Health Care Directive or Living Will: Discussed advance care planning with patient; however, patient declined at this time.    Preoperative Review of :   reviewed - controlled substances prescribed by other outside provider(s).   10/18/22 last prescription hydromorphone           Review of Systems  Constitutional, neuro, ENT, endocrine, pulmonary, cardiac, gastrointestinal, genitourinary, musculoskeletal, integument and psychiatric systems are negative, except as otherwise noted.    Patient Active Problem List    Diagnosis Date Noted     Carotid stenosis 04/21/2023     Priority: Medium     Primary hypertension 05/07/2022     Priority: Medium     Status post coronary angiogram 05/06/2022     Priority: Medium     Coronary artery disease  due to lipid rich plaque      Priority: Medium     Chest pain, unspecified type 05/05/2022     Priority: Medium     Acute chest pain 04/28/2022     Priority: Medium     Hiatal hernia 04/15/2021     Priority: Medium     Formatting of this note might be different from the original.  Added automatically from request for surgery [id number]       Spinal stenosis of lumbar region with neurogenic claudication 06/04/2018     Priority: Medium     S/P partial colectomy 04/10/2018     Priority: Medium     Diverticulitis 01/28/2018     Priority: Medium     Psoriasis 06/20/2012     Priority: Medium     GERD (gastroesophageal reflux disease) 03/15/2011     Priority: Medium     Tobacco abuse 02/17/2011     Priority: Medium     Health Care Home 01/27/2011     Priority: Medium     DX V65.8 REPLACED WITH [facility] CARE HOME (04/08/2013)       CARDIOVASCULAR SCREENING; LDL GOAL LESS THAN 160 10/31/2010     Priority: Medium     R Occipital Neuralgia 10/07/2009     Priority: Medium     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 On fiber, senna, and MOM. Advised to d/c MOM, start Miralax and titer to regular BM's. Pt has been seen by GI.        Brain syndrome, posttraumatic 03/06/2009     Priority: Medium     Work comp.  Has seen Dr. Ahmadi, [facility].  MRIs, EMG unremarkalbe, some foraminal stenosis on C5/C6  Sent her to physiatrist, trigger point injections didn't help.  After some neck traction, had intense unremitting HA, neck pain.  Off/on tingling in arms.  Pain clinic and/or spine surg for more eval probable next steps.    Has failed multiple rescue and preventive HA meds. On no narcotics        Past Medical History:   Diagnosis Date     Anemia      Asymptomatic stenosis of left carotid artery      Cancer (H)     melanoma     Coronary artery  disease      Hiatal hernia      Hypertension      Irritable bowel syndrome      Other chronic pain      Past Surgical History:   Procedure Laterality Date     BACK SURGERY       CHOLECYSTECTOMY, LAPOROSCOPIC  02/14/2000    Cholecystectomy, Laparoscopic     COLON SURGERY       CV CORONARY ANGIOGRAM N/A 05/06/2022    Procedure: CV CORONARY ANGIOGRAM;  Surgeon: Stefanie Spangler MD;  Location: [facility] CV     CV LEFT HEART CATH N/A 05/06/2022    Procedure: Left Heart Catheterization;  Surgeon: Stefanie Spangler MD;  Location: [facility] CATH LAB CV     CYSTOSCOPY, INSERT LIGHTED STENT URETER(S) N/A 04/10/2018    Procedure: CYSTOSCOPY, INSERT LIGHTED STENT URETER(S);  Lighted Stent Placement,Laparoscopic Assisted Sigmoid Colectomy;  Surgeon: ERVIN Reina MD;  Location: [facility] OR     ENDOVASCULAR CAROTID STENT PLACEMENT Left 4/21/2023    Procedure: Left transcarotid revascularization;  Surgeon: Eveline Manley MD;  Location: [facility]     IR TCAR TRANSCAROTID ARTERY REVASCULARIZATION  4/21/2023     LAPAROSCOPIC ASSISTED COLECTOMY LEFT (DESCENDING) N/A 04/10/2018    Procedure: LAPAROSCOPIC ASSISTED COLECTOMY LEFT (DESCENDING);;  Surgeon: Hill Murray MD;  Location: [facility] OR     LUMBAR DISCECTOMY Left 09/23/2022    Procedure: left thoracic 12-lumbar 1 hemilaminectomy, medial facetectomy, foraminotomy and microdiscectomy;  Surgeon: Suha Kent MD;  Location: [facility]     NECK SURGERY       ORTHOPEDIC SURGERY  10/01/2010    Cut palm and reattched tendons and nerves in left hand forefinger.     VT ESOPHAGOGASTRODUODENOSCOPY TRANSORAL DIAGNOSTIC N/A 04/30/2021    Procedure: ESOPHAGOGASTRODUODENOSCOPY (EGD);  Surgeon: Souleymane Moreno DO;  Location: [facility];  Service: General     SURGICAL HISTORY OF -   01/04/2000    Esophagogastroduodenoscopy with biopsy     TUBAL LIGATION       Current Outpatient Medications   Medication Sig Dispense Refill     acetaminophen (TYLENOL) 500 MG  "tablet Take 1,000 mg by mouth every 6 hours as needed for mild pain       aspirin (ASA) 81 MG EC tablet Take 1 tablet (81 mg) by mouth daily 30 tablet 3     atorvastatin (LIPITOR) 40 MG tablet Take 1 tablet by mouth once daily 90 tablet 0     celecoxib (CELEBREX) 100 MG capsule Take 1 capsule (100 mg) by mouth 2 times daily (Patient taking differently: Take 200 mg by mouth daily) 180 capsule 3     clobetasol (TEMOVATE) 0.05 % external ointment APPLY  OINTMENT TOPICALLY TWICE DAILY 60 g 0     clopidogrel (PLAVIX) 75 MG tablet Take 1 tablet (75 mg) by mouth daily 30 tablet 3     fish oil-omega-3 fatty acids 1000 MG capsule Take 1 g by mouth daily       lactobacillus rhamnosus (GG) (CULTURELL) capsule Take 1 capsule by mouth daily       Melatonin 10 MG TABS tablet Take 10 mg by mouth At Bedtime (Patient not taking: Reported on 5/1/2023)       metoprolol succinate ER (TOPROL XL) 50 MG 24 hr tablet Take 0.5 tablets (25 mg) by mouth daily (Patient taking differently: Take 12.5 mg by mouth daily) 30 tablet 2     Multiple Vitamins-Minerals (HAIR/SKIN/NAILS) TABS Take 1 tablet by mouth daily       multivitamin w/minerals (THERA-VIT-M) tablet Take 1 tablet by mouth daily       naloxone (NARCAN) 4 MG/0.1ML nasal spray See Admin Instructions (Patient not taking: Reported on 5/1/2023)       nitroGLYcerin (NITROSTAT) 0.4 MG sublingual tablet One tablet under the tongue every 5 minutes if needed for chest pain. May repeat every 5 minutes for a maximum of 3 doses in 15 minutes\" 25 tablet 3     omeprazole (PRILOSEC) 20 MG DR capsule Take 1 capsule (20 mg) by mouth 3 times daily (Patient taking differently: Take 20 mg by mouth daily) 270 capsule 3     UNABLE TO FIND Take 1 tablet by mouth At Bedtime MEDICATION NAME: Qunol Sleep - melatonin 5 mg, Ashwagandha, L-Theanine       Vitamin D (Cholecalciferol) 25 MCG (1000 UT) TABS Take 1,000 Units by mouth daily         Allergies   Allergen Reactions     Ciprofloxacin Anaphylaxis     Flagyl " "[Metronidazole] Anaphylaxis     Gabapentin Other (See Comments)     Suicidal thoughts.     Zofran [Ondansetron] Other (See Comments) and GI Disturbance     Causes bloating, moderately uncomfortable, abd pain       Benadryl [Diphenhydramine] Other (See Comments)     Muscle spasms     Percocet [Oxycodone-Acetaminophen] Other (See Comments) and Headache     Goes nuts - nasty mean irritated, can take Dilaudid     Vicodin [Hydrocodone-Acetaminophen] Other (See Comments)     Anxiety-agitation        Social History     Tobacco Use     Smoking status: Some Days     Packs/day: 0.05     Years: 30.00     Pack years: 1.50     Types: Cigarettes     Last attempt to quit: 2023     Years since quittin.2     Smokeless tobacco: Never     Tobacco comments:     3 Cigs a day   Vaping Use     Vaping status: Never Used   Substance Use Topics     Alcohol use: Not Currently     Comment: Alcoholic Drinks/day: 2x year        History   Drug Use Unknown         Objective     /80 (BP Location: Right arm, Patient Position: Sitting, Cuff Size: Adult Regular)   Pulse 81   Temp 98  F (36.7  C) (Oral)   Resp 22   Ht 1.499 m (4' 11.02\")   Wt 72.3 kg (159 lb 8 oz)   LMP 04/10/2016   SpO2 99%   BMI 32.20 kg/m      Physical Exam    GENERAL APPEARANCE: healthy, alert and no distress     EYES: EOMI, PERRL     HENT: ear canals and TM's normal and nose and mouth without ulcers or lesions     NECK: no adenopathy, no asymmetry, masses, or scars and thyroid normal to palpation     RESP: lungs clear to auscultation - no rales, rhonchi or wheezes     CV: regular rates and rhythm, normal S1 S2, no S3 or S4 and no murmur, click or rub     ABDOMEN:  soft, nontender, no HSM or masses and bowel sounds normal     MS: normal muscle tone, slightly decreased range of motion in legs due to pain     SKIN: no suspicious lesions or rashes     NEURO: Normal strength and tone, sensory exam grossly normal, mentation intact and speech normal     PSYCH: " mentation appears normal. and affect normal/bright     LYMPHATICS: No cervical adenopathy    Recent Labs   Lab Test 04/21/23  1025 04/11/23  1607 09/25/22  0317 09/19/22  1416 05/05/22  0812 04/28/22  1543   HGB 13.0 13.4 13.3 13.9   < > 14.1    275 297 275   < > 286   INR  --   --   --  0.93  --   --    NA  --  138 136 140   < > 143   POTASSIUM 4.4 3.9 4.1 4.4   < > 4.0   CR 0.79 0.71 0.84 0.76   < > 0.70   A1C  --   --   --   --   --  5.6    < > = values in this interval not displayed.        Diagnostics:  Labs pending at this time.  Results will be reviewed when available.   No EKG this visit, completed in the last 90 days.    Revised Cardiac Risk Index (RCRI):  The patient has the following serious cardiovascular risks for perioperative complications:   - Coronary Artery Disease (MI, positive stress test, angina, Qs on EKG) = 1 point     RCRI Interpretation: 1 point: Class II (low risk - 0.9% complication rate)           Signed Electronically by: CARLTON PEREZ CNP  Copy of this evaluation report is provided to requesting physician.

## 2023-05-19 LAB — DEPRECATED CALCIDIOL+CALCIFEROL SERPL-MC: 51 UG/L (ref 20–75)

## 2023-05-22 ENCOUNTER — TELEPHONE (OUTPATIENT)
Dept: FAMILY MEDICINE | Facility: CLINIC | Age: 58
End: 2023-05-22
Payer: COMMERCIAL

## 2023-05-22 NOTE — TELEPHONE ENCOUNTER
----- Message from CARLTON Parr CNP sent at 5/22/2023  7:36 AM CDT -----  Call patient with lab results: Normal results. Ok for surgery pending cardiology approval.

## 2023-06-01 ENCOUNTER — OFFICE VISIT (OUTPATIENT)
Dept: CARDIOLOGY | Facility: CLINIC | Age: 58
End: 2023-06-01
Payer: COMMERCIAL

## 2023-06-01 VITALS
OXYGEN SATURATION: 97 % | SYSTOLIC BLOOD PRESSURE: 123 MMHG | HEIGHT: 59 IN | BODY MASS INDEX: 32.28 KG/M2 | HEART RATE: 81 BPM | RESPIRATION RATE: 16 BRPM | WEIGHT: 160.1 LBS | DIASTOLIC BLOOD PRESSURE: 80 MMHG

## 2023-06-01 DIAGNOSIS — R07.9 CHEST PAIN, UNSPECIFIED TYPE: ICD-10-CM

## 2023-06-01 DIAGNOSIS — I25.10 CORONARY ARTERY DISEASE DUE TO LIPID RICH PLAQUE: ICD-10-CM

## 2023-06-01 DIAGNOSIS — I10 PRIMARY HYPERTENSION: ICD-10-CM

## 2023-06-01 DIAGNOSIS — Z72.0 TOBACCO ABUSE: Chronic | ICD-10-CM

## 2023-06-01 DIAGNOSIS — Z98.890 STATUS POST CORONARY ANGIOGRAM: ICD-10-CM

## 2023-06-01 DIAGNOSIS — I25.83 CORONARY ARTERY DISEASE DUE TO LIPID RICH PLAQUE: ICD-10-CM

## 2023-06-01 PROCEDURE — 99213 OFFICE O/P EST LOW 20 MIN: CPT | Performed by: INTERNAL MEDICINE

## 2023-06-01 RX ORDER — BUPROPION HYDROCHLORIDE 150 MG/1
1 TABLET, EXTENDED RELEASE ORAL
Status: ON HOLD | COMMUNITY
Start: 2023-05-18 | End: 2023-06-09

## 2023-06-01 NOTE — LETTER
6/1/2023    Sera Solo, NP  [address] [place] [address]    RE: Selina Parikh       Dear Colleague,     I had the pleasure of seeing Selina Parikh in the [facility].      [facility]  [phone]          Assessment/Recommendations   Patient with a history of chest discomfort, no significant epicardial coronary artery disease with tapering of her distal LAD and coronary angiogram.  Recently admitted and ruled out for myocardial infarction and her chest discomforts have not gone away.    She is limited with walking because of back pain and is having back surgery in about 10 days.  I would like to be more active thereafter and she would like this as well.  I think she will feel better but if not she will call us and we could taper off of the tiny dose of metoprolol that she is taking.    LDL cholesterol in the fall 2022 was 82, she takes dual antiplatelet therapy but will be stopping the Plavix soon following percutaneous intervention of her left carotid artery.    Encouraged her to continue to work towards tobacco cessation    We will plan on seeing her back in 1 year, but of course would be happy to see her sooner if questions or problems arise.           History of Present Illness/Subjective    Ms. Selina Parikh is a 57 year old female with known carotid artery disease, status post recent percutaneous intervention on the left carotid.  She has also had intermittent chest discomfort and was evaluated in the emergency department and admitted overnight in April of this year.  No evidence for myocardial injury.  She has had resolution of the chest discomforts.  She is unable to walk or exercise any vigorous type away because of back discomfort and is scheduled to have fusion surgery this month.    She denies orthopnea, paroxysmal nocturnal dyspnea, peripheral edema, does feel dizzy at times and wonders if it is worsened by the Wellbutrin which she is using to try  "to quit smoking.    ECG: Personally reviewed.  Hospital ECG shows sinus rhythm without acute ST-T wave changes.       Physical Examination Review of Systems   /80 (BP Location: Left arm, Patient Position: Sitting, Cuff Size: Adult Regular)   Pulse 81   Resp 16   Ht 1.499 m (4' 11.02\")   Wt 72.6 kg (160 lb 1.6 oz)   LMP 04/10/2016   SpO2 97%   BMI 32.31 kg/m    Body mass index is 32.31 kg/m .  Wt Readings from Last 3 Encounters:   06/01/23 72.6 kg (160 lb 1.6 oz)   05/18/23 72.3 kg (159 lb 8 oz)   05/01/23 70.8 kg (156 lb)     General Appearance:   Alert, cooperative and in no acute distress.   ENT/Mouth: Pink/moist oral mucosa   EYES:  no scleral icterus, normal conjunctivae   Neck: JVP normal. No Hepatojugular reflux. Thyroid not visualized.   Chest/Lungs:   Lungs are clear to auscultation, equal chest wall expansion.   Cardiovascular:   S1, S2 without murmur ,clicks or rubs. Brachial, radial left posterior tibial is a little bit down compared to the right pulses are intact and symetric.  Left posterior tibial pulse is diminished when compared to the right but both palpable.  No carotid bruits noted   Abdomen:  Nontender. BS+.   Extremities: No cyanosis, clubbing or edema   Skin: no xanthelasma, warm.    Neurologic: normal arm movement bilateral, no tremors     Psychiatric: Appropriate affect.      Enc Vitals  BP: 123/80  Pulse: 81  Resp: 16  SpO2: 97 %  Weight: 72.6 kg (160 lb 1.6 oz)  Height: 149.9 cm (4' 11.02\")                                           Medical History  Surgical History Family History Social History   Past Medical History:   Diagnosis Date    Anemia     Asymptomatic stenosis of left carotid artery     Cancer (H)     melanoma    Coronary artery disease     Hiatal hernia     Hypertension     Irritable bowel syndrome     Other chronic pain     Past Surgical History:   Procedure Laterality Date    BACK SURGERY      CHOLECYSTECTOMY, LAPOROSCOPIC  02/14/2000    Cholecystectomy, " Laparoscopic    COLON SURGERY      CV CORONARY ANGIOGRAM N/A 05/06/2022    Procedure: CV CORONARY ANGIOGRAM;  Surgeon: Stefanie Spangler MD;  Location: [facility] CV    CV LEFT HEART CATH N/A 05/06/2022    Procedure: Left Heart Catheterization;  Surgeon: Stefanie Spangler MD;  Location: [facility] LAB CV    CYSTOSCOPY, INSERT LIGHTED STENT URETER(S) N/A 04/10/2018    Procedure: CYSTOSCOPY, INSERT LIGHTED STENT URETER(S);  Lighted Stent Placement,Laparoscopic Assisted Sigmoid Colectomy;  Surgeon: ERVIN Reina MD;  Location: [facility] OR    ENDOVASCULAR CAROTID STENT PLACEMENT Left 4/21/2023    Procedure: Left transcarotid revascularization;  Surgeon: Eveline Manley MD;  Location: [facility]    IR TCAR TRANSCAROTID ARTERY REVASCULARIZATION  4/21/2023    LAPAROSCOPIC ASSISTED COLECTOMY LEFT (DESCENDING) N/A 04/10/2018    Procedure: LAPAROSCOPIC ASSISTED COLECTOMY LEFT (DESCENDING);;  Surgeon: Hill Murray MD;  Location: [facility] OR    LUMBAR DISCECTOMY Left 09/23/2022    Procedure: left thoracic 12-lumbar 1 hemilaminectomy, medial facetectomy, foraminotomy and microdiscectomy;  Surgeon: Suha Kent MD;  Location: [facility]    NECK SURGERY      ORTHOPEDIC SURGERY  10/01/2010    Cut palm and reattched tendons and nerves in left hand forefinger.    [place] ESOPHAGOGASTRODUODENOSCOPY TRANSORAL DIAGNOSTIC N/A 04/30/2021    Procedure: ESOPHAGOGASTRODUODENOSCOPY (EGD);  Surgeon: Souleymane Moreno DO;  Location: [facility];  Service: General    SURGICAL HISTORY OF -   01/04/2000    Esophagogastroduodenoscopy with biopsy    TUBAL LIGATION      Family History   Problem Relation Age of Onset    C.A.D. Father 50        50's    Diabetes Father     Diabetes Brother     C.A.D. Brother 42        quad bypass and MI    Diabetes Brother         2 brothers have DM    Cancer Brother 34        Melanoma    Aortic aneurysm Brother     Allergies Son         Meds    Allergies Daughter         Med    Cancer  Mother     DAVIS.DIXIE. Brother 38        MI age 38    Diabetes Mother     Diabetes Brother     Social History     Socioeconomic History    Marital status:      Spouse name: Not on file    Number of children: Not on file    Years of education: Not on file    Highest education level: Not on file   Occupational History    Not on file   Tobacco Use    Smoking status: Some Days     Packs/day: 0.05     Years: 30.00     Pack years: 1.50     Types: Cigarettes     Last attempt to quit: 2023     Years since quittin.2    Smokeless tobacco: Never    Tobacco comments:     3 Cigs a day   Vaping Use    Vaping status: Never Used   Substance and Sexual Activity    Alcohol use: Not Currently     Comment: Alcoholic Drinks/day: 2x year     Drug use: Not Currently    Sexual activity: Not Currently     Partners: Male     Birth control/protection: Surgical   Other Topics Concern    Parent/sibling w/ CABG, MI or angioplasty before 65F 55M? Yes     Comment: brother- 38, MI, brother 42- quad bypass   Social History Narrative    Not on file     Social Determinants of Health     Financial Resource Strain: Not on file   Food Insecurity: Not on file   Transportation Needs: Not on file   Physical Activity: Not on file   Stress: Not on file   Social Connections: Not on file   Intimate Partner Violence: Not on file   Housing Stability: Not on file          Medications  Allergies   Current Outpatient Medications   Medication Sig Dispense Refill    acetaminophen (TYLENOL) 500 MG tablet Take 1,000 mg by mouth every 6 hours as needed for mild pain      aspirin (ASA) 81 MG EC tablet Take 1 tablet (81 mg) by mouth daily 30 tablet 3    atorvastatin (LIPITOR) 40 MG tablet Take 1 tablet by mouth once daily 90 tablet 0    buPROPion (WELLBUTRIN SR) 150 MG 12 hr tablet Take 1 tablet by mouth 2 times daily      buPROPion (ZYBAN) 150 MG 12 hr tablet Take 1 tablet (150 mg) by mouth 2 times daily for 30 days 60 tablet 0    celecoxib (CELEBREX) 100 MG  "capsule Take 1 capsule (100 mg) by mouth 2 times daily (Patient taking differently: Take 200 mg by mouth daily) 180 capsule 3    clobetasol (TEMOVATE) 0.05 % external ointment APPLY  OINTMENT TOPICALLY TWICE DAILY 60 g 0    clopidogrel (PLAVIX) 75 MG tablet Take 1 tablet (75 mg) by mouth daily 30 tablet 3    fish oil-omega-3 fatty acids 1000 MG capsule Take 1 g by mouth daily      Melatonin 10 MG TABS tablet Take 10 mg by mouth At Bedtime      metoprolol succinate ER (TOPROL XL) 25 MG 24 hr tablet Take 0.5 tablets (12.5 mg) by mouth daily      Multiple Vitamins-Minerals (HAIR/SKIN/NAILS) TABS Take 1 tablet by mouth daily      multivitamin w/minerals (THERA-VIT-M) tablet Take 1 tablet by mouth daily      nitroGLYcerin (NITROSTAT) 0.4 MG sublingual tablet One tablet under the tongue every 5 minutes if needed for chest pain. May repeat every 5 minutes for a maximum of 3 doses in 15 minutes\" 25 tablet 3    omeprazole (PRILOSEC) 20 MG DR capsule Take 1 capsule (20 mg) by mouth daily 30 capsule     UNABLE TO FIND Take 1 tablet by mouth At Bedtime MEDICATION NAME: Qunol Sleep - melatonin 5 mg, Ashwagandha, L-Theanine      Vitamin D (Cholecalciferol) 25 MCG (1000 UT) TABS Take 1,000 Units by mouth daily      Allergies   Allergen Reactions    Ciprofloxacin Anaphylaxis    Flagyl [Metronidazole] Anaphylaxis    Gabapentin Other (See Comments)     Suicidal thoughts.    Zofran [Ondansetron] Other (See Comments) and GI Disturbance     Causes bloating, moderately uncomfortable, abd pain      Benadryl [Diphenhydramine] Other (See Comments)     Muscle spasms    Percocet [Oxycodone-Acetaminophen] Other (See Comments) and Headache     Goes nuts - nasty mean irritated, can take Dilaudid    Vicodin [Hydrocodone-Acetaminophen] Other (See Comments)     Anxiety-agitation         Lab Results    Chemistry/lipid CBC Cardiac Enzymes/BNP/TSH/INR   Lab Results   Component Value Date    CHOL 152 09/02/2022    HDL 44 (L) 09/02/2022    TRIG 128 " 09/02/2022    BUN 13.7 05/18/2023     05/18/2023    CO2 25 05/18/2023    Lab Results   Component Value Date    WBC 7.2 05/18/2023    HGB 13.5 05/18/2023    HCT 40.8 05/18/2023    MCV 94 05/18/2023     05/18/2023    Lab Results   Component Value Date    TROPONINI <0.01 05/05/2022    BNP 10 05/05/2022    TSH 0.95 11/29/2011    INR 0.93 09/19/2022                Thank you for allowing me to participate in the care of your patient.      Sincerely,     Glenn Ferreira MD     [facility]  cc:   Glenn Ferreira MD  [address] [facility] [place] [address]

## 2023-06-01 NOTE — PROGRESS NOTES
[facility]  [phone]          Assessment/Recommendations   Patient with a history of chest discomfort, no significant epicardial coronary artery disease with tapering of her distal LAD and coronary angiogram.  Recently admitted and ruled out for myocardial infarction and her chest discomforts have not gone away.    She is limited with walking because of back pain and is having back surgery in about 10 days.  I would like to be more active thereafter and she would like this as well.  I think she will feel better but if not she will call us and we could taper off of the tiny dose of metoprolol that she is taking.    LDL cholesterol in the fall 2022 was 82, she takes dual antiplatelet therapy but will be stopping the Plavix soon following percutaneous intervention of her left carotid artery.    Encouraged her to continue to work towards tobacco cessation    We will plan on seeing her back in 1 year, but of course would be happy to see her sooner if questions or problems arise.           History of Present Illness/Subjective    Ms. Selina Parikh is a 57 year old female with known carotid artery disease, status post recent percutaneous intervention on the left carotid.  She has also had intermittent chest discomfort and was evaluated in the emergency department and admitted overnight in April of this year.  No evidence for myocardial injury.  She has had resolution of the chest discomforts.  She is unable to walk or exercise any vigorous type away because of back discomfort and is scheduled to have fusion surgery this month.    She denies orthopnea, paroxysmal nocturnal dyspnea, peripheral edema, does feel dizzy at times and wonders if it is worsened by the Wellbutrin which she is using to try to quit smoking.    ECG: Personally reviewed.  Hospital ECG shows sinus rhythm without acute ST-T wave changes.       Physical Examination Review of Systems   /80 (BP Location: Left arm, Patient  "Position: Sitting, Cuff Size: Adult Regular)   Pulse 81   Resp 16   Ht 1.499 m (4' 11.02\")   Wt 72.6 kg (160 lb 1.6 oz)   LMP 04/10/2016   SpO2 97%   BMI 32.31 kg/m    Body mass index is 32.31 kg/m .  Wt Readings from Last 3 Encounters:   06/01/23 72.6 kg (160 lb 1.6 oz)   05/18/23 72.3 kg (159 lb 8 oz)   05/01/23 70.8 kg (156 lb)     General Appearance:   Alert, cooperative and in no acute distress.   ENT/Mouth: Pink/moist oral mucosa   EYES:  no scleral icterus, normal conjunctivae   Neck: JVP normal. No Hepatojugular reflux. Thyroid not visualized.   Chest/Lungs:   Lungs are clear to auscultation, equal chest wall expansion.   Cardiovascular:   S1, S2 without murmur ,clicks or rubs. Brachial, radial left posterior tibial is a little bit down compared to the right pulses are intact and symetric.  Left posterior tibial pulse is diminished when compared to the right but both palpable.  No carotid bruits noted   Abdomen:  Nontender. BS+.   Extremities: No cyanosis, clubbing or edema   Skin: no xanthelasma, warm.    Neurologic: normal arm movement bilateral, no tremors     Psychiatric: Appropriate affect.      Enc Vitals  BP: 123/80  Pulse: 81  Resp: 16  SpO2: 97 %  Weight: 72.6 kg (160 lb 1.6 oz)  Height: 149.9 cm (4' 11.02\")                                           Medical History  Surgical History Family History Social History   Past Medical History:   Diagnosis Date     Anemia      Asymptomatic stenosis of left carotid artery      Cancer (H)     melanoma     Coronary artery disease      Hiatal hernia      Hypertension      Irritable bowel syndrome      Other chronic pain     Past Surgical History:   Procedure Laterality Date     BACK SURGERY       CHOLECYSTECTOMY, LAPOROSCOPIC  02/14/2000    Cholecystectomy, Laparoscopic     COLON SURGERY       CV CORONARY ANGIOGRAM N/A 05/06/2022    Procedure: CV CORONARY ANGIOGRAM;  Surgeon: Stefanie Spangler MD;  Location: [facility] CATH LAB CV     CV LEFT HEART CATH N/A " 05/06/2022    Procedure: Left Heart Catheterization;  Surgeon: Stefanie Spangler MD;  Location: [facility] CATH LAB CV     CYSTOSCOPY, INSERT LIGHTED STENT URETER(S) N/A 04/10/2018    Procedure: CYSTOSCOPY, INSERT LIGHTED STENT URETER(S);  Lighted Stent Placement,Laparoscopic Assisted Sigmoid Colectomy;  Surgeon: ERVIN Reina MD;  Location: [facility] OR     ENDOVASCULAR CAROTID STENT PLACEMENT Left 4/21/2023    Procedure: Left transcarotid revascularization;  Surgeon: Eveline Manley MD;  Location: [facility]     IR TCAR TRANSCAROTID ARTERY REVASCULARIZATION  4/21/2023     LAPAROSCOPIC ASSISTED COLECTOMY LEFT (DESCENDING) N/A 04/10/2018    Procedure: LAPAROSCOPIC ASSISTED COLECTOMY LEFT (DESCENDING);;  Surgeon: Hill Murray MD;  Location: [facility]     LUMBAR DISCECTOMY Left 09/23/2022    Procedure: left thoracic 12-lumbar 1 hemilaminectomy, medial facetectomy, foraminotomy and microdiscectomy;  Surgeon: Suha Kent MD;  Location: [facility]     NECK SURGERY       ORTHOPEDIC SURGERY  10/01/2010    Cut palm and reattched tendons and nerves in left hand forefinger.     [place] ESOPHAGOGASTRODUODENOSCOPY TRANSORAL DIAGNOSTIC N/A 04/30/2021    Procedure: ESOPHAGOGASTRODUODENOSCOPY (EGD);  Surgeon: Souleymane Moreno DO;  Location: [facility];  Service: General     SURGICAL HISTORY OF -   01/04/2000    Esophagogastroduodenoscopy with biopsy     TUBAL LIGATION      Family History   Problem Relation Age of Onset     C.A.D. Father 50        50's     Diabetes Father      Diabetes Brother      C.A.D. Brother 42        quad bypass and MI     Diabetes Brother         2 brothers have DM     Cancer Brother 34        Melanoma     Aortic aneurysm Brother      Allergies Son         Meds     Allergies Daughter         Med     Cancer Mother      C.A.D. Brother 38        MI age 38     Diabetes Mother      Diabetes Brother     Social History     Socioeconomic History     Marital status:      Spouse  name: Not on file     Number of children: Not on file     Years of education: Not on file     Highest education level: Not on file   Occupational History     Not on file   Tobacco Use     Smoking status: Some Days     Packs/day: 0.05     Years: 30.00     Pack years: 1.50     Types: Cigarettes     Last attempt to quit: 2023     Years since quittin.2     Smokeless tobacco: Never     Tobacco comments:     3 Cigs a day   Vaping Use     Vaping status: Never Used   Substance and Sexual Activity     Alcohol use: Not Currently     Comment: Alcoholic Drinks/day: 2x year      Drug use: Not Currently     Sexual activity: Not Currently     Partners: Male     Birth control/protection: Surgical   Other Topics Concern     Parent/sibling w/ CABG, MI or angioplasty before 65F 55M? Yes     Comment: brother- 38, MI, brother 42- quad bypass   Social History Narrative     Not on file     Social Determinants of Health     Financial Resource Strain: Not on file   Food Insecurity: Not on file   Transportation Needs: Not on file   Physical Activity: Not on file   Stress: Not on file   Social Connections: Not on file   Intimate Partner Violence: Not on file   Housing Stability: Not on file          Medications  Allergies   Current Outpatient Medications   Medication Sig Dispense Refill     acetaminophen (TYLENOL) 500 MG tablet Take 1,000 mg by mouth every 6 hours as needed for mild pain       aspirin (ASA) 81 MG EC tablet Take 1 tablet (81 mg) by mouth daily 30 tablet 3     atorvastatin (LIPITOR) 40 MG tablet Take 1 tablet by mouth once daily 90 tablet 0     buPROPion (WELLBUTRIN SR) 150 MG 12 hr tablet Take 1 tablet by mouth 2 times daily       buPROPion (ZYBAN) 150 MG 12 hr tablet Take 1 tablet (150 mg) by mouth 2 times daily for 30 days 60 tablet 0     celecoxib (CELEBREX) 100 MG capsule Take 1 capsule (100 mg) by mouth 2 times daily (Patient taking differently: Take 200 mg by mouth daily) 180 capsule 3     clobetasol (TEMOVATE)  "0.05 % external ointment APPLY  OINTMENT TOPICALLY TWICE DAILY 60 g 0     clopidogrel (PLAVIX) 75 MG tablet Take 1 tablet (75 mg) by mouth daily 30 tablet 3     fish oil-omega-3 fatty acids 1000 MG capsule Take 1 g by mouth daily       Melatonin 10 MG TABS tablet Take 10 mg by mouth At Bedtime       metoprolol succinate ER (TOPROL XL) 25 MG 24 hr tablet Take 0.5 tablets (12.5 mg) by mouth daily       Multiple Vitamins-Minerals (HAIR/SKIN/NAILS) TABS Take 1 tablet by mouth daily       multivitamin w/minerals (THERA-VIT-M) tablet Take 1 tablet by mouth daily       nitroGLYcerin (NITROSTAT) 0.4 MG sublingual tablet One tablet under the tongue every 5 minutes if needed for chest pain. May repeat every 5 minutes for a maximum of 3 doses in 15 minutes\" 25 tablet 3     omeprazole (PRILOSEC) 20 MG DR capsule Take 1 capsule (20 mg) by mouth daily 30 capsule      UNABLE TO FIND Take 1 tablet by mouth At Bedtime MEDICATION NAME: Qunol Sleep - melatonin 5 mg, Ashwagandha, L-Theanine       Vitamin D (Cholecalciferol) 25 MCG (1000 UT) TABS Take 1,000 Units by mouth daily      Allergies   Allergen Reactions     Ciprofloxacin Anaphylaxis     Flagyl [Metronidazole] Anaphylaxis     Gabapentin Other (See Comments)     Suicidal thoughts.     Zofran [Ondansetron] Other (See Comments) and GI Disturbance     Causes bloating, moderately uncomfortable, abd pain       Benadryl [Diphenhydramine] Other (See Comments)     Muscle spasms     Percocet [Oxycodone-Acetaminophen] Other (See Comments) and Headache     Goes nuts - nasty mean irritated, can take Dilaudid     Vicodin [Hydrocodone-Acetaminophen] Other (See Comments)     Anxiety-agitation         Lab Results    Chemistry/lipid CBC Cardiac Enzymes/BNP/TSH/INR   Lab Results   Component Value Date    CHOL 152 09/02/2022    HDL 44 (L) 09/02/2022    TRIG 128 09/02/2022    BUN 13.7 05/18/2023     05/18/2023    CO2 25 05/18/2023    Lab Results   Component Value Date    WBC 7.2 05/18/2023    " HGB 13.5 05/18/2023    HCT 40.8 05/18/2023    MCV 94 05/18/2023     05/18/2023    Lab Results   Component Value Date    TROPONINI <0.01 05/05/2022    BNP 10 05/05/2022    TSH 0.95 11/29/2011    INR 0.93 09/19/2022

## 2023-06-08 ENCOUNTER — LAB (OUTPATIENT)
Dept: LAB | Facility: CLINIC | Age: 58
End: 2023-06-08
Payer: COMMERCIAL

## 2023-06-08 ENCOUNTER — ANESTHESIA EVENT (OUTPATIENT)
Dept: SURGERY | Facility: CLINIC | Age: 58
DRG: 455 | End: 2023-06-08
Payer: COMMERCIAL

## 2023-06-08 ENCOUNTER — TELEPHONE (OUTPATIENT)
Dept: NEUROSURGERY | Facility: CLINIC | Age: 58
End: 2023-06-08

## 2023-06-08 DIAGNOSIS — Z01.818 PRE-OP TESTING: Primary | ICD-10-CM

## 2023-06-08 DIAGNOSIS — Z01.818 PRE-OP TESTING: ICD-10-CM

## 2023-06-08 DIAGNOSIS — T50.905A MEDICATION REACTION: ICD-10-CM

## 2023-06-08 LAB
ANION GAP SERPL CALCULATED.3IONS-SCNC: 11 MMOL/L (ref 7–15)
APTT PPP: 29 SECONDS (ref 22–38)
BUN SERPL-MCNC: 13.6 MG/DL (ref 6–20)
CALCIUM SERPL-MCNC: 9.9 MG/DL (ref 8.6–10)
CHLORIDE SERPL-SCNC: 106 MMOL/L (ref 98–107)
CREAT SERPL-MCNC: 0.86 MG/DL (ref 0.51–0.95)
DEPRECATED CALCIDIOL+CALCIFEROL SERPL-MC: 46 UG/L (ref 20–75)
DEPRECATED HCO3 PLAS-SCNC: 25 MMOL/L (ref 22–29)
ERYTHROCYTE [DISTWIDTH] IN BLOOD BY AUTOMATED COUNT: 13.5 % (ref 10–15)
GFR SERPL CREATININE-BSD FRML MDRD: 78 ML/MIN/1.73M2
GLUCOSE SERPL-MCNC: 93 MG/DL (ref 70–99)
HCT VFR BLD AUTO: 41.9 % (ref 35–47)
HGB BLD-MCNC: 13.5 G/DL (ref 11.7–15.7)
INR PPP: 0.99 (ref 0.85–1.15)
MCH RBC QN AUTO: 30.3 PG (ref 26.5–33)
MCHC RBC AUTO-ENTMCNC: 32.2 G/DL (ref 31.5–36.5)
MCV RBC AUTO: 94 FL (ref 78–100)
PLATELET # BLD AUTO: 247 10E3/UL (ref 150–450)
POTASSIUM SERPL-SCNC: 4.6 MMOL/L (ref 3.4–5.3)
RBC # BLD AUTO: 4.46 10E6/UL (ref 3.8–5.2)
SODIUM SERPL-SCNC: 142 MMOL/L (ref 136–145)
WBC # BLD AUTO: 5.7 10E3/UL (ref 4–11)

## 2023-06-08 PROCEDURE — 85730 THROMBOPLASTIN TIME PARTIAL: CPT

## 2023-06-08 PROCEDURE — 82306 VITAMIN D 25 HYDROXY: CPT

## 2023-06-08 PROCEDURE — 85610 PROTHROMBIN TIME: CPT

## 2023-06-08 PROCEDURE — 80323 ALKALOIDS NOS: CPT | Mod: 90

## 2023-06-08 PROCEDURE — 80048 BASIC METABOLIC PNL TOTAL CA: CPT

## 2023-06-08 PROCEDURE — 99000 SPECIMEN HANDLING OFFICE-LAB: CPT

## 2023-06-08 PROCEDURE — 85027 COMPLETE CBC AUTOMATED: CPT

## 2023-06-08 PROCEDURE — 36415 COLL VENOUS BLD VENIPUNCTURE: CPT

## 2023-06-08 NOTE — TELEPHONE ENCOUNTER
Called patient, discussed surgery, post-op course, expectations, follow up plan.    Reviewed H&P from 05/18/2023 - cleared for surgery  Labs, cardio clearance - WNL    MRI done on 03/10/2023 - in [place]    To OR as planned.     Check in - 0600    Nothing to eat or drink after midnight the night before surgery.     Reviewed with patient: Arrive 2 hours prior to scheduled surgery.    Bring all pertinent films to hospital the day of surgery.     Continue to refrain from NSAIDS (Ibuprofen, Aleve, Naprosyn), ASA, Over the counter herbal medications or supplements, anti-coagulants and blood thinners.     Patient confirmed they have help/assistance in place at home upon discharge    Instructions: using a washcloth and a bottle of provided Hibiclens, wash your body, avoiding your face and genitals. Preferably, shower the night before surgery and the morning of surgery using a half a bottle each time for your whole body shower.        Patient was informed that we will provide up to 6 weeks prescription of pain medication for post-operative pain.      Instructed patient about the following: After your surgery, if you will be staying in-patient, a nursing provider team will be monitoring you closely throughout your stay and communicate your health status to your surgeon and other providers.  You will be seen by Advanced Practice Providers (e.g., nurse practitioners, clinic nurse specialist, and physician assistants) who will check on you regularly to assess the status of your surgery.     Diana Holbrook RN, CNRN

## 2023-06-09 ENCOUNTER — ANESTHESIA (OUTPATIENT)
Dept: SURGERY | Facility: CLINIC | Age: 58
DRG: 455 | End: 2023-06-09
Payer: COMMERCIAL

## 2023-06-09 ENCOUNTER — APPOINTMENT (OUTPATIENT)
Dept: RADIOLOGY | Facility: CLINIC | Age: 58
DRG: 455 | End: 2023-06-09
Attending: PHYSICIAN ASSISTANT
Payer: COMMERCIAL

## 2023-06-09 ENCOUNTER — HOSPITAL ENCOUNTER (INPATIENT)
Facility: CLINIC | Age: 58
LOS: 2 days | Discharge: HOME | DRG: 455 | End: 2023-06-11
Attending: SURGERY | Admitting: SURGERY
Payer: COMMERCIAL

## 2023-06-09 ENCOUNTER — APPOINTMENT (OUTPATIENT)
Dept: PHYSICAL THERAPY | Facility: CLINIC | Age: 58
DRG: 455 | End: 2023-06-09
Attending: SURGERY
Payer: COMMERCIAL

## 2023-06-09 DIAGNOSIS — M48.062 SPINAL STENOSIS OF LUMBAR REGION WITH NEUROGENIC CLAUDICATION: ICD-10-CM

## 2023-06-09 DIAGNOSIS — M54.16 LUMBAR RADICULOPATHY: Primary | ICD-10-CM

## 2023-06-09 LAB — GLUCOSE BLDC GLUCOMTR-MCNC: 75 MG/DL (ref 70–99)

## 2023-06-09 PROCEDURE — 22853 INSJ BIOMECHANICAL DEVICE: CPT | Mod: AS | Performed by: NURSE PRACTITIONER

## 2023-06-09 PROCEDURE — 999N000180 XR SURGERY CARM FLUORO LESS THAN 5 MIN: Mod: TC

## 2023-06-09 PROCEDURE — 61783 SCAN PROC SPINAL: CPT | Mod: AS | Performed by: NURSE PRACTITIONER

## 2023-06-09 PROCEDURE — 250N000025 HC SEVOFLURANE, PER MIN: Performed by: SURGERY

## 2023-06-09 PROCEDURE — 250N000005 HC OR RX SURGIFLO HEMOSTATIC MATRIX 10ML 199102S OPNP: Performed by: SURGERY

## 2023-06-09 PROCEDURE — 250N000009 HC RX 250: Performed by: PHYSICIAN ASSISTANT

## 2023-06-09 PROCEDURE — 258N000003 HC RX IP 258 OP 636: Performed by: ANESTHESIOLOGY

## 2023-06-09 PROCEDURE — 8E0WXBG COMPUTER ASSISTED PROCEDURE OF TRUNK REGION, WITH COMPUTERIZED TOMOGRAPHY: ICD-10-PCS | Performed by: SURGERY

## 2023-06-09 PROCEDURE — 258N000003 HC RX IP 258 OP 636: Performed by: NURSE ANESTHETIST, CERTIFIED REGISTERED

## 2023-06-09 PROCEDURE — C1762 CONN TISS, HUMAN(INC FASCIA): HCPCS | Performed by: SURGERY

## 2023-06-09 PROCEDURE — 250N000011 HC RX IP 250 OP 636: Performed by: NURSE PRACTITIONER

## 2023-06-09 PROCEDURE — 120N000001 HC R&B MED SURG/OB

## 2023-06-09 PROCEDURE — 22853 INSJ BIOMECHANICAL DEVICE: CPT | Performed by: SURGERY

## 2023-06-09 PROCEDURE — 0SG30AJ FUSION OF LUMBOSACRAL JOINT WITH INTERBODY FUSION DEVICE, POSTERIOR APPROACH, ANTERIOR COLUMN, OPEN APPROACH: ICD-10-PCS | Performed by: SURGERY

## 2023-06-09 PROCEDURE — 0SG3071 FUSION OF LUMBOSACRAL JOINT WITH AUTOLOGOUS TISSUE SUBSTITUTE, POSTERIOR APPROACH, POSTERIOR COLUMN, OPEN APPROACH: ICD-10-PCS | Performed by: SURGERY

## 2023-06-09 PROCEDURE — 250N000009 HC RX 250: Performed by: NURSE ANESTHETIST, CERTIFIED REGISTERED

## 2023-06-09 PROCEDURE — 61783 SCAN PROC SPINAL: CPT | Mod: 59 | Performed by: SURGERY

## 2023-06-09 PROCEDURE — 272N000001 HC OR GENERAL SUPPLY STERILE: Performed by: SURGERY

## 2023-06-09 PROCEDURE — 250N000013 HC RX MED GY IP 250 OP 250 PS 637: Performed by: ANESTHESIOLOGY

## 2023-06-09 PROCEDURE — 999N000182 XR SURGERY CARM FLUORO GREATER THAN 5 MIN: Mod: TC

## 2023-06-09 PROCEDURE — 250N000011 HC RX IP 250 OP 636: Performed by: PHYSICIAN ASSISTANT

## 2023-06-09 PROCEDURE — 250N000009 HC RX 250: Performed by: ANESTHESIOLOGY

## 2023-06-09 PROCEDURE — 20936 SP BONE AGRFT LOCAL ADD-ON: CPT | Performed by: SURGERY

## 2023-06-09 PROCEDURE — 99222 1ST HOSP IP/OBS MODERATE 55: CPT | Performed by: NURSE PRACTITIONER

## 2023-06-09 PROCEDURE — 20930 SP BONE ALGRFT MORSEL ADD-ON: CPT | Performed by: SURGERY

## 2023-06-09 PROCEDURE — 250N000011 HC RX IP 250 OP 636: Performed by: NURSE ANESTHETIST, CERTIFIED REGISTERED

## 2023-06-09 PROCEDURE — 250N000011 HC RX IP 250 OP 636: Performed by: ANESTHESIOLOGY

## 2023-06-09 PROCEDURE — 97116 GAIT TRAINING THERAPY: CPT | Mod: GP

## 2023-06-09 PROCEDURE — 710N000010 HC RECOVERY PHASE 1, LEVEL 2, PER MIN: Performed by: SURGERY

## 2023-06-09 PROCEDURE — 360N000078 HC SURGERY LEVEL 5, PER MIN: Performed by: SURGERY

## 2023-06-09 PROCEDURE — 22842 INSERT SPINE FIXATION DEVICE: CPT | Mod: AS | Performed by: NURSE PRACTITIONER

## 2023-06-09 PROCEDURE — 22633 ARTHRD CMBN 1NTRSPC LUMBAR: CPT | Performed by: SURGERY

## 2023-06-09 PROCEDURE — 250N000013 HC RX MED GY IP 250 OP 250 PS 637: Performed by: NURSE PRACTITIONER

## 2023-06-09 PROCEDURE — 999N000141 HC STATISTIC PRE-PROCEDURE NURSING ASSESSMENT: Performed by: SURGERY

## 2023-06-09 PROCEDURE — 22633 ARTHRD CMBN 1NTRSPC LUMBAR: CPT | Mod: AS | Performed by: NURSE PRACTITIONER

## 2023-06-09 PROCEDURE — 250N000009 HC RX 250: Performed by: SURGERY

## 2023-06-09 PROCEDURE — 370N000017 HC ANESTHESIA TECHNICAL FEE, PER MIN: Performed by: SURGERY

## 2023-06-09 PROCEDURE — C1713 ANCHOR/SCREW BN/BN,TIS/BN: HCPCS | Performed by: SURGERY

## 2023-06-09 PROCEDURE — 22842 INSERT SPINE FIXATION DEVICE: CPT | Performed by: SURGERY

## 2023-06-09 PROCEDURE — 0SP004Z REMOVAL OF INTERNAL FIXATION DEVICE FROM LUMBAR VERTEBRAL JOINT, OPEN APPROACH: ICD-10-PCS | Performed by: SURGERY

## 2023-06-09 PROCEDURE — 0SB40ZZ EXCISION OF LUMBOSACRAL DISC, OPEN APPROACH: ICD-10-PCS | Performed by: SURGERY

## 2023-06-09 PROCEDURE — 97161 PT EVAL LOW COMPLEX 20 MIN: CPT | Mod: GP

## 2023-06-09 DEVICE — IMP SCR MEDT 4.75MM SOLERA 6.5X45MM MA 54840006545: Type: IMPLANTABLE DEVICE | Site: SPINE LUMBAR | Status: FUNCTIONAL

## 2023-06-09 DEVICE — IMP SCR MEDT BREAK-OFF SET SOLERA 4.75MM TI 5440030: Type: IMPLANTABLE DEVICE | Site: SPINE LUMBAR | Status: FUNCTIONAL

## 2023-06-09 DEVICE — IMP SCR MEDT 4.75MM SOLERA 6.5X40MM MA 54840006540: Type: IMPLANTABLE DEVICE | Site: SPINE LUMBAR | Status: FUNCTIONAL

## 2023-06-09 DEVICE — IMP SPI INTERBODY MEDT CATALYFT PL SHORT 7MM 6068073: Type: IMPLANTABLE DEVICE | Site: SPINE LUMBAR | Status: FUNCTIONAL

## 2023-06-09 DEVICE — IMP ROD MEDT 8CM 1475501080: Type: IMPLANTABLE DEVICE | Site: SPINE LUMBAR | Status: FUNCTIONAL

## 2023-06-09 DEVICE — IMPLANTABLE DEVICE: Type: IMPLANTABLE DEVICE | Site: SPINE LUMBAR | Status: FUNCTIONAL

## 2023-06-09 RX ORDER — MULTIVITAMIN,THERAPEUTIC
1 TABLET ORAL DAILY
Status: DISCONTINUED | OUTPATIENT
Start: 2023-06-10 | End: 2023-06-11 | Stop reason: HOSPADM

## 2023-06-09 RX ORDER — ENOXAPARIN SODIUM 100 MG/ML
40 INJECTION SUBCUTANEOUS EVERY 24 HOURS
Status: DISCONTINUED | OUTPATIENT
Start: 2023-06-11 | End: 2023-06-11 | Stop reason: HOSPADM

## 2023-06-09 RX ORDER — ATORVASTATIN CALCIUM 40 MG/1
40 TABLET, FILM COATED ORAL DAILY
Status: DISCONTINUED | OUTPATIENT
Start: 2023-06-10 | End: 2023-06-11 | Stop reason: HOSPADM

## 2023-06-09 RX ORDER — ACETAMINOPHEN 325 MG/1
975 TABLET ORAL EVERY 8 HOURS
Status: DISCONTINUED | OUTPATIENT
Start: 2023-06-09 | End: 2023-06-11 | Stop reason: HOSPADM

## 2023-06-09 RX ORDER — NALOXONE HYDROCHLORIDE 0.4 MG/ML
0.4 INJECTION, SOLUTION INTRAMUSCULAR; INTRAVENOUS; SUBCUTANEOUS
Status: DISCONTINUED | OUTPATIENT
Start: 2023-06-09 | End: 2023-06-11 | Stop reason: HOSPADM

## 2023-06-09 RX ORDER — CEFAZOLIN SODIUM/WATER 2 G/20 ML
2 SYRINGE (ML) INTRAVENOUS
Status: COMPLETED | OUTPATIENT
Start: 2023-06-09 | End: 2023-06-09

## 2023-06-09 RX ORDER — PHENYLEPHRINE HCL IN 0.9% NACL 50MG/250ML
PLASTIC BAG, INJECTION (ML) INTRAVENOUS
Status: DISCONTINUED
Start: 2023-06-09 | End: 2023-06-09 | Stop reason: HOSPADM

## 2023-06-09 RX ORDER — FENTANYL CITRATE 50 UG/ML
25-100 INJECTION, SOLUTION INTRAMUSCULAR; INTRAVENOUS
Status: DISCONTINUED | OUTPATIENT
Start: 2023-06-09 | End: 2023-06-09 | Stop reason: HOSPADM

## 2023-06-09 RX ORDER — HYDROMORPHONE HCL IN WATER/PF 6 MG/30 ML
0.2 PATIENT CONTROLLED ANALGESIA SYRINGE INTRAVENOUS
Status: DISCONTINUED | OUTPATIENT
Start: 2023-06-09 | End: 2023-06-11

## 2023-06-09 RX ORDER — SODIUM CHLORIDE, SODIUM LACTATE, POTASSIUM CHLORIDE, CALCIUM CHLORIDE 600; 310; 30; 20 MG/100ML; MG/100ML; MG/100ML; MG/100ML
INJECTION, SOLUTION INTRAVENOUS CONTINUOUS PRN
Status: DISCONTINUED | OUTPATIENT
Start: 2023-06-09 | End: 2023-06-09

## 2023-06-09 RX ORDER — HYDROMORPHONE HCL IN WATER/PF 6 MG/30 ML
0.4 PATIENT CONTROLLED ANALGESIA SYRINGE INTRAVENOUS EVERY 5 MIN PRN
Status: DISCONTINUED | OUTPATIENT
Start: 2023-06-09 | End: 2023-06-09 | Stop reason: HOSPADM

## 2023-06-09 RX ORDER — HYDROMORPHONE HCL IN WATER/PF 6 MG/30 ML
0.2 PATIENT CONTROLLED ANALGESIA SYRINGE INTRAVENOUS EVERY 5 MIN PRN
Status: DISCONTINUED | OUTPATIENT
Start: 2023-06-09 | End: 2023-06-09 | Stop reason: HOSPADM

## 2023-06-09 RX ORDER — POLYETHYLENE GLYCOL 3350 17 G/17G
17 POWDER, FOR SOLUTION ORAL DAILY
Status: DISCONTINUED | OUTPATIENT
Start: 2023-06-10 | End: 2023-06-11 | Stop reason: HOSPADM

## 2023-06-09 RX ORDER — PROCHLORPERAZINE MALEATE 10 MG
10 TABLET ORAL EVERY 6 HOURS PRN
Status: DISCONTINUED | OUTPATIENT
Start: 2023-06-09 | End: 2023-06-11 | Stop reason: HOSPADM

## 2023-06-09 RX ORDER — HYDROMORPHONE HYDROCHLORIDE 2 MG/1
2 TABLET ORAL EVERY 4 HOURS PRN
Status: DISCONTINUED | OUTPATIENT
Start: 2023-06-09 | End: 2023-06-11 | Stop reason: HOSPADM

## 2023-06-09 RX ORDER — HYDROMORPHONE HCL IN WATER/PF 6 MG/30 ML
0.4 PATIENT CONTROLLED ANALGESIA SYRINGE INTRAVENOUS
Status: DISCONTINUED | OUTPATIENT
Start: 2023-06-09 | End: 2023-06-11

## 2023-06-09 RX ORDER — ACETAMINOPHEN 325 MG/1
975 TABLET ORAL ONCE
Status: COMPLETED | OUTPATIENT
Start: 2023-06-09 | End: 2023-06-09

## 2023-06-09 RX ORDER — NALOXONE HYDROCHLORIDE 0.4 MG/ML
0.2 INJECTION, SOLUTION INTRAMUSCULAR; INTRAVENOUS; SUBCUTANEOUS
Status: DISCONTINUED | OUTPATIENT
Start: 2023-06-09 | End: 2023-06-11 | Stop reason: HOSPADM

## 2023-06-09 RX ORDER — FENTANYL CITRATE 50 UG/ML
50 INJECTION, SOLUTION INTRAMUSCULAR; INTRAVENOUS EVERY 5 MIN PRN
Status: DISCONTINUED | OUTPATIENT
Start: 2023-06-09 | End: 2023-06-09 | Stop reason: HOSPADM

## 2023-06-09 RX ORDER — BUPIVACAINE HYDROCHLORIDE AND EPINEPHRINE 5; 5 MG/ML; UG/ML
INJECTION, SOLUTION EPIDURAL; INTRACAUDAL; PERINEURAL PRN
Status: DISCONTINUED | OUTPATIENT
Start: 2023-06-09 | End: 2023-06-09 | Stop reason: HOSPADM

## 2023-06-09 RX ORDER — LORATADINE 10 MG/1
10 TABLET ORAL DAILY PRN
Status: DISCONTINUED | OUTPATIENT
Start: 2023-06-09 | End: 2023-06-11 | Stop reason: HOSPADM

## 2023-06-09 RX ORDER — NITROGLYCERIN 0.4 MG/1
0.4 TABLET SUBLINGUAL EVERY 5 MIN PRN
Status: DISCONTINUED | OUTPATIENT
Start: 2023-06-09 | End: 2023-06-11 | Stop reason: HOSPADM

## 2023-06-09 RX ORDER — ACETAMINOPHEN 325 MG/1
650 TABLET ORAL EVERY 4 HOURS PRN
Status: DISCONTINUED | OUTPATIENT
Start: 2023-06-12 | End: 2023-06-11 | Stop reason: HOSPADM

## 2023-06-09 RX ORDER — PROPOFOL 10 MG/ML
INJECTION, EMULSION INTRAVENOUS PRN
Status: DISCONTINUED | OUTPATIENT
Start: 2023-06-09 | End: 2023-06-09

## 2023-06-09 RX ORDER — BISACODYL 10 MG
10 SUPPOSITORY, RECTAL RECTAL DAILY PRN
Status: DISCONTINUED | OUTPATIENT
Start: 2023-06-09 | End: 2023-06-11 | Stop reason: HOSPADM

## 2023-06-09 RX ORDER — PANTOPRAZOLE SODIUM 40 MG/1
40 TABLET, DELAYED RELEASE ORAL 2 TIMES DAILY
Status: DISCONTINUED | OUTPATIENT
Start: 2023-06-09 | End: 2023-06-10

## 2023-06-09 RX ORDER — CEFAZOLIN SODIUM/WATER 2 G/20 ML
2 SYRINGE (ML) INTRAVENOUS SEE ADMIN INSTRUCTIONS
Status: DISCONTINUED | OUTPATIENT
Start: 2023-06-09 | End: 2023-06-09 | Stop reason: HOSPADM

## 2023-06-09 RX ORDER — ONDANSETRON 4 MG/1
4 TABLET, ORALLY DISINTEGRATING ORAL EVERY 30 MIN PRN
Status: DISCONTINUED | OUTPATIENT
Start: 2023-06-09 | End: 2023-06-09 | Stop reason: HOSPADM

## 2023-06-09 RX ORDER — KETAMINE HYDROCHLORIDE 10 MG/ML
INJECTION INTRAMUSCULAR; INTRAVENOUS PRN
Status: DISCONTINUED | OUTPATIENT
Start: 2023-06-09 | End: 2023-06-09

## 2023-06-09 RX ORDER — ONDANSETRON 2 MG/ML
4 INJECTION INTRAMUSCULAR; INTRAVENOUS EVERY 30 MIN PRN
Status: DISCONTINUED | OUTPATIENT
Start: 2023-06-09 | End: 2023-06-09 | Stop reason: HOSPADM

## 2023-06-09 RX ORDER — VITAMIN B COMPLEX
25 TABLET ORAL DAILY
Status: DISCONTINUED | OUTPATIENT
Start: 2023-06-10 | End: 2023-06-11 | Stop reason: HOSPADM

## 2023-06-09 RX ORDER — AMOXICILLIN 250 MG
1 CAPSULE ORAL 2 TIMES DAILY
Status: DISCONTINUED | OUTPATIENT
Start: 2023-06-09 | End: 2023-06-11 | Stop reason: HOSPADM

## 2023-06-09 RX ORDER — METHOCARBAMOL 750 MG/1
750 TABLET, FILM COATED ORAL 4 TIMES DAILY
Status: DISCONTINUED | OUTPATIENT
Start: 2023-06-09 | End: 2023-06-11

## 2023-06-09 RX ORDER — METHOCARBAMOL 750 MG/1
750 TABLET, FILM COATED ORAL EVERY 6 HOURS PRN
Status: DISCONTINUED | OUTPATIENT
Start: 2023-06-09 | End: 2023-06-09

## 2023-06-09 RX ORDER — BUPROPION HYDROCHLORIDE 150 MG/1
150 TABLET, EXTENDED RELEASE ORAL 2 TIMES DAILY
Status: DISCONTINUED | OUTPATIENT
Start: 2023-06-09 | End: 2023-06-11 | Stop reason: HOSPADM

## 2023-06-09 RX ORDER — METOPROLOL SUCCINATE 50 MG/1
12.5 TABLET, EXTENDED RELEASE ORAL DAILY
COMMUNITY

## 2023-06-09 RX ORDER — PROPOFOL 10 MG/ML
INJECTION, EMULSION INTRAVENOUS
Status: DISCONTINUED
Start: 2023-06-09 | End: 2023-06-09 | Stop reason: HOSPADM

## 2023-06-09 RX ORDER — FENTANYL CITRATE 50 UG/ML
25 INJECTION, SOLUTION INTRAMUSCULAR; INTRAVENOUS EVERY 5 MIN PRN
Status: DISCONTINUED | OUTPATIENT
Start: 2023-06-09 | End: 2023-06-09 | Stop reason: HOSPADM

## 2023-06-09 RX ORDER — LIDOCAINE 40 MG/G
CREAM TOPICAL
Status: DISCONTINUED | OUTPATIENT
Start: 2023-06-09 | End: 2023-06-09 | Stop reason: HOSPADM

## 2023-06-09 RX ORDER — DEXAMETHASONE SODIUM PHOSPHATE 10 MG/ML
INJECTION, SOLUTION INTRAMUSCULAR; INTRAVENOUS PRN
Status: DISCONTINUED | OUTPATIENT
Start: 2023-06-09 | End: 2023-06-09

## 2023-06-09 RX ORDER — HYDROMORPHONE HYDROCHLORIDE 4 MG/1
4 TABLET ORAL EVERY 4 HOURS PRN
Status: DISCONTINUED | OUTPATIENT
Start: 2023-06-09 | End: 2023-06-11 | Stop reason: HOSPADM

## 2023-06-09 RX ORDER — CELECOXIB 100 MG/1
200 CAPSULE ORAL DAILY
Status: ON HOLD | COMMUNITY
End: 2023-06-11

## 2023-06-09 RX ORDER — SODIUM CHLORIDE, SODIUM LACTATE, POTASSIUM CHLORIDE, CALCIUM CHLORIDE 600; 310; 30; 20 MG/100ML; MG/100ML; MG/100ML; MG/100ML
INJECTION, SOLUTION INTRAVENOUS CONTINUOUS
Status: DISCONTINUED | OUTPATIENT
Start: 2023-06-09 | End: 2023-06-09 | Stop reason: HOSPADM

## 2023-06-09 RX ORDER — HYDROXYZINE HYDROCHLORIDE 25 MG/1
25 TABLET, FILM COATED ORAL EVERY 6 HOURS PRN
Status: DISCONTINUED | OUTPATIENT
Start: 2023-06-09 | End: 2023-06-11 | Stop reason: HOSPADM

## 2023-06-09 RX ORDER — MAGNESIUM SULFATE 4 G/50ML
4 INJECTION INTRAVENOUS ONCE
Status: COMPLETED | OUTPATIENT
Start: 2023-06-09 | End: 2023-06-09

## 2023-06-09 RX ADMIN — HYDROMORPHONE HYDROCHLORIDE 0.4 MG: 0.2 INJECTION, SOLUTION INTRAMUSCULAR; INTRAVENOUS; SUBCUTANEOUS at 20:03

## 2023-06-09 RX ADMIN — HYDROMORPHONE HYDROCHLORIDE 4 MG: 4 TABLET ORAL at 22:22

## 2023-06-09 RX ADMIN — ACETAMINOPHEN 975 MG: 325 TABLET ORAL at 23:50

## 2023-06-09 RX ADMIN — PHENYLEPHRINE HYDROCHLORIDE 0.5 MCG/KG/MIN: 10 INJECTION INTRAVENOUS at 08:12

## 2023-06-09 RX ADMIN — KETAMINE HYDROCHLORIDE 25 MG: 10 INJECTION, SOLUTION INTRAMUSCULAR; INTRAVENOUS at 10:57

## 2023-06-09 RX ADMIN — Medication 2 G: at 07:25

## 2023-06-09 RX ADMIN — SODIUM CHLORIDE, POTASSIUM CHLORIDE, SODIUM LACTATE AND CALCIUM CHLORIDE: 600; 310; 30; 20 INJECTION, SOLUTION INTRAVENOUS at 08:36

## 2023-06-09 RX ADMIN — ONDANSETRON 4 MG: 2 INJECTION INTRAMUSCULAR; INTRAVENOUS at 11:59

## 2023-06-09 RX ADMIN — DEXAMETHASONE SODIUM PHOSPHATE 10 MG: 10 INJECTION, SOLUTION INTRAMUSCULAR; INTRAVENOUS at 07:38

## 2023-06-09 RX ADMIN — PANTOPRAZOLE SODIUM 40 MG: 40 TABLET, DELAYED RELEASE ORAL at 20:05

## 2023-06-09 RX ADMIN — METHOCARBAMOL 750 MG: 750 TABLET ORAL at 20:06

## 2023-06-09 RX ADMIN — ACETAMINOPHEN 975 MG: 325 TABLET ORAL at 16:19

## 2023-06-09 RX ADMIN — SENNOSIDES AND DOCUSATE SODIUM 1 TABLET: 50; 8.6 TABLET ORAL at 20:05

## 2023-06-09 RX ADMIN — MAGNESIUM SULFATE HEPTAHYDRATE 4 G: 4 INJECTION, SOLUTION INTRAVENOUS at 07:19

## 2023-06-09 RX ADMIN — BUPROPION HYDROCHLORIDE 150 MG: 150 TABLET, FILM COATED, EXTENDED RELEASE ORAL at 20:06

## 2023-06-09 RX ADMIN — HYDROMORPHONE HYDROCHLORIDE 0.4 MG: 0.2 INJECTION, SOLUTION INTRAMUSCULAR; INTRAVENOUS; SUBCUTANEOUS at 17:43

## 2023-06-09 RX ADMIN — SODIUM CHLORIDE, POTASSIUM CHLORIDE, SODIUM LACTATE AND CALCIUM CHLORIDE: 600; 310; 30; 20 INJECTION, SOLUTION INTRAVENOUS at 08:02

## 2023-06-09 RX ADMIN — PROPOFOL 100 MCG/KG/MIN: 10 INJECTION, EMULSION INTRAVENOUS at 08:12

## 2023-06-09 RX ADMIN — HYDROMORPHONE HYDROCHLORIDE 4 MG: 4 TABLET ORAL at 16:19

## 2023-06-09 RX ADMIN — HYDROMORPHONE HYDROCHLORIDE 0.5 MG: 1 INJECTION, SOLUTION INTRAMUSCULAR; INTRAVENOUS; SUBCUTANEOUS at 11:49

## 2023-06-09 RX ADMIN — METHOCARBAMOL 750 MG: 750 TABLET ORAL at 17:42

## 2023-06-09 RX ADMIN — MIDAZOLAM 2 MG: 1 INJECTION INTRAMUSCULAR; INTRAVENOUS at 07:25

## 2023-06-09 RX ADMIN — SODIUM CHLORIDE, POTASSIUM CHLORIDE, SODIUM LACTATE AND CALCIUM CHLORIDE: 600; 310; 30; 20 INJECTION, SOLUTION INTRAVENOUS at 06:52

## 2023-06-09 RX ADMIN — PROPOFOL 150 MG: 10 INJECTION, EMULSION INTRAVENOUS at 07:38

## 2023-06-09 RX ADMIN — LIDOCAINE HYDROCHLORIDE 20 MG: 10 INJECTION, SOLUTION INFILTRATION; PERINEURAL at 07:38

## 2023-06-09 RX ADMIN — HYDROMORPHONE HYDROCHLORIDE 0.5 MG: 1 INJECTION, SOLUTION INTRAMUSCULAR; INTRAVENOUS; SUBCUTANEOUS at 11:27

## 2023-06-09 RX ADMIN — FENTANYL CITRATE 100 MCG: 50 INJECTION, SOLUTION INTRAMUSCULAR; INTRAVENOUS at 07:38

## 2023-06-09 RX ADMIN — SUGAMMADEX 200 MG: 100 INJECTION, SOLUTION INTRAVENOUS at 11:22

## 2023-06-09 RX ADMIN — ACETAMINOPHEN 975 MG: 325 TABLET ORAL at 07:06

## 2023-06-09 RX ADMIN — Medication 2 G: at 11:17

## 2023-06-09 RX ADMIN — ROCURONIUM BROMIDE 70 MG: 10 INJECTION, SOLUTION INTRAVENOUS at 07:38

## 2023-06-09 RX ADMIN — HYDROMORPHONE HYDROCHLORIDE 0.4 MG: 0.2 INJECTION, SOLUTION INTRAMUSCULAR; INTRAVENOUS; SUBCUTANEOUS at 13:05

## 2023-06-09 RX ADMIN — HYDROXYZINE HYDROCHLORIDE 25 MG: 25 TABLET ORAL at 22:22

## 2023-06-09 RX ADMIN — PHENYLEPHRINE HYDROCHLORIDE 200 MCG: 10 INJECTION INTRAVENOUS at 08:02

## 2023-06-09 ASSESSMENT — ACTIVITIES OF DAILY LIVING (ADL)
ADLS_ACUITY_SCORE: 36
ADLS_ACUITY_SCORE: 37
ADLS_ACUITY_SCORE: 36
ADLS_ACUITY_SCORE: 37
ADLS_ACUITY_SCORE: 36
ADLS_ACUITY_SCORE: 37
ADLS_ACUITY_SCORE: 36

## 2023-06-09 ASSESSMENT — ENCOUNTER SYMPTOMS: SEIZURES: 0

## 2023-06-09 NOTE — PROGRESS NOTES
06/09/23 1500   Appointment Info   Signing Clinician's Name / Credentials (PT) Kyung Rivas, PT, DPT   Living Environment   People in Home other (see comments)  (Patient's brother will be helping her at home.)   Current Living Arrangements house   Home Accessibility stairs to enter home   Number of Stairs, Main Entrance 2   Stair Railings, Main Entrance railings safe and in good condition   Transportation Anticipated family or friend will provide   Living Environment Comments All needs met on one level.   Self-Care   Usual Activity Tolerance good   Current Activity Tolerance moderate   Equipment Currently Used at Home none  (Patient has a FWW to use at home.)   Fall history within last six months no   Activity/Exercise/Self-Care Comment Patient is independent at baseline.   General Information   Onset of Illness/Injury or Date of Surgery 06/09/23   Referring Physician Zahra Erazo APRN CNP   Patient/Family Therapy Goals Statement (PT) Return home   Pertinent History of Current Problem (include personal factors and/or comorbidities that impact the POC) s/p lumbar fusion   Existing Precautions/Restrictions (S)  spinal   Weight-Bearing Status - LLE weight-bearing as tolerated   Weight-Bearing Status - RLE weight-bearing as tolerated   Posture    Posture Not impaired   Range of Motion (ROM)   Range of Motion ROM is WFL   Strength (Manual Muscle Testing)   Strength (Manual Muscle Testing) strength is WFL   Bed Mobility   Comment, (Bed Mobility) Patient encountered in chair.   Transfers   Transfers sit-stand transfer   Maintains Weight-bearing Status (Transfers) able to maintain   Impairments Contributing to Impaired Transfers impaired balance;pain   Sit-Stand Transfer   Sit-Stand [place] (Transfers) modified independence   Assistive Device (Sit-Stand Transfers) walker, front-wheeled   Gait/Stairs (Locomotion)   [place] Level (Gait) supervision   Assistive Device (Gait) walker, front-wheeled    Distance in Feet (Gait) 350'   Pattern (Gait) step-through   Deviations/Abnormal Patterns (Gait) cathy decreased   Maintains Weight-bearing Status (Gait) able to maintain   Clinical Impression   Criteria for Skilled Therapeutic Intervention Yes, treatment indicated   PT Diagnosis (PT) Impaired functional mobility   Influenced by the following impairments Impaired balance, pain, spinal precautions   Functional limitations due to impairments Gait, stairs   Clinical Presentation (PT Evaluation Complexity) Stable/Uncomplicated   Clinical Presentation Rationale Patient presents as medically diagnosed.   Clinical Decision Making (Complexity) low complexity   Planned Therapy Interventions (PT) balance training;gait training;home exercise program;patient/family education;stair training;home program guidelines   Anticipated Equipment Needs at Discharge (PT) walker, rolling   Risk & Benefits of therapy have been explained evaluation/treatment results reviewed;care plan/treatment goals reviewed;patient   PT Total Evaluation Time   PT Eval, Low Complexity Minutes (60915) 15   Plan of Care Review   Plan of Care Reviewed With patient   Physical Therapy Goals   PT Frequency Daily   PT Predicted Duration/Target Date for Goal Attainment 06/16/23   PT Goals Gait;Stairs   PT: Gait Modified independent;Assistive device;Greater than 200 feet  (FWW)   PT: Stairs Modified independent;2 stairs;Rail on left;Rail on right   Interventions   Interventions Quick Adds Gait Training   Gait Training   Gait Training Minutes (86733) 10   Symptoms Noted During/After Treatment (Gait Training) increased pain   Treatment Detail/Skilled Intervention Patient ambulated 350' with supervision and FWW. Verbal cues for posture. No adverse signs or symptoms.   [place] Level (Gait Training) stand-by assist   Physical Assistance Level (Gait Training) supervision;verbal cues   Weight Bearing (Gait Training) weight-bearing as tolerated   Assistive Device  (Gait Training) rolling walker  (FWW)   Gait Analysis Deviations decreased cathy;decreased step length   Impairments (Gait Analysis/Training) balance impaired;strength decreased   PT Discharge Planning   PT Plan Gait, stairs, spinal HEP   PT Discharge Recommendation (DC Rec) (S)  home with assist   PT Rationale for DC Rec Patient is ambulating safely. Patient will have assist from family as needed.   PT Brief overview of current status Patient ambulated 350' with supervision and FWW.   Total Session Time   Timed Code Treatment Minutes 10   Total Session Time (sum of timed and untimed services) 25

## 2023-06-09 NOTE — BRIEF OP NOTE
[facility] Brief Operative Note    Pre-operative diagnosis: Lumbar radiculopathy [M54.16]   Post-operative diagnosis same   Procedure: Procedure(s):  Exploration of prior lumbar 3- lumbar 5 instrumented fusion with removal and replacement of pedicle screws and extension to sacral 1. Lumbar 5-sacral 1 left transforaminal lumbar interbody fusion and posterolateral arthrodesis with use of stealth navigation   Surgeon(s): Surgeon(s) and Role:     * Suha Kent MD - Primary   Estimated blood loss: 250 cc    Specimens: * No specimens in log *   Findings: Post op ct showed adequate hardware placement    Implant Name Type Inv. Item Serial No.  Lot No. LRB No. Used Action   IMP SCR MEDT BREAK-OFF SET SOLERA 4.75MM TI [id number] - [id number] Metallic Hardware/[place] IMP SCR MEDT BREAK-OFF SET SOLERA 4.75MM TI [id number]  [employer] NA  8 Implanted   IMP SCR MEDT 4.75MM SOLERA 6.5X45MM MA [id number] - [id number] Metallic Hardware/[place] IMP SCR MEDT 4.75MM SOLERA 6.5X45MM MA [id number]  [employer] NA  6 Implanted   IMP SCR MEDT 4.75MM SOLERA 6.5X40MM MA [id number] - [id number] Metallic Hardware/[place] IMP SCR MEDT 4.75MM SOLERA 6.5X40MM MA [id number]  [employer] NA  2 Implanted   GRAFT DBM ORTHOBLEND LG DEFECT 5CC - [id number] Bone/Tissue/Biologic GRAFT DBM ORTHOBLEND LG DEFECT 5CC [id number] [employer]  N/A 1 Implanted   IMP SPI INTERBODY MEDT CATALYFT PL SHORT 7MM [id number] - [id number] Metallic Hardware/[place] IMP SPI INTERBODY MEDT CATALYFT PL SHORT 7MM [id number]  [employer] [id number] N/A 1 Implanted   IMP JOSEFINA MEDT 8CM [id number] - [id number] Metallic Hardware/[place] IMP JOSEFINA MEDT 8CM [id number]  [employer] NA N/A 2 Implanted

## 2023-06-09 NOTE — CONSULTS
"ACUPUNCTURIST TREATMENT NOTE    Name: Selina Parikh  :  1965  MRN:  [id number]    Acupuncture Treatment  Patient Type: Orthopedic  Intervention Reason: Pain  Pain Location: Low back  Pre-session Pain Ratin  Post-session Pain Ratin  Patient complaint:: Low back pain, smoking cessation support  Initial insertions: Yin glover, Anthony 17, Li 4, 10, 11, Kait 7, Seda 3, Sp 10  Number of needles inserted: 14  Number of needles removed: 14         \"Risks and benefits of acupuncture were discussed with patient. Consent for treatment was given. We thank you for the referral.\"     Antnoio Ferreira    Date:  2023  Time:  3:45 PM    "

## 2023-06-09 NOTE — INTERVAL H&P NOTE
"I have reviewed the surgical (or preoperative) H&P that is linked to this encounter, and examined the patient. There are no significant changes    Clinical Conditions Present on Arrival:  Clinically Significant Risk Factors Present on Admission                 # Drug Induced Platelet Defect: home medication list includes an antiplatelet medication  # Obesity: Estimated body mass index is 32.31 kg/m  as calculated from the following:    Height as of 6/1/23: 4' 11.02\" (1.499 m).    Weight as of 6/1/23: 160 lb 1.6 oz (72.6 kg).       "

## 2023-06-09 NOTE — PROGRESS NOTES
S: Pt. seen in the [facility] office. Female. Castro VINCENT. RX: Corset brace.. DX: P/O fusion.  O:A: Today I F/D an Aspen Quikdraw RAP LSO size Medium to support surgical site. Donning doffing wear and care instructions given.  P: Pt. to be seen as needed.    Dawson MOTT,BLOSSOM

## 2023-06-09 NOTE — ANESTHESIA PREPROCEDURE EVALUATION
Anesthesia Pre-Procedure Evaluation    Patient: Selina Parikh   MRN: [id number] : 1965        Procedure : Procedure(s):  Exploration of prior lumbar 3- lumbar 5 instrumented fusion with extension to sacral 1. Lumbar 5-sacral 1 transforaminal lumbar interbody fusion and posterolateral arthrodesis with use of stealth navigation and bilateral laminectomies, medial facetectomies and foraminotomies          Past Medical History:   Diagnosis Date     Anemia      Antiplatelet or antithrombotic long-term use      Asymptomatic stenosis of left carotid artery      Cancer (H)     melanoma     Coronary artery disease      Hiatal hernia      Hypertension      Irritable bowel syndrome      Other chronic pain      Stented coronary artery       Past Surgical History:   Procedure Laterality Date     BACK SURGERY       CHOLECYSTECTOMY, LAPOROSCOPIC  2000    Cholecystectomy, Laparoscopic     COLON SURGERY       CV CORONARY ANGIOGRAM N/A 2022    Procedure: CV CORONARY ANGIOGRAM;  Surgeon: Stefanie Spangler MD;  Location: [facility] CATH [facility] CV     CV LEFT HEART CATH N/A 2022    Procedure: Left Heart Catheterization;  Surgeon: Stefanie Spangler MD;  Location: [facility] CATH LAB CV     CYSTOSCOPY, INSERT LIGHTED STENT URETER(S) N/A 04/10/2018    Procedure: CYSTOSCOPY, INSERT LIGHTED STENT URETER(S);  Lighted Stent Placement,Laparoscopic Assisted Sigmoid Colectomy;  Surgeon: ERVIN Reina MD;  Location: [facility] OR     ENDOVASCULAR CAROTID STENT PLACEMENT Left 2023    Procedure: Left transcarotid revascularization;  Surgeon: Eveline Manley MD;  Location: [facility] OR     IR TCAR TRANSCAROTID ARTERY REVASCULARIZATION  2023     LAPAROSCOPIC ASSISTED COLECTOMY LEFT (DESCENDING) N/A 04/10/2018    Procedure: LAPAROSCOPIC ASSISTED COLECTOMY LEFT (DESCENDING);;  Surgeon: Hill Murray MD;  Location: [facility] OR     LUMBAR DISCECTOMY Left 2022    Procedure: left thoracic 12-lumbar 1 hemilaminectomy,  medial facetectomy, foraminotomy and microdiscectomy;  Surgeon: Suha Kent MD;  Location: [facility]     NECK SURGERY       ORTHOPEDIC SURGERY  10/01/2010    Cut palm and reattched tendons and nerves in left hand forefinger.     KS ESOPHAGOGASTRODUODENOSCOPY TRANSORAL DIAGNOSTIC N/A 2021    Procedure: ESOPHAGOGASTRODUODENOSCOPY (EGD);  Surgeon: Souleymane Moreno DO;  Location: [facility];  Service: General     SURGICAL HISTORY OF -   2000    Esophagogastroduodenoscopy with biopsy     TUBAL LIGATION        Allergies   Allergen Reactions     Ciprofloxacin Anaphylaxis     Flagyl [Metronidazole] Anaphylaxis     Gabapentin Other (See Comments)     Suicidal thoughts.     Zofran [Ondansetron] Other (See Comments) and GI Disturbance     Causes bloating, moderately uncomfortable, abd pain       Benadryl [Diphenhydramine] Other (See Comments)     Muscle spasms     Percocet [Oxycodone-Acetaminophen] Other (See Comments) and Headache     Goes nuts - nasty mean irritated, can take Dilaudid     Vicodin [Hydrocodone-Acetaminophen] Other (See Comments)     Anxiety-agitation      Social History     Tobacco Use     Smoking status: Former     Packs/day: 0.05     Years: 30.00     Pack years: 1.50     Types: Cigarettes     Quit date: 2023     Years since quittin.0     Smokeless tobacco: Never     Tobacco comments:     3 Cigs a day   Vaping Use     Vaping status: Never Used   Substance Use Topics     Alcohol use: Not Currently     Comment: Alcoholic Drinks/day: 2x year       Wt Readings from Last 1 Encounters:   23 72.6 kg (160 lb 1.6 oz)        Anesthesia Evaluation            ROS/MED HX  ENT/Pulmonary:  - neg pulmonary ROS  (-) sleep apnea   Neurologic:  - neg neurologic ROS  (-) no seizures and no CVA   Cardiovascular:     (+) hypertension-Peripheral Vascular Disease-- Carotid Stenosis. CAD ---Taking blood thinners     METS/Exercise Tolerance:     Hematologic:  - neg hematologic  ROS      Musculoskeletal:       GI/Hepatic:     (+) GERD, esophageal disease,     Renal/Genitourinary:  - neg Renal ROS     Endo:     (+) Obesity,     Psychiatric/Substance Use:  - neg psychiatric ROS     Infectious Disease:  - neg infectious disease ROS     Malignancy:       Other:      (+) , H/O Chronic Pain,        Physical Exam    Airway  airway exam normal      Mallampati: II   TM distance: > 3 FB   Neck ROM: limited   Mouth opening: > 3 cm    Respiratory Devices and Support         Dental           Cardiovascular   cardiovascular exam normal       Rhythm and rate: regular and normal     Pulmonary   pulmonary exam normal        breath sounds clear to auscultation           OUTSIDE LABS:  CBC:   Lab Results   Component Value Date    WBC 5.7 06/08/2023    WBC 7.2 05/18/2023    HGB 13.5 06/08/2023    HGB 13.5 05/18/2023    HCT 41.9 06/08/2023    HCT 40.8 05/18/2023     06/08/2023     05/18/2023     BMP:   Lab Results   Component Value Date     06/08/2023     05/18/2023    POTASSIUM 4.6 06/08/2023    POTASSIUM 4.4 05/18/2023    CHLORIDE 106 06/08/2023    CHLORIDE 106 05/18/2023    CO2 25 06/08/2023    CO2 25 05/18/2023    BUN 13.6 06/08/2023    BUN 13.7 05/18/2023    CR 0.86 06/08/2023    CR 0.75 05/18/2023    GLC 93 06/08/2023    GLC 87 05/18/2023     COAGS:   Lab Results   Component Value Date    PTT 29 06/08/2023    INR 0.99 06/08/2023     POC:   Lab Results   Component Value Date     (H) 04/14/2018    HCG Negative 06/16/2012     HEPATIC:   Lab Results   Component Value Date    ALBUMIN 4.5 09/02/2022    PROTTOTAL 6.9 09/02/2022    ALT 12 09/02/2022    AST 22 09/02/2022    ALKPHOS 78 09/02/2022    BILITOTAL 0.2 09/02/2022    BILIDIRECT 0.2 03/25/2013     OTHER:   Lab Results   Component Value Date    LACT 0.5 (L) 01/28/2018    A1C 5.6 04/28/2022    MAXWELL 9.9 06/08/2023    PHOS 3.6 04/14/2018    MAG 2.0 04/14/2018    LIPASE 113 01/25/2020    TSH 0.95 11/29/2011    T4 1.06 11/29/2011    CRP  0.6 09/25/2022    SED 9 11/01/2019       Anesthesia Plan    ASA Status:  3      Anesthesia Type: General.     - Airway: ETT         Techniques and Equipment:     - Airway: Video-Laryngoscope     - Lines/Monitors: 2nd IV     Consents    Anesthesia Plan(s) and associated risks, benefits, and realistic alternatives discussed. Questions answered and patient/representative(s) expressed understanding.    - Discussed:     - Discussed with:  Patient         Postoperative Care    Pain management: Multi-modal analgesia.   PONV prophylaxis: Ondansetron (or other 5HT-3), Dexamethasone or Solumedrol     Comments:                Pepe Burrell MD

## 2023-06-09 NOTE — ANESTHESIA CARE TRANSFER NOTE
Patient: Selina Parikh    Procedure: Procedure(s):  Exploration of prior lumbar 3- lumbar 5 instrumented fusion with removal and replacement of pedicle screws and extension to sacral 1. Lumbar 3-sacral 1  Left transforaminal lumbar interbody fusion and posterolateral arthrodesis with use of stealth navigation       Diagnosis: Lumbar radiculopathy [M54.16]  Diagnosis Additional Information: No value filed.    Anesthesia Type:   General     Note:    Oropharynx: oropharynx clear of all foreign objects  Level of Consciousness: drowsy    Level of Supplemental Oxygen (L/min / FiO2): 8  Independent Airway: airway patency satisfactory and stable  Dentition: dentition unchanged  Vital Signs Stable: post-procedure vital signs reviewed and stable  Report to RN Given: handoff report given  Patient transferred to: PACU  Comments: Patient vomited yellow bile in oropharynx after turned from OR table to cart. Oropharynx suctioned well, patient opening eyes and following commands, extubated awake, noted small amt. yellow bile in ETT upon removal. No further vomiting after ETT removed  Handoff Report: Identifed the Patient, Identified the Reponsible Provider, Reviewed the pertinent medical history, Discussed the surgical course, Reviewed Intra-OP anesthesia mangement and issues during anesthesia, Set expectations for post-procedure period and Allowed opportunity for questions and acknowledgement of understanding      Vitals:  Vitals Value Taken Time   /55 06/09/23 1140   Temp 36.3  C (97.4  F) 06/09/23 1140   Pulse 79 06/09/23 1144   Resp 27 06/09/23 1144   SpO2 100 % 06/09/23 1144   Vitals shown include unvalidated device data.    Electronically Signed By: CARLTON NESBITT CRNA  June 9, 2023  11:45 AM

## 2023-06-09 NOTE — CONSULTS
[facility] ACUTE PAIN SERVICE CONSULTATION     Date of Admission:  6/9/2023  Date of Consult (When I saw the patient): 06/09/23  Physician requesting consult: Zahra VINCENT CNP  Reason for consult: postop lumbar fusion, hx severe difficulty postop pain control     Assessment/Plan:     Selina Parikh is a 57 year old female who was admitted on 6/9/2023.  Pain team was asked to see the patient for postop lumbar fusion, hx severe difficulty postop pain control. Admitted for planned procedure- exploration prior lumbar fusion and screw replacement. History of right occipital neuralgia, Coronary artery disease, HTN, tobacco abuse on Bupropion 12 hour tablet 150 mg BID, and history of IBS and partial colectomy.  Describes pain as 2-4/10 and aching in low back. Prior LLE pain pre op is now resolved post op. The patient denies nausea, vomiting, constipation, diarrhea, chest pain, shortness of breath. Diet is regular. The patient is a former smoker and denies chemical dependency history.     Post op day: Day of Surgery. Exploration of prior lumbar 3- lumbar 5 instrumented fusion with removal and replacement of pedicle screws and extension to sacral 1. Lumbar 3-sacral 1  Left transforaminal lumbar interbody fusion and posterolateral arthrodesis with use of stealth navigation    Past pain treatments have included: Gabapentin (suicide ideation), Baclofen (causes light headedness), hydromorphone, meloxicam, methocarbamol, and tramadol     Home pain medications/psych medications/anticoagulation medications include: APAP, Bupropion (tobacco abuse), and celecoxib    Opioid Induced Respiratory Depression Risk Assessment:?  Moderate due to the following risk factors: Concomitant CNS depressants, opioid naive status, or post surgical ?     PLAN:   1) Pain is consistent with post op pain. Labs and imaging indicated: I have personally reviewed pertinent notes, labs, tests, and radiologic imaging in patient's chart.  Treatment plan includes multimodal pain approach, Hospital Medicine Service for medical management, NRS. Patient educated regarding multimodal pain approach, medications as listed below. Patient is understanding of the plan. All questions and concerns addressed to patient's satisfaction.   2)Multimodal Medication Therapy  Topical: None  NSAID'S: CrCl 82.7 mL/min. None post op fusion. Does take celebrex at home - defer NRS on use post op   Steroids: None  Muscle Relaxants: Methocarbamol 750 mg q6h prn - schedule this   Adjuvants: APAP 975 mg q8h +PRN, hydroxyzine 25 mg q6h prn adjuvant pain  Antidepressants/anxiolytics: Bupropion  mg BID (PTA-Tobacco abuse)  Opioids: Hydromorphone PO 2-4 mg q4h prn  IV Pain medication: Hydromorphone IV 0.2-0.4 mg q2h prn  3)Non-medication interventions: ice, rest, pt ot   Acupuncture consult - offered and agreeable   Integrative consult - offered and declined  4)Constipation Prophylaxis: Scheduled and prn: Miralax daily and senna/docusate BID  5) Care Teams: Hospitalist, Pain management, PT/OT    -Opioid prescriber has been Sandra Rivas  -[place]  pulled from system on 06/09/23. This indicates no recent fills; Last fill 10/18/22 hydromorphone 2 mg #28 (7 day supply), 09/25/22 diazepam 5 mg #17 (5 day supply)    Discharge Recommendations - We recommend prescribing the following at the time of discharge: TBD     History of Present Illness (HPI):       Selina Parikh is a 57 year old female who presented for planned procedure.  Past medical history as above. The pain is reported to be acute post op pain, chronic back pain, located in the low back, and it does radiate to LLE.  Current pain is rated at 2-4/10 and goal is 0-2/10.      Per [place]  review, the patient does not have an opioid tolerance. Opioid induced side effects noted and include: none.      Reviewed medical record, labs, imaging, ED note, and care everywhere.     Medical History   PAST MEDICAL HISTORY:   Past Medical  History:   Diagnosis Date     Anemia      Antiplatelet or antithrombotic long-term use      Asymptomatic stenosis of left carotid artery      Cancer (H)     melanoma     Coronary artery disease      Hiatal hernia      Hypertension      Irritable bowel syndrome      Other chronic pain      Stented coronary artery        PAST SURGICAL HISTORY:   Past Surgical History:   Procedure Laterality Date     BACK SURGERY       CHOLECYSTECTOMY, LAPOROSCOPIC  02/14/2000    Cholecystectomy, Laparoscopic     COLON SURGERY       CV CORONARY ANGIOGRAM N/A 05/06/2022    Procedure: CV CORONARY ANGIOGRAM;  Surgeon: Stefanie Spangler MD;  Location: [facility] CV     CV LEFT HEART CATH N/A 05/06/2022    Procedure: Left Heart Catheterization;  Surgeon: Stefanie Spangler MD;  Location: [facility] CV     CYSTOSCOPY, INSERT LIGHTED STENT URETER(S) N/A 04/10/2018    Procedure: CYSTOSCOPY, INSERT LIGHTED STENT URETER(S);  Lighted Stent Placement,Laparoscopic Assisted Sigmoid Colectomy;  Surgeon: ERVIN Reina MD;  Location: [facility] OR     ENDOVASCULAR CAROTID STENT PLACEMENT [facility] 4/21/2023    Procedure: Left transcarotid revascularization;  Surgeon: Eveline Manley MD;  Location: [facility] [place]     IR TCAR TRANSCAROTID ARTERY REVASCULARIZATION  4/21/2023     LAPAROSCOPIC ASSISTED COLECTOMY LEFT (DESCENDING) N/A 04/10/2018    Procedure: LAPAROSCOPIC ASSISTED COLECTOMY LEFT (DESCENDING);;  Surgeon: Hill Murray MD;  Location: [facility] OR     LUMBAR DISCECTOMY Left 09/23/2022    Procedure: left thoracic 12-lumbar 1 hemilaminectomy, medial facetectomy, foraminotomy and microdiscectomy;  Surgeon: Suha Kent MD;  Location: [facility] OR     NECK SURGERY       ORTHOPEDIC SURGERY  10/01/2010    Cut palm and reattched tendons and nerves in left hand forefinger.     MN ESOPHAGOGASTRODUODENOSCOPY TRANSORAL DIAGNOSTIC N/A 04/30/2021    Procedure: ESOPHAGOGASTRODUODENOSCOPY (EGD);  Surgeon: Souleymane Moreno DO;  Location:  [facility];  Service: General     SURGICAL HISTORY OF -   2000    Esophagogastroduodenoscopy with biopsy     TUBAL LIGATION         FAMILY HISTORY:   Family History   Problem Relation Age of Onset     C.A.D. Father 50        50's     Diabetes Father      Diabetes Brother      C.A.D. Brother 42        quad bypass and MI     Diabetes Brother         2 brothers have DM     Cancer Brother 34        Melanoma     Aortic aneurysm Brother      Allergies Son         Meds     Allergies Daughter         Med     Cancer Mother      C.A.D. Brother 38        MI age 38     Diabetes Mother      Diabetes Brother        SOCIAL HISTORY:   Social History     Tobacco Use     Smoking status: Former     Packs/day: 0.05     Years: 30.00     Pack years: 1.50     Types: Cigarettes     Quit date: 2023     Years since quittin.0     Smokeless tobacco: Never     Tobacco comments:     3 Cigs a day   Vaping Use     Vaping status: Never Used   Substance Use Topics     Alcohol use: Not Currently     Comment: Alcoholic Drinks/day: 2x year         [employer]  Tobacco:  reports that she quit smoking 7 days ago. Her smoking use included cigarettes. She has a 1.50 pack-year smoking history. She has never used smokeless tobacco.  Alcohol:  reports that she does not currently use alcohol.  Illicit drugs:  reports that she does not currently use drugs.    Allergies  Allergies   Allergen Reactions     Ciprofloxacin Anaphylaxis     Flagyl [Metronidazole] Anaphylaxis     Gabapentin Other (See Comments)     Suicidal thoughts.     Zofran [Ondansetron] Other (See Comments) and GI Disturbance     Causes bloating, moderately uncomfortable, abd pain       Benadryl [Diphenhydramine] Other (See Comments)     Muscle spasms     Percocet [Oxycodone-Acetaminophen] Other (See Comments) and Headache     Goes nuts - nasty mean irritated, can take Dilaudid     Vicodin [Hydrocodone-Acetaminophen] Other (See Comments)     Anxiety-agitation        Problem List  Patient Active Problem List    Diagnosis Date Noted     Lumbar radiculopathy 06/09/2023     Priority: Medium     Carotid stenosis 04/21/2023     Priority: Medium     Primary hypertension 05/07/2022     Priority: Medium     Status post coronary angiogram 05/06/2022     Priority: Medium     Coronary artery disease due to lipid rich plaque      Priority: Medium     Chest pain, unspecified type 05/05/2022     Priority: Medium     Acute chest pain 04/28/2022     Priority: Medium     Hiatal hernia 04/15/2021     Priority: Medium     Formatting of this note might be different from the original.  Added automatically from request for surgery [id number]       Spinal stenosis of lumbar region with neurogenic claudication 06/04/2018     Priority: Medium     S/P partial colectomy 04/10/2018     Priority: Medium     Diverticulitis 01/28/2018     Priority: Medium     Psoriasis 06/20/2012     Priority: Medium     GERD (gastroesophageal reflux disease) 03/15/2011     Priority: Medium     Tobacco abuse 02/17/2011     Priority: Medium     Health Care Home 01/27/2011     Priority: Medium     DX V65.8 REPLACED WITH [facility] (04/08/2013)       CARDIOVASCULAR SCREENING; LDL GOAL LESS THAN 160 10/31/2010     Priority: Medium     R Occipital Neuralgia 10/07/2009     Priority: Medium     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 On fiber, senna, and MOM. Advised to d/c MOM, start Miralax and titer to regular BM's. Pt has been seen by GI.        Brain syndrome, posttraumatic 03/06/2009     Priority: Medium     Work comp.  Has seen Dr. Ahmadi, [facility] clinic of neurology.  MRIs, EMG unremarkalbe, some foraminal stenosis on C5/C6  Sent her to physiatrist, trigger point injections didn't help.  After some neck traction, had intense unremitting HA,  "neck pain.  Off/on tingling in arms.  Pain clinic and/or spine surg for more eval probable next steps.    Has failed multiple rescue and preventive HA meds. On no narcotics         Prior to Admission Medications   Medications Prior to Admission   Medication Sig Dispense Refill Last Dose     acetaminophen (TYLENOL) 500 MG tablet Take 1,000 mg by mouth every 6 hours as needed for mild pain        aspirin (ASA) 81 MG EC tablet Take 1 tablet (81 mg) by mouth daily 30 tablet 3 6/9/2023 at AM     atorvastatin (LIPITOR) 40 MG tablet Take 1 tablet by mouth once daily 90 tablet 0 6/9/2023 at AM     buPROPion (ZYBAN) 150 MG 12 hr tablet Take 1 tablet (150 mg) by mouth 2 times daily for 30 days 60 tablet 0 6/9/2023 at AM     celecoxib (CELEBREX) 100 MG capsule Take 200 mg by mouth daily   6/6/2023 at Am     clobetasol (TEMOVATE) 0.05 % external ointment APPLY  OINTMENT TOPICALLY TWICE DAILY (Patient taking differently: 2 times daily as needed) 60 g 0 6/9/2023 at AM, did not bring     clopidogrel (PLAVIX) 75 MG tablet Take 1 tablet (75 mg) by mouth daily 30 tablet 3 6/2/2023     fish oil-omega-3 fatty acids 1000 MG capsule Take 1 g by mouth daily   6/2/2023     metoprolol succinate ER (TOPROL XL) 50 MG 24 hr tablet Take 12.5 mg by mouth daily   6/9/2023 at AM     Multiple Vitamins-Minerals (HAIR/SKIN/NAILS) TABS Take 1 tablet by mouth daily   6/2/2023     multivitamin w/minerals (THERA-VIT-M) tablet Take 1 tablet by mouth daily   6/2/2023     nitroGLYcerin (NITROSTAT) 0.4 MG sublingual tablet One tablet under the tongue every 5 minutes if needed for chest pain. May repeat every 5 minutes for a maximum of 3 doses in 15 minutes\" 25 tablet 3 Unknown     omeprazole (PRILOSEC) 20 MG DR capsule Take 20 mg by mouth 3 times daily Do not substitute   6/9/2023 at AM     UNABLE TO FIND Take 1 tablet by mouth At Bedtime MEDICATION NAME: Qunol Sleep - melatonin 5 mg, Ashwagandha L-Theanine   6/8/2023     Vitamin D (Cholecalciferol) 25 MCG " "(1000 UT) TABS Take 1,000 Units by mouth daily   6/9/2023 at AM       Review of Systems  Complete ROS reviewed, unless noted in HPI, all other systems reviewed (with patient) and all others found to be negative.      Objective:     Physical Exam:  /60 (BP Location: Right arm)   Pulse 69   Temp 97.6  F (36.4  C) (Oral)   Resp 16   LMP 04/10/2016   SpO2 100%   Weight:   There were no vitals filed for this visit.   There is no height or weight on file to calculate BMI.    General Appearance:  Alert, cooperative, no distress   Head:  Normocephalic, without obvious abnormality, atraumatic   Eyes:  PERRL, conjunctiva/corneas clear, EOM's intact   ENT/Throat: Lips, mucosa, and tongue normal; teeth and gums normal   Lymph/Neck: Supple, symmetrical, trachea midline   Lungs:   Clear to auscultation bilaterally, NC O2, respirations unlabored   Abdomen:   Soft, non-tender   Musculoskeletal: Extremities normal, atraumatic   Skin: Incision covered    Neurologic: Alert and oriented X 3, Moves all 4 extremities     Psych: Affect is appropriate      Imaging: Reviewed I have personally reviewed pertinent labs, tests, and radiologic imaging in patient's chart.  XR Surgery IRMA L/T 5 Min Fluoro    Result Date: 6/9/2023  This exam was marked as non-reportable because it will not be read by a radiologist or a [place] non-radiologist provider.     XR SURGERY IRMA FLUORO GREATER THAN 5 MIN    Result Date: 6/9/2023  This exam was marked as non-reportable because it will not be read by a radiologist or a [place] non-radiologist provider.     POC US Guidance Needle Placement    Result Date: 6/9/2023  Ultrasound was performed as guidance to an anesthesia procedure.  Click \"PACS images\" hyperlink below to view any stored images.  For specific procedure details, view procedure note authored by anesthesia.      Labs: Reviewed I have personally reviewed pertinent labs, tests, and radiologic imaging in patient's chart.  Recent Results " (from the past 24 hour(s))   Glucose by meter    Collection Time: 06/09/23  6:34 AM   Result Value Ref Range    GLUCOSE BY METER POCT 75 70 - 99 mg/dL       Total time spent 65 minutes with greater than 50% in consultation, education and coordination of care.     Also discussed with RN,  Acupuncturist, and pharmacist.     Please see A&P for additional details of medical decision making.    Elements of Medical Decision Making as described above. High level of decision making required due to 1 or more chronic illness with severe exacerbation, progression, and side effects of treatment.  High risk therapy including opioids, high risk drug therapy including oral and/or parenteral controlled substances    Thank you for this consultation.    Margarita VINCENT, JEWELLP-C  Acute Care Pain Management Program  [facility] ([place], [place], [place])  Monday-Friday 8a-4p   Page via online paging system or [employer]

## 2023-06-09 NOTE — PHARMACY-ADMISSION MEDICATION HISTORY
Pharmacist Admission Medication History    Admission medication history is complete. The information provided in this note is only as accurate as the sources available at the time of the update.    Medication reconciliation/reorder completed by provider prior to medication history? No    Information Source(s): Patient and CareEverywhere/SureScripts via in-person    Pertinent Information:   Patient would like to take omeprazole while in hospital.  Pantoprazole doesn't work as well for her.     Changes made to PTA medication list:    Added: None    Deleted: melatonin    Changed: celebrex, omeprazole     Allergies reviewed with patient and updates made in EHR: no    Medication History Completed By: Swati Ferreira RPH 6/9/2023 7:31 AM    Prior to Admission medications    Medication Sig Last Dose Taking? Auth Provider Long Term End Date   acetaminophen (TYLENOL) 500 MG tablet Take 1,000 mg by mouth every 6 hours as needed for mild pain  Yes Unknown, Entered By History     aspirin (ASA) 81 MG EC tablet Take 1 tablet (81 mg) by mouth daily 6/9/2023 at AM Yes Eveline Manley MD     atorvastatin (LIPITOR) 40 MG tablet Take 1 tablet by mouth once daily 6/9/2023 at AM Yes Glenn Ferreira MD Yes    buPROPion (ZYBAN) 150 MG 12 hr tablet Take 1 tablet (150 mg) by mouth 2 times daily for 30 days 6/9/2023 at AM Yes Shaunna Perez APRN CNP  6/17/23   celecoxib (CELEBREX) 100 MG capsule Take 200 mg by mouth daily 6/6/2023 at Am Yes Unknown, Entered By History No    clobetasol (TEMOVATE) 0.05 % external ointment APPLY  OINTMENT TOPICALLY TWICE DAILY  Patient taking differently: 2 times daily as needed 6/9/2023 at AM, did not bring Yes Sera Solo NP No    clopidogrel (PLAVIX) 75 MG tablet Take 1 tablet (75 mg) by mouth daily 6/2/2023 Yes Eveline Manley MD Yes    fish oil-omega-3 fatty acids 1000 MG capsule Take 1 g by mouth daily 6/2/2023 Yes Reported, Patient     metoprolol succinate ER (TOPROL XL) 50  "MG 24 hr tablet Take 12.5 mg by mouth daily 6/9/2023 at AM Yes Unknown, Entered By History No    Multiple Vitamins-Minerals (HAIR/SKIN/NAILS) TABS Take 1 tablet by mouth daily 6/2/2023 Yes Unknown, Entered By History     multivitamin w/minerals (THERA-VIT-M) tablet Take 1 tablet by mouth daily 6/2/2023 Yes Unknown, Entered By History     nitroGLYcerin (NITROSTAT) 0.4 MG sublingual tablet One tablet under the tongue every 5 minutes if needed for chest pain. May repeat every 5 minutes for a maximum of 3 doses in 15 minutes\" Unknown Yes Kanika Daniels, CNP Yes    omeprazole (PRILOSEC) 20 MG DR capsule Take 20 mg by mouth 3 times daily Do not substitute 6/9/2023 at AM Yes Unknown, Entered By History     UNABLE TO FIND Take 1 tablet by mouth At Bedtime MEDICATION NAME: Qunol Sleep - melatonin 5 mg, Ashwagandha, L-Theanine 6/8/2023 Yes Unknown, Entered By History     Vitamin D (Cholecalciferol) 25 MCG (1000 UT) TABS Take 1,000 Units by mouth daily 6/9/2023 at AM Yes Reported, Patient No        "

## 2023-06-09 NOTE — OR NURSING
Previous lumbar spine implants removed ( six screws, 2 rods and six set screws) and sent to Decontamination to be returned to pt per facility protocol.

## 2023-06-09 NOTE — ANESTHESIA PROCEDURE NOTES
Airway       Patient location during procedure: OR       Procedure Start/Stop Times: 6/9/2023 7:42 AM  Staff -        Anesthesiologist:  Pepe Burrell MD       CRNA: Lorraine Richards APRN CRNA       Performed By: CRNA  Consent for Airway        Urgency: elective  Indications and Patient Condition       Indications for airway management: cherry-procedural and airway protection       Induction type:intravenous       Mask difficulty assessment: 2 - vent by mask + OA or adjuvant +/- NMBA    Final Airway Details       Final airway type: endotracheal airway       Successful airway: ETT - single  Endotracheal Airway Details        ETT size (mm): 7.0       Successful intubation technique: video laryngoscopy       VL Blade Size: Glidescope 3       Grade View of Cords: 1       Adjucts: stylet       Position: Right       Measured from: lips       Secured at (cm): 20       Bite block used: Soft    Post intubation assessment        Placement verified by: capnometry, equal breath sounds and chest rise        Number of attempts at approach: 1       Number of other approaches attempted: 0       Secured with: silk tape       Ease of procedure: easy       Dentition: Unchanged and Intact    Medication(s) Administered   Medication Administration Time: 6/9/2023 7:42 AM         Goal Outcome Evaluation:    Plan of Care Reviewed With: sibling, other (see comments) (sister Iliana and Brother in law Patrick)     Overall Patient Progress: improving    Shift events:  On PS mode PS 7 PEEP 5 for 5 1/2 hours, tolerated well.  Switched back to A/C b/c patient felt tired.  Visited by sister and DAVID, condition/progress/plan of care explained by Dr Zapata.  Plan to have dental procedure (tooth extraction) ? Monday or ASAP to clear source of infection.    Improving GCS, E4V1M6. Able to interact with staff and family with hand squeeze/eye blink/head nodding and shaking.  BP improving, weaning pressors as tolerated. Currently on norepi 0.05, epi 0.05, vasopressin 3 units/hour.  [place] parameters stable.  CI 2.5.  HR 80s A fib/SR.  On amio 0.5mg/min.  Back to A/C mode after PS mode trial for 4 1/2 hours.  Continue tube feeding at goal rate.  Maintains on CRRT, goal 0-50cc hour, I=O so far.  Rectal tube yields small amount of brownish water stool.  Chest drain yield small amount of serosanguineous output.  Temp 37.

## 2023-06-09 NOTE — ANESTHESIA POSTPROCEDURE EVALUATION
Patient: Selina Parikh    Procedure: Procedure(s):  Exploration of prior lumbar 3- lumbar 5 instrumented fusion with removal and replacement of pedicle screws and extension to sacral 1. Lumbar 3-sacral 1  Left transforaminal lumbar interbody fusion and posterolateral arthrodesis with use of stealth navigation       Anesthesia Type:  General    Note:  Disposition: Inpatient   Postop Pain Control: Uneventful            Sign Out: Well controlled pain   PONV: No   Neuro/Psych: Uneventful            Sign Out: Acceptable/Baseline neuro status   Airway/Respiratory: Uneventful            Sign Out: Acceptable/Baseline resp. status   CV/Hemodynamics: Uneventful            Sign Out: Acceptable CV status; No obvious hypovolemia; No obvious fluid overload   Other NRE: NONE   DID A NON-ROUTINE EVENT OCCUR? No           Last vitals:  Vitals Value Taken Time   /61 06/09/23 1230   Temp 36.2  C (97.2  F) 06/09/23 1230   Pulse 75 06/09/23 1233   Resp 27 06/09/23 1233   SpO2 99 % 06/09/23 1233   Vitals shown include unvalidated device data.    Electronically Signed By: Pepe Burrell MD  June 9, 2023  3:19 PM

## 2023-06-10 ENCOUNTER — APPOINTMENT (OUTPATIENT)
Dept: PHYSICAL THERAPY | Facility: CLINIC | Age: 58
DRG: 455 | End: 2023-06-10
Attending: SURGERY
Payer: COMMERCIAL

## 2023-06-10 LAB
ANION GAP SERPL CALCULATED.3IONS-SCNC: 7 MMOL/L (ref 5–18)
BUN SERPL-MCNC: 17 MG/DL (ref 8–22)
CALCIUM SERPL-MCNC: 8.7 MG/DL (ref 8.5–10.5)
CHLORIDE BLD-SCNC: 106 MMOL/L (ref 98–107)
CO2 SERPL-SCNC: 26 MMOL/L (ref 22–31)
CREAT SERPL-MCNC: 0.77 MG/DL (ref 0.6–1.1)
ERYTHROCYTE [DISTWIDTH] IN BLOOD BY AUTOMATED COUNT: 13.7 % (ref 10–15)
GFR SERPL CREATININE-BSD FRML MDRD: 89 ML/MIN/1.73M2
GLUCOSE BLD-MCNC: 104 MG/DL (ref 70–125)
HCT VFR BLD AUTO: 31.7 % (ref 35–47)
HGB BLD-MCNC: 10.4 G/DL (ref 11.7–15.7)
MCH RBC QN AUTO: 30.9 PG (ref 26.5–33)
MCHC RBC AUTO-ENTMCNC: 32.8 G/DL (ref 31.5–36.5)
MCV RBC AUTO: 94 FL (ref 78–100)
PLATELET # BLD AUTO: 251 10E3/UL (ref 150–450)
POTASSIUM BLD-SCNC: 4.8 MMOL/L (ref 3.5–5)
RBC # BLD AUTO: 3.37 10E6/UL (ref 3.8–5.2)
SODIUM SERPL-SCNC: 139 MMOL/L (ref 136–145)
WBC # BLD AUTO: 14.5 10E3/UL (ref 4–11)

## 2023-06-10 PROCEDURE — 85014 HEMATOCRIT: CPT | Performed by: NURSE PRACTITIONER

## 2023-06-10 PROCEDURE — 36415 COLL VENOUS BLD VENIPUNCTURE: CPT | Performed by: NURSE PRACTITIONER

## 2023-06-10 PROCEDURE — 97530 THERAPEUTIC ACTIVITIES: CPT | Mod: GP

## 2023-06-10 PROCEDURE — 250N000013 HC RX MED GY IP 250 OP 250 PS 637: Performed by: NURSE PRACTITIONER

## 2023-06-10 PROCEDURE — 97110 THERAPEUTIC EXERCISES: CPT | Mod: GP

## 2023-06-10 PROCEDURE — 97116 GAIT TRAINING THERAPY: CPT | Mod: GP

## 2023-06-10 PROCEDURE — 80048 BASIC METABOLIC PNL TOTAL CA: CPT | Performed by: NURSE PRACTITIONER

## 2023-06-10 PROCEDURE — 250N000011 HC RX IP 250 OP 636: Performed by: NURSE PRACTITIONER

## 2023-06-10 PROCEDURE — 999N000111 HC STATISTIC OT IP EVAL DEFER

## 2023-06-10 PROCEDURE — 120N000001 HC R&B MED SURG/OB

## 2023-06-10 RX ADMIN — ATORVASTATIN CALCIUM 40 MG: 40 TABLET, FILM COATED ORAL at 08:07

## 2023-06-10 RX ADMIN — THERA TABS 1 TABLET: TAB at 08:07

## 2023-06-10 RX ADMIN — HYDROMORPHONE HYDROCHLORIDE 0.2 MG: 0.2 INJECTION, SOLUTION INTRAMUSCULAR; INTRAVENOUS; SUBCUTANEOUS at 00:12

## 2023-06-10 RX ADMIN — Medication 20 MG: at 14:07

## 2023-06-10 RX ADMIN — METHOCARBAMOL 750 MG: 750 TABLET ORAL at 08:07

## 2023-06-10 RX ADMIN — ACETAMINOPHEN 975 MG: 325 TABLET ORAL at 08:06

## 2023-06-10 RX ADMIN — METHOCARBAMOL 750 MG: 750 TABLET ORAL at 16:00

## 2023-06-10 RX ADMIN — HYDROMORPHONE HYDROCHLORIDE 4 MG: 4 TABLET ORAL at 13:11

## 2023-06-10 RX ADMIN — ACETAMINOPHEN 975 MG: 325 TABLET ORAL at 14:07

## 2023-06-10 RX ADMIN — Medication 20 MG: at 21:35

## 2023-06-10 RX ADMIN — BUPROPION HYDROCHLORIDE 150 MG: 150 TABLET, FILM COATED, EXTENDED RELEASE ORAL at 21:35

## 2023-06-10 RX ADMIN — POLYETHYLENE GLYCOL 3350 17 G: 17 POWDER, FOR SOLUTION ORAL at 08:06

## 2023-06-10 RX ADMIN — BUPROPION HYDROCHLORIDE 150 MG: 150 TABLET, FILM COATED, EXTENDED RELEASE ORAL at 08:07

## 2023-06-10 RX ADMIN — HYDROMORPHONE HYDROCHLORIDE 4 MG: 4 TABLET ORAL at 17:38

## 2023-06-10 RX ADMIN — HYDROMORPHONE HYDROCHLORIDE 2 MG: 2 TABLET ORAL at 05:27

## 2023-06-10 RX ADMIN — HYDROMORPHONE HYDROCHLORIDE 4 MG: 4 TABLET ORAL at 21:35

## 2023-06-10 RX ADMIN — HYDROMORPHONE HYDROCHLORIDE 0.4 MG: 0.2 INJECTION, SOLUTION INTRAMUSCULAR; INTRAVENOUS; SUBCUTANEOUS at 04:06

## 2023-06-10 RX ADMIN — ACETAMINOPHEN 975 MG: 325 TABLET ORAL at 22:06

## 2023-06-10 RX ADMIN — METHOCARBAMOL 750 MG: 750 TABLET ORAL at 21:35

## 2023-06-10 RX ADMIN — Medication 5 MG: at 00:12

## 2023-06-10 RX ADMIN — SENNOSIDES AND DOCUSATE SODIUM 1 TABLET: 50; 8.6 TABLET ORAL at 21:35

## 2023-06-10 RX ADMIN — HYDROXYZINE HYDROCHLORIDE 25 MG: 25 TABLET ORAL at 05:27

## 2023-06-10 RX ADMIN — HYDROXYZINE HYDROCHLORIDE 25 MG: 25 TABLET ORAL at 13:11

## 2023-06-10 RX ADMIN — SENNOSIDES AND DOCUSATE SODIUM 1 TABLET: 50; 8.6 TABLET ORAL at 08:07

## 2023-06-10 RX ADMIN — Medication 25 MCG: at 08:07

## 2023-06-10 RX ADMIN — METHOCARBAMOL 750 MG: 750 TABLET ORAL at 13:12

## 2023-06-10 RX ADMIN — METOPROLOL SUCCINATE 12.5 MG: 25 TABLET, FILM COATED, EXTENDED RELEASE ORAL at 08:06

## 2023-06-10 ASSESSMENT — ACTIVITIES OF DAILY LIVING (ADL)
ADLS_ACUITY_SCORE: 22
ADLS_ACUITY_SCORE: 22
ADLS_ACUITY_SCORE: 37
ADLS_ACUITY_SCORE: 22
ADLS_ACUITY_SCORE: 37
ADLS_ACUITY_SCORE: 37
ADLS_ACUITY_SCORE: 22
ADLS_ACUITY_SCORE: 37

## 2023-06-10 NOTE — PROGRESS NOTES
Patient vital signs are at baseline: Yes  Patient able to ambulate as they were prior to admission or with assist devices provided by therapies during their stay:  Yes  Patient MUST void prior to discharge:  Yes  Patient able to tolerate oral intake:  Yes  Pain has adequate pain control using Oral analgesics:  No,  Reason:  IV dilaudid used with PO dilaudid;atarax  Does patient have an identified :  Yes  Has goal D/C date and time been discussed with patient:  Yes     Alert and oriented x4. LORENZO drain approx. 75 mL from 2300 to 0700. Pain increased while lying in bed. Better relief while lying in chair flat in chair.  Iv dilaudid last given at 0400. VSS.

## 2023-06-10 NOTE — CONSULTS
Care Management Follow Up    Length of Stay (days): 1    Expected Discharge Date: 06/11/2023     Concerns to be Addressed: no discharge needs identified     Patient plan of care discussed at interdisciplinary rounds: Yes    Anticipated Discharge Disposition: Home     Anticipated Discharge Services: None  Anticipated Discharge DME: None    Patient/family educated on Medicare website which has current facility and service quality ratings: no  Education Provided on the Discharge Plan: No  Patient/Family in Agreement with the Plan: yes    Referrals Placed by CM/SW:  None   Private pay costs discussed: Not applicable    Additional Information:  Chart reviewed.  Pt plans to discharge home today. Pt has family that will assist as needed. No CM needs identified.  Family to transport.       TAMY Aguilera

## 2023-06-10 NOTE — PLAN OF CARE
Patient vital signs are at baseline: Yes  Patient able to ambulate as they were prior to admission or with assist devices provided by therapies during their stay:  Yes  Patient MUST void prior to discharge:  Yes, pt has a chaudhari in place  Patient able to tolerate oral intake:  Yes  Pain has adequate pain control using Oral analgesics:  Yes  Does patient have an identified :  Yes  Has goal D/C date and time been discussed with patient:  Yes Goal Outcome Evaluation:      Plan of Care Reviewed With: patient    Overall Patient Progress: improvingOverall Patient Progress: improving alert and oriented.vss on room air, pain medications given, has a chaudhari and drain in place. Ambulated with therapist.

## 2023-06-10 NOTE — PLAN OF CARE
Problem: Spinal Surgery  Goal: Optimal Coping with Surgery  Outcome: Progressing  Goal: Absence of Bleeding  Outcome: Progressing  Goal: Fluid and Electrolyte Balance  Outcome: Progressing  Goal: Optimal Functional Ability  Outcome: Progressing  Intervention: Optimize Functional Status  Recent Flowsheet Documentation  Taken 6/10/2023 1555 by Olga Soriano RN  Activity Management: activity adjusted per tolerance  Positioning/Transfer Devices:    pillows    in use  Taken 6/10/2023 1405 by Olga Soriano RN  Activity Management: up in chair  Positioning/Transfer Devices:    pillows    in use  Taken 6/10/2023 1400 by Olga Soriano RN  Activity Management: (For bladder scan) back to bed  Taken 6/10/2023 1355 by Olga Soriano RN  Activity Management: ambulated to bathroom  Taken 6/10/2023 1045 by Olga Soriano RN  Activity Management: up in chair  Positioning/Transfer Devices:    pillows    in use  Taken 6/10/2023 1036 by Olga Soriano RN  Activity Management: ambulated to bathroom  Taken 6/10/2023 0818 by Olga Soriano RN  Activity Management: back to bed  Positioning/Transfer Devices:    pillows    in use  Taken 6/10/2023 0815 by Olga Soriano RN  Activity Management: ambulated to bathroom  Goal: Absence of Infection Signs and Symptoms  Outcome: Progressing  Goal: Optimal Neurologic Function  Outcome: Progressing  Intervention: Optimize Neurologic Function  Recent Flowsheet Documentation  Taken 6/10/2023 1555 by Olga Soriano RN  Body Position: position changed independently  Taken 6/10/2023 1405 by Olga Soriano RN  Body Position: position changed independently  Taken 6/10/2023 1045 by lOga Soriano RN  Body Position: position changed independently  Taken 6/10/2023 0818 by Olga Soriano RN  Body Position: position changed independently  Goal: Anesthesia/Sedation Recovery  Outcome: Progressing  Intervention: Optimize Anesthesia Recovery  Recent Flowsheet  Documentation  Taken 6/10/2023 1555 by Olga Soriano RN  Safety Promotion/Fall Prevention:    activity supervised    clutter free environment maintained    lighting adjusted    nonskid shoes/slippers when out of bed    patient and family education    room door open    room near nurse's station    room organization consistent    safety round/check completed    supervised activity    treat reversible contributory factors    treat underlying cause  Taken 6/10/2023 0800 by Olga Soriano RN  Safety Promotion/Fall Prevention:    activity supervised    clutter free environment maintained    lighting adjusted    nonskid shoes/slippers when out of bed    patient and family education    room door open    room near nurse's station    room organization consistent    safety round/check completed    supervised activity    treat reversible contributory factors    treat underlying cause  Goal: Optimal Pain Control and Function  Outcome: Progressing  Intervention: Prevent or Manage Pain  Recent Flowsheet Documentation  Taken 6/10/2023 1600 by Olga Soriano RN  Pain Management Interventions: medication (see MAR)  Taken 6/10/2023 1452 by Olga Soriano RN  Pain Management Interventions: ambulation/increased activity  Taken 6/10/2023 1407 by Olga Soriano RN  Pain Management Interventions: medication (see MAR)  Taken 6/10/2023 1311 by Olga Soriano RN  Pain Management Interventions: medication (see MAR)  Taken 6/10/2023 0851 by Olga Soriano RN  Pain Management Interventions: medication offered but refused  Taken 6/10/2023 0806 by Olga Soriano RN  Pain Management Interventions: medication (see MAR)  Goal: Nausea and Vomiting Relief  Outcome: Progressing  Goal: Effective Urinary Elimination  Outcome: Progressing  Goal: Effective Oxygenation and Ventilation  Outcome: Progressing  Intervention: Optimize Oxygenation and Ventilation  Recent Flowsheet Documentation  Taken 6/10/2023 1555 by Olga Soriano  RN  Head of Bed ([facility]) Positioning: [facility] at 15 degrees  Taken 6/10/2023 0818 by Olga Soriano RN  Head of Bed ([facility]) Positioning: HOB flat     Patient vital signs are at baseline: Yes  Patient able to ambulate as they were prior to admission or with assist devices provided by therapies during their stay:  Yes  Patient MUST void prior to discharge:  Yes  Patient able to tolerate oral intake:  Yes  Pain has adequate pain control using Oral analgesics:  Yes  Does patient have an identified :  Yes  Has goal D/C date and time been discussed with patient:  Yes     Pt is A & O x 4 & endorses mild to moderate pain to the low back during today's shift. Utilizing scheduled APAP, scheduled Robaxin, PRN Atarax & PRN PO Dilaudid w/ effective results. Dressing to surgical incision has a moderate amount of sanguineous drainage present. LORENZO drain dressing CDI w/ an output of 85 ml this shift (60 ml for the first 8 hours & 25 ml for the last 4 hours). CMS intact aside from baseline numbness/tingling to BLE. Voiding spontaneously; Passed PVR's. Up w/ a SBA, walker & gait belt; Wearing brace whenever OOB. Saline locked. Tolerating a regular diet. VSS. Participating in therapies. Anticipating discharge tomorrow pending LORENZO drain removal.     ILYA Donohue  Shift: 0700 - 1930

## 2023-06-10 NOTE — PROGRESS NOTES
Neurosurgery Progress Note:  6/10/2023     A/P: Ms. Parikh is a pleasant 57 year old female POD#1 Exploration of prior lumbar 3- lumbar 5 instrumented fusion with removal and replacement of pedicle screws and extension to sacral 1. Lumbar 3-sacral 1  Left transforaminal lumbar interbody fusion and posterolateral arthrodesis with use of stealth navigation    Patient states that she is doing okay has complaint of incisional pain and notes right anterior thigh tightness this morning after walking full lap on floor. States that he does not feel that the sharp shooting nerve pain she has had in that past. Otherwise denies any radicular leg pain numbness tingling or weakness, chaudhari removed this morning voided x1 with normal PVRs     Plan:  1. Keep drain in place  2. PVRsx3 to be completed   3. Brace to be worn when out of bed and sitting upright  4. Xray to be completed today  5. SCDs, lovenox to begin 48hrs postoperative  6. Increase activity as toleratered with PT/OT  7. Appreciate pain management assistance with pain control        Neurosurgery Attending: The patient's clinical examination, laboratory data, and plan was discussed with Dr. Kent        S:   Complaint of incisional pain with extension into right anterior thigh notes as tightness denies left lower extremity radicular pain numbness tingling or weakness     O:  /56 (BP Location: Left arm)   Pulse 68   Temp 98.3  F (36.8  C) (Oral)   Resp 16   LMP 04/10/2016   SpO2 97%           General: Awake, alert, NAD.     Motor: normal bulk and tone     Strength: full strength in all extremities throughout, bilaterally.     Sensation: intact to light touch throughout both upper and lower extremities     Incision: dressing intact with drained drainage noted      LORENZO drain 75mL/8hrs overnight      Sandra Rivas PA-C  [facility] Neurosurgery  O: [phone]

## 2023-06-10 NOTE — PROGRESS NOTES
Occupational Therapy: Orders received. Chart reviewed and discussed with care team.? Occupational Therapy not indicated due to increased knowledge for safe ADLs/IADLs. Pt verbalizes all precautions and states this is her 6th surgery.?Educated on how to purchase a sock aid per request. Defer discharge recommendations to ortho team.? Will complete orders.

## 2023-06-10 NOTE — PROGRESS NOTES
[facility] ACUTE PAIN SERVICE      Assessment/Plan:     Selina Parikh is a 57 year old female who was admitted on 6/9/2023.  Pain team was asked to see the patient for postop lumbar fusion, hx severe difficulty postop pain control. Admitted for planned procedure- exploration prior lumbar fusion and screw replacement. History of right occipital neuralgia, Coronary artery disease, HTN, tobacco abuse on Bupropion 12 hour tablet 150 mg BID, and history of IBS and partial colectomy. POD 1: Exploration of prior lumbar 3- lumbar 5 instrumented fusion with removal and replacement of pedicle screws and extension to sacral 1. Lumbar 3-sacral 1  Left transforaminal lumbar interbody fusion and posterolateral arthrodesis with use of stealth navigation    Past pain treatments have included: Gabapentin (suicide ideation), Baclofen (causes light headedness), hydromorphone, meloxicam, methocarbamol, and tramadol   Home pain medications/psych medications/anticoagulation medications include: APAP, Bupropion (tobacco abuse), and celecoxib    Opioid Induced Respiratory Depression Risk Assessment:?  Moderate      Has used 10mg of Dilaudid PO and 1.6mg of IV Dilaudid since surgery. Sitting in chair, eating breakfast. No nausea. Notices increase in heartburn/reflux. Asks for tums, this is clinically appropriate but will defer to medicine service for the order.   Feels the Dilaudid is working well. Goal to minimize/stop IV today, she is agreeable. OT enters to give therapy as I leave. Feeling better overall than last evening. No changes to pain management plan requested.     PLAN:   1) Pain is consistent with post op pain. Patient is understanding of the plan. All questions and concerns addressed to patient's satisfaction.   2)Multimodal Medication Therapy  Topical: None  NSAID'S:  None post op fusion. Does take celebrex at home - defer NRS on use post op ; likely will want to wait 7-10 days pos op.  Steroids: None  Muscle  Relaxants: Methocarbamol 750 mg q6h prn - schedule this   Adjuvants: APAP 975 mg q8h +PRN, hydroxyzine 25 mg q6h prn adjuvant pain  Antidepressants/anxiolytics: Bupropion  mg BID (PTA-Tobacco abuse)  Opioids: Hydromorphone PO 2-4 mg q4h prn  IV Pain medication: Hydromorphone IV 0.2-0.4 mg q2h prn  3)Non-medication interventions: ice, rest, pt ot   Acupuncture consult - offered and agreeable , seen Friday  Integrative consult - offered and declined  4)Constipation Prophylaxis: Scheduled and prn: Miralax daily and senna/docusate BID  5) Care Teams: Hospitalist, Pain management, PT/OT    -Opioid prescriber has been Sandra MORENO PMP pulled from system on 06/09/23. This indicates no recent fills; Last fill 10/18/22 hydromorphone 2 mg #28 (7 day supply), 09/25/22 diazepam 5 mg #17 (5 day supply)    Discharge Recommendations - We recommend prescribing the following at the time of discharge: 240MME for postop fusion (30 tab of 2mg Dilaudid)    Vandana Valdovinos, Ayana  Acute Care Pain Management Program  [facility] ([facility], [place], [place])  Monday-Friday 8a-4p   Page via online paging system or [employer]

## 2023-06-11 ENCOUNTER — APPOINTMENT (OUTPATIENT)
Dept: RADIOLOGY | Facility: CLINIC | Age: 58
DRG: 455 | End: 2023-06-11
Attending: NURSE PRACTITIONER
Payer: COMMERCIAL

## 2023-06-11 VITALS
TEMPERATURE: 98.2 F | SYSTOLIC BLOOD PRESSURE: 117 MMHG | DIASTOLIC BLOOD PRESSURE: 56 MMHG | HEART RATE: 68 BPM | OXYGEN SATURATION: 96 % | RESPIRATION RATE: 16 BRPM

## 2023-06-11 LAB
ANABASINE UR-MCNC: <5 NG/ML
COTININE UR-MCNC: 76 NG/ML
ERYTHROCYTE [DISTWIDTH] IN BLOOD BY AUTOMATED COUNT: 13.8 % (ref 10–15)
HCT VFR BLD AUTO: 30.7 % (ref 35–47)
HGB BLD-MCNC: 10 G/DL (ref 11.7–15.7)
MCH RBC QN AUTO: 31 PG (ref 26.5–33)
MCHC RBC AUTO-ENTMCNC: 32.6 G/DL (ref 31.5–36.5)
MCV RBC AUTO: 95 FL (ref 78–100)
NICOTINE UR-MCNC: <15 NG/ML
PLATELET # BLD AUTO: 226 10E3/UL (ref 150–450)
RBC # BLD AUTO: 3.23 10E6/UL (ref 3.8–5.2)
TRANS-3-OH-COTININE UR-MCNC: 121 NG/ML
WBC # BLD AUTO: 10 10E3/UL (ref 4–11)

## 2023-06-11 PROCEDURE — 250N000013 HC RX MED GY IP 250 OP 250 PS 637: Performed by: SURGERY

## 2023-06-11 PROCEDURE — 85027 COMPLETE CBC AUTOMATED: CPT | Performed by: NURSE PRACTITIONER

## 2023-06-11 PROCEDURE — 250N000013 HC RX MED GY IP 250 OP 250 PS 637: Performed by: NURSE PRACTITIONER

## 2023-06-11 PROCEDURE — 72100 X-RAY EXAM L-S SPINE 2/3 VWS: CPT

## 2023-06-11 PROCEDURE — 250N000011 HC RX IP 250 OP 636: Performed by: NURSE PRACTITIONER

## 2023-06-11 PROCEDURE — 36415 COLL VENOUS BLD VENIPUNCTURE: CPT | Performed by: NURSE PRACTITIONER

## 2023-06-11 RX ORDER — METHOCARBAMOL 750 MG/1
750 TABLET, FILM COATED ORAL 4 TIMES DAILY PRN
Qty: 42 TABLET | Refills: 0 | Status: SHIPPED | OUTPATIENT
Start: 2023-06-11 | End: 2023-06-22

## 2023-06-11 RX ORDER — HYDROMORPHONE HYDROCHLORIDE 2 MG/1
2-4 TABLET ORAL EVERY 4 HOURS PRN
Qty: 42 TABLET | Refills: 0 | Status: SHIPPED | OUTPATIENT
Start: 2023-06-11 | End: 2023-11-01

## 2023-06-11 RX ORDER — METHOCARBAMOL 750 MG/1
750 TABLET, FILM COATED ORAL 4 TIMES DAILY PRN
Status: DISCONTINUED | OUTPATIENT
Start: 2023-06-11 | End: 2023-06-11 | Stop reason: HOSPADM

## 2023-06-11 RX ADMIN — Medication 20 MG: at 13:01

## 2023-06-11 RX ADMIN — HYDROMORPHONE HYDROCHLORIDE 4 MG: 4 TABLET ORAL at 10:25

## 2023-06-11 RX ADMIN — METOPROLOL SUCCINATE 12.5 MG: 25 TABLET, FILM COATED, EXTENDED RELEASE ORAL at 08:11

## 2023-06-11 RX ADMIN — POLYETHYLENE GLYCOL 3350 17 G: 17 POWDER, FOR SOLUTION ORAL at 08:11

## 2023-06-11 RX ADMIN — ATORVASTATIN CALCIUM 40 MG: 40 TABLET, FILM COATED ORAL at 08:11

## 2023-06-11 RX ADMIN — SENNOSIDES AND DOCUSATE SODIUM 1 TABLET: 50; 8.6 TABLET ORAL at 08:10

## 2023-06-11 RX ADMIN — BUPROPION HYDROCHLORIDE 150 MG: 150 TABLET, FILM COATED, EXTENDED RELEASE ORAL at 08:11

## 2023-06-11 RX ADMIN — HYDROXYZINE HYDROCHLORIDE 25 MG: 25 TABLET ORAL at 10:25

## 2023-06-11 RX ADMIN — Medication 20 MG: at 08:18

## 2023-06-11 RX ADMIN — Medication 25 MCG: at 08:11

## 2023-06-11 RX ADMIN — ACETAMINOPHEN 975 MG: 325 TABLET ORAL at 14:06

## 2023-06-11 RX ADMIN — THERA TABS 1 TABLET: TAB at 08:11

## 2023-06-11 RX ADMIN — ENOXAPARIN SODIUM 40 MG: 100 INJECTION SUBCUTANEOUS at 13:01

## 2023-06-11 RX ADMIN — ACETAMINOPHEN 975 MG: 325 TABLET ORAL at 06:23

## 2023-06-11 RX ADMIN — METHOCARBAMOL 750 MG: 750 TABLET ORAL at 08:11

## 2023-06-11 ASSESSMENT — ACTIVITIES OF DAILY LIVING (ADL)
ADLS_ACUITY_SCORE: 22

## 2023-06-11 NOTE — PLAN OF CARE
Goal Outcome Evaluation:    Patient vital signs are at baseline: Yes; BP soft 92/50 overnight  Patient able to ambulate as they were prior to admission or with assist devices provided by therapies during their stay:  Yes  Patient MUST void prior to discharge:  Yes  Patient able to tolerate oral intake:  Yes  Pain has adequate pain control using Oral analgesics:  Yes  Does patient have an identified :  Yes  Has goal D/C date and time been discussed with patient:  Yes    Pain managed with PRN po dilaudid and scheduled medications. Slept well overnight. Ambulating SBA with brace when OOB. LORENZO draining serosanguinous fluid: 45cc out overnight. Dressing has dried drainage. Plan to discharge pending drain output.

## 2023-06-11 NOTE — PROGRESS NOTES
Possible discharge today pending drain output. Please send for XR.     NEIL Hall  [facility] Neurosurgery  O: [phone]

## 2023-06-11 NOTE — PROGRESS NOTES
Care Management Discharge Note    Discharge Date: 06/11/2023       Discharge Disposition: Home    Discharge Services: None    Discharge DME: None    Discharge Transportation: family or friend will provide    Private pay costs discussed: Not applicable    Does the patient's insurance plan have a 3 day qualifying hospital stay waiver?  No    PAS Confirmation Code: N/A  Patient/family educated on Medicare website which has current facility and service quality ratings: no    Education Provided on the Discharge Plan: No  Persons Notified of Discharge Plans: Pt   Patient/Family in Agreement with the Plan: yes    Handoff Referral Completed: No    Additional Information:  Chart reviewed.  Pt discharging home today. Pt has family that will assist at home.  No CM needs identified.  Family to transport.       TAMY Aguilera

## 2023-06-11 NOTE — PROGRESS NOTES
Neurosurgery Progress Note:  6/11/2023     A/P: Ms. Parikh is a pleasant 57 year old female POD#2 Exploration of prior lumbar 3- lumbar 5 instrumented fusion with removal and replacement of pedicle screws and extension to sacral 1. Lumbar 3-sacral 1  Left transforaminal lumbar interbody fusion and posterolateral arthrodesis with use of stealth navigation with Dr Kent.     Patient states that she is doing okay. Reports incisional back pain, not unexpected. No leg pain, numbness or tingling. She feels ready for discharge. Will monitor drain output and evaluate readiness this afternoon.     1300 ADDENDUM: OK to discontinue drain per Dr Kent. Need post op XR completed. Patient can discharge once XR completed.      Plan:  1. Keep drain in place  2. PVRsx3 to be completed   3. Brace to be worn when out of bed and sitting upright  4. Xray to be completed today  5. SCDs, lovenox to begin 48hrs postoperative  6. Increase activity as toleratered with PT/OT  7. Appreciate pain management assistance with pain control      S:   Incisional pain. No leg pain, numbness or tingling. Unable to tolerate laying on her back due to discomfort. Feels ready for discharge. We discussed not taking Celebrex for 6 months. She states understanding.      O:  /56 (BP Location: Left arm)   Pulse 68   Temp 98.2  F (36.8  C) (Oral)   Resp 16   LMP 04/10/2016   SpO2 96%      General: Awake, alert, NAD.     Motor: normal bulk and tone     Strength: full strength in all extremities throughout, bilaterally.     Sensation: intact to light touch throughout both upper and lower extremities     Incision: Changed dressing. Staples CDI.      LORENZO drain: 45 ml overnight; 55 on evening's    Imaging: need post op XR      Nemo ePters, LESLIE-CNP  [facility] Neurosurgery  O: [phone]

## 2023-06-11 NOTE — DISCHARGE INSTRUCTIONS
DO NOT TAKE  NSAIDs  for 6 months after fusion surgery, as these medications may delay bone healing  NSAIDS include such drugs as:    Ibuprofen  Motrin  Advil  Aleve  Naproxen     Celebrex    You can resume your aspirin on Tuesday.   You can resume your fish oil on Friday.

## 2023-06-11 NOTE — PLAN OF CARE
Pt cleared for discharge per Ortho & Pain team. Ensured pt received post op x-rays upon LORENZO drain removal. Removed PIV, assisted in gathering pt's belongings & reviewed AVS to pt & brother's verbalized satisfaction & understanding. Hospital staff escorted pt to main entrance & pt discharged to home. Pt left via private vehicle accompanied by her brother.     Olga Soriano, DEJONN  Shift: 0700 - 1930

## 2023-06-11 NOTE — PROGRESS NOTES
[facility] ACUTE PAIN SERVICE   Chart check and sign off   Assessment/Plan:     Selina Parikh is a 57 year old female who was admitted on 6/9/2023.  Pain team was asked to see the patient for postop lumbar fusion, hx severe difficulty postop pain control. Admitted for planned procedure- exploration prior lumbar fusion and screw replacement. History of right occipital neuralgia, Coronary artery disease, HTN, tobacco abuse on Bupropion 12 hour tablet 150 mg BID, and history of IBS and partial colectomy. POD 2.   Opioid Induced Respiratory Depression Risk Assessment:?  Moderate      Has used 12mg of Dilaudid PO in 24 hours, none in the last 10 hours.  Will stop IV Dilaudid as discussed with her yesterday. Adding BP parameters to opioid. Scripts per ortho (recs below)    PLAN:   1) Pain is consistent with post op pain. Patient is understanding of the plan. All questions and concerns addressed to patient's satisfaction.   2)Multimodal Medication Therapy  Topical: None  NSAID'S:  None post op fusion. Does take celebrex at home - defer NRS on use post op ; likely will want to wait 7-10 days pos op.  Steroids: None  Muscle Relaxants: Methocarbamol 750 mg q6h prn - schedule this   Adjuvants: APAP 975 mg q8h +PRN, hydroxyzine 25 mg q6h prn adjuvant pain  Antidepressants/anxiolytics: Bupropion  mg BID (PTA-Tobacco abuse)  Opioids: Hydromorphone PO 2-4 mg q4h prn  IV Pain medication: complete  3)Non-medication interventions: ice, rest, pt ot   Acupuncture consult - offered and agreeable , seen Friday  Integrative consult - offered and declined  4)Constipation Prophylaxis: Scheduled and prn: Miralax daily and senna/docusate BID  5) Care Teams: Hospitalist, Pain management, PT/OT    -Opioid prescriber has been Sandra MORENO  pulled from system on 06/09/23. This indicates no recent fills; Last fill 10/18/22 hydromorphone 2 mg #28 (7 day supply), 09/25/22 diazepam 5 mg #17 (5 day supply)    Discharge  Recommendations - We recommend prescribing the following at the time of discharge: 240MME for postop fusion (30 tab of 2mg Dilaudid), Robaxin, PRN, 5-7 day supply    Fatimah GreggD  Acute Care Pain Management Program  [facility] ([facility], [place], [place])  Monday-Friday 8a-4p   Page via online paging system or [employer]

## 2023-06-12 ENCOUNTER — PATIENT OUTREACH (OUTPATIENT)
Dept: CARE COORDINATION | Facility: CLINIC | Age: 58
End: 2023-06-12
Payer: COMMERCIAL

## 2023-06-12 NOTE — PROGRESS NOTES
"W offered Clinic Care Coordination to an established [facility] eligible patient and patient declined CCC at this time.    Clinic Care Coordination Contact  [facility]: Post-Discharge Note  SITUATION                                                      Admission:    Admission Date: 06/09/23   Reason for Admission: Lumbar radiculopathy  Discharge:   Discharge Date: 06/11/23  Discharge Diagnosis: Lumbar radiculopathy. Procedure : Procedure(s):  Exploration of prior lumbar 3- lumbar 5 instrumented fusion with extension to sacral 1. Lumbar 5-sacral 1 transforaminal lumbar interbody fusion and posterolateral arthrodesis with use of stealth navigation and bilateral laminectomies, medial facetectomies and foraminotomies    BACKGROUND                                                      Per hospital discharge summary and inpatient provider notes:    Selina Parikh is a 57 year old female who was admitted on 6/9/2023.  Pain team was asked to see the patient for postop lumbar fusion, hx severe difficulty postop pain control. Admitted for planned procedure- exploration prior lumbar fusion and screw replacement. History of right occipital neuralgia, Coronary artery disease, HTN, tobacco abuse on Bupropion 12 hour tablet 150 mg BID, and history of IBS and partial colectomy.  Describes pain as 2-4/10 and aching in low back. Prior LLE pain pre op is now resolved post op. The patient denies nausea, vomiting, constipation, diarrhea, chest pain, shortness of breath. Diet is regular. The patient is a former smoker and denies chemical dependency history.      Post op day: Day of Surgery. Exploration of prior lumbar 3- lumbar 5 instrumented fusion with removal and replacement of pedicle screws and extension to sacral 1. Lumbar 3-sacral 1  Left transforaminal lumbar interbody fusion and posterolateral arthrodesis with use of stealth navigation     ASSESSMENT      Discharge Assessment  How are you doing now that you are home?: \"Good. " "I've had some numbness in my legs but they just want to keep an eye on it.\"  How are your symptoms? (Red Flag symptoms escalate to triage hotline per guidelines): Improved  Do you feel your condition is stable enough to be safe at home until your provider visit?: Yes  Does the patient have their discharge instructions? : Yes  Does the patient have questions regarding their discharge instructions? : No  Were you started on any new medications or were there changes to any of your previous medications? : Yes  Does the patient have all of their medications?: Yes  Do you have questions regarding any of your medications? : No  Do you have all of your needed medical supplies or equipment (DME)?  (i.e. oxygen tank, CPAP, cane, etc.): Yes  Discharge follow-up appointment scheduled within 14 calendar days? : Yes  Discharge Follow Up Appointment Date: 06/22/23  Discharge Follow Up Appointment Scheduled with?: Specialty Care Provider (Spine and Neurosurgery appt)    Post-op ([facility] Only)  If the patient had a surgery or procedure, do they have any questions for a nurse?: No    PLAN                                                      Outpatient Plan:      Follow-up and recommended labs and tests  You should have your follow ups scheduled. Please call  if you have any questions    Future Appointments   Date Time Provider Department Center   6/22/2023  1:00 PM [facility] NURSE [facility]   7/20/2023  1:00 PM Sandra Rivas PA-C [facility]   7/26/2023 11:00 AM Drea Martin, PhD [facility]   9/12/2023  8:00 AM Vados, Alexa [facility]   9/19/2023  1:30 PM Vados, Alexa [facility]   9/26/2023  1:30 PM Vados, Alexa [facility]   10/3/2023  1:30 PM Vados, Alexa [facility]   11/14/2023 12:30 PM [facility]   11/14/2023  1:00 PM Eveline Manley MD [facility] [facility]         For any urgent concerns, please contact our 24 hour nurse triage line: " [phone] ([phone])         EMILY Diaz  [phone]  [facility]

## 2023-06-12 NOTE — OP NOTE
NEUROSURGERY OPERATIVE REPORT    PROCEDURE DATE: 6/9/23    PREOPERATIVE DIAGNOSIS:  Lumbar radiculopathy   Adjacent Segment Disease    POSTOPERATIVE DIAGNOSIS:  Same    PROCEDURE:  1. Exploration of prior lumbar 3- lumbar 5 instrumented fusion with removal and replacement of pedicle screws and extension to sacral 1.  2.  Left lumbar 5-sacral 1  transforaminal lumbar interbody fusion with posterolateral arthrodesis   3.  Use of stealth navigation    SURGEON: Suha Kent MD     ASSISTANT: Zahra Erazo CNP    INDICATIONS:  58 yo female who is s/p L3-5 fusion at OSH and recent left thoracic 12-lumbar 1 microdiscectomy in 9/22. Ongoing L5 radiculopathy. MRI lumbar spine shows moderate facet hypertrophy with effusions and moderate to severe foraminal narrowing at lumbar 5-sacral 1 contributing to her symptoms. Recommend exploration and extension of her fusion to S1 with a left lumbar 5-sacral 1 TILF.  Risks and benefits were discussed in detail including but not limited to infection, hematoma, nerve damage including paralysis, post op radiculitis, durotomy, lack of a sold bone fusion, hardware malfunction, risks associated with the use of general anesthesia, blood clots in the lungs or legs. She agreed to proceed.     PROCEDURE:  After obtaining informed consent, the patient was brought to the operating room with pneumatic stockings in place.  IV antibiotics was administered.  She was intubated under general endotracheal anesthesia. A chaudhari catheter was placed and the pt was turned into the prone position on the radiolucent laminectomy frame on the table. She was prepped and draped in the usual sterile fashion.         A preincisional infiltration of local anesthetic was performed along the midline and the prior incision and extended inferiorly and opened sharply and extended down to the fascia and scar.  The fascia and scar were opened in one layer with monopolar electrocautery.  A subperiosteal dissection was  undertaken to expose the lamina and posterior lateral elements at lumbar 3-sacral 1.   Her prior hardware was also exposed from lumbar 3-lumbar 5. Her set screws, rods and screws were removed bilaterally.      At this point, we brought in intraoperative O-arm for navigation. We did an intraoperative CT with a Stealth localization guide attached to the sacral spinous process and after confirming navigation of all instruments (registered separately on the navigational star), we proceeded to place the pedicle screws using stereotactic localization. At lumbar 3-5 we replaced the screws to fit into the new construct using the same screw size as previously.  We used the navigational wand to select an entry site, entered using the usual anatomical markers for the entry through the cortex of the pedicle. Once the entry site was confirmed, we then proceeded to use the navigational drill to drill through the pedicle and into the vertebral body. We used navigationally monitored taps to under-tap the screw hole by 1 mm and then proceeded to place the selected screw into the hole. At each step we confirmed that the walls of the cortex of the drilled hole were intact using a small ball-tip probe. We then placed pedicle screws at sacral 1 bilaterally.  We then proceeded to copiously irrigate the incision with antibiotic-containing saline and achieved hemostasis.      We then removed her spinous process of lumbar 5 for autograft. We next peformed a hemilmainectomy at left lumbar 5-sacral 1 saving the harvested autologous bone for replacement at the end of the procedure at the graft site. we removed the ligamentum and then drilled through the L5 pars. We then removed the IAP and trimmed the SAP to be flush with the S1 pedicle. We also trimmed any remaining pars to be flush with the L5 pedicle.  Once the dissection was complete, we followed the S1 nerve root confirming the foramina was open. We then exposed the L5-S1 disc space and  performed a discectomy using oswald, pituitarires and curettes. Next we trialed and placed our interbody graft pre packed with allograft and autograft. This was then expanded to break off torque. We then decorticated the transverse processes at lumbar 5 and sacral 1 and placed previously obtained autograft mixed with karissa and I factor across the transverse processes for bone fusion.  Once this was in position, we then placed rods bilaterally at lumbar 3-sacral 1 and locked into place in the top-loaded heads of the pedicle screws and secured with set screws tightened to break off torque.  A drain was tunneled out of the wound.  Wound was again irrigated with antibiotic containing irrigation and hemostasis was achieved.     Due to the nature and complexity of the case an assistant was required for the duration of the case for positioning,retraction, hemostasis, and closure.       The incision was closed in layers with 0 Vicryl for the fascia and muscle layer, inverted 2-0 Vicryl for subcutaneous tissues and staples for the skin edges. Sponge and needle counts were correct prior to closure x2. Sterile dressings were placed. Patient tolerated the procedure well.      The patient  was turned from the radiolucent frame onto a gurney, extubated and taken to the recovery room at their neurological baseline with no new deficits appreciated.     Estimated blood loss: 250 cc    Specimens: * No specimens in log *    Findings: Post op ct showed adequate hardware placement      Implants:   Implant Name Type Inv. Item Serial No.  Lot No. LRB No. Used Action   IMP SCR MEDT BREAK-OFF SET SOLERA 4.75MM TI [id number] - [id number] Metallic Hardware/[place] IMP SCR MEDT BREAK-OFF SET SOLERA 4.75MM TI [id number]  [facility]  8 Implanted   IMP SCR MEDT 4.75MM SOLERA 6.5X45MM MA [id number] - [id number] Metallic Hardware/[place] IMP SCR MEDT 4.75MM SOLERA 6.5X45MM MA [id number]  [employer] NA  6 Implanted   IMP SCR  MEDT 4.75MM SOLERA 6.5X40MM MA [id number] - [id number] Metallic Hardware/[place] IMP SCR MEDT 4.75MM SOLERA 6.5X40MM MA [id number]  [employer]ANE[employer] NA  2 Implanted   GRAFT DBM ORTHOBLEND LG DEFECT 5CC - [id number] Bone/Tissue/Biologic GRAFT DBM ORTHOBLEND LG DEFECT 5C [id number] [employer] INC  N/A 1 Implanted   IMP SPI INTERBODY MEDT CATALYFT PL SHORT 7MM [id number] - [id number] Metallic Hardware/[place] IMP SPI INTERBODY MEDT CATALYFT PL SHORT 7MM [id number]  [employer] INC [id number] N/A 1 Implanted   IMP OJSEFINA MEDT 8CM [id number] - [id number] Metallic Hardware/[place] IMP JOSEFINA MEDT 8CM [id number]  [employer] NA N/A 2 Implanted       Suha Kent MD

## 2023-06-21 NOTE — DISCHARGE SUMMARY
HOSPITAL DISCHARGE SUMMARY         Patient Name: Selina Parikh  YOB: 1965  Age: 57 year old  Medical Record Number: [id number]  Primary Physician: Sera Solo  Admission Date: 6/9/2023.  Discharge Date: 6/11/2023      Selina Parikh will be discharged from [facility] to home.     PRINCIPAL DIAGNOSIS CAUSING ADMISSION: Lumbar radiculopathy    Discharge Diagnoses:  Principal Problem:    Lumbar radiculopathy      HPI: Presented in March 2023 left lower back pain with radiation down the left leg, wrapping around the left posterolateral thigh and down the leg into the foot. Pain in right thigh as well. Chronic numbness and tingling both legs. Legs feel like they will give out. Injections was helpful for a short interval. Prior L3-5 fusion as well as left T12-L1 microdiscectomy. MRI lumbar spine shows moderate facet hypertrophy with effusions and moderate to severe foraminal narrowing at lumbar 5-sacral 1 likely contributing to her symptoms.     BRIEF HOSPITAL COURSE: Selina Parikh is a 57 year old female who was admitted on day of surgery and underwent Procedure(s):  Exploration of prior lumbar 3- lumbar 5 instrumented fusion with removal and replacement of pedicle screws and extension to sacral 1. Lumbar 3-sacral 1  Left transforaminal lumbar interbody fusion and posterolateral arthrodesis with use of stealth navigation.  Surgery was without complications.  Postoperatively she reported feeling well with incisional back pain but no leg pain, numbness or tingling.     On exam on day of discharge, her strength was full with no new focal deficits.  PT/OT consultations were obtained to assess function, assist with mobilization, and evaluate for discharge recommendations.  By POD # 2 she was tolerating a regular diet, ambulating, voiding without difficulty, and obtaining good pain control on oral medication.  Her incision was clean, dry and intact.   She met all discharge criteria and was  stable for discharge.      PROCEDURES PERFORMED DURING HOSPITALIZATION: Procedure/Surgery Information   Procedure: Procedure(s):  Exploration of prior lumbar 3- lumbar 5 instrumented fusion with removal and replacement of pedicle screws and extension to sacral 1. Lumbar 3-sacral 1  Left transforaminal lumbar interbody fusion and posterolateral arthrodesis with use of stealth navigation   Surgeon(s): Surgeon(s) and Role:     * Suha Kent MD - Primary             COMPLICATIONS IN HOSPITAL:  None.    IMPORTANT PENDING TEST RESULTS: None.    PERTINENT FINDINGS/RESULTS AT DISCHARGE:  None.    CONDITION AT DISCHARGE:  stable    DISCHARGE MEDICATIONS  No current facility-administered medications for this encounter.    Current Outpatient Medications:      acetaminophen (TYLENOL) 500 MG tablet, Take 1,000 mg by mouth every 6 hours as needed for mild pain, Disp: , Rfl:      atorvastatin (LIPITOR) 40 MG tablet, Take 1 tablet by mouth once daily, Disp: 90 tablet, Rfl: 0     clobetasol (TEMOVATE) 0.05 % external ointment, APPLY  OINTMENT TOPICALLY TWICE DAILY (Patient taking differently: 2 times daily as needed), Disp: 60 g, Rfl: 0     fish oil-omega-3 fatty acids 1000 MG capsule, Take 1 g by mouth daily, Disp: , Rfl:      HYDROmorphone (DILAUDID) 2 MG tablet, Take 1-2 tablets (2-4 mg) by mouth every 4 hours as needed for moderate pain, Disp: 42 tablet, Rfl: 0     methocarbamol (ROBAXIN) 750 MG tablet, Take 1 tablet (750 mg) by mouth 4 times daily as needed for muscle spasms, Disp: 42 tablet, Rfl: 0     metoprolol succinate ER (TOPROL XL) 50 MG 24 hr tablet, Take 12.5 mg by mouth daily, Disp: , Rfl:      Multiple Vitamins-Minerals (HAIR/SKIN/NAILS) TABS, Take 1 tablet by mouth daily, Disp: , Rfl:      multivitamin w/minerals (THERA-VIT-M) tablet, Take 1 tablet by mouth daily, Disp: , Rfl:      nitroGLYcerin (NITROSTAT) 0.4 MG sublingual tablet, One tablet under the tongue every 5 minutes if needed for chest pain. May  "repeat every 5 minutes for a maximum of 3 doses in 15 minutes\", Disp: 25 tablet, Rfl: 3     omeprazole (PRILOSEC) 20 MG DR capsule, Take 20 mg by mouth 3 times daily Do not substitute, Disp: , Rfl:      UNABLE TO FIND, Take 1 tablet by mouth At Bedtime MEDICATION NAME: Qunol Sleep - melatonin 5 mg, Ashwagandha, L-Theanine, Disp: , Rfl:      Vitamin D (Cholecalciferol) 25 MCG (1000 UT) TABS, Take 1,000 Units by mouth daily, Disp: , Rfl:        AFTER DISCHARGE ORDERS AND INSTRUCTIONS:     Follow up with Neurosurgery: in 2 weeks and 6 weeks   Follow up appointment with Primary Care Physician: Sera Solo as needed  Diet: Orders Placed This Encounter      Diet     Activity: *No lifting, pushing or pulling greater than 5-10 pound (this is about a gallon of milk).  *No repetitive bending, twisting, or jarring activities  *No overhead work  *No aerobic or strenuous activity  *No activities with increased risk of falls  *You may move about your home as tolerated  *You may walk up and down stairs as tolerated  *You may increase your activity slowly over the next 4-6 weeks    Warnings: Call [phone] with the following symptoms:    *Temperature 101(fever) or greater or chills  *Redness, swelling or increased drainage from the wound  *Worsening pain not relieved by the pain prescription given  *Worsening or new onset of weakness, or numbness and tingling  *Loss or change in your ability to control bowel or bladder function  *Change in your ability to walk, talk, see or think  *If you did not have a bowel movement before leaving the hospital and your bowels have not moved within 48 hours of your discharge    Wound care: WOUND CARE WITH STAPLES:  WOUND CARE:  Keep a dressing on the wound until the staples are removed.  No lotions, soaps, powders, ointments, creams, or patches on incision until the wound is well healed.    Change the dressing daily, more frequently if necessary to keep the wound dry.  If you notice " increased or persistent drainage from your wound, contact our office.  You may use an extra large band-aid or 4x4 and tape.  Both can be purchased at your pharmacy.    Staples are usually removed in 1-2 weeks.  They are sometimes left longer.    Wash your hands before changing the dressing or touching the wound. If someone is helping you change the dressing, ensure that the person washes his/her hands.  Good handwashing can decrease the risk of infection.  If you are unable to see the wound, have someone check the wound daily for redness, swelling or drainage.  A small amount of drainage is normal.       Two days after surgery begin showering. Wash your wound daily. Remove all dressings prior to your shower.  Apply mild soap directly to the wound, form a light lather and rinse with running water.     Do not submerge the wound under water. No baths or swimming for 6 weeks.     If you develop redness, swelling, drainage, or temp 101 or greater, please call our office at [phone].        CARLTON [place] [address]  Office: [phone]

## 2023-06-22 ENCOUNTER — ALLIED HEALTH/NURSE VISIT (OUTPATIENT)
Dept: NEUROSURGERY | Facility: CLINIC | Age: 58
End: 2023-06-22
Payer: COMMERCIAL

## 2023-06-22 VITALS — HEART RATE: 97 BPM | SYSTOLIC BLOOD PRESSURE: 106 MMHG | TEMPERATURE: 98.5 F | DIASTOLIC BLOOD PRESSURE: 77 MMHG

## 2023-06-22 DIAGNOSIS — M54.16 LUMBAR RADICULOPATHY: ICD-10-CM

## 2023-06-22 PROCEDURE — 99207 PR NO CHARGE NURSE ONLY: CPT

## 2023-06-22 RX ORDER — METHOCARBAMOL 750 MG/1
750 TABLET, FILM COATED ORAL 4 TIMES DAILY PRN
Qty: 42 TABLET | Refills: 0 | Status: SHIPPED | OUTPATIENT
Start: 2023-06-22

## 2023-06-22 ASSESSMENT — PAIN SCALES - GENERAL: PAINLEVEL: MILD PAIN (2)

## 2023-06-22 NOTE — PATIENT INSTRUCTIONS
A dressing is not required. Your wound is covered with steri-strips.  The steri-strips should fall off in 7-10 days.  If they have not fallen off in 14 days, remove them.    Keep the wound clean.    Wash your hands before touching the wound.  Ensure that anyone assisting you in the care of your wound washes her/his hands before touching the wound. Good handwashing can decrease the risk of serious infection.    If you are unable to see your wound, have someone check the wound daily for redness, swelling,or drainage. A small amount of drainage is normal.    You may shower. Do not allow shower to beat directly on the wound.  Pat the wound dry. Do not rub.    No tub baths until the wound is well healed.  Usually 3-4 weeks.     If you develop redness, swelling, drainage, or temp 101 or greater, call our office at      WEARING THE LUMBAR BRACE:    * Wear the brace when out of bed or sitting upright in bed.  * You should apply the brace before standing.  * You may shower seated without brace.   Sit down on shower chair, remove brace   Shower   Dry   Re-apply brace before standing.   Take care not to fall    *Check  your incision and the skin under your brace at least daily.     * No lifting, pushing or pulling greater than 5-10 pound (this is about a gallon of milk) for the first 6 weeks after surgery .  *No repetitive bending, twisting, or jarring activities for 6 weeks.  *No overhead work  *No aerobic or strenuous activity  *No activities with increased risk of falls  *You may move about your home as tolerated  *You may walk up and down stairs as tolerated  *You may increase your activity slowly over the next 6 weeks    WALKING PROGRAM: As you can tolerate, walk daily-start with 5-10 minutes of continuous walking. This is in addition to the walking that you do as part of your daily activities. Increase the time that you walk by 5 minutes every couple of days. Do not exceed 30-45 minutes of continuous walking until seen  in follow-up. Walking is the best exercise after surgery.  **Listen to your body, if you find that you are more painful or fatigued, you may need to proceed more slowly.    **Do not smoke or expose yourself to second hand smoke. Cigarette smoke can delay healing and cause complications.     DRIVING:  We recommend that you do not drive while taking medications for pain or muscle spasms. Always read and follow the advice on your prescription bottle. If you have questions, speak with your pharmacist.  We recommend that you do not drive while wearing a brace, as it could limit your range of motion.    WORK: If you plan to return to work before your 6 week post-op appointment, call and discuss with one of the nurses in the neurosurgery office.

## 2023-06-22 NOTE — PROGRESS NOTES
POSTOPERATIVE FOLLOW-UP NURSE VISIT NOTE    Procedure: Exploration of prior lumbar 3- lumbar 5 instrumented fusion with removal and replacement of pedicle screws and extension to sacral 1.  Left lumbar 5-sacral 1  transforaminal lumbar interbody fusion with posterolateral arthrodesis    Date: 6/9/23    Surgeon: Donte    Pre-op symptoms: Pain and numbness    Post-op symptoms: Mobility improved, balance improved, pain improved, numbness present.    Pain and medications: 2/10, taking methocarbamol as ordered, acetaminophen PRN, has stopped taking dilaudid.    Wound: Staples removed, CDI, approximated, cleansed, reinforced with steri strips, placed 4x4 gauze as padding    Bracing compliance: States compliance    Medications refilled: Methocarbamol    Imaging ordered: Lumbar XR 2 view AP/Lat
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
## 2023-07-20 ENCOUNTER — HOSPITAL ENCOUNTER (OUTPATIENT)
Dept: RADIOLOGY | Facility: HOSPITAL | Age: 58
Discharge: HOME | End: 2023-07-20
Attending: SURGERY | Admitting: SURGERY
Payer: COMMERCIAL

## 2023-07-20 ENCOUNTER — OFFICE VISIT (OUTPATIENT)
Dept: NEUROSURGERY | Facility: CLINIC | Age: 58
End: 2023-07-20
Payer: COMMERCIAL

## 2023-07-20 ENCOUNTER — OFFICE VISIT (OUTPATIENT)
Dept: FAMILY MEDICINE | Facility: CLINIC | Age: 58
End: 2023-07-20
Payer: COMMERCIAL

## 2023-07-20 VITALS
SYSTOLIC BLOOD PRESSURE: 134 MMHG | TEMPERATURE: 98 F | OXYGEN SATURATION: 98 % | HEART RATE: 78 BPM | RESPIRATION RATE: 20 BRPM | DIASTOLIC BLOOD PRESSURE: 82 MMHG

## 2023-07-20 VITALS — DIASTOLIC BLOOD PRESSURE: 68 MMHG | OXYGEN SATURATION: 97 % | HEART RATE: 87 BPM | SYSTOLIC BLOOD PRESSURE: 122 MMHG

## 2023-07-20 DIAGNOSIS — N81.6 RECTOCELE: Primary | ICD-10-CM

## 2023-07-20 DIAGNOSIS — M54.16 LUMBAR RADICULOPATHY: ICD-10-CM

## 2023-07-20 DIAGNOSIS — M48.062 SPINAL STENOSIS OF LUMBAR REGION WITH NEUROGENIC CLAUDICATION: Primary | ICD-10-CM

## 2023-07-20 DIAGNOSIS — K59.00 CONSTIPATION, UNSPECIFIED CONSTIPATION TYPE: ICD-10-CM

## 2023-07-20 PROCEDURE — 99213 OFFICE O/P EST LOW 20 MIN: CPT | Performed by: STUDENT IN AN ORGANIZED HEALTH CARE EDUCATION/TRAINING PROGRAM

## 2023-07-20 PROCEDURE — 72100 X-RAY EXAM L-S SPINE 2/3 VWS: CPT

## 2023-07-20 PROCEDURE — 99024 POSTOP FOLLOW-UP VISIT: CPT | Performed by: PHYSICIAN ASSISTANT

## 2023-07-20 RX ORDER — POLYETHYLENE GLYCOL 3350 17 G/17G
1 POWDER, FOR SOLUTION ORAL DAILY
Qty: 510 G | Refills: 0 | Status: SHIPPED | OUTPATIENT
Start: 2023-07-20

## 2023-07-20 ASSESSMENT — PAIN SCALES - GENERAL: PAINLEVEL: MODERATE PAIN (4)

## 2023-07-20 NOTE — PATIENT INSTRUCTIONS
Patient has been advised to wean out of her brace. By removing the brace for 1-2 hours a day, increasing each day by 1-2 hour increments.  If at any time during the wean you experience excessive fatigue, back pain or spasm, back down to the previous level for a day or so, then resume schedule.  Once out of brace for a full 8 hours, discontinue altogether or wear only as needed for comfort. She has also been advised to gradually increase activity and can add 5 pounds per week until back to her normal amount. Provided referral to physical therapy to focus on core strength and mobility as well as lower extremity strength. Will plan to follow up in 6 weeks with lumbar xray and understands to contact the office sooner should any symptoms worsen. She has also been advised to follow up with her PCP in regards to her LLE swelling.

## 2023-07-20 NOTE — PROGRESS NOTES
Neurosurgery Progress Note: 7/20/2023     A/P: postoperative exploration of prior lumbar 3- lumbar 5 instrumented fusion with removal and replacement of pedicle screws and extension to sacral 1. Lumbar 3-sacral 1  Left transforaminal lumbar interbody fusion and posterolateral arthrodesis with use of stealth navigation on 6/9/2023 by Dr. Kent     Plan: Patient has been advised to wean out of her brace. By removing the brace for 1-2 hours a day, increasing each day by 1-2 hour increments.  If at any time during the wean you experience excessive fatigue, back pain or spasm, back down to the previous level for a day or so, then resume schedule.  Once out of brace for a full 8 hours, discontinue altogether or wear only as needed for comfort. She has also been advised to gradually increase activity and can add 5 pounds per week until back to her normal amount. Provided referral to physical therapy to focus on core strength and mobility as well as lower extremity strength. Will plan to follow up in 6 weeks with lumbar xray and understands to contact the office sooner should any symptoms worsen. She has also been advised to follow up with her PCP in regards to her LLE swelling.        Ms. Parikh is a pleasant 57 year old right handed female who presents postoperative exploration of prior lumbar 3- lumbar 5 instrumented fusion with removal and replacement of pedicle screws and extension to sacral 1. Lumbar 3-sacral 1  Left transforaminal lumbar interbody fusion and posterolateral arthrodesis with use of stealth navigation on 6/9/2023 by Dr. Kent. Presently she states that she is overall doing well. She has continued complaint of left radicular leg pain but states that following surgery it become more severe and has since improved though still presently. She notes that her anterior shin is very sensitive to touch. She also states a few days ago she went to  her left leg and had weakness in her hip flexor strength.  She feels that her left leg is swollen but has no calf tenderness. She states that overall she is doing better. She was seen by pain management and advised to being aquatic therapy but there were no openings until September and it was too far to travel.     HPI: 58 yo female who is s/p L3-5 fusion at OSH and recent left thoracic 12-lumbar 1 microdiscectomy in 9/22. Ongoing L5 radiculopathy. MRI lumbar spine shows moderate facet hypertrophy with effusions and moderate to severe foraminal narrowing at lumbar 5-sacral 1 contributing to her symptoms. Recommend exploration and extension of her fusion to S1 with a left lumbar 5-sacral 1 TILF.  Risks and benefits were discussed in detail including but not limited to infection, hematoma, nerve damage including paralysis, post op radiculitis, durotomy, lack of a sold bone fusion, hardware malfunction, risks associated with the use of general anesthesia, blood clots in the lungs or legs. She agreed to proceed.     Exam:  /68   Pulse 87   LMP 04/10/2016   SpO2 97%     General: alert and oriented x3     Strength is 5/5 throughout both upper extremities throughout slight weakness of left hip flexor, in standing march not able to lift left leg are high as right side     Sensation is intact throughout both upper and lower extremities    Gait is smooth and coordinated      Imaging:  Xray reviewed personally  IMPRESSION:   Nomenclature is based on 5 lumbar vertebral bodies. Unchanged mild levoconvex curvature with apex at L1-L2. Minimal retrolisthesis T12 on L1 and mild retrolisthesis L1 on L2 and L2 on L3, unchanged. Small presumed chronic Schmorl's node along the T12   superior endplate, unchanged. No definite new gross vertebral body height loss identified.     Interbody spacer devices/graft in the L3-L4, L4-L5 and L5-S1 disc spaces, as before. Bilateral posterior fusion instrumentation spanning L3-S1, unchanged. No definite radiographic findings of hardware loosening or  failure are identified. There is severe   disc space narrowing at L1-L2 with mild and moderate multilevel disc space narrowing elsewhere. Scattered degenerative hypertrophic changes of the facets, as before. Surgical clips in the right upper quadrant of the abdomen. Suture material seen   projecting over the pelvis. The previously seen lower back skin staples overlying the lumbosacral spine have been removed since the prior exam.   This result has not been signed. Information might be incomplete.         Sandra Rivas PA-C  [facility]  O: [phone]

## 2023-07-20 NOTE — PROGRESS NOTES
"  Assessment & Plan     Rectocele  Longstanding history of constipation, now with feeling of fullness in her vagina but no mass or discharge noted. On exam, midline rectocele present which is likely the cause of the feeling of fullness. Has multiple risk factors for pelvic organ prolapse including constipation, history of vaginal deliveries, recent smoking hx, overweight. Discussed nonsurgical treatments for rectocele including treatment of constipation as below. If bothersome, can consider pelvic floor PT, discussed exercises with patient. OBGYN referral sent if patient desires to proceed with pessary.  - Ob/Gyn Referral; Future    Constipation, unspecified constipation type  - polyethylene glycol (MIRALAX) 17 GM/Dose powder; Take 17 g (1 Capful) by mouth daily     BMI:   Estimated body mass index is 32.31 kg/m  as calculated from the following:    Height as of 6/1/23: 1.499 m (4' 11.02\").    Weight as of 6/1/23: 72.6 kg (160 lb 1.6 oz).   Weight management plan: Discussed healthy diet and exercise guidelines        No follow-ups on file.    Layne Aguayo MD  [facility]    Delfin Marks is a 57 year old, presenting for the following health issues:  Urgent Care (Pt had back surgery June 9/2023 pt is constipation and vaginal pain/abdominal pain and cramping )        4/11/2023     1:58 PM   Additional Questions   Roomed by Mario MAE     Constipation  - Had back surgery about 6 weeks ago. Had back surgery follow up this afternoon and that went fine  - Now having vaginal pain, abdominal pain. Felt like \"old tampon stuck in there\". Feels uncomfortable. Not like that this morning. Has a feeling of fullness in her abdomen. No cramping.   - No vaginal discharge.   - Last BM 2-3 days ago. Since then, only pellets. Taking stool softeners OTC (sennakot).  - Has two kids, vaginal deliveries.     Review of Systems   Constitutional, HEENT, cardiovascular, pulmonary, gi and gu systems are " negative, except as otherwise noted.      Objective    /82   Pulse 78   Temp 98  F (36.7  C) (Tympanic)   Resp 20   LMP 04/10/2016   SpO2 98%   There is no height or weight on file to calculate BMI.  Physical Exam   GENERAL: healthy, alert and no distress  RESP: lungs clear to auscultation - no rales, rhonchi or wheezes  CV: regular rate and rhythm, normal S1 S2, no S3 or S4, no murmur, click or rub, no peripheral edema and peripheral pulses strong  ABDOMEN: soft, nontender, no hepatosplenomegaly, no masses and bowel sounds normal   (female): normal female external genitalia, normal urethral meatus , vaginal mucosa pink, moist, well rugated and rectocele present  MS: no gross musculoskeletal defects noted, no edema

## 2023-07-20 NOTE — NURSING NOTE
"Selina Parikh is a 57 year old female who presents for:  Chief Complaint   Patient presents with     RECHECK        Initial Vitals:  /68   Pulse 87   LMP 04/10/2016   SpO2 97%  Estimated body mass index is 32.31 kg/m  as calculated from the following:    Height as of 6/1/23: 4' 11.02\" (1.499 m).    Weight as of 6/1/23: 160 lb 1.6 oz (72.6 kg).. There is no height or weight on file to calculate BSA. BP completed using cuff size: regular  Moderate Pain (4)    Uzair Bishop    "

## 2023-07-26 ENCOUNTER — VIRTUAL VISIT (OUTPATIENT)
Dept: PALLIATIVE MEDICINE | Facility: CLINIC | Age: 58
End: 2023-07-26
Attending: NURSE PRACTITIONER
Payer: COMMERCIAL

## 2023-07-26 DIAGNOSIS — M48.062 SPINAL STENOSIS OF LUMBAR REGION WITH NEUROGENIC CLAUDICATION: Primary | ICD-10-CM

## 2023-07-26 DIAGNOSIS — G89.4 CHRONIC PAIN SYNDROME: ICD-10-CM

## 2023-07-26 DIAGNOSIS — G89.29 CHRONIC LOW BACK PAIN WITH LEFT-SIDED SCIATICA, UNSPECIFIED BACK PAIN LATERALITY: ICD-10-CM

## 2023-07-26 DIAGNOSIS — M54.42 CHRONIC LOW BACK PAIN WITH LEFT-SIDED SCIATICA, UNSPECIFIED BACK PAIN LATERALITY: ICD-10-CM

## 2023-07-26 PROCEDURE — 96156 HLTH BHV ASSMT/REASSESSMENT: CPT | Mod: VID | Performed by: PSYCHOLOGIST

## 2023-08-03 ENCOUNTER — THERAPY VISIT (OUTPATIENT)
Dept: PHYSICAL THERAPY | Facility: CLINIC | Age: 58
End: 2023-08-03
Attending: PHYSICIAN ASSISTANT
Payer: COMMERCIAL

## 2023-08-03 DIAGNOSIS — Z98.1 HISTORY OF LUMBAR FUSION: ICD-10-CM

## 2023-08-03 DIAGNOSIS — M51.26 LUMBAR DISC HERNIATION: ICD-10-CM

## 2023-08-03 DIAGNOSIS — M48.062 SPINAL STENOSIS OF LUMBAR REGION WITH NEUROGENIC CLAUDICATION: ICD-10-CM

## 2023-08-03 PROCEDURE — 97161 PT EVAL LOW COMPLEX 20 MIN: CPT | Mod: GP | Performed by: PHYSICAL THERAPIST

## 2023-08-03 PROCEDURE — 97110 THERAPEUTIC EXERCISES: CPT | Mod: GP | Performed by: PHYSICAL THERAPIST

## 2023-08-03 NOTE — PROGRESS NOTES
PHYSICAL THERAPY EVALUATION  Type of Visit: Evaluation    See electronic medical record for Abuse and Falls Screening details.    Subjective       Presenting condition or subjective complaint: back surgery  Date of onset: 06/09/23    Relevant medical history: Arthritis; Bladder or bowel problems; Migraines or headaches; Neck injury; Overweight; Pain at night or rest; Significant weakness   Dates & types of surgery: Back 2021 fusion, June 2023 fusion; Neck fusion 2015, 2016 neck hardware removal, 2019 fusion;    Prior diagnostic imaging/testing results: MRI; CT scan; X-ray     Prior therapy history for the same diagnosis, illness or injury: Yes 1998 back surgery      Living Environment  Social support: With family members   Type of home: House; 1 level   Stairs to enter the home: Yes 3 Is there a railing: No   Ramp: No   Stairs inside the home: Yes 12 Is there a railing: Yes   Help at home: None  Equipment owned: [employer]     Employment: No    Hobbies/Interests: Dazo    Patient goals for therapy: walk betterwalking longer, continue feeling good    Pain assessment: Pain present  Location: left lateral thorax, left low back, slightly into/Rating: 3/10     Objective   LUMBAR SPINE EVALUATION  PAIN: Pain Level at Rest: 3/10  Pain Level with Use: 7/10  Pain Location: lumbar spine and hip  Pain Quality: Aching and Tingling  Pain Frequency: intermittent  Pain is Worst: doesn't matter time of day  Pain is Exacerbated By: staying in one position, cleaning  Pain is Relieved By: cold and rest  INTEGUMENTARY (edema, incisions): WNL  POSTURE: WNL  GAIT:   Weightbearing Status: WBAT  Assistive Device(s): None  Gait Deviations: WNL  BALANCE/PROPRIOCEPTION: WNL  WEIGHTBEARING ALIGNMENT: WNL  NON-WEIGHTBEARING ALIGNMENT: WNL   ROM:  extension/sidebend/rotation slightly limited and guarded  PELVIC/SI SCREEN: WNL  STRENGTH:  4/5 BLE   FLEXIBILITY:  tight hamstring, paraspinals, glutes  LUMBAR/HIP Special Tests:  DNT    FUNCTIONAL  TESTS: Double Leg Squat: Anterior knee translation, Knee valgus, Hip internal rotation, and Improper use of glutes/hips  PALPATION: WNL  SPINAL SEGMENTAL CONCLUSIONS:  DNT    Assessment & Plan   CLINICAL IMPRESSIONS  Medical Diagnosis: Spinal stenosis of lumbar region with neurogenic claudication (M48.062)    Treatment Diagnosis:     Impression/Assessment: Patient is a 57 year old female with low back complaints.  The following significant findings have been identified: Pain, Decreased ROM/flexibility, Decreased joint mobility, Decreased strength, Impaired gait, Impaired muscle performance, and Decreased activity tolerance. These impairments interfere with their ability to perform self care tasks, recreational activities, household chores, driving , household mobility, and community mobility as compared to previous level of function.     Clinical Decision Making (Complexity):  Clinical Presentation: Stable/Uncomplicated  Clinical Presentation Rationale: based on medical and personal factors listed in PT evaluation  Clinical Decision Making (Complexity): Low complexity    PLAN OF CARE  Treatment Interventions:  Interventions: Gait Training, Manual Therapy, Neuromuscular Re-education, Therapeutic Activity, Therapeutic Exercise, Self-Care/Home Management    Long Term Goals     PT Goal 1  Goal Identifier: 1.  Goal Description: Patient will tolerate standing > 10 minutes at a time in order to complete household tasks.  Target Date: 08/24/23  PT Goal 2  Goal Identifier: 2.  Goal Description: Patient will tolerate sitting activities > 30 minutes in order tolerate car rides.  Goal Progress: w+3  Target Date: 08/24/23  PT Goal 3  Goal Identifier: 3.  Goal Description: Patient will tolerate lifting, carrying, and playing with grandkids with <3/10 pain.  Target Date: 09/14/23      Frequency of Treatment: 1x/week  Duration of Treatment: 6 weeks    Education Assessment:        Risks and benefits of evaluation/treatment have been  explained.   Patient/Family/caregiver agrees with Plan of Care.     Evaluation Time:        Signing Clinician: Kaya Mansfield, PT      [facility]                                                                                   OUTPATIENT PHYSICAL THERAPY      PLAN OF TREATMENT FOR OUTPATIENT REHABILITATION   Patient's Last Name, First Name, Selina Montoya YOB: 1965   Provider's Name   [facility]   Medical Record No.  [id number]     Onset Date: 06/09/23  Start of Care Date: 08/03/23     Medical Diagnosis:  Spinal stenosis of lumbar region with neurogenic claudication (M48.062)      PT Treatment Diagnosis:    Plan of Treatment  Frequency/Duration: 1x/week/ 6 weeks    Certification date from 08/03/23 to 09/14/23         See note for plan of treatment details and functional goals     Kaya Mansfield, PT                         I CERTIFY THE NEED FOR THESE SERVICES FURNISHED UNDER        THIS PLAN OF TREATMENT AND WHILE UNDER MY CARE     (Physician attestation of this document indicates review and certification of the therapy plan).                Referring Provider:  Sandra Rivas      Initial Assessment  See Epic Evaluation- Start of Care Date: 08/03/23

## 2023-08-18 ENCOUNTER — VIRTUAL VISIT (OUTPATIENT)
Dept: PALLIATIVE MEDICINE | Facility: CLINIC | Age: 58
End: 2023-08-18
Payer: COMMERCIAL

## 2023-08-18 DIAGNOSIS — M54.42 CHRONIC LOW BACK PAIN WITH LEFT-SIDED SCIATICA, UNSPECIFIED BACK PAIN LATERALITY: ICD-10-CM

## 2023-08-18 DIAGNOSIS — M48.062 SPINAL STENOSIS OF LUMBAR REGION WITH NEUROGENIC CLAUDICATION: Primary | ICD-10-CM

## 2023-08-18 DIAGNOSIS — G89.29 CHRONIC LOW BACK PAIN WITH LEFT-SIDED SCIATICA, UNSPECIFIED BACK PAIN LATERALITY: ICD-10-CM

## 2023-08-18 DIAGNOSIS — G89.4 CHRONIC PAIN SYNDROME: ICD-10-CM

## 2023-08-18 PROCEDURE — 96158 HLTH BHV IVNTJ INDIV 1ST 30: CPT | Mod: VID | Performed by: PSYCHOLOGIST

## 2023-08-18 PROCEDURE — 96159 HLTH BHV IVNTJ INDIV EA ADDL: CPT | Mod: VID | Performed by: PSYCHOLOGIST

## 2023-08-18 NOTE — PROGRESS NOTES
Selina Parikh is a 57 year old female who is being evaluated via a billable video visit.      Patient is currently in the [facility]? yes    Patient would like the video invitation sent by: Text to cell phone: [phone]}    Video Start Time: 1:29 PM  Video Stop Time:  2:16 PM     Additional provider notes:     Pain Diagnoses per pain provider:   Spinal stenosis of lumbar region with neurogenic claudication     Chronic low back pain with left-sided sciatica, unspecified back pain laterality     Chronic pain syndrome        DATA: During today's visit you reported the following: Your back pain is mildly increased - believe this could be from resuming smoking in the past couple weeks. Your mood is stable. Your activity level is mildly increased, discovered [place] and walking on the path with grandchildren. Your stress level is fairly manageable. Your sleep is about the same  - still very disrupted. You reported engaging in self-care for your pain 2-3 times daily.    You identified that you would like to focus on the following or had questions regarding the following issues or concerns, and we discussed the following:   - reminded you need to schedule a follow-up with CARLTON Nunez CNP if you would like to continue to see me  - started smoking again - stress related to brother living with you and his memory loss related to TBI  - currently using ice regularly, Celebrex, PT  - grandson staying with you since returning from [facility]  - window of tolerance  - would like to quit smoking again   - sleep hygiene skills - no clock watching, get up after 20-30 min and go someplace quiet (no phone, lights, tablets, tv, etc.), deep breathing, meditation, consistent sleep/wake times, avoid naps, reduce caffeine (none after 3 PM); if you have worry thoughts try 'brain dump'    ASSESSMENT: Oriented to follow up. Selina is easily engaeged and open to exploring new strategies to manage her pain and her  sleep.    PLAN:   Your next appointment is scheduled for 9/8 at 10:00 AM.  Assignment/Objectives /interventions for next session:   - try sleep hygiene tips if what you're currently doing with podcast isn't helpful  - schedule follow up with CARLTON Nunez CNP     We believe regular attendance is key to your success in our program!    Any time you are unable to keep your appointment we ask that you call us at [phone] at least 24 hours in advance to cancel.This will allow us to offer the appointment time to another patient.   Multiple missed appointments may lead to dismissal from the clinic.    Telemedicine Visit: The patient's condition can be safely assessed and treated via synchronous audio and visual telemedicine encounter.      Reason for Telemedicine Visit: Services only offered telehealth    Originating Site (Patient Location): Patient's home    Distant Site (Provider Location): Provider Remote Setting- Home Office    Consent:  The patient/guardian has verbally consented to: the potential risks and benefits of telemedicine (video visit) versus in person care; bill my insurance or make self-payment for services provided; and responsibility for payment of non-covered services.     Mode of Communication:  Video Conference via orderbolt    As the provider I attest to compliance with applicable laws and regulations related to telemedicine.      Drea Martin PsyD LP  Licensed Psychologist  Outpatient Clinic Therapist  [facility]

## 2023-08-24 ENCOUNTER — THERAPY VISIT (OUTPATIENT)
Dept: PHYSICAL THERAPY | Facility: CLINIC | Age: 58
End: 2023-08-24
Attending: PHYSICIAN ASSISTANT
Payer: COMMERCIAL

## 2023-08-24 DIAGNOSIS — M51.26 LUMBAR DISC HERNIATION: ICD-10-CM

## 2023-08-24 DIAGNOSIS — Z98.1 HISTORY OF LUMBAR FUSION: ICD-10-CM

## 2023-08-24 DIAGNOSIS — M48.062 SPINAL STENOSIS OF LUMBAR REGION WITH NEUROGENIC CLAUDICATION: Primary | ICD-10-CM

## 2023-08-24 PROCEDURE — 97110 THERAPEUTIC EXERCISES: CPT | Mod: GP | Performed by: PHYSICAL THERAPIST

## 2023-09-07 NOTE — PROGRESS NOTES
ACUPUNCTURIST TREATMENT NOTE      Selina Parikh, a 57 year old female, is here today for initial exam. Patient is referred by Jannie.    HPI  Main Complaint: Chronic low back pain:    Patient recently underwent exploration of prior lumbar 3- lumbar 5 instrumented fusion with removal and replacement of pedicle screws and extension to sacral 1. Lumbar 3-sacral 1  Left transforaminal lumbar interbody fusion and posterolateral arthrodesis with use of stealth navigation on 6/9/2023. Patient states since surgery her pain is worse and now in the right side and radiating down RLE. Pain, tingling, numbness, burning all the way down to toes. Pain depends upon positioning and movement, worse with driving especially, walking, difficult to sleep on sides. Patient felt PT was making pain worse. Has stopped doing any PT.     She is able to be independent in ADLs, however, everything takes her longer to do. Has to use a stool at the stove for cooking.      Secondary complaints: smoking cessation    Acupuncture Intake Form    Have you had acupuncture before? yes  For what conditions? neck pain    What are you seeking treatment for today? back leg hip pain    Health History    What are your most important health concerns?  Please list in order of importance:     Health Concern 1: back pain  Date of Onset: 2015    Health Concern 2: smoking  Date of Onset: 48 yr    Health Concern 3:    Date of Onset:      Is there anything that improves or aggravates these conditions?      Do you have a pacemaker? Yes    Do you have any other active implanted medical devices? (such as: spinal cord stimulator, deep brain stimulator, cochlear implant etc.) If yes, please list: no    Lifestyle History    How much caffeine do you consume? 4 cup coffee  (Drinks per week)         What type? Coffee/espresso drinks     How much soda pop do you consume? 0  (Drinks per week)       What type?      How much water do you drink per day? 2    # hours you sleep per  night: 2to4    Do you sleep well? no  Do you awake feeling rested? no    At what time of day is your energy typically at its best? noon  At its worst? am pm    How much change are you willing to/able to make at this time to improve your health? Complete        Past Medical History  Past Medical History Reviewed: Yes   has a past medical history of Anemia, Antiplatelet or antithrombotic long-term use, Asymptomatic stenosis of left carotid artery, Cancer (H), Coronary artery disease, Hiatal hernia, Hypertension, Irritable bowel syndrome, Other chronic pain, and Stented coronary artery.    Objective    TCM Exam Completed: Yes  Sleep: frequent awakening and restless due to pain  Limbs/Back: Right Lower Extremity and Lower Back  Stools: Constipation  Temperature/Perspiration: nighttime and hot flashes    Tongue/Pulse Exam Completed: No    Patient Assessment  Patient Type: Pain  Patient Complaint: Chronic low back pain, radiating down RLE to foot        6/9/2023     3:27 PM 9/12/2023     7:00 AM   Acupuncture   Intervention Reason Pain Pain   Pain Location Low back low back, RLE   Pre-session Pain Rating 6 8   Post-session Pain Rating 4        TCM Diagnosis: Qi Stagnation;Blood Stagnation;Yin Deficiency;Other channel obstruction    Treatment Principle: Course qi and blood, nourish yin, unblock channels, stop pain    TCM / Acupuncture Treatment  Acupuncture Points:       Initial insertions: Baihui, HTJJ L2-L5       Second insertions: Ub23, Shiqizhuixia, Ub31, Ub32. (R): Yaoyan, Ub53, Ub54, Gb29       Additional insertions: Gb34, Ub57, Ki7, Ub60, (L) NADA    Number of needles inserted: 33  Number of needles removed: 33          7/20/2023     1:00 PM 8/3/2023     3:00 PM   Oswestry Disability Index (MAKEDA   Darren Hoskins 1980, All rights reserved)   Section 1 - Pain intensity I have no pain at the moment. The pain is very mild at the moment.   Section 2 - Personal care (washing, dressing, etc.)  It is painful to look after  myself and I am slow and careful. It is painful to look after myself and I am slow and careful.   Section 3 - Lifting I can only lift very light weights. Pain prevents me from lifting heavy weights but I can manage light to medium weights if they are conveniently positioned.   Section 4 - Walking Pain prevents me from walking more than 100 yards. Pain prevents me from walking more than a quarter of a mile.   Section 5 - Sitting Pain prevents me from sitting for more than half an hour. Pain prevents me from sitting for more than half an hour.   Section 6 - Standing Pain prevents me from standing for more than 10 minutes. Pain prevents me from standing for more than 10 minutes.   Section 7 - Sleeping Because of pain I have less than 2 hours sleep. Because of pain I have less than 4 hours sleep.   Section 8 - Sex life (if applicable) My sex life is severely restricted by pain.    Section 9 - Social life My social life is normal but increases the degree of pain. Pain has restricted my social life and I do not go out as often.   Section 10 - Traveling Pain is bad but I am able to manage trips over two hours. Pain restricts me to trips of less than one hour.   Oswestry Score (%) 52 53.33       Assessment and Plan  Treatment Observations: Patient relaxed well and fell asleep during treatment  Acupuncture Treatment Recommendations: Acupuncture 1x/wk for 12 weeks for M54.42, G89.29 (ICD-10-CM) - Chronic low back pain with left-sided sciatica, unspecified back pain laterality  G89.4 (ICD-10-CM) - Chronic pain syndrome       It is my recommendation that this patient seek advice from their Primary Care Provider about active symptoms not addressed during this visit. The risks and benefits of acupuncture were reviewed and the patient stated understanding. The patient's questions were answered to their satisfaction. Consent was provided for treatment. We thank you for the referral and opportunity to treat this patient.    Time  Spent with Patient:   I spent a total of 40 minutes face-to-face with Selina Parikh during today's office visit.     Alexa Lundberg L.Ac.   09/12/23

## 2023-09-08 ENCOUNTER — VIRTUAL VISIT (OUTPATIENT)
Dept: PALLIATIVE MEDICINE | Facility: CLINIC | Age: 58
End: 2023-09-08
Payer: COMMERCIAL

## 2023-09-08 DIAGNOSIS — M48.062 SPINAL STENOSIS OF LUMBAR REGION WITH NEUROGENIC CLAUDICATION: Primary | ICD-10-CM

## 2023-09-08 DIAGNOSIS — M54.42 CHRONIC LOW BACK PAIN WITH LEFT-SIDED SCIATICA, UNSPECIFIED BACK PAIN LATERALITY: ICD-10-CM

## 2023-09-08 DIAGNOSIS — G89.4 CHRONIC PAIN SYNDROME: ICD-10-CM

## 2023-09-08 DIAGNOSIS — G89.29 CHRONIC LOW BACK PAIN WITH LEFT-SIDED SCIATICA, UNSPECIFIED BACK PAIN LATERALITY: ICD-10-CM

## 2023-09-08 PROCEDURE — 96159 HLTH BHV IVNTJ INDIV EA ADDL: CPT | Mod: VID | Performed by: PSYCHOLOGIST

## 2023-09-08 PROCEDURE — 96158 HLTH BHV IVNTJ INDIV 1ST 30: CPT | Mod: VID | Performed by: PSYCHOLOGIST

## 2023-09-08 NOTE — PROGRESS NOTES
Selina Parikh is a 57 year old female who is being evaluated via a billable video visit.      Patient is currently in the [facility]? yes    Patient would like the video invitation sent by: Text to cell phone: [phone]}    Video Start Time: 9:59 AM  Video Stop Time: 10:49 AM    Additional provider notes:     Pain Diagnoses per pain provider:   Spinal stenosis of lumbar region with neurogenic claudication     Chronic low back pain with left-sided sciatica, unspecified back pain laterality     Chronic pain syndrome            DATA: During today's visit you reported the following: Your back pain is worse - report right side is like left side was. Believe chair planks from PT may have been triggering, stopped them. Your mood is stable. Your activity level is stable. Your stress level is overall manageable. Your sleep is still the same - pain still disrupts, also easily awakened by traffic and other noises at night. You reported engaging in self-care for your pain 2-3 times daily.    You identified that you would like to focus on the following or had questions regarding the following issues or concerns, and we discussed the following:   - planning to discuss PT exercises today at appointment  - starting acupuncture next week  - would like to quit smoking  - encouraged to ask PT about sciatic nerve pain stretches  - check in with yourself every few hours to see if you need to use skills to stay on top of pain  - using ambient noise at night, I heart radio shorty to drown out traffic - still struggling with sleep though    ASSESSMENT: Overall Selina seems to be doing a fantastic job of listening to cues from her body. We discussed again clinic policy of actively working with a pain provider to continue to have access to see pain psychology. She is not interested in injections or medications to manage her pain, which arguably are not warranted at this point anyways; this would likely indicate further  appointments with CARLTON Nunez CNP would not be required. We discussed we could have one additional appointment, whether she sees Gill or not, however she seems to be actively using skills and honoring cues from her body.     PLAN:   Your next appointment is scheduled for 10/11 at 10:00 AM.  Assignment/Objectives /interventions for next session:   - sciatic nerve stretches or discuss with PT today  - schedule follow up with CARLTON Nunez CNP   - keep your current routine - it seems to be working well    We believe regular attendance is key to your success in our program!    Any time you are unable to keep your appointment we ask that you call us at [phone] at least 24 hours in advance to cancel.This will allow us to offer the appointment time to another patient.   Multiple missed appointments may lead to dismissal from the clinic.    Telemedicine Visit: The patient's condition can be safely assessed and treated via synchronous audio and visual telemedicine encounter.      Reason for Telemedicine Visit: Services only offered telehealth    Originating Site (Patient Location): Patient's home    Distant Site (Provider Location): Provider Remote Setting- Home Office    Consent:  The patient/guardian has verbally consented to: the potential risks and benefits of telemedicine (video visit) versus in person care; bill my insurance or make self-payment for services provided; and responsibility for payment of non-covered services.     Mode of Communication:  Video Conference via [employer]    As the provider I attest to compliance with applicable laws and regulations related to telemedicine.      Drea Martin PsyD LP  Licensed Psychologist  Outpatient Clinic Therapist  [facility]

## 2023-09-12 ENCOUNTER — OFFICE VISIT (OUTPATIENT)
Dept: PALLIATIVE MEDICINE | Facility: OTHER | Age: 58
End: 2023-09-12
Attending: NURSE PRACTITIONER
Payer: COMMERCIAL

## 2023-09-12 VITALS
HEART RATE: 93 BPM | OXYGEN SATURATION: 98 % | WEIGHT: 163 LBS | BODY MASS INDEX: 32.9 KG/M2 | SYSTOLIC BLOOD PRESSURE: 116 MMHG | DIASTOLIC BLOOD PRESSURE: 67 MMHG

## 2023-09-12 DIAGNOSIS — M54.16 LUMBAR RADICULOPATHY: Primary | ICD-10-CM

## 2023-09-12 DIAGNOSIS — G89.4 CHRONIC PAIN SYNDROME: ICD-10-CM

## 2023-09-12 DIAGNOSIS — M54.42 CHRONIC LOW BACK PAIN WITH LEFT-SIDED SCIATICA, UNSPECIFIED BACK PAIN LATERALITY: Primary | ICD-10-CM

## 2023-09-12 DIAGNOSIS — G89.29 CHRONIC INTRACTABLE PAIN: ICD-10-CM

## 2023-09-12 DIAGNOSIS — G89.29 CHRONIC LOW BACK PAIN WITH LEFT-SIDED SCIATICA, UNSPECIFIED BACK PAIN LATERALITY: Primary | ICD-10-CM

## 2023-09-12 PROCEDURE — 97810 ACUP 1/> WO ESTIM 1ST 15 MIN: CPT | Performed by: ACUPUNCTURIST

## 2023-09-12 PROCEDURE — G0463 HOSPITAL OUTPT CLINIC VISIT: HCPCS | Mod: 25 | Performed by: NURSE PRACTITIONER

## 2023-09-12 PROCEDURE — 99215 OFFICE O/P EST HI 40 MIN: CPT | Performed by: NURSE PRACTITIONER

## 2023-09-12 PROCEDURE — 97811 ACUP 1/> W/O ESTIM EA ADD 15: CPT | Performed by: ACUPUNCTURIST

## 2023-09-12 RX ORDER — DULOXETIN HYDROCHLORIDE 20 MG/1
CAPSULE, DELAYED RELEASE ORAL
Qty: 63 CAPSULE | Refills: 0 | Status: SHIPPED | OUTPATIENT
Start: 2023-09-12 | End: 2023-09-22 | Stop reason: SINTOL

## 2023-09-12 ASSESSMENT — ENCOUNTER SYMPTOMS
INSOMNIA: 1
LOSS OF CONSCIOUSNESS: 0
NAUSEA: 1
BOWEL INCONTINENCE: 1
MUSCLE CRAMPS: 1
LIGHT-HEADEDNESS: 1
DECREASED CONCENTRATION: 1
FEVER: 0
FATIGUE: 1
JAUNDICE: 0
HEARTBURN: 1
TREMORS: 1
PANIC: 0
SLEEP DISTURBANCES DUE TO BREATHING: 1
PALPITATIONS: 1
HEADACHES: 1
MEMORY LOSS: 1
POLYPHAGIA: 0
SKIN CHANGES: 0
MUSCLE WEAKNESS: 1
ALTERED TEMPERATURE REGULATION: 1
NERVOUS/ANXIOUS: 1
JOINT SWELLING: 1
POLYDIPSIA: 1
DISTURBANCES IN COORDINATION: 1
EXERCISE INTOLERANCE: 1
CONSTIPATION: 1
DECREASED APPETITE: 1
HYPOTENSION: 0
HALLUCINATIONS: 0
ARTHRALGIAS: 1
STIFFNESS: 1
ABDOMINAL PAIN: 1
BLOATING: 1
DIZZINESS: 1
VOMITING: 1
BACK PAIN: 1
DIARRHEA: 0
NIGHT SWEATS: 1
ORTHOPNEA: 0
SYNCOPE: 0
NECK PAIN: 1
DEPRESSION: 0
WEIGHT GAIN: 1
NAIL CHANGES: 1
POOR WOUND HEALING: 1
LEG PAIN: 1
SEIZURES: 0
PARALYSIS: 0
BLOOD IN STOOL: 0
CHILLS: 1
WEIGHT LOSS: 0
RECTAL PAIN: 1
HYPERTENSION: 0
TINGLING: 1
SPEECH CHANGE: 0
NUMBNESS: 1
WEAKNESS: 1
MYALGIAS: 1
INCREASED ENERGY: 1

## 2023-09-12 ASSESSMENT — PAIN SCALES - GENERAL: PAINLEVEL: EXTREME PAIN (8)

## 2023-09-12 NOTE — PROGRESS NOTES
Date:5/01/2023      COMPREHENSIVE PAIN CLINIC FOLLOW UP EVALUATION    I had the pleasure of meeting Ms. Selina Parikh on 5/1/2023 in the [facility] in consult for CARLTON Sanchez with regards to her pain.  The patient is a 57 year old female with past medical history of post traumatic brain syndrome, GERD, Diverticulitis, CAD, occipital neuralgia, chest pain, HTN, L3-5 fusion and left T12-L1 microdiscectomy with Dr. Kent 09/2022, and carotid stenosis who presents for evaluation of chronic pain.    Updates since initial consult appointment on 05/01/2023.  06/09/2023 patient had surgery with Dr. Kent.  She was playing with her grandchild on the floor and heard a pop.  She had no symptoms until the next day.  She was also participating in PT.   She reports constant pain in R) leg which is new.  Left leg has intermittent numbness.  Pain in right leg worsens with walking.  Unclear what caused the new symptoms in R) leg. She notified Dr. Burch and xrays were ordered and to be reviewed at next appt.  She does not tolerate gabapentin or pregabalin.  PT has been put on hold for now.  She has chronic insomnia. She is listening to an shorty to help her fall asleep.  She has difficulty staying asleep.  Discussed cymbalta for chronic pain as well as grounding and frequency specific microcurrent.  Discussed looking into activating the vagus nerve to help with relaxation.  Discussed documentary [place] and anti-inflammatory life style.  She received methocarbamol and hydromorphone 2mg for post op pain.    She started smoking again after lumbar surgery 1/2 pack per day.  She is determined to stop smoking.    Interventions/Injections: none    Progress Notes Reviewed:  06/09/2023 Nemo Peters CNP, Neurosurgery discharge note [facility]  Exploration of prior lumbar 3- lumbar 5 instrumented fusion with removal and replacement of pedicle screws and extension to sacral 1. Lumbar 3-sacral 1  Left  "transforaminal lumbar interbody fusion and posterolateral arthrodesis with use of stealth navigation.  Surgery was without complications.  Postoperatively she reported feeling well with incisional back pain but no leg pain, numbness or tingling.        Any hospitalizations/ER/UC visits since last appointment: 06/09/2023 lumbar surgery  Any falls since last appointment:  none      Primary Pain : R) leg      Characteristics: pain in R) leg, numbness in L) leg      What makes the pain better:  ice, TENS unit  What makes the pain worse: walking, activity  Current pain on 0/10 VAS:   6    Worst pain:  10    Best pain:  4          Current Pain Related Medications:  Any medications changes since last appointment:  no    Opioids:  Muscle relaxants:  Acetaminophen/NSAID: She is taking acetaminophen and celebrex 100mg BID  TCA:  Anticonvulsant:  Antidepressant:  Benzodiazapine:  OTC:  Heat/Ice:  ice is helpful  Sleep medications:      Therapies:   Physical therapy, Pain Psychology, TENs unit, Grounding Mat, Frequency Specific Micro Current, Anti-inflammatory Lifestyle    Exercise:  PT is on hold until etiology of new R) leg symptoms is defined.    [place]:  [facility]:  She has had several appointments with Pain Psychology.      ----------------------------------------------------------------------------------------------------------------------------------------------------    History of pain on initial consult 05/01/2023:  Patient reports chronic left low back pain that radiates down the L) leg, wrapping around the L) posterolateral thigh and down into the foot. She has some pain down the right thigh as well but the left is worse then the right. She has b/l hip pain.  She reports spasms in L) foot.  She complains of numbness and tingling in both legs. Oral steroids did not improve her pain.The patient describes the pain as \"toothache\", constant, burning.  She reports that the pain is made worse by " standing in one spot, prolong walking, picking up heavy items.  Her pain is improved with ice, heat, frequent position changes.  She rates her pain at a 5 today, best 3 worst 9 on a 0/10 VAS. Surgery is pending for extension of the fusion to L5-S1 with Dr. Kent in June 2023. 2021 L) TF LESI L5-S1 [facility] without much benefit.  She does not want to be prescribed opiates.  She continues to do the exercises learned at PT on a daily basis.    She denies any new problems with falls or balance, any new numbness or weakness of the arms or legs, any new bowel or bladder incontinence, any night sweats or unexplained fevers, or any sudden or unexpected weight loss.     Follows with cardiology and neurology.  03/07/2023 Dr. Suha Kent progress note reviewed.    Selina Parikh has not been seen at a pain clinic in the past.      Patient reported symptoms:  Patient Supplied Answers To the  Pain Questionnaire      5/1/2023    10:47 AM    Pain -  Patient Entered Questionnaire/Answers   What number best describes your pain right now:  0 = No pain  to  10 = Worst pain imaginable 5   How would you describe the pain burning    sharp    numbness    dull, aching    throbbing    pressure   Which of the following worsen your pain standing    walking   Which of the following improve or reduce your pain nothing relieves the pain   What number best describes your average pain for the past week:  0 = No pain  to  10 = Worst pain imaginable 4   What number best describes your LOWEST pain in past 24 hours:  0 = No pain  to  10 = Worst pain imaginable 2   What number best describes your WORST pain in past 24 hours:  0 = No pain  to  10 = Worst pain imaginable 7   When is your pain worst Constant   What non-medicine treatments have you already had for your pain physical therapy    chiropractic care    TENS (electrical stimulator)    spine injections (shots)    other nerve blocks    surgery    exercise          Current Treatments:  Acetaminophen 1,000mg q 6 hrs-discussed max daily dose 3,000mg  Celebrex 100mg BID  Plavix 75mg daily  Melatonin      Previous Medication Treatments:  Anti-convulsants: Gabapentin causes suicidal thoughts  Muscle relaxors: Baclofen causes light headedness, zanaflex, cyclobenzaprine, methocarbamol  Anti-depressants: no  Acetaminophen/NSAIDs: yes- helpful  Topicals: Voltaren gel, Biofreeze, lidocaine patch  Opioids: tramadol and hydromorphone,   percocet and vicodin cause anxiety.  Other:  Diazepam 5mg     Other Treatments:  [facility] [place], last session 3 yrs ago.  She doesn't find physical therapy very helpfu  Pain Psychology: no  Chiropractic: yes-not helpful  Acupuncture: no  TENs Unit: yes - helpful  Injections: cervical TPI at [facility]  Surgeries: 2 lumbar sugeries    Past Medical History:  Medical history reviewed.   Past Medical History:   Diagnosis Date    Anemia     Antiplatelet or antithrombotic long-term use     Asymptomatic stenosis of left carotid artery     Cancer (H)     melanoma    Coronary artery disease     Hiatal hernia     Hypertension     Irritable bowel syndrome     Other chronic pain     Stented coronary artery       Patient Active Problem List   Diagnosis    Brain syndrome, posttraumatic    IBS (irritable bowel syndrome)    R Occipital Neuralgia    Scapulocostal syndrome    CARDIOVASCULAR SCREENING; LDL GOAL LESS THAN 160    Health Care Home    Tobacco abuse    GERD (gastroesophageal reflux disease)    Psoriasis    Diverticulitis    S/P partial colectomy    Acute chest pain    Chest pain, unspecified type    Coronary artery disease due to lipid rich plaque    Status post coronary angiogram    Primary hypertension    Spinal stenosis of lumbar region with neurogenic claudication    Hiatal hernia    Carotid stenosis    Lumbar radiculopathy    Lumbar disc herniation       Past Surgical History:  Pertinent surgical  history reviewed.   Past Surgical History:   Procedure Laterality Date    BACK SURGERY      CHOLECYSTECTOMY, LAPOROSCOPIC  02/14/2000    Cholecystectomy, Laparoscopic    COLON SURGERY      CV CORONARY ANGIOGRAM N/A 05/06/2022    Procedure: CV CORONARY ANGIOGRAM;  Surgeon: Stefanie Spangler MD;  Location: [facility] CV    CV LEFT HEART CATH N/A 05/06/2022    Procedure: Left Heart Catheterization;  Surgeon: Stefanie Spangler MD;  Location: [facility] CV    CYSTOSCOPY, INSERT LIGHTED STENT URETER(S) N/A 04/10/2018    Procedure: CYSTOSCOPY, INSERT LIGHTED STENT URETER(S);  Lighted Stent Placement,Laparoscopic Assisted Sigmoid Colectomy;  Surgeon: ERVIN Reina MD;  Location: [facility] OR    ENDOVASCULAR CAROTID STENT PLACEMENT Left 4/21/2023    Procedure: Left transcarotid revascularization;  Surgeon: Eveline Manley MD;  Location: [facility] OR    IR TCAR TRANSCAROTID ARTERY REVASCULARIZATION  4/21/2023    LAPAROSCOPIC ASSISTED COLECTOMY LEFT (DESCENDING) N/A 04/10/2018    Procedure: LAPAROSCOPIC ASSISTED COLECTOMY LEFT (DESCENDING);;  Surgeon: Hill Murray MD;  Location: [facility] OR    LUMBAR DISCECTOMY Left 09/23/2022    Procedure: left thoracic 12-lumbar 1 hemilaminectomy, medial facetectomy, foraminotomy and microdiscectomy;  Surgeon: Suha Kent MD;  Location: [facility] OR    NECK SURGERY      OPTICAL TRACKING SYSTEM FUSION SPINE POSTERIOR LUMBAR THREE+ LEVELS Bilateral 6/9/2023    Procedure: Exploration of prior lumbar 3- lumbar 5 instrumented fusion with removal and replacement of pedicle screws and extension to sacral 1. Lumbar 3-sacral 1  Left transforaminal lumbar interbody fusion and posterolateral arthrodesis with use of stealth navigation;  Surgeon: Suha Kent MD;  Location: [facility] OR    ORTHOPEDIC SURGERY  10/01/2010    Cut palm and reattched tendons and nerves in left hand forefinger.    TX ESOPHAGOGASTRODUODENOSCOPY TRANSORAL DIAGNOSTIC N/A 04/30/2021  "   Procedure: ESOPHAGOGASTRODUODENOSCOPY (EGD);  Surgeon: Souleymane Moreno DO;  Location: [facility];  Service: General    SURGICAL HISTORY OF -   01/04/2000    Esophagogastroduodenoscopy with biopsy    TUBAL LIGATION          Medications: Pertinent medications reviewed.  Current Outpatient Medications   Medication Sig Dispense Refill    acetaminophen (TYLENOL) 500 MG tablet Take 1,000 mg by mouth every 6 hours as needed for mild pain      atorvastatin (LIPITOR) 40 MG tablet Take 1 tablet by mouth once daily 90 tablet 3    clobetasol (TEMOVATE) 0.05 % external ointment APPLY  OINTMENT TOPICALLY TWICE DAILY 60 g 0    fish oil-omega-3 fatty acids 1000 MG capsule Take 1 g by mouth daily      methocarbamol (ROBAXIN) 750 MG tablet Take 1 tablet (750 mg) by mouth 4 times daily as needed for muscle spasms 42 tablet 0    metoprolol succinate ER (TOPROL XL) 50 MG 24 hr tablet Take 12.5 mg by mouth daily      Multiple Vitamins-Minerals (HAIR/SKIN/NAILS) TABS Take 1 tablet by mouth daily      multivitamin w/minerals (THERA-VIT-M) tablet Take 1 tablet by mouth daily      nitroGLYcerin (NITROSTAT) 0.4 MG sublingual tablet One tablet under the tongue every 5 minutes if needed for chest pain. May repeat every 5 minutes for a maximum of 3 doses in 15 minutes\" 25 tablet 3    omeprazole (PRILOSEC) 20 MG DR capsule Take 20 mg by mouth 3 times daily Do not substitute      polyethylene glycol (MIRALAX) 17 GM/Dose powder Take 17 g (1 Capful) by mouth daily 510 g 0    UNABLE TO FIND Take 1 tablet by mouth At Bedtime MEDICATION NAME: Qunol Sleep - melatonin 5 mg, Ashwagandha, L-Theanine      Vitamin D (Cholecalciferol) 25 MCG (1000 UT) TABS Take 1,000 Units by mouth daily      HYDROmorphone (DILAUDID) 2 MG tablet Take 1-2 tablets (2-4 mg) by mouth every 4 hours as needed for moderate pain (Patient not taking: Reported on 9/12/2023) 42 tablet 0       [place] Prescription Monitoring Program reviewed 4/28/2023.  No concern for abuse or " misuse of controlled medications based on this report.  Last opioid script was filled on 10/18/2022 Hydromorphone 2mg 28 tabs for 7 days per Davida Delvalle.  Current calculated MME: 0        Allergies: Pertinent allergies reviewed.     Allergies   Allergen Reactions    Ciprofloxacin Anaphylaxis    Flagyl [Metronidazole] Anaphylaxis    Gabapentin Other (See Comments)     Suicidal thoughts.    Zofran [Ondansetron] Other (See Comments) and GI Disturbance     Causes bloating, moderately uncomfortable, abd pain      Benadryl [Diphenhydramine] Other (See Comments)     Muscle spasms    Percocet [Oxycodone-Acetaminophen] Other (See Comments) and Headache     Goes nuts - nasty mean irritated, can take Dilaudid    Vicodin [Hydrocodone-Acetaminophen] Other (See Comments)     Anxiety-agitation       Family History:   family history includes Allergies in her daughter and son; Aortic aneurysm in her brother; C.A.D. (age of onset: 38) in her brother; C.A.D. (age of onset: 42) in her brother; C.A.D. (age of onset: 50) in her father; Cancer in her mother; Cancer (age of onset: 34) in her brother; Diabetes in her brother, brother, brother, father, and mother.    Social History:   She is  and lives with her brother, Emmanuel, in a house.  She is independent in ADL's.  She is smoking 1/2 pack daily. She is on SSD.   reports that she has been smoking cigarettes. She has a 15.00 pack-year smoking history. She has never used smokeless tobacco. She reports that she does not currently use alcohol. She reports that she does not currently use drugs.  Social History     Social History Narrative    Not on file         Review of Systems:      (Positive responses bolded)  GENERAL: fever/chills, fatigue, general unwell feeling, weight gain/loss.  HEAD/EYES:  headache, dizziness, or vision changes.    EARS/NOSE/THROAT: nosebleeds, hearing loss, sinus infection, earache, tinnitus.  IMMUNE:  allergies, cancer, immune deficiency, or  infections.  SKIN:  itching, rash, hives  HEME/Lymphatic: anemia, easy bruising, easy bleeding.  RESPIRATORY: cough, wheezing, or shortness of breath  CARDIOVASCULAR/Circulation: extremity edema, syncope, hypertension, tachycardia, or angina.  GASTROINTESTINAL: abdominal pain, nausea/emesis, diarrhea, constipation,  hematochezia, or melena.  ENDOCRINE:  diabetes, steroid use,  thyroid disease or osteoporosis.  MUSCULOSKELETAL: joint pain, stiffness, neck pain, back pain, arthritis, or gout.  GENITOURINARY: frequency, urgency, dysuria, difficulty voiding, hematuria or incontinence.  NEUROLOGIC: weakness, numbness, paresthesias, seizure, tremor, stroke or memory loss.  PSYCHIATRIC: depression, anxiety, stress, suicidal thoughts or mood swings.     Physical Exam:  Constitutional: She is oriented to person, place, and time.  She appears well-developed and well-nourished. She is not in acute distress.   HENT:     Head: Normocephalic and atraumatic.     Eyes: Pupils are equal, round, and reactive to light. EOM are normal. No scleral icterus.  Pulmonary/Chest:  NWOB. No respiratory distress.   Neurological: She is alert and oriented to person, place, and time. Coordination grossly normal.  Romberg test negative.    Skin: Skin is warm and dry. She is not diaphoretic.   Psychiatric: She has a normal mood and affect. Her behavior is normal. Judgment and thought content normal.  She answers questions appropriately.  MSK: Gait is antalgic.      Imaging:  MRI OF THE LUMBAR SPINE WITHOUT CONTRAST   3/10/2023 1:17 PM      COMPARISON: Lumbar spine MRI 03/08/2021. Lumbar spine CT 09/29/2021.     HISTORY: Lumbar radiculopathy     TECHNIQUE: Multiplanar, multisequence MRI images of the lumbar spine  were acquired without IV contrast.     FINDINGS: There are five lumbar-type vertebrae for the purposes of  this dictation. The conus ends at L2-L3. 19 degrees of levocurvature  from L1 to L3. 4 mm retrolisthesis of L1 on L2. Postsurgical  changes  of posterior instrumented fusion from L3 to L5. Interbody fusion of  L3-L4 and L4-L5. Vertebral body heights are maintained. Nonpathologic  marrow signal. The visualized bony pelvis is grossly within normal  limits.     Moderate symmetric atrophy of the posterior paraspinal musculature.  Normal diameter of the distal abdominal aorta. Left hemilaminectomy  and left medial facetectomy changes at L3-L4 and L4-L5. Small amount  of edema in the posterior paraspinal soft tissues from L3 to L5,  decreased since the prior lumbar spine MRI.     T12-L1: Moderate intervertebral disc height loss. Loss of the normal  T2 signal within the disc. Broad-based disc bulge with superimposed  small central disc extrusion. Moderate bilateral facet arthropathy.  Moderate spinal canal narrowing. No right neural foraminal narrowing.  No left neural foraminal narrowing.     L1-L2: Moderate intervertebral disc height loss. Loss of the normal T2  signal within the disc. Broad-based disc bulge with superimposed small  central disc protrusion. Moderate bilateral facet arthropathy.  Moderate to severe spinal canal narrowing. Moderate to severe right  neuroforaminal narrowing. No left neuroforaminal narrowing.     L2-L3: Mild intervertebral disc height loss. Loss of the normal T2  signal within the disc. Broad-based disc bulge with superimposed right  foraminal disc protrusion. Moderate bilateral facet arthropathy.  Moderate spinal canal narrowing. Mild to moderate right neural  foraminal narrowing. No left neural foraminal narrowing.     L3-L4: Postsurgical changes of 360 degree fusion. Left hemilaminectomy  changes. No spinal canal narrowing. No right neural foraminal  narrowing. No left neural foraminal narrowing.     L4-L5: Postsurgical changes of 360 degree fusion. Left hemilaminectomy  changes. No spinal canal narrowing. No neural foraminal narrowing.     L5-S1: Mild intervertebral disc height loss. Loss of the normal T2  signal  within the disc. Broad-based disc bulge with superimposed small  central disc protrusion. Moderate bilateral facet arthropathy. Mild to  moderate spinal canal narrowing. No right neuroforaminal narrowing.  Moderate left neuroforaminal narrowing.                                                                      IMPRESSION:  1.  Postsurgical changes of posterior instrumented fusion from L3 to  L5. Interbody fusion of L3-L4 and L4-L5.   2.  Left hemilaminectomy and left medial facetectomy changes at L3-L4  and L4-L5. Small amount of edema in the posterior paraspinal soft  tissues from L3 to L5, decreased since the prior lumbar spine MRI  03/08/2021.  3.  Moderate multilevel degenerative changes of the lumbar spine,  worsened since prior lumbar spine MRI.  4.  Moderate to severe spinal canal narrowing at L1-L2.  5.  Moderate spinal canal narrowing at T12-L1 and L2-L3.  6.  Moderate to severe right neural foraminal narrowing at L1-L2.  7.  Moderate left neuroforaminal narrowing at L5-S1.         Diagnosis:  (M54.16) Lumbar radiculopathy  (primary encounter diagnosis)  Comment: R) leg pain, L) leg numbness  Plan: Dr. Burch ordered xrays and follow up apt.    (M48.062) Spinal stenosis of lumbar region with neurogenic claudication  (primary encounter diagnosis)  Comment: Refer to pain psychology, acupuncture, pool therapy  Plan: 06/09/2023 Extension of lumbar fusion with Dr. Burch    (G89.4) Chronic pain syndrome  Comment:  Plan:  Will trial cymbalta 20mg 1 tab in am with food x 1 wk then 2 tabs in am with food x 1 week then 3 tabs in am with food for 1 wk.             Plan:  A multimodal plan was developed today to treat your pain.  Multimodal analgesia is a strategy that reduces reliance on opioids through the use of non-opioid analgesics and therapies that have different mechanisms of action.    Diagnostics: no new imaging to review today        Medications:  Start duloxetine 20 mg 1 tab in am with food x 1 wk then  2 tabs in am with food x 1 week then 3 tabs in am with food for 1 wk.     The following OTC pain medications may be helpful, use as directed: Voltaren Gel 1%, CBD products, Capsaicin products, Australian Dream Cream, Epson It, Arnica Products, Lidocaine Patch, Solanpas, Biofreeze, Aspercream, Tiger Balm and Fortunato Emu cream.  Apply heat or cold PRN.      Therapies:  Continue Pain Psychology at [facility].  Psychological treatments are also important part of pain management.  Understanding and managing the thoughts, emotions and behaviors that accompany the discomfort can help you cope more effectively with your pain and can actually reduce the intensity of your pain.    Continue accupuntcure at [facility]     PT on hold for now in [facility]  PHYSICAL THERAPY: Encourage patient to continue working with physical therapy as able.  Discussed the importance of core strengthening, ROM, stretching exercises with the patient and how each of these entities is important in decreasing pain.  Explained to the patient that the purpose of physical therapy is to teach the patient a home exercise program.  These exercises need to be performed every day in order to decrease pain and prevent future occurrences of pain.        Discussed Grounding Mat - handout provided  https://www.youETARGETube.com/watch?v=[email or web address]    Discussed Frequency Specific Microcurrent - handout provided  Treatment for Neuropathic Pain.   [facility] [phone]  [facility] [phone]  May be able to be billed as a chiropractic service depending on your insurance coverage.     Discussed Acupuncture.    Continue TENs unit. Zynex brand was cost prohibited.      Interventions:  none      Follow up: 1-2 months      CARLTON Colon, RN, CNP, FNP  [facility] Locations        BILLING TIME DOCUMENTATION:   The total TIME spent on this patient on the date of  the encounter/appointment was 40 minutes.

## 2023-09-12 NOTE — PATIENT INSTRUCTIONS
[facility] Number:  [phone]  Call with any questions about your care and for scheduling assistance.   Calls are returned Monday through Friday between 8 AM and 4:30 PM. We usually get back to you within 2 business days depending on the issue/request.    If we are prescribing your medications:  For opioid medication refills, call the clinic or send a Previstarhart message 7 days in advance.  Please include:  Name of requested medication  Name of the pharmacy.  For non-opioid medications, call your pharmacy directly to request a refill. Please allow 3-4 days to be processed.   Per [place] State Law:  All controlled substance prescriptions must be filled within 30 days of being written.    For those controlled substances allowing refills, pickup must occur within 30 days of last fill.      Plan:  A multimodal plan was developed today to treat your pain.  Multimodal analgesia is a strategy that reduces reliance on opioids through the use of non-opioid analgesics and therapies that have different mechanisms of action.    Diagnostics: no new imaging to review today      Medications:  Start duloxetine 20 mg 1 tab in am with food x 1 wk then 2 tabs in am with food x 1 week then 3 tabs in am with food for 1 wk.     The following OTC pain medications may be helpful, use as directed: Voltaren Gel 1%, CBD products, Capsaicin products, Australian Dream Cream, Epson It, Arnica Products, Lidocaine Patch, Solanpas, Biofreeze, Aspercream, Tiger Balm and Fortunato Emu cream.  Apply heat or cold PRN.    Therapies:  Continue Pain Psychology at [facility].  Psychological treatments are also important part of pain management.  Understanding and managing the thoughts, emotions and behaviors that accompany the discomfort can help you cope more effectively with your pain and can actually reduce the intensity of your pain.    Continue accupuntcure at [facility]  [facility]     PT on hold for now in [facility].  PHYSICAL THERAPY: Encourage patient to continue working with physical therapy as able.  Discussed the importance of core strengthening, ROM, stretching exercises with the patient and how each of these entities is important in decreasing pain.  Explained to the patient that the purpose of physical therapy is to teach the patient a home exercise program.  These exercises need to be performed every day in order to decrease pain and prevent future occurrences of pain.        Discussed Grounding Mat - handout provided  https://www.ESKY.com/watch?v=[email or web address]    Discussed Frequency Specific Microcurrent - handout provided  Treatment for Neuropathic Pain.   [facility] [phone]  [facility] [phone]  May be able to be billed as a chiropractic service depending on your insurance coverage.     Discussed Acupuncture.    Continue TENs unit. Zynex brand was cost prohibited.      Interventions:  none    Follow up: 1-2 months      CARLTON Colon, RN, CNP, FNP      We believe regular attendance is key to your success in our program!    Any time you are unable to keep your appointment we ask that you call us at least 24 hours in advance to cancel.This will allow us to offer the appointment time to another patient.   Multiple missed appointments may lead to dismissal from the clinic.

## 2023-09-12 NOTE — PROGRESS NOTES
Patient presents to the clinic today for a visit with CARLTON Ireland CNP regarding Pain Management.          4/15/2022    10:41 AM 5/1/2023    10:25 AM 9/12/2023    10:16 AM   PEG Score   PEG Total Score 8.33 5 9.33       UDS/CSA- na    Medications- hydromorphone     QUESTIONS:    Jazmin FOSTER [facility] Visit Facilitator

## 2023-09-14 ENCOUNTER — OFFICE VISIT (OUTPATIENT)
Dept: NEUROSURGERY | Facility: CLINIC | Age: 58
End: 2023-09-14
Payer: COMMERCIAL

## 2023-09-14 ENCOUNTER — ANCILLARY PROCEDURE (OUTPATIENT)
Dept: GENERAL RADIOLOGY | Facility: CLINIC | Age: 58
End: 2023-09-14
Attending: PHYSICIAN ASSISTANT
Payer: COMMERCIAL

## 2023-09-14 VITALS — SYSTOLIC BLOOD PRESSURE: 116 MMHG | OXYGEN SATURATION: 96 % | DIASTOLIC BLOOD PRESSURE: 61 MMHG | HEART RATE: 71 BPM

## 2023-09-14 DIAGNOSIS — R11.0 NAUSEA: ICD-10-CM

## 2023-09-14 DIAGNOSIS — M53.3 SACROILIAC JOINT PAIN: ICD-10-CM

## 2023-09-14 DIAGNOSIS — M48.062 SPINAL STENOSIS OF LUMBAR REGION WITH NEUROGENIC CLAUDICATION: Primary | ICD-10-CM

## 2023-09-14 DIAGNOSIS — M48.062 SPINAL STENOSIS OF LUMBAR REGION WITH NEUROGENIC CLAUDICATION: ICD-10-CM

## 2023-09-14 DIAGNOSIS — M54.6 ACUTE BILATERAL THORACIC BACK PAIN: ICD-10-CM

## 2023-09-14 PROCEDURE — 99024 POSTOP FOLLOW-UP VISIT: CPT | Performed by: PHYSICIAN ASSISTANT

## 2023-09-14 PROCEDURE — 72100 X-RAY EXAM L-S SPINE 2/3 VWS: CPT | Mod: TC | Performed by: RADIOLOGY

## 2023-09-14 RX ORDER — METHOCARBAMOL 500 MG/1
500 TABLET, FILM COATED ORAL 4 TIMES DAILY PRN
Qty: 42 TABLET | Refills: 0 | Status: SHIPPED | OUTPATIENT
Start: 2023-09-14 | End: 2023-11-01

## 2023-09-14 RX ORDER — HYDROMORPHONE HYDROCHLORIDE 2 MG/1
2 TABLET ORAL EVERY 8 HOURS PRN
Qty: 21 TABLET | Refills: 0 | Status: SHIPPED | OUTPATIENT
Start: 2023-09-14 | End: 2023-09-21

## 2023-09-14 RX ORDER — PROMETHAZINE HYDROCHLORIDE 25 MG/1
25 TABLET ORAL EVERY 8 HOURS PRN
Qty: 21 TABLET | Refills: 0 | Status: SHIPPED | OUTPATIENT
Start: 2023-09-14

## 2023-09-14 ASSESSMENT — PAIN SCALES - GENERAL: PAINLEVEL: EXTREME PAIN (8)

## 2023-09-14 NOTE — LETTER
9/14/2023         RE: Selina Parikh  [id number] [facility] [address]        Dear Colleague,    Thank you for referring your patient, Selina Parikh, to the [facility]. Please see a copy of my visit note below.    Neurosurgery Progress Note: 9/14/2023     A/P: postoperative Exploration of prior lumbar 3- lumbar 5 instrumented fusion with removal and replacement of pedicle screws and extension to sacral with Left lumbar 5-sacral 1  transforaminal lumbar interbody fusion with posterolateral arthrodesiswith use of stealth navigation  on 6/9/2023 by Dr. Kent     Plan:  Will plan to discuss with Dr. Kent. Provided refills of both pain medications and muscle relaxants and she has been advised to stop her Celebrex as it prevents fusion progression. Will plan to obtain thoracic and lumbar CT as well as attempt right SI joint injection. Will plan to follow up once CT completed for further evaluation.        Ms. Parikh is a pleasant 57 year old right handed female who presents postoperative exploration of prior lumbar 3- lumbar 5 instrumented fusion with removal and replacement of pedicle screws and extension to sacral 1. Lumbar 3-sacral 1  Left transforaminal lumbar interbody fusion and posterolateral arthrodesis with use of stealth navigation on 6/9/2023 by Dr. Kent. Presently states that she is not doing well. Has severe back pain that extends upwards through her thoracic spine. Notes that her back is tender to touch and with sitting her pain with increase and is accompanied with nausea. She notes that over the past month she has had severe bilateral buttocks pain (R>L) noting that she has to adjust constantly or shifts off of her right buttocks when sitting. She notes radicular right lower extremity pain that radiates into her groin and down her lateral aspect of her leg to her foot of which was not present prior to surgery. She denies any injury or trauma at its onset. She  denies any radicular numbness or tingling. She notes that prior to surgery she had no right radicular leg pain but notes that her previous left radicular lower extremity pain appears to be improved.       HPI: 56 yo female who is s/p L3-5 fusion at OSH and recent left thoracic 12-lumbar 1 microdiscectomy in 9/22. Ongoing L5 radiculopathy. MRI lumbar spine shows moderate facet hypertrophy with effusions and moderate to severe foraminal narrowing at lumbar 5-sacral 1 contributing to her symptoms. Recommend exploration and extension of her fusion to S1 with a left lumbar 5-sacral 1 TILF.  Risks and benefits were discussed in detail including but not limited to infection, hematoma, nerve damage including paralysis, post op radiculitis, durotomy, lack of a sold bone fusion, hardware malfunction, risks associated with the use of general anesthesia, blood clots in the lungs or legs. She agreed to proceed.     Exam:  /61   Pulse 71   LMP 04/10/2016   SpO2 96%     General: alert and oriented x3     Strength limited in right lower extremity secondary to pain- give way weakness of right hip flexion and knee extension otherwise good strength    Sensation is intact throughout both upper and lower extremities    Antalgic gait     Positive GABBY bilaterally   Pain to palpation of right SI joint   Positive SI compression test on right  Positive SI distraction test on right  Positive Yeoman test on right      Imaging:  Xray reviewed personally  IMPRESSION: Five lumbar type vertebrae. Posterior spine fusion  hardware from L3 down to L5 again noted. Interbody fusion devices in  the L3-L4 and L4-L5 disc space is again noted. There has been  extension of posterior spine fusion hardware down to S1 as well as  interval placement of an interbody fusion device in the L5-S1 disc  space. Minimal degenerative retrolisthesis of L1 upon L2 and L2 upon  L3 again noted. Alignment otherwise normal. Vertebral body heights  normal. No  evidence for fracture. Loss of disc space height and  degenerative endplate spurring at L1-L2. No evidence for hardware  failure or loosening.      Sandra Rivas PA-C  [facility]  O: [phone]      Again, thank you for allowing me to participate in the care of your patient.        Sincerely,        Sandra Rivas PA-C

## 2023-09-14 NOTE — PATIENT INSTRUCTIONS
Will plan to discuss with Dr. Kent. Provided refills of both pain medications and muscle relaxants and she has been advised to stop her Celebrex as it prevents fusion progression. Will plan to obtain thoracic and lumbar CT as well as attempt right SI joint injection. Will plan to follow up once CT completed for further evaluation.

## 2023-09-14 NOTE — NURSING NOTE
"Selina Parikh is a 57 year old female who presents for:  Chief Complaint   Patient presents with    RECHECK        Initial Vitals:  /61   Pulse 71   LMP 04/10/2016   SpO2 96%  Estimated body mass index is 32.9 kg/m  as calculated from the following:    Height as of 6/1/23: 4' 11.02\" (1.499 m).    Weight as of 9/12/23: 163 lb (73.9 kg).. There is no height or weight on file to calculate BSA. BP completed using cuff size: regular  Extreme Pain (8)    Uzair Bishop    "

## 2023-09-14 NOTE — PROGRESS NOTES
Neurosurgery Progress Note: 9/14/2023     A/P: postoperative Exploration of prior lumbar 3- lumbar 5 instrumented fusion with removal and replacement of pedicle screws and extension to sacral with Left lumbar 5-sacral 1  transforaminal lumbar interbody fusion with posterolateral arthrodesiswith use of stealth navigation  on 6/9/2023 by Dr. Kent     Plan:  Will plan to discuss with Dr. Kent. Provided refills of both pain medications and muscle relaxants and she has been advised to stop her Celebrex as it prevents fusion progression. Will plan to obtain thoracic and lumbar CT as well as attempt right SI joint injection. Will plan to follow up once CT completed for further evaluation.        Ms. Parikh is a pleasant 57 year old right handed female who presents postoperative exploration of prior lumbar 3- lumbar 5 instrumented fusion with removal and replacement of pedicle screws and extension to sacral 1. Lumbar 3-sacral 1  Left transforaminal lumbar interbody fusion and posterolateral arthrodesis with use of stealth navigation on 6/9/2023 by Dr. Kent. Presently states that she is not doing well. Has severe back pain that extends upwards through her thoracic spine. Notes that her back is tender to touch and with sitting her pain with increase and is accompanied with nausea. She notes that over the past month she has had severe bilateral buttocks pain (R>L) noting that she has to adjust constantly or shifts off of her right buttocks when sitting. She notes radicular right lower extremity pain that radiates into her groin and down her lateral aspect of her leg to her foot of which was not present prior to surgery. She denies any injury or trauma at its onset. She denies any radicular numbness or tingling. She notes that prior to surgery she had no right radicular leg pain but notes that her previous left radicular lower extremity pain appears to be improved.       HPI: 56 yo female who is s/p L3-5 fusion at OSH  and recent left thoracic 12-lumbar 1 microdiscectomy in 9/22. Ongoing L5 radiculopathy. MRI lumbar spine shows moderate facet hypertrophy with effusions and moderate to severe foraminal narrowing at lumbar 5-sacral 1 contributing to her symptoms. Recommend exploration and extension of her fusion to S1 with a left lumbar 5-sacral 1 TILF.  Risks and benefits were discussed in detail including but not limited to infection, hematoma, nerve damage including paralysis, post op radiculitis, durotomy, lack of a sold bone fusion, hardware malfunction, risks associated with the use of general anesthesia, blood clots in the lungs or legs. She agreed to proceed.     Exam:  /61   Pulse 71   LMP 04/10/2016   SpO2 96%     General: alert and oriented x3     Strength limited in right lower extremity secondary to pain- give way weakness of right hip flexion and knee extension otherwise good strength    Sensation is intact throughout both upper and lower extremities    Antalgic gait     Positive GABBY bilaterally   Pain to palpation of right SI joint   Positive SI compression test on right  Positive SI distraction test on right  Positive Yeoman test on right      Imaging:  Xray reviewed personally  IMPRESSION: Five lumbar type vertebrae. Posterior spine fusion  hardware from L3 down to L5 again noted. Interbody fusion devices in  the L3-L4 and L4-L5 disc space is again noted. There has been  extension of posterior spine fusion hardware down to S1 as well as  interval placement of an interbody fusion device in the L5-S1 disc  space. Minimal degenerative retrolisthesis of L1 upon L2 and L2 upon  L3 again noted. Alignment otherwise normal. Vertebral body heights  normal. No evidence for fracture. Loss of disc space height and  degenerative endplate spurring at L1-L2. No evidence for hardware  failure or loosening.      Sandra Rivas PA-C  [facility]  O: [phone]

## 2023-09-15 ENCOUNTER — HOSPITAL ENCOUNTER (OUTPATIENT)
Dept: CT IMAGING | Facility: HOSPITAL | Age: 58
Discharge: HOME | End: 2023-09-15
Attending: PHYSICIAN ASSISTANT
Payer: COMMERCIAL

## 2023-09-15 ENCOUNTER — NURSE TRIAGE (OUTPATIENT)
Dept: NURSING | Facility: CLINIC | Age: 58
End: 2023-09-15

## 2023-09-15 DIAGNOSIS — M54.6 ACUTE BILATERAL THORACIC BACK PAIN: ICD-10-CM

## 2023-09-15 DIAGNOSIS — M48.062 SPINAL STENOSIS OF LUMBAR REGION WITH NEUROGENIC CLAUDICATION: ICD-10-CM

## 2023-09-15 PROCEDURE — 72128 CT CHEST SPINE W/O DYE: CPT

## 2023-09-15 PROCEDURE — 72131 CT LUMBAR SPINE W/O DYE: CPT

## 2023-09-15 NOTE — TELEPHONE ENCOUNTER
"Nurse Triage SBAR    Is this a 2nd Level Triage? YES, LICENSED PRACTITIONER REVIEW IS REQUIRED    Situation: Patient with a rash started today to  arms, bottom on legs and now chest.  Pt does not have my chart to send a photo for review.  Complaint of heaviness to chest, took some nitroglycerin and no  relief.  Told to go to ED, \"it is not my heart, I know it.\"  Patient thinks that it is related to Cymbalta.  Patient also wanted it noted that she is also on Celebrex as well.     Background:   Started Cymbalta 4 days ago.  Rash started today, noticed she was itching yesterday.  She is allergic to Benadryl.  She complained of chest heaviness more than ususal, took her nitroglycerin with no relief.    TOLD to go to ED, refused.  \"It is not my heart\"  She has a CT scan scheduled tonight at  5:45.  I will be in a hospital if I need one later on.  She is asking if she should continue her meds.  RN told her to wait until she heard form you guys, but no.      Protocol Recommended Disposition:   Routing to Pain    Recommendation:   Go to ED   refused  to go  Take some Benadryl,  pt allergic.      Routed to provider      Reason for Disposition   Patient wants to be seen    Additional Information   Negative: Sounds like a life-threatening emergency to the triager   Negative: Fever and localized purple or blood-colored spots or dots that are not from injury or friction   Negative: Fever and localized rash is very painful   Negative: Patient sounds very sick or weak to the triager   Negative: Looks like a boil, infected sore, deep ulcer, or other infected rash (spreading redness, pus)    Answer Assessment - Initial Assessment Questions  1. APPEARANCE of RASH: \"Describe the rash.\"       Red and flat, almost looks jin and patchy  to both arms and legs and now starting to go to chest  2. LOCATION: \"Where is the rash located?\"       both arms and legs and now starting to go to chest    3. NUMBER: \"How many spots are there?\"       " "8-10 on two arms and chest and legs, the bottom half  4. SIZE: \"How big are the spots?\" (Inches, centimeters or compare to size of a coin)       Size of dime to 50 cent piece  5. ONSET: \"When did the rash start?\"       Today, started itching last night, but did not notice rash  6. ITCHING: \"Does the rash itch?\" If Yes, ask: \"How bad is the itch?\"  (Scale 0-10; or none, mild, moderate, severe)      Yes itches,  mild   7. PAIN: \"Does the rash hurt?\" If Yes, ask: \"How bad is the pain?\"  (Scale 0-10; or none, mild, moderate, severe)     - NONE (0): no pain     - MILD (1-3): doesn't interfere with normal activities      - MODERATE (4-7): interferes with normal activities or awakens from sleep      - SEVERE (8-10): excruciating pain, unable to do any normal activities      No pain  8. OTHER SYMPTOMS: \"Do you have any other symptoms?\" (e.g., fever)      No fever or chills,  No SOB,  has some chest pain, it is heavier than normal.  Did take nitroglycerin and discomfort did not go away.  No difficulties swallowing.,  no difficulties breathing.  9. PREGNANCY: \"Is there any chance you are pregnant?\" \"When was your last menstrual period?\"      no    Protocols used: Rash or Redness - [id number]    "

## 2023-09-21 NOTE — PROGRESS NOTES
ACUPUNCTURIST TREATMENT NOTE      Selina Parikh, a 58 year old female, is here today for Follow - Up exam. Patient is referred by Jannie. Treatment 2.    HPI  Main Complaint: Chronic low back pain:     Had injection last week, leg pain has improved somewhat, now with pain in low back wrapping around both sides into hips.    Would like to continue working on smoking cessation as well. Reports after her first treatment she did cut back on smoking, however, has increased again.        Past Medical History  Past Medical History Reviewed: Yes   has a past medical history of Anemia, Antiplatelet or antithrombotic long-term use, Asymptomatic stenosis of left carotid artery, Cancer (H), Coronary artery disease, Hiatal hernia, Hypertension, Irritable bowel syndrome, Other chronic pain, and Stented coronary artery.    Objective    TCM Exam Completed: Yes  Limbs/Back: Right Lower Extremity and Lower Back    Tongue/Pulse Exam Completed: No    Patient Assessment    PEG: A Three-Item Scale Assessing Pain Intensity and Interference    What number best describes your PAIN ON AVERAGE in the past week? 3    What number best describes how, during the past week, pain has interfered with your ENJOYMENT OF LIFE? 6    What number best describes how, during the past week, pain has interfered with your GENERAL ACTIVITY? 8    PEG Total Score: 5.67    Jyae EE, Zbigniew KA, Charly MJ, Lina TA, Lipscomb J, Zeina JM, Shawn SM, Cindy K. Development and initial validation of the PEG, a 3-item scale assessing pain intensity and interference. Journal of General Internal Medicine. 2009 Eduard;24:733-738.  Patient Type: Pain  Patient Complaint: Chronic low back pain, radiating down RLE to foot        9/12/2023     7:00 AM 9/26/2023     1:00 PM   Acupuncture   Intervention Reason Pain Pain   Pain Location low back, RLE low back, RLE   Pre-session Pain Rating 8 4   Post-session Pain Rating  4       TCM Diagnosis: Qi Stagnation;Blood Stagnation;Yin  Deficiency;Other channel obstruction    Treatment Principle: Course qi and blood, nourish yin, unblock channels, stop pain    TCM / Acupuncture Treatment  Acupuncture Points:       Initial insertions: Baihui, HTJJ L2-L5, Ub23, Ub52, shiqizhuixia       Second insertions: Ub31, Ub32, Yaoyan, Gb29, Gb34, Ub57, Ub60, (L) NADA                          8/3/2023     3:00 PM 9/14/2023     3:00 PM   Oswestry Disability Index (MAKEDA   Darren Hoskins 1980, All rights reserved)   Section 1 - Pain intensity The pain is very mild at the moment. The pain is very mild at the moment.   Section 2 - Personal care (washing, dressing, etc.)  It is painful to look after myself and I am slow and careful. It is painful to look after myself and I am slow and careful.   Section 3 - Lifting Pain prevents me from lifting heavy weights but I can manage light to medium weights if they are conveniently positioned. I cannot lift or carry anything at all.   Section 4 - Walking Pain prevents me from walking more than a quarter of a mile. Pain prevents me from walking more than 100 yards.   Section 5 - Sitting Pain prevents me from sitting for more than half an hour. Pain prevents me from sitting for more than 10 minutes.   Section 6 - Standing Pain prevents me from standing for more than 10 minutes. Pain prevents me from standing for more than 10 minutes.   Section 7 - Sleeping Because of pain I have less than 4 hours sleep. Because of pain I have less than 2 hours sleep.   Section 8 - Sex life (if applicable)  Pain prevents me from having any sex life at all.   Section 9 - Social life Pain has restricted my social life and I do not go out as often. Pain has restricted my social life to home.   Section 10 - Traveling Pain restricts me to trips of less than one hour. Pain restricts me to short necessary trips of under 30 minutes.   Oswestry Score (%) 53.33 72       Assessment and Plan  Treatment Observations: Patient relaxed well during  treatment  Acupuncture Treatment Recommendations: Acupuncture 1x/wk for 12 weeks for M54.42, G89.29 (ICD-10-CM) - Chronic low back pain with left-sided sciatica, unspecified back pain laterality G89.4 (ICD-10-CM) - Chronic pain syndrome       It is my recommendation that this patient seek advice from their Primary Care Provider about active symptoms not addressed during this visit. The risks and benefits of acupuncture were reviewed and the patient stated understanding. The patient's questions were answered to their satisfaction. Consent was provided for treatment. We thank you for the referral and opportunity to treat this patient.    Time Spent with Patient:   I spent a total of 30 minutes face-to-face with Selina Parikh during today's office visit.     Alexa Lundberg L.Ac.  09/26/23

## 2023-09-22 ENCOUNTER — RADIOLOGY INJECTION OFFICE VISIT (OUTPATIENT)
Dept: PHYSICAL MEDICINE AND REHAB | Facility: CLINIC | Age: 58
End: 2023-09-22
Attending: PHYSICIAN ASSISTANT
Payer: COMMERCIAL

## 2023-09-22 VITALS
DIASTOLIC BLOOD PRESSURE: 82 MMHG | OXYGEN SATURATION: 98 % | HEART RATE: 84 BPM | TEMPERATURE: 98.2 F | SYSTOLIC BLOOD PRESSURE: 126 MMHG | RESPIRATION RATE: 16 BRPM

## 2023-09-22 DIAGNOSIS — M53.3 SACROILIAC JOINT PAIN: ICD-10-CM

## 2023-09-22 PROCEDURE — 27096 INJECT SACROILIAC JOINT: CPT | Mod: LT | Performed by: PAIN MEDICINE

## 2023-09-22 RX ORDER — ROPIVACAINE HYDROCHLORIDE 5 MG/ML
INJECTION, SOLUTION EPIDURAL; INFILTRATION; PERINEURAL
Status: COMPLETED | OUTPATIENT
Start: 2023-09-22 | End: 2023-09-22

## 2023-09-22 RX ORDER — METHYLPREDNISOLONE ACETATE 40 MG/ML
INJECTION, SUSPENSION INTRA-ARTICULAR; INTRALESIONAL; INTRAMUSCULAR; SOFT TISSUE
Status: COMPLETED | OUTPATIENT
Start: 2023-09-22 | End: 2023-09-22

## 2023-09-22 RX ORDER — LIDOCAINE HYDROCHLORIDE 10 MG/ML
INJECTION, SOLUTION EPIDURAL; INFILTRATION; INTRACAUDAL; PERINEURAL
Status: COMPLETED | OUTPATIENT
Start: 2023-09-22 | End: 2023-09-22

## 2023-09-22 RX ORDER — ASPIRIN 81 MG/1
81 TABLET ORAL DAILY
COMMUNITY

## 2023-09-22 RX ADMIN — ROPIVACAINE HYDROCHLORIDE 2.5 ML: 5 INJECTION, SOLUTION EPIDURAL; INFILTRATION; PERINEURAL at 13:06

## 2023-09-22 RX ADMIN — LIDOCAINE HYDROCHLORIDE 1 ML: 10 INJECTION, SOLUTION EPIDURAL; INFILTRATION; INTRACAUDAL; PERINEURAL at 13:06

## 2023-09-22 RX ADMIN — METHYLPREDNISOLONE ACETATE 20 MG: 40 INJECTION, SUSPENSION INTRA-ARTICULAR; INTRALESIONAL; INTRAMUSCULAR; SOFT TISSUE at 13:06

## 2023-09-22 ASSESSMENT — PAIN SCALES - GENERAL
PAINLEVEL: MODERATE PAIN (5)
PAINLEVEL: MODERATE PAIN (5)

## 2023-09-22 NOTE — PATIENT INSTRUCTIONS
Follow-up visit with LUCY Pedraza of [facility] in 2 weeks to discuss injection outcome and determine care plan going forward.       DISCHARGE INSTRUCTIONS    During office hours (8:00 a.m.- 4:00 p.m.) questions or concerns may be answered  by calling Spine Center Navigation Nurses at  [phone].  Messages received after hours will be returned the following business day.      In the case of an emergency, please dial 911 or seek assistance at the nearest Emergency Room/Urgent Care facility.     All Patients:    You may experience an increase in your symptoms for the first 2 days (It may take anywhere between 2 days- 2 weeks for the steroid to have maximum effect).    You may use ice on the injection site, as frequently as 20 minutes each hour if needed.    You may take your pain medicine.    You may continue taking your regular medication after your injection. If you have had a Medial Branch Block you may resume pain medication once your pain diary is completed.    You may shower. No swimming, tub bath or hot tub for 48 hours.  You may remove your bandaid/bandage as soon as you are home.    You may resume light activities, as tolerated.    Resume your usual diet as tolerated.    It is strongly advised that you do not drive for 1-3 hours post injection.    If you have had oral sedation:  Do not drive for 8 hours post injection.      If you have had IV sedation:  Do not drive for 24 hours post injection.  Do not operate hazardous machinery or make important personal/business decisions for 24 hours.      POSSIBLE STEROID SIDE EFFECTS (If steroid/cortisone was used for your procedure)    -If you experience these symptoms, it should only last for a short period    Swelling of the legs              Skin redness (flushing)     Mouth (oral) irritation   Blood sugar (glucose) levels            Sweats                    Mood changes  Headache  Sleeplessness  Weakened immune system for up to 14 days,  which could increase the risk of sylwia the COVID-19 virus and/or experiencing more severe symptoms of the disease, if exposed.  Decreased effectiveness of the flu vaccine if given within 2 weeks of the steroid.         POSSIBLE PROCEDURE SIDE EFFECTS  -Call the Spine Center if you are concerned  Increased Pain           Increased numbness/tingling      Nausea/Vomiting          Bruising/bleeding at site      Redness or swelling                                              Difficulty walking      Weakness           Fever greater than 100.5    *In the event of a severe headache after an epidural steroid injection that is relieved by lying down, please call the [facility] to speak with a clinical staff member*

## 2023-09-26 ENCOUNTER — OFFICE VISIT (OUTPATIENT)
Dept: PALLIATIVE MEDICINE | Facility: OTHER | Age: 58
End: 2023-09-26
Payer: COMMERCIAL

## 2023-09-26 DIAGNOSIS — G89.29 CHRONIC LOW BACK PAIN WITH LEFT-SIDED SCIATICA, UNSPECIFIED BACK PAIN LATERALITY: Primary | ICD-10-CM

## 2023-09-26 DIAGNOSIS — M54.42 CHRONIC LOW BACK PAIN WITH LEFT-SIDED SCIATICA, UNSPECIFIED BACK PAIN LATERALITY: Primary | ICD-10-CM

## 2023-09-26 DIAGNOSIS — G89.4 CHRONIC PAIN SYNDROME: ICD-10-CM

## 2023-09-26 PROCEDURE — 97811 ACUP 1/> W/O ESTIM EA ADD 15: CPT | Performed by: ACUPUNCTURIST

## 2023-09-26 PROCEDURE — 97810 ACUP 1/> WO ESTIM 1ST 15 MIN: CPT | Performed by: ACUPUNCTURIST

## 2023-09-26 ASSESSMENT — PAIN SCALES - PAIN ENJOYMENT GENERAL ACTIVITY SCALE (PEG)
PEG_TOTALSCORE: 5.67
INTERFERED_ENJOYMENT_LIFE: 6
INTERFERED_GENERAL_ACTIVITY: 8
AVG_PAIN_PASTWEEK: 3

## 2023-09-27 ENCOUNTER — TELEPHONE (OUTPATIENT)
Dept: NEUROSURGERY | Facility: CLINIC | Age: 58
End: 2023-09-27
Payer: COMMERCIAL

## 2023-09-28 ENCOUNTER — OFFICE VISIT (OUTPATIENT)
Dept: NEUROSURGERY | Facility: CLINIC | Age: 58
End: 2023-09-28
Payer: COMMERCIAL

## 2023-09-28 VITALS
HEART RATE: 92 BPM | DIASTOLIC BLOOD PRESSURE: 64 MMHG | SYSTOLIC BLOOD PRESSURE: 105 MMHG | TEMPERATURE: 98.2 F | OXYGEN SATURATION: 96 %

## 2023-09-28 DIAGNOSIS — M25.551 BILATERAL HIP PAIN: Primary | ICD-10-CM

## 2023-09-28 DIAGNOSIS — M25.552 BILATERAL HIP PAIN: Primary | ICD-10-CM

## 2023-09-28 DIAGNOSIS — Z98.1 S/P LUMBAR FUSION: ICD-10-CM

## 2023-09-28 PROCEDURE — 99207 PR NO CHARGE LOS: CPT | Performed by: SURGERY

## 2023-09-28 ASSESSMENT — PAIN SCALES - GENERAL: PAINLEVEL: MILD PAIN (3)

## 2023-09-28 NOTE — PROGRESS NOTES
She was doing well for a while. Now having pain in hips and into groin and into right foot. Also having pain in her pelvis and wrapping around her hips. Left SI joint injection on 9/22/23. Helped a little bit but not as much as she would like.  Continues acupuncture and therapy. CT reviewed which is negative for loosening. Pain in both hips with rotation. Possibly hip related. Recommend seeing spine medicine for additional conservative options. Follow up here at end of December with a xray for 6 month post fusion follow-up.     Suha Kent MD

## 2023-09-28 NOTE — LETTER
9/28/2023         RE: Selina Parikh  [address] [facility] [id number]        Dear Colleague,    Thank you for referring your patient, Selina Parikh, to the [facility] SPINE AND NEUROSURGERY. Please see a copy of my visit note below.    She was doing well for a while. Now having pain in hips and into groin and into right foot. Also having pain in her pelvis and wrapping around her hips. Left SI joint injection on 9/22/23. Helped a little bit but not as much as she would like.  Continues acupuncture and therapy. CT reviewed which is negative for loosening. Pain in both hips with rotation. Possibly hip related. Recommend seeing spine medicine for additional conservative options. Follow up here at end of December with a xray for 6 month post fusion follow-up.     Suha Kent MD       Again, thank you for allowing me to participate in the care of your patient.        Sincerely,        Suha Kent MD

## 2023-09-28 NOTE — PATIENT INSTRUCTIONS
Recommend smoking cessation.  Start pool therapy at [facility].  Schedule with Spine Medicine for possible injections.  Follow up with us in December.

## 2023-10-11 ENCOUNTER — VIRTUAL VISIT (OUTPATIENT)
Dept: PALLIATIVE MEDICINE | Facility: CLINIC | Age: 58
End: 2023-10-11
Payer: COMMERCIAL

## 2023-10-11 DIAGNOSIS — M54.16 LUMBAR RADICULOPATHY: ICD-10-CM

## 2023-10-11 DIAGNOSIS — G89.29 CHRONIC INTRACTABLE PAIN: Primary | ICD-10-CM

## 2023-10-11 PROCEDURE — 96158 HLTH BHV IVNTJ INDIV 1ST 30: CPT | Mod: 95 | Performed by: PSYCHOLOGIST

## 2023-10-11 NOTE — PROGRESS NOTES
Selina Parikh is a 58 year old female who is being evaluated via a billable video visit.      Patient is currently in the [facility]? yes    Patient would like the video invitation sent by: Text to cell phone: [phone]}    Video Start Time: 10:01 AM  Video Stop Time: 10: 31 AM    Additional provider notes:     Pain Diagnoses per pain provider:   Lumbar radiculopathy    Chronic intractable pain        DATA: During today's visit you reported the following: Your back pain is improved since injection. Your mood is stable overall - being out in nature has been helpful. Your activity level is mildly increased since being up [place]. Your stress level remains manageable. Your sleep is unchanged, waking up yet from pain. You reported engaging in self-care for your pain a 2-3 times daily.    You identified that you would like to focus on the following or had questions regarding the following issues or concerns, and we discussed the following:   - camping with friends at [facility]  - noting pain after acupuncture that lasts 2-3 days - wraps around trunk of body  - insurance covers a gym that doesn't have a pool, daughter will add you to their [employer]  - discussed sleep hygiene skills    ASSESSMENT: Selina reports she is overall doing well, and would like perhaps one further follow up at this time. She is actively working with her providers on potential causes for ongoing hip pain. She seems to be actively using skills to manage her pain as well on a regular basis.     PLAN:   Your next appointment is scheduled for 12/13 at 10:00 AM.  Assignment/Objectives /interventions for next session:   - continue to use coping skills  - try sleep hygiene skills to reset sleep schedule    We believe regular attendance is key to your success in our program!    Any time you are unable to keep your appointment we ask that you call us at [phone] at least 24 hours in advance to cancel.This will allow us to offer the  appointment time to another patient.   Multiple missed appointments may lead to dismissal from the clinic.    Telemedicine Visit: The patient's condition can be safely assessed and treated via synchronous audio and visual telemedicine encounter.      Reason for Telemedicine Visit: Services only offered telehealth    Originating Site (Patient Location): Patient's other parked car    Distant Site (Provider Location): Provider Remote Setting- Home Office    Consent:  The patient/guardian has verbally consented to: the potential risks and benefits of telemedicine (video visit) versus in person care; bill my insurance or make self-payment for services provided; and responsibility for payment of non-covered services.     Mode of Communication:  Video Conference via [employer]    As the provider I attest to compliance with applicable laws and regulations related to telemedicine.      Drea Martin PsyD LP  Licensed Psychologist  Outpatient Clinic Therapist  [facility]

## 2023-10-27 DIAGNOSIS — L40.9 PSORIASIS: ICD-10-CM

## 2023-10-27 DIAGNOSIS — M54.42 CHRONIC BILATERAL LOW BACK PAIN WITH BILATERAL SCIATICA: ICD-10-CM

## 2023-10-27 DIAGNOSIS — M54.41 CHRONIC BILATERAL LOW BACK PAIN WITH BILATERAL SCIATICA: ICD-10-CM

## 2023-10-27 DIAGNOSIS — G89.29 CHRONIC BILATERAL LOW BACK PAIN WITH BILATERAL SCIATICA: ICD-10-CM

## 2023-10-30 DIAGNOSIS — K21.00 GASTROESOPHAGEAL REFLUX DISEASE WITH ESOPHAGITIS WITHOUT HEMORRHAGE: Primary | Chronic | ICD-10-CM

## 2023-10-30 RX ORDER — CELECOXIB 100 MG/1
100 CAPSULE ORAL 2 TIMES DAILY
Qty: 180 CAPSULE | Refills: 0 | OUTPATIENT
Start: 2023-10-30

## 2023-10-30 RX ORDER — CLOBETASOL PROPIONATE 0.5 MG/G
OINTMENT TOPICAL 2 TIMES DAILY
Qty: 60 G | Refills: 0 | Status: SHIPPED | OUTPATIENT
Start: 2023-10-30

## 2023-10-30 NOTE — TELEPHONE ENCOUNTER
"Routing refill request to provider for review/approval because:  Medication is reported/historical      Requested Prescriptions   Pending Prescriptions Disp Refills    omeprazole (PRILOSEC) 20 MG DR capsule       Sig: Take 1 capsule (20 mg) by mouth 3 times daily Do not substitute       PPI Protocol Failed - 10/30/2023  2:08 PM        Failed - Recent (12 mo) or future (30 days) visit within the authorizing provider's specialty     Patient has had an office visit with the authorizing provider or a provider within the authorizing providers department within the previous 12 mos or has a future within next 30 days. See \"Patient Info\" tab in inbasket, or \"Choose Columns\" in Meds & Orders section of the refill encounter.              Passed - Not on Clopidogrel (unless Pantoprazole ordered)        Passed - No diagnosis of osteoporosis on record        Passed - Medication is active on med list        Passed - Patient is age 18 or older        Passed - No active pregnacy on record        Passed - No positive pregnancy test in past 12 months                 Indra Kim RN 10/30/23 2:33 PM   "

## 2023-10-30 NOTE — TELEPHONE ENCOUNTER
Pending Prescriptions:                       Disp   Refills    omeprazole (PRILOSEC) 20 MG DR capsule                        Sig: Take 1 capsule (20 mg) by mouth 3 times daily Do           not substitute

## 2023-11-01 ENCOUNTER — OFFICE VISIT (OUTPATIENT)
Dept: PALLIATIVE MEDICINE | Facility: OTHER | Age: 58
End: 2023-11-01
Attending: NURSE PRACTITIONER
Payer: COMMERCIAL

## 2023-11-01 ENCOUNTER — OFFICE VISIT (OUTPATIENT)
Dept: PHYSICAL MEDICINE AND REHAB | Facility: CLINIC | Age: 58
End: 2023-11-01
Attending: SURGERY
Payer: COMMERCIAL

## 2023-11-01 ENCOUNTER — HOSPITAL ENCOUNTER (OUTPATIENT)
Dept: RADIOLOGY | Facility: HOSPITAL | Age: 58
Discharge: HOME | End: 2023-11-01
Attending: PAIN MEDICINE
Payer: COMMERCIAL

## 2023-11-01 VITALS
SYSTOLIC BLOOD PRESSURE: 144 MMHG | BODY MASS INDEX: 32.29 KG/M2 | OXYGEN SATURATION: 98 % | WEIGHT: 160 LBS | HEART RATE: 79 BPM | DIASTOLIC BLOOD PRESSURE: 82 MMHG

## 2023-11-01 DIAGNOSIS — Z98.1 HISTORY OF LUMBAR FUSION: ICD-10-CM

## 2023-11-01 DIAGNOSIS — M48.062 SPINAL STENOSIS OF LUMBAR REGION WITH NEUROGENIC CLAUDICATION: ICD-10-CM

## 2023-11-01 DIAGNOSIS — M25.552 BILATERAL HIP PAIN: ICD-10-CM

## 2023-11-01 DIAGNOSIS — M53.3 SACROILIAC JOINT PAIN: Primary | ICD-10-CM

## 2023-11-01 DIAGNOSIS — M54.16 LUMBAR RADICULOPATHY: ICD-10-CM

## 2023-11-01 DIAGNOSIS — M25.551 BILATERAL HIP PAIN: ICD-10-CM

## 2023-11-01 DIAGNOSIS — G89.29 CHRONIC INTRACTABLE PAIN: ICD-10-CM

## 2023-11-01 PROCEDURE — 73522 X-RAY EXAM HIPS BI 3-4 VIEWS: CPT

## 2023-11-01 PROCEDURE — G0463 HOSPITAL OUTPT CLINIC VISIT: HCPCS | Performed by: NURSE PRACTITIONER

## 2023-11-01 PROCEDURE — 99215 OFFICE O/P EST HI 40 MIN: CPT | Performed by: NURSE PRACTITIONER

## 2023-11-01 PROCEDURE — 99214 OFFICE O/P EST MOD 30 MIN: CPT | Performed by: PAIN MEDICINE

## 2023-11-01 RX ORDER — METHOCARBAMOL 500 MG/1
500 TABLET, FILM COATED ORAL AT BEDTIME
Qty: 30 TABLET | Refills: 3 | Status: SHIPPED | OUTPATIENT
Start: 2023-11-01 | End: 2024-02-29

## 2023-11-01 ASSESSMENT — PAIN SCALES - GENERAL: PAINLEVEL: SEVERE PAIN (6)

## 2023-11-01 NOTE — LETTER
11/1/2023         RE: Selina Parikh  [address] [facility] [address]        Dear Colleague,    Thank you for referring your patient, Selina Parikh, to the [facility] SPINE AND NEUROSURGERY. Please see a copy of my visit note below.      Assessment:     Diagnoses and all orders for this visit:  Sacroiliac joint pain  Bilateral hip pain  -     Spine  Referral  -     XR Pelvis w 2 VW Hips Bilateral; Future  Spinal stenosis of lumbar region with neurogenic claudication  History of lumbar fusion     Selina Parikh is a 58 year old y.o. female with past medical history significant for  melanoma, hypertension, Status post L3-5 posterior fusion intensity spine in 2020  who presents today for follow-up regarding bilateral hip pain:    -Patient received 75% relief with her right SI joint injection, however she continues to have right groin area pain.  Provocative right hip joint maneuvers are positive.     Plan:     A shared decision making plan was used. The patient's values and choices were respected. Prior medical records from Dr. Short last note in 2022 and neurosurgery note in 2023 were reviewed today. The following represents what was discussed and decided upon by the provider and the patient.        -DIAGNOSTIC TESTS: Images were personally reviewed and interpreted.   -- MRI of the lumbar spine dated 3/10/2023 is personally reviewed images interpreted and discussed with patient.  Does show posterior instrumented fusion from L3-L5.  There is a left hemilaminectomy and left medial facetectomy at L3-4 and L4-5.  There is moderate level of degenerative changes in the lumbar spine and.  There is moderate to severe spinal canal stenosis at L1-L2 moderate spinal canal narrowing at T12-L1 and L2-L3.  There is moderate to severe right foraminal stenosis at L1-L2 and moderate left foraminal stenosis at L5-S1.  -- I ordered an x-ray of her hips.  Once have reviewed this I will have one of our nurses  give her a call with results and recommendations.  Could consider ultrasound-guided right hip joint injection.    -INTERVENTIONS: As above    -MEDICATIONS: No changes to medications.  -  Discussed side effects of medications and proper use. Patient verbalized understanding.    -PHYSICAL THERAPY: She is currently doing her physical therapy exercises at the [facility] in the pool on her own.  She finds that these are helpful.  Recommend she continue with these.  Discussed the importance of core strengthening, ROM, stretching exercises with the patient and how each of these entities is important in decreasing pain.  Explained to the patient that the purpose of physical therapy is to teach the patient a home exercise program.  These exercises need to be performed every day in order to decrease pain and prevent future occurrences of pain.        -PATIENT EDUCATION: We discussed pain management in a multiple fashion including physical therapy, medication management, possible future injections.    -FOLLOW UP: Patient will follow-up after imaging and possible injection.    Advised to contact clinic if symptoms worsen or change.    Subjective:     Selina Parikh is a 58 year old female who presents today for follow-up regarding low back pain.  Patient has extensive history of lumbar spine surgeries and fusion.  She was last seen by Dr. Kent on 9/28/2023 and referred to the spine center for conservative management of hip pain bilaterally.  Patient notes 75% relief with her right SI joint injection on 9/22/2023.  She notes that this also seemed to help the burning groin pain as well.  She is been able to move better, however continues to have trouble with walking without support.  Sitting, standing and short walks are helpful.  She is doing physical therapy exercises in the water at the [facility].  She finds this also to be helpful.  She is taking methocarbamol and Tylenol.  She also notes that she takes Celebrex very sparingly.  She  notes that Dr. Kent has told her not to take this, however it helps her joints throughout her body and at times she needs this.  Pain today is 4/10 is worst is 8/10 is best 2/10.  She has pain across her entire low back right greater than left with pain radiating down her right buttock and posterior thigh and into the anterior groin area.    Evaluation to Date: MRI of lumbar spine dated 3/10/2023.  L3-L5 posterior fusion in 2020.  Removal of fusion and replacement of pedicle screws and extension to sacral with left lumbar L5-S1 transforaminal lumbar interbody fusion with Dr. Kent on 6/9/2023.  Right SI joint injection on 9/22/2023.    Patient Active Problem List   Diagnosis     Brain syndrome, posttraumatic     IBS (irritable bowel syndrome)     R Occipital Neuralgia     Scapulocostal syndrome     CARDIOVASCULAR SCREENING; LDL GOAL LESS THAN 160     Health Care Home     Tobacco abuse     GERD (gastroesophageal reflux disease)     Psoriasis     Diverticulitis     S/P partial colectomy     Acute chest pain     Chest pain, unspecified type     Coronary artery disease due to lipid rich plaque     Status post coronary angiogram     Primary hypertension     Spinal stenosis of lumbar region with neurogenic claudication     Hiatal hernia     Carotid stenosis     Lumbar radiculopathy     Lumbar disc herniation       Current Outpatient Medications   Medication     acetaminophen (TYLENOL) 500 MG tablet     aspirin 81 MG EC tablet     atorvastatin (LIPITOR) 40 MG tablet     clobetasol (TEMOVATE) 0.05 % external ointment     fish oil-omega-3 fatty acids 1000 MG capsule     HYDROmorphone (DILAUDID) 2 MG tablet     methocarbamol (ROBAXIN) 500 MG tablet     methocarbamol (ROBAXIN) 750 MG tablet     metoprolol succinate ER (TOPROL XL) 50 MG 24 hr tablet     Multiple Vitamins-Minerals (HAIR/SKIN/NAILS) TABS     multivitamin w/minerals (THERA-VIT-M) tablet     nitroGLYcerin (NITROSTAT) 0.4 MG sublingual tablet     omeprazole  (PRILOSEC) 20 MG DR capsule     polyethylene glycol (MIRALAX) 17 GM/Dose powder     promethazine (PHENERGAN) 25 MG tablet     UNABLE TO FIND     Vitamin D (Cholecalciferol) 25 MCG (1000 UT) TABS     No current facility-administered medications for this visit.       Allergies   Allergen Reactions     Ciprofloxacin Anaphylaxis     Flagyl [Metronidazole] Anaphylaxis     Gabapentin Other (See Comments)     Suicidal thoughts.     Zofran [Ondansetron] Other (See Comments) and GI Disturbance     Causes bloating, moderately uncomfortable, abd pain       Benadryl [Diphenhydramine] Other (See Comments)     Muscle spasms     Percocet [Oxycodone-Acetaminophen] Other (See Comments) and Headache     Goes nuts - nasty mean irritated, can take Dilaudid     Vicodin [Hydrocodone-Acetaminophen] Other (See Comments)     Anxiety-agitation       Past Medical History:   Diagnosis Date     Anemia      Antiplatelet or antithrombotic long-term use      Asymptomatic stenosis of left carotid artery      Cancer (H)     melanoma     Coronary artery disease      Hiatal hernia      Hypertension      Irritable bowel syndrome      Other chronic pain      Stented coronary artery         Review of Systems  ROS:  Specifically negative for bowel/bladder dysfunction, balance changes, headache, dizziness, foot drop, fevers, chills, appetite changes, nausea/vomiting, unexplained weight loss. Otherwise 13 systems reviewed are negative. Please see the patient's intake questionnaire from today for details.    Reviewed Social, Family, Past Medical and Past Surgical history with patient, no significant changes noted since prior visit.     Objective:     LMP 04/10/2016     PHYSICAL EXAMINATION:    --CONSTITUTIONAL: Well developed, well nourished, healthy appearing individual.  --PSYCHIATRIC: Appropriate mood and affect. No difficulty interacting due to temper, social withdrawal, or memory issues.  --SKIN: Lumbar region is dry and intact.   --RESPIRATORY: Normal  rhythm and effort. No abnormal accessory muscle breathing patterns noted.   --MUSCULOSKELETAL:  Normal lumbar lordosis noted, no lateral shift.  --GROSS MOTOR: Easily arises from a seated position. Gait is non-antalgic  --LUMBAR SPINE:  Inspection reveals no evidence of deformity. Range of motion is mildly limited in lumbar flexion, extension, lateral rotation.  Tenderness palpation over the right greater than left PSIS area. Straight leg raising in the seated position is negative to radicular pain.   --SACROILIAC JOINT: Positive on the right distraction.  Positive on the right for groin pain and right low back pain Edu's with reproduction of pain to affected extremity. One Finger point test positive on the right.  --LOWER EXTREMITY MOTOR TESTING:  Plantar flexion left 5/5, right 5/5   Dorsiflexion left 5/5, right 5/5   Great toe MTP extension left 5/5, right 5/5  Knee flexion left 5/5, right 5/5  Knee extension left 5/5, right 5/5   Hip flexion left 5/5, right 5/5  Hip abduction left 5/5, right 5/5  Hip adduction left 5/5, right 5/5   --HIPS: Full range of motion of bilateral hips, however does have increased pain with internal rotation of the right hip.  Stinchfield test is negative bilaterally.  --NEUROLOGIC:  Sensation to light touch is intact in the bilateral L4, L5, and S1 dermatomes.    RESULTS:   Imaging: Lumbar spine imaging was reviewed today.                         Again, thank you for allowing me to participate in the care of your patient.        Sincerely,        Aguilar Savage, DO

## 2023-11-01 NOTE — PROGRESS NOTES
Patient presents to the clinic today for a visit with CARLTON Ireland CNP regarding Pain Management.          5/1/2023    10:25 AM 9/12/2023    10:16 AM 11/1/2023    10:37 AM   PEG Score   PEG Total Score 5 9.33 5.33         QUESTIONS:Methocarbamol needs a refill if you are willing    Jazmin FOSTER [facility] Visit Facilitator

## 2023-11-01 NOTE — PATIENT INSTRUCTIONS
I ordered x-rays of your hips.  Once have reviewed this I will have one of our nurses give you a call with results and recommendations.    ~Please call Nurse Navigation line [phone] with any questions or concerns about your treatment plan, if symptoms worsen and you would like to be seen urgently, or if you have problems controlling bladder and bowel function.

## 2023-11-01 NOTE — PROGRESS NOTES
Assessment:     Diagnoses and all orders for this visit:  Sacroiliac joint pain  Bilateral hip pain  -     Spine [facility] Referral  -     XR Pelvis w 2 VW Hips Bilateral; Future  Spinal stenosis of lumbar region with neurogenic claudication  History of lumbar fusion     Selina Parikh is a 58 year old y.o. female with past medical history significant for  melanoma, hypertension, Status post L3-5 posterior fusion intensity spine in 2020  who presents today for follow-up regarding bilateral hip pain:    -Patient received 75% relief with her right SI joint injection, however she continues to have right groin area pain.  Provocative right hip joint maneuvers are positive.     Plan:     A shared decision making plan was used. The patient's values and choices were respected. Prior medical records from Dr. Short last note in 2022 and neurosurgery note in 2023 were reviewed today. The following represents what was discussed and decided upon by the provider and the patient.        -DIAGNOSTIC TESTS: Images were personally reviewed and interpreted.   -- MRI of the lumbar spine dated 3/10/2023 is personally reviewed images interpreted and discussed with patient.  Does show posterior instrumented fusion from L3-L5.  There is a left hemilaminectomy and left medial facetectomy at L3-4 and L4-5.  There is moderate level of degenerative changes in the lumbar spine and.  There is moderate to severe spinal canal stenosis at L1-L2 moderate spinal canal narrowing at T12-L1 and L2-L3.  There is moderate to severe right foraminal stenosis at L1-L2 and moderate left foraminal stenosis at L5-S1.  -- I ordered an x-ray of her hips.  Once have reviewed this I will have one of our nurses give her a call with results and recommendations.  Could consider ultrasound-guided right hip joint injection.    -INTERVENTIONS: As above    -MEDICATIONS: No changes to medications.  -  Discussed side effects of medications and proper use. Patient  verbalized understanding.    -PHYSICAL THERAPY: She is currently doing her physical therapy exercises at the [facility] in the pool on her own.  She finds that these are helpful.  Recommend she continue with these.  Discussed the importance of core strengthening, ROM, stretching exercises with the patient and how each of these entities is important in decreasing pain.  Explained to the patient that the purpose of physical therapy is to teach the patient a home exercise program.  These exercises need to be performed every day in order to decrease pain and prevent future occurrences of pain.        -PATIENT EDUCATION: We discussed pain management in a multiple fashion including physical therapy, medication management, possible future injections.    -FOLLOW UP: Patient will follow-up after imaging and possible injection.    Advised to contact clinic if symptoms worsen or change.    Subjective:     Selina Parikh is a 58 year old female who presents today for follow-up regarding low back pain.  Patient has extensive history of lumbar spine surgeries and fusion.  She was last seen by Dr. Kent on 9/28/2023 and referred to the spine center for conservative management of hip pain bilaterally.  Patient notes 75% relief with her right SI joint injection on 9/22/2023.  She notes that this also seemed to help the burning groin pain as well.  She is been able to move better, however continues to have trouble with walking without support.  Sitting, standing and short walks are helpful.  She is doing physical therapy exercises in the water at the [facility].  She finds this also to be helpful.  She is taking methocarbamol and Tylenol.  She also notes that she takes Celebrex very sparingly.  She notes that Dr. Kent has told her not to take this, however it helps her joints throughout her body and at times she needs this.  Pain today is 4/10 is worst is 8/10 is best 2/10.  She has pain across her entire low back right greater than left  with pain radiating down her right buttock and posterior thigh and into the anterior groin area.    Evaluation to Date: MRI of lumbar spine dated 3/10/2023.  L3-L5 posterior fusion in 2020.  Removal of fusion and replacement of pedicle screws and extension to sacral with left lumbar L5-S1 transforaminal lumbar interbody fusion with Dr. Kent on 6/9/2023.  Right SI joint injection on 9/22/2023.    Patient Active Problem List   Diagnosis    Brain syndrome, posttraumatic    IBS (irritable bowel syndrome)    R Occipital Neuralgia    Scapulocostal syndrome    CARDIOVASCULAR SCREENING; LDL GOAL LESS THAN 160    Health Care Home    Tobacco abuse    GERD (gastroesophageal reflux disease)    Psoriasis    Diverticulitis    S/P partial colectomy    Acute chest pain    Chest pain, unspecified type    Coronary artery disease due to lipid rich plaque    Status post coronary angiogram    Primary hypertension    Spinal stenosis of lumbar region with neurogenic claudication    Hiatal hernia    Carotid stenosis    Lumbar radiculopathy    Lumbar disc herniation       Current Outpatient Medications   Medication    acetaminophen (TYLENOL) 500 MG tablet    aspirin 81 MG EC tablet    atorvastatin (LIPITOR) 40 MG tablet    clobetasol (TEMOVATE) 0.05 % external ointment    fish oil-omega-3 fatty acids 1000 MG capsule    HYDROmorphone (DILAUDID) 2 MG tablet    methocarbamol (ROBAXIN) 500 MG tablet    methocarbamol (ROBAXIN) 750 MG tablet    metoprolol succinate ER (TOPROL XL) 50 MG 24 hr tablet    Multiple Vitamins-Minerals (HAIR/SKIN/NAILS) TABS    multivitamin w/minerals (THERA-VIT-M) tablet    nitroGLYcerin (NITROSTAT) 0.4 MG sublingual tablet    omeprazole (PRILOSEC) 20 MG DR capsule    polyethylene glycol (MIRALAX) 17 GM/Dose powder    promethazine (PHENERGAN) 25 MG tablet    UNABLE TO FIND    Vitamin D (Cholecalciferol) 25 MCG (1000 UT) TABS     No current facility-administered medications for this visit.       Allergies   Allergen  Reactions    Ciprofloxacin Anaphylaxis    Flagyl [Metronidazole] Anaphylaxis    Gabapentin Other (See Comments)     Suicidal thoughts.    Zofran [Ondansetron] Other (See Comments) and GI Disturbance     Causes bloating, moderately uncomfortable, abd pain      Benadryl [Diphenhydramine] Other (See Comments)     Muscle spasms    Percocet [Oxycodone-Acetaminophen] Other (See Comments) and Headache     Goes nuts - nasty mean irritated, can take Dilaudid    Vicodin [Hydrocodone-Acetaminophen] Other (See Comments)     Anxiety-agitation       Past Medical History:   Diagnosis Date    Anemia     Antiplatelet or antithrombotic long-term use     Asymptomatic stenosis of left carotid artery     Cancer (H)     melanoma    Coronary artery disease     Hiatal hernia     Hypertension     Irritable bowel syndrome     Other chronic pain     Stented coronary artery         Review of Systems  ROS:  Specifically negative for bowel/bladder dysfunction, balance changes, headache, dizziness, foot drop, fevers, chills, appetite changes, nausea/vomiting, unexplained weight loss. Otherwise 13 systems reviewed are negative. Please see the patient's intake questionnaire from today for details.    Reviewed Social, Family, Past Medical and Past Surgical history with patient, no significant changes noted since prior visit.     Objective:     LMP 04/10/2016     PHYSICAL EXAMINATION:    --CONSTITUTIONAL: Well developed, well nourished, healthy appearing individual.  --PSYCHIATRIC: Appropriate mood and affect. No difficulty interacting due to temper, social withdrawal, or memory issues.  --SKIN: Lumbar region is dry and intact.   --RESPIRATORY: Normal rhythm and effort. No abnormal accessory muscle breathing patterns noted.   --MUSCULOSKELETAL:  Normal lumbar lordosis noted, no lateral shift.  --GROSS MOTOR: Easily arises from a seated position. Gait is non-antalgic  --LUMBAR SPINE:  Inspection reveals no evidence of deformity. Range of motion is  mildly limited in lumbar flexion, extension, lateral rotation.  Tenderness palpation over the right greater than left PSIS area. Straight leg raising in the seated position is negative to radicular pain.   --SACROILIAC JOINT: Positive on the right distraction.  Positive on the right for groin pain and right low back pain Edu's with reproduction of pain to affected extremity. One Finger point test positive on the right.  --LOWER EXTREMITY MOTOR TESTING:  Plantar flexion left 5/5, right 5/5   Dorsiflexion left 5/5, right 5/5   Great toe MTP extension left 5/5, right 5/5  Knee flexion left 5/5, right 5/5  Knee extension left 5/5, right 5/5   Hip flexion left 5/5, right 5/5  Hip abduction left 5/5, right 5/5  Hip adduction left 5/5, right 5/5   --HIPS: Full range of motion of bilateral hips, however does have increased pain with internal rotation of the right hip.  Stinchfield test is negative bilaterally.  --NEUROLOGIC:  Sensation to light touch is intact in the bilateral L4, L5, and S1 dermatomes.    RESULTS:   Imaging: Lumbar spine imaging was reviewed today.

## 2023-11-01 NOTE — PROGRESS NOTES
Date:11/01/2023    COMPREHENSIVE PAIN CLINIC FOLLOW UP EVALUATION  I had the pleasure of meeting Ms. Selina Parikh on 5/1/2023 in the [facility] in consult for CARLTON Sanchez with regards to her pain.  The patient is a 57 year old female with past medical history of post traumatic brain syndrome, GERD, Diverticulitis, CAD, occipital neuralgia, chest pain, HTN, L3-5 fusion and left T12-L1 microdiscectomy with Dr. Kent 09/2022, and carotid stenosis who presents for evaluation of chronic pain.      Updates since last appointment on 09/12/2023.  She picked up a script robaxin and hydromorphone 2mg 09/14/2023 from Dr. Kent for lumbar pain.  She needs refills of robaxin 500mg 1 tab q hs.  She is trying to quit smoking.   Saw Dr. Savage earlier this am and had xrays of b/l hips with recommendations of possible hip joint injections.  Started tumeric supplements  New Medication started last appt:  duloxetine - Cymbalta caused increase in depression and she stopped it.  She is following with pain psychology with Joelle Martin. Next appt 11/2023.  Stopped acupuncture due to increase pain.  [employer] - pool therapy twice a week - very helpful    Interventions/Injections:   09/22/2023 L) SI injection with Dr. Savage - decreased pain 75%-80% - reports walking for longer periods of time with less pain and without a cane or cart.      Progress Notes Reviewed:  10/11/2023 Joelle Martin, Pain Psychology [facility]  09/28/2023 Dr. Suha Kent, [facility] - Hip pain  09/26/2023 Acupuncture treatment  09/22/2023 Dr. Savage - SI pain  09/14/2023 Sandra Rivas PA-C - Follow-up spinal surgery    Any hospitalizations/ER/UC visits since last appointment:  no  Any falls/accidents since last appointment:  no      Primary Pain :  chronic R) low back pain that radiates down the R) leg, wrapping around the hip.  The burning pain in the leg has improved.  She has b/l hip pain.     "    Characteristics:  She reports spasms in L) foot.  She complains of numbness and tingling in both legs. Oral steroids did not improve her pain.The patient describes the pain as \"toothache\", constant, burning.     What makes the pain better:   Her pain is improved with ice, heat, frequent position changes.   What makes the pain worse:  She reports that the pain is made worse by standing in one spot, prolong walking, picking up heavy items.  11/01/2023 current pain on 0/10 VAS:  4   Worst pain: 9  Best pain:  2  09/12/2023 05/01/2023 initial consult current pain  5 today, best 3 worst 9 on a 0/10 VAS.       Current Pain Related Medications:  Any medications changes since last appointment:    Current Treatments:  Robaxin 500mg q hs  Acetaminophen 1,000mg q 6 hrs-discussed max daily dose 3,000mg  Celebrex 100mg BID - changed to once a day  Plavix 75mg daily - discontinued after lumbar sugery  Melatonin 10mg      Therapies discuss on initial consult:   Physical therapy, Pain Psychology, TENs unit, Grounding Mat, Frequency Specific Micro Current, Anti-inflammatory Lifestyle    Employment: on SSD    Exercise: pool therapy twice a week and she walks her dog    Hobbies:  She enjoys her children and grand children camping    Mental Health:  stable           Plan on last appointment 09/12/2023:  A multimodal plan was developed today to treat your pain.  Multimodal analgesia is a strategy that reduces reliance on opioids through the use of non-opioid analgesics and therapies that have different mechanisms of action.    Diagnostics: no new imaging to review today        Medications:  Start duloxetine 20 mg 1 tab in am with food x 1 wk then 2 tabs in am with food x 1 week then 3 tabs in am with food for 1 wk.     The following OTC pain medications may be helpful, use as directed: Voltaren Gel 1%, CBD products, Capsaicin products, Australian Dream Cream, Epson It, Arnica Products, Lidocaine Patch, Solanpas, Biofreeze, " Aspercream, Tiger Balm and Fortunato Emu cream.  Apply heat or cold PRN.      Therapies:  Continue Pain Psychology at [facility].  Psychological treatments are also important part of pain management.  Understanding and managing the thoughts, emotions and behaviors that accompany the discomfort can help you cope more effectively with your pain and can actually reduce the intensity of your pain.    Continue accupuntcure at [facility]     PT on hold for now in [facility]  PHYSICAL THERAPY: Encourage patient to continue working with physical therapy as able.  Discussed the importance of core strengthening, ROM, stretching exercises with the patient and how each of these entities is important in decreasing pain.  Explained to the patient that the purpose of physical therapy is to teach the patient a home exercise program.  These exercises need to be performed every day in order to decrease pain and prevent future occurrences of pain.        Discussed Grounding Mat - handout provided  [email or web address]/[email or web address]    Discussed Frequency Specific Microcurrent - handout provided  Treatment for Neuropathic Pain.   [facility] [phone]  [facility] chiropractic [phone]  May be able to be billed as a chiropractic service depending on your insurance coverage.     Discussed Acupuncture.    Continue TENs unit. Zynex brand was cost prohibited.      Interventions:  none      Follow up: 1-2 months      ----------------------------------------------------------------------------------------------------------------------------------------------------    History of pain on initial consult 05/01/2023:  Patient reports chronic left low back pain that radiates down the L) leg, wrapping around the L) posterolateral thigh and down into the foot. She has some pain down the right thigh as well but the left is worse then the right. She has b/l hip pain.  She reports spasms in  "L) foot.  She complains of numbness and tingling in both legs. Oral steroids did not improve her pain.The patient describes the pain as \"toothache\", constant, burning.  She reports that the pain is made worse by standing in one spot, prolong walking, picking up heavy items.  Her pain is improved with ice, heat, frequent position changes.  She rates her pain at a 5 today, best 3 worst 9 on a 0/10 VAS. Surgery is pending for extension of the fusion to L5-S1 with Dr. Kent in June 2023. 2021 L) TF LESI L5-S1 [facility] without much benefit.  She does not want to be prescribed opiates.  She continues to do the exercises learned at PT on a daily basis.    She denies any new problems with falls or balance, any new numbness or weakness of the arms or legs, any new bowel or bladder incontinence, any night sweats or unexplained fevers, or any sudden or unexpected weight loss.     Follows with cardiology and neurology.  03/07/2023 Dr. Suha Kent progress note reviewed.    Selina Parikh has not been seen at a pain clinic in the past.      Patient reported symptoms:  Patient Supplied Answers To the  Pain Questionnaire      5/1/2023    10:47 AM    Pain -  Patient Entered Questionnaire/Answers   What number best describes your pain right now:  0 = No pain  to  10 = Worst pain imaginable 5   How would you describe the pain burning    sharp    numbness    dull, aching    throbbing    pressure   Which of the following worsen your pain standing    walking   Which of the following improve or reduce your pain nothing relieves the pain   What number best describes your average pain for the past week:  0 = No pain  to  10 = Worst pain imaginable 4   What number best describes your LOWEST pain in past 24 hours:  0 = No pain  to  10 = Worst pain imaginable 2   What number best describes your WORST pain in past 24 hours:  0 = No pain  to  10 = Worst pain imaginable 7   When is your pain worst Constant   What " non-medicine treatments have you already had for your pain physical therapy    chiropractic care    TENS (electrical stimulator)    spine injections (shots)    other nerve blocks    surgery    exercise         Current Treatments:  Acetaminophen 1,000mg q 6 hrs-discussed max daily dose 3,000mg  Celebrex 100mg BID  Plavix 75mg daily  Melatonin      Previous Medication Treatments:  Anti-convulsants: Gabapentin causes suicidal thoughts  Muscle relaxors: Baclofen causes light headedness, zanaflex, cyclobenzaprine, methocarbamol  Anti-depressants: no  Acetaminophen/NSAIDs: yes- helpful  Topicals: Voltaren gel, Biofreeze, lidocaine patch  Opioids: tramadol and hydromorphone,   percocet and vicodin cause anxiety.  Other:  Diazepam 5mg     Other Treatments:  [facility] [place], last session 3 yrs ago.  She doesn't find physical therapy very helpfu  Pain Psychology: no  Chiropractic: yes-not helpful  Acupuncture: no  TENs Unit: yes - helpful  Injections: cervical TPI at [facility]  Surgeries: 2 lumbar sugeries    Past Medical History:  Medical history reviewed.   Past Medical History:   Diagnosis Date    Anemia     Antiplatelet or antithrombotic long-term use     Asymptomatic stenosis of left carotid artery     Cancer (H)     melanoma    Coronary artery disease     Hiatal hernia     Hypertension     Irritable bowel syndrome     Other chronic pain     Stented coronary artery       Patient Active Problem List   Diagnosis    Brain syndrome, posttraumatic    IBS (irritable bowel syndrome)    R Occipital Neuralgia    Scapulocostal syndrome    CARDIOVASCULAR SCREENING; LDL GOAL LESS THAN 160    Health Care Home    Tobacco abuse    GERD (gastroesophageal reflux disease)    Psoriasis    Diverticulitis    S/P partial colectomy    Acute chest pain    Chest pain, unspecified type    Coronary artery disease due to lipid rich plaque    Status post coronary angiogram    Primary hypertension     Spinal stenosis of lumbar region with neurogenic claudication    Hiatal hernia    Carotid stenosis    Lumbar radiculopathy    Lumbar disc herniation       Past Surgical History:  Pertinent surgical history reviewed.   Past Surgical History:   Procedure Laterality Date    BACK SURGERY      CHOLECYSTECTOMY, LAPOROSCOPIC  02/14/2000    Cholecystectomy, Laparoscopic    COLON SURGERY      CV CORONARY ANGIOGRAM N/A 05/06/2022    Procedure: CV CORONARY ANGIOGRAM;  Surgeon: Stefanie Spangler MD;  Location: [facility] CATH LAB CV    CV LEFT HEART CATH N/A 05/06/2022    Procedure: Left Heart Catheterization;  Surgeon: Stefanie Spangler MD;  Location: [facility] CATH LAB CV    CYSTOSCOPY, INSERT LIGHTED STENT URETER(S) N/A 04/10/2018    Procedure: CYSTOSCOPY, INSERT LIGHTED STENT URETER(S);  Lighted Stent Placement,Laparoscopic Assisted Sigmoid Colectomy;  Surgeon: ERVIN Reina MD;  Location: [facility] OR    ENDOVASCULAR CAROTID STENT PLACEMENT Left 4/21/2023    Procedure: Left transcarotid revascularization;  Surgeon: Eveline Manley MD;  Location: [facility] OR    IR TCAR TRANSCAROTID ARTERY REVASCULARIZATION  4/21/2023    LAPAROSCOPIC ASSISTED COLECTOMY LEFT (DESCENDING) N/A 04/10/2018    Procedure: LAPAROSCOPIC ASSISTED COLECTOMY LEFT (DESCENDING);;  Surgeon: Hill Murray MD;  Location: [facility] OR    LUMBAR DISCECTOMY Left 09/23/2022    Procedure: left thoracic 12-lumbar 1 hemilaminectomy, medial facetectomy, foraminotomy and microdiscectomy;  Surgeon: Suha Kent MD;  Location: [facility] OR    NECK SURGERY      OPTICAL TRACKING SYSTEM FUSION SPINE POSTERIOR LUMBAR THREE+ LEVELS Bilateral 6/9/2023    Procedure: Exploration of prior lumbar 3- lumbar 5 instrumented fusion with removal and replacement of pedicle screws and extension to sacral 1. Lumbar 3-sacral 1  Left transforaminal lumbar interbody fusion and posterolateral arthrodesis with use of stealth navigation;  Surgeon: Suha Kent MD;   "Location: [facility]    ORTHOPEDIC SURGERY  10/01/2010    Cut palm and reattched tendons and nerves in left hand forefinger.    MS ESOPHAGOGASTRODUODENOSCOPY TRANSORAL DIAGNOSTIC N/A 04/30/2021    Procedure: ESOPHAGOGASTRODUODENOSCOPY (EGD);  Surgeon: Souleymane Moreno DO;  [facility]: [facility];  Service: General    SURGICAL HISTORY OF -   01/04/2000    Esophagogastroduodenoscopy with biopsy    TUBAL LIGATION          Medications: Pertinent medications reviewed.  Current Outpatient Medications   Medication Sig Dispense Refill    acetaminophen (TYLENOL) 500 MG tablet Take 1,000 mg by mouth every 6 hours as needed for mild pain      aspirin 81 MG EC tablet Take 81 mg by mouth daily      atorvastatin (LIPITOR) 40 MG tablet Take 1 tablet by mouth once daily 90 tablet 3    clobetasol (TEMOVATE) 0.05 % external ointment Apply topically 2 times daily To affected areas 60 g 0    fish oil-omega-3 fatty acids 1000 MG capsule Take 1 g by mouth daily      HYDROmorphone (DILAUDID) 2 MG tablet Take 1-2 tablets (2-4 mg) by mouth every 4 hours as needed for moderate pain 42 tablet 0    methocarbamol (ROBAXIN) 500 MG tablet Take 1 tablet (500 mg) by mouth 4 times daily as needed for muscle spasms 42 tablet 0    methocarbamol (ROBAXIN) 750 MG tablet Take 1 tablet (750 mg) by mouth 4 times daily as needed for muscle spasms 42 tablet 0    metoprolol succinate ER (TOPROL XL) 50 MG 24 hr tablet Take 12.5 mg by mouth daily (Patient not taking: Reported on 9/28/2023)      Multiple Vitamins-Minerals (HAIR/SKIN/NAILS) TABS Take 1 tablet by mouth daily      multivitamin w/minerals (THERA-VIT-M) tablet Take 1 tablet by mouth daily      nitroGLYcerin (NITROSTAT) 0.4 MG sublingual tablet One tablet under the tongue every 5 minutes if needed for chest pain. May repeat every 5 minutes for a maximum of 3 doses in 15 minutes\" 25 tablet 3    omeprazole (PRILOSEC) 20 MG DR capsule Take 1 capsule (20 mg) by mouth 3 times daily Do not " substitute 270 capsule 3    polyethylene glycol (MIRALAX) 17 GM/Dose powder Take 17 g (1 Capful) by mouth daily 510 g 0    promethazine (PHENERGAN) 25 MG tablet Take 1 tablet (25 mg) by mouth every 8 hours as needed for nausea 21 tablet 0    UNABLE TO FIND Take 1 tablet by mouth At Bedtime MEDICATION NAME: Qunol Sleep - melatonin 5 mg, Ashwagandha, L-Theanine      Vitamin D (Cholecalciferol) 25 MCG (1000 UT) TABS Take 1,000 Units by mouth daily         [place] Prescription Monitoring Program reviewed 11/01/2023.  No concern for abuse or misuse of controlled medications based on this report.  09/14/2023 Hydromorphone 2mg 21 tabs for 7 days  06/11/2023 Hydromorphone 2mg 42 tabs for 6 days        Allergies: Pertinent allergies reviewed.     Allergies   Allergen Reactions    Ciprofloxacin Anaphylaxis    Flagyl [Metronidazole] Anaphylaxis    Gabapentin Other (See Comments)     Suicidal thoughts.    Zofran [Ondansetron] Other (See Comments) and GI Disturbance     Causes bloating, moderately uncomfortable, abd pain      Benadryl [Diphenhydramine] Other (See Comments)     Muscle spasms    Percocet [Oxycodone-Acetaminophen] Other (See Comments) and Headache     Goes nuts - nasty mean irritated, can take Dilaudid    Vicodin [Hydrocodone-Acetaminophen] Other (See Comments)     Anxiety-agitation       Family History:   family history includes Allergies in her daughter and son; Aortic aneurysm in her brother; C.A.D. (age of onset: 38) in her brother; C.A.D. (age of onset: 42) in her brother; C.A.D. (age of onset: 50) in her father; Cancer in her mother; Cancer (age of onset: 34) in her brother; Diabetes in her brother, brother, brother, father, and mother.    Social History:   She is  and lives with her brother, Emmanuel, in a house.  She is independent in ADL's.  She walks for exercise.  She has a corgi.   She is smoking 1/2 pack daily. She is on SSD.   reports that she has been smoking cigarettes. She has a 15.00 pack-year  smoking history. She has never used smokeless tobacco. She reports that she does not currently use alcohol. She reports that she does not currently use drugs.  Social History     Social History Narrative    Not on file         Physical Exam:  Constitutional: She is oriented to person, place, and time.  She appears well-developed and well-nourished. She is not in acute distress.   HENT:     Head: Normocephalic and atraumatic.     Eyes: Pupils are equal, round, and reactive to light. EOM are normal. No scleral icterus.  Pulmonary/Chest:  NWOB. No respiratory distress.   Neurological: She is alert and oriented to person, place, and time. Coordination grossly normal.  Romberg test negative.    Skin: Skin is warm and dry. She is not diaphoretic.   Psychiatric: She has a normal mood and affect. Her behavior is normal. Judgment and thought content normal.  She answers questions appropriately.  MSK: Gait is antalgic.      Imaging:  MRI OF THE LUMBAR SPINE WITHOUT CONTRAST   3/10/2023 1:17 PM      COMPARISON: Lumbar spine MRI 03/08/2021. Lumbar spine CT 09/29/2021.     HISTORY: Lumbar radiculopathy     TECHNIQUE: Multiplanar, multisequence MRI images of the lumbar spine  were acquired without IV contrast.     FINDINGS: There are five lumbar-type vertebrae for the purposes of  this dictation. The conus ends at L2-L3. 19 degrees of levocurvature  from L1 to L3. 4 mm retrolisthesis of L1 on L2. Postsurgical changes  of posterior instrumented fusion from L3 to L5. Interbody fusion of  L3-L4 and L4-L5. Vertebral body heights are maintained. Nonpathologic  marrow signal. The visualized bony pelvis is grossly within normal  limits.     Moderate symmetric atrophy of the posterior paraspinal musculature.  Normal diameter of the distal abdominal aorta. Left hemilaminectomy  and left medial facetectomy changes at L3-L4 and L4-L5. Small amount  of edema in the posterior paraspinal soft tissues from L3 to L5,  decreased since the prior  lumbar spine MRI.     T12-L1: Moderate intervertebral disc height loss. Loss of the normal  T2 signal within the disc. Broad-based disc bulge with superimposed  small central disc extrusion. Moderate bilateral facet arthropathy.  Moderate spinal canal narrowing. No right neural foraminal narrowing.  No left neural foraminal narrowing.     L1-L2: Moderate intervertebral disc height loss. Loss of the normal T2  signal within the disc. Broad-based disc bulge with superimposed small  central disc protrusion. Moderate bilateral facet arthropathy.  Moderate to severe spinal canal narrowing. Moderate to severe right  neuroforaminal narrowing. No left neuroforaminal narrowing.     L2-L3: Mild intervertebral disc height loss. Loss of the normal T2  signal within the disc. Broad-based disc bulge with superimposed right  foraminal disc protrusion. Moderate bilateral facet arthropathy.  Moderate spinal canal narrowing. Mild to moderate right neural  foraminal narrowing. No left neural foraminal narrowing.     L3-L4: Postsurgical changes of 360 degree fusion. Left hemilaminectomy  changes. No spinal canal narrowing. No right neural foraminal  narrowing. No left neural foraminal narrowing.     L4-L5: Postsurgical changes of 360 degree fusion. Left hemilaminectomy  changes. No spinal canal narrowing. No neural foraminal narrowing.     L5-S1: Mild intervertebral disc height loss. Loss of the normal T2  signal within the disc. Broad-based disc bulge with superimposed small  central disc protrusion. Moderate bilateral facet arthropathy. Mild to  moderate spinal canal narrowing. No right neuroforaminal narrowing.  Moderate left neuroforaminal narrowing.                                                                      IMPRESSION:  1.  Postsurgical changes of posterior instrumented fusion from L3 to  L5. Interbody fusion of L3-L4 and L4-L5.   2.  Left hemilaminectomy and left medial facetectomy changes at L3-L4  and L4-L5. Small  amount of edema in the posterior paraspinal soft  tissues from L3 to L5, decreased since the prior lumbar spine MRI  03/08/2021.  3.  Moderate multilevel degenerative changes of the lumbar spine,  worsened since prior lumbar spine MRI.  4.  Moderate to severe spinal canal narrowing at L1-L2.  5.  Moderate spinal canal narrowing at T12-L1 and L2-L3.  6.  Moderate to severe right neural foraminal narrowing at L1-L2.  7.  Moderate left neuroforaminal narrowing at L5-S1.         Diagnosis:  (M54.16) Lumbar radiculopathy  (primary encounter diagnosis)  Comment: R) leg pain, L) leg numbness  Plan: Dr. Burch ordered xrays and follow up apt.    (M48.062) Spinal stenosis of lumbar region with neurogenic claudication  (primary encounter diagnosis)  Comment: Refer to pain psychology, acupuncture, pool therapy  Plan: 06/09/2023 Extension of lumbar fusion with Dr. Burch    (G89.4) Chronic pain syndrome  Comment:  Plan:  Cymbalta caused increase in depression.          Plan on 11/01/2023:  Refill methocarbamol 500mg 1 tab q hs    Follow up with Dr. Savage to review hip xray and have hip steroid injection.    Follow up with Dr. Kent in December.    Follow-up in clinic as needed.    Gill Valderrama APRN, RN, CNP, FNP  [facility]        BILLING TIME DOCUMENTATION:   The total TIME spent on this patient on the date of the encounter/appointment was 40 minutes.

## 2023-11-02 ENCOUNTER — TELEPHONE (OUTPATIENT)
Dept: PHYSICAL MEDICINE AND REHAB | Facility: CLINIC | Age: 58
End: 2023-11-02
Payer: COMMERCIAL

## 2023-11-02 DIAGNOSIS — M25.551 HIP PAIN, RIGHT: Primary | ICD-10-CM

## 2023-11-02 NOTE — TELEPHONE ENCOUNTER
----- Message from Aguilar Savage DO sent at 11/2/2023  7:21 AM CDT -----  Please call the patient and let her know I reviewed x-rays of her hips.  They do have normal joint space with no fractures.  We could consider a diagnostic only meaning numbing medication only injection under ultrasound guidance into the right hip to see if this would provide pain relief over a short amount of time.  If it did and we can consider doing an ultrasound-guided injection with steroid thereafter.  If the patient would like to move forward with this please have her scheduled.

## 2023-11-02 NOTE — TELEPHONE ENCOUNTER
Call placed to patient with provider's results and recommendations.  Pt stated understanding. She would like to proceed. Order placed for DIAGNOSTIC ONLY right hip joint injection with US guidance. Injection requirements reviewed. Transferred to scheduling to make appt.

## 2023-11-21 ENCOUNTER — TELEPHONE (OUTPATIENT)
Dept: PHYSICAL MEDICINE AND REHAB | Facility: CLINIC | Age: 58
End: 2023-11-21

## 2023-11-21 NOTE — TELEPHONE ENCOUNTER
[facility] Call Center    Phone Message    May a detailed message be left on voicemail: no     Reason for Call: Requesting c/b- to reschedule procedure appt today

## 2023-11-21 NOTE — TELEPHONE ENCOUNTER
Phone call to patient to discuss. Patient states her pain is not that high today and she is feeling ill with stuffed head and sore throat. Transferred to scheduling to reschedule the injection appointment.

## 2023-11-28 ENCOUNTER — OFFICE VISIT (OUTPATIENT)
Dept: VASCULAR SURGERY | Facility: CLINIC | Age: 58
End: 2023-11-28
Attending: SURGERY
Payer: COMMERCIAL

## 2023-11-28 ENCOUNTER — HOSPITAL ENCOUNTER (OUTPATIENT)
Dept: ULTRASOUND IMAGING | Facility: CLINIC | Age: 58
Discharge: HOME | End: 2023-11-28
Attending: SURGERY | Admitting: SURGERY
Payer: COMMERCIAL

## 2023-11-28 VITALS
TEMPERATURE: 96.9 F | OXYGEN SATURATION: 99 % | DIASTOLIC BLOOD PRESSURE: 90 MMHG | HEART RATE: 83 BPM | BODY MASS INDEX: 32.25 KG/M2 | WEIGHT: 160 LBS | HEIGHT: 59 IN | SYSTOLIC BLOOD PRESSURE: 132 MMHG

## 2023-11-28 DIAGNOSIS — I65.22 ASYMPTOMATIC STENOSIS OF LEFT CAROTID ARTERY: ICD-10-CM

## 2023-11-28 DIAGNOSIS — I65.22 ASYMPTOMATIC STENOSIS OF LEFT CAROTID ARTERY: Primary | ICD-10-CM

## 2023-11-28 PROCEDURE — 93880 EXTRACRANIAL BILAT STUDY: CPT

## 2023-11-28 PROCEDURE — 99213 OFFICE O/P EST LOW 20 MIN: CPT | Performed by: SURGERY

## 2023-11-28 ASSESSMENT — PAIN SCALES - GENERAL: PAINLEVEL: MILD PAIN (3)

## 2023-11-28 NOTE — PROGRESS NOTES
Patient is in clinic today to see MD Jimena Manley.      Patient is here for a follow up to discuss  L TCAR .    The patient does smoke.    Pt is currently taking ASA and statin.    The patient states he/she are NOT wearing compression .    Swelling is observed in bilateral legs.    Pt rates pain 3/10 located at neck.    Pts symptoms include Rest Pain, leg cramps, swelling, numbness or weakness, and shade or floaters in eyes in Left extremity.    Patients condition is worse.    The provider has been notified that the patient is complaining of smoking cessation .

## 2023-11-28 NOTE — PROGRESS NOTES
VASCULAR SURGERY PROGRESS NOTE   VASCULAR SURGEON: Eveline Manley MD, RPVI     LOCATION:  [facility]     Selina Parikh   Medical Record #:  [id number]  YOB: 1965  Age:  58 year old     Date of Service: 11/28/2023    PRIMARY CARE PROVIDER: Sera Solo      Reason for visit: Follow-up    IMPRESSION: 58-year-old female status post left transcarotid revascularization.  Ultrasound today is unremarkable    RECOMMENDATION/RISKS: Continue aspirin and statin.  Advised her to quit smoking.  Follow-up in 1 year with repeat ultrasound    HPI:  Selina Parikh is a 58 year old female who was seen today for follow-up.  Patient is doing well and denies any complaints  REVIEW OF SYSTEMS:    A 12 point ROS was reviewed and is negative     PHH:    Past Medical History:   Diagnosis Date    Anemia     Antiplatelet or antithrombotic long-term use     Asymptomatic stenosis of left carotid artery     Cancer (H)     melanoma    Coronary artery disease     Hiatal hernia     Hypertension     Irritable bowel syndrome     Other chronic pain     Stented coronary artery           Past Surgical History:   Procedure Laterality Date    BACK SURGERY      CHOLECYSTECTOMY, LAPOROSCOPIC  02/14/2000    Cholecystectomy, Laparoscopic    COLON SURGERY      CV CORONARY ANGIOGRAM N/A 05/06/2022    Procedure: CV CORONARY ANGIOGRAM;  Surgeon: Stefanie Spangler MD;  Location: [facility] CV    CV LEFT HEART CATH N/A 05/06/2022    Procedure: Left Heart Catheterization;  Surgeon: Stefanie Spangler MD;  Location: [facility] CV    CYSTOSCOPY, INSERT LIGHTED STENT URETER(S) N/A 04/10/2018    Procedure: CYSTOSCOPY, INSERT LIGHTED STENT URETER(S);  Lighted Stent Placement,Laparoscopic Assisted Sigmoid Colectomy;  Surgeon: ERVIN Reina MD;  Location: [facility] OR    ENDOVASCULAR CAROTID STENT PLACEMENT Left 4/21/2023    Procedure: Left transcarotid revascularization;  Surgeon: Eveline Manley MD;  Location:   [facility]    IR TCAR TRANSCAROTID ARTERY REVASCULARIZATION  4/21/2023    LAPAROSCOPIC ASSISTED COLECTOMY LEFT (DESCENDING) N/A 04/10/2018    Procedure: LAPAROSCOPIC ASSISTED COLECTOMY LEFT (DESCENDING);;  Surgeon: Hill Murray MD;  Location: [facility] OR    LUMBAR DISCECTOMY Left 09/23/2022    Procedure: left thoracic 12-lumbar 1 hemilaminectomy, medial facetectomy, foraminotomy and microdiscectomy;  Surgeon: Suha Kent MD;  Location: [facility]    NECK SURGERY      OPTICAL TRACKING SYSTEM FUSION SPINE POSTERIOR LUMBAR THREE+ LEVELS Bilateral 6/9/2023    Procedure: Exploration of prior lumbar 3- lumbar 5 instrumented fusion with removal and replacement of pedicle screws and extension to sacral 1. Lumbar 3-sacral 1  Left transforaminal lumbar interbody fusion and posterolateral arthrodesis with use of stealth navigation;  Surgeon: Suha Kent MD;  Location: [facility]    ORTHOPEDIC SURGERY  10/01/2010    Cut palm and reattched tendons and nerves in left hand forefinger.    CO ESOPHAGOGASTRODUODENOSCOPY TRANSORAL DIAGNOSTIC N/A 04/30/2021    Procedure: ESOPHAGOGASTRODUODENOSCOPY (EGD);  Surgeon: Souleymane Moreno DO;  Location: [facility];  Service: General    SURGICAL HISTORY OF -   01/04/2000    Esophagogastroduodenoscopy with biopsy    TUBAL LIGATION         ALLERGIES:  Ciprofloxacin, Flagyl [metronidazole], Gabapentin, Zofran [ondansetron], Benadryl [diphenhydramine], Percocet [oxycodone-acetaminophen], and Vicodin [hydrocodone-acetaminophen]    MEDS:    Current Outpatient Medications:     acetaminophen (TYLENOL) 500 MG tablet, Take 1,000 mg by mouth every 6 hours as needed for mild pain, Disp: , Rfl:     aspirin 81 MG EC tablet, Take 81 mg by mouth daily, Disp: , Rfl:     atorvastatin (LIPITOR) 40 MG tablet, Take 1 tablet by mouth once daily, Disp: 90 tablet, Rfl: 3    clobetasol (TEMOVATE) 0.05 % external ointment, Apply topically 2 times daily To affected areas, Disp: 60  "g, Rfl: 0    fish oil-omega-3 fatty acids 1000 MG capsule, Take 1 g by mouth daily, Disp: , Rfl:     methocarbamol (ROBAXIN) 500 MG tablet, Take 1 tablet (500 mg) by mouth at bedtime for 120 days, Disp: 30 tablet, Rfl: 3    methocarbamol (ROBAXIN) 750 MG tablet, Take 1 tablet (750 mg) by mouth 4 times daily as needed for muscle spasms, Disp: 42 tablet, Rfl: 0    metoprolol succinate ER (TOPROL XL) 50 MG 24 hr tablet, Take 12.5 mg by mouth daily, Disp: , Rfl:     Multiple Vitamins-Minerals (HAIR/SKIN/NAILS) TABS, Take 1 tablet by mouth daily, Disp: , Rfl:     multivitamin w/minerals (THERA-VIT-M) tablet, Take 1 tablet by mouth daily, Disp: , Rfl:     omeprazole (PRILOSEC) 20 MG DR capsule, Take 1 capsule (20 mg) by mouth 3 times daily Do not substitute, Disp: 270 capsule, Rfl: 3    polyethylene glycol (MIRALAX) 17 GM/Dose powder, Take 17 g (1 Capful) by mouth daily, Disp: 510 g, Rfl: 0    promethazine (PHENERGAN) 25 MG tablet, Take 1 tablet (25 mg) by mouth every 8 hours as needed for nausea, Disp: 21 tablet, Rfl: 0    Turmeric (QC TUMERIC COMPLEX PO), , Disp: , Rfl:     UNABLE TO FIND, Take 1 tablet by mouth At Bedtime MEDICATION NAME: Qunol Sleep - melatonin 5 mg, Ashwagandha, L-Theanine, Disp: , Rfl:     Vitamin D (Cholecalciferol) 25 MCG (1000 UT) TABS, Take 1,000 Units by mouth daily, Disp: , Rfl:     nitroGLYcerin (NITROSTAT) 0.4 MG sublingual tablet, One tablet under the tongue every 5 minutes if needed for chest pain. May repeat every 5 minutes for a maximum of 3 doses in 15 minutes\" (Patient not taking: Reported on 11/28/2023), Disp: 25 tablet, Rfl: 3    SOCIAL HABITS:    History   Smoking Status    Every Day    Packs/day: 0.50    Years: 30.00    Types: Cigarettes    Last attempt to quit: 6/2/2023   Smokeless Tobacco    Never     Social History    Substance and Sexual Activity      Alcohol use: Not Currently        Comment: Alcoholic Drinks/day: 2x year       History   Drug Use Unknown       FAMILY HISTORY:  " "  Family History   Problem Relation Age of Onset    C.A.BIA. Father 50        50's    Diabetes Father     Diabetes Brother     C.A.D. Brother 42        quad bypass and MI    Diabetes Brother         2 brothers have DM    Cancer Brother 34        Melanoma    Aortic aneurysm Brother     Allergies Son         Meds    Allergies Daughter         Med    Cancer Mother     C.A.D. Brother 38        MI age 38    Diabetes Mother     Diabetes Brother        PE:  BP (!) 132/90   Pulse 83   Temp 96.9  F (36.1  C) (Tympanic)   Ht 1.499 m (4' 11\")   Wt 72.6 kg (160 lb)   LMP 04/10/2016   SpO2 99%   BMI 32.32 kg/m    Wt Readings from Last 1 Encounters:   11/28/23 72.6 kg (160 lb)     Body mass index is 32.32 kg/m .    EXAM:  GENERAL: This is a well-developed 58 year old female who appears her stated age  EYES: Grossly normal.  MOUTH: Buccal mucosa normal   CARDIAC: Normal   CHEST/LUNG: Clear to auscultation bilaterally  GASTROINTESINAL soft nontender nondistended  MUSCULOSKELETAL: Grossly normal and both lower extremities are intact.  HEME/LYMPH: No lymphedema  NEUROLOGIC: Focally intact, Alert and oriented x 3.   PSYCH: appropriate affect             DIAGNOSTIC STUDIES:     Images:  XR Pelvis w 2 VW Hips Bilateral    Result Date: 11/1/2023  EXAM: XR PELVIS W 2 VW HIPS BILATERAL LOCATION: [facility] DATE: 11/1/2023 INDICATION: Bilateral hip pain COMPARISON: None.     IMPRESSION: No acute fracture or malalignment. There is normal hip joint spacing bilaterally. Small chronic ossicle at the left superior acetabulum. Mild bilateral sacroiliac joint degenerative changes. Postsurgical changes of the lower lumbar spine. Chain suture overlies the left hemipelvis.        LABS:      Sodium   Date Value Ref Range Status   06/10/2023 139 136 - 145 mmol/L Final   06/08/2023 142 136 - 145 mmol/L Final   05/18/2023 142 136 - 145 mmol/L Final   04/19/2021 142 133 - 144 mmol/L Final   10/12/2020 142 133 - 144 mmol/L " Final   01/25/2020 140 133 - 144 mmol/L Final     Urea Nitrogen   Date Value Ref Range Status   06/10/2023 17 8 - 22 mg/dL Final   06/08/2023 13.6 6.0 - 20.0 mg/dL Final   05/18/2023 13.7 6.0 - 20.0 mg/dL Final   04/11/2023 17.4 6.0 - 20.0 mg/dL Final   09/25/2022 15 8 - 22 mg/dL Final   09/19/2022 13 8 - 22 mg/dL Final   04/19/2021 13 7 - 30 mg/dL Final   10/12/2020 15 7 - 30 mg/dL Final   01/25/2020 13 7 - 30 mg/dL Final     Hemoglobin   Date Value Ref Range Status   06/11/2023 10.0 (L) 11.7 - 15.7 g/dL Final   06/10/2023 10.4 (L) 11.7 - 15.7 g/dL Final   06/08/2023 13.5 11.7 - 15.7 g/dL Final   04/19/2021 14.2 11.7 - 15.7 g/dL Final   10/12/2020 13.6 11.7 - 15.7 g/dL Final   01/25/2020 14.6 11.7 - 15.7 g/dL Final     Platelet Count   Date Value Ref Range Status   06/11/2023 226 150 - 450 10e3/uL Final   06/10/2023 251 150 - 450 10e3/uL Final   06/08/2023 247 150 - 450 10e3/uL Final   04/19/2021 272 150 - 450 10e9/L Final   10/12/2020 243 150 - 450 10e9/L Final   01/25/2020 265 150 - 450 10e9/L Final     BNP   Date Value Ref Range Status   05/05/2022 10 0 - 84 pg/mL Final     INR   Date Value Ref Range Status   06/08/2023 0.99 0.85 - 1.15 Final   09/19/2022 0.93 0.85 - 1.15 Final   12/30/2020 0.96 0.86 - 1.14 Final   03/25/2013 1.0  Final       30 minutes spent on the day of encounter doing chart review, history and exam, documentation, and further activities as noted.       Eveline Manley MD, [facility]  VASCULAR SURGERY

## 2023-11-28 NOTE — PATIENT INSTRUCTIONS
Marlin Marks,    Thank you for entrusting your care with us today. After your visit today with MD Eveline Manley this is the plan that was discussed at your appointment.    We will contact you to schedule 1 year follow-up with Mariann AYALA and ultrasound        I am including additional information on these things and our contact information if you have any questions or concerns.   Please do not hesitate to reach out to us if you felt we did not answer your questions or you are unsure of the treatment plan after your visit today. Our number is [phone].Thank you for trusting us with your care.         Again thank you for your time.

## 2023-12-13 ENCOUNTER — VIRTUAL VISIT (OUTPATIENT)
Dept: PALLIATIVE MEDICINE | Facility: CLINIC | Age: 58
End: 2023-12-13
Payer: COMMERCIAL

## 2023-12-13 DIAGNOSIS — G89.29 CHRONIC INTRACTABLE PAIN: ICD-10-CM

## 2023-12-13 DIAGNOSIS — M54.16 LUMBAR RADICULOPATHY: Primary | ICD-10-CM

## 2023-12-13 DIAGNOSIS — M48.062 SPINAL STENOSIS OF LUMBAR REGION WITH NEUROGENIC CLAUDICATION: ICD-10-CM

## 2023-12-13 PROCEDURE — 96158 HLTH BHV IVNTJ INDIV 1ST 30: CPT | Mod: 95 | Performed by: PSYCHOLOGIST

## 2023-12-13 NOTE — PROGRESS NOTES
Selina Parikh is a 58 year old female who is being evaluated via a billable video visit.      Patient is currently in the [facility]? yes    Patient would like the video invitation sent by: Text to cell phone: [phone]}    Video Start Time: 10:01 AM  Video Stop Time: 10:22 AM    Additional provider notes:     Pain Diagnoses per pain provider:   Lumbar radiculopathy    Chronic intractable pain    Spinal stenosis of lumbar region with neurogenic claudication        DATA: During today's visit you reported the following: Your back pain is mildly improved - pool therapy has been very helpful, as has moving around. Your mood is mildly improved. Your activity level is increased - pool, walking in house. Your stress level is overall stable. Your sleep is still disrupted, primarily due to temperature. You reported engaging in self-care for your pain 2-3 times daily - physical activity, TENS unit.    You identified that you would like to focus on the following or had questions regarding the following issues or concerns, and we discussed the following:   - things overall are stable  - muscle relaxant has been helpful at night  - taking tumeric pill - seems to help    ASSESSMENT: Selina reports she has developed a good routine of self-care, and notes improvement overall in her back and mood. She continues to report that her sleep is disrupted, however states this seems more related to ongoing hot flashes rather than due to pain; she was encouraged to reach out to her PCP to discuss. We discussed continuing with her current routine of self-care, and to continue to slowly increase physical activity as tolerated.    PLAN:   Your next appointment is scheduled for PRN - may schedule as long as she is working with CARLTON Nunez CNP.  Assignment/Objectives /interventions for next session:   - continue self-care routine  - continue to slowly build in physical activity as tolerated    We believe regular attendance  is key to your success in our program!    Any time you are unable to keep your appointment we ask that you call us at [phone] at least 24 hours in advance to cancel.This will allow us to offer the appointment time to another patient.   Multiple missed appointments may lead to dismissal from the clinic.    Telemedicine Visit: The patient's condition can be safely assessed and treated via synchronous audio and visual telemedicine encounter.      Reason for Telemedicine Visit: Services only offered telehealth    Originating Site (Patient Location): Patient's home    Distant Site (Provider Location): Provider Remote Setting- Home Office    Consent:  The patient/guardian has verbally consented to: the potential risks and benefits of telemedicine (video visit) versus in person care; bill my insurance or make self-payment for services provided; and responsibility for payment of non-covered services.     Mode of Communication:  Video Conference via [employer]    As the provider I attest to compliance with applicable laws and regulations related to telemedicine.      Drea Martin PsyD LP  Licensed Psychologist  Outpatient Clinic Therapist  [facility]

## 2023-12-28 ENCOUNTER — HOSPITAL ENCOUNTER (OUTPATIENT)
Dept: RADIOLOGY | Facility: HOSPITAL | Age: 58
Discharge: HOME | End: 2023-12-28
Attending: SURGERY | Admitting: SURGERY
Payer: COMMERCIAL

## 2023-12-28 ENCOUNTER — OFFICE VISIT (OUTPATIENT)
Dept: NEUROSURGERY | Facility: CLINIC | Age: 58
End: 2023-12-28
Attending: SURGERY
Payer: COMMERCIAL

## 2023-12-28 VITALS — DIASTOLIC BLOOD PRESSURE: 84 MMHG | HEART RATE: 89 BPM | SYSTOLIC BLOOD PRESSURE: 136 MMHG | OXYGEN SATURATION: 97 %

## 2023-12-28 DIAGNOSIS — M48.062 SPINAL STENOSIS OF LUMBAR REGION WITH NEUROGENIC CLAUDICATION: Primary | ICD-10-CM

## 2023-12-28 DIAGNOSIS — Z98.1 S/P LUMBAR FUSION: ICD-10-CM

## 2023-12-28 PROCEDURE — 99213 OFFICE O/P EST LOW 20 MIN: CPT | Performed by: PHYSICIAN ASSISTANT

## 2023-12-28 PROCEDURE — 72100 X-RAY EXAM L-S SPINE 2/3 VWS: CPT

## 2023-12-28 ASSESSMENT — PAIN SCALES - GENERAL: PAINLEVEL: MILD PAIN (3)

## 2023-12-28 NOTE — PATIENT INSTRUCTIONS
Ms. Parikh is doing well and has been advised to remain as active as possible. Will plan to follow up in 6 months with lumbar CT at that time for 1 year postoperative appointment. She understands that should her symptoms return that she can contact our office and could benefit from follow up with spine center for hip injections as they were cancelled previously secondary to improvement of her pain.

## 2023-12-28 NOTE — NURSING NOTE
"Selina Parikh is a 58 year old female who presents for:  Chief Complaint   Patient presents with    Follow Up     6 month post op  Some pain low right back/hip        Initial Vitals:  /84   Pulse 89   LMP 04/10/2016   SpO2 97%  Estimated body mass index is 32.32 kg/m  as calculated from the following:    Height as of 11/28/23: 4' 11\" (1.499 m).    Weight as of 11/28/23: 160 lb (72.6 kg).. There is no height or weight on file to calculate BSA. BP completed using cuff size: regular  Mild Pain (3)      Vic Garland  "

## 2023-12-28 NOTE — PROGRESS NOTES
Neurosurgery Progress Note: 12/28/2023     A/P: postoperative Exploration of prior lumbar 3- lumbar 5 instrumented fusion with removal and replacement of pedicle screws and extension to sacral with Left lumbar 5-sacral 1  transforaminal lumbar interbody fusion with posterolateral arthrodesiswith use of stealth navigation  on 6/9/2023 by Dr. Kent     Plan:  Ms. Parikh is doing well and has been advised to remain as active as possible. Will plan to follow up in 6 months with lumbar CT at that time for 1 year postoperative appointment. She understands that should her symptoms return that she can contact our office and could benefit from follow up with spine center for hip injections as they were cancelled previously secondary to improvement of her pain.        Ms. Parikh is a pleasant 57 year old right handed female who presents postoperative exploration of prior lumbar 3- lumbar 5 instrumented fusion with removal and replacement of pedicle screws and extension to sacral 1. Lumbar 3-sacral 1  Left transforaminal lumbar interbody fusion and posterolateral arthrodesis with use of stealth navigation on 6/9/2023 by Dr. Kent. Seen by Dr. Kent and recommended referral to spine medicine for further conservative treatment options of which she had SI joint injections and had begun aquatic therapy at the [facility]. She was scheduled for hip injections but states that she never had them as her pain had improved with aquatic therapy. She states that currently she is feel well with little to no current complaints of back, buttocks, or hip pain. She states that she is currently moving the best she has moved in a long while and is pleased with her progress.       HPI: 56 yo female who is s/p L3-5 fusion at OSH and recent left thoracic 12-lumbar 1 microdiscectomy in 9/22. Ongoing L5 radiculopathy. MRI lumbar spine shows moderate facet hypertrophy with effusions and moderate to severe foraminal narrowing at lumbar 5-sacral 1  contributing to her symptoms. Recommend exploration and extension of her fusion to S1 with a left lumbar 5-sacral 1 TILF.  Risks and benefits were discussed in detail including but not limited to infection, hematoma, nerve damage including paralysis, post op radiculitis, durotomy, lack of a sold bone fusion, hardware malfunction, risks associated with the use of general anesthesia, blood clots in the lungs or legs. She agreed to proceed.     Exam:  /84   Pulse 89   LMP 04/10/2016   SpO2 97%     General: alert and oriented x3     Strength 5/5 strength throughout in lower extremities     Sensation is intact throughout both upper and lower extremities    Imaging:  Xray reviewed personally good hardware placement and stable lumbar alignment   IMPRESSION: 5 lumbar type vertebrae. Postsurgical changes of posterior instrumented fusion from L3 to S1. Interbody fusion from L3-L4 through L5-S1. Solid interbody fusion at L3-L4 and L4-L5. Right-sided dorsolateral fusion from L3 to L5. The surgical   hardware appear intact without evidence for fracture or loosening. Mild levocurvature of the lumbar spine. 3 mm retrolisthesis of L1 on L2 and L2 on L3 with associated moderate intervertebral disc height loss and endplate spurring. 1 mm retrolisthesis of   L5 on S1. Vertebral body heights are maintained. Moderate intervertebral disc height loss at T11-T12 and T12-L1. Mild degenerative changes of the sacroiliac joints.     Surgical clips project of the right upper quadrant.        Sandra Rivas PA-C  [facility] Neurosurgery  O: [phone]

## 2023-12-28 NOTE — LETTER
12/28/2023         RE: Selina Parikh  [address] [address] [address]        Dear Colleague,    Thank you for referring your patient, Selina Parikh, to the [facility] SPINE AND NEUROSURGERY. Please see a copy of my visit note below.    Neurosurgery Progress Note: 12/28/2023     A/P: postoperative Exploration of prior lumbar 3- lumbar 5 instrumented fusion with removal and replacement of pedicle screws and extension to sacral with Left lumbar 5-sacral 1  transforaminal lumbar interbody fusion with posterolateral arthrodesiswith use of stealth navigation  on 6/9/2023 by Dr. Kent     Plan:  Ms. Parikh is doing well and has been advised to remain as active as possible. Will plan to follow up in 6 months with lumbar CT at that time for 1 year postoperative appointment. She understands that should her symptoms return that she can contact our office and could benefit from follow up with spine center for hip injections as they were cancelled previously secondary to improvement of her pain.        Ms. Parikh is a pleasant 57 year old right handed female who presents postoperative exploration of prior lumbar 3- lumbar 5 instrumented fusion with removal and replacement of pedicle screws and extension to sacral 1. Lumbar 3-sacral 1  Left transforaminal lumbar interbody fusion and posterolateral arthrodesis with use of stealth navigation on 6/9/2023 by Dr. Kent. Seen by Dr. Kent and recommended referral to spine medicine for further conservative treatment options of which she had SI joint injections and had begun aquatic therapy at the [facility]. She was scheduled for hip injections but states that she never had them as her pain had improved with aquatic therapy. She states that currently she is feel well with little to no current complaints of back, buttocks, or hip pain. She states that she is currently moving the best she has moved in a long while and is pleased with her progress.       HPI: 56 yo female who  is s/p L3-5 fusion at OSH and recent left thoracic 12-lumbar 1 microdiscectomy in 9/22. Ongoing L5 radiculopathy. MRI lumbar spine shows moderate facet hypertrophy with effusions and moderate to severe foraminal narrowing at lumbar 5-sacral 1 contributing to her symptoms. Recommend exploration and extension of her fusion to S1 with a left lumbar 5-sacral 1 TILF.  Risks and benefits were discussed in detail including but not limited to infection, hematoma, nerve damage including paralysis, post op radiculitis, durotomy, lack of a sold bone fusion, hardware malfunction, risks associated with the use of general anesthesia, blood clots in the lungs or legs. She agreed to proceed.     Exam:  /84   Pulse 89   LMP 04/10/2016   SpO2 97%     General: alert and oriented x3     Strength 5/5 strength throughout in lower extremities     Sensation is intact throughout both upper and lower extremities    Imaging:  Xray reviewed personally good hardware placement and stable lumbar alignment   IMPRESSION: 5 lumbar type vertebrae. Postsurgical changes of posterior instrumented fusion from L3 to S1. Interbody fusion from L3-L4 through L5-S1. Solid interbody fusion at L3-L4 and L4-L5. Right-sided dorsolateral fusion from L3 to L5. The surgical   hardware appear intact without evidence for fracture or loosening. Mild levocurvature of the lumbar spine. 3 mm retrolisthesis of L1 on L2 and L2 on L3 with associated moderate intervertebral disc height loss and endplate spurring. 1 mm retrolisthesis of   L5 on S1. Vertebral body heights are maintained. Moderate intervertebral disc height loss at T11-T12 and T12-L1. Mild degenerative changes of the sacroiliac joints.     Surgical clips project of the right upper quadrant.        Sandra Rivas PA-C  [facility] Neurosurgery  O: [phone]      Again, thank you for allowing me to participate in the care of your patient.        Sincerely,        Sandra Rivas PA-C

## 2024-01-04 PROBLEM — M54.16 LUMBAR RADICULOPATHY: Status: RESOLVED | Noted: 2023-06-09 | Resolved: 2024-01-04

## 2024-01-04 NOTE — PROGRESS NOTES
DISCHARGE  Reason for Discharge:   Patient has failed to schedule further appointments.    Equipment Issued: HEP    Discharge Plan: Patient to continue home program.    Referring Provider:  Sandra Rivas

## 2024-01-22 ENCOUNTER — NURSE TRIAGE (OUTPATIENT)
Dept: NURSING | Facility: CLINIC | Age: 59
End: 2024-01-22
Payer: COMMERCIAL

## 2024-01-22 ENCOUNTER — APPOINTMENT (OUTPATIENT)
Dept: CT IMAGING | Facility: CLINIC | Age: 59
End: 2024-01-22
Attending: EMERGENCY MEDICINE
Payer: COMMERCIAL

## 2024-01-22 ENCOUNTER — HOSPITAL ENCOUNTER (EMERGENCY)
Facility: CLINIC | Age: 59
Discharge: HOME | End: 2024-01-22
Attending: EMERGENCY MEDICINE | Admitting: EMERGENCY MEDICINE
Payer: COMMERCIAL

## 2024-01-22 VITALS
WEIGHT: 160 LBS | TEMPERATURE: 98.5 F | BODY MASS INDEX: 32.25 KG/M2 | DIASTOLIC BLOOD PRESSURE: 77 MMHG | HEIGHT: 59 IN | HEART RATE: 107 BPM | OXYGEN SATURATION: 98 % | RESPIRATION RATE: 17 BRPM | SYSTOLIC BLOOD PRESSURE: 151 MMHG

## 2024-01-22 DIAGNOSIS — E86.0 DEHYDRATION: ICD-10-CM

## 2024-01-22 DIAGNOSIS — R11.10 VOMITING AND DIARRHEA: ICD-10-CM

## 2024-01-22 DIAGNOSIS — R19.7 VOMITING AND DIARRHEA: ICD-10-CM

## 2024-01-22 LAB
ALBUMIN SERPL BCG-MCNC: 5.3 G/DL (ref 3.5–5.2)
ALBUMIN UR-MCNC: 30 MG/DL
ALP SERPL-CCNC: 118 U/L (ref 40–150)
ALT SERPL W P-5'-P-CCNC: 45 U/L (ref 0–50)
ANION GAP SERPL CALCULATED.3IONS-SCNC: 11 MMOL/L (ref 7–15)
APPEARANCE UR: CLEAR
AST SERPL W P-5'-P-CCNC: 29 U/L (ref 0–45)
ATRIAL RATE - MUSE: 118 BPM
BASOPHILS # BLD AUTO: 0 10E3/UL (ref 0–0.2)
BASOPHILS NFR BLD AUTO: 0 %
BILIRUB DIRECT SERPL-MCNC: <0.2 MG/DL (ref 0–0.3)
BILIRUB SERPL-MCNC: 0.4 MG/DL
BILIRUB UR QL STRIP: NEGATIVE
BUN SERPL-MCNC: 26.8 MG/DL (ref 6–20)
CALCIUM SERPL-MCNC: 10.3 MG/DL (ref 8.6–10)
CHLORIDE SERPL-SCNC: 101 MMOL/L (ref 98–107)
COLOR UR AUTO: YELLOW
CREAT SERPL-MCNC: 0.89 MG/DL (ref 0.51–0.95)
DEPRECATED HCO3 PLAS-SCNC: 28 MMOL/L (ref 22–29)
DIASTOLIC BLOOD PRESSURE - MUSE: NORMAL MMHG
EGFRCR SERPLBLD CKD-EPI 2021: 75 ML/MIN/1.73M2
EOSINOPHIL # BLD AUTO: 0.1 10E3/UL (ref 0–0.7)
EOSINOPHIL NFR BLD AUTO: 1 %
ERYTHROCYTE [DISTWIDTH] IN BLOOD BY AUTOMATED COUNT: 15.3 % (ref 10–15)
FLUAV RNA SPEC QL NAA+PROBE: NEGATIVE
FLUBV RNA RESP QL NAA+PROBE: NEGATIVE
GLUCOSE SERPL-MCNC: 103 MG/DL (ref 70–99)
GLUCOSE UR STRIP-MCNC: NEGATIVE MG/DL
HCT VFR BLD AUTO: 49.5 % (ref 35–47)
HGB BLD-MCNC: 17 G/DL (ref 11.7–15.7)
HGB UR QL STRIP: NEGATIVE
IMM GRANULOCYTES # BLD: 0 10E3/UL
IMM GRANULOCYTES NFR BLD: 0 %
INTERPRETATION ECG - MUSE: NORMAL
KETONES UR STRIP-MCNC: ABNORMAL MG/DL
LEUKOCYTE ESTERASE UR QL STRIP: NEGATIVE
LIPASE SERPL-CCNC: 39 U/L (ref 13–60)
LYMPHOCYTES # BLD AUTO: 3.1 10E3/UL (ref 0.8–5.3)
LYMPHOCYTES NFR BLD AUTO: 29 %
MCH RBC QN AUTO: 30.2 PG (ref 26.5–33)
MCHC RBC AUTO-ENTMCNC: 34.3 G/DL (ref 31.5–36.5)
MCV RBC AUTO: 88 FL (ref 78–100)
MONOCYTES # BLD AUTO: 0.7 10E3/UL (ref 0–1.3)
MONOCYTES NFR BLD AUTO: 7 %
MUCOUS THREADS #/AREA URNS LPF: PRESENT /LPF
NEUTROPHILS # BLD AUTO: 6.8 10E3/UL (ref 1.6–8.3)
NEUTROPHILS NFR BLD AUTO: 63 %
NITRATE UR QL: NEGATIVE
NRBC # BLD AUTO: 0 10E3/UL
NRBC BLD AUTO-RTO: 0 /100
P AXIS - MUSE: -12 DEGREES
PH UR STRIP: 6 [PH] (ref 5–7)
PLATELET # BLD AUTO: 329 10E3/UL (ref 150–450)
POTASSIUM SERPL-SCNC: 3.6 MMOL/L (ref 3.4–5.3)
PR INTERVAL - MUSE: 130 MS
PROT SERPL-MCNC: 8.4 G/DL (ref 6.4–8.3)
QRS DURATION - MUSE: 90 MS
QT - MUSE: 344 MS
QTC - MUSE: 482 MS
R AXIS - MUSE: 52 DEGREES
RBC # BLD AUTO: 5.63 10E6/UL (ref 3.8–5.2)
RBC URINE: 0 /HPF
RSV RNA SPEC NAA+PROBE: NEGATIVE
SARS-COV-2 RNA RESP QL NAA+PROBE: NEGATIVE
SODIUM SERPL-SCNC: 140 MMOL/L (ref 135–145)
SP GR UR STRIP: 1.03 (ref 1–1.03)
SQUAMOUS EPITHELIAL: 2 /HPF
SYSTOLIC BLOOD PRESSURE - MUSE: NORMAL MMHG
T AXIS - MUSE: -2 DEGREES
UROBILINOGEN UR STRIP-MCNC: <2 MG/DL
VENTRICULAR RATE- MUSE: 118 BPM
WBC # BLD AUTO: 10.7 10E3/UL (ref 4–11)
WBC URINE: 3 /HPF

## 2024-01-22 PROCEDURE — 250N000011 HC RX IP 250 OP 636: Performed by: EMERGENCY MEDICINE

## 2024-01-22 PROCEDURE — 87507 IADNA-DNA/RNA PROBE TQ 12-25: CPT | Performed by: EMERGENCY MEDICINE

## 2024-01-22 PROCEDURE — 96361 HYDRATE IV INFUSION ADD-ON: CPT

## 2024-01-22 PROCEDURE — 83690 ASSAY OF LIPASE: CPT | Performed by: STUDENT IN AN ORGANIZED HEALTH CARE EDUCATION/TRAINING PROGRAM

## 2024-01-22 PROCEDURE — 85025 COMPLETE CBC W/AUTO DIFF WBC: CPT | Performed by: STUDENT IN AN ORGANIZED HEALTH CARE EDUCATION/TRAINING PROGRAM

## 2024-01-22 PROCEDURE — 258N000003 HC RX IP 258 OP 636: Performed by: EMERGENCY MEDICINE

## 2024-01-22 PROCEDURE — 36415 COLL VENOUS BLD VENIPUNCTURE: CPT | Performed by: STUDENT IN AN ORGANIZED HEALTH CARE EDUCATION/TRAINING PROGRAM

## 2024-01-22 PROCEDURE — 81001 URINALYSIS AUTO W/SCOPE: CPT | Performed by: EMERGENCY MEDICINE

## 2024-01-22 PROCEDURE — 80053 COMPREHEN METABOLIC PANEL: CPT | Performed by: STUDENT IN AN ORGANIZED HEALTH CARE EDUCATION/TRAINING PROGRAM

## 2024-01-22 PROCEDURE — 74177 CT ABD & PELVIS W/CONTRAST: CPT

## 2024-01-22 PROCEDURE — 99285 EMERGENCY DEPT VISIT HI MDM: CPT | Mod: 25

## 2024-01-22 PROCEDURE — 250N000011 HC RX IP 250 OP 636: Mod: JZ | Performed by: STUDENT IN AN ORGANIZED HEALTH CARE EDUCATION/TRAINING PROGRAM

## 2024-01-22 PROCEDURE — 96374 THER/PROPH/DIAG INJ IV PUSH: CPT | Mod: 59

## 2024-01-22 PROCEDURE — 87493 C DIFF AMPLIFIED PROBE: CPT | Mod: XU | Performed by: EMERGENCY MEDICINE

## 2024-01-22 PROCEDURE — 87637 SARSCOV2&INF A&B&RSV AMP PRB: CPT | Performed by: STUDENT IN AN ORGANIZED HEALTH CARE EDUCATION/TRAINING PROGRAM

## 2024-01-22 PROCEDURE — 93005 ELECTROCARDIOGRAM TRACING: CPT | Performed by: EMERGENCY MEDICINE

## 2024-01-22 PROCEDURE — 96375 TX/PRO/DX INJ NEW DRUG ADDON: CPT

## 2024-01-22 RX ORDER — IOPAMIDOL 755 MG/ML
90 INJECTION, SOLUTION INTRAVASCULAR ONCE
Status: DISCONTINUED | OUTPATIENT
Start: 2024-01-22 | End: 2024-01-23 | Stop reason: HOSPADM

## 2024-01-22 RX ORDER — METOCLOPRAMIDE HYDROCHLORIDE 5 MG/ML
10 INJECTION INTRAMUSCULAR; INTRAVENOUS ONCE
Status: COMPLETED | OUTPATIENT
Start: 2024-01-22 | End: 2024-01-22

## 2024-01-22 RX ORDER — IOPAMIDOL 755 MG/ML
90 INJECTION, SOLUTION INTRAVASCULAR ONCE
Status: COMPLETED | OUTPATIENT
Start: 2024-01-22 | End: 2024-01-22

## 2024-01-22 RX ORDER — HYDROXYZINE PAMOATE 25 MG/1
25 CAPSULE ORAL 3 TIMES DAILY PRN
Qty: 15 CAPSULE | Refills: 0 | Status: SHIPPED | OUTPATIENT
Start: 2024-01-22

## 2024-01-22 RX ADMIN — METOCLOPRAMIDE HYDROCHLORIDE 10 MG: 5 INJECTION INTRAMUSCULAR; INTRAVENOUS at 20:38

## 2024-01-22 RX ADMIN — SODIUM CHLORIDE 1000 ML: 9 INJECTION, SOLUTION INTRAVENOUS at 22:01

## 2024-01-22 RX ADMIN — FAMOTIDINE 20 MG: 10 INJECTION, SOLUTION INTRAVENOUS at 21:18

## 2024-01-22 RX ADMIN — SODIUM CHLORIDE 1000 ML: 9 INJECTION, SOLUTION INTRAVENOUS at 20:37

## 2024-01-22 RX ADMIN — IOPAMIDOL 90 ML: 755 INJECTION, SOLUTION INTRAVENOUS at 21:30

## 2024-01-22 ASSESSMENT — ACTIVITIES OF DAILY LIVING (ADL): ADLS_ACUITY_SCORE: 35

## 2024-01-22 NOTE — TELEPHONE ENCOUNTER
"Nurse Triage SBAR    Is this a 2nd Level Triage? NO    Situation: Vomiting blood    Background: Patient states that she has been vomiting and having diarrhea all week.  She states that she has been unable to keep her medication down and has only been able to drink gatorade.  She also states that now she is vomiting blood.  She states it seems like at least a couple of tablespoons of blood.  Denies any bloody or black stools and states her stools are \"like water\".     Assessment: vomiting blood    Protocol Recommended Disposition:   Go to ED Now    Recommendation: Patient stated that she will go to the ED now.      N/A    SAMEER MABRY RN    Does the patient meet one of the following criteria for ADS visit consideration? 16+ years old, with an [facility] PCP     TIP  Providers, please consider if this condition is appropriate for management at one of our Acute and Diagnostic Services sites.     If patient is a good candidate, please use dotphrase <dot>triageresponse and select Refer to ADS to document.    Reason for Disposition   Vomited blood and more than a few streaks of blood  (Exceptions: Few streaks that occurred only once, or swallowed blood from a nosebleed or cut in the mouth.)    Additional Information   Negative: Shock suspected (e.g., cold/pale/clammy skin, too weak to stand, low BP, rapid pulse)   Negative: Difficult to awaken or acting confused (e.g., disoriented, slurred speech)   Negative: Unable to walk, or can only walk with assistance (e.g., requires support)   Negative: Fainted   Negative: Vomited blood and large amount (example: \"a cup of blood\")   Negative: Rectal bleeding (i.e., bloody stool, blood in stool)   Negative: Sounds like a life-threatening emergency to the triager   Negative: Coughing up blood (i.e., from lungs)   Negative: Weak, dizzy or lightheaded    Protocols used: Vomiting Blood-A-OH    "

## 2024-01-23 LAB

## 2024-01-23 NOTE — ED PROVIDER NOTES
EMERGENCY DEPARTMENT ENCOUNTER      NAME: Selina Parikh  AGE: 58 year old female  YOB: 1965  MRN: [id number]  EVALUATION DATE & TIME: No admission date for patient encounter.    PCP: Sera Solo    ED PROVIDER: Nancy Bean MD      Chief Complaint   Patient presents with    Nausea, Vomiting, & Diarrhea    Tachycardia         FINAL IMPRESSION:  1. Vomiting and diarrhea    2. Dehydration          ED COURSE & MEDICAL DECISION MAKIN:05 PM I met with the patient for the initial interview and physical examination. Discussed plan for treatment and workup in the ED.    10:31 PM I rechecked and updated the patient. I discussed the plan for discharge with the patient, and patient is agreeable. We discussed supportive cares at home and reasons for return to the ER including new or worsening symptoms - all questions and concerns addressed. Patient to be discharged by RN.     Pertinent Labs & Imaging studies reviewed. (See chart for details)  58 year old female presents to the Emergency Department for evaluation of days of watery diarrhea.  She denies any hematochezia.  She has been generally weak.  She reports that she had a fever.  Laboratory studies are within normal limits.  She ordered no abdominal pain but on my exam had right lower quadrant tenderness.  She does have a history of previous colectomy.  CT scan with no significant findings.  Patient was given 2 L of IV fluids, felt improved.  Will plan to discharge home with supportive management for gastroenteritis.  Patient is comfortable with this plan.    I offered antiemetics, she is allergic to ondansetron.  She already has a prescription for Phenergan.  I discussed use of hydroxyzine with Phenergan to see if this gave improved relief to the patient's symptoms.    At the conclusion of the encounter I discussed the results of all of the tests and the disposition. The questions were answered. The patient or family acknowledged  understanding and was agreeable with the care plan.       Medical Decision Making  Obtained supplemental history:Supplemental history obtained?: No  Reviewed external records: External records reviewed?: Documented in chart  Care impacted by chronic illness:Heart Disease and Hypertension  Care significantly affected by social determinants of health:N/A  Did you consider but not order tests?: Work up considered but not performed and documented in chart, if applicable  Did you interpret images independently?: Independent interpretation of ECG and images noted in documentation, when applicable.  Consultation discussion with other provider:Did you involve another provider (consultant, , pharmacy, etc.)?: No  Discharge. I prescribed additional prescription strength medication(s) as charted. See documentation for any additional details.      MEDICATIONS GIVEN IN THE EMERGENCY:  Medications   iopamidol (ISOVUE-370) solution 90 mL (has no administration in time range)   metoclopramide (REGLAN) injection 10 mg (10 mg Intravenous $Given 1/22/24 2038)   sodium chloride 0.9% BOLUS 1,000 mL (0 mLs Intravenous Stopped 1/22/24 2244)   famotidine (PEPCID) injection 20 mg (20 mg Intravenous $Given 1/22/24 2118)   iopamidol (ISOVUE-370) solution 90 mL (90 mLs Intravenous $Given 1/22/24 2130)   sodium chloride 0.9% BOLUS 1,000 mL (0 mLs Intravenous Stopped 1/22/24 2244)       NEW PRESCRIPTIONS STARTED AT TODAY'S ER VISIT  Discharge Medication List as of 1/22/2024 10:46 PM        START taking these medications    Details   hydrOXYzine jose (VISTARIL) 25 MG capsule Take 1 capsule (25 mg) by mouth 3 times daily as needed for other (nausea and vomiting), Disp-15 capsule, R-0, E-Prescribe                =================================================================    HPI    Patient information was obtained from: the patient     Use of : N/A         Selina Parikh is a 58 year old female with a pertinent history of IBS,  GERD, diverticulitis, melanoma, CAD, s/p cholecystectomy, and hypertension who presents to this ED via walk in for evaluation of nausea, vomiting, and diarrhea.     Patient reports symptoms started 2 days ago.  The diarrhea has actually improved today with only about 6 watery episodes.  The previous 2 days she has been having diarrhea every hour.  No bright red blood or black stools.  She did notice streaks of bright red blood in her emesis.  No hematochezia.  She states that she has had tactile fever, chills, sweats.  Her Tmax was 102  F.  She has had decreased urine output.  She can tolerate small sips of fluids.  She tried to eat half a piece of toast yesterday, and ended up vomiting.  She denies any significant abdominal pain.  She does have a history of previous colectomy for multiple episodes of diverticulitis.  She states that grandchildren and her daughter have been sick with similar symptoms.  Her daughter tested COVID-positive, grandchildren did not.  She reports she took a home COVID test and this was negative.  She denies any cough, congestion, chest pain, shortness of breath.  She denies feeling lightheaded.  She denies any recent antibiotic use.    PAST MEDICAL HISTORY:  Past Medical History:   Diagnosis Date    Anemia     Antiplatelet or antithrombotic long-term use     Asymptomatic stenosis of left carotid artery     Cancer (H)     melanoma    Coronary artery disease     Hiatal hernia     Hypertension     Irritable bowel syndrome     Other chronic pain     Stented coronary artery        PAST SURGICAL HISTORY:  Past Surgical History:   Procedure Laterality Date    BACK SURGERY      CHOLECYSTECTOMY, LAPOROSCOPIC  02/14/2000    Cholecystectomy, Laparoscopic    COLON SURGERY      CV CORONARY ANGIOGRAM N/A 05/06/2022    Procedure: CV CORONARY ANGIOGRAM;  Surgeon: Stefanie Spangler MD;  Location: [facility] CATH LAB CV    CV LEFT HEART CATH N/A 05/06/2022    Procedure: Left Heart Catheterization;  Surgeon:  Stefanie Spangler MD;  Location: [facility] CATH LAB CV    CYSTOSCOPY, INSERT LIGHTED STENT URETER(S) N/A 04/10/2018    Procedure: CYSTOSCOPY, INSERT LIGHTED STENT URETER(S);  Lighted Stent Placement,Laparoscopic Assisted Sigmoid Colectomy;  Surgeon: ERVIN Reina MD;  Location: [facility] OR    ENDOVASCULAR CAROTID STENT PLACEMENT Left 4/21/2023    Procedure: Left transcarotid revascularization;  Surgeon: Eveline Manley MD;  [place] [facility]    [place] TCAR TRANSCAROTID ARTERY REVASCULARIZATION  4/21/2023    LAPAROSCOPIC ASSISTED COLECTOMY LEFT (DESCENDING) N/A 04/10/2018    Procedure: LAPAROSCOPIC ASSISTED COLECTOMY LEFT (DESCENDING);;  Surgeon: Hill Murray MD;  Location: [facility] OR    LUMBAR DISCECTOMY Left 09/23/2022    Procedure: left thoracic 12-lumbar 1 hemilaminectomy, medial facetectomy, foraminotomy and microdiscectomy;  Surgeon: Suha Kent MD;  Location: [facility]    NECK SURGERY      OPTICAL TRACKING SYSTEM FUSION SPINE POSTERIOR LUMBAR THREE+ LEVELS Bilateral 6/9/2023    Procedure: Exploration of prior lumbar 3- lumbar 5 instrumented fusion with removal and replacement of pedicle screws and extension to sacral 1. Lumbar 3-sacral 1  Left transforaminal lumbar interbody fusion and posterolateral arthrodesis with use of stealth navigation;  Surgeon: Suha Kent MD;  Location: [facility]    ORTHOPEDIC SURGERY  10/01/2010    Cut palm and reattched tendons and nerves in left hand forefinger.    [place] ESOPHAGOGASTRODUODENOSCOPY TRANSORAL DIAGNOSTIC N/A 04/30/2021    Procedure: ESOPHAGOGASTRODUODENOSCOPY (EGD);  Surgeon: Souleymane Moreno DO;  Location: [facility];  Service: General    SURGICAL HISTORY OF -   01/04/2000    Esophagogastroduodenoscopy with biopsy    TUBAL LIGATION             CURRENT MEDICATIONS:    hydrOXYzine jose (VISTARIL) 25 MG capsule  acetaminophen (TYLENOL) 500 MG tablet  aspirin 81 MG EC tablet  atorvastatin (LIPITOR) 40 MG tablet  clobetasol  (TEMOVATE) 0.05 % external ointment  fish oil-omega-3 fatty acids 1000 MG capsule  methocarbamol (ROBAXIN) 500 MG tablet  methocarbamol (ROBAXIN) 750 MG tablet  metoprolol succinate ER (TOPROL XL) 50 MG 24 hr tablet  Multiple Vitamins-Minerals (HAIR/SKIN/NAILS) TABS  multivitamin w/minerals (THERA-VIT-M) tablet  nitroGLYcerin (NITROSTAT) 0.4 MG sublingual tablet  omeprazole (PRILOSEC) 20 MG DR capsule  polyethylene glycol (MIRALAX) 17 GM/Dose powder  promethazine (PHENERGAN) 25 MG tablet  Turmeric (QC TUMERIC COMPLEX PO)  UNABLE TO FIND  Vitamin D (Cholecalciferol) 25 MCG (1000 UT) TABS        ALLERGIES:  Allergies   Allergen Reactions    Ciprofloxacin Anaphylaxis    Flagyl [Metronidazole] Anaphylaxis    Gabapentin Other (See Comments)     Suicidal thoughts.    Zofran [Ondansetron] Other (See Comments) and GI Disturbance     Causes bloating, moderately uncomfortable, abd pain      Benadryl [Diphenhydramine] Other (See Comments)     Muscle spasms    Percocet [Oxycodone-Acetaminophen] Other (See Comments) and Headache     Goes nuts - nasty mean irritated, can take Dilaudid    Vicodin [Hydrocodone-Acetaminophen] Other (See Comments)     Anxiety-agitation       FAMILY HISTORY:  Family History   Problem Relation Age of Onset    C.A.D. Father 50        50's    Diabetes Father     Diabetes Brother     C.A.D. Brother 42        quad bypass and MI    Diabetes Brother         2 brothers have DM    Cancer Brother 34        Melanoma    Aortic aneurysm Brother     Allergies Son         Meds    Allergies Daughter         Med    Cancer Mother     C.A.D. Brother 38        MI age 38    Diabetes Mother     Diabetes Brother        SOCIAL HISTORY:   Social History     Socioeconomic History    Marital status:    Tobacco Use    Smoking status: Every Day     Packs/day: 0.50     Years: 30.00     Additional pack years: 0.00     Total pack years: 15.00     Types: Cigarettes     Last attempt to quit: 6/2/2023     Years since quitting:  "0.6    Smokeless tobacco: Never    Tobacco comments:     3 Cigs a day   Vaping Use    Vaping Use: Never used   Substance and Sexual Activity    Alcohol use: Not Currently     Comment: Alcoholic Drinks/day: 2x year     Drug use: Not Currently    Sexual activity: Not Currently     Partners: Male     Birth control/protection: Surgical   Other Topics Concern    Parent/sibling w/ CABG, MI or angioplasty before 65F 55M? Yes     Comment: brother- 38, MI, brother 42- quad bypass       VITALS:  BP (!) 151/77   Pulse 107   Temp 98.5  F (36.9  C) (Oral)   Resp 17   Ht 1.499 m (4' 11\")   Wt 72.6 kg (160 lb)   LMP 04/10/2016   SpO2 98%   BMI 32.32 kg/m      PHYSICAL EXAM    Constitutional: Well developed, Well nourished, NAD  HENT: Normocephalic, Atraumatic, Bilateral external ears normal, Oropharynx normal, mucous membranes moist, Nose normal.   Neck- Normal range of motion, No tenderness, Supple, No stridor.  Eyes: PERRL, EOMI, Conjunctiva normal, No discharge.   Respiratory: Normal breath sounds, No respiratory distress  Cardiovascular: Tachycardia, Regular rhythm  GI: Bowel sounds normal, Soft, tender in the RLQ, no rebound tenderness or guarding  Musculoskeletal: No edema. Good range of motion in all major joints. No tenderness to palpation or major deformities noted.   Integument: Warm, Dry, No erythema, No rash  Neurologic: Alert & oriented x 3, Normal motor function, Normal sensory function, No focal deficits noted. Normal gait.   Psychiatric: Affect normal, Judgment normal, Mood normal.      LAB:  All pertinent labs reviewed and interpreted.  Results for orders placed or performed during the hospital encounter of 01/22/24   CT Abdomen Pelvis w Contrast    Impression    IMPRESSION:   1.  No acute findings in the abdomen and pelvis.   Basic metabolic panel   Result Value Ref Range    Sodium 140 135 - 145 mmol/L    Potassium 3.6 3.4 - 5.3 mmol/L    Chloride 101 98 - 107 mmol/L    Carbon Dioxide (CO2) 28 22 - 29 " mmol/L    Anion Gap 11 7 - 15 mmol/L    Urea Nitrogen 26.8 (H) 6.0 - 20.0 mg/dL    Creatinine 0.89 0.51 - 0.95 mg/dL    GFR Estimate 75 >60 mL/min/1.73m2    Calcium 10.3 (H) 8.6 - 10.0 mg/dL    Glucose 103 (H) 70 - 99 mg/dL   Hepatic function panel   Result Value Ref Range    Protein Total 8.4 (H) 6.4 - 8.3 g/dL    Albumin 5.3 (H) 3.5 - 5.2 g/dL    Bilirubin Total 0.4 <=1.2 mg/dL    Alkaline Phosphatase 118 40 - 150 U/L    AST 29 0 - 45 U/L    ALT 45 0 - 50 U/L    Bilirubin Direct <0.20 0.00 - 0.30 mg/dL   Result Value Ref Range    Lipase 39 13 - 60 U/L   Symptomatic Influenza A/B, RSV, & SARS-CoV2 PCR (COVID-19) Nasopharyngeal    Specimen: Nasopharyngeal; Swab   Result Value Ref Range    Influenza A PCR Negative Negative    Influenza B PCR Negative Negative    RSV PCR Negative Negative    SARS CoV2 PCR Negative Negative   UA with Microscopic reflex to Culture    Specimen: Urine, Midstream   Result Value Ref Range    Color Urine Yellow Colorless, Straw, Light Yellow, Yellow    Appearance Urine Clear Clear    Glucose Urine Negative Negative mg/dL    Bilirubin Urine Negative Negative    Ketones Urine Trace (A) Negative mg/dL    Specific Gravity Urine 1.028 1.001 - 1.030    Blood Urine Negative Negative    pH Urine 6.0 5.0 - 7.0    Protein Albumin Urine 30 (A) Negative mg/dL    Urobilinogen Urine <2.0 <2.0 mg/dL    Nitrite Urine Negative Negative    Leukocyte Esterase Urine Negative Negative    Mucus Urine Present (A) None Seen /LPF    RBC Urine 0 <=2 /HPF    WBC Urine 3 <=5 /HPF    Squamous Epithelials Urine 2 (H) <=1 /HPF   CBC with platelets and differential   Result Value Ref Range    WBC Count 10.7 4.0 - 11.0 10e3/uL    RBC Count 5.63 (H) 3.80 - 5.20 10e6/uL    Hemoglobin 17.0 (H) 11.7 - 15.7 g/dL    Hematocrit 49.5 (H) 35.0 - 47.0 %    MCV 88 78 - 100 fL    MCH 30.2 26.5 - 33.0 pg    MCHC 34.3 31.5 - 36.5 g/dL    RDW 15.3 (H) 10.0 - 15.0 %    Platelet Count 329 150 - 450 10e3/uL    % Neutrophils 63 %    %  Lymphocytes 29 %    % Monocytes 7 %    % Eosinophils 1 %    % Basophils 0 %    % Immature Granulocytes 0 %    NRBCs per 100 WBC 0 <1 /100    Absolute Neutrophils 6.8 1.6 - 8.3 10e3/uL    Absolute Lymphocytes 3.1 0.8 - 5.3 10e3/uL    Absolute Monocytes 0.7 0.0 - 1.3 10e3/uL    Absolute Eosinophils 0.1 0.0 - 0.7 10e3/uL    Absolute Basophils 0.0 0.0 - 0.2 10e3/uL    Absolute Immature Granulocytes 0.0 <=0.4 10e3/uL    Absolute NRBCs 0.0 10e3/uL   ECG 12-LEAD WITH MUSE (LHE)   Result Value Ref Range    Systolic Blood Pressure  mmHg    Diastolic Blood Pressure  mmHg    Ventricular Rate 118 BPM    Atrial Rate 118 BPM    AL Interval 130 ms    QRS Duration 90 ms     ms    QTc 482 ms    P Axis -12 degrees    R AXIS 52 degrees    T Axis -2 degrees    Interpretation ECG       Sinus tachycardia  Nonspecific T wave abnormality  Abnormal ECG  When compared with ECG of 22-APR-2023 10:37,  Vent. rate has increased BY  61 BPM  ST no longer elevated in Inferior leads  ST now depressed in Anterior leads  Nonspecific T wave abnormality now evident in Anterior leads  Confirmed by SEE ED PROVIDER NOTE FOR, ECG INTERPRETATION ([id number]),  DARINEL GILLIAM ([id number]) on 1/22/2024 7:12:06 PM         RADIOLOGY:  Reviewed all pertinent imaging. Please see official radiology report.  CT Abdomen Pelvis w Contrast   Final Result   IMPRESSION:    1.  No acute findings in the abdomen and pelvis.          EKG:    Performed at: 18:37    Impression: Sinus tachycardia. Nonspecific T wave abnormality. Abnormal ECG.     Rate: 118  Rhythm: Sinus tachycardia    AL Interval: 130  QRS Interval: 90  QTc Interval: 482  Comparison: When compared with ECG of 22-APR-2023 Vent. Rate has increased by 61 bpm. ST no longer elevated in Inferior leads. ST now depressed in Anterior leads.     I have independently reviewed and interpreted the EKG(s) documented above.    PROCEDURES:   None      I, Daisy Hernández am serving as a scribe to document services personally  performed by Nancy Bean MD, based on my observation and the provider's statements to me. I, Nancy Bean MD attest that Daisy Hernández is acting in a scribe capacity, has observed my performance of the services and has documented them in accordance with my direction.     Nancy Bean MD  Emergency Medicine  [facility] EMERGENCY ROOM  [facility] [facility] [address] [phone]      Nancy Bean MD  01/22/24 5275

## 2024-01-23 NOTE — ED TRIAGE NOTES
Pt reports feeling sick since 1/17 with nausea, vomiting, and diarrhea. PT states she has a hx of GERD and usually takes omeprazole 3x a day however, she is not able to keep it down. PT is tachy in triage.      Triage Assessment (Adult)       Row Name 01/22/24 2332          Triage Assessment    Airway WDL WDL        Respiratory WDL    Respiratory WDL WDL        Skin Circulation/Temperature WDL    Skin Circulation/Temperature WDL WDL        Cardiac WDL    Cardiac WDL X     Cardiac Rhythm ST        Peripheral/Neurovascular WDL    Peripheral Neurovascular WDL WDL        Cognitive/Neuro/Behavioral WDL    Cognitive/Neuro/Behavioral WDL WDL

## 2024-01-24 ENCOUNTER — TELEPHONE (OUTPATIENT)
Dept: EMERGENCY MEDICINE | Facility: CLINIC | Age: 59
End: 2024-01-24
Payer: COMMERCIAL

## 2024-01-24 NOTE — TELEPHONE ENCOUNTER
"[facility]     Reason for call: Lab Result Notification     Lab Result (including Rx patient on, if applicable).  If culture, copy of lab report at bottom.  Lab Result: Final Enteric Bacteria and Virus Panel by OSVALDO Stool is POSITIVE for Sapovirus  Recommendations in treatment per [facility] ED Lab Result Enteric Bacteria and Virus Panel protocol.    Creatinine Level (mg/dl)   Creatinine   Date Value Ref Range Status   01/22/2024 0.89 0.51 - 0.95 mg/dL Final   04/19/2021 0.73 0.52 - 1.04 mg/dL Final    Creatinine clearance (ml/min), if applicable Serum creatinine: 0.89 mg/dL 01/22/24 2036  Estimated creatinine clearance: 79 mL/min     Patient's current Symptoms: Patient on day 3 of illness, with hx IBS, GERD, Diverticulitis, patient has follow up appointment 1/26, patient reports this has been going on for 8 days, then she says Day 7, the only thing that's better is that I'm not vomiting  Fever: None  Nausea/vomiting:  No vomiting today, has bad heartburn  Diarrhea:  Yes, 'a couple of times an hour', more than a cup at a time, she is reporting severe diarrhea  Hydration: has been drinking high sugar gatorade, last voided \"I don't even know, yesterday\", thinks she voided around 10PM last night and it wasn't very much, patient feels weak, \"but its been that way for a weak\", patient can stand and walk to the bathroom, \"I don't feel sick anymore I just feel nauseated and weak\", she said yesterday she drank 20 fluid oz of high sugar gatorade, had an 8oz cup of coffee this morning, she has a headache    RN Recommendations/Instructions per [facility] ED lab result protocol:   [facility] ED lab result protocol utilized: Enteric bacteria & virus  Patient was notified of lab result.   RN reviewed information about returning to ED immediately d/t signs of dehydration and poor fluid intake in the setting of ongoing severe diarrhea, we reviewed organism, bleach based disinfecting/handwashing, rest, " fluids, electrolytes (low sugar), dietary choices, and return for fevers, intractable vomiting/bloody, diarrhea >6 stools/blood, dehydration, weak/pale/faint.   Writer notes patient is not very agreeable to return to ED but writer emphasized that she has gone way to long without urinated and her headache may be a result of her severe dehydration - please we welcome and encourage you to return to ED at this time - patient verbalized understanding and agrees with plan.    Patient/care giver notified to contact your PCP clinic or return to the Emergency department if your:  Symptoms return.  Symptoms do not improve after 3 days on antibiotic.  Symptoms do not resolve after completing antibiotic.  Symptoms worsen or other concerning symptoms.    Bon Ely RN

## 2024-01-25 ENCOUNTER — HOSPITAL ENCOUNTER (EMERGENCY)
Facility: CLINIC | Age: 59
Discharge: HOME | End: 2024-01-25
Attending: EMERGENCY MEDICINE | Admitting: EMERGENCY MEDICINE
Payer: COMMERCIAL

## 2024-01-25 VITALS
BODY MASS INDEX: 32.25 KG/M2 | SYSTOLIC BLOOD PRESSURE: 132 MMHG | RESPIRATION RATE: 16 BRPM | OXYGEN SATURATION: 99 % | HEART RATE: 99 BPM | HEIGHT: 59 IN | TEMPERATURE: 97.4 F | DIASTOLIC BLOOD PRESSURE: 73 MMHG | WEIGHT: 160 LBS

## 2024-01-25 DIAGNOSIS — A09 DIARRHEA OF INFECTIOUS ORIGIN: ICD-10-CM

## 2024-01-25 DIAGNOSIS — A08.31 GASTROENTERITIS DUE TO SAPOVIRUS: ICD-10-CM

## 2024-01-25 LAB
ANION GAP SERPL CALCULATED.3IONS-SCNC: 9 MMOL/L (ref 7–15)
BUN SERPL-MCNC: 8 MG/DL (ref 6–20)
CALCIUM SERPL-MCNC: 9.4 MG/DL (ref 8.6–10)
CHLORIDE SERPL-SCNC: 104 MMOL/L (ref 98–107)
CREAT SERPL-MCNC: 0.73 MG/DL (ref 0.51–0.95)
DEPRECATED HCO3 PLAS-SCNC: 27 MMOL/L (ref 22–29)
EGFRCR SERPLBLD CKD-EPI 2021: >90 ML/MIN/1.73M2
GLUCOSE SERPL-MCNC: 88 MG/DL (ref 70–99)
HOLD SPECIMEN: NORMAL
POTASSIUM SERPL-SCNC: 4 MMOL/L (ref 3.4–5.3)
SODIUM SERPL-SCNC: 140 MMOL/L (ref 135–145)

## 2024-01-25 PROCEDURE — 96374 THER/PROPH/DIAG INJ IV PUSH: CPT

## 2024-01-25 PROCEDURE — 80048 BASIC METABOLIC PNL TOTAL CA: CPT | Performed by: EMERGENCY MEDICINE

## 2024-01-25 PROCEDURE — 36415 COLL VENOUS BLD VENIPUNCTURE: CPT | Performed by: EMERGENCY MEDICINE

## 2024-01-25 PROCEDURE — 96361 HYDRATE IV INFUSION ADD-ON: CPT

## 2024-01-25 PROCEDURE — 99284 EMERGENCY DEPT VISIT MOD MDM: CPT | Mod: 25

## 2024-01-25 PROCEDURE — 258N000003 HC RX IP 258 OP 636: Performed by: EMERGENCY MEDICINE

## 2024-01-25 RX ORDER — METOCLOPRAMIDE HYDROCHLORIDE 5 MG/ML
10 INJECTION INTRAMUSCULAR; INTRAVENOUS ONCE
Status: DISCONTINUED | OUTPATIENT
Start: 2024-01-25 | End: 2024-01-25 | Stop reason: HOSPADM

## 2024-01-25 RX ADMIN — SODIUM CHLORIDE 1000 ML: 9 INJECTION, SOLUTION INTRAVENOUS at 14:00

## 2024-01-25 ASSESSMENT — ACTIVITIES OF DAILY LIVING (ADL): ADLS_ACUITY_SCORE: 33

## 2024-01-25 NOTE — DISCHARGE INSTRUCTIONS
Your electrolytes were normal. Maintain good oral hydration to keep up with liquid stool output.  No antidiarrheal such as Imodium.

## 2024-01-25 NOTE — ED NOTES
EMERGENCY DEPARTMENT SIGN OUT NOTE        ED COURSE AND MEDICAL DECISION MAKING  Patient was signed out to me by Dr Queenie Villavicencio at 2:05 PM  2:58 PM I met with the patient and introduced myself.  I discussed the results of the lab work and the plan going forward.    In brief, Selina Parikh is a 58 year old female who initially presented with diarrhea for the past 8 days or so.      At time of sign out, disposition was pending labs (electrolytes) and discussion about admission/discharge.     Electrolytes normal.  I met with patient and she overall looks well.  She has a viral gastroenteritis.  Symptoms are improving.     FINAL IMPRESSION    1. Gastroenteritis due to sapovirus    2. Diarrhea of infectious origin        ED MEDS  Medications   metoclopramide (REGLAN) injection 10 mg (has no administration in time range)   sodium chloride 0.9% BOLUS 1,000 mL (1,000 mLs Intravenous $New Bag 1/25/24 1400)       LAB  Labs Ordered and Resulted from Time of ED Arrival to Time of ED Departure   BASIC METABOLIC PANEL - Normal       Result Value    Sodium 140      Potassium 4.0      Chloride 104      Carbon Dioxide (CO2) 27      Anion Gap 9      Urea Nitrogen 8.0      Creatinine 0.73      GFR Estimate >90      Calcium 9.4      Glucose 88             RADIOLOGY    No orders to display       DISCHARGE MEDS  New Prescriptions    No medications on file       Delano Alvares MD  [facility] EMERGENCY ROOM  [address] [address] [phone]       Delano Alvares MD  01/25/24 0722

## 2024-01-25 NOTE — ED PROVIDER NOTES
EMERGENCY DEPARTMENT ENCOUNTER      NAME: Selina Parikh  AGE: 58 year old female  YOB: 1965  MRN: [id number]  EVALUATION DATE & TIME: No admission date for patient encounter.    PCP: Sera Solo    ED PROVIDER: Queenie Villavicencio MD    Chief Complaint   Patient presents with    Diarrhea    Abdominal Pain         FINAL IMPRESSION:  1. Gastroenteritis due to sapovirus    2. Diarrhea of infectious origin          ED COURSE & MEDICAL DECISION MAKING:    Pertinent Labs & Imaging studies reviewed. (See chart for details)  58 year old female with history of IBS status post partial colectomy for diverticulitis, HTN who presents to the Emergency Department for evaluation of several days of diarrhea.  Has already been seen in the ED 2 days ago with normal CT and laboratory workup but since, has tested positive for Sabel virus from stool sample.  She actually has been having improvement of her diarrhea only 5 episodes today from 10 episodes yesterday and more the day prior.  She has been maintaining good oral hydration, still nauseous but improved with antiemetics.  She is here today simply because the symptoms are still ongoing and she was told that we will only be 3 days.  She looks well appearing on examination.  My concern given improving oral hydration, decrease to output for dehydration or electrolyte abnormality is low.   Offered IV fluids, Reglan for symptom control and to repeat her electrolytes today and make she is in normal.  Patient agreeable with plan.  Pending electrolytes and likely discharge signed out to oncoming physician awaiting results of same.      ED Course as of 01/25/24 1400   Thu Jan 25, 2024   1345 I met with the patient to gather history and to perform my initial exam. We discussed plans for the ED course, including diagnostic testing and treatment.          Medical Decision Making    History:  Supplemental history from: Other: Mother at bedside  External Record(s) reviewed:  Outpatient Record: Labs-Stool sample from 1/22/24    Work Up:  Chart documentation includes differential considered and any EKGs or imaging independently interpreted by provider, see MDM  In additional to work up documented, I considered the following work up: see MDM    External consultation:  Discussion of management with another provider: N/A    Complicating factors:  Care impacted by chronic illness: Cancer/Chemotherapy, Heart Disease, Hypertension, and Other: IBS  Care affected by social determinants of health: Access to Affordable Health Care    Disposition considerations: Discharge. I recommended the patient continue their current prescription strength medication(s): Antiemetics as needed. See documentation for any additional details.        At the conclusion of the encounter I discussed the results of all of the tests and the disposition. The questions were answered. The patient or family acknowledged understanding and was agreeable with the care plan.      MEDICATIONS GIVEN IN THE EMERGENCY:  Medications   sodium chloride 0.9% BOLUS 1,000 mL (has no administration in time range)   metoclopramide (REGLAN) injection 10 mg (has no administration in time range)       NEW PRESCRIPTIONS STARTED AT TODAY'S ER VISIT  New Prescriptions    No medications on file          =================================================================    HPI    Patient information was obtained from: Patient    Use of Intrepreter: N/A      Selina Parikh is a 58 year old female with pertinent medical history of  IBS, GERD, diverticulitis, melanoma, CAD, s/p cholecystectomy, and hypertension who presents with diarrhea.     Per chart review, stool sample from 1/22/24 was positive for Sapovirus.     Patient reports eight days of diarrhea. She was seen in the ED two days ago and had stool cultures grown that showed a virus. Initially was having x3 stools/hour but had x10 in total yesterday and x5 today. Endorses associated decreased  appetite, abdominal bloating, and nausea which is improved with medications. Taking omeprozole for heartburn. Represented to the ED today because of continuous symptoms. Scheduled for a PCP follow up tomorrow but could not wait.       PAST MEDICAL HISTORY:  Past Medical History:   Diagnosis Date    Anemia     Antiplatelet or antithrombotic long-term use     Asymptomatic stenosis of left carotid artery     Cancer (H)     melanoma    Coronary artery disease     Hiatal hernia     Hypertension     Irritable bowel syndrome     Other chronic pain     Stented coronary artery        PAST SURGICAL HISTORY:  Past Surgical History:   Procedure Laterality Date    BACK SURGERY      CHOLECYSTECTOMY, LAPOROSCOPIC  02/14/2000    Cholecystectomy, Laparoscopic    COLON SURGERY      CV CORONARY ANGIOGRAM N/A 05/06/2022    Procedure: CV CORONARY ANGIOGRAM;  Surgeon: Stefanie Spangler MD;  Location: [facility] CATH [facility] CV    CV LEFT HEART CATH N/A 05/06/2022    Procedure: Left Heart Catheterization;  Surgeon: Stefanie Spangler MD;  Location: [facility] CV    CYSTOSCOPY, INSERT LIGHTED STENT URETER(S) N/A 04/10/2018    Procedure: CYSTOSCOPY, INSERT LIGHTED STENT URETER(S);  Lighted Stent Placement,Laparoscopic Assisted Sigmoid Colectomy;  Surgeon: ERVIN Reina MD;  Location: [place] OR    ENDOVASCULAR CAROTID STENT PLACEMENT Left 4/21/2023    Procedure: Left transcarotid revascularization;  Surgeon: Eveline Manley MD;  Location: [facility] OR    IR TCAR TRANSCAROTID ARTERY REVASCULARIZATION  4/21/2023    LAPAROSCOPIC ASSISTED COLECTOMY LEFT (DESCENDING) N/A 04/10/2018    Procedure: LAPAROSCOPIC ASSISTED COLECTOMY LEFT (DESCENDING);;  Surgeon: Hill Murray MD;  Location: [place] OR    LUMBAR DISCECTOMY Left 09/23/2022    Procedure: left thoracic 12-lumbar 1 hemilaminectomy, medial facetectomy, foraminotomy and microdiscectomy;  Surgeon: Suha Kent MD;  Location: [facility] OR    NECK SURGERY      OPTICAL  TRACKING SYSTEM FUSION SPINE POSTERIOR LUMBAR THREE+ LEVELS Bilateral 6/9/2023    Procedure: Exploration of prior lumbar 3- lumbar 5 instrumented fusion with removal and replacement of pedicle screws and extension to sacral 1. Lumbar 3-sacral 1  Left transforaminal lumbar interbody fusion and posterolateral arthrodesis with use of stealth navigation;  Surgeon: Suha Kent MD;  Location: [facility]    ORTHOPEDIC SURGERY  10/01/2010    Cut palm and reattched tendons and nerves in left hand forefinger.    NC ESOPHAGOGASTRODUODENOSCOPY TRANSORAL DIAGNOSTIC N/A 04/30/2021    Procedure: ESOPHAGOGASTRODUODENOSCOPY (EGD);  Surgeon: Souleymane Moreno DO;  Location: [facility];  Service: General    SURGICAL HISTORY OF -   01/04/2000    Esophagogastroduodenoscopy with biopsy    TUBAL LIGATION         CURRENT MEDICATIONS:    Cannot display prior to admission medications because the patient has not been admitted in this contact.       ALLERGIES:  Allergies   Allergen Reactions    Ciprofloxacin Anaphylaxis    Flagyl [Metronidazole] Anaphylaxis    Gabapentin Other (See Comments)     Suicidal thoughts.    Zofran [Ondansetron] Other (See Comments) and GI Disturbance     Causes bloating, moderately uncomfortable, abd pain      Benadryl [Diphenhydramine] Other (See Comments)     Muscle spasms    Percocet [Oxycodone-Acetaminophen] Other (See Comments) and Headache     Goes nuts - nasty mean irritated, can take Dilaudid    Vicodin [Hydrocodone-Acetaminophen] Other (See Comments)     Anxiety-agitation       FAMILY HISTORY:  Family History   Problem Relation Age of Onset    C.A.D. Father 50        50's    Diabetes Father     Diabetes Brother     C.A.D. Brother 42        quad bypass and MI    Diabetes Brother         2 brothers have DM    Cancer Brother 34        Melanoma    Aortic aneurysm Brother     Allergies Son         Meds    Allergies Daughter         Med    Cancer Mother     C.A.D. Brother 38        MI age  "38    Diabetes Mother     Diabetes Brother        SOCIAL HISTORY:  Social History     Tobacco Use    Smoking status: Every Day     Packs/day: 0.50     Years: 30.00     Additional pack years: 0.00     Total pack years: 15.00     Types: Cigarettes     Last attempt to quit: 2023     Years since quittin.6    Smokeless tobacco: Never    Tobacco comments:     3 Cigs a day   Vaping Use    Vaping Use: Never used   Substance Use Topics    Alcohol use: Not Currently     Comment: Alcoholic Drinks/day: 2x year     Drug use: Not Currently        VITALS:  Patient Vitals for the past 24 hrs:   BP Temp Temp src Pulse Resp SpO2 Height Weight   24 1354 132/73 97.4  F (36.3  C) Oral 99 16 99 % 1.505 m (4' 11.25\") 72.6 kg (160 lb)       PHYSICAL EXAM    General Appearance: Well-nourished. No acute distress.   Head:  Normocephalic  Cardio:  Regular rate and rhythm  Pulm:  No respiratory distress  Abdomen:  Soft, non distended,no rebound or guarding. General discomfort, diffuse mild tenderness to palpation.   Extremities: Normal gait  Neuro:  Alert and oriented ×3     RADIOLOGY/LABS:  Reviewed all pertinent imaging. Please see official radiology report. All pertinent labs reviewed and interpreted.           The creation of this record is based on the scribe s observations of the work being performed by Queenie Villavicencio MD and the provider s statements to them. It was created on her behalf by Natalia Corrales, a trained medical scribe. This document has been checked and approved by the attending provider.    Queenie Villavicencio MD  Emergency Medicine  [facility] EMERGENCY ROOM  [address] [id number] [phone]  Dept: [phone]     Queenie Villavicencio MD  24 1400    "

## 2024-01-26 ENCOUNTER — OFFICE VISIT (OUTPATIENT)
Dept: FAMILY MEDICINE | Facility: CLINIC | Age: 59
End: 2024-01-26
Payer: COMMERCIAL

## 2024-01-26 VITALS
SYSTOLIC BLOOD PRESSURE: 116 MMHG | WEIGHT: 158 LBS | OXYGEN SATURATION: 97 % | HEART RATE: 82 BPM | RESPIRATION RATE: 14 BRPM | DIASTOLIC BLOOD PRESSURE: 82 MMHG | TEMPERATURE: 97.3 F | BODY MASS INDEX: 31.64 KG/M2

## 2024-01-26 DIAGNOSIS — K21.00 GASTROESOPHAGEAL REFLUX DISEASE WITH ESOPHAGITIS WITHOUT HEMORRHAGE: Primary | ICD-10-CM

## 2024-01-26 DIAGNOSIS — R11.2 NAUSEA AND VOMITING, UNSPECIFIED VOMITING TYPE: ICD-10-CM

## 2024-01-26 DIAGNOSIS — M19.90 OSTEOARTHRITIS, UNSPECIFIED OSTEOARTHRITIS TYPE, UNSPECIFIED SITE: ICD-10-CM

## 2024-01-26 PROCEDURE — 99214 OFFICE O/P EST MOD 30 MIN: CPT | Performed by: NURSE PRACTITIONER

## 2024-01-26 RX ORDER — CELECOXIB 100 MG/1
100 CAPSULE ORAL 2 TIMES DAILY
Qty: 180 CAPSULE | Refills: 0 | Status: SHIPPED | OUTPATIENT
Start: 2024-01-26 | End: 2024-05-08

## 2024-01-26 ASSESSMENT — PATIENT HEALTH QUESTIONNAIRE - PHQ9
SUM OF ALL RESPONSES TO PHQ QUESTIONS 1-9: 15
SUM OF ALL RESPONSES TO PHQ QUESTIONS 1-9: 15
10. IF YOU CHECKED OFF ANY PROBLEMS, HOW DIFFICULT HAVE THESE PROBLEMS MADE IT FOR YOU TO DO YOUR WORK, TAKE CARE OF THINGS AT HOME, OR GET ALONG WITH OTHER PEOPLE: SOMEWHAT DIFFICULT

## 2024-01-26 NOTE — PROGRESS NOTES
"  Assessment & Plan     Gastroesophageal reflux disease with esophagitis without hemorrhage  Continued GERD symptoms despite use of PPI.  She declines to try a different medication, as Omeprazole seems to work the best.  Will refer to GI for and EGD.  She may continue Omeprazole for now.  - Adult GI  Referral - Procedure Only    Nausea and vomiting, unspecified vomiting type  Acute GI symptoms improving from recent virus.  Encouraged to continue good hydration.  Recommend EGD due to chronic vomiting at night, likely associated with GERD.   - Adult GI  Referral - Procedure Only    Osteoarthritis, unspecified osteoarthritis type, unspecified site  I have advised that she not utilize Celebrex while her GERD symptoms are bothering her.  Recommend waiting for EGD and discussing with GI.   - celecoxib (CELEBREX) 100 MG capsule  Dispense: 180 capsule; Refill: 0        Nicotine/Tobacco Cessation  She reports that she has been smoking cigarettes. She has a 15 pack-year smoking history. She has never used smokeless tobacco.  Nicotine/Tobacco Cessation Plan  Information offered: Patient not interested at this time      BMI  Estimated body mass index is 31.64 kg/m  as calculated from the following:    Height as of 1/25/24: 1.505 m (4' 11.25\").    Weight as of this encounter: 71.7 kg (158 lb).       Depression Screening Follow Up        1/26/2024     1:01 PM   PHQ   PHQ-9 Total Score 15   Q9: Thoughts of better off dead/self-harm past 2 weeks Several days   F/U: Thoughts of suicide or self-harm No   F/U: Safety concerns No         1/26/2024     1:01 PM   Last PHQ-9   1.  Little interest or pleasure in doing things 1   2.  Feeling down, depressed, or hopeless 1   3.  Trouble falling or staying asleep, or sleeping too much 3   4.  Feeling tired or having little energy 3   5.  Poor appetite or overeating 3   6.  Feeling bad about yourself 1   7.  Trouble concentrating 1   8.  Moving slowly or restless 1   Q9: " Thoughts of better off dead/self-harm past 2 weeks 1   PHQ-9 Total Score 15   In the past two weeks have you had thoughts of suicide or self harm? No   Do you have concerns about your personal safety or the safety of others? No           1/29/2024     9:35 AM   C-SSRS (Brief [place])   Within the last month, have you wished you were dead or wished you could go to sleep and not wake up? No   Within the last month, have you had any actual thoughts of killing yourself? No   Within the last month, have you ever done anything, started to do anything, or prepared to do anything to end your life? No         Follow Up        Crisis resource information provided in the After Visit Summary  Patient declined referral.    Discussed the following ways the patient can remain in a safe environment:  be around others      Delfin Marks is a 58 year old who presents for follow-up.  Patient developed Dayanara vomiting and diarrhea about 10 days ago.  She was seen in the ER earlier this week and received IV fluids.  Stool samples were positive for Sapovirus.  Overall, her symptoms are improving.  Today is the first day that she has really started to feel better.  Vomiting has resolved.  She still has some minimal diarrhea.  She has been able to to eat and drink, although very little.    Patient initially scheduled this visit due to worsening heartburn the last couple of months.  It definitely got worse with her recent illness, but was bad prior to that.  She reports a lot of regurgitation at night, and will vomit fairly frequently.  She is currently using omeprazole 3 times daily.  She has tried some other PPIs as well as Pepcid, and omeprazole seems to work the best.  She does have a history of a hiatal hernia and colon resection.  It has been several years since she has had an EGD.    Patient would like to restart Celebrex.  She was taken off of this due to a recent low back surgery.  It could cause issues with successful lumbar  fusion.  Per neurosurgery's note, she needed to stay off the Celebrex for 6 months.  Patient found the Celebrex to be helpful for other areas of osteoarthritis including her hands.  She is not currently using any anti-inflammatories.    History of Present Illness       Reason for visit:  Gurds    She eats 2-3 servings of fruits and vegetables daily.She consumes 1 sweetened beverage(s) daily.She exercises with enough effort to increase her heart rate 9 or less minutes per day.  She exercises with enough effort to increase her heart rate 3 or less days per week.   She is taking medications regularly.         Pertinent items in HPI        Objective    /82   Pulse 82   Temp 97.3  F (36.3  C) (Temporal)   Resp 14   Wt 71.7 kg (158 lb)   LMP 04/10/2016   SpO2 97%   BMI 31.64 kg/m    Body mass index is 31.64 kg/m .  Physical Exam   GENERAL: alert and no distress  RESP: lungs clear to auscultation - no rales, rhonchi or wheezes  CV: regular rate and rhythm, normal S1 S2, no S3 or S4, no murmur, click or rub, no peripheral edema   ABDOMEN: soft, nontender, no hepatosplenomegaly, no masses and bowel sounds normal          Signed Electronically by: Sera Solo NP

## 2024-01-29 ASSESSMENT — COLUMBIA-SUICIDE SEVERITY RATING SCALE - C-SSRS
1. WITHIN THE PAST MONTH, HAVE YOU WISHED YOU WERE DEAD OR WISHED YOU COULD GO TO SLEEP AND NOT WAKE UP?: NO
2. IN THE PAST MONTH, HAVE YOU ACTUALLY HAD ANY THOUGHTS OF KILLING YOURSELF?: NO
6. HAVE YOU EVER DONE ANYTHING, STARTED TO DO ANYTHING, OR PREPARED TO DO ANYTHING TO END YOUR LIFE?: NO

## 2024-01-31 ENCOUNTER — TELEPHONE (OUTPATIENT)
Dept: FAMILY MEDICINE | Facility: CLINIC | Age: 59
End: 2024-01-31

## 2024-01-31 NOTE — TELEPHONE ENCOUNTER
01/31/24  General Call      Reason for Call:  Resend referral to [facility]    What are your questions or concerns:  Pt states that [facility] has not received her referral yet, so she would like it resent to Fax: [phone]    Date of last appointment with provider: 01/26/24    Okay to leave a detailed message?: Yes at Cell number on file:    Telephone Information:   Mobile [phone]

## 2024-03-08 ENCOUNTER — TRANSFERRED RECORDS (OUTPATIENT)
Dept: HEALTH INFORMATION MANAGEMENT | Facility: CLINIC | Age: 59
End: 2024-03-08
Payer: COMMERCIAL

## 2024-05-07 DIAGNOSIS — M19.90 OSTEOARTHRITIS, UNSPECIFIED OSTEOARTHRITIS TYPE, UNSPECIFIED SITE: ICD-10-CM

## 2024-05-08 RX ORDER — CELECOXIB 100 MG/1
100 CAPSULE ORAL 2 TIMES DAILY
Qty: 180 CAPSULE | Refills: 0 | Status: SHIPPED | OUTPATIENT
Start: 2024-05-08 | End: 2024-08-19

## 2024-06-13 ENCOUNTER — PATIENT OUTREACH (OUTPATIENT)
Dept: CARE COORDINATION | Facility: CLINIC | Age: 59
End: 2024-06-13
Payer: COMMERCIAL

## 2024-06-26 ENCOUNTER — HOSPITAL ENCOUNTER (OUTPATIENT)
Dept: CT IMAGING | Facility: CLINIC | Age: 59
Discharge: HOME | End: 2024-06-26
Attending: PHYSICIAN ASSISTANT | Admitting: PHYSICIAN ASSISTANT
Payer: COMMERCIAL

## 2024-06-26 DIAGNOSIS — M48.062 SPINAL STENOSIS OF LUMBAR REGION WITH NEUROGENIC CLAUDICATION: ICD-10-CM

## 2024-06-26 PROCEDURE — 72131 CT LUMBAR SPINE W/O DYE: CPT

## 2024-06-27 ENCOUNTER — TELEPHONE (OUTPATIENT)
Dept: NEUROSURGERY | Facility: CLINIC | Age: 59
End: 2024-06-27
Payer: COMMERCIAL

## 2024-06-27 NOTE — TELEPHONE ENCOUNTER
Called and spoke with patient as she is status post Exploration of prior lumbar 3- lumbar 5 instrumented fusion with removal and replacement of pedicle screws and extension to sacral with Left lumbar 5-sacral 1  transforaminal lumbar interbody fusion with posterolateral arthrodesiswith use of stealth navigation  on 6/9/2023 by Dr. Donte Mcgrath horses starting in foot and shooting up into calves states that her pain is presently tolerable, notes continue low back pain but states that is does not limit her activities.        IMPRESSION:  1. No acute osseous abnormality.  2. Interbody and posterior fusion changes spanning L3-S1, as  described.  3. Multilevel degenerative changes, as described.  4. There may be up to moderate to severe spinal canal stenosis at  L2-L3 and there appears to be at least moderate spinal canal narrowing  at T12-L1 and L1-L2.  5. Moderate to severe right L1-L2 neural foraminal stenosis. Moderate  bordering on moderate to severe bilateral L2-L3 neural foraminal  stenosis.    Discussed CT results and could offer conservative options or further workup with MRI of which she would like to wait. She understands that should symptoms worsen or arise to contact the office     Sandra Rivas PA-C  [facility] Neurosurgery  Tel [phone]

## 2024-07-29 DIAGNOSIS — I25.10 CORONARY ARTERY DISEASE DUE TO LIPID RICH PLAQUE: ICD-10-CM

## 2024-07-29 DIAGNOSIS — R07.9 ACUTE CHEST PAIN: ICD-10-CM

## 2024-07-29 DIAGNOSIS — I25.83 CORONARY ARTERY DISEASE DUE TO LIPID RICH PLAQUE: ICD-10-CM

## 2024-07-29 NOTE — TELEPHONE ENCOUNTER
Patient has [facility] coverage and is eligible to get certain prescriptions as a 100-day supply.      Prescriptions updated to 100-day supply: atorvastatin     Fatimah JadeD, [facility]  Retail Pharmacy Specialist  [phone]

## 2024-08-01 RX ORDER — ATORVASTATIN CALCIUM 40 MG/1
40 TABLET, FILM COATED ORAL DAILY
Qty: 100 TABLET | Refills: 0 | Status: SHIPPED | OUTPATIENT
Start: 2024-08-01

## 2024-08-18 DIAGNOSIS — M19.90 OSTEOARTHRITIS, UNSPECIFIED OSTEOARTHRITIS TYPE, UNSPECIFIED SITE: ICD-10-CM

## 2024-08-19 RX ORDER — CELECOXIB 100 MG/1
100 CAPSULE ORAL 2 TIMES DAILY
Qty: 180 CAPSULE | Refills: 0 | Status: SHIPPED | OUTPATIENT
Start: 2024-08-19

## 2024-08-20 ENCOUNTER — TELEPHONE (OUTPATIENT)
Dept: VASCULAR SURGERY | Facility: CLINIC | Age: 59
End: 2024-08-20
Payer: COMMERCIAL

## 2024-08-20 NOTE — TELEPHONE ENCOUNTER
LMTCB to schedule US/1yr carotid follow up with Mariann around Nov. [phone]  _________________________________  Maddy Huerta, RN  [facility]  Needs 1 year follow-up with Mariann Blankenship in [place] and carotid us

## 2024-11-11 DIAGNOSIS — K21.00 GASTROESOPHAGEAL REFLUX DISEASE WITH ESOPHAGITIS WITHOUT HEMORRHAGE: Chronic | ICD-10-CM

## 2024-11-13 DIAGNOSIS — M19.90 OSTEOARTHRITIS, UNSPECIFIED OSTEOARTHRITIS TYPE, UNSPECIFIED SITE: ICD-10-CM

## 2024-11-13 RX ORDER — CELECOXIB 100 MG/1
100 CAPSULE ORAL 2 TIMES DAILY
Qty: 180 CAPSULE | Refills: 0 | Status: SHIPPED | OUTPATIENT
Start: 2024-11-13

## 2024-12-09 NOTE — PATIENT INSTRUCTIONS
"Marlin Marks,    Thank you for entrusting your care with us today. After your visit today with LUCY Blankenship this is the plan that was discussed at your appointment.    Follow-up in one year with ultrasound prior.    We will call you closer to that date to schedule.    I am including additional information on these things and our contact information if you have any questions or concerns.   Please do not hesitate to reach out to us if you felt we did not answer your questions or you are unsure of the treatment plan after your visit today. Our number is [phone].Thank you for trusting us with your care.         Again thank you for your time.       Carotid Artery Disease      What is carotid artery disease?  A carotid artery on each side of the neck supplies blood to the brain. Carotid artery disease occurs when a substance called plaque builds up in either or both arteries. The buildup may narrow the artery and limit blood flow to the brain. If this plaque breaks open, it may form a blood clot. Or pieces of the plaque may break off. A piece of plaque or a blood clot could move to the brain and cause a stroke or transient ischemic attack (TIA).    The narrowing in an artery is called stenosis. The more narrow an artery becomes, the greater the risk of stroke or TIA.        What causes it?  Carotid artery disease is caused by a process called hardening of the arteries, or atherosclerosis. Plaque builds up inside the carotid arteries. Things that can lead to this buildup include:    Smoking.  High blood pressure.  High cholesterol.  Diabetes.  A family history of hardening of the arteries.    What are the symptoms?  Many people have no symptoms. In some people, a doctor can hear a sound in their neck called a bruit (pronounced \"broo-EE\"). This is a whooshing sound that happens when a carotid artery is narrowed.    For some people, a transient ischemic attack (TIA) or stroke is the first sign of the " disease.    Know the warning signs and symptoms of stroke: BE FAST     B = Balance loss   E = Eyesight changes   F = Facial droop or numbness   A = Arm or leg weakness   S = Speech difficulty, slurred speech   T = Time to call 911 for help    How is carotid artery disease treated?  The goal of treatment is to lower your risk of a stroke. Treatment depends on whether you have symptoms and how narrow your arteries are. You probably will take medicine. You also will be encouraged to have a heart-healthy lifestyle. Some people also have a procedure to lower their risk.    Medicines  You may take aspirin or another medicine to prevent blood clots. You will likely also take medicine to lower cholesterol. You may also take medicine to help manage blood pressure.    Heart-healthy lifestyle  A heart-healthy lifestyle can help lower your risk of stroke.    Don't smoke. Avoid secondhand smoke too.  Eat heart-healthy foods.  Be active. Ask your doctor what type of exercise is safe for you.  Stay at a healthy weight. Lose weight if you need to.  Manage other health problems, such as diabetes. If you think you may have a problem with alcohol or drug use, talk to your doctor.  Get vaccinated against COVID-19, the flu, and pneumonia.    Surgery or stenting  Surgery in the arteries is called carotid endarterectomy. The doctor makes a cut in the neck and takes the plaque out of the artery.    Some people have a stent placed inside a carotid artery. A stent may be inserted during a catheter procedure. In this procedure, a doctor puts a thin tube, called a catheter, into a blood vessel in your groin or arm. The doctor threads the tube up to the carotid artery in the neck. The catheter is used to place the stent. In another type of procedure, a stent is placed in the artery through a cut in the neck.    Surgery and stenting may help lower your risk of a stroke. But they also have a risk of serious problems. You and your doctor can  decide together if you want to have surgery or stenting.    Current as of: June 24, 2023  Author: [employer] StaffYou are leaving this website for information purposes only  Clinical Review BoardYou are leaving this website for information purposes only  All [employer] education is reviewed by a team that includes physicians, nurses, advanced practitioners, registered dieticians, and other healthcare professionals.    Your risk factors for stroke or TIA (transient ischemic attack):     Your Risk Factors Your Results Goals Additional education   Please scan QR code   [] High blood pressure LMP 04/10/2016    Less than 120/80    [] Cholesterol            Total  Cholesterol   Date Value Ref Range Status   09/02/2022 152 <200 mg/dL Final   11/03/2011 190 0 - 200 mg/dL Final     Comment:     LDL Cholesterol is the primary guide to therapy.   The NCEP recommends further evaluation of: patients with cholesterol greater   than 200 mg/dL if additional risk factors are present, cholesterol greater   than   240 mg/dL, triglycerides greater than 150 mg/dL, or HDL less than 40 mg/dL.    Less than 150     Triglycerides   Triglycerides   Date Value Ref Range Status   09/02/2022 128 <150 mg/dL Final    Less than 150     LDL LDL Cholesterol Calculated   Date Value Ref Range Status   09/02/2022 82 <=100 mg/dL Final     LDL Cholesterol Direct   Date Value Ref Range Status   11/03/2011 117 0 - 129 mg/dL Final     Comment:     Optimal:         <100 mg/dL   Near Optimal:     100-129 mg/dL   Borderline High:  130-159 mg/dL   High:             160-189 mg/dL   Very high:  greater than or equal to 190 mg/dL   Cannot estimate LDL when triglyceride exceeds 400 mg/dL      Less than 70     HDL HDL Cholesterol   Date Value Ref Range Status   11/03/2011 44 (L) 50 - 110 mg/dL Final     Direct Measure HDL   Date Value Ref Range Status   09/02/2022 44 (L) >=50 mg/dL Final    Greater than 40 (men)  Greater than 50 (women)    [] Diabetes                 "A1C Hemoglobin A1C   Date Value Ref Range Status   2022 5.6 0.0 - 5.6 % Final     Comment:     Normal <5.7%   Prediabetes 5.7-6.4%    Diabetes 6.5% or higher     Note: Adopted from ADA consensus guidelines.   2011 5.3 4.3 - 6.0 % Final    Less than 5.7    [] Smoking/tobacco use   Tobacco Use      Smoking status: Every Day        Packs/day: 0.00        Years: 0.5 packs/day for 30.0 years (15.0 ttl pk-yrs)        Types: Cigarettes        Start date: 1993        Last attempt to quit: 2023        Years since quittin.5      Smokeless tobacco: Never      Tobacco comments: 3 Cigs a day   Quit smoking and tobacco    [] Overweight Estimated body mass index is 31.64 kg/m  as calculated from the following:    Height as of 24: 4' 11.25\" (1.505 m).    Weight as of 24: 158 lb (71.7 kg).  Less than 25                Carotid Duplex    Steps for the test are:  You will be asked to lie on a stretcher. It will be okay for you to wear your street clothes. In some situations, you may be asked to change into a gown.    Ultrasound gel, usually warmed for your comfort, will be placed on either side of your neck.    Through the gel, the technician will apply to your neck a small hand-held device that emits sound waves.   When the test is completed, the technician will remove excess gel from your neck. The gel is water-soluble and will not stain your skin or clothes.    How to Prepare:  Eat and take medications as usual.   Wear minimal or no jewelry to ease application and removal of gel.    Risks  There are typically no side effects or complications associated with a carotid duplex ultrasound examination.    What Can I Expect After the Test?  The technician will send the ultrasound images to your vascular surgeon for evaluation. Typically, a report is available in 2-3 days. If anything critical is found, it is standard practice to notify the vascular surgeon immediately.  Reference: " https://vascular.org/patient-resources/vascular-tests

## 2024-12-17 ENCOUNTER — HOSPITAL ENCOUNTER (OUTPATIENT)
Dept: ULTRASOUND IMAGING | Facility: CLINIC | Age: 59
Discharge: HOME | End: 2024-12-17
Attending: SURGERY
Payer: COMMERCIAL

## 2024-12-17 ENCOUNTER — OFFICE VISIT (OUTPATIENT)
Dept: VASCULAR SURGERY | Facility: CLINIC | Age: 59
End: 2024-12-17
Attending: SURGERY
Payer: COMMERCIAL

## 2024-12-17 VITALS
OXYGEN SATURATION: 98 % | BODY MASS INDEX: 32.25 KG/M2 | DIASTOLIC BLOOD PRESSURE: 83 MMHG | HEIGHT: 59 IN | HEART RATE: 82 BPM | WEIGHT: 160 LBS | SYSTOLIC BLOOD PRESSURE: 138 MMHG

## 2024-12-17 DIAGNOSIS — R07.9 ACUTE CHEST PAIN: ICD-10-CM

## 2024-12-17 DIAGNOSIS — I25.10 CORONARY ARTERY DISEASE DUE TO LIPID RICH PLAQUE: Primary | ICD-10-CM

## 2024-12-17 DIAGNOSIS — I65.22 ASYMPTOMATIC STENOSIS OF LEFT CAROTID ARTERY: ICD-10-CM

## 2024-12-17 DIAGNOSIS — I25.83 CORONARY ARTERY DISEASE DUE TO LIPID RICH PLAQUE: Primary | ICD-10-CM

## 2024-12-17 PROCEDURE — 99213 OFFICE O/P EST LOW 20 MIN: CPT | Performed by: PHYSICIAN ASSISTANT

## 2024-12-17 PROCEDURE — 93880 EXTRACRANIAL BILAT STUDY: CPT

## 2024-12-17 RX ORDER — ATORVASTATIN CALCIUM 40 MG/1
40 TABLET, FILM COATED ORAL DAILY
Qty: 90 TABLET | Refills: 3 | Status: SHIPPED | OUTPATIENT
Start: 2024-12-17

## 2024-12-17 NOTE — NURSING NOTE
"Chief Complaint   Patient presents with    Vascular Disease     1yr carotid follow up and review ultrasound results       Vitals:    12/17/24 1130   Weight: 72.6 kg (160 lb)   Height: 1.499 m (4' 11\")     Wt Readings from Last 1 Encounters:   12/17/24 72.6 kg (160 lb)     Minerva Willams MA      "

## 2024-12-17 NOTE — PROGRESS NOTES
VASCULAR SURGERY PROGRESS NOTE    LOCATION:  [facility]     Selina Parikh  Medical Record #: [id number]  YOB: 1965  Age: 59 year old     Date of Service: 12/17/2024    PRIMARY CARE PROVIDER: Sera Solo    Reason for visit: Surveillance of carotid disease     IMPRESSION: 59-year-old female presenting for surveillance of carotid artery stenosis status post left TCAR. Ultrasound today demonstrates a patent left carotid stent and less than 50% stenosis bilaterally. Patient remains completely asymptomatic and is medically optimized.    RECOMMENDATION/RISKS: Continue best medical management with aspirin and statin therapy. Follow-up in 1 year with repeat ultrasound.    HPI:  Selina Parikh is a 59 year old female with past medical history significant for hypertension, coronary artery disease, history of melanoma, and carotid artery stenosis status post left TCAR in 2023 for high-grade asymptomatic disease. Patient was last seen in the vascular clinic 1 year ago and was noted to be doing well with unremarkable ultrasound findings.    Today,  presents for follow-up. She is doing well and denies any vision changes, slurred speech, facial asymmetry, unilateral extremity numbness or weakness, or other signs/symptoms of TIA or stroke. She is compliant with her aspirin and statin medications.    Ultrasound results were discussed and all questions answered.  No other concerns.    REVIEW OF SYSTEMS:    A 12 point ROS was reviewed and is negative except for what is listed above in HPI.    PHH:    Past Medical History:   Diagnosis Date    Anemia     Antiplatelet or antithrombotic long-term use     Asymptomatic stenosis of left carotid artery     Cancer (H)     melanoma    Coronary artery disease     Hiatal hernia     Hypertension     Irritable bowel syndrome     Other chronic pain     Stented coronary artery      Past Surgical History:   Procedure Laterality Date    BACK SURGERY       CHOLECYSTECTOMY, LAPOROSCOPIC  02/14/2000    Cholecystectomy, Laparoscopic    COLON SURGERY      CV CORONARY ANGIOGRAM N/A 05/06/2022    Procedure: CV CORONARY ANGIOGRAM;  Surgeon: Stefanie Spangler MD;  Location: [facility] CATH LAB CV    CV LEFT HEART CATH N/A 05/06/2022    Procedure: Left Heart Catheterization;  Surgeon: Stefanie Spangler MD;  Location: [facility] CATH LAB CV    CYSTOSCOPY, INSERT LIGHTED STENT URETER(S) N/A 04/10/2018    Procedure: CYSTOSCOPY, INSERT LIGHTED STENT URETER(S);  Lighted Stent Placement,Laparoscopic Assisted Sigmoid Colectomy;  Surgeon: ERVIN Reina MD;  Location: [place] OR    ENDOVASCULAR CAROTID STENT PLACEMENT Left 4/21/2023    Procedure: Left transcarotid revascularization;  Surgeon: Eveline Manley MD;  Location: [facility] OR    IR TCAR TRANSCAROTID ARTERY REVASCULARIZATION  4/21/2023    LAPAROSCOPIC ASSISTED COLECTOMY LEFT (DESCENDING) N/A 04/10/2018    Procedure: LAPAROSCOPIC ASSISTED COLECTOMY LEFT (DESCENDING);;  Surgeon: Hill Murray MD;  Location: [place] OR    LUMBAR DISCECTOMY Left 09/23/2022    Procedure: left thoracic 12-lumbar 1 hemilaminectomy, medial facetectomy, foraminotomy and microdiscectomy;  Surgeon: Suha Kent MD;  Location: [facility] OR    NECK SURGERY      OPTICAL TRACKING SYSTEM FUSION SPINE POSTERIOR LUMBAR THREE+ LEVELS Bilateral 6/9/2023    Procedure: Exploration of prior lumbar 3- lumbar 5 instrumented fusion with removal and replacement of pedicle screws and extension to sacral 1. Lumbar 3-sacral 1  Left transforaminal lumbar interbody fusion and posterolateral arthrodesis with use of stealth navigation;  Surgeon: Suha Kent MD;  Location: [facility] OR    ORTHOPEDIC SURGERY  10/01/2010    Cut palm and reattched tendons and nerves in left hand forefinger.    PA ESOPHAGOGASTRODUODENOSCOPY TRANSORAL DIAGNOSTIC N/A 04/30/2021    Procedure: ESOPHAGOGASTRODUODENOSCOPY (EGD);  Surgeon: Souleymane Moreno DO;  Location:  "[facility];  Service: General    SURGICAL HISTORY OF -   01/04/2000    Esophagogastroduodenoscopy with biopsy    TUBAL LIGATION       ALLERGIES:  Ciprofloxacin, Flagyl [metronidazole], Gabapentin, Zofran [ondansetron], Benadryl [diphenhydramine], Percocet [oxycodone-acetaminophen], and Vicodin [hydrocodone-acetaminophen]    MEDS:    Current Outpatient Medications:     acetaminophen (TYLENOL) 500 MG tablet, Take 1,000 mg by mouth every 6 hours as needed for mild pain, Disp: , Rfl:     aspirin 81 MG EC tablet, Take 81 mg by mouth daily, Disp: , Rfl:     atorvastatin (LIPITOR) 40 MG tablet, Take 1 tablet (40 mg) by mouth daily, Disp: 100 tablet, Rfl: 0    celecoxib (CELEBREX) 100 MG capsule, Take 1 capsule by mouth twice daily, Disp: 180 capsule, Rfl: 0    clobetasol (TEMOVATE) 0.05 % external ointment, Apply topically 2 times daily To affected areas, Disp: 60 g, Rfl: 0    fish oil-omega-3 fatty acids 1000 MG capsule, Take 1 g by mouth daily, Disp: , Rfl:     hydrOXYzine jose (VISTARIL) 25 MG capsule, Take 1 capsule (25 mg) by mouth 3 times daily as needed for other (nausea and vomiting), Disp: 15 capsule, Rfl: 0    methocarbamol (ROBAXIN) 750 MG tablet, Take 1 tablet (750 mg) by mouth 4 times daily as needed for muscle spasms, Disp: 42 tablet, Rfl: 0    metoprolol succinate ER (TOPROL XL) 50 MG 24 hr tablet, Take 12.5 mg by mouth daily, Disp: , Rfl:     Multiple Vitamins-Minerals (HAIR/SKIN/NAILS) TABS, Take 1 tablet by mouth daily, Disp: , Rfl:     multivitamin w/minerals (THERA-VIT-M) tablet, Take 1 tablet by mouth daily, Disp: , Rfl:     nitroGLYcerin (NITROSTAT) 0.4 MG sublingual tablet, One tablet under the tongue every 5 minutes if needed for chest pain. May repeat every 5 minutes for a maximum of 3 doses in 15 minutes\", Disp: 25 tablet, Rfl: 3    omeprazole (PRILOSEC) 20 MG DR capsule, TAKE 1 CAPSULE BY MOUTH THREE TIMES DAILY, Disp: 270 capsule, Rfl: 0    polyethylene glycol (MIRALAX) 17 GM/Dose powder, " Take 17 g (1 Capful) by mouth daily, Disp: 510 g, Rfl: 0    promethazine (PHENERGAN) 25 MG tablet, Take 1 tablet (25 mg) by mouth every 8 hours as needed for nausea, Disp: 21 tablet, Rfl: 0    Turmeric (QC TUMERIC COMPLEX PO), , Disp: , Rfl:     UNABLE TO FIND, Take 1 tablet by mouth At Bedtime MEDICATION NAME: Qunol Sleep - melatonin 5 mg, Ashwagandha, L-Theanine, Disp: , Rfl:     Vitamin D (Cholecalciferol) 25 MCG (1000 UT) TABS, Take 1,000 Units by mouth daily, Disp: , Rfl:     SOCIAL HABITS:    History   Smoking Status    Every Day    Packs/day: 0.50    Years: 30.00    Types: Cigarettes    Last attempt to quit: 6/2/2023   Smokeless Tobacco    Never     Social History    Substance and Sexual Activity      Alcohol use: Not Currently        Comment: Alcoholic Drinks/day: 2x year       History   Drug Use Unknown     FAMILY HISTORY:    Family History   Problem Relation Age of Onset    C.A.D. Father 50        50's    Diabetes Father     Diabetes Brother     C.A.D. Brother 42        quad bypass and MI    Diabetes Brother         2 brothers have DM    Cancer Brother 34        Melanoma    Aortic aneurysm Brother     Allergies Son         Meds    Allergies Daughter         Med    Cancer Mother     C.A.D. Brother 38        MI age 38    Diabetes Mother     Diabetes Brother      PE:  LMP 04/10/2016   Wt Readings from Last 1 Encounters:   01/26/24 71.7 kg (158 lb)     There is no height or weight on file to calculate BMI.    EXAM:  GENERAL: well-developed 59 year old female who appears her stated age  CARDIAC: normal   CHEST/LUNG: normal respiratory effort   MUSCULOSKELETAL: grossly normal and both lower extremities are intact, no lower extremity edema  NEUROLOGIC: focally intact, alert and oriented x 3  PSYCH: appropriate affect    DIAGNOSTIC STUDIES:     Images:    US Carotid Bilateral    I personally reviewed the images and my interpretation is a patent left carotid stent and less than 50% stenosis bilaterally.    LABS:       Sodium   Date Value Ref Range Status   01/25/2024 140 135 - 145 mmol/L Final     Comment:     Reference intervals for this test were updated on 09/26/2023 to more accurately reflect our healthy population. There may be differences in the flagging of prior results with similar values performed with this method. Interpretation of those prior results can be made in the context of the updated reference intervals.    01/22/2024 140 135 - 145 mmol/L Final     Comment:     Reference intervals for this test were updated on 09/26/2023 to more accurately reflect our healthy population. There may be differences in the flagging of prior results with similar values performed with this method. Interpretation of those prior results can be made in the context of the updated reference intervals.    06/10/2023 139 136 - 145 mmol/L Final   04/19/2021 142 133 - 144 mmol/L Final   10/12/2020 142 133 - 144 mmol/L Final   01/25/2020 140 133 - 144 mmol/L Final     Urea Nitrogen   Date Value Ref Range Status   01/25/2024 8.0 6.0 - 20.0 mg/dL Final   01/22/2024 26.8 (H) 6.0 - 20.0 mg/dL Final   06/10/2023 17 8 - 22 mg/dL Final   06/08/2023 13.6 6.0 - 20.0 mg/dL Final   09/25/2022 15 8 - 22 mg/dL Final   09/19/2022 13 8 - 22 mg/dL Final   04/19/2021 13 7 - 30 mg/dL Final   10/12/2020 15 7 - 30 mg/dL Final   01/25/2020 13 7 - 30 mg/dL Final     Hemoglobin   Date Value Ref Range Status   01/22/2024 17.0 (H) 11.7 - 15.7 g/dL Final   06/11/2023 10.0 (L) 11.7 - 15.7 g/dL Final   06/10/2023 10.4 (L) 11.7 - 15.7 g/dL Final   04/19/2021 14.2 11.7 - 15.7 g/dL Final   10/12/2020 13.6 11.7 - 15.7 g/dL Final   01/25/2020 14.6 11.7 - 15.7 g/dL Final     Platelet Count   Date Value Ref Range Status   01/22/2024 329 150 - 450 10e3/uL Final   06/11/2023 226 150 - 450 10e3/uL Final   06/10/2023 251 150 - 450 10e3/uL Final   04/19/2021 272 150 - 450 10e9/L Final   10/12/2020 243 150 - 450 10e9/L Final   01/25/2020 265 150 - 450 10e9/L Final     BNP   Date  Value Ref Range Status   05/05/2022 10 0 - 84 pg/mL Final     INR   Date Value Ref Range Status   06/08/2023 0.99 0.85 - 1.15 Final   09/19/2022 0.93 0.85 - 1.15 Final   12/30/2020 0.96 0.86 - 1.14 Final   03/25/2013 1.0  Final     25 minutes spent on the day of encounter doing chart review, history and exam, documentation, and further activities as noted.     Mariann Blankenship PA-C  VASCULAR SURGERY

## 2025-01-22 ENCOUNTER — HOSPITAL ENCOUNTER (OUTPATIENT)
Dept: MRI IMAGING | Facility: CLINIC | Age: 60
Discharge: HOME | End: 2025-01-22
Attending: PHYSICIAN ASSISTANT
Payer: COMMERCIAL

## 2025-01-22 DIAGNOSIS — M54.16 LUMBAR RADICULOPATHY: ICD-10-CM

## 2025-01-22 DIAGNOSIS — Z98.1 S/P LUMBAR FUSION: ICD-10-CM

## 2025-01-22 PROCEDURE — 255N000002 HC RX 255 OP 636: Performed by: PHYSICIAN ASSISTANT

## 2025-01-22 PROCEDURE — 72158 MRI LUMBAR SPINE W/O & W/DYE: CPT

## 2025-01-22 PROCEDURE — A9585 GADOBUTROL INJECTION: HCPCS | Performed by: PHYSICIAN ASSISTANT

## 2025-01-22 RX ORDER — GADOBUTROL 604.72 MG/ML
7 INJECTION INTRAVENOUS ONCE
Status: COMPLETED | OUTPATIENT
Start: 2025-01-22 | End: 2025-01-22

## 2025-01-22 RX ADMIN — GADOBUTROL 7 ML: 604.72 INJECTION INTRAVENOUS at 14:09

## 2025-02-01 ENCOUNTER — HOSPITAL ENCOUNTER (OUTPATIENT)
Dept: MRI IMAGING | Facility: CLINIC | Age: 60
Discharge: HOME | End: 2025-02-01
Attending: PHYSICIAN ASSISTANT | Admitting: PHYSICIAN ASSISTANT
Payer: COMMERCIAL

## 2025-02-01 DIAGNOSIS — M54.12 CERVICAL RADICULOPATHY: ICD-10-CM

## 2025-02-01 PROCEDURE — A9585 GADOBUTROL INJECTION: HCPCS | Performed by: PHYSICIAN ASSISTANT

## 2025-02-01 PROCEDURE — 255N000002 HC RX 255 OP 636: Performed by: PHYSICIAN ASSISTANT

## 2025-02-01 PROCEDURE — 72156 MRI NECK SPINE W/O & W/DYE: CPT

## 2025-02-01 RX ORDER — GADOBUTROL 604.72 MG/ML
7 INJECTION INTRAVENOUS ONCE
Status: COMPLETED | OUTPATIENT
Start: 2025-02-01 | End: 2025-02-01

## 2025-02-01 RX ADMIN — GADOBUTROL 7 ML: 604.72 INJECTION INTRAVENOUS at 14:13

## 2025-02-05 ENCOUNTER — TELEPHONE (OUTPATIENT)
Dept: NEUROSURGERY | Facility: CLINIC | Age: 60
End: 2025-02-05
Payer: COMMERCIAL

## 2025-02-05 DIAGNOSIS — M54.12 CERVICAL RADICULOPATHY: Primary | ICD-10-CM

## 2025-02-05 NOTE — TELEPHONE ENCOUNTER
Called and discussed with patient about MRI resulted     Noted anterolisthesis at C7-T1 but otherwise no foraminal or cancel stenosis mild C3-C4 facet arthropathy     FINDINGS:   Grade I anterolisthesis of C7 upon T1 again noted. Alignment otherwise normal. Vertebral body heights normal. Marrow signal normal. No evidence for fracture or pathologic bony lesion of the cervical spine. Anterior fusion from C4 down to C7 again noted.   Posterior fusion from C4 down to C7 again noted. No abnormal cord signal. No extraspinal abnormality.     Craniovertebral junction and C1-C2: Normal.     C2-C3: Normal disc height. No herniation. Normal facets. No spinal canal or neural foraminal stenosis.      C3-C4: Minimal posterior broad-based disc osteophyte complex. Mild facet arthropathy on the left. No spinal canal or neural foraminal stenosis.      C4-C5: (Fused level). No spinal canal or neural foraminal stenosis.      C5-C6: (Fused level). No spinal canal or neural foraminal stenosis.      C6-C7: (Fused level). No spinal canal or neural foraminal stenosis.      C7-T1: Facet arthropathy bilaterally, uncinate hypertrophy bilaterally and a posterior broad-based disc osteophyte complex. No spinal canal or neural foraminal stenosis.                                                                      IMPRESSION:  1.  Postoperative and degenerative change of the cervical spine as detailed above.  2.  No high-grade spinal canal or high-grade neural foraminal stenosis at any level of the cervical spine.    She notes continued neck pain and that extends to bilateral shoulders and has radicular numbness and tingling into bilateral arms (R>L) thumb, 2nd and 3rd digit     Would recommend she complete upper extremity EMG as currently scheduled as well as cervical flexion extension xray to further evaluate anterolisthesis at C7-T1   Will plan to call after results with further recommendations or treatment options     VINITA Pedraza  [facility] Neurosurgery  Tel [phone]

## 2025-02-05 NOTE — TELEPHONE ENCOUNTER
Date: February 5, 2025    Provider: Other     Provider/Other: n/a    Reason for out-going call: OTHER      Detailed message: ldvm for patient to c/b and schedule imaging in [place]          Number provider for patient: [facility]: [phone]

## 2025-02-05 NOTE — TELEPHONE ENCOUNTER
----- Message from Sandra Rivas sent at 2/5/2025  2:42 PM CST -----  Can you call to schedule cervical xray up at [place] at any time     Thanks

## 2025-02-08 DIAGNOSIS — K21.00 GASTROESOPHAGEAL REFLUX DISEASE WITH ESOPHAGITIS WITHOUT HEMORRHAGE: Chronic | ICD-10-CM

## 2025-02-10 ENCOUNTER — RADIOLOGY INJECTION OFFICE VISIT (OUTPATIENT)
Dept: PHYSICAL MEDICINE AND REHAB | Facility: CLINIC | Age: 60
End: 2025-02-10
Attending: PHYSICIAN ASSISTANT
Payer: COMMERCIAL

## 2025-02-10 VITALS
TEMPERATURE: 97.8 F | HEART RATE: 89 BPM | WEIGHT: 156 LBS | OXYGEN SATURATION: 96 % | RESPIRATION RATE: 16 BRPM | SYSTOLIC BLOOD PRESSURE: 156 MMHG | DIASTOLIC BLOOD PRESSURE: 86 MMHG | BODY MASS INDEX: 31.45 KG/M2 | HEIGHT: 59 IN

## 2025-02-10 DIAGNOSIS — M48.062 SPINAL STENOSIS OF LUMBAR REGION WITH NEUROGENIC CLAUDICATION: ICD-10-CM

## 2025-02-10 PROCEDURE — 64483 NJX AA&/STRD TFRM EPI L/S 1: CPT | Mod: 50 | Performed by: STUDENT IN AN ORGANIZED HEALTH CARE EDUCATION/TRAINING PROGRAM

## 2025-02-10 RX ORDER — OMEPRAZOLE 20 MG/1
20 CAPSULE, DELAYED RELEASE ORAL 3 TIMES DAILY
Qty: 270 CAPSULE | Refills: 0 | Status: SHIPPED | OUTPATIENT
Start: 2025-02-10

## 2025-02-10 RX ORDER — LIDOCAINE HYDROCHLORIDE 10 MG/ML
INJECTION, SOLUTION EPIDURAL; INFILTRATION; INTRACAUDAL; PERINEURAL
Status: COMPLETED | OUTPATIENT
Start: 2025-02-10 | End: 2025-02-10

## 2025-02-10 RX ORDER — BUPIVACAINE HYDROCHLORIDE 2.5 MG/ML
INJECTION, SOLUTION EPIDURAL; INFILTRATION; INTRACAUDAL
Status: COMPLETED | OUTPATIENT
Start: 2025-02-10 | End: 2025-02-10

## 2025-02-10 RX ORDER — DEXAMETHASONE SODIUM PHOSPHATE 10 MG/ML
INJECTION, SOLUTION INTRAMUSCULAR; INTRAVENOUS
Status: COMPLETED | OUTPATIENT
Start: 2025-02-10 | End: 2025-02-10

## 2025-02-10 RX ADMIN — BUPIVACAINE HYDROCHLORIDE 2 ML: 2.5 INJECTION, SOLUTION EPIDURAL; INFILTRATION; INTRACAUDAL at 15:00

## 2025-02-10 RX ADMIN — DEXAMETHASONE SODIUM PHOSPHATE 10 MG: 10 INJECTION, SOLUTION INTRAMUSCULAR; INTRAVENOUS at 15:01

## 2025-02-10 RX ADMIN — LIDOCAINE HYDROCHLORIDE 2 ML: 10 INJECTION, SOLUTION EPIDURAL; INFILTRATION; INTRACAUDAL; PERINEURAL at 15:00

## 2025-02-10 ASSESSMENT — PAIN SCALES - GENERAL
PAINLEVEL_OUTOF10: SEVERE PAIN (7)
PAINLEVEL_OUTOF10: SEVERE PAIN (8)

## 2025-02-10 NOTE — PATIENT INSTRUCTIONS
DISCHARGE INSTRUCTIONS    During office hours (8:00 a.m.- 4:00 p.m.) questions or concerns may be answered  by calling [facility] Navigation Nurses at  [phone].  Messages received after hours will be returned the following business day.      In the case of an emergency, please dial 911 or seek assistance at the nearest Emergency Room/Urgent Care facility.     All Patients:    You may experience an increase in your symptoms for the first 2 days (It may take anywhere between 2 days- 2 weeks for the steroid to have maximum effect).    You may use ice on the injection site, as frequently as 20 minutes each hour if needed.    You may take your pain medicine.    You may continue taking your regular medication after your injection. If you have had a Medial Branch Block you may resume pain medication once your pain diary is completed.    You may shower. No swimming, tub bath or hot tub for 48 hours.  You may remove your bandaid/bandage as soon as you are home.    You may resume light activities, as tolerated.    Resume your usual diet as tolerated.    If you were told to hold any blood thinning medications you may resume taking them 24 hours after your procedure as prescribed.    It is strongly advised that you do not drive for 1-3 hours post injection.    If you have had oral sedation:  Do not drive for 8 hours post injection.      If you have had IV sedation:  Do not drive for 24 hours post injection.  Do not operate hazardous machinery or make important personal/business decisions for 24 hours.      POSSIBLE STEROID SIDE EFFECTS (If steroid/cortisone was used for your procedure)    -If you experience these symptoms, it should only last for a short period    Swelling of the legs              Skin redness (flushing)     Mouth (oral) irritation   Blood sugar (glucose) levels            Sweats                    Mood changes  Headache  Sleeplessness  Weakened immune system for up to 14 days, which could increase  the risk of sylwia the COVID-19 virus and/or experiencing more severe symptoms of the disease, if exposed.  Decreased effectiveness of the flu vaccine if given within 2 weeks of the steroid.         POSSIBLE PROCEDURE SIDE EFFECTS  -Call the Spine Center if you are concerned  Increased Pain           Increased numbness/tingling      Nausea/Vomiting          Bruising/bleeding at site      Redness or swelling                                              Difficulty walking      Weakness           Fever greater than 100.5    *In the event of a severe headache after an epidural steroid injection that is relieved by lying down, please call the [facility] to speak with a clinical staff member*

## 2025-02-21 ENCOUNTER — HOSPITAL ENCOUNTER (OUTPATIENT)
Dept: RADIOLOGY | Facility: HOSPITAL | Age: 60
Discharge: HOME | End: 2025-02-21
Attending: PHYSICIAN ASSISTANT | Admitting: PHYSICIAN ASSISTANT
Payer: COMMERCIAL

## 2025-02-21 DIAGNOSIS — M54.12 CERVICAL RADICULOPATHY: ICD-10-CM

## 2025-02-21 PROCEDURE — 72050 X-RAY EXAM NECK SPINE 4/5VWS: CPT

## 2025-02-25 ENCOUNTER — TELEPHONE (OUTPATIENT)
Dept: NEUROSURGERY | Facility: CLINIC | Age: 60
End: 2025-02-25
Payer: COMMERCIAL

## 2025-02-25 NOTE — TELEPHONE ENCOUNTER
Called and spoke with patient about EMG results discussed mild bilateral carpal tunnel syndrome    Interpretation: Abnormal study: There is electrodiagnostic evidence of:     1.  Bilateral median neuropathy at the wrist consistent with entrapment in the carpal tunnel, borderline/very mild in severity.     2. There is no electrodiagnostic evidence of cervical radiculopathy, brachial plexopathy, or ulnar neuropathy in the bilateral upper extremities.       Discussed with Dr. Kent will plan to have her follow up in 2-3 weeks following next injection for further evaluation and treatment options      Snadra Rivas PA-C  [facility] Neurosurgery  Tel [phone]

## 2025-02-26 ENCOUNTER — TELEPHONE (OUTPATIENT)
Dept: NEUROSURGERY | Facility: CLINIC | Age: 60
End: 2025-02-26

## 2025-02-26 ENCOUNTER — RADIOLOGY INJECTION OFFICE VISIT (OUTPATIENT)
Dept: PHYSICAL MEDICINE AND REHAB | Facility: CLINIC | Age: 60
End: 2025-02-26
Attending: PHYSICIAN ASSISTANT
Payer: COMMERCIAL

## 2025-02-26 VITALS
HEART RATE: 90 BPM | TEMPERATURE: 97.5 F | OXYGEN SATURATION: 96 % | RESPIRATION RATE: 18 BRPM | SYSTOLIC BLOOD PRESSURE: 140 MMHG | DIASTOLIC BLOOD PRESSURE: 82 MMHG

## 2025-02-26 DIAGNOSIS — M53.3 SACROILIAC JOINT PAIN: ICD-10-CM

## 2025-02-26 PROCEDURE — 27096 INJECT SACROILIAC JOINT: CPT | Mod: RT | Performed by: PAIN MEDICINE

## 2025-02-26 RX ORDER — LIDOCAINE HYDROCHLORIDE 10 MG/ML
INJECTION, SOLUTION EPIDURAL; INFILTRATION; INTRACAUDAL; PERINEURAL
Status: COMPLETED | OUTPATIENT
Start: 2025-02-26 | End: 2025-02-26

## 2025-02-26 RX ORDER — ROPIVACAINE HYDROCHLORIDE 5 MG/ML
INJECTION, SOLUTION EPIDURAL; INFILTRATION; PERINEURAL
Status: COMPLETED | OUTPATIENT
Start: 2025-02-26 | End: 2025-02-26

## 2025-02-26 RX ORDER — TRIAMCINOLONE ACETONIDE 40 MG/ML
INJECTION, SUSPENSION INTRA-ARTICULAR; INTRAMUSCULAR
Status: COMPLETED | OUTPATIENT
Start: 2025-02-26 | End: 2025-02-26

## 2025-02-26 RX ADMIN — LIDOCAINE HYDROCHLORIDE 1 ML: 10 INJECTION, SOLUTION EPIDURAL; INFILTRATION; INTRACAUDAL; PERINEURAL at 11:31

## 2025-02-26 RX ADMIN — ROPIVACAINE HYDROCHLORIDE 2.5 ML: 5 INJECTION, SOLUTION EPIDURAL; INFILTRATION; PERINEURAL at 11:31

## 2025-02-26 RX ADMIN — TRIAMCINOLONE ACETONIDE 20 MG: 40 INJECTION, SUSPENSION INTRA-ARTICULAR; INTRAMUSCULAR at 11:32

## 2025-02-26 ASSESSMENT — PAIN SCALES - GENERAL
PAINLEVEL_OUTOF10: SEVERE PAIN (7)
PAINLEVEL_OUTOF10: SEVERE PAIN (7)

## 2025-02-26 NOTE — TELEPHONE ENCOUNTER
Date: February 26, 2025    Provider: Dr. Donte Bustos MD     Provider/Other:     Reason for out-going call: FOLLOW-UP/ REVIEW RESULTS      Detailed message: Left voicemail to schedule 2 week post injection with Dr. Kent, around 3/13 or later.          Number provider for patient: [facility]: [phone]

## 2025-02-26 NOTE — PATIENT INSTRUCTIONS
DISCHARGE INSTRUCTIONS    During office hours (8:00 a.m.- 4:00 p.m.) questions or concerns may be answered  by calling [facility] Center Navigation Nurses at  [phone].  Messages received after hours will be returned the following business day.      In the case of an emergency, please dial 911 or seek assistance at the nearest Emergency Room/Urgent Care facility.     All Patients:    You may experience an increase in your symptoms for the first 2 days (It may take anywhere between 2 days- 2 weeks for the steroid to have maximum effect).    You may use ice on the injection site, as frequently as 20 minutes each hour if needed.    You may take your pain medicine.    You may continue taking your regular medication after your injection. If you have had a Medial Branch Block you may resume pain medication once your pain diary is completed.    You may shower. No swimming, tub bath or hot tub for 48 hours.  You may remove your bandaid/bandage as soon as you are home.    You may resume light activities, as tolerated.    Resume your usual diet as tolerated.    If you were told to hold any blood thinning medications you may resume taking them 24 hours after your procedure as prescribed.    It is strongly advised that you do not drive for 1-3 hours post injection.    If you have had oral sedation:  Do not drive for 8 hours post injection.      If you have had IV sedation:  Do not drive for 24 hours post injection.  Do not operate hazardous machinery or make important personal/business decisions for 24 hours.      POSSIBLE STEROID SIDE EFFECTS (If steroid/cortisone was used for your procedure)    -If you experience these symptoms, it should only last for a short period    Swelling of the legs              Skin redness (flushing)     Mouth (oral) irritation   Blood sugar (glucose) levels            Sweats                    Mood changes  Headache  Sleeplessness  Weakened immune system for up to 14 days, which could increase  the risk of sylwia the COVID-19 virus and/or experiencing more severe symptoms of the disease, if exposed.  Decreased effectiveness of the flu vaccine if given within 2 weeks of the steroid.         POSSIBLE PROCEDURE SIDE EFFECTS  -Call the Spine Center if you are concerned  Increased Pain           Increased numbness/tingling      Nausea/Vomiting          Bruising/bleeding at site      Redness or swelling                                              Difficulty walking      Weakness           Fever greater than 100.5    *In the event of a severe headache after an epidural steroid injection that is relieved by lying down, please call the [facility] to speak with a clinical staff member*

## 2025-03-20 ENCOUNTER — OFFICE VISIT (OUTPATIENT)
Dept: NEUROSURGERY | Facility: CLINIC | Age: 60
End: 2025-03-20
Payer: COMMERCIAL

## 2025-03-20 VITALS
DIASTOLIC BLOOD PRESSURE: 73 MMHG | BODY MASS INDEX: 31.79 KG/M2 | WEIGHT: 157.7 LBS | HEART RATE: 99 BPM | SYSTOLIC BLOOD PRESSURE: 133 MMHG | OXYGEN SATURATION: 96 % | HEIGHT: 59 IN

## 2025-03-20 DIAGNOSIS — M48.062 SPINAL STENOSIS OF LUMBAR REGION WITH NEUROGENIC CLAUDICATION: ICD-10-CM

## 2025-03-20 DIAGNOSIS — Z98.1 S/P LUMBAR FUSION: Primary | ICD-10-CM

## 2025-03-20 PROCEDURE — 99213 OFFICE O/P EST LOW 20 MIN: CPT | Performed by: SURGERY

## 2025-03-20 PROCEDURE — 1125F AMNT PAIN NOTED PAIN PRSNT: CPT | Performed by: SURGERY

## 2025-03-20 PROCEDURE — 3078F DIAST BP <80 MM HG: CPT | Performed by: SURGERY

## 2025-03-20 PROCEDURE — 3075F SYST BP GE 130 - 139MM HG: CPT | Performed by: SURGERY

## 2025-03-20 ASSESSMENT — PAIN SCALES - GENERAL: PAINLEVEL_OUTOF10: MODERATE PAIN (6)

## 2025-03-20 NOTE — NURSING NOTE
"Selina Parikh is a 59 year old female who presents for:  Chief Complaint   Patient presents with    RECHECK     Patient has pain in the lower back runs down the right side to the knee. Numbness and tingling in the hands. Having balance issues. Lvl 6.        Initial Vitals:  /73   Pulse 99   Ht 4' 11\" (1.499 m)   Wt 157 lb 11.2 oz (71.5 kg)   LMP 04/10/2016   SpO2 96%   BMI 31.85 kg/m   Estimated body mass index is 31.85 kg/m  as calculated from the following:    Height as of this encounter: 4' 11\" (1.499 m).    Weight as of this encounter: 157 lb 11.2 oz (71.5 kg).. Body surface area is 1.73 meters squared. BP completed using cuff size: large  Moderate Pain (6)    Nursing Comments:       Rehana Brothers    "

## 2025-03-20 NOTE — PATIENT INSTRUCTIONS
Work on nicotine cessation.  Continue spine exercises.  Obtain scoliosis xray with flexion and extension.   Consider extension of fusion to thoracic spine with multilevel lumbar laminectomies.  Risks and benefits were discussed in detail including but not limited to infection, hematoma, nerve damage including paralysis, post op radiculitis, durotomy, lack of a sold bone fusion, hardware malfunction, adjacent segment disease, risks associated with the use of general anesthesia.

## 2025-03-20 NOTE — LETTER
"3/20/2025      Selina Parikh  [address] [address] [address]      Dear Colleague,    Thank you for referring your patient, Selina Parikh, to the [facility]. Please see a copy of my visit note below.    NEUROSURGERY FOLLOW UP  NOTE    Selina Parikh comes today in follow-up.  Patient is a 59-year-old female who is status post exploration of prior lumbar 3- lumbar 5 instrumented fusion with removal and replacement of pedicle screws and extension to sacral 1. Lumbar 5-sacral 1 left transforaminal lumbar interbody fusion and posterolateral arthrodesis with use of stealth navigation on 6/9/2023.    S/p right SI joint injection on 2/26/25. More relief from this injection. Lumbar 2-3 TFESI on 2/10/25. Some benefit of numbness in feet. Feels her legs will go out from underneath her. When she hs valsalva will get shooting pain down her back and legs. No bowel or bladder loss. Numbness into her groins and around her buttocks. Shooting right anterior thigh pain.     PHYSICAL EXAM:   Constitutional: /73   Pulse 99   Ht 4' 11\" (1.499 m)   Wt 157 lb 11.2 oz (71.5 kg)   LMP 04/10/2016   SpO2 96%   BMI 31.85 kg/m       Mental Status: A & O in no acute distress.  Affect is appropriate.  Speech is fluent.  Recent and remote memory are intact.  Attention span and concentration are normal.     Motor:  Normal bulk and tone all muscle groups of upper and lower extremities.       IMAGING:   I personally reviewed all radiographic images        CONSULTATION ASSESSMENT AND PLAN:    We reviewed her most recent MRI of her lumbar spine which shows new adjacent segment disease again above her prior lumbar 3-S1 instrumented fusion.  She now has mild central stenosis at lumbar 1-2, moderate to severe spinal canal stenosis at lumbar 2-3 mild spinal canal stenosis at thoracic 12-lumbar 1.  She also has Modic type I endplate changes noted at surgery thoracic 12-lumbar 1 and Modic type III endplate " changes noted at lumbar 1-lumbar 2.  She also has some redundancy of her cauda equina nerve roots due to her compression.  Recommend scoliosis x-rays.  Again recommend complete nicotine cessation for overall bone health and to help reduce the risk of further adjacent segment disease.  Pending review of scoliosis x-rays in addition to flexion-extension films consider further extension of her prior fusion with multilevel laminectomies.  She would like to think about doing surgery at the end of summer early fall.  Work on nicotine sensation as well as obtain her x-rays and return to clinic towards the end of summer.    Lumbar exercises were also provided to patient.    Suha Kent MD      CC:     Sera Solo  [facility] [place] [address]      Again, thank you for allowing me to participate in the care of your patient.        Sincerely,        Suha Kent MD    Electronically signed

## 2025-03-20 NOTE — PROGRESS NOTES
"NEUROSURGERY FOLLOW UP  NOTE    Selina Parikh comes today in follow-up.  Patient is a 59-year-old female who is status post exploration of prior lumbar 3- lumbar 5 instrumented fusion with removal and replacement of pedicle screws and extension to sacral 1. Lumbar 5-sacral 1 left transforaminal lumbar interbody fusion and posterolateral arthrodesis with use of stealth navigation on 6/9/2023.    S/p right SI joint injection on 2/26/25. More relief from this injection. Lumbar 2-3 TFESI on 2/10/25. Some benefit of numbness in feet. Feels her legs will go out from underneath her. When she hs valsalva will get shooting pain down her back and legs. No bowel or bladder loss. Numbness into her groins and around her buttocks. Shooting right anterior thigh pain.     PHYSICAL EXAM:   Constitutional: /73   Pulse 99   Ht 4' 11\" (1.499 m)   Wt 157 lb 11.2 oz (71.5 kg)   LMP 04/10/2016   SpO2 96%   BMI 31.85 kg/m       Mental Status: A & O in no acute distress.  Affect is appropriate.  Speech is fluent.  Recent and remote memory are intact.  Attention span and concentration are normal.     Motor:  Normal bulk and tone all muscle groups of upper and lower extremities.       IMAGING:   I personally reviewed all radiographic images        CONSULTATION ASSESSMENT AND PLAN:    We reviewed her most recent MRI of her lumbar spine which shows new adjacent segment disease again above her prior lumbar 3-S1 instrumented fusion.  She now has mild central stenosis at lumbar 1-2, moderate to severe spinal canal stenosis at lumbar 2-3 mild spinal canal stenosis at thoracic 12-lumbar 1.  She also has Modic type I endplate changes noted at surgery thoracic 12-lumbar 1 and Modic type III endplate changes noted at lumbar 1-lumbar 2.  She also has some redundancy of her cauda equina nerve roots due to her compression.  Recommend scoliosis x-rays.  Again recommend complete nicotine cessation for overall bone health and to help reduce the " risk of further adjacent segment disease.  Pending review of scoliosis x-rays in addition to flexion-extension films consider further extension of her prior fusion with multilevel laminectomies.  She would like to think about doing surgery at the end of summer early fall.  Work on nicotine sensation as well as obtain her x-rays and return to clinic towards the end of summer.    Lumbar exercises were also provided to patient.    Suha Kent MD      CC:     Sera Solo  [facility] [place] [address]

## 2025-03-25 ENCOUNTER — VIRTUAL VISIT (OUTPATIENT)
Dept: FAMILY MEDICINE | Facility: CLINIC | Age: 60
End: 2025-03-25
Payer: COMMERCIAL

## 2025-03-25 DIAGNOSIS — L40.9 PSORIASIS: ICD-10-CM

## 2025-03-25 DIAGNOSIS — N81.6 RECTOCELE: Primary | ICD-10-CM

## 2025-03-25 DIAGNOSIS — K44.9 HIATAL HERNIA: ICD-10-CM

## 2025-03-25 PROBLEM — F33.1 MODERATE RECURRENT MAJOR DEPRESSION (H): Status: ACTIVE | Noted: 2025-03-25

## 2025-03-25 PROCEDURE — 98005 SYNCH AUDIO-VIDEO EST LOW 20: CPT | Performed by: NURSE PRACTITIONER

## 2025-03-25 RX ORDER — CLOBETASOL PROPIONATE 0.05 G/100ML
SHAMPOO TOPICAL
Qty: 118 ML | Refills: 1 | Status: SHIPPED | OUTPATIENT
Start: 2025-03-25

## 2025-03-25 NOTE — PROGRESS NOTES
"Selina is a 59 year old who is being evaluated via a billable video visit.    How would you like to obtain your AVS? Mail a copy  If the video visit is dropped, the invitation should be resent by: Text to cell phone: [phone]  Will anyone else be joining your video visit? No  {If patient encounters technical issues they should call [phone] :[id number]}    Assessment & Plan     Rectocele  ***  - Adult Uro/Gyn  Referral    Hiatal hernia  ***  - Adult Gen Surg  Referral    Psoriasis  ***  - clobetasol propionate (CLOBEX) 0.05 % external shampoo  Dispense: 118 mL; Refill: 1            Nicotine/Tobacco Cessation  She reports that she has been smoking cigarettes. She started smoking about 31 years ago. She has a 15 pack-year smoking history. She has never used smokeless tobacco.  Nicotine/Tobacco Cessation Plan  Phone counseling: Place order for Quit Partner Referral      BMI  Estimated body mass index is 31.85 kg/m  as calculated from the following:    Height as of 3/20/25: 1.499 m (4' 11\").    Weight as of 3/20/25: 71.5 kg (157 lb 11.2 oz).         Subjective   Selina is a 59 year old, presenting for the following health issues:  Referral (Referral for rectum hernia ) and Derm Problem (psoriasis is getting thick on scalp)        3/25/2025    12:40 PM   Additional Questions   Roomed by Mario X       Video Start Time: {video visit start/end time for provider to select:248398}    History of Present Illness       Reason for visit:  Hernia Repair & Scalp         {SUPERLIST (Optional):001004}  {additonal problems for provider to add (Optional):628746}    {ROS Picklists (Optional):303562}      Objective    Vitals - Patient Reported  Weight (Patient Reported): 71.2 kg (157 lb)      Vitals:  No vitals were obtained today due to virtual visit.    Physical Exam   GENERAL: alert and no distress  EYES: Eyes grossly normal to inspection.  No discharge or erythema, or obvious scleral/conjunctival " abnormalities.  RESP: No audible wheeze, cough, or visible cyanosis.    SKIN: Visible skin clear. No significant rash, abnormal pigmentation or lesions.  NEURO: Cranial nerves grossly intact.  Mentation and speech appropriate for age.  PSYCH: Appropriate affect, tone, and pace of words    {Diagnostic Test Results (Optional):828262}      Video-Visit Details    Type of service:  Video Visit   Video End Time:{video visit start/end time for provider to select:203110}  Originating Location (pt. Location): Home  {PROVIDER LOCATION On-site should be selected for visits conducted from your clinic location or adjoining [facility] hospital, academic office, or other nearby [facility] building. Off-site should be selected for all other provider locations, including home:352060}  Distant Location (provider location):  {virtual location provider:917006}  Platform used for Video Visit: Vic  Signed Electronically by: Sera Solo NP  {Email feedback regarding this note to [email or web address]   :186807}

## 2025-03-26 ENCOUNTER — TELEPHONE (OUTPATIENT)
Dept: UROLOGY | Facility: CLINIC | Age: 60
End: 2025-03-26
Payer: COMMERCIAL

## 2025-03-26 NOTE — TELEPHONE ENCOUNTER
[facility] Call Center    Phone Message    May a detailed message be left on voicemail: yes     Reason for Call: Appointment Intake    Referring Provider Name:  Sera Solo NP in [facility]  Diagnosis and/or Symptoms: Rectocele/Pelvic Organ Prolapse  Pelvic Organ Prolapse is not in protocols. Unsure how to schedule properly. Please call pt back to schedule off referral. Thanks     Action Taken: Message routed to:  [facility] Uro    Travel Screening: Not Applicable     Date of Service:

## 2025-03-27 NOTE — TELEPHONE ENCOUNTER
Spoke with patient, she was currently driving. Advised her to call back to schedule next available appointment per message below.

## 2025-03-31 ENCOUNTER — VIRTUAL VISIT (OUTPATIENT)
Dept: UROLOGY | Facility: CLINIC | Age: 60
End: 2025-03-31
Attending: NURSE PRACTITIONER
Payer: COMMERCIAL

## 2025-03-31 ENCOUNTER — OFFICE VISIT (OUTPATIENT)
Dept: SURGERY | Facility: CLINIC | Age: 60
End: 2025-03-31
Attending: NURSE PRACTITIONER
Payer: COMMERCIAL

## 2025-03-31 ENCOUNTER — TELEPHONE (OUTPATIENT)
Dept: NEUROSURGERY | Facility: CLINIC | Age: 60
End: 2025-03-31

## 2025-03-31 ENCOUNTER — TELEPHONE (OUTPATIENT)
Dept: FAMILY MEDICINE | Facility: CLINIC | Age: 60
End: 2025-03-31

## 2025-03-31 VITALS — DIASTOLIC BLOOD PRESSURE: 84 MMHG | SYSTOLIC BLOOD PRESSURE: 140 MMHG | WEIGHT: 159 LBS | BODY MASS INDEX: 32.11 KG/M2

## 2025-03-31 DIAGNOSIS — K44.9 HIATAL HERNIA: Primary | ICD-10-CM

## 2025-03-31 DIAGNOSIS — G93.9 BRAIN LESION: Primary | ICD-10-CM

## 2025-03-31 DIAGNOSIS — N81.6 RECTOCELE: ICD-10-CM

## 2025-03-31 PROCEDURE — 3077F SYST BP >= 140 MM HG: CPT | Performed by: SURGERY

## 2025-03-31 PROCEDURE — 99204 OFFICE O/P NEW MOD 45 MIN: CPT | Performed by: SURGERY

## 2025-03-31 PROCEDURE — 3079F DIAST BP 80-89 MM HG: CPT | Performed by: SURGERY

## 2025-03-31 NOTE — PROGRESS NOTES
HPI: Selina Parikh is a 59 year old female referred to see me by Sera Solo for evaluation of gastroesophageal reflux disease.  She notes  a several year history of gastroesophageal reflux disease described as significant esophageal burning, regurgitation, inability to lay flat without regurgitating, nighttime cough and difficulty eating.  She has had this for over 20 years and had a hiatal hernia per her reports after the birth of her first child.  There are reports from an endoscopy in 2008 that demonstrated a hiatal hernia.  Most recent endoscopy in 2024 demonstrated persistent hiatal hernia with Cruz's metaplasia.  She was unaware that she had Cruz's metaplasia and has no surveillance plan set in place.  She is fairly miserable and is taking PPIs 3 times a day with minimal to no relief of symptoms.  She also has a another back fusion planned in the future and will be relegated to laying flat in bed.  Because of this she would like to have her hiatal hernia repaired sooner rather than later in order to prevent regurgitation and significant symptoms during that recovery process.    Allergies:Ciprofloxacin, Flagyl [metronidazole], Gabapentin, Zofran [ondansetron], Benadryl [diphenhydramine], Percocet [oxycodone-acetaminophen], and Vicodin [hydrocodone-acetaminophen]    Past Medical History:   Diagnosis Date    Anemia     Antiplatelet or antithrombotic long-term use     Asymptomatic stenosis of left carotid artery     Cancer (H)     melanoma    Coronary artery disease     Hiatal hernia     Hypertension     Irritable bowel syndrome     Other chronic pain     Stented coronary artery        Past Surgical History:   Procedure Laterality Date    BACK SURGERY      CHOLECYSTECTOMY, LAPOROSCOPIC  02/14/2000    Cholecystectomy, Laparoscopic    COLON SURGERY      CV CORONARY ANGIOGRAM N/A 05/06/2022    Procedure: CV CORONARY ANGIOGRAM;  Surgeon: Stefanie Spangler MD;  Location: [facility] CATH LAB CV    CV LEFT HEART  CATH N/A 05/06/2022    Procedure: Left Heart Catheterization;  Surgeon: Stefanie Spangler MD;  Location: [facility] CATH LAB CV    CYSTOSCOPY, INSERT LIGHTED STENT URETER(S) N/A 04/10/2018    Procedure: CYSTOSCOPY, INSERT LIGHTED STENT URETER(S);  Lighted Stent Placement,Laparoscopic Assisted Sigmoid Colectomy;  Surgeon: ERVIN Reina MD;  Location: [facility] OR    ENDOVASCULAR CAROTID STENT PLACEMENT Left 4/21/2023    Procedure: Left transcarotid revascularization;  Surgeon: Eveline Manley MD;  [place] [facility]    IR TCAR TRANSCAROTID ARTERY REVASCULARIZATION  4/21/2023    LAPAROSCOPIC ASSISTED COLECTOMY LEFT (DESCENDING) N/A 04/10/2018    Procedure: LAPAROSCOPIC ASSISTED COLECTOMY LEFT (DESCENDING);;  Surgeon: Hill Murray MD;  Location: [facility] OR    LUMBAR DISCECTOMY Left 09/23/2022    Procedure: left thoracic 12-lumbar 1 hemilaminectomy, medial facetectomy, foraminotomy and microdiscectomy;  Surgeon: Suha Kent MD;  Location: [facility]    NECK SURGERY      OPTICAL TRACKING SYSTEM FUSION SPINE POSTERIOR LUMBAR THREE+ LEVELS Bilateral 6/9/2023    Procedure: Exploration of prior lumbar 3- lumbar 5 instrumented fusion with removal and replacement of pedicle screws and extension to sacral 1. Lumbar 3-sacral 1  Left transforaminal lumbar interbody fusion and posterolateral arthrodesis with use of stealth navigation;  Surgeon: Suha Kent MD;  Location: [facility]    ORTHOPEDIC SURGERY  10/01/2010    Cut palm and reattched tendons and nerves in left hand forefinger.    VA ESOPHAGOGASTRODUODENOSCOPY TRANSORAL DIAGNOSTIC N/A 04/30/2021    Procedure: ESOPHAGOGASTRODUODENOSCOPY (EGD);  Surgeon: Souleymane Moreno DO;  Location: [facility];  Service: General    SURGICAL HISTORY OF -   01/04/2000    Esophagogastroduodenoscopy with biopsy    TUBAL LIGATION         CURRENT MEDS:    Current Outpatient Medications:     acetaminophen (TYLENOL) 500 MG tablet, Take 1,000 mg by  "mouth every 6 hours as needed for mild pain, Disp: , Rfl:     aspirin 81 MG EC tablet, Take 81 mg by mouth daily, Disp: , Rfl:     atorvastatin (LIPITOR) 40 MG tablet, Take 1 tablet (40 mg) by mouth daily., Disp: 90 tablet, Rfl: 3    celecoxib (CELEBREX) 100 MG capsule, Take 1 capsule by mouth twice daily, Disp: 180 capsule, Rfl: 0    clobetasol (TEMOVATE) 0.05 % external ointment, Apply topically 2 times daily To affected areas, Disp: 60 g, Rfl: 0    clobetasol propionate (CLOBEX) 0.05 % external shampoo, Apply thin film to dry scalp once daily (maximum dose: 50 g/week or 50 mL/week); leave in place for 15 minutes, then add water, lather, and rinse thoroughly. Limit treatment to 4 consecutive weeks., Disp: 118 mL, Rfl: 1    fish oil-omega-3 fatty acids 1000 MG capsule, Take 1 g by mouth daily, Disp: , Rfl:     hydrOXYzine jose (VISTARIL) 25 MG capsule, Take 1 capsule (25 mg) by mouth 3 times daily as needed for other (nausea and vomiting) (Patient not taking: Reported on 3/25/2025), Disp: 15 capsule, Rfl: 0    methocarbamol (ROBAXIN) 750 MG tablet, Take 1 tablet (750 mg) by mouth 4 times daily as needed for muscle spasms, Disp: 42 tablet, Rfl: 0    metoprolol succinate ER (TOPROL XL) 50 MG 24 hr tablet, Take 12.5 mg by mouth daily. (Patient not taking: Reported on 3/25/2025), Disp: , Rfl:     Multiple Vitamins-Minerals (HAIR/SKIN/NAILS) TABS, Take 1 tablet by mouth daily. (Patient not taking: Reported on 3/25/2025), Disp: , Rfl:     multivitamin w/minerals (THERA-VIT-M) tablet, Take 1 tablet by mouth daily. (Patient not taking: Reported on 3/25/2025), Disp: , Rfl:     nitroGLYcerin (NITROSTAT) 0.4 MG sublingual tablet, One tablet under the tongue every 5 minutes if needed for chest pain. May repeat every 5 minutes for a maximum of 3 doses in 15 minutes\", Disp: 25 tablet, Rfl: 3    omeprazole (PRILOSEC) 20 MG DR capsule, TAKE 1 CAPSULE BY MOUTH THREE TIMES DAILY, Disp: 270 capsule, Rfl: 0    polyethylene glycol " (MIRALAX) 17 GM/Dose powder, Take 17 g (1 Capful) by mouth daily, Disp: 510 g, Rfl: 0    promethazine (PHENERGAN) 25 MG tablet, Take 1 tablet (25 mg) by mouth every 8 hours as needed for nausea, Disp: 21 tablet, Rfl: 0    Turmeric (QC TUMERIC COMPLEX PO), Take by mouth. (Patient not taking: Reported on 3/25/2025), Disp: , Rfl:     UNABLE TO FIND, Take 1 tablet by mouth At Bedtime MEDICATION NAME: Qunol Sleep - melatonin 5 mg, Ashwagandha, L-Theanine, Disp: , Rfl:     Vitamin D (Cholecalciferol) 25 MCG (1000 UT) TABS, Take 1,000 Units by mouth daily, Disp: , Rfl:     Family History   Problem Relation Age of Onset    C.A.D. Father 50        50's    Diabetes Father     Diabetes Brother     C.A.D. Brother 42        quad bypass and MI    Diabetes Brother         2 brothers have DM    Cancer Brother 34        Melanoma    Aortic aneurysm Brother     Allergies Son         Meds    Allergies Daughter         Med    Cancer Mother     C.A.D. Brother 38        MI age 38    Diabetes Mother     Diabetes Brother         reports that she has been smoking cigarettes. She started smoking about 31 years ago. She has a 15 pack-year smoking history. She has never used smokeless tobacco. She reports that she does not currently use alcohol. She reports that she does not currently use drugs.    Review of Systems:  The 12 system review is within normal limits except for as mentioned above.  General ROS: No complaints or constitutional symptoms  Ophthalmic ROS: No complaints of visual changes  Skin: No complaints or symptoms   Endocrine: No complaints or symptoms  Hematologic/Lymphatic: No symptoms or complaints  Psychiatric: No symptoms or complaints  Respiratory ROS: no cough, shortness of breath, or wheezing  Cardiovascular ROS: no chest pain or dyspnea on exertion  Gastrointestinal ROS: As per HPI  Genito-Urinary ROS: no dysuria, trouble voiding, or hematuria  Musculoskeletal ROS: no joint or muscle pain  Neurological ROS: no TIA or  stroke symptoms      EXAM:  BP (!) 140/84 (BP Location: Right arm, Patient Position: Sitting, Cuff Size: Adult Regular)   Wt 72.1 kg (159 lb)   LMP 04/10/2016   BMI 32.11 kg/m    GENERAL: Well developed female, No acute distress, pleasant and conversant   EYES: Pupils equal, round and reactive, no scleral icterus  ABDOMEN: Soft, non-tender, no masses, non-distended  SKIN: Pink, warm and dry, no obvious rashes or lesions   NEURO:No focal deficits, ambulatory  MUSCULOSKELETAL:No obvious deformities, no swelling, normal appearing      LABS:  Lab Results   Component Value Date    WBC 10.7 01/22/2024    WBC 9.1 04/19/2021    HGB 17.0 01/22/2024    HGB 14.2 04/19/2021    HCT 49.5 01/22/2024    HCT 43.9 04/19/2021    MCV 88 01/22/2024    MCV 92 04/19/2021     01/22/2024     04/19/2021     INR/Prothrombin Time  @LABRCNTIP(NA,K,CL,co2,bun,creatinine,labglom,glucose,calcium)@  Lab Results   Component Value Date    ALT 45 01/22/2024    AST 29 01/22/2024    ALKPHOS 118 01/22/2024       IMAGES:   Relevant images were reviewed and discussed with the patient.  Notable findings were: Endoscopy reports reviewed    Assessment/Plan:   Selina Parikh is a 59 year old female with signs and symptoms consistent with hiatal hernia and significant gastroesophageal reflux disease with Cruz's esophagus.  I have explained the pathophysiology of GERD and Cruz's esophagus in detail as well as the surgical versus non-operative management strategies.      At this point we will proceed with continued initial work-up.  This will include repeat endoscopy as well as an esophagram.  Once these are complete we can discuss surgical options and the risk surrounding surgery given her other medical comorbidities.    Souleymane Moreno DO FACS  Foregut and Acute Care Surgery  [phone]  [facility] Department of Surgery

## 2025-03-31 NOTE — PROGRESS NOTES
HPI:  Selina Parikh is a 59 year old female with a rectocele.  She does have hx of constipation.  She has some splinting.  She has a hx of colon resection for diverticulitis.    Reviewed previous notes from Dr. Solo from 1/26/24    Exam:  LMP 04/10/2016   GENERAL: alert and no distress  EYES: Eyes grossly normal to inspection.  No discharge or erythema, or obvious scleral/conjunctival abnormalities.  RESP: No audible wheeze, cough, or visible cyanosis.    SKIN: Visible skin clear. No significant rash, abnormal pigmentation or lesions.  NEURO: Cranial nerves grossly intact.  Mentation and speech appropriate for age.  PSYCH: Appropriate affect, tone, and pace of words    Review of Imaging:  The following imaging exams were reviewed by me and discussed with patient:  CT abd/pelvis 1/22/24      Review of Labs:  The following labs were reviewed by me and discussed with the patient:  Lab Results   Component Value Date    WBC 10.7 01/22/2024    WBC 9.1 04/19/2021     Lab Results   Component Value Date    RBC 5.63 01/22/2024    RBC 4.78 04/19/2021     Lab Results   Component Value Date    HGB 17.0 01/22/2024    HGB 14.2 04/19/2021     Lab Results   Component Value Date    HCT 49.5 01/22/2024    HCT 43.9 04/19/2021     Lab Results   Component Value Date    MCV 88 01/22/2024    MCV 92 04/19/2021     Lab Results   Component Value Date    MCH 30.2 01/22/2024    MCH 29.7 04/19/2021     Lab Results   Component Value Date    MCHC 34.3 01/22/2024    MCHC 32.3 04/19/2021     Lab Results   Component Value Date    RDW 15.3 01/22/2024    RDW 14.7 04/19/2021     Lab Results   Component Value Date     01/22/2024     04/19/2021        Last Comprehensive Metabolic Panel:  Sodium   Date Value Ref Range Status   01/25/2024 140 135 - 145 mmol/L Final     Comment:     Reference intervals for this test were updated on 09/26/2023 to more accurately reflect our healthy population. There may be differences in the flagging of  prior results with similar values performed with this method. Interpretation of those prior results can be made in the context of the updated reference intervals.    04/19/2021 142 133 - 144 mmol/L Final     Potassium   Date Value Ref Range Status   01/25/2024 4.0 3.4 - 5.3 mmol/L Final   06/10/2023 4.8 3.5 - 5.0 mmol/L Final   04/19/2021 4.0 3.4 - 5.3 mmol/L Final     Comment:     Specimen slightly hemolyzed, potassium may be falsely elevated     Chloride   Date Value Ref Range Status   01/25/2024 104 98 - 107 mmol/L Final   06/10/2023 106 98 - 107 mmol/L Final   04/19/2021 111 (H) 94 - 109 mmol/L Final     Carbon Dioxide   Date Value Ref Range Status   04/19/2021 27 20 - 32 mmol/L Final     Carbon Dioxide (CO2)   Date Value Ref Range Status   01/25/2024 27 22 - 29 mmol/L Final   06/10/2023 26 22 - 31 mmol/L Final     Anion Gap   Date Value Ref Range Status   01/25/2024 9 7 - 15 mmol/L Final   06/10/2023 7 5 - 18 mmol/L Final   04/19/2021 4 3 - 14 mmol/L Final     Glucose   Date Value Ref Range Status   01/25/2024 88 70 - 99 mg/dL Final   06/10/2023 104 70 - 125 mg/dL Final   04/19/2021 89 70 - 99 mg/dL Final     Urea Nitrogen   Date Value Ref Range Status   01/25/2024 8.0 6.0 - 20.0 mg/dL Final   06/10/2023 17 8 - 22 mg/dL Final   04/19/2021 13 7 - 30 mg/dL Final     Creatinine   Date Value Ref Range Status   01/25/2024 0.73 0.51 - 0.95 mg/dL Final   04/19/2021 0.73 0.52 - 1.04 mg/dL Final     GFR Estimate   Date Value Ref Range Status   01/25/2024 >90 >60 mL/min/1.73m2 Final   04/19/2021 >90 >60 mL/min/[1.73_m2] Final     Comment:     Non  GFR Calc  Starting 12/18/2018, serum creatinine based estimated GFR (eGFR) will be   calculated using the Chronic Kidney Disease Epidemiology Collaboration   (CKD-EPI) equation.       Calcium   Date Value Ref Range Status   01/25/2024 9.4 8.6 - 10.0 mg/dL Final   04/19/2021 8.8 8.5 - 10.1 mg/dL Final     Bilirubin Total   Date Value Ref Range Status   01/22/2024  0.4 <=1.2 mg/dL Final   10/12/2020 0.2 0.2 - 1.3 mg/dL Final     Alkaline Phosphatase   Date Value Ref Range Status   01/22/2024 118 40 - 150 U/L Final     Comment:     Reference intervals for this test were updated on 11/14/2023 to more accurately reflect our healthy population. There may be differences in the flagging of prior results with similar values performed with this method. Interpretation of those prior results can be made in the context of the updated reference intervals.   10/12/2020 94 40 - 150 U/L Final     ALT   Date Value Ref Range Status   01/22/2024 45 0 - 50 U/L Final     Comment:     Reference intervals for this test were updated on 6/12/2023 to more accurately reflect our healthy population. There may be differences in the flagging of prior results with similar values performed with this method. Interpretation of those prior results can be made in the context of the updated reference intervals.     10/12/2020 22 0 - 50 U/L Final     AST   Date Value Ref Range Status   01/22/2024 29 0 - 45 U/L Final     Comment:     Reference intervals for this test were updated on 6/12/2023 to more accurately reflect our healthy population. There may be differences in the flagging of prior results with similar values performed with this method. Interpretation of those prior results can be made in the context of the updated reference intervals.   10/12/2020 14 0 - 45 U/L Final                Color Urine (no units)   Date Value   01/22/2024 Yellow   01/25/2020 Yellow     Appearance Urine (no units)   Date Value   01/22/2024 Clear   01/25/2020 Clear     Glucose Urine (mg/dL)   Date Value   01/22/2024 Negative   01/25/2020 Negative     Bilirubin Urine (no units)   Date Value   01/22/2024 Negative   01/25/2020 Negative     Ketones Urine (mg/dL)   Date Value   01/22/2024 Trace (A)   01/25/2020 5 (A)     Specific Gravity Urine (no units)   Date Value   01/22/2024 1.028   01/25/2020 1.014     pH Urine   Date Value    01/22/2024 6.0   01/25/2020 6.0 pH     Protein Albumin Urine (mg/dL)   Date Value   01/22/2024 30 (A)   01/25/2020 Negative     Urobilinogen Urine (EU/dL)   Date Value   01/06/2010 0.2     Nitrite Urine (no units)   Date Value   01/22/2024 Negative   01/25/2020 Negative     Leukocyte Esterase Urine (no units)   Date Value   01/22/2024 Negative   01/25/2020 Negative          Assessment & Plan   58 y/o female with rectocele and possible rectal prolapse in addition to GSM.  She denies any stress incontinence.  We discussed options for surgical repair which is what she desires because needs to have back fusion and would like this addressed before that, however she has concern for rectal prolapse.  We discussed a referral to colonrectal and gyn to set up for possible joint surgery.  -referral to colorectal to evaluate for rectal prolapse.  -referral to Dr. Mauricio to discuss possible hysterectomy  -f/u with me afterwards for exam in person      Lashell Pacheco MD  [facility]

## 2025-03-31 NOTE — PATIENT INSTRUCTIONS
-referral to colorectal to evaluate for rectal prolapse.  -referral to Dr. Mauricio to discuss possible hysterectomy  -f/u with me afterwards for exam in person

## 2025-03-31 NOTE — LETTER
3/31/2025      Selina Parikh  [address] [address] [address]      Dear Colleague,    Thank you for referring your patient, Selina Parikh, to the [facility] [place]. Please see a copy of my visit note below.    HPI: Selina Parikh is a 59 year old female referred to see me by Sera Solo for evaluation of gastroesophageal reflux disease.  She notes  a several year history of gastroesophageal reflux disease described as significant esophageal burning, regurgitation, inability to lay flat without regurgitating, nighttime cough and difficulty eating.  She has had this for over 20 years and had a hiatal hernia per her reports after the birth of her first child.  There are reports from an endoscopy in 2008 that demonstrated a hiatal hernia.  Most recent endoscopy in 2024 demonstrated persistent hiatal hernia with Cruz's metaplasia.  She was unaware that she had Cruz's metaplasia and has no surveillance plan set in place.  She is fairly miserable and is taking PPIs 3 times a day with minimal to no relief of symptoms.  She also has a another back fusion planned in the future and will be relegated to laying flat in bed.  Because of this she would like to have her hiatal hernia repaired sooner rather than later in order to prevent regurgitation and significant symptoms during that recovery process.    Allergies:Ciprofloxacin, Flagyl [metronidazole], Gabapentin, Zofran [ondansetron], Benadryl [diphenhydramine], Percocet [oxycodone-acetaminophen], and Vicodin [hydrocodone-acetaminophen]    Past Medical History:   Diagnosis Date     Anemia      Antiplatelet or antithrombotic long-term use      Asymptomatic stenosis of left carotid artery      Cancer (H)     melanoma     Coronary artery disease      Hiatal hernia      Hypertension      Irritable bowel syndrome      Other chronic pain      Stented coronary artery        Past Surgical History:   Procedure Laterality Date      BACK SURGERY       CHOLECYSTECTOMY, LAPOROSCOPIC  02/14/2000    Cholecystectomy, Laparoscopic     COLON SURGERY       CV CORONARY ANGIOGRAM N/A 05/06/2022    Procedure: CV CORONARY ANGIOGRAM;  Surgeon: Stefanie Spangler MD;  Location: [facility] CV     CV LEFT HEART CATH N/A 05/06/2022    Procedure: Left Heart Catheterization;  Surgeon: Stefanie Spangler MD;  Location: [facility] CATH LAB CV     CYSTOSCOPY, INSERT LIGHTED STENT URETER(S) N/A 04/10/2018    Procedure: CYSTOSCOPY, INSERT LIGHTED STENT URETER(S);  Lighted Stent Placement,Laparoscopic Assisted Sigmoid Colectomy;  Surgeon: ERVIN Reina MD;  Location: [facility] OR     ENDOVASCULAR CAROTID STENT PLACEMENT Left 4/21/2023    Procedure: Left transcarotid revascularization;  Surgeon: Eveline Manley MD;  [facility]     IR TCAR TRANSCAROTID ARTERY REVASCULARIZATION  4/21/2023     LAPAROSCOPIC ASSISTED COLECTOMY LEFT (DESCENDING) N/A 04/10/2018    Procedure: LAPAROSCOPIC ASSISTED COLECTOMY LEFT (DESCENDING);;  Surgeon: Hill Murray MD;  Location: [facility] OR     LUMBAR DISCECTOMY Left 09/23/2022    Procedure: left thoracic 12-lumbar 1 hemilaminectomy, medial facetectomy, foraminotomy and microdiscectomy;  Surgeon: Suha Kent MD;  Location: [facility] OR     NECK SURGERY       OPTICAL TRACKING SYSTEM FUSION SPINE POSTERIOR LUMBAR THREE+ LEVELS Bilateral 6/9/2023    Procedure: Exploration of prior lumbar 3- lumbar 5 instrumented fusion with removal and replacement of pedicle screws and extension to sacral 1. Lumbar 3-sacral 1  Left transforaminal lumbar interbody fusion and posterolateral arthrodesis with use of stealth navigation;  Surgeon: Suha Kent MD;  Location: [facility] OR     ORTHOPEDIC SURGERY  10/01/2010    Cut palm and reattched tendons and nerves in left hand forefinger.     NH ESOPHAGOGASTRODUODENOSCOPY TRANSORAL DIAGNOSTIC N/A 04/30/2021    Procedure: ESOPHAGOGASTRODUODENOSCOPY (EGD);  Surgeon:  Souleymane Moreno DO;  Location: [facility];  Service: General     SURGICAL HISTORY OF -   01/04/2000    Esophagogastroduodenoscopy with biopsy     TUBAL LIGATION         CURRENT MEDS:    Current Outpatient Medications:      acetaminophen (TYLENOL) 500 MG tablet, Take 1,000 mg by mouth every 6 hours as needed for mild pain, Disp: , Rfl:      aspirin 81 MG EC tablet, Take 81 mg by mouth daily, Disp: , Rfl:      atorvastatin (LIPITOR) 40 MG tablet, Take 1 tablet (40 mg) by mouth daily., Disp: 90 tablet, Rfl: 3     celecoxib (CELEBREX) 100 MG capsule, Take 1 capsule by mouth twice daily, Disp: 180 capsule, Rfl: 0     clobetasol (TEMOVATE) 0.05 % external ointment, Apply topically 2 times daily To affected areas, Disp: 60 g, Rfl: 0     clobetasol propionate (CLOBEX) 0.05 % external shampoo, Apply thin film to dry scalp once daily (maximum dose: 50 g/week or 50 mL/week); leave in place for 15 minutes, then add water, lather, and rinse thoroughly. Limit treatment to 4 consecutive weeks., Disp: 118 mL, Rfl: 1     fish oil-omega-3 fatty acids 1000 MG capsule, Take 1 g by mouth daily, Disp: , Rfl:      hydrOXYzine jose (VISTARIL) 25 MG capsule, Take 1 capsule (25 mg) by mouth 3 times daily as needed for other (nausea and vomiting) (Patient not taking: Reported on 3/25/2025), Disp: 15 capsule, Rfl: 0     methocarbamol (ROBAXIN) 750 MG tablet, Take 1 tablet (750 mg) by mouth 4 times daily as needed for muscle spasms, Disp: 42 tablet, Rfl: 0     metoprolol succinate ER (TOPROL XL) 50 MG 24 hr tablet, Take 12.5 mg by mouth daily. (Patient not taking: Reported on 3/25/2025), Disp: , Rfl:      Multiple Vitamins-Minerals (HAIR/SKIN/NAILS) TABS, Take 1 tablet by mouth daily. (Patient not taking: Reported on 3/25/2025), Disp: , Rfl:      multivitamin w/minerals (THERA-VIT-M) tablet, Take 1 tablet by mouth daily. (Patient not taking: Reported on 3/25/2025), Disp: , Rfl:      nitroGLYcerin (NITROSTAT) 0.4 MG sublingual tablet,  "One tablet under the tongue every 5 minutes if needed for chest pain. May repeat every 5 minutes for a maximum of 3 doses in 15 minutes\", Disp: 25 tablet, Rfl: 3     omeprazole (PRILOSEC) 20 MG DR capsule, TAKE 1 CAPSULE BY MOUTH THREE TIMES DAILY, Disp: 270 capsule, Rfl: 0     polyethylene glycol (MIRALAX) 17 GM/Dose powder, Take 17 g (1 Capful) by mouth daily, Disp: 510 g, Rfl: 0     promethazine (PHENERGAN) 25 MG tablet, Take 1 tablet (25 mg) by mouth every 8 hours as needed for nausea, Disp: 21 tablet, Rfl: 0     Turmeric (QC TUMERIC COMPLEX PO), Take by mouth. (Patient not taking: Reported on 3/25/2025), Disp: , Rfl:      UNABLE TO FIND, Take 1 tablet by mouth At Bedtime MEDICATION NAME: Qunol Sleep - melatonin 5 mg, Ashwagandha, L-Theanine, Disp: , Rfl:      Vitamin D (Cholecalciferol) 25 MCG (1000 UT) TABS, Take 1,000 Units by mouth daily, Disp: , Rfl:     Family History   Problem Relation Age of Onset     C.A.D. Father 50        50's     Diabetes Father      Diabetes Brother      C.A.D. Brother 42        quad bypass and MI     Diabetes Brother         2 brothers have DM     Cancer Brother 34        Melanoma     Aortic aneurysm Brother      Allergies Son         Meds     Allergies Daughter         Med     Cancer Mother      C.A.D. Brother 38        MI age 38     Diabetes Mother      Diabetes Brother         reports that she has been smoking cigarettes. She started smoking about 31 years ago. She has a 15 pack-year smoking history. She has never used smokeless tobacco. She reports that she does not currently use alcohol. She reports that she does not currently use drugs.    Review of Systems:  The 12 system review is within normal limits except for as mentioned above.  General ROS: No complaints or constitutional symptoms  Ophthalmic ROS: No complaints of visual changes  Skin: No complaints or symptoms   Endocrine: No complaints or symptoms  Hematologic/Lymphatic: No symptoms or complaints  Psychiatric: No " symptoms or complaints  Respiratory ROS: no cough, shortness of breath, or wheezing  Cardiovascular ROS: no chest pain or dyspnea on exertion  Gastrointestinal ROS: As per HPI  Genito-Urinary ROS: no dysuria, trouble voiding, or hematuria  Musculoskeletal ROS: no joint or muscle pain  Neurological ROS: no TIA or stroke symptoms      EXAM:  BP (!) 140/84 (BP Location: Right arm, Patient Position: Sitting, Cuff Size: Adult Regular)   Wt 72.1 kg (159 lb)   LMP 04/10/2016   BMI 32.11 kg/m    GENERAL: Well developed female, No acute distress, pleasant and conversant   EYES: Pupils equal, round and reactive, no scleral icterus  ABDOMEN: Soft, non-tender, no masses, non-distended  SKIN: Pink, warm and dry, no obvious rashes or lesions   NEURO:No focal deficits, ambulatory  MUSCULOSKELETAL:No obvious deformities, no swelling, normal appearing      LABS:  Lab Results   Component Value Date    WBC 10.7 01/22/2024    WBC 9.1 04/19/2021    HGB 17.0 01/22/2024    HGB 14.2 04/19/2021    HCT 49.5 01/22/2024    HCT 43.9 04/19/2021    MCV 88 01/22/2024    MCV 92 04/19/2021     01/22/2024     04/19/2021     INR/Prothrombin Time  @LABRCNTIP(NA,K,CL,co2,bun,creatinine,labglom,glucose,calcium)@  Lab Results   Component Value Date    ALT 45 01/22/2024    AST 29 01/22/2024    ALKPHOS 118 01/22/2024       IMAGES:   Relevant images were reviewed and discussed with the patient.  Notable findings were: Endoscopy reports reviewed    Assessment/Plan:   Selina Parikh is a 59 year old female with signs and symptoms consistent with hiatal hernia and significant gastroesophageal reflux disease with Cruz's esophagus.  I have explained the pathophysiology of GERD and Cruz's esophagus in detail as well as the surgical versus non-operative management strategies.      At this point we will proceed with continued initial work-up.  This will include repeat endoscopy as well as an esophagram.  Once these are complete we can discuss  surgical options and the risk surrounding surgery given her other medical comorbidities.    Souleymane Moreno DO [facility]  Foregut and Acute Care Surgery  [phone]  [facility] Department of Surgery      Again, thank you for allowing me to participate in the care of your patient.        Sincerely,        Souleymane Moreon DO    Electronically signed

## 2025-03-31 NOTE — TELEPHONE ENCOUNTER
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  She is wondering what she is to do next.

## 2025-03-31 NOTE — TELEPHONE ENCOUNTER
Forms/Letter Request    Type of form/letter: DMV/Handicap Parking      Is Release of Information needed?: No    Do we have the form/letter: Yes:     Who is the form from? Patient    Where did/will the form come from? Patient or family brought in       When is form/letter needed by: asap    How would you like the form/letter returned:     Patient Notified form requests are processed in 5-7 business days:Yes    Okay to leave a detailed message?: Yes at Cell number on file:    Telephone Information:   Mobile [phone]

## 2025-03-31 NOTE — PROGRESS NOTES
Virtual Visit Details    Type of service:  Video Visit   Video Start Time: 1:28 PM  Video End Time:1:38 PM    Originating Location (pt. Location): Home    Distant Location (provider location):  Off-site  Platform used for Video Visit: Vic

## 2025-03-31 NOTE — NURSING NOTE
Current patient location:  [place]    Is the patient currently in the state of [place]? YES    Visit mode: VIDEO    If the visit is dropped, the patient can be reconnected by:VIDEO VISIT: Text to cell phone:   Telephone Information:   Mobile [phone]       Will anyone else be joining the visit? NO  (If patient encounters technical issues they should call [phone] :[phone])    Are changes needed to the allergy or medication list? No    Are refills needed on medications prescribed by this physician? NO    Rooming Documentation:  Questionnaire(s) completed    Reason for visit: Consult    Loren HERNANDEZ

## 2025-03-31 NOTE — PROGRESS NOTES
HEART CARE ENCOUNTER NOTE      [facility]  [phone]    Primary Care: Sera Solo  Primary Cardiologist: Dr. Ferreira    Assessment/Recommendations   Assessment:  LAD myocardial bridge  Angiogram May 2022 pronounced distal LAD tapering with moderate - distal LAD myocardial bridge  Has exertional abdominal/chest discomfort that she feels is related to her GI issues.  Also shortness of breath but feels this is related to her smoking and deconditioning  HLD  LDL 82 on atorvastatin 40 mg  HTN  Home blood pressure log with SBP in 160s.  Not currently on medication  Preoperative cardiovascular risk assessment:   Angiogram 2022 with moderate distal LAD myocardial bridge but no other disease.  Expressing symptoms of chest pain and BAH that could be anginal although she does have other explanations for these   No significant murmurs on exam, no arrhythmias on ECG   Unclear functional capacity due to limited mobility from back pain but does note significant shortness of breath with stairs.    Given her symptoms that are concerning for possible angina and her unclear functional capacity I would recommend stress testing prior to upcoming surgeries (patient reports needing a spinal fusion surgery and hiatal hernia surgery).  Unavailable to use treadmill due to severe back pain so we will get regadenoson nuclear stress test      Other pertinent medical hx reviewed:  Tobacco use - 1ppd  Carotid artery disease s/p percutaneous intervention of left carotid  Back pain  GERD  Migraines  IBS  Meningioma  hiatal hernia with Cruz's metaplasia     Plan:  Start losartan 25 mg for blood pressure control, repeat BMP in 1 week  Pharmacological nuclear stress test for chest pain and shortness of breath with plans for upcoming surgical procedures    Follow up with me in 3 weeks.      The longitudinal plan of care for the diagnosis(es)/condition(s) as documented were addressed during this visit. Due to the added  complexity in care, I will continue to support Selina in the subsequent management and with ongoing continuity of care.      History of Present Illness/Subjective     Selina Parikh is a 59 year old female with PMHx of myocardial bridge, HLD, HTN, smoker, carotid artery disease with percutaneous intervention of left carotid, migraines, IBS, meningioma, hiatal hernia presenting for follow-up.      She was last seen by Dr. Ferreira on 6/1/2023.  At that time, patient was following up for chest discomfort following a carotid intervention with no evidence of myocardial injury.  Her angiogram showed no significant epicardial CAD with tapering of her distal LAD.  She was asked to increase her physical activity and follow-up for reassessment with plans for possibly tapering off metoprolol.  She was also asked to stop smoking.    Seen in the ED [facility] on 3/30/2025 for headaches and hypertension.  She had no acute neurological deficits.  Her blood pressure trended down while in the ED.  Lab work and CT head were unremarkable.  She was asked to follow-up with cardiology    She notes her blood pressures been elevated in the last few weeks.  She also endorses increasing fatigue.  She is planning to get upcoming endoscopy and possible surgical pair of hiatal hernia and spinal fusion surgery.  She was requesting cardiac clearance for these procedures as well.  She endorses chest discomfort with exertion that she feels is related to her GI issues.  She also reports shortness of breath with exertion but feels this is because she is a smoker and sedentary.    Cardiac Testing     ECG, 1/22/2024: Sinus tachycardia, nonspecific T wave abnormality      Cardiac Cath 5/6/2022:  Angina in a long term smoker with early onset ischemic heart disease in several first degree relatives.  Diffuse pronounced distal vessel tapering, particularly in the LAD. There is a moderate mid-distal LAD myocardial bridge, otherwise  "there is no focal or severe atherosclerosis.  LV EPD 12 mmHg. LV-Ao gradient 7 mmHg by pullback.    Labs:  LDL 82  Creatinine 0.73  Potassium 4  Hemoglobin 17  Platelet 329    Physical Examination    Vitals: BP (!) 134/92 (BP Location: Right arm, Patient Position: Sitting, Cuff Size: Adult Regular)   Pulse 85   Resp 16   Ht 1.499 m (4' 11\")   Wt 72.6 kg (160 lb)   LMP 04/10/2016   BMI 32.32 kg/m      General: Appears older than stated age, in no acute distress. Resting comfortably in exam chair  Skin: No clubbing, no cyanosis.  Eyes: Extra ocular movements intact  Neck: No jugular venous distention, no carotid bruits, carotids have a normal upstroke  Lungs: Clear to auscultation bilaterally, no wheezing or rhonchi.  Heart: Regular rhythm, PMI not displaced, S1, S2 normal, no S3, no S4, no heaves, no rub and no murmur.  Abdomen: Soft, nontender  Extremities: No peripheral edema . Grade 2/4 distal pulses bilaterally.  Neuro: Oriented to person, place and time, alert, cooperative, gait coordinated.    BP Readings from Last 3 Encounters:   04/01/25 (!) 134/92   03/31/25 (!) 140/84   03/20/25 133/73       Pulse Readings from Last 3 Encounters:   04/01/25 85   03/20/25 99   02/26/25 90       Wt Readings from Last 3 Encounters:   04/01/25 72.6 kg (160 lb)   03/31/25 72.1 kg (159 lb)   03/20/25 71.5 kg (157 lb 11.2 oz)         Review of Systems      Please refer above for cardiac ROS details.       History     MEDICAL HISTORY:  Past Medical History:   Diagnosis Date    Anemia     Antiplatelet or antithrombotic long-term use     Asymptomatic stenosis of left carotid artery     Cancer (H)     melanoma    Coronary artery disease     Hiatal hernia     Hypertension     Irritable bowel syndrome     Other chronic pain     Stented coronary artery      SURGICAL HISTORY  Past Surgical History:   Procedure Laterality Date    BACK SURGERY      CHOLECYSTECTOMY, LAPOROSCOPIC  02/14/2000    Cholecystectomy, Laparoscopic    COLON " SURGERY      CV CORONARY ANGIOGRAM N/A 05/06/2022    Procedure: CV CORONARY ANGIOGRAM;  Surgeon: Stefanie Spangler MD;  Location: [facility] CV    CV LEFT HEART CATH N/A 05/06/2022    Procedure: Left Heart Catheterization;  Surgeon: Stefanie Spangler MD;  Location: [facility] CATH LAB CV    CYSTOSCOPY, INSERT LIGHTED STENT URETER(S) N/A 04/10/2018    Procedure: CYSTOSCOPY, INSERT LIGHTED STENT URETER(S);  Lighted Stent Placement,Laparoscopic Assisted Sigmoid Colectomy;  Surgeon: ERVIN Reina MD;  Location: [facility] OR    ENDOVASCULAR CAROTID STENT PLACEMENT Left 4/21/2023    Procedure: Left transcarotid revascularization;  Surgeon: Eveline Manley MD;  Location: [facility]    IR TCAR TRANSCAROTID ARTERY REVASCULARIZATION  4/21/2023    LAPAROSCOPIC ASSISTED COLECTOMY LEFT (DESCENDING) N/A 04/10/2018    Procedure: LAPAROSCOPIC ASSISTED COLECTOMY LEFT (DESCENDING);;  Surgeon: Hill Murray MD;  Location: [facility] OR    LUMBAR DISCECTOMY Left 09/23/2022    Procedure: left thoracic 12-lumbar 1 hemilaminectomy, medial facetectomy, foraminotomy and microdiscectomy;  Surgeon: Suha Kent MD;  Location: [facility]    NECK SURGERY      OPTICAL TRACKING SYSTEM FUSION SPINE POSTERIOR LUMBAR THREE+ LEVELS Bilateral 6/9/2023    Procedure: Exploration of prior lumbar 3- lumbar 5 instrumented fusion with removal and replacement of pedicle screws and extension to sacral 1. Lumbar 3-sacral 1  Left transforaminal lumbar interbody fusion and posterolateral arthrodesis with use of stealth navigation;  Surgeon: Suha Kent MD;  Location: [facility]    ORTHOPEDIC SURGERY  10/01/2010    Cut palm and reattched tendons and nerves in left hand forefinger.    WA ESOPHAGOGASTRODUODENOSCOPY TRANSORAL DIAGNOSTIC N/A 04/30/2021    Procedure: ESOPHAGOGASTRODUODENOSCOPY (EGD);  Surgeon: Souleymane Moreno DO;  [facility];  Service: General    SURGICAL HISTORY OF -   01/04/2000     Esophagogastroduodenoscopy with biopsy    TUBAL LIGATION        FAMILY HISTORY:  Family History   Problem Relation Age of Onset    C.A.D. Father 50        50's    Diabetes Father     Diabetes Brother     C.A.D. Brother 42        quad bypass and MI    Diabetes Brother         2 brothers have DM    Cancer Brother 34        Melanoma    Aortic aneurysm Brother     Allergies Son         Meds    Allergies Daughter         Med    Cancer Mother     C.A.D. Brother 38        MI age 38    Diabetes Mother     Diabetes Brother     FAMILY HISTORY:  Family History   Problem Relation Age of Onset    C.A.D. Father 50        50's    Diabetes Father     Diabetes Brother     C.A.D. Brother 42        quad bypass and MI    Diabetes Brother         2 brothers have DM    Cancer Brother 34        Melanoma    Aortic aneurysm Brother     Allergies Son         Meds    Allergies Daughter         Med    Cancer Mother     C.A.D. Brother 38        MI age 38    Diabetes Mother     Diabetes Brother       SOCIAL HISTORY:  Social History     Socioeconomic History    Marital status:      Spouse name: Not on file    Number of children: Not on file    Years of education: Not on file    Highest education level: Not on file   Occupational History    Not on file   Tobacco Use    Smoking status: Every Day     Current packs/day: 0.00     Average packs/day: 0.5 packs/day for 30.0 years (15.0 ttl pk-yrs)     Types: Cigarettes     Start date: 1993     Last attempt to quit: 2023     Years since quittin.8    Smokeless tobacco: Never    Tobacco comments:     6-10 Cigs a day   Vaping Use    Vaping status: Never Used   Substance and Sexual Activity    Alcohol use: Not Currently     Comment: Alcoholic Drinks/day: 2x year     Drug use: Not Currently    Sexual activity: Not Currently     Partners: Male     Birth control/protection: Surgical   Other Topics Concern    Parent/sibling w/ CABG, MI or angioplasty before 65F 55M? Yes     Comment: brother-  38, MI, brother 42- quad bypass   Social History Narrative    Not on file     Social Drivers of Health     Financial Resource Strain: Low Risk  (1/26/2024)    Financial Resource Strain     Within the past 12 months, have you or your family members you live with been unable to get utilities (heat, electricity) when it was really needed?: No   Food Insecurity: Low Risk  (1/26/2024)    Food Insecurity     Within the past 12 months, did you worry that your food would run out before you got money to buy more?: No     Within the past 12 months, did the food you bought just not last and you didn t have money to get more?: No   Transportation Needs: Low Risk  (1/26/2024)    Transportation Needs     Within the past 12 months, has lack of transportation kept you from medical appointments, getting your medicines, non-medical meetings or appointments, work, or from getting things that you need?: No   Physical Activity: Not on file   Stress: Not on file   Social Connections: Unknown (1/1/2022)    [email or web address]    Social Connections     Frequency of Communication with Friends and Family: Not on file   Interpersonal Safety: Not on file   Housing Stability: Low Risk  (1/26/2024)    Housing Stability     Do you have housing? : Yes     Are you worried about losing your housing?: No    ALLERGIES:  Allergies   Allergen Reactions    Ciprofloxacin Anaphylaxis    Flagyl [Metronidazole] Anaphylaxis    Gabapentin Other (See Comments)     Suicidal thoughts.    Zofran [Ondansetron] Other (See Comments) and GI Disturbance     Causes bloating, moderately uncomfortable, abd pain      Benadryl [Diphenhydramine] Other (See Comments)     Muscle spasms    Percocet [Oxycodone-Acetaminophen] Other (See Comments) and Headache     Goes nuts - nasty mean irritated, can take Dilaudid    Vicodin [Hydrocodone-Acetaminophen] Other (See Comments)     Anxiety-agitation  "           Medications:     Current Outpatient Medications   Medication Sig Dispense Refill    acetaminophen (TYLENOL) 500 MG tablet Take 1,000 mg by mouth 2 times daily.      aspirin 81 MG EC tablet Take 81 mg by mouth daily      atorvastatin (LIPITOR) 40 MG tablet Take 1 tablet (40 mg) by mouth daily. 90 tablet 3    celecoxib (CELEBREX) 100 MG capsule Take 1 capsule by mouth twice daily 180 capsule 0    clobetasol (TEMOVATE) 0.05 % external ointment Apply topically 2 times daily To affected areas (Patient taking differently: Apply topically 2 times daily as needed. To affected areas) 60 g 0    clobetasol propionate (CLOBEX) 0.05 % external shampoo Apply thin film to dry scalp once daily (maximum dose: 50 g/week or 50 mL/week); leave in place for 15 minutes, then add water, lather, and rinse thoroughly. Limit treatment to 4 consecutive weeks. 118 mL 1    fish oil-omega-3 fatty acids 1000 MG capsule Take 1 g by mouth daily      losartan (COZAAR) 25 MG tablet Take 1 tablet (25 mg) by mouth daily. 90 tablet 1    methocarbamol (ROBAXIN) 750 MG tablet Take 1 tablet (750 mg) by mouth 4 times daily as needed for muscle spasms 42 tablet 0    nitroGLYcerin (NITROSTAT) 0.4 MG sublingual tablet One tablet under the tongue every 5 minutes if needed for chest pain. May repeat every 5 minutes for a maximum of 3 doses in 15 minutes\" 25 tablet 3    omeprazole (PRILOSEC) 20 MG DR capsule TAKE 1 CAPSULE BY MOUTH THREE TIMES DAILY 270 capsule 0    promethazine (PHENERGAN) 25 MG tablet Take 1 tablet (25 mg) by mouth every 8 hours as needed for nausea 21 tablet 0    UNABLE TO FIND Take 2 tablets by mouth at bedtime. MEDICATION NAME: Qunol Sleep - melatonin 5 mg, Ashwagandha, L-Theanine      Vitamin D (Cholecalciferol) 25 MCG (1000 UT) TABS Take 1,000 Units by mouth daily             Eliazar Verde PA-C  April 1, 2025    This note was partially generated using Dragon voice recognition system, and there may be some incorrect " words,  spellings, and punctuation that were not noted in checking the note before saving.

## 2025-03-31 NOTE — NURSING NOTE
Reviewed chart.  Orders signed for referral to Colorectal Surgery and Ob/Gyn.    Leidy Foster RN

## 2025-04-01 ENCOUNTER — OFFICE VISIT (OUTPATIENT)
Dept: CARDIOLOGY | Facility: CLINIC | Age: 60
End: 2025-04-01
Payer: COMMERCIAL

## 2025-04-01 ENCOUNTER — TELEPHONE (OUTPATIENT)
Dept: SURGERY | Facility: CLINIC | Age: 60
End: 2025-04-01

## 2025-04-01 VITALS
RESPIRATION RATE: 16 BRPM | SYSTOLIC BLOOD PRESSURE: 134 MMHG | BODY MASS INDEX: 32.25 KG/M2 | HEIGHT: 59 IN | WEIGHT: 160 LBS | HEART RATE: 85 BPM | DIASTOLIC BLOOD PRESSURE: 92 MMHG

## 2025-04-01 DIAGNOSIS — Q24.5 MYOCARDIAL BRIDGE: ICD-10-CM

## 2025-04-01 DIAGNOSIS — Z72.0 TOBACCO ABUSE: ICD-10-CM

## 2025-04-01 DIAGNOSIS — K62.3 RECTAL PROLAPSE: ICD-10-CM

## 2025-04-01 DIAGNOSIS — R07.9 CHEST PAIN, UNSPECIFIED TYPE: ICD-10-CM

## 2025-04-01 DIAGNOSIS — N81.6 RECTOCELE: Primary | ICD-10-CM

## 2025-04-01 DIAGNOSIS — R06.09 DOE (DYSPNEA ON EXERTION): ICD-10-CM

## 2025-04-01 DIAGNOSIS — I10 PRIMARY HYPERTENSION: Primary | ICD-10-CM

## 2025-04-01 PROBLEM — K44.9 HIATAL HERNIA: Status: ACTIVE | Noted: 2021-04-15

## 2025-04-01 PROCEDURE — G2211 COMPLEX E/M VISIT ADD ON: HCPCS

## 2025-04-01 PROCEDURE — 3080F DIAST BP >= 90 MM HG: CPT

## 2025-04-01 PROCEDURE — 3075F SYST BP GE 130 - 139MM HG: CPT

## 2025-04-01 PROCEDURE — 99214 OFFICE O/P EST MOD 30 MIN: CPT

## 2025-04-01 RX ORDER — LOSARTAN POTASSIUM 25 MG/1
25 TABLET ORAL DAILY
Qty: 90 TABLET | Refills: 1 | Status: SHIPPED | OUTPATIENT
Start: 2025-04-01

## 2025-04-01 NOTE — TELEPHONE ENCOUNTER
Spoke with patients to clarify he questions on the procedure scheduled on 5/1. All questions answered.

## 2025-04-01 NOTE — PATIENT INSTRUCTIONS
It was great to see you today! Your care team includes myself and Dr. Ferreira.    Recommendations:  Schedule stress test   Start losartan 25 mg once daily   Repeat labwork in 1 week to recheck kidney function, potassium, liver function   Monitor your blood pressure at home, once per day, about 1-2 hours after taking your morning medications.  Please allow at least 10 minutes of rest prior to checking your blood pressure.  Alternatively, you can take three blood pressure readings ~5 minutes apart and average them.  Keep a home blood pressure log.    Notify the office if you are having shortness of breath or chest pain.    Continue a heart healthy and low sodium diet (plant based or mediterranean diet).  Work on increasing physical activity with an end goal of 150 minutes per week of mild to moderate intensity exercise (brisk walk, biking, swimming)     Follow up with me in 3-4 weeks.      If you have questions, concerns, or new concerning symptoms prior to your next appointment, please contact the Cardiology Nursing team at [phone].    For scheduling issues/changes, please call [phone].

## 2025-04-01 NOTE — TELEPHONE ENCOUNTER
Discussed incidental finding on head CT scan with Dr. Kent. Would recommend brain MRI followed by a follow up appt with Dr. Kent.  Spoke with Selina who voiced agreement.  Diana Holbrook RN, CNRN

## 2025-04-01 NOTE — LETTER
4/1/2025    Sera Solo, NP  [address] [place] [address]    RE: Selina Parikh       Dear Colleague,     I had the pleasure of seeing Selina Parikh in the [facility].  HEART CARE ENCOUNTER NOTE      [facility]  [phone]    Primary Care: Sera Solo  Primary Cardiologist: Dr. Ferreira    Assessment/Recommendations   Assessment:  LAD myocardial bridge  Angiogram May 2022 pronounced distal LAD tapering with moderate - distal LAD myocardial bridge  Has exertional abdominal/chest discomfort that she feels is related to her GI issues.  Also shortness of breath but feels this is related to her smoking and deconditioning  HLD  LDL 82 on atorvastatin 40 mg  HTN  Home blood pressure log with SBP in 160s.  Not currently on medication  Preoperative cardiovascular risk assessment:   Angiogram 2022 with moderate distal LAD myocardial bridge but no other disease.  Expressing symptoms of chest pain and BAH that could be anginal although she does have other explanations for these   No significant murmurs on exam, no arrhythmias on ECG   Unclear functional capacity due to limited mobility from back pain but does note significant shortness of breath with stairs.    Given her symptoms that are concerning for possible angina and her unclear functional capacity I would recommend stress testing prior to upcoming surgeries (patient reports needing a spinal fusion surgery and hiatal hernia surgery).  Unavailable to use treadmill due to severe back pain so we will get regadenoson nuclear stress test      Other pertinent medical hx reviewed:  Tobacco use - 1ppd  Carotid artery disease s/p percutaneous intervention of left carotid  Back pain  GERD  Migraines  IBS  Meningioma  hiatal hernia with Cruz's metaplasia     Plan:  Start losartan 25 mg for blood pressure control, repeat BMP in 1 week  Pharmacological nuclear stress test for chest pain and shortness of breath with plans for  upcoming surgical procedures    Follow up with me in 3 weeks.      The longitudinal plan of care for the diagnosis(es)/condition(s) as documented were addressed during this visit. Due to the added complexity in care, I will continue to support Selina in the subsequent management and with ongoing continuity of care.      History of Present Illness/Subjective     Selina Parikh is a 59 year old female with PMHx of myocardial bridge, HLD, HTN, smoker, carotid artery disease with percutaneous intervention of left carotid, migraines, IBS, meningioma, hiatal hernia presenting for follow-up.      She was last seen by Dr. Ferreira on 6/1/2023.  At that time, patient was following up for chest discomfort following a carotid intervention with no evidence of myocardial injury.  Her angiogram showed no significant epicardial CAD with tapering of her distal LAD.  She was asked to increase her physical activity and follow-up for reassessment with plans for possibly tapering off metoprolol.  She was also asked to stop smoking.    Seen in the ED [facility] on 3/30/2025 for headaches and hypertension.  She had no acute neurological deficits.  Her blood pressure trended down while in the ED.  Lab work and CT head were unremarkable.  She was asked to follow-up with cardiology    She notes her blood pressures been elevated in the last few weeks.  She also endorses increasing fatigue.  She is planning to get upcoming endoscopy and possible surgical pair of hiatal hernia and spinal fusion surgery.  She was requesting cardiac clearance for these procedures as well.  She endorses chest discomfort with exertion that she feels is related to her GI issues.  She also reports shortness of breath with exertion but feels this is because she is a smoker and sedentary.    Cardiac Testing     ECG, 1/22/2024: Sinus tachycardia, nonspecific T wave abnormality      Cardiac Cath 5/6/2022:  Angina in a long term smoker with early  "onset ischemic heart disease in several first degree relatives.  Diffuse pronounced distal vessel tapering, particularly in the LAD. There is a moderate mid-distal LAD myocardial bridge, otherwise there is no focal or severe atherosclerosis.  LV EPD 12 mmHg. LV-Ao gradient 7 mmHg by pullback.    Labs:  LDL 82  Creatinine 0.73  Potassium 4  Hemoglobin 17  Platelet 329    Physical Examination    Vitals: BP (!) 134/92 (BP Location: Right arm, Patient Position: Sitting, Cuff Size: Adult Regular)   Pulse 85   Resp 16   Ht 1.499 m (4' 11\")   Wt 72.6 kg (160 lb)   LMP 04/10/2016   BMI 32.32 kg/m      General: Appears older than stated age, in no acute distress. Resting comfortably in exam chair  Skin: No clubbing, no cyanosis.  Eyes: Extra ocular movements intact  Neck: No jugular venous distention, no carotid bruits, carotids have a normal upstroke  Lungs: Clear to auscultation bilaterally, no wheezing or rhonchi.  Heart: Regular rhythm, PMI not displaced, S1, S2 normal, no S3, no S4, no heaves, no rub and no murmur.  Abdomen: Soft, nontender  Extremities: No peripheral edema . Grade 2/4 distal pulses bilaterally.  Neuro: Oriented to person, place and time, alert, cooperative, gait coordinated.    BP Readings from Last 3 Encounters:   04/01/25 (!) 134/92   03/31/25 (!) 140/84   03/20/25 133/73       Pulse Readings from Last 3 Encounters:   04/01/25 85   03/20/25 99   02/26/25 90       Wt Readings from Last 3 Encounters:   04/01/25 72.6 kg (160 lb)   03/31/25 72.1 kg (159 lb)   03/20/25 71.5 kg (157 lb 11.2 oz)         Review of Systems      Please refer above for cardiac ROS details.       History     MEDICAL HISTORY:  Past Medical History:   Diagnosis Date     Anemia      Antiplatelet or antithrombotic long-term use      Asymptomatic stenosis of left carotid artery      Cancer (H)     melanoma     Coronary artery disease      Hiatal hernia      Hypertension      Irritable bowel syndrome      Other chronic pain      " Stented coronary artery      SURGICAL HISTORY  Past Surgical History:   Procedure Laterality Date     BACK SURGERY       CHOLECYSTECTOMY, LAPOROSCOPIC  02/14/2000    Cholecystectomy, Laparoscopic     COLON SURGERY       CV CORONARY ANGIOGRAM N/A 05/06/2022    Procedure: CV CORONARY ANGIOGRAM;  Surgeon: Stefanie Spangler MD;  Location: [facility] CV     CV LEFT HEART CATH N/A 05/06/2022    Procedure: Left Heart Catheterization;  Surgeon: Stefanie Spangler MD;  Location: [facility] CATH LAB CV     CYSTOSCOPY, INSERT LIGHTED STENT URETER(S) N/A 04/10/2018    Procedure: CYSTOSCOPY, INSERT LIGHTED STENT URETER(S);  Lighted Stent Placement,Laparoscopic Assisted Sigmoid Colectomy;  Surgeon: ERVIN Reina MD;  Location: [facility] OR     ENDOVASCULAR CAROTID STENT PLACEMENT Left 4/21/2023    Procedure: Left transcarotid revascularization;  Surgeon: Eveline Manley MD;  Location: [facility] OR     IR TCAR TRANSCAROTID ARTERY REVASCULARIZATION  4/21/2023     LAPAROSCOPIC ASSISTED COLECTOMY LEFT (DESCENDING) N/A 04/10/2018    Procedure: LAPAROSCOPIC ASSISTED COLECTOMY LEFT (DESCENDING);;  Surgeon: Hill Murray MD;  Location: [facility] OR     LUMBAR DISCECTOMY Left 09/23/2022    Procedure: left thoracic 12-lumbar 1 hemilaminectomy, medial facetectomy, foraminotomy and microdiscectomy;  Surgeon: Suha Kent MD;  Location: [facility] OR     NECK SURGERY       OPTICAL TRACKING SYSTEM FUSION SPINE POSTERIOR LUMBAR THREE+ LEVELS Bilateral 6/9/2023    Procedure: Exploration of prior lumbar 3- lumbar 5 instrumented fusion with removal and replacement of pedicle screws and extension to sacral 1. Lumbar 3-sacral 1  Left transforaminal lumbar interbody fusion and posterolateral arthrodesis with use of stealth navigation;  Surgeon: Suha Kent MD;  Location: [facility] OR     ORTHOPEDIC SURGERY  10/01/2010    Cut palm and reattched tendons and nerves in left hand forefinger.     OR  ESOPHAGOGASTRODUODENOSCOPY TRANSORAL DIAGNOSTIC N/A 2021    Procedure: ESOPHAGOGASTRODUODENOSCOPY (EGD);  Surgeon: Souleymane Moreno DO;  [facility]: [facility];  Service: General     SURGICAL HISTORY OF -   2000    Esophagogastroduodenoscopy with biopsy     TUBAL LIGATION        FAMILY HISTORY:  Family History   Problem Relation Age of Onset     C.A.D. Father 50        50's     Diabetes Father      Diabetes Brother      C.A.D. Brother 42        quad bypass and MI     Diabetes Brother         2 brothers have DM     Cancer Brother 34        Melanoma     Aortic aneurysm Brother      Allergies Son         Meds     Allergies Daughter         Med     Cancer Mother      C.A.D. Brother 38        MI age 38     Diabetes Mother      Diabetes Brother     FAMILY HISTORY:  Family History   Problem Relation Age of Onset     C.A.D. Father 50        50's     Diabetes Father      Diabetes Brother      C.A.D. Brother 42        quad bypass and MI     Diabetes Brother         2 brothers have DM     Cancer Brother 34        Melanoma     Aortic aneurysm Brother      Allergies Son         Meds     Allergies Daughter         Med     Cancer Mother      C.A.D. Brother 38        MI age 38     Diabetes Mother      Diabetes Brother       SOCIAL HISTORY:  Social History     Socioeconomic History     Marital status:      Spouse name: Not on file     Number of children: Not on file     Years of education: Not on file     Highest education level: Not on file   Occupational History     Not on file   Tobacco Use     Smoking status: Every Day     Current packs/day: 0.00     Average packs/day: 0.5 packs/day for 30.0 years (15.0 ttl pk-yrs)     Types: Cigarettes     Start date: 1993     Last attempt to quit: 2023     Years since quittin.8     Smokeless tobacco: Never     Tobacco comments:     6-10 Cigs a day   Vaping Use     Vaping status: Never Used   Substance and Sexual Activity     Alcohol use: Not Currently      Comment: Alcoholic Drinks/day: 2x year      Drug use: Not Currently     Sexual activity: Not Currently     Partners: Male     Birth control/protection: Surgical   Other Topics Concern     Parent/sibling w/ CABG, MI or angioplasty before 65F 55M? Yes     Comment: brother- 38, MI, brother 42- quad bypass   Social History Narrative     Not on file     Social Drivers of Health     Financial Resource Strain: Low Risk  (1/26/2024)    Financial Resource Strain      Within the past 12 months, have you or your family members you live with been unable to get utilities (heat, electricity) when it was really needed?: No   Food Insecurity: Low Risk  (1/26/2024)    Food Insecurity      Within the past 12 months, did you worry that your food would run out before you got money to buy more?: No      Within the past 12 months, did the food you bought just not last and you didn t have money to get more?: No   Transportation Needs: Low Risk  (1/26/2024)    Transportation Needs      Within the past 12 months, has lack of transportation kept you from medical appointments, getting your medicines, non-medical meetings or appointments, work, or from getting things that you need?: No   Physical Activity: Not on file   Stress: Not on file   Social Connections: Unknown (1/1/2022)    Received from [employer] [facility], [employer] [facility]    Social Connections      Frequency of Communication with Friends and Family: Not on file   Interpersonal Safety: Not on file   Housing Stability: Low Risk  (1/26/2024)    Housing Stability      Do you have housing? : Yes      Are you worried about losing your housing?: No    ALLERGIES:  Allergies   Allergen Reactions     Ciprofloxacin Anaphylaxis     Flagyl [Metronidazole] Anaphylaxis     Gabapentin Other (See Comments)     Suicidal thoughts.     Zofran [Ondansetron] Other (See Comments) and GI Disturbance     Causes bloating, moderately uncomfortable, abd  "pain       Benadryl [Diphenhydramine] Other (See Comments)     Muscle spasms     Percocet [Oxycodone-Acetaminophen] Other (See Comments) and Headache     Goes nuts - nasty mean irritated, can take Dilaudid     Vicodin [Hydrocodone-Acetaminophen] Other (See Comments)     Anxiety-agitation            Medications:     Current Outpatient Medications   Medication Sig Dispense Refill     acetaminophen (TYLENOL) 500 MG tablet Take 1,000 mg by mouth 2 times daily.       aspirin 81 MG EC tablet Take 81 mg by mouth daily       atorvastatin (LIPITOR) 40 MG tablet Take 1 tablet (40 mg) by mouth daily. 90 tablet 3     celecoxib (CELEBREX) 100 MG capsule Take 1 capsule by mouth twice daily 180 capsule 0     clobetasol (TEMOVATE) 0.05 % external ointment Apply topically 2 times daily To affected areas (Patient taking differently: Apply topically 2 times daily as needed. To affected areas) 60 g 0     clobetasol propionate (CLOBEX) 0.05 % external shampoo Apply thin film to dry scalp once daily (maximum dose: 50 g/week or 50 mL/week); leave in place for 15 minutes, then add water, lather, and rinse thoroughly. Limit treatment to 4 consecutive weeks. 118 mL 1     fish oil-omega-3 fatty acids 1000 MG capsule Take 1 g by mouth daily       losartan (COZAAR) 25 MG tablet Take 1 tablet (25 mg) by mouth daily. 90 tablet 1     methocarbamol (ROBAXIN) 750 MG tablet Take 1 tablet (750 mg) by mouth 4 times daily as needed for muscle spasms 42 tablet 0     nitroGLYcerin (NITROSTAT) 0.4 MG sublingual tablet One tablet under the tongue every 5 minutes if needed for chest pain. May repeat every 5 minutes for a maximum of 3 doses in 15 minutes\" 25 tablet 3     omeprazole (PRILOSEC) 20 MG DR capsule TAKE 1 CAPSULE BY MOUTH THREE TIMES DAILY 270 capsule 0     promethazine (PHENERGAN) 25 MG tablet Take 1 tablet (25 mg) by mouth every 8 hours as needed for nausea 21 tablet 0     UNABLE TO FIND Take 2 tablets by mouth at bedtime. MEDICATION NAME: Qunol " Sleep - melatonin 5 mg, Ashwagandha, L-Theanine       Vitamin D (Cholecalciferol) 25 MCG (1000 UT) TABS Take 1,000 Units by mouth daily             Eliazar Verde PA-C  April 1, 2025    This note was partially generated using Dragon voice recognition system, and there may be some incorrect words,  spellings, and punctuation that were not noted in checking the note before saving.      Thank you for allowing me to participate in the care of your patient.      Sincerely,     Eliazar Verde PA-C     [facility]  cc:   Glenn Ferreira MD  [address]  [place] [address]

## 2025-04-01 NOTE — PROGRESS NOTES
Please schedule defecography then next available NP with Dr. Wong in [facility] location    Thank you    
Intact

## 2025-04-01 NOTE — TELEPHONE ENCOUNTER
Patient confirmed scheduled appointment:  Date: 4/25/25  Time: 130 pm   Visit type: Imaging   Provider: Radiology   Location: [facility]   Testing/imaging: XR Defecography  Additional notes: ordered by       Patient confirmed scheduled appointment:  Date: 4/28/25  Time: 1030 am   Visit type: New Patient   Provider:    Location: [facility]  Testing/imaging: n/a  Additional notes: Pt being referred by  for rectal prolapse

## 2025-04-02 ENCOUNTER — ANCILLARY PROCEDURE (OUTPATIENT)
Dept: GENERAL RADIOLOGY | Facility: CLINIC | Age: 60
End: 2025-04-02
Attending: SURGERY
Payer: COMMERCIAL

## 2025-04-02 ENCOUNTER — TELEPHONE (OUTPATIENT)
Dept: UROLOGY | Facility: CLINIC | Age: 60
End: 2025-04-02

## 2025-04-02 DIAGNOSIS — M48.062 SPINAL STENOSIS OF LUMBAR REGION WITH NEUROGENIC CLAUDICATION: ICD-10-CM

## 2025-04-02 DIAGNOSIS — Z98.1 S/P LUMBAR FUSION: ICD-10-CM

## 2025-04-02 PROCEDURE — 72083 X-RAY EXAM ENTIRE SPI 4/5 VW: CPT | Performed by: STUDENT IN AN ORGANIZED HEALTH CARE EDUCATION/TRAINING PROGRAM

## 2025-04-02 NOTE — TELEPHONE ENCOUNTER
4/2 Called and left voicemail, provided phone number [phone] to schedule an appointment with Dr. Pacheco on 6/23 at  1145 in person.     [facility]   Orthopedics, Podiatry, Sports Medicine, Ent ,Eye , Audiology, Adult Endocrine & Diabetes, Nutrition & Medication Therapy Management Specialties   [facility] Surgery [facility]      ----- Message from Kyung FOSTER sent at 4/2/2025 11:05 AM CDT -----  Jagdeep Carrnigton,     You can schedule patient for an in-person visit to do an exam with  on 6/23/25 at 11:45 am. I will place that spot on hold.     Thank you,  Kyung Alan MA on 4/2/2025 at 11:06 AM   Cardiothoracic Surgery Progress Note      POD # 2 s/p CABG x 5, AVR      LOS: 2 days      Subjective:  Up in chair, no complaints, no drips     Objective:  Vital Signs  Temp:  [97.8 °F (36.6 °C)-99.9 °F (37.7 °C)] 98.3 °F (36.8 °C)  Heart Rate:  [] 84  Resp:  [16-26] 20  BP: ()/(51-82) 99/62  Arterial Line BP: ()/(40-92) 127/56    Physical Exam:   General Appearance: alert, appears stated age and cooperative   Lungs: clear to auscultation, respirations regular, respirations even, and respirations unlabored   Heart: regular rhythm & normal rate, normal S1, S2, no murmur, no gallop, no rub, and no click   Skin: Incision c/d/i    Output by Drain (mL) 09/04/24 0701 - 09/04/24 1900 09/04/24 1901 - 09/05/24 0700 09/05/24 0701 - 09/05/24 0710 Range Total   Chest Tube 3 Anterior Mediastinal 110 80  190   Y Chest Tube 1 and 2 Right Pleural 2 Fr. Left Pleural 110 80  190        Results:    Results from last 7 days   Lab Units 09/05/24  0421   WBC 10*3/mm3 14.16*   HEMOGLOBIN g/dL 11.1*   HEMATOCRIT % 33.0*   PLATELETS 10*3/mm3 98*     Results from last 7 days   Lab Units 09/05/24  0454   SODIUM mmol/L 131*   POTASSIUM mmol/L 4.4   CHLORIDE mmol/L 98   CO2 mmol/L 20.0*   BUN mg/dL 18   CREATININE mg/dL 0.67*   GLUCOSE mg/dL 182*   CALCIUM mg/dL 8.4*       Assessment:  POD # 2 s/p CABG x 5, AVR     Plan:  D/C chest tubes and wires  Pulmonary toilet  Ambulate  D/C central line  Transfer to telemetry    Frankie Michaud MD  09/05/24  07:10 EDT

## 2025-04-02 NOTE — TELEPHONE ENCOUNTER
Diagnosis, Referred by & from: Rectal Prolapse   Appt date: 4/28/2025   NOTES STATUS DETAILS   OFFICE NOTE from referring provider Internal [facility] - :  3/31/25 - URO OV with Dr. Pacheco   OFFICE NOTE from other specialist Internal / Care Everywhere [facility]:  3/31/25 - GEN SURG OV with Dr. Moreno    [facility]:  3/25/25, 1/26/24 - PCC OV with Sera Solo NP    [facility]:  11/29/16 - GEN SURG OV with Dr. Jimenez   DISCHARGE SUMMARY from hospital N/A    DISCHARGE REPORT from the ER Internal [facility]:  1/25/24 - ED OV with Dr. Alvares  1/22/24 - ED OV with Dr. Bean   OPERATIVE REPORT Internal [facility]:  4/10/18 - OP Note for Lighted Stent Placement,Laparoscopic Assisted Sigmoid Colectomy with Dr. Reina   MEDICATION LIST Internal    LABS     BIOPSIES/PATHOLOGY RELATED TO DIAGNOSIS Care Everywhere / Internal [facility]:  10/31/18 - Colon Biopsy (Case: [id number])    [facility]:  4/10/18 - Colon Biopsy (Case: [id number])   DIAGNOSTIC PROCEDURES     COLONOSCOPY (most recent all time after 5 years) Care Everywhere [facility]:  10/31/18 - Colonoscopy  4/15/13 - Colonoscopy   UPPER ENDOSCOPY (EGD) Received / Internal [facility]:  3/8/24 - EGD    [facility]:  4/30/21 - EGD   IMAGING (DISC & REPORT)      CT Internal [facility]:  1/22/24 - CT Abd/Pelvis   XRAY Internal [facility]:  ([facility]) 4/25/25 - XR Defacography  11/1/23 - XR Pelvis

## 2025-04-07 ENCOUNTER — HOSPITAL ENCOUNTER (OUTPATIENT)
Dept: NUCLEAR MEDICINE | Facility: HOSPITAL | Age: 60
Discharge: HOME | End: 2025-04-07
Payer: COMMERCIAL

## 2025-04-07 DIAGNOSIS — Q24.5 MYOCARDIAL BRIDGE: ICD-10-CM

## 2025-04-07 DIAGNOSIS — R07.9 CHEST PAIN, UNSPECIFIED TYPE: ICD-10-CM

## 2025-04-07 DIAGNOSIS — R06.09 DOE (DYSPNEA ON EXERTION): ICD-10-CM

## 2025-04-07 PROCEDURE — 78452 HT MUSCLE IMAGE SPECT MULT: CPT

## 2025-04-07 PROCEDURE — 343N000001 HC RX 343 MED OP 636

## 2025-04-07 PROCEDURE — 999N000248 HC STATISTIC IV INSERT WITH US BY RN

## 2025-04-07 PROCEDURE — A9500 TC99M SESTAMIBI: HCPCS

## 2025-04-07 PROCEDURE — 78452 HT MUSCLE IMAGE SPECT MULT: CPT | Mod: 26 | Performed by: INTERNAL MEDICINE

## 2025-04-07 PROCEDURE — 93017 CV STRESS TEST TRACING ONLY: CPT

## 2025-04-07 PROCEDURE — 93018 CV STRESS TEST I&R ONLY: CPT | Performed by: INTERNAL MEDICINE

## 2025-04-07 RX ADMIN — Medication 8.7 MILLICURIE: at 13:20

## 2025-04-08 ENCOUNTER — HOSPITAL ENCOUNTER (OUTPATIENT)
Dept: CARDIOLOGY | Facility: HOSPITAL | Age: 60
Discharge: HOME | End: 2025-04-08
Payer: COMMERCIAL

## 2025-04-08 ENCOUNTER — HOSPITAL ENCOUNTER (OUTPATIENT)
Dept: NUCLEAR MEDICINE | Facility: HOSPITAL | Age: 60
Discharge: HOME | End: 2025-04-08
Payer: COMMERCIAL

## 2025-04-08 LAB
CV STRESS CURRENT BP HE: NORMAL
CV STRESS CURRENT HR HE: 104
CV STRESS CURRENT HR HE: 104
CV STRESS CURRENT HR HE: 105
CV STRESS CURRENT HR HE: 106
CV STRESS CURRENT HR HE: 107
CV STRESS CURRENT HR HE: 108
CV STRESS CURRENT HR HE: 109
CV STRESS CURRENT HR HE: 110
CV STRESS CURRENT HR HE: 118
CV STRESS CURRENT HR HE: 118
CV STRESS CURRENT HR HE: 119
CV STRESS CURRENT HR HE: 119
CV STRESS CURRENT HR HE: 82
CV STRESS CURRENT HR HE: 83
CV STRESS CURRENT HR HE: 88
CV STRESS DEVIATION TIME HE: NORMAL
CV STRESS ECHO PERCENT HR HE: NORMAL
CV STRESS EXERCISE STAGE HE: NORMAL
CV STRESS FINAL RESTING BP HE: NORMAL
CV STRESS FINAL RESTING HR HE: 104
CV STRESS MAX HR HE: 120
CV STRESS MAX TREADMILL GRADE HE: 0
CV STRESS MAX TREADMILL SPEED HE: 0
CV STRESS PEAK DIA BP HE: NORMAL
CV STRESS PEAK SYS BP HE: NORMAL
CV STRESS PHASE HE: NORMAL
CV STRESS PROTOCOL HE: NORMAL
CV STRESS RESTING PT POSITION HE: NORMAL
CV STRESS ST DEVIATION AMOUNT HE: NORMAL
CV STRESS ST DEVIATION ELEVATION HE: NORMAL
CV STRESS ST EVELATION AMOUNT HE: NORMAL
CV STRESS TEST TYPE HE: NORMAL
CV STRESS TOTAL STAGE TIME MIN 1 HE: NORMAL
NUC STRESS EJECTION FRACTION: 69 %
RATE PRESSURE PRODUCT: NORMAL
STRESS ECHO BASELINE DIASTOLIC HE: 72
STRESS ECHO BASELINE HR: 82
STRESS ECHO BASELINE SYSTOLIC BP: 133
STRESS ECHO CALCULATED PERCENT HR: 75 %
STRESS ECHO LAST STRESS DIASTOLIC BP: 72
STRESS ECHO LAST STRESS HR: 118
STRESS ECHO LAST STRESS SYSTOLIC BP: 138
STRESS ECHO TARGET HR: 161

## 2025-04-08 PROCEDURE — A9500 TC99M SESTAMIBI: HCPCS

## 2025-04-08 PROCEDURE — 343N000001 HC RX 343 MED OP 636

## 2025-04-08 PROCEDURE — 93016 CV STRESS TEST SUPVJ ONLY: CPT | Performed by: INTERNAL MEDICINE

## 2025-04-08 PROCEDURE — 250N000011 HC RX IP 250 OP 636: Mod: JZ

## 2025-04-08 RX ORDER — AMINOPHYLLINE 25 MG/ML
50-100 INJECTION, SOLUTION INTRAVENOUS
Status: DISCONTINUED | OUTPATIENT
Start: 2025-04-08 | End: 2025-04-09 | Stop reason: HOSPADM

## 2025-04-08 RX ORDER — REGADENOSON 0.08 MG/ML
0.4 INJECTION, SOLUTION INTRAVENOUS ONCE
Status: COMPLETED | OUTPATIENT
Start: 2025-04-08 | End: 2025-04-08

## 2025-04-08 RX ADMIN — REGADENOSON 0.4 MG: 0.08 INJECTION, SOLUTION INTRAVENOUS at 13:21

## 2025-04-08 RX ADMIN — Medication 30.5 MILLICURIE: at 13:52

## 2025-04-09 ENCOUNTER — HOSPITAL ENCOUNTER (OUTPATIENT)
Dept: RADIOLOGY | Facility: HOSPITAL | Age: 60
Discharge: HOME | End: 2025-04-09
Attending: SURGERY
Payer: COMMERCIAL

## 2025-04-09 ENCOUNTER — TELEPHONE (OUTPATIENT)
Dept: NEUROSURGERY | Facility: CLINIC | Age: 60
End: 2025-04-09

## 2025-04-09 ENCOUNTER — HOSPITAL ENCOUNTER (OUTPATIENT)
Dept: MRI IMAGING | Facility: CLINIC | Age: 60
Discharge: HOME | End: 2025-04-09
Attending: SURGERY
Payer: COMMERCIAL

## 2025-04-09 ENCOUNTER — OFFICE VISIT (OUTPATIENT)
Dept: FAMILY MEDICINE | Facility: CLINIC | Age: 60
End: 2025-04-09
Payer: COMMERCIAL

## 2025-04-09 ENCOUNTER — LAB (OUTPATIENT)
Dept: LAB | Facility: HOSPITAL | Age: 60
End: 2025-04-09
Attending: SURGERY
Payer: COMMERCIAL

## 2025-04-09 VITALS
HEIGHT: 59 IN | OXYGEN SATURATION: 96 % | RESPIRATION RATE: 14 BRPM | BODY MASS INDEX: 32.25 KG/M2 | TEMPERATURE: 98.3 F | DIASTOLIC BLOOD PRESSURE: 90 MMHG | SYSTOLIC BLOOD PRESSURE: 140 MMHG | HEART RATE: 88 BPM | WEIGHT: 160 LBS

## 2025-04-09 DIAGNOSIS — G83.4 CAUDA EQUINA COMPRESSION (H): ICD-10-CM

## 2025-04-09 DIAGNOSIS — Z01.818 PREOP GENERAL PHYSICAL EXAM: Primary | ICD-10-CM

## 2025-04-09 DIAGNOSIS — Z72.0 TOBACCO ABUSE: Chronic | ICD-10-CM

## 2025-04-09 DIAGNOSIS — K44.9 HIATAL HERNIA: ICD-10-CM

## 2025-04-09 DIAGNOSIS — D32.9 MENINGIOMA (H): ICD-10-CM

## 2025-04-09 DIAGNOSIS — I10 PRIMARY HYPERTENSION: ICD-10-CM

## 2025-04-09 DIAGNOSIS — Z12.31 VISIT FOR SCREENING MAMMOGRAM: ICD-10-CM

## 2025-04-09 DIAGNOSIS — Z13.220 SCREENING FOR LIPID DISORDERS: ICD-10-CM

## 2025-04-09 DIAGNOSIS — G93.9 BRAIN LESION: ICD-10-CM

## 2025-04-09 PROBLEM — F33.1 MODERATE RECURRENT MAJOR DEPRESSION (H): Status: RESOLVED | Noted: 2025-03-25 | Resolved: 2025-04-09

## 2025-04-09 LAB
ANION GAP SERPL CALCULATED.3IONS-SCNC: 9 MMOL/L (ref 7–15)
BUN SERPL-MCNC: 14.6 MG/DL (ref 8–23)
CALCIUM SERPL-MCNC: 9.7 MG/DL (ref 8.8–10.4)
CHLORIDE SERPL-SCNC: 106 MMOL/L (ref 98–107)
CREAT SERPL-MCNC: 0.78 MG/DL (ref 0.51–0.95)
EGFRCR SERPLBLD CKD-EPI 2021: 87 ML/MIN/1.73M2
GLUCOSE SERPL-MCNC: 99 MG/DL (ref 70–99)
HCO3 SERPL-SCNC: 29 MMOL/L (ref 22–29)
POTASSIUM SERPL-SCNC: 3.9 MMOL/L (ref 3.4–5.3)
SODIUM SERPL-SCNC: 144 MMOL/L (ref 135–145)
TSH SERPL DL<=0.005 MIU/L-ACNC: 2.01 UIU/ML (ref 0.3–4.2)

## 2025-04-09 PROCEDURE — 82374 ASSAY BLOOD CARBON DIOXIDE: CPT

## 2025-04-09 PROCEDURE — 74221 X-RAY XM ESOPHAGUS 2CNTRST: CPT

## 2025-04-09 PROCEDURE — 3080F DIAST BP >= 90 MM HG: CPT | Performed by: NURSE PRACTITIONER

## 2025-04-09 PROCEDURE — 80048 BASIC METABOLIC PNL TOTAL CA: CPT

## 2025-04-09 PROCEDURE — 82565 ASSAY OF CREATININE: CPT

## 2025-04-09 PROCEDURE — 99214 OFFICE O/P EST MOD 30 MIN: CPT | Performed by: NURSE PRACTITIONER

## 2025-04-09 PROCEDURE — 3077F SYST BP >= 140 MM HG: CPT | Performed by: NURSE PRACTITIONER

## 2025-04-09 PROCEDURE — 70553 MRI BRAIN STEM W/O & W/DYE: CPT

## 2025-04-09 PROCEDURE — 84443 ASSAY THYROID STIM HORMONE: CPT

## 2025-04-09 PROCEDURE — 36415 COLL VENOUS BLD VENIPUNCTURE: CPT

## 2025-04-09 PROCEDURE — 250N000013 HC RX MED GY IP 250 OP 250 PS 637: Performed by: SURGERY

## 2025-04-09 PROCEDURE — A9585 GADOBUTROL INJECTION: HCPCS | Performed by: SURGERY

## 2025-04-09 PROCEDURE — 255N000002 HC RX 255 OP 636: Performed by: SURGERY

## 2025-04-09 RX ORDER — GADOBUTROL 604.72 MG/ML
7 INJECTION INTRAVENOUS ONCE
Status: COMPLETED | OUTPATIENT
Start: 2025-04-09 | End: 2025-04-09

## 2025-04-09 RX ADMIN — ANTACID/ANTIFLATULENT 4 G: 380; 550; 10; 10 GRANULE, EFFERVESCENT ORAL at 08:33

## 2025-04-09 RX ADMIN — GADOBUTROL 7 ML: 604.72 INJECTION INTRAVENOUS at 16:23

## 2025-04-09 ASSESSMENT — PATIENT HEALTH QUESTIONNAIRE - PHQ9
10. IF YOU CHECKED OFF ANY PROBLEMS, HOW DIFFICULT HAVE THESE PROBLEMS MADE IT FOR YOU TO DO YOUR WORK, TAKE CARE OF THINGS AT HOME, OR GET ALONG WITH OTHER PEOPLE: VERY DIFFICULT
SUM OF ALL RESPONSES TO PHQ QUESTIONS 1-9: 18
SUM OF ALL RESPONSES TO PHQ QUESTIONS 1-9: 18

## 2025-04-09 NOTE — H&P (VIEW-ONLY)
Preoperative Evaluation  [facility]  [address] [address]  Phone: [phone]  Fax: [phone]  Primary Provider: Sera Solo NP  Pre-op Performing Provider: Sera Solo NP  Apr 9, 2025 4/9/2025   Surgical Information   What procedure is being done? EGD with biopsy   Facility or Hospital where procedure/surgery will be performed: [facility]   Who is doing the procedure / surgery? Dr Moreno   Date of surgery / procedure: 5/1/2025   Time of surgery / procedure: 1   Where do you plan to recover after surgery? at home with family     Fax number for surgical facility: Note does not need to be faxed, will be available electronically in Epic.    Assessment & Plan     The proposed surgical procedure is considered LOW risk.    Preop general physical exam    Hiatal hernia    Primary hypertension  Recently started on Losartan and tolerating well.  Blood pressure remains above goal.  Has upcoming follow up with cardiology and may need further adjustment of Losartan.     Meningioma (H)  Found incidentally on recent CT of the brain.  Scheduled for brain MRI and follow up with neurosurgery.    Tobacco abuse  Currently smoking about 1 ppd of cigarettes.    Visit for screening mammogram  - MA Screen Bilateral w/Jono    Cauda equina compression (H)  Follows with neurosurgery.  Planning on another surgery this year.         Risks and Recommendations  The patient has the following additional risks and recommendations for perioperative complications:  Pulmonary:    - Active nicotine user, advised smoking cessation  Social and Substance:    - Active nicotine user, advised smoking cessation    Preoperative Medication Instructions  Antiplatelet or Anticoagulation Medication Instructions   - aspirin: Discontinue aspirin 7 days prior to procedure to reduce bleeding risk. It should be resumed postoperatively.     Additional Medication Instructions   -  Herbal medications and vitamins: DO NOT TAKE 14 days prior to surgery.   - ACE/ARB/ARNI (lisinopril, enalapril, losartan, valsartan, olmesartan, sacubritril/valsartan) : Continue without modification (e.g., MAC anesthesia, neurosurgery, spine surgery, heart failure, or labile hypertension with risk of hypertension).   - Statins (atorvastatin, simvastatin, pravastatin) : Continue taking on the day of surgery.    - celecoxib (Celebrex): DO NOT TAKE 3 days before surgery. May continue without modification for management of severe pain.    - Topicals: DO NOT TAKE day of surgery.    Recommendation  Approval given to proceed with proposed procedure, without further diagnostic evaluation.    Delfin Marks is a 59 year old, presenting for the following:  Pre-Op Exam    Patient is scheduled for an EGD with biopsy.  History of hiatal hernia.  Experiences GERD, nighttime cough, difficulty eating, and regurgitation when she lays flat at night.  Recently had an XR esophagram that showed a small sliding hiatal hernia.    Patient started on Losartan last week for hypertension by cardiology.  She is tolerating the medication well.  Had a stress test 2 days ago, which appears normal.            4/9/2025   Pre-Op Questionnaire   Have you ever had a heart attack or stroke? No   Have you ever had surgery on your heart or blood vessels, such as a stent placement, a coronary artery bypass, or surgery on an artery in your head, neck, heart, or legs? (!) YES history of endovascular carotid artery stent placement   Do you have chest pain with activity? No   Do you have a history of heart failure? No   Do you currently have a cold, bronchitis or symptoms of other infection? No   Do you have a cough, shortness of breath, or wheezing? No   Do you or anyone in your family have previous history of blood clots? No   Do you or does anyone in your family have a serious bleeding problem such as prolonged bleeding following surgeries or cuts?  No   Have you ever had problems with anemia or been told to take iron pills? (!) YES now resolved   Have you had any abnormal blood loss such as black, tarry or bloody stools, or abnormal vaginal bleeding? No   Have you ever had a blood transfusion? No   Are you willing to have a blood transfusion if it is medically needed before, during, or after your surgery? Yes   Have you or any of your relatives ever had problems with anesthesia? No   Do you have sleep apnea, excessive snoring or daytime drowsiness? (!) YES - will snore when on back   Do you have a CPAP machine? (!) NO    Do you have any artifical heart valves or other implanted medical devices like a pacemaker, defibrillator, or continuous glucose monitor? No   Do you have artificial joints? No   Are you allergic to latex? No     Health Care Directive  Patient does not have a Health Care Directive: Discussed advance care planning with patient; however, patient declined at this time.    Preoperative Review of    reviewed - no record of controlled substances prescribed.      Patient Active Problem List    Diagnosis Date Noted    Cauda equina compression (H) 04/09/2025     Priority: Medium    Lumbar disc herniation 08/24/2023     Priority: Medium    Carotid stenosis 04/21/2023     Priority: Medium    Primary hypertension 05/07/2022     Priority: Medium    Status post coronary angiogram 05/06/2022     Priority: Medium    Coronary artery disease due to lipid rich plaque      Priority: Medium    Chest pain, unspecified type 05/05/2022     Priority: Medium    Acute chest pain 04/28/2022     Priority: Medium    Hiatal hernia 04/15/2021     Priority: Medium     Formatting of this note might be different from the original.  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 On fiber, senna, and MOM. Advised to d/c MOM, start Miralax and titer to regular BM's. Pt has been seen by GI.       Brain syndrome, posttraumatic 03/06/2009     Priority: Medium     Work comp.  Has seen Dr. Ahmadi, [facility] neurolog[facility].  MRIs, EMG unremarkalbe, some foraminal stenosis on C5/C6  Sent her to physiatrist, trigger point injections didn't help.  After some neck traction, had intense unremitting HA, neck pain.  Off/on tingling in arms.  Pain clinic and/or spine surg for more eval probable next steps.    Has failed multiple rescue and preventive HA meds. On no narcotics        Past Medical History:   Diagnosis Date    Anemia     Antiplatelet or antithrombotic long-term use     Asymptomatic stenosis of left carotid artery     Cancer (H)     melanoma    Coronary artery disease     Hiatal hernia     Hypertension     Irritable bowel syndrome     Other chronic pain     Stented coronary artery      Past Surgical History:   Procedure Laterality Date    BACK SURGERY      CHOLECYSTECTOMY, LAPOROSCOPIC  02/14/2000    Cholecystectomy, Laparoscopic    COLON SURGERY      CV CORONARY ANGIOGRAM N/A 05/06/2022    Procedure: CV CORONARY ANGIOGRAM;  Surgeon: Stefanie Spangler MD;  Location: [facility] CATH [facility] CV    CV LEFT HEART CATH N/A 05/06/2022    Procedure: Left Heart Catheterization;  Surgeon: Stefanie Spangler MD;  Location: [facility] CATH LAB CV    CYSTOSCOPY, INSERT LIGHTED STENT  URETER(S) N/A 04/10/2018    Procedure: CYSTOSCOPY, INSERT LIGHTED STENT URETER(S);  Lighted Stent Placement,Laparoscopic Assisted Sigmoid Colectomy;  Surgeon: ERVIN Reina MD;  Location: [facility] OR    ENDOVASCULAR CAROTID STENT PLACEMENT Left 4/21/2023    Procedure: Left transcarotid revascularization;  Surgeon: Eveline Manley MD;  Location: [facility] TCAR TRANSCAROTID ARTERY REVASCULARIZATION  4/21/2023    LAPAROSCOPIC ASSISTED COLECTOMY LEFT (DESCENDING) N/A 04/10/2018    Procedure: LAPAROSCOPIC ASSISTED COLECTOMY LEFT (DESCENDING);;  Surgeon: Hill Murray MD;  Location: [facility] OR    LUMBAR DISCECTOMY Left 09/23/2022    Procedure: left thoracic 12-lumbar 1 hemilaminectomy, medial facetectomy, foraminotomy and microdiscectomy;  Surgeon: Suha Kent MD;  Location: [facility]    NECK SURGERY      OPTICAL TRACKING SYSTEM FUSION SPINE POSTERIOR LUMBAR THREE+ LEVELS Bilateral 6/9/2023    Procedure: Exploration of prior lumbar 3- lumbar 5 instrumented fusion with removal and replacement of pedicle screws and extension to sacral 1. Lumbar 3-sacral 1  Left transforaminal lumbar interbody fusion and posterolateral arthrodesis with use of stealth navigation;  Surgeon: Suha Kent MD;  Location: [facility]    ORTHOPEDIC SURGERY  10/01/2010    Cut palm and reattched tendons and nerves in left hand forefinger.    FL ESOPHAGOGASTRODUODENOSCOPY TRANSORAL DIAGNOSTIC N/A 04/30/2021    Procedure: ESOPHAGOGASTRODUODENOSCOPY (EGD);  Surgeon: Souleymane Moreno DO;  Location: [facility];  Service: General    SURGICAL HISTORY OF -   01/04/2000    Esophagogastroduodenoscopy with biopsy    TUBAL LIGATION       Current Outpatient Medications   Medication Sig Dispense Refill    acetaminophen (TYLENOL) 500 MG tablet Take 1,000 mg by mouth 2 times daily.      aspirin 81 MG EC tablet Take 81 mg by mouth daily      atorvastatin (LIPITOR) 40 MG tablet Take 1 tablet (40 mg) by mouth  "daily. 90 tablet 3    celecoxib (CELEBREX) 100 MG capsule Take 1 capsule by mouth twice daily 180 capsule 0    clobetasol (TEMOVATE) 0.05 % external ointment Apply topically 2 times daily To affected areas (Patient taking differently: Apply topically 2 times daily as needed. To affected areas) 60 g 0    clobetasol propionate (CLOBEX) 0.05 % external shampoo Apply thin film to dry scalp once daily (maximum dose: 50 g/week or 50 mL/week); leave in place for 15 minutes, then add water, lather, and rinse thoroughly. Limit treatment to 4 consecutive weeks. 118 mL 1    fish oil-omega-3 fatty acids 1000 MG capsule Take 1 g by mouth daily      losartan (COZAAR) 25 MG tablet Take 1 tablet (25 mg) by mouth daily. 90 tablet 1    methocarbamol (ROBAXIN) 750 MG tablet Take 1 tablet (750 mg) by mouth 4 times daily as needed for muscle spasms 42 tablet 0    nitroGLYcerin (NITROSTAT) 0.4 MG sublingual tablet One tablet under the tongue every 5 minutes if needed for chest pain. May repeat every 5 minutes for a maximum of 3 doses in 15 minutes\" 25 tablet 3    omeprazole (PRILOSEC) 20 MG DR capsule TAKE 1 CAPSULE BY MOUTH THREE TIMES DAILY 270 capsule 0    promethazine (PHENERGAN) 25 MG tablet Take 1 tablet (25 mg) by mouth every 8 hours as needed for nausea 21 tablet 0    UNABLE TO FIND Take 2 tablets by mouth at bedtime. MEDICATION NAME: Qunol Sleep - melatonin 5 mg, Ashwagandha, L-Theanine      Vitamin D (Cholecalciferol) 25 MCG (1000 UT) TABS Take 1,000 Units by mouth daily         Allergies   Allergen Reactions    Ciprofloxacin Anaphylaxis    Flagyl [Metronidazole] Anaphylaxis    Gabapentin Other (See Comments)     Suicidal thoughts.    Zofran [Ondansetron] Other (See Comments) and GI Disturbance     Causes bloating, moderately uncomfortable, abd pain      Benadryl [Diphenhydramine] Other (See Comments)     Muscle spasms    Percocet [Oxycodone-Acetaminophen] Other (See Comments) and Headache     Goes nuts - nasty mean irritated, " "can take Dilaudid    Vicodin [Hydrocodone-Acetaminophen] Other (See Comments)     Anxiety-agitation        Social History     Tobacco Use    Smoking status: Every Day     Current packs/day: 0.00     Average packs/day: 0.5 packs/day for 30.0 years (15.0 ttl pk-yrs)     Types: Cigarettes     Start date: 1993     Last attempt to quit: 2023     Years since quittin.8    Smokeless tobacco: Never    Tobacco comments:     6-10 Cigs a day   Substance Use Topics    Alcohol use: Not Currently     Comment: Alcoholic Drinks/day: 2x year      Family History   Problem Relation Age of Onset    C.A.D. Father 50        50's    Diabetes Father     Diabetes Brother     C.A.D. Brother 42        quad bypass and MI    Diabetes Brother         2 brothers have DM    Cancer Brother 34        Melanoma    Aortic aneurysm Brother     Allergies Son         Meds    Allergies Daughter         Med    Cancer Mother     C.A.D. Brother 38        MI age 38    Diabetes Mother     Diabetes Brother      History   Drug Use Unknown               Objective    BP (!) 140/90   Pulse 88   Temp 98.3  F (36.8  C) (Temporal)   Resp 14   Ht 1.499 m (4' 11\")   Wt 72.6 kg (160 lb)   LMP 04/10/2016   SpO2 96%   BMI 32.32 kg/m     Estimated body mass index is 32.32 kg/m  as calculated from the following:    Height as of this encounter: 1.499 m (4' 11\").    Weight as of this encounter: 72.6 kg (160 lb).  Physical Exam  GENERAL: alert and no distress  EYES: Eyes grossly normal to inspection, PERRL and conjunctivae and sclerae normal  HENT: ear canals and TM's normal, nose and mouth without ulcers or lesions  NECK: no adenopathy, no asymmetry, masses, or scars  RESP: lungs clear to auscultation - no rales, rhonchi or wheezes  CV: regular rate and rhythm, normal S1 S2, no S3 or S4, no murmur, click or rub, no peripheral edema  ABDOMEN: soft, nontender, no hepatosplenomegaly, no masses and bowel sounds normal  MS: no gross musculoskeletal defects noted, " no edema  SKIN: no suspicious lesions or rashes  NEURO: Normal strength and tone, mentation intact and speech normal  PSYCH: mentation appears normal, affect normal/bright    Recent Labs   Lab Test 04/09/25  0837      POTASSIUM 3.9   CR 0.78        Diagnostics  No labs were ordered during this visit.   No EKG required for low risk surgery (cataract, skin procedure, breast biopsy, etc).    Revised Cardiac Risk Index (RCRI)  The patient has the following serious cardiovascular risks for perioperative complications:   - No serious cardiac risks = 0 points     RCRI Interpretation: 0 points: Class I (very low risk - 0.4% complication rate)         Signed Electronically by: Sera Solo NP  A copy of this evaluation report is provided to the requesting physician.

## 2025-04-09 NOTE — TELEPHONE ENCOUNTER
Date: April 9, 2025    Provider: DR. ALFARO        Reminder call- ldvm for patient to confirm appt w/ DR. ALFARO in [place] @ 9:25 AM

## 2025-04-09 NOTE — PATIENT INSTRUCTIONS
How to Take Your Medication Before Surgery  Preoperative Medication Instructions   Antiplatelet or Anticoagulation Medication Instructions   - aspirin: Discontinue aspirin 7 days prior to procedure to reduce bleeding risk. It should be resumed postoperatively.     Additional Medication Instructions   - Herbal medications and vitamins: DO NOT TAKE 14 days prior to surgery.   - ACE/ARB/ARNI (lisinopril, enalapril, losartan, valsartan, olmesartan, sacubritril/valsartan) : Continue without modification (e.g., MAC anesthesia, neurosurgery, spine surgery, heart failure, or labile hypertension with risk of hypertension).   - Statins (atorvastatin, simvastatin, pravastatin) : Continue taking on the day of surgery.    - celecoxib (Celebrex): DO NOT TAKE 3 days before surgery. May continue without modification for management of severe pain.    - Topicals: DO NOT TAKE day of surgery.       Patient Education   Preparing for Your Surgery  For Adults  Getting started  In most cases, a nurse will call to review your health history and instructions. They will give you an arrival time based on your scheduled surgery time. Please be ready to share:  Your doctor's clinic name and phone number  Your medical, surgical, and anesthesia history  A list of allergies and sensitivities  A list of medicines, including herbal treatments and over-the-counter drugs  Whether the patient has a legal guardian (ask how to send us the papers in advance)  Note: You may not receive a call if you were seen at our PAC (Preoperative Assessment Center).  Please tell us if you're pregnant--or if there's any chance you might be pregnant. Some surgeries may injure a fetus (unborn baby), so they require a pregnancy test. Surgeries that are safe for a fetus don't always need a test, and you can choose whether to have one.   Preparing for surgery  Within 10 to 30 days of surgery: Have a pre-op exam (sometimes called an H&P, or History and Physical). This can be  done at a clinic or pre-operative center.  If you're having a , you may not need this exam. Talk to your care team.  At your pre-op exam, talk to your care team about all medicines you take. (This includes CBD oil and any drugs, such as THC, marijuana, and other forms of cannabis.) If you need to stop any medicine before surgery, ask when to start taking it again.  This is for your safety. Many medicines and drugs can make you bleed too much during surgery. Some change how well surgery (anesthesia) drugs work.  Call your insurance company to let them know you're having surgery. (If you don't have insurance, call [phone].)  Call your clinic if there's any change in your health. This includes a scrape or scratch near the surgery site, or any signs of a cold (sore throat, runny nose, cough, rash, fever).  Eating and drinking guidelines  For your safety: Unless your surgeon tells you otherwise, follow the guidelines below.  Eat and drink as normal until 8 hours before you arrive for surgery. After that, no food or milk. You can spit out gum when you arrive.  Drink clear liquids until 2 hours before you arrive. These are liquids you can see through, like water, Gatorade, and Propel Water. They also include plain black coffee and tea (no cream or milk).  No alcohol for 24 hours before you arrive. The night before surgery, stop any drinks that contain THC.  If your care team tells you to take medicine on the morning of surgery, it's okay to take it with a sip of water. No other medicines or drugs are allowed (including CBD oil)--follow your care team's instructions.  If you have questions the day of surgery, call your hospital or surgery center.   Preventing infection  Shower or bathe the night before and the morning of surgery. Follow the instructions your clinic gave you. (If no instructions, use regular soap.)  Don't shave or clip hair near your surgery site. We'll remove the hair if needed.  Don't smoke  or vape the morning of surgery. No chewing tobacco for 6 hours before you arrive. A nicotine patch is okay. You may spit out nicotine gum when you arrive.  For some surgeries, the surgeon will tell you to fully quit smoking and nicotine.  We will make every effort to keep you safe from infection. We will:  Clean our hands often with soap and water (or an alcohol-based hand rub).  Clean the skin at your surgery site with a special soap that kills germs.  Give you a special gown to keep you warm. (Cold raises the risk of infection.)  Wear hair covers, masks, gowns, and gloves during surgery.  Give antibiotic medicine, if prescribed. Not all surgeries need this medicine.  What to bring on the day of surgery  Photo ID and insurance card  Copy of your health care directive, if you have one  Glasses and hearing aids (bring cases)  You can't wear contacts during surgery  Inhaler and eye drops, if you use them (tell us about these when you arrive)  CPAP machine or breathing device, if you use them  A few personal items, if spending the night  If you have . . .  A pacemaker, ICD (cardiac defibrillator), or other implant: Bring the ID card.  An implanted stimulator: Bring the remote control.  A legal guardian: Bring a copy of the certified (court-stamped) guardianship papers.  Please remove any jewelry, including body piercings. Leave jewelry and other valuables at home.  If you're going home the day of surgery  You must have a responsible adult drive you home. They should stay with you overnight as well.  If you don't have someone to stay with you, and you aren't safe to go home alone, we may keep you overnight. Insurance often won't pay for this.  After surgery  If it's hard to control your pain or you need more pain medicine, please call your surgeon's office.  Questions?   If you have any questions for your care team, list them here:    ____________________________________________________________________________________________________________________________________________________________________________________________________________________________________________________________  For informational purposes only. Not to replace the advice of your health care provider. Copyright   2003, 2019 [facility]. All rights reserved. Clinically reviewed by Yoav Briseno MD. [employer] [id number] - REV 08/24.

## 2025-04-09 NOTE — PROGRESS NOTES
Preoperative Evaluation  [facility]  [address] [address]  Phone: [phone]  Fax: [phone]  Primary Provider: Sera Solo NP  Pre-op Performing Provider: Sera Solo NP  Apr 9, 2025 4/9/2025   Surgical Information   What procedure is being done? EGD with biopsy   Facility or Hospital where procedure/surgery will be performed: [facility]   Who is doing the procedure / surgery? Dr Moreno   Date of surgery / procedure: 5/1/2025   Time of surgery / procedure: 1   Where do you plan to recover after surgery? at home with family     Fax number for surgical facility: Note does not need to be faxed, will be available electronically in Epic.    Assessment & Plan     The proposed surgical procedure is considered LOW risk.    Preop general physical exam    Hiatal hernia    Primary hypertension  Recently started on Losartan and tolerating well.  Blood pressure remains above goal.  Has upcoming follow up with cardiology and may need further adjustment of Losartan.     Meningioma (H)  Found incidentally on recent CT of the brain.  Scheduled for brain MRI and follow up with neurosurgery.    Tobacco abuse  Currently smoking about 1 ppd of cigarettes.    Visit for screening mammogram  - MA Screen Bilateral w/Jono    Cauda equina compression (H)  Follows with neurosurgery.  Planning on another surgery this year.         Risks and Recommendations  The patient has the following additional risks and recommendations for perioperative complications:  Pulmonary:    - Active nicotine user, advised smoking cessation  Social and Substance:    - Active nicotine user, advised smoking cessation    Preoperative Medication Instructions  Antiplatelet or Anticoagulation Medication Instructions   - aspirin: Discontinue aspirin 7 days prior to procedure to reduce bleeding risk. It should be resumed postoperatively.     Additional Medication Instructions   -  Herbal medications and vitamins: DO NOT TAKE 14 days prior to surgery.   - ACE/ARB/ARNI (lisinopril, enalapril, losartan, valsartan, olmesartan, sacubritril/valsartan) : Continue without modification (e.g., MAC anesthesia, neurosurgery, spine surgery, heart failure, or labile hypertension with risk of hypertension).   - Statins (atorvastatin, simvastatin, pravastatin) : Continue taking on the day of surgery.    - celecoxib (Celebrex): DO NOT TAKE 3 days before surgery. May continue without modification for management of severe pain.    - Topicals: DO NOT TAKE day of surgery.    Recommendation  Approval given to proceed with proposed procedure, without further diagnostic evaluation.    Delfin Marks is a 59 year old, presenting for the following:  Pre-Op Exam    Patient is scheduled for an EGD with biopsy.  History of hiatal hernia.  Experiences GERD, nighttime cough, difficulty eating, and regurgitation when she lays flat at night.  Recently had an XR esophagram that showed a small sliding hiatal hernia.    Patient started on Losartan last week for hypertension by cardiology.  She is tolerating the medication well.  Had a stress test 2 days ago, which appears normal.            4/9/2025   Pre-Op Questionnaire   Have you ever had a heart attack or stroke? No   Have you ever had surgery on your heart or blood vessels, such as a stent placement, a coronary artery bypass, or surgery on an artery in your head, neck, heart, or legs? (!) YES history of endovascular carotid artery stent placement   Do you have chest pain with activity? No   Do you have a history of heart failure? No   Do you currently have a cold, bronchitis or symptoms of other infection? No   Do you have a cough, shortness of breath, or wheezing? No   Do you or anyone in your family have previous history of blood clots? No   Do you or does anyone in your family have a serious bleeding problem such as prolonged bleeding following surgeries or cuts?  No   Have you ever had problems with anemia or been told to take iron pills? (!) YES now resolved   Have you had any abnormal blood loss such as black, tarry or bloody stools, or abnormal vaginal bleeding? No   Have you ever had a blood transfusion? No   Are you willing to have a blood transfusion if it is medically needed before, during, or after your surgery? Yes   Have you or any of your relatives ever had problems with anesthesia? No   Do you have sleep apnea, excessive snoring or daytime drowsiness? (!) YES - will snore when on back   Do you have a CPAP machine? (!) NO    Do you have any artifical heart valves or other implanted medical devices like a pacemaker, defibrillator, or continuous glucose monitor? No   Do you have artificial joints? No   Are you allergic to latex? No     Health Care Directive  Patient does not have a Health Care Directive: Discussed advance care planning with patient; however, patient declined at this time.    Preoperative Review of    reviewed - no record of controlled substances prescribed.      Patient Active Problem List    Diagnosis Date Noted    Cauda equina compression (H) 04/09/2025     Priority: Medium    Lumbar disc herniation 08/24/2023     Priority: Medium    Carotid stenosis 04/21/2023     Priority: Medium    Primary hypertension 05/07/2022     Priority: Medium    Status post coronary angiogram 05/06/2022     Priority: Medium    Coronary artery disease due to lipid rich plaque      Priority: Medium    Chest pain, unspecified type 05/05/2022     Priority: Medium    Acute chest pain 04/28/2022     Priority: Medium    Hiatal hernia 04/15/2021     Priority: Medium     Formatting of this note might be different from the original.  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 On fiber, senna, and MOM. Advised to d/c MOM, start Miralax and titer to regular BM's. Pt has been seen by GI.       Brain syndrome, posttraumatic 03/06/2009     Priority: Medium     Work comp.  Has seen Dr. Ahmadi, [facility] neurolog[facility].  MRIs, EMG unremarkalbe, some foraminal stenosis on C5/C6  Sent her to physiatrist, trigger point injections didn't help.  After some neck traction, had intense unremitting HA, neck pain.  Off/on tingling in arms.  Pain clinic and/or spine surg for more eval probable next steps.    Has failed multiple rescue and preventive HA meds. On no narcotics        Past Medical History:   Diagnosis Date    Anemia     Antiplatelet or antithrombotic long-term use     Asymptomatic stenosis of left carotid artery     Cancer (H)     melanoma    Coronary artery disease     Hiatal hernia     Hypertension     Irritable bowel syndrome     Other chronic pain     Stented coronary artery      Past Surgical History:   Procedure Laterality Date    BACK SURGERY      CHOLECYSTECTOMY, LAPOROSCOPIC  02/14/2000    Cholecystectomy, Laparoscopic    COLON SURGERY      CV CORONARY ANGIOGRAM N/A 05/06/2022    Procedure: CV CORONARY ANGIOGRAM;  Surgeon: Stefanie Spangler MD;  Location: [facility] CATH [facility] CV    CV LEFT HEART CATH N/A 05/06/2022    Procedure: Left Heart Catheterization;  Surgeon: Stefanie Spangler MD;  Location: [facility] CATH LAB CV    CYSTOSCOPY, INSERT LIGHTED STENT  URETER(S) N/A 04/10/2018    Procedure: CYSTOSCOPY, INSERT LIGHTED STENT URETER(S);  Lighted Stent Placement,Laparoscopic Assisted Sigmoid Colectomy;  Surgeon: ERVIN Reina MD;  Location: [facility] OR    ENDOVASCULAR CAROTID STENT PLACEMENT Left 4/21/2023    Procedure: Left transcarotid revascularization;  Surgeon: Eveline Manley MD;  Location: [facility] TCAR TRANSCAROTID ARTERY REVASCULARIZATION  4/21/2023    LAPAROSCOPIC ASSISTED COLECTOMY LEFT (DESCENDING) N/A 04/10/2018    Procedure: LAPAROSCOPIC ASSISTED COLECTOMY LEFT (DESCENDING);;  Surgeon: Hill Murray MD;  Location: [facility] OR    LUMBAR DISCECTOMY Left 09/23/2022    Procedure: left thoracic 12-lumbar 1 hemilaminectomy, medial facetectomy, foraminotomy and microdiscectomy;  Surgeon: Suha Kent MD;  Location: [facility]    NECK SURGERY      OPTICAL TRACKING SYSTEM FUSION SPINE POSTERIOR LUMBAR THREE+ LEVELS Bilateral 6/9/2023    Procedure: Exploration of prior lumbar 3- lumbar 5 instrumented fusion with removal and replacement of pedicle screws and extension to sacral 1. Lumbar 3-sacral 1  Left transforaminal lumbar interbody fusion and posterolateral arthrodesis with use of stealth navigation;  Surgeon: Suha Kent MD;  Location: [facility]    ORTHOPEDIC SURGERY  10/01/2010    Cut palm and reattched tendons and nerves in left hand forefinger.    DE ESOPHAGOGASTRODUODENOSCOPY TRANSORAL DIAGNOSTIC N/A 04/30/2021    Procedure: ESOPHAGOGASTRODUODENOSCOPY (EGD);  Surgeon: Souleymane Moreno DO;  [facility]: [facility];  Service: General    SURGICAL HISTORY OF -   01/04/2000    Esophagogastroduodenoscopy with biopsy    TUBAL LIGATION       Current Outpatient Medications   Medication Sig Dispense Refill    acetaminophen (TYLENOL) 500 MG tablet Take 1,000 mg by mouth 2 times daily.      aspirin 81 MG EC tablet Take 81 mg by mouth daily      atorvastatin (LIPITOR) 40 MG tablet Take 1 tablet (40 mg) by mouth  "daily. 90 tablet 3    celecoxib (CELEBREX) 100 MG capsule Take 1 capsule by mouth twice daily 180 capsule 0    clobetasol (TEMOVATE) 0.05 % external ointment Apply topically 2 times daily To affected areas (Patient taking differently: Apply topically 2 times daily as needed. To affected areas) 60 g 0    clobetasol propionate (CLOBEX) 0.05 % external shampoo Apply thin film to dry scalp once daily (maximum dose: 50 g/week or 50 mL/week); leave in place for 15 minutes, then add water, lather, and rinse thoroughly. Limit treatment to 4 consecutive weeks. 118 mL 1    fish oil-omega-3 fatty acids 1000 MG capsule Take 1 g by mouth daily      losartan (COZAAR) 25 MG tablet Take 1 tablet (25 mg) by mouth daily. 90 tablet 1    methocarbamol (ROBAXIN) 750 MG tablet Take 1 tablet (750 mg) by mouth 4 times daily as needed for muscle spasms 42 tablet 0    nitroGLYcerin (NITROSTAT) 0.4 MG sublingual tablet One tablet under the tongue every 5 minutes if needed for chest pain. May repeat every 5 minutes for a maximum of 3 doses in 15 minutes\" 25 tablet 3    omeprazole (PRILOSEC) 20 MG DR capsule TAKE 1 CAPSULE BY MOUTH THREE TIMES DAILY 270 capsule 0    promethazine (PHENERGAN) 25 MG tablet Take 1 tablet (25 mg) by mouth every 8 hours as needed for nausea 21 tablet 0    UNABLE TO FIND Take 2 tablets by mouth at bedtime. MEDICATION NAME: Qunol Sleep - melatonin 5 mg, Ashwagandha, L-Theanine      Vitamin D (Cholecalciferol) 25 MCG (1000 UT) TABS Take 1,000 Units by mouth daily         Allergies   Allergen Reactions    Ciprofloxacin Anaphylaxis    Flagyl [Metronidazole] Anaphylaxis    Gabapentin Other (See Comments)     Suicidal thoughts.    Zofran [Ondansetron] Other (See Comments) and GI Disturbance     Causes bloating, moderately uncomfortable, abd pain      Benadryl [Diphenhydramine] Other (See Comments)     Muscle spasms    Percocet [Oxycodone-Acetaminophen] Other (See Comments) and Headache     Goes nuts - nasty mean irritated, " "can take Dilaudid    Vicodin [Hydrocodone-Acetaminophen] Other (See Comments)     Anxiety-agitation        Social History     Tobacco Use    Smoking status: Every Day     Current packs/day: 0.00     Average packs/day: 0.5 packs/day for 30.0 years (15.0 ttl pk-yrs)     Types: Cigarettes     Start date: 1993     Last attempt to quit: 2023     Years since quittin.8    Smokeless tobacco: Never    Tobacco comments:     6-10 Cigs a day   Substance Use Topics    Alcohol use: Not Currently     Comment: Alcoholic Drinks/day: 2x year      Family History   Problem Relation Age of Onset    C.A.D. Father 50        50's    Diabetes Father     Diabetes Brother     C.A.D. Brother 42        quad bypass and MI    Diabetes Brother         2 brothers have DM    Cancer Brother 34        Melanoma    Aortic aneurysm Brother     Allergies Son         Meds    Allergies Daughter         Med    Cancer Mother     C.A.D. Brother 38        MI age 38    Diabetes Mother     Diabetes Brother      History   Drug Use Unknown               Objective    BP (!) 140/90   Pulse 88   Temp 98.3  F (36.8  C) (Temporal)   Resp 14   Ht 1.499 m (4' 11\")   Wt 72.6 kg (160 lb)   LMP 04/10/2016   SpO2 96%   BMI 32.32 kg/m     Estimated body mass index is 32.32 kg/m  as calculated from the following:    Height as of this encounter: 1.499 m (4' 11\").    Weight as of this encounter: 72.6 kg (160 lb).  Physical Exam  GENERAL: alert and no distress  EYES: Eyes grossly normal to inspection, PERRL and conjunctivae and sclerae normal  HENT: ear canals and TM's normal, nose and mouth without ulcers or lesions  NECK: no adenopathy, no asymmetry, masses, or scars  RESP: lungs clear to auscultation - no rales, rhonchi or wheezes  CV: regular rate and rhythm, normal S1 S2, no S3 or S4, no murmur, click or rub, no peripheral edema  ABDOMEN: soft, nontender, no hepatosplenomegaly, no masses and bowel sounds normal  MS: no gross musculoskeletal defects noted, " no edema  SKIN: no suspicious lesions or rashes  NEURO: Normal strength and tone, mentation intact and speech normal  PSYCH: mentation appears normal, affect normal/bright    Recent Labs   Lab Test 04/09/25  0837      POTASSIUM 3.9   CR 0.78        Diagnostics  No labs were ordered during this visit.   No EKG required for low risk surgery (cataract, skin procedure, breast biopsy, etc).    Revised Cardiac Risk Index (RCRI)  The patient has the following serious cardiovascular risks for perioperative complications:   - No serious cardiac risks = 0 points     RCRI Interpretation: 0 points: Class I (very low risk - 0.4% complication rate)         Signed Electronically by: Sera Solo NP  A copy of this evaluation report is provided to the requesting physician.

## 2025-04-10 ENCOUNTER — OFFICE VISIT (OUTPATIENT)
Dept: NEUROSURGERY | Facility: CLINIC | Age: 60
End: 2025-04-10
Payer: COMMERCIAL

## 2025-04-10 VITALS
SYSTOLIC BLOOD PRESSURE: 146 MMHG | BODY MASS INDEX: 32.3 KG/M2 | OXYGEN SATURATION: 95 % | WEIGHT: 160.2 LBS | HEART RATE: 86 BPM | DIASTOLIC BLOOD PRESSURE: 84 MMHG | HEIGHT: 59 IN

## 2025-04-10 DIAGNOSIS — G93.89 BRAIN MASS: Primary | ICD-10-CM

## 2025-04-10 PROCEDURE — 99213 OFFICE O/P EST LOW 20 MIN: CPT | Performed by: SURGERY

## 2025-04-10 PROCEDURE — 3077F SYST BP >= 140 MM HG: CPT | Performed by: SURGERY

## 2025-04-10 PROCEDURE — 1125F AMNT PAIN NOTED PAIN PRSNT: CPT | Performed by: SURGERY

## 2025-04-10 PROCEDURE — 3079F DIAST BP 80-89 MM HG: CPT | Performed by: SURGERY

## 2025-04-10 ASSESSMENT — PAIN SCALES - GENERAL: PAINLEVEL_OUTOF10: MODERATE PAIN (4)

## 2025-04-10 NOTE — PATIENT INSTRUCTIONS
Recommend review of previous CT brains 2019, 2022, 2023 and current MRI brain at next tumor board. If lesion shows interval growth would recommend treatment including surgical removal.

## 2025-04-10 NOTE — NURSING NOTE
"Selina Parikh is a 59 year old female who presents for:  Chief Complaint   Patient presents with    RECHECK     Patient has pain in the lower back runs down the right leg. Waking up with sonal horses in there legs. Lvl 4.        Initial Vitals:  BP (!) 146/84   Pulse 86   Ht 4' 11\" (1.499 m)   Wt 160 lb 3.2 oz (72.7 kg)   LMP 04/10/2016   SpO2 95%   BMI 32.36 kg/m   Estimated body mass index is 32.36 kg/m  as calculated from the following:    Height as of this encounter: 4' 11\" (1.499 m).    Weight as of this encounter: 160 lb 3.2 oz (72.7 kg).. Body surface area is 1.74 meters squared. BP completed using cuff size: large  Moderate Pain (4)    Nursing Comments:       Rehana Brothers    "

## 2025-04-10 NOTE — LETTER
4/10/2025      Selina Parikh  [address] [facility] [address]      Dear Colleague,    Thank you for referring your patient, Selina Parikh, to the [facility] SPINE AND NEUROSURGERY. Please see a copy of my visit note below.    NEUROSURGERY CONSULTATION NOTE      CONSULTATION ASSESSMENT AND PLAN:    60 yo female who presents with headaches,  double vision nausea, poor appetite, numbness in hands. MRI of the brain demonstrates a 1.5 cm contrast enhancing mas in the left parietal lobe. This was new from 2018 scans. She also has films from 2022 and 2023. We will evaluate all these imaging with radiology at her next tumor board. If shows interval growth would recommend treatment. Final recommendations pending review or all prior imaging.     Suha Kent MD      HISTORY OF PRESENTING ILLNESS:    60 yo female who presents with headaches. 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 She was offered extension of her fusion and decompression. She planned to work on nicotine cessation and proceed at the end of next summer.   Recently seen in the ED for hypertension and headaches. A CT head was done a the time showing left brain lesion.   Double vision for a long time. At times very blurry. This is worse in the AM's. Have seen ophthalmologist and was noted to be OK. Always has nausea and poor appetite. Has long term GERD. Does vomit regularly at night. No hemibody numbness or weakness. Numbness in her bilateral hands. EMG on past carpal tunnel. No confusion.     Past history of melanoma. 30 years ago. Never has had known metastasis.     Past Medical History:   Diagnosis Date     Anemia      Antiplatelet or antithrombotic long-term use      Asymptomatic stenosis of left carotid artery      Cancer (H)     melanoma     Coronary artery disease      Hiatal hernia      Hypertension      Irritable bowel syndrome      Other chronic pain      Stented coronary artery        Past  Surgical History:   Procedure Laterality Date     BACK SURGERY       CHOLECYSTECTOMY, LAPOROSCOPIC  02/14/2000    Cholecystectomy, Laparoscopic     COLON SURGERY       CV CORONARY ANGIOGRAM N/A 05/06/2022    Procedure: CV CORONARY ANGIOGRAM;  Surgeon: Stefanie Spangler MD;  Location: [facility] CV     CV LEFT HEART CATH N/A 05/06/2022    Procedure: Left Heart Catheterization;  Surgeon: Stefanie Spangler MD;  Location: [facility] CATH LAB CV     CYSTOSCOPY, INSERT LIGHTED STENT URETER(S) N/A 04/10/2018    Procedure: CYSTOSCOPY, INSERT LIGHTED STENT URETER(S);  Lighted Stent Placement,Laparoscopic Assisted Sigmoid Colectomy;  Surgeon: ERVIN Reina MD;  Location: [facility] OR     ENDOVASCULAR CAROTID STENT PLACEMENT Left 4/21/2023    Procedure: Left transcarotid revascularization;  Surgeon: Eveline Manley MD;  Location: [facility] OR     IR TCAR TRANSCAROTID ARTERY REVASCULARIZATION  4/21/2023     LAPAROSCOPIC ASSISTED COLECTOMY LEFT (DESCENDING) N/A 04/10/2018    Procedure: LAPAROSCOPIC ASSISTED COLECTOMY LEFT (DESCENDING);;  Surgeon: Hill Murray MD;  Location: [facility] OR     LUMBAR DISCECTOMY Left 09/23/2022    Procedure: left thoracic 12-lumbar 1 hemilaminectomy, medial facetectomy, foraminotomy and microdiscectomy;  Surgeon: Suha Kent MD;  Location: [facility] OR     NECK SURGERY       OPTICAL TRACKING SYSTEM FUSION SPINE POSTERIOR LUMBAR THREE+ LEVELS Bilateral 6/9/2023    Procedure: Exploration of prior lumbar 3- lumbar 5 instrumented fusion with removal and replacement of pedicle screws and extension to sacral 1. Lumbar 3-sacral 1  Left transforaminal lumbar interbody fusion and posterolateral arthrodesis with use of stealth navigation;  Surgeon: Suha Kent MD;  Location: [facility] OR     ORTHOPEDIC SURGERY  10/01/2010    Cut palm and reattched tendons and nerves in left hand forefinger.     FL ESOPHAGOGASTRODUODENOSCOPY TRANSORAL DIAGNOSTIC N/A 04/30/2021     "Procedure: ESOPHAGOGASTRODUODENOSCOPY (EGD);  Surgeon: Souleymane Moreno DO;  Location[facility];  Service: General     SURGICAL HISTORY OF -   01/04/2000    Esophagogastroduodenoscopy with biopsy     TUBAL LIGATION         REVIEW OF SYSTEMS:  See ROS form under media     MEDICATIONS:    Current Outpatient Medications   Medication Sig Dispense Refill     acetaminophen (TYLENOL) 500 MG tablet Take 1,000 mg by mouth 2 times daily.       aspirin 81 MG EC tablet Take 81 mg by mouth daily       atorvastatin (LIPITOR) 40 MG tablet Take 1 tablet (40 mg) by mouth daily. 90 tablet 3     celecoxib (CELEBREX) 100 MG capsule Take 1 capsule by mouth twice daily 180 capsule 0     clobetasol (TEMOVATE) 0.05 % external ointment Apply topically 2 times daily To affected areas (Patient taking differently: Apply topically 2 times daily as needed. To affected areas) 60 g 0     clobetasol propionate (CLOBEX) 0.05 % external shampoo Apply thin film to dry scalp once daily (maximum dose: 50 g/week or 50 mL/week); leave in place for 15 minutes, then add water, lather, and rinse thoroughly. Limit treatment to 4 consecutive weeks. 118 mL 1     fish oil-omega-3 fatty acids 1000 MG capsule Take 1 g by mouth daily       losartan (COZAAR) 25 MG tablet Take 1 tablet (25 mg) by mouth daily. 90 tablet 1     methocarbamol (ROBAXIN) 750 MG tablet Take 1 tablet (750 mg) by mouth 4 times daily as needed for muscle spasms 42 tablet 0     nitroGLYcerin (NITROSTAT) 0.4 MG sublingual tablet One tablet under the tongue every 5 minutes if needed for chest pain. May repeat every 5 minutes for a maximum of 3 doses in 15 minutes\" 25 tablet 3     omeprazole (PRILOSEC) 20 MG DR capsule TAKE 1 CAPSULE BY MOUTH THREE TIMES DAILY 270 capsule 0     promethazine (PHENERGAN) 25 MG tablet Take 1 tablet (25 mg) by mouth every 8 hours as needed for nausea 21 tablet 0     UNABLE TO FIND Take 2 tablets by mouth at bedtime. MEDICATION NAME: Qunol Sleep - melatonin 5 " mg, Ashwagandha, L-Theanine       Vitamin D (Cholecalciferol) 25 MCG (1000 UT) TABS Take 1,000 Units by mouth daily           ALLERGIES/SENSITIVITIES:     Allergies   Allergen Reactions     Ciprofloxacin Anaphylaxis     Flagyl [Metronidazole] Anaphylaxis     Gabapentin Other (See Comments)     Suicidal thoughts.     Zofran [Ondansetron] Other (See Comments) and GI Disturbance     Causes bloating, moderately uncomfortable, abd pain       Benadryl [Diphenhydramine] Other (See Comments)     Muscle spasms     Percocet [Oxycodone-Acetaminophen] Other (See Comments) and Headache     Goes nuts - nasty mean irritated, can take Dilaudid     Vicodin [Hydrocodone-Acetaminophen] Other (See Comments)     Anxiety-agitation       PERTINENT SOCIAL HISTORY:   Social History     Socioeconomic History     Marital status:      Spouse name: None     Number of children: None     Years of education: None     Highest education level: None   Tobacco Use     Smoking status: Every Day     Current packs/day: 0.00     Average packs/day: 0.5 packs/day for 30.0 years (15.0 ttl pk-yrs)     Types: Cigarettes     Start date: 1993     Last attempt to quit: 2023     Years since quittin.8     Smokeless tobacco: Never     Tobacco comments:     6-10 Cigs a day   Vaping Use     Vaping status: Never Used   Substance and Sexual Activity     Alcohol use: Not Currently     Comment: Alcoholic Drinks/day: 2x year      Drug use: Not Currently     Sexual activity: Not Currently     Partners: Male     Birth control/protection: Surgical   Other Topics Concern     Parent/sibling w/ CABG, MI or angioplasty before 65F 55M? Yes     Comment: brother- 38, MI, brother 42- quad bypass     Social Drivers of Health     Financial Resource Strain: Low Risk  (2024)    Financial Resource Strain      Within the past 12 months, have you or your family members you live with been unable to get utilities (heat, electricity) when it was really needed?: No  "  Food Insecurity: Low Risk  (1/26/2024)    Food Insecurity      Within the past 12 months, did you worry that your food would run out before you got money to buy more?: No      Within the past 12 months, did the food you bought just not last and you didn t have money to get more?: No   Transportation Needs: Low Risk  (1/26/2024)    Transportation Needs      Within the past 12 months, has lack of transportation kept you from medical appointments, getting your medicines, non-medical meetings or appointments, work, or from getting things that you need?: No    Received from [facility], [employer] [facility]    Social Connections   Housing Stability: Low Risk  (1/26/2024)    Housing Stability      Do you have housing? : Yes      Are you worried about losing your housing?: No         FAMILY HISTORY:  Family History   Problem Relation Age of Onset     C.A.D. Father 50        50's     Diabetes Father      Diabetes Brother      C.A.D. Brother 42        quad bypass and MI     Diabetes Brother         2 brothers have DM     Cancer Brother 34        Melanoma     Aortic aneurysm Brother      Allergies Son         Meds     Allergies Daughter         Med     Cancer Mother      C.A.D. Brother 38        MI age 38     Diabetes Mother      Diabetes Brother         PHYSICAL EXAM:   Constitutional: BP (!) 146/84   Pulse 86   Ht 1.499 m (4' 11\")   Wt 72.7 kg (160 lb 3.2 oz)   LMP 04/10/2016   SpO2 95%   BMI 32.36 kg/m       Mental Status: A & O in no acute distress.  Affect is appropriate.  Speech is fluent.  Recent and remote memory are intact.  Attention span and concentration are normal.     Cranial Nerves: CN1: grossly intact per patient recall. CN2: No funduscopic exam performed. CN3,4 & 6: Pupillary light response, lateral and vertical gaze normal.  No nystagmus.  Visual fields are full to confrontation. CN5: Intact to touch CN7: No facial weakness, smile, facial " symmetry intact. CN8: Intact to spoken voice. CN9&10: Gag reflex, uvula midline, palate rises with phonation. CN11: Shoulder shrug 5/5 intact bilaterally. CN12: Tongue midline and moves freely from side to side.     Motor: No pronator drift of upper extremity. Normal bulk and tone all muscle groups of upper and lower extremities.    Strength: 5/5 x 4     Sensory: Sensation intact bilaterally to light touch.     Coordination; finger to nose intact.     IMAGING:  I personally reviewed all radiographic images         Cc:   Sera Solo             Again, thank you for allowing me to participate in the care of your patient.        Sincerely,        Suha Kent MD    Electronically signed

## 2025-04-10 NOTE — PROGRESS NOTES
NEUROSURGERY CONSULTATION NOTE      CONSULTATION ASSESSMENT AND PLAN:    60 yo female who presents with headaches,  double vision nausea, poor appetite, numbness in hands. MRI of the brain demonstrates a 1.5 cm contrast enhancing mas in the left parietal lobe. This was new from 2018 scans. She also has films from 2022 and 2023. We will evaluate all these imaging with radiology at her next tumor board. If shows interval growth would recommend treatment. Final recommendations pending review or all prior imaging.     Suha Kent MD      HISTORY OF PRESENTING ILLNESS:    60 yo female who presents with headaches. 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 She was offered extension of her fusion and decompression. She planned to work on nicotine cessation and proceed at the end of next summer.   Recently seen in the ED for hypertension and headaches. A CT head was done a the time showing left brain lesion.   Double vision for a long time. At times very blurry. This is worse in the AM's. Have seen ophthalmologist and was noted to be OK. Always has nausea and poor appetite. Has long term GERD. Does vomit regularly at night. No hemibody numbness or weakness. Numbness in her bilateral hands. EMG on past carpal tunnel. No confusion.     Past history of melanoma. 30 years ago. Never has had known metastasis.     Past Medical History:   Diagnosis Date    Anemia     Antiplatelet or antithrombotic long-term use     Asymptomatic stenosis of left carotid artery     Cancer (H)     melanoma    Coronary artery disease     Hiatal hernia     Hypertension     Irritable bowel syndrome     Other chronic pain     Stented coronary artery        Past Surgical History:   Procedure Laterality Date    BACK SURGERY      CHOLECYSTECTOMY, LAPOROSCOPIC  02/14/2000    Cholecystectomy, Laparoscopic    COLON SURGERY      CV CORONARY ANGIOGRAM N/A 05/06/2022    Procedure: CV CORONARY ANGIOGRAM;  Surgeon:  Stefanie Spangler MD;  Location: [facility] CV    CV LEFT HEART CATH N/A 05/06/2022    Procedure: Left Heart Catheterization;  Surgeon: Stefanie Spangler MD;  Location: [facility] CV    CYSTOSCOPY, INSERT LIGHTED STENT URETER(S) N/A 04/10/2018    Procedure: CYSTOSCOPY, INSERT LIGHTED STENT URETER(S);  Lighted Stent Placement,Laparoscopic Assisted Sigmoid Colectomy;  Surgeon: ERVIN Reina MD;  Location: [facility] OR    ENDOVASCULAR CAROTID STENT PLACEMENT Left 4/21/2023    Procedure: Left transcarotid revascularization;  Surgeon: Eveline Manley MD;  [place] [facility]    IR TCAR TRANSCAROTID ARTERY REVASCULARIZATION  4/21/2023    LAPAROSCOPIC ASSISTED COLECTOMY LEFT (DESCENDING) N/A 04/10/2018    Procedure: LAPAROSCOPIC ASSISTED COLECTOMY LEFT (DESCENDING);;  Surgeon: Hill Murray MD;  Location: [facility] OR    LUMBAR DISCECTOMY Left 09/23/2022    Procedure: left thoracic 12-lumbar 1 hemilaminectomy, medial facetectomy, foraminotomy and microdiscectomy;  Surgeon: Suha Kent MD;  Location: [facility]    NECK SURGERY      OPTICAL TRACKING SYSTEM FUSION SPINE POSTERIOR LUMBAR THREE+ LEVELS Bilateral 6/9/2023    Procedure: Exploration of prior lumbar 3- lumbar 5 instrumented fusion with removal and replacement of pedicle screws and extension to sacral 1. Lumbar 3-sacral 1  Left transforaminal lumbar interbody fusion and posterolateral arthrodesis with use of stealth navigation;  Surgeon: Suha Kent MD;  Location: [facility]    ORTHOPEDIC SURGERY  10/01/2010    Cut palm and reattched tendons and nerves in left hand forefinger.    DC ESOPHAGOGASTRODUODENOSCOPY TRANSORAL DIAGNOSTIC N/A 04/30/2021    Procedure: ESOPHAGOGASTRODUODENOSCOPY (EGD);  Surgeon: Souleymane Moreno DO;  Location: [facility];  Service: General    SURGICAL HISTORY OF -   01/04/2000    Esophagogastroduodenoscopy with biopsy    TUBAL LIGATION         REVIEW OF SYSTEMS:  See ROS form under  "media     MEDICATIONS:    Current Outpatient Medications   Medication Sig Dispense Refill    acetaminophen (TYLENOL) 500 MG tablet Take 1,000 mg by mouth 2 times daily.      aspirin 81 MG EC tablet Take 81 mg by mouth daily      atorvastatin (LIPITOR) 40 MG tablet Take 1 tablet (40 mg) by mouth daily. 90 tablet 3    celecoxib (CELEBREX) 100 MG capsule Take 1 capsule by mouth twice daily 180 capsule 0    clobetasol (TEMOVATE) 0.05 % external ointment Apply topically 2 times daily To affected areas (Patient taking differently: Apply topically 2 times daily as needed. To affected areas) 60 g 0    clobetasol propionate (CLOBEX) 0.05 % external shampoo Apply thin film to dry scalp once daily (maximum dose: 50 g/week or 50 mL/week); leave in place for 15 minutes, then add water, lather, and rinse thoroughly. Limit treatment to 4 consecutive weeks. 118 mL 1    fish oil-omega-3 fatty acids 1000 MG capsule Take 1 g by mouth daily      losartan (COZAAR) 25 MG tablet Take 1 tablet (25 mg) by mouth daily. 90 tablet 1    methocarbamol (ROBAXIN) 750 MG tablet Take 1 tablet (750 mg) by mouth 4 times daily as needed for muscle spasms 42 tablet 0    nitroGLYcerin (NITROSTAT) 0.4 MG sublingual tablet One tablet under the tongue every 5 minutes if needed for chest pain. May repeat every 5 minutes for a maximum of 3 doses in 15 minutes\" 25 tablet 3    omeprazole (PRILOSEC) 20 MG DR capsule TAKE 1 CAPSULE BY MOUTH THREE TIMES DAILY 270 capsule 0    promethazine (PHENERGAN) 25 MG tablet Take 1 tablet (25 mg) by mouth every 8 hours as needed for nausea 21 tablet 0    UNABLE TO FIND Take 2 tablets by mouth at bedtime. MEDICATION NAME: Qunol Sleep - melatonin 5 mg, Ashwagandha, L-Theanine      Vitamin D (Cholecalciferol) 25 MCG (1000 UT) TABS Take 1,000 Units by mouth daily           ALLERGIES/SENSITIVITIES:     Allergies   Allergen Reactions    Ciprofloxacin Anaphylaxis    Flagyl [Metronidazole] Anaphylaxis    Gabapentin Other (See " Comments)     Suicidal thoughts.    Zofran [Ondansetron] Other (See Comments) and GI Disturbance     Causes bloating, moderately uncomfortable, abd pain      Benadryl [Diphenhydramine] Other (See Comments)     Muscle spasms    Percocet [Oxycodone-Acetaminophen] Other (See Comments) and Headache     Goes nuts - nasty mean irritated, can take Dilaudid    Vicodin [Hydrocodone-Acetaminophen] Other (See Comments)     Anxiety-agitation       PERTINENT SOCIAL HISTORY:   Social History     Socioeconomic History    Marital status:      Spouse name: None    Number of children: None    Years of education: None    Highest education level: None   Tobacco Use    Smoking status: Every Day     Current packs/day: 0.00     Average packs/day: 0.5 packs/day for 30.0 years (15.0 ttl pk-yrs)     Types: Cigarettes     Start date: 1993     Last attempt to quit: 2023     Years since quittin.8    Smokeless tobacco: Never    Tobacco comments:     6-10 Cigs a day   Vaping Use    Vaping status: Never Used   Substance and Sexual Activity    Alcohol use: Not Currently     Comment: Alcoholic Drinks/day: 2x year     Drug use: Not Currently    Sexual activity: Not Currently     Partners: Male     Birth control/protection: Surgical   Other Topics Concern    Parent/sibling w/ CABG, MI or angioplasty before 65F 55M? Yes     Comment: brother- 38, MI, brother 42- quad bypass     Social Drivers of Health     Financial Resource Strain: Low Risk  (2024)    Financial Resource Strain     Within the past 12 months, have you or your family members you live with been unable to get utilities (heat, electricity) when it was really needed?: No   Food Insecurity: Low Risk  (2024)    Food Insecurity     Within the past 12 months, did you worry that your food would run out before you got money to buy more?: No     Within the past 12 months, did the food you bought just not last and you didn t have money to get more?: No   Transportation  "Needs: Low Risk  (1/26/2024)    Transportation Needs     Within the past 12 months, has lack of transportation kept you from medical appointments, getting your medicines, non-medical meetings or appointments, work, or from getting things that you need?: No    Received from [facility], [facility]    Social Connections   Housing Stability: Low Risk  (1/26/2024)    Housing Stability     Do you have housing? : Yes     Are you worried about losing your housing?: No         FAMILY HISTORY:  Family History   Problem Relation Age of Onset    C.A.D. Father 50        50's    Diabetes Father     Diabetes Brother     C.A.D. Brother 42        quad bypass and MI    Diabetes Brother         2 brothers have DM    Cancer Brother 34        Melanoma    Aortic aneurysm Brother     Allergies Son         Meds    Allergies Daughter         Med    Cancer Mother     C.A.D. Brother 38        MI age 38    Diabetes Mother     Diabetes Brother         PHYSICAL EXAM:   Constitutional: BP (!) 146/84   Pulse 86   Ht 1.499 m (4' 11\")   Wt 72.7 kg (160 lb 3.2 oz)   LMP 04/10/2016   SpO2 95%   BMI 32.36 kg/m       Mental Status: A & O in no acute distress.  Affect is appropriate.  Speech is fluent.  Recent and remote memory are intact.  Attention span and concentration are normal.     Cranial Nerves: CN1: grossly intact per patient recall. CN2: No funduscopic exam performed. CN3,4 & 6: Pupillary light response, lateral and vertical gaze normal.  No nystagmus.  Visual fields are full to confrontation. CN5: Intact to touch CN7: No facial weakness, smile, facial symmetry intact. CN8: Intact to spoken voice. CN9&10: Gag reflex, uvula midline, palate rises with phonation. CN11: Shoulder shrug 5/5 intact bilaterally. CN12: Tongue midline and moves freely from side to side.     Motor: No pronator drift of upper extremity. Normal bulk and tone all muscle groups of upper and lower " extremities.    Strength: 5/5 x 4     Sensory: Sensation intact bilaterally to light touch.     Coordination; finger to nose intact.     IMAGING:  I personally reviewed all radiographic images         Cc:   Sera Solo

## 2025-04-22 ENCOUNTER — VIRTUAL VISIT (OUTPATIENT)
Dept: NEUROSURGERY | Facility: CLINIC | Age: 60
End: 2025-04-22
Payer: COMMERCIAL

## 2025-04-22 ENCOUNTER — PREP FOR PROCEDURE (OUTPATIENT)
Dept: NEUROLOGY | Facility: CLINIC | Age: 60
End: 2025-04-22

## 2025-04-22 DIAGNOSIS — G93.89 BRAIN MASS: Primary | ICD-10-CM

## 2025-04-22 DIAGNOSIS — D33.0 BENIGN NEOPLASM OF BRAIN, SUPRATENTORIAL (H): Primary | ICD-10-CM

## 2025-04-22 NOTE — NURSING NOTE
"Selina Parikh is a 59 year old female who presents for:  Chief Complaint   Patient presents with    RECHECK     Patient has pain in the lower back running down right leg to knee, to the hip on the left side. Has a headache. Has pain in the back up to the shoulder blades. Alcides horses in her feet. Lvl 5.        Initial Vitals:  LMP 04/10/2016  Estimated body mass index is 32.36 kg/m  as calculated from the following:    Height as of 4/10/25: 4' 11\" (1.499 m).    Weight as of 4/10/25: 160 lb 3.2 oz (72.7 kg).. There is no height or weight on file to calculate BSA. BP completed using cuff size: NA (Not Taken)  Data Unavailable    Nursing Comments: Patient states we are reaching her in [place], [place]. Preferred phone number [phone]    Rehana Brothers    "

## 2025-04-22 NOTE — PROGRESS NOTES
We discussed the brain tumor recommendations from recent review of her imaging.  Her left-sided brain lesion does appear new 2018 and doubled in size since 2023.  Given this recommend removal of her presumed meningioma.  Risks and benefits of resection include but are not limited to infection, fluid collections/hematomas, seizures, new neurological symptoms, strokes, recurrent tumor. She agreed to proceed.    Suha Kent MD

## 2025-04-22 NOTE — LETTER
4/22/2025      Selina Parikh  [address] [facility] [address]      Dear Colleague,    Thank you for referring your patient, Selina Parikh, to the [facility] SPINE AND NEUROSURGERY. Please see a copy of my visit note below.    We discussed the brain tumor recommendations from recent review of her imaging.  Her left-sided brain lesion does appear new 2018 and doubled in size since 2023.  Given this recommend removal of her presumed meningioma.  Risks and benefits of resection include but are not limited to infection, fluid collections/hematomas, seizures, new neurological symptoms, strokes, recurrent tumor. She agreed to proceed.    Suha Kent MD       Again, thank you for allowing me to participate in the care of your patient.        Sincerely,        Suha Kent MD    Electronically signed

## 2025-04-23 DIAGNOSIS — D33.0 BENIGN NEOPLASM OF BRAIN, SUPRATENTORIAL (H): Primary | ICD-10-CM

## 2025-04-24 ENCOUNTER — TELEPHONE (OUTPATIENT)
Dept: NEUROSURGERY | Facility: CLINIC | Age: 60
End: 2025-04-24
Payer: COMMERCIAL

## 2025-04-24 NOTE — TELEPHONE ENCOUNTER
Scheduled surgery with: SURGEONS NAME: DR. KENT    Spoke with:  PATIENT       OTHER:   n/a    Date of Surgery:   5/16/2025    Estimated Arrival time Discussed with Patient:    Yes    Location of surgery:    [facility]     Pre-Op H&P:    YES- notified patient to complete pre-op within 30 days, preferably 1 week prior to surgery date.       OTHER :     n/a    Post-Op Appts:    YES-  nurse visit, 6 week & 3 months    OTHER:     n/a     Does patient need Images prior:  Yes -   MRI  N/a        Scheduled: [date]    4/26/2025      [time]    1:00PM     Discussed with patient pre-op RN will call 2-3 days prior to surgery with arrival time and instructions:  Yes     Did patient receive surgery folder : Yes    Method/Via: Received in clinic by RN     Surgical soap: Receiving via Received in clinic by RN       Additional Comments:  Contacted patient and schedule surgery for 4/24/2025 w/ Dr. Kent.   Discussed surgery details and post-op appointments patient verbalized understanding.      All patients questions were answered and patient was instructed to review surgical packet and call back with any questions or concerns.       Adryan Rodriguez 4/24/2025 11:26 AM

## 2025-04-26 ENCOUNTER — HOSPITAL ENCOUNTER (OUTPATIENT)
Dept: MRI IMAGING | Facility: CLINIC | Age: 60
Discharge: HOME | End: 2025-04-26
Attending: SURGERY | Admitting: SURGERY
Payer: COMMERCIAL

## 2025-04-26 DIAGNOSIS — D33.0 BENIGN NEOPLASM OF BRAIN, SUPRATENTORIAL (H): ICD-10-CM

## 2025-04-26 PROCEDURE — A9585 GADOBUTROL INJECTION: HCPCS | Performed by: SURGERY

## 2025-04-26 PROCEDURE — 70553 MRI BRAIN STEM W/O & W/DYE: CPT

## 2025-04-26 PROCEDURE — 255N000002 HC RX 255 OP 636: Performed by: SURGERY

## 2025-04-26 RX ORDER — GADOBUTROL 604.72 MG/ML
15 INJECTION INTRAVENOUS ONCE
Status: COMPLETED | OUTPATIENT
Start: 2025-04-26 | End: 2025-04-26

## 2025-04-26 RX ADMIN — GADOBUTROL 15 ML: 604.72 INJECTION INTRAVENOUS at 13:44

## 2025-04-28 ENCOUNTER — PRE VISIT (OUTPATIENT)
Dept: SURGERY | Facility: CLINIC | Age: 60
End: 2025-04-28

## 2025-04-29 ENCOUNTER — OFFICE VISIT (OUTPATIENT)
Dept: CARDIOLOGY | Facility: CLINIC | Age: 60
End: 2025-04-29
Payer: COMMERCIAL

## 2025-04-29 VITALS
DIASTOLIC BLOOD PRESSURE: 72 MMHG | BODY MASS INDEX: 32.24 KG/M2 | WEIGHT: 159.9 LBS | SYSTOLIC BLOOD PRESSURE: 124 MMHG | RESPIRATION RATE: 16 BRPM | HEIGHT: 59 IN | HEART RATE: 64 BPM

## 2025-04-29 DIAGNOSIS — I10 PRIMARY HYPERTENSION: ICD-10-CM

## 2025-04-29 DIAGNOSIS — Z01.810 PREOP CARDIOVASCULAR EXAM: ICD-10-CM

## 2025-04-29 PROCEDURE — 99214 OFFICE O/P EST MOD 30 MIN: CPT | Performed by: INTERNAL MEDICINE

## 2025-04-29 PROCEDURE — 3078F DIAST BP <80 MM HG: CPT | Performed by: INTERNAL MEDICINE

## 2025-04-29 PROCEDURE — G2211 COMPLEX E/M VISIT ADD ON: HCPCS | Performed by: INTERNAL MEDICINE

## 2025-04-29 PROCEDURE — 3074F SYST BP LT 130 MM HG: CPT | Performed by: INTERNAL MEDICINE

## 2025-04-29 RX ORDER — TELMISARTAN 80 MG/1
80 TABLET ORAL DAILY
Qty: 90 TABLET | Refills: 1 | Status: SHIPPED | OUTPATIENT
Start: 2025-04-29

## 2025-04-29 NOTE — PATIENT INSTRUCTIONS
Start telmisartan, stop losartan, take BP in evening  Call next Thursday or Friday and let us know how BP is doing if readings not below 140/90; we will add another medicine.

## 2025-04-29 NOTE — LETTER
4/29/2025    Sera Solo, NP  [address] [place] [address]    RE: Selina Parikh       Dear Colleague,     I had the pleasure of seeing Selina Parikh in the [facility].    [facility] [address]  [place] [address]   [email or web address]   OFFICE: [phone]     CARDIOLOGY CLINIC NOTE     Assessment/Recommendations   Assessment:    Preop cardiovascular exam: Patient has no chest pain, nuclear stress test is negative.  No cardiac contraindications to proceeding with both surgeries.  No further cardiac testing recommended at this time.  Hypertension: Switch to telmisartan 80 mg daily, patient to call back in a week if blood pressure readings are not below 140/90 HCTZ will be added as well.  LAD bridge: No symptoms at present, if she develops chest pain with exertion beta-blocker trial will be initiated.       HPI:   Patient is 59-year-old woman with history of LAD bridge, hyperlipidemia, hypertension who is coming for preop, she  is scheduled for upper endoscopy with biopsy and subsequently for meningioma removal.  She recently underwent Nuclear stress test that is negative, EF is normal.  She also had an angiogram in May 2022 that showed distal LAD bridge.    In terms of hypertension she has been recently started on losartan 25 mg daily, her home readings continue to be elevated.    She continues to have headaches but reports no cardiac chest pain specifically denies chest pain shortness of breath orthopnea PND or lower extremity edema.    Past medical history- myocardial bridge, HLD, HTN, smoker, carotid artery disease with percutaneous intervention of left carotid, migraines, IBS, meningioma, hiatal hernia         Medications  Allergies   Current Outpatient Medications   Medication Sig Dispense Refill     acetaminophen (TYLENOL) 500 MG tablet Take 1,000 mg by mouth 2 times daily.       aspirin 81 MG EC tablet Take 81 mg by mouth  "daily       atorvastatin (LIPITOR) 40 MG tablet Take 1 tablet (40 mg) by mouth daily. 90 tablet 3     celecoxib (CELEBREX) 100 MG capsule Take 1 capsule by mouth twice daily 180 capsule 0     clobetasol (TEMOVATE) 0.05 % external ointment Apply topically 2 times daily To affected areas (Patient taking differently: Apply topically 2 times daily as needed. To affected areas) 60 g 0     clobetasol propionate (CLOBEX) 0.05 % external shampoo Apply thin film to dry scalp once daily (maximum dose: 50 g/week or 50 mL/week); leave in place for 15 minutes, then add water, lather, and rinse thoroughly. Limit treatment to 4 consecutive weeks. 118 mL 1     fish oil-omega-3 fatty acids 1000 MG capsule Take 1 g by mouth daily       methocarbamol (ROBAXIN) 750 MG tablet Take 1 tablet (750 mg) by mouth 4 times daily as needed for muscle spasms 42 tablet 0     nitroGLYcerin (NITROSTAT) 0.4 MG sublingual tablet One tablet under the tongue every 5 minutes if needed for chest pain. May repeat every 5 minutes for a maximum of 3 doses in 15 minutes\" 25 tablet 3     omeprazole (PRILOSEC) 20 MG DR capsule TAKE 1 CAPSULE BY MOUTH THREE TIMES DAILY 270 capsule 0     promethazine (PHENERGAN) 25 MG tablet Take 1 tablet (25 mg) by mouth every 8 hours as needed for nausea 21 tablet 0     telmisartan (MICARDIS) 80 MG tablet Take 1 tablet (80 mg) by mouth daily. 90 tablet 1     UNABLE TO FIND Take 2 tablets by mouth at bedtime. MEDICATION NAME: Qunol Sleep - melatonin 5 mg, Ashwagandha, L-Theanine       Vitamin D (Cholecalciferol) 25 MCG (1000 UT) TABS Take 1,000 Units by mouth daily        Allergies   Allergen Reactions     Ciprofloxacin Anaphylaxis     Flagyl [Metronidazole] Anaphylaxis     Gabapentin Other (See Comments)     Suicidal thoughts.     Zofran [Ondansetron] Other (See Comments) and GI Disturbance     Causes bloating, moderately uncomfortable, abd pain       Benadryl [Diphenhydramine] Other (See Comments)     Muscle spasms     " "Percocet [Oxycodone-Acetaminophen] Other (See Comments) and Headache     Goes nuts - nasty mean irritated, can take Dilaudid     Vicodin [Hydrocodone-Acetaminophen] Other (See Comments)     Anxiety-agitation        Physical Examination Review of Systems   Vitals: /72 (BP Location: Left arm, Patient Position: Sitting, Cuff Size: Adult Regular)   Pulse 64   Resp 16   Ht 1.499 m (4' 11\")   Wt 72.5 kg (159 lb 14.4 oz)   LMP 04/10/2016   BMI 32.30 kg/m    BMI= Body mass index is 32.3 kg/m .  Wt Readings from Last 3 Encounters:   04/29/25 72.5 kg (159 lb 14.4 oz)   04/10/25 72.7 kg (160 lb 3.2 oz)   04/09/25 72.6 kg (160 lb)       General: pleasant female. No acute distress.   Neck: No JVD  Lungs: clear to auscultation  COR:  regular rate and rhythm, No murmurs, rubs, or gallops  Extrem: No edema   Per HPI          Lab Results    Chemistry/lipid CBC Cardiac Enzymes/BNP/TSH/INR   Lab Results   Component Value Date    CHOL 152 09/02/2022    HDL 44 (L) 09/02/2022    TRIG 128 09/02/2022    BUN 14.6 04/09/2025     04/09/2025    CO2 29 04/09/2025    Lab Results   Component Value Date    WBC 10.7 01/22/2024    HGB 17.0 (H) 01/22/2024    HCT 49.5 (H) 01/22/2024    MCV 88 01/22/2024     01/22/2024    Lab Results   Component Value Date    TROPONINI <0.01 05/05/2022    BNP 10 05/05/2022    TSH 2.01 04/09/2025    INR 0.99 06/08/2023          The longitudinal plan of care for the diagnosis(es)/condition(s) as documented were addressed during this visit. Due to the added complexity in care, I will continue to support Selina in the subsequent management and with ongoing continuity of care.          Cb Miller MD, MPH  Non-invasive Cardiologist  [facility]         Thank you for allowing me to participate in the care of your patient.      Sincerely,     Bc Angela     [facility]  cc:   Eliazar Verde PA-C  [address] [place] [address], [facility]  [place] [address]

## 2025-04-29 NOTE — PROGRESS NOTES
[facility]   [address]  [place] [address]   [email or web address]   OFFICE: [phone]     CARDIOLOGY CLINIC NOTE     Assessment/Recommendations   Assessment:    Preop cardiovascular exam: Patient has no chest pain, nuclear stress test is negative.  No cardiac contraindications to proceeding with both surgeries.  No further cardiac testing recommended at this time.  Hypertension: Switch to telmisartan 80 mg daily, patient to call back in a week if blood pressure readings are not below 140/90 HCTZ will be added as well.  LAD bridge: No symptoms at present, if she develops chest pain with exertion beta-blocker trial will be initiated.       HPI:   Patient is 59-year-old woman with history of LAD bridge, hyperlipidemia, hypertension who is coming for preop, she  is scheduled for upper endoscopy with biopsy and subsequently for meningioma removal.  She recently underwent Nuclear stress test that is negative, EF is normal.  She also had an angiogram in May 2022 that showed distal LAD bridge.    In terms of hypertension she has been recently started on losartan 25 mg daily, her home readings continue to be elevated.    She continues to have headaches but reports no cardiac chest pain specifically denies chest pain shortness of breath orthopnea PND or lower extremity edema.    Past medical history- myocardial bridge, HLD, HTN, smoker, carotid artery disease with percutaneous intervention of left carotid, migraines, IBS, meningioma, hiatal hernia         Medications  Allergies   Current Outpatient Medications   Medication Sig Dispense Refill    acetaminophen (TYLENOL) 500 MG tablet Take 1,000 mg by mouth 2 times daily.      aspirin 81 MG EC tablet Take 81 mg by mouth daily      atorvastatin (LIPITOR) 40 MG tablet Take 1 tablet (40 mg) by mouth daily. 90 tablet 3    celecoxib (CELEBREX) 100 MG capsule Take 1 capsule by mouth twice daily 180 capsule 0    clobetasol  "(TEMOVATE) 0.05 % external ointment Apply topically 2 times daily To affected areas (Patient taking differently: Apply topically 2 times daily as needed. To affected areas) 60 g 0    clobetasol propionate (CLOBEX) 0.05 % external shampoo Apply thin film to dry scalp once daily (maximum dose: 50 g/week or 50 mL/week); leave in place for 15 minutes, then add water, lather, and rinse thoroughly. Limit treatment to 4 consecutive weeks. 118 mL 1    fish oil-omega-3 fatty acids 1000 MG capsule Take 1 g by mouth daily      methocarbamol (ROBAXIN) 750 MG tablet Take 1 tablet (750 mg) by mouth 4 times daily as needed for muscle spasms 42 tablet 0    nitroGLYcerin (NITROSTAT) 0.4 MG sublingual tablet One tablet under the tongue every 5 minutes if needed for chest pain. May repeat every 5 minutes for a maximum of 3 doses in 15 minutes\" 25 tablet 3    omeprazole (PRILOSEC) 20 MG DR capsule TAKE 1 CAPSULE BY MOUTH THREE TIMES DAILY 270 capsule 0    promethazine (PHENERGAN) 25 MG tablet Take 1 tablet (25 mg) by mouth every 8 hours as needed for nausea 21 tablet 0    telmisartan (MICARDIS) 80 MG tablet Take 1 tablet (80 mg) by mouth daily. 90 tablet 1    UNABLE TO FIND Take 2 tablets by mouth at bedtime. MEDICATION NAME: Qunol Sleep - melatonin 5 mg, Ashwagandha, L-Theanine      Vitamin D (Cholecalciferol) 25 MCG (1000 UT) TABS Take 1,000 Units by mouth daily        Allergies   Allergen Reactions    Ciprofloxacin Anaphylaxis    Flagyl [Metronidazole] Anaphylaxis    Gabapentin Other (See Comments)     Suicidal thoughts.    Zofran [Ondansetron] Other (See Comments) and GI Disturbance     Causes bloating, moderately uncomfortable, abd pain      Benadryl [Diphenhydramine] Other (See Comments)     Muscle spasms    Percocet [Oxycodone-Acetaminophen] Other (See Comments) and Headache     Goes nuts - nasty mean irritated, can take Dilaudid    Vicodin [Hydrocodone-Acetaminophen] Other (See Comments)     Anxiety-agitation        Physical " "Examination Review of Systems   Vitals: /72 (BP Location: Left arm, Patient Position: Sitting, Cuff Size: Adult Regular)   Pulse 64   Resp 16   Ht 1.499 m (4' 11\")   Wt 72.5 kg (159 lb 14.4 oz)   LMP 04/10/2016   BMI 32.30 kg/m    BMI= Body mass index is 32.3 kg/m .  Wt Readings from Last 3 Encounters:   04/29/25 72.5 kg (159 lb 14.4 oz)   04/10/25 72.7 kg (160 lb 3.2 oz)   04/09/25 72.6 kg (160 lb)       General: pleasant female. No acute distress.   Neck: No JVD  Lungs: clear to auscultation  COR:  regular rate and rhythm, No murmurs, rubs, or gallops  Extrem: No edema   Per HPI          Lab Results    Chemistry/lipid CBC Cardiac Enzymes/BNP/TSH/INR   Lab Results   Component Value Date    CHOL 152 09/02/2022    HDL 44 (L) 09/02/2022    TRIG 128 09/02/2022    BUN 14.6 04/09/2025     04/09/2025    CO2 29 04/09/2025    Lab Results   Component Value Date    WBC 10.7 01/22/2024    HGB 17.0 (H) 01/22/2024    HCT 49.5 (H) 01/22/2024    MCV 88 01/22/2024     01/22/2024    Lab Results   Component Value Date    TROPONINI <0.01 05/05/2022    BNP 10 05/05/2022    TSH 2.01 04/09/2025    INR 0.99 06/08/2023          The longitudinal plan of care for the diagnosis(es)/condition(s) as documented were addressed during this visit. Due to the added complexity in care, I will continue to support Selina in the subsequent management and with ongoing continuity of care.          Cb Miller MD, MPH  Non-invasive Cardiologist  [facility]       "

## 2025-04-30 ENCOUNTER — ANESTHESIA EVENT (OUTPATIENT)
Dept: SURGERY | Facility: AMBULATORY SURGERY CENTER | Age: 60
End: 2025-04-30
Payer: COMMERCIAL

## 2025-04-30 DIAGNOSIS — M19.90 OSTEOARTHRITIS, UNSPECIFIED OSTEOARTHRITIS TYPE, UNSPECIFIED SITE: ICD-10-CM

## 2025-05-01 ENCOUNTER — ANESTHESIA (OUTPATIENT)
Dept: SURGERY | Facility: AMBULATORY SURGERY CENTER | Age: 60
End: 2025-05-01
Payer: COMMERCIAL

## 2025-05-01 ENCOUNTER — HOSPITAL ENCOUNTER (OUTPATIENT)
Facility: AMBULATORY SURGERY CENTER | Age: 60
Discharge: HOME | End: 2025-05-01
Attending: SURGERY
Payer: COMMERCIAL

## 2025-05-01 VITALS
DIASTOLIC BLOOD PRESSURE: 71 MMHG | HEART RATE: 92 BPM | SYSTOLIC BLOOD PRESSURE: 115 MMHG | WEIGHT: 159.1 LBS | TEMPERATURE: 96.9 F | RESPIRATION RATE: 16 BRPM | BODY MASS INDEX: 32.08 KG/M2 | HEIGHT: 59 IN | OXYGEN SATURATION: 96 %

## 2025-05-01 DIAGNOSIS — K44.9 HIATAL HERNIA: ICD-10-CM

## 2025-05-01 LAB — UPPER GI ENDOSCOPY: NORMAL

## 2025-05-01 RX ORDER — SODIUM CHLORIDE, SODIUM LACTATE, POTASSIUM CHLORIDE, CALCIUM CHLORIDE 600; 310; 30; 20 MG/100ML; MG/100ML; MG/100ML; MG/100ML
INJECTION, SOLUTION INTRAVENOUS CONTINUOUS
OUTPATIENT
Start: 2025-05-01

## 2025-05-01 RX ORDER — LIDOCAINE HYDROCHLORIDE 20 MG/ML
INJECTION, SOLUTION INFILTRATION; PERINEURAL PRN
Status: DISCONTINUED | OUTPATIENT
Start: 2025-05-01 | End: 2025-05-01

## 2025-05-01 RX ORDER — LIDOCAINE 40 MG/G
CREAM TOPICAL
OUTPATIENT
Start: 2025-05-01

## 2025-05-01 RX ORDER — DEXAMETHASONE SODIUM PHOSPHATE 4 MG/ML
4 INJECTION, SOLUTION INTRA-ARTICULAR; INTRALESIONAL; INTRAMUSCULAR; INTRAVENOUS; SOFT TISSUE
OUTPATIENT
Start: 2025-05-01

## 2025-05-01 RX ORDER — PROPOFOL 10 MG/ML
INJECTION, EMULSION INTRAVENOUS CONTINUOUS PRN
Status: DISCONTINUED | OUTPATIENT
Start: 2025-05-01 | End: 2025-05-01

## 2025-05-01 RX ORDER — NALOXONE HYDROCHLORIDE 0.4 MG/ML
0.1 INJECTION, SOLUTION INTRAMUSCULAR; INTRAVENOUS; SUBCUTANEOUS
OUTPATIENT
Start: 2025-05-01

## 2025-05-01 RX ORDER — LOSARTAN POTASSIUM 25 MG/1
25 TABLET ORAL DAILY
COMMUNITY

## 2025-05-01 RX ORDER — CELECOXIB 100 MG/1
100 CAPSULE ORAL 2 TIMES DAILY
Qty: 180 CAPSULE | Refills: 0 | OUTPATIENT
Start: 2025-05-01

## 2025-05-01 RX ADMIN — LIDOCAINE HYDROCHLORIDE 40 MG: 20 INJECTION, SOLUTION INFILTRATION; PERINEURAL at 13:02

## 2025-05-01 RX ADMIN — LIDOCAINE HYDROCHLORIDE 20 MG: 20 INJECTION, SOLUTION INFILTRATION; PERINEURAL at 12:57

## 2025-05-01 RX ADMIN — SODIUM CHLORIDE, SODIUM LACTATE, POTASSIUM CHLORIDE, CALCIUM CHLORIDE: 600; 310; 30; 20 INJECTION, SOLUTION INTRAVENOUS at 12:12

## 2025-05-01 RX ADMIN — PROPOFOL 200 MCG/KG/MIN: 10 INJECTION, EMULSION INTRAVENOUS at 12:57

## 2025-05-01 ASSESSMENT — LIFESTYLE VARIABLES: TOBACCO_USE: 1

## 2025-05-01 NOTE — INTERVAL H&P NOTE
"I have reviewed the surgical (or preoperative) H&P that is linked to this encounter, and examined the patient. There are no significant changes    Clinical Conditions Present on Arrival:  Clinically Significant Risk Factors Present on Admission                  # Drug Induced Platelet Defect: home medication list includes an antiplatelet medication      # Obesity: Estimated body mass index is 32.32 kg/m  as calculated from the following:    Height as of this encounter: 1.499 m (4' 11\").    Weight as of this encounter: 72.6 kg (160 lb).     Plan for egd    Jean Marie Moreno [facility]  [phone]      "

## 2025-05-01 NOTE — DISCHARGE INSTRUCTIONS
If you have any questions or concerns regarding your procedure please contact Dr. Moreno, his office number is [phone]    Adult Discharge Orders & Instructions     For 24 hours after surgery    Get plenty of rest.  A responsible adult must stay with you for at least 24 hours after you leave the hospital.   Do not drive or use heavy equipment.  If you have weakness or tingling, don't drive or use heavy equipment until this feeling goes away.  Do not drink alcohol.  Avoid strenuous or risky activities.  Ask for help when climbing stairs.   You may feel lightheaded.  IF so, sit for a few minutes before standing.  Have someone help you get up.   If you have nausea (feel sick to your stomach): Drink only clear liquids such as apple juice, ginger ale, broth or 7-Up.  Rest may also help.  Be sure to drink enough fluids.  Move to a regular diet as you feel able.  You may have a slight fever. Call the doctor if your fever is over 100 F (37.7 C) (taken under the tongue) or lasts longer than 24 hours.  You may have a dry mouth, a sore throat, muscle aches or trouble sleeping.  These should go away after 24 hours.  Do not make important or legal decisions.     Call your doctor for any of the followin.  Signs of infection (fever, growing tenderness at the surgery site, a large amount of drainage or bleeding, severe pain, foul-smelling drainage, redness, swelling).    2. It has been over 8 to 10 hours since surgery and you are still not able to urinate (pass water).    3.  Headache for over 24 hours.    4.  Numbness, tingling or weakness in your legs the day after surgery (if you had spinal anesthesia).          Based on the surgery/procedure that you had today, we do not expect that you will have any problems.  However, we want you to know what to do if you have pain, nausea, bleeding, or infection:    To control pain:  Take medicines your physician has prescribed or or over-the counter medicine he or she advises.   Ice packs and periods of rest are often helpful.  For surgery on an arm or leg, raise it on a pillow to ease swelling.  If your pain is not managed with the above methods, contact your physician.    Copyright Adelso Manley, Licensed under [employer]    To control nausea:  Take anti-nausea medicine approved by your physician.  Drink clear liquids such as apple juice, ginger ale, broth or 7-Up. Be sure to drink enough fluids.  Move to a regular diet as you feel able.  Rest may also help.    Bleeding:  You may see a little blood on your dressing, about the size of a quarter in the first 24 hours.  If you see this, there is no reason to be alarmed.  However, if this continues to increase in size, apply pressure if able, and notify your physician.    Copyright [employer] Licensed under [employer]    Infection: Please contact your physician if you have any of the following signs:  redness, swelling, heat, increasing pain or foul-smelling drainage at your surgery site, fever or chills.           Discharge Instructions:    Take you medications as directed and follow up with you primary provider as scheduled.   Follow these care guidelines during your recovery for the next 24 hours.   If you have any questions or concerns please contact the provider that performed your procedure.     You were given medicine for sedation:  You have been given medicines during your procedure that might make you sleepy and weak. To prevent problems:    *Rest for the rest of the day after you are home. You should be back to your normal activity tomorrow.  *For the next 24 hours:   -Do not drink alcoholic beverages.   -Do not make any important decisions or sign any legal forms.   -Do not work around machinery or power equipment.     The medicines used for sedation may make you feel nauseated.   *Start with clear liquids, such as tea, jell-o, broth and ginger ale. As you feel better you may add soft foods such as pudding and  ice cream.   *When you no longer feel nauseated, you may try your normal diet.     You should be back to eating your normal after 24 hours.     Call if you have any of these problems:  *Fever of 101 degree F or 38 degrees C  *Nausea with vomiting that does not ease after a few hours.  *Abdominal pain or bloating  *Fainting

## 2025-05-01 NOTE — ANESTHESIA CARE TRANSFER NOTE
Patient: Selina Parikh    Procedure: Procedure(s):  ESOPHAGOGASTRODUODENOSCOPY, WITH BIOPSY       Diagnosis: Hiatal hernia [K44.9]  Diagnosis Additional Information: No value filed.    Anesthesia Type:   MAC     Note:    Oropharynx: oropharynx clear of all foreign objects and spontaneously breathing  Level of Consciousness: awake and drowsy  Oxygen Supplementation: room air    Independent Airway: airway patency satisfactory and stable  Dentition: dentition unchanged  Vital Signs Stable: post-procedure vital signs reviewed and stable  Report to RN Given: handoff report given  Patient transferred to: Phase II    Handoff Report: Identifed the Patient, Identified the Reponsible Provider, Reviewed the pertinent medical history, Discussed the surgical course, Reviewed Intra-OP anesthesia mangement and issues during anesthesia, Set expectations for post-procedure period and Allowed opportunity for questions and acknowledgement of understanding      Vitals:  Vitals Value Taken Time   /61 05/01/25 1312   Temp 96.9  F (36.1  C) 05/01/25 1312   Pulse 90 05/01/25 1312   Resp 16 05/01/25 1312   SpO2 94 % 05/01/25 1312       Electronically Signed By: CARLTON Prince CRNA  May 1, 2025  1:13 PM

## 2025-05-01 NOTE — ANESTHESIA POSTPROCEDURE EVALUATION
Patient: Selina Parikh    Procedure: Procedure(s):  ESOPHAGOGASTRODUODENOSCOPY, WITH BIOPSY       Anesthesia Type:  MAC    Note:  Disposition: Outpatient   Postop Pain Control: Uneventful            Sign Out: Well controlled pain   PONV: No   Neuro/Psych: Uneventful            Sign Out: Acceptable/Baseline neuro status   Airway/Respiratory: Uneventful            Sign Out: Acceptable/Baseline resp. status   CV/Hemodynamics: Uneventful            Sign Out: Acceptable CV status; No obvious hypovolemia; No obvious fluid overload   Other NRE: NONE   DID A NON-ROUTINE EVENT OCCUR? No           Last vitals:  Vitals Value Taken Time   /71 05/01/25 1330   Temp 96.9  F (36.1  C) 05/01/25 1312   Pulse 89 05/01/25 1338   Resp 16 05/01/25 1315   SpO2 97 % 05/01/25 1338   Vitals shown include unfiled device data.    Electronically Signed By: Estrellita Dominguez MD  May 1, 2025  3:17 PM

## 2025-05-01 NOTE — ANESTHESIA PREPROCEDURE EVALUATION
Anesthesia Pre-Procedure Evaluation    Patient: Selina Parikh   MRN: [id number] : 1965        Procedure : Procedure(s):  ESOPHAGOGASTRODUODENOSCOPY, WITH BIOPSY          Past Medical History:   Diagnosis Date    Anemia     Antiplatelet or antithrombotic long-term use     Asymptomatic stenosis of left carotid artery     Cancer (H)     melanoma    Coronary artery disease     Difficulty walking     Gastroesophageal reflux disease     Hiatal hernia     History of angina     Hypertension     Irritable bowel syndrome     Other chronic pain     Stented coronary artery     Walking troubles       Past Surgical History:   Procedure Laterality Date    BACK SURGERY      CHOLECYSTECTOMY, LAPOROSCOPIC  2000    Cholecystectomy, Laparoscopic    COLON SURGERY      CV CORONARY ANGIOGRAM N/A 2022    Procedure: CV CORONARY ANGIOGRAM;  Surgeon: Stefanie Spangler MD;  Location: [facility] CV    CV LEFT HEART CATH N/A 2022    Procedure: Left Heart Catheterization;  Surgeon: Stefanie Spangler MD;  Location: [facility] CV    CYSTOSCOPY, INSERT LIGHTED STENT URETER(S) N/A 04/10/2018    Procedure: CYSTOSCOPY, INSERT LIGHTED STENT URETER(S);  Lighted Stent Placement,Laparoscopic Assisted Sigmoid Colectomy;  Surgeon: ERVIN Reina MD;  Location: [place] OR    ENDOVASCULAR CAROTID STENT PLACEMENT Left 2023    Procedure: Left transcarotid revascularization;  Surgeon: Eveline Manley MD;  Location: [facility] OR    IR TCAR TRANSCAROTID ARTERY REVASCULARIZATION  2023    LAPAROSCOPIC ASSISTED COLECTOMY LEFT (DESCENDING) N/A 04/10/2018    Procedure: LAPAROSCOPIC ASSISTED COLECTOMY LEFT (DESCENDING);;  Surgeon: Hill Murray MD;  Location: [facility] OR    LUMBAR DISCECTOMY Left 2022    Procedure: left thoracic 12-lumbar 1 hemilaminectomy, medial facetectomy, foraminotomy and microdiscectomy;  Surgeon: Suha Kent MD;  Location: [facility] OR    NECK SURGERY      OPTICAL  TRACKING SYSTEM FUSION SPINE POSTERIOR LUMBAR THREE+ LEVELS Bilateral 2023    Procedure: Exploration of prior lumbar 3- lumbar 5 instrumented fusion with removal and replacement of pedicle screws and extension to sacral 1. Lumbar 3-sacral 1  Left transforaminal lumbar interbody fusion and posterolateral arthrodesis with use of stealth navigation;  Surgeon: Suha Kent MD;  Location: [facility]    ORTHOPEDIC SURGERY  10/01/2010    Cut palm and reattched tendons and nerves in left hand forefinger.    FL ESOPHAGOGASTRODUODENOSCOPY TRANSORAL DIAGNOSTIC N/A 2021    Procedure: ESOPHAGOGASTRODUODENOSCOPY (EGD);  Surgeon: Souleymane Moreno DO;  Location: [facility];  Service: General    SURGICAL HISTORY OF -   2000    Esophagogastroduodenoscopy with biopsy    TUBAL LIGATION        Allergies   Allergen Reactions    Ciprofloxacin Anaphylaxis    Flagyl [Metronidazole] Anaphylaxis    Gabapentin Other (See Comments)     Suicidal thoughts.    Zofran [Ondansetron] Other (See Comments) and GI Disturbance     Causes bloating, moderately uncomfortable, abd pain      Benadryl [Diphenhydramine] Other (See Comments)     Muscle spasms    Percocet [Oxycodone-Acetaminophen] Other (See Comments) and Headache     Goes nuts - nasty mean irritated, can take Dilaudid    Vicodin [Hydrocodone-Acetaminophen] Other (See Comments)     Anxiety-agitation      Social History     Tobacco Use    Smoking status: Every Day     Current packs/day: 0.00     Average packs/day: 0.5 packs/day for 30.0 years (15.0 ttl pk-yrs)     Types: Cigarettes     Start date: 1993     Last attempt to quit: 2023     Years since quittin.9    Smokeless tobacco: Never    Tobacco comments:     6-10 Cigs a day   Substance Use Topics    Alcohol use: Not Currently     Comment: Alcoholic Drinks/day: 2x year       Wt Readings from Last 1 Encounters:   25 72.2 kg (159 lb 1.6 oz)        Anesthesia Evaluation   Pt has had prior  anesthetic.     No history of anesthetic complications       ROS/MED HX  ENT/Pulmonary:  - neg pulmonary ROS   (+)                tobacco use, Current use,   patient smoked within 24 hours,                    Neurologic: Comment: menigioma - neg neurologic ROS     Cardiovascular:  - neg cardiovascular ROS   (+)  - Peripheral Vascular Disease (s/p TCAR)-- Carotid Stenosis.  CAD (nonobstructive) -  - -                                      METS/Exercise Tolerance:     Hematologic:  - neg hematologic  ROS   (+)      anemia,          Musculoskeletal:  - neg musculoskeletal ROS     GI/Hepatic:  - neg GI/hepatic ROS   (+) GERD,     hiatal hernia,              Renal/Genitourinary:  - neg Renal ROS     Endo:  - neg endo ROS     Psychiatric/Substance Use:  - neg psychiatric ROS     Infectious Disease:  - neg infectious disease ROS     Malignancy:  - neg malignancy ROS (+) Malignancy, History of Skin.Skin CA Remission status post.      Other:  - neg other ROS    (+)  , H/O Chronic Pain,         Physical Exam    Airway        Mallampati: II   TM distance: > 3 FB   Neck ROM: full   Mouth opening: > 3 cm    Respiratory Devices and Support         Dental       (+) Edentulous      Cardiovascular   cardiovascular exam normal          Pulmonary           (+) decreased breath sounds           OUTSIDE LABS:  CBC:   Lab Results   Component Value Date    WBC 10.7 01/22/2024    WBC 10.0 06/11/2023    HGB 17.0 (H) 01/22/2024    HGB 10.0 (L) 06/11/2023    HCT 49.5 (H) 01/22/2024    HCT 30.7 (L) 06/11/2023     01/22/2024     06/11/2023     BMP:   Lab Results   Component Value Date     04/09/2025     01/25/2024    POTASSIUM 3.9 04/09/2025    POTASSIUM 4.0 01/25/2024    CHLORIDE 106 04/09/2025    CHLORIDE 104 01/25/2024    CO2 29 04/09/2025    CO2 27 01/25/2024    BUN 14.6 04/09/2025    BUN 8.0 01/25/2024    CR 0.78 04/09/2025    CR 0.73 01/25/2024    GLC 99 04/09/2025    GLC 88 01/25/2024     COAGS:   Lab Results  "  Component Value Date    PTT 29 06/08/2023    INR 0.99 06/08/2023     POC:   Lab Results   Component Value Date     (H) 04/14/2018    HCG Negative 06/16/2012     HEPATIC:   Lab Results   Component Value Date    ALBUMIN 5.3 (H) 01/22/2024    PROTTOTAL 8.4 (H) 01/22/2024    ALT 45 01/22/2024    AST 29 01/22/2024    ALKPHOS 118 01/22/2024    BILITOTAL 0.4 01/22/2024    BILIDIRECT 0.2 03/25/2013     OTHER:   Lab Results   Component Value Date    LACT 0.5 (L) 01/28/2018    A1C 5.6 04/28/2022    MAXWELL 9.7 04/09/2025    PHOS 3.6 04/14/2018    MAG 2.0 04/14/2018    LIPASE 39 01/22/2024    TSH 2.01 04/09/2025    T4 1.06 11/29/2011    CRP 0.6 09/25/2022    SED 9 11/01/2019       Anesthesia Plan    ASA Status:  3    NPO Status:  NPO Appropriate    Anesthesia Type: MAC.     - Reason for MAC: immobility needed, straight local not clinically adequate   Induction: Propofol.   Maintenance: TIVA.        Consents    Anesthesia Plan(s) and associated risks, benefits, and realistic alternatives discussed. Questions answered and patient/representative(s) expressed understanding.     - Discussed:     - Discussed with:  Patient            Postoperative Care    Pain management: Multi-modal analgesia.        Comments:    Other Comments: Propofol    Anesthetic risks, benefits, and options reviewed. Patient agrees to proceed.               Duran Hernandez MD    Clinically Significant Risk Factors Present on Admission                 # Drug Induced Platelet Defect: home medication list includes an antiplatelet medication   # Hypertension: Noted on problem list           # Obesity: Estimated body mass index is 32.13 kg/m  as calculated from the following:    Height as of this encounter: 1.499 m (4' 11\").    Weight as of this encounter: 72.2 kg (159 lb 1.6 oz).                "

## 2025-05-01 NOTE — TELEPHONE ENCOUNTER
"Pt states that she \"can't move\" without this medication. She had endoscopy today already and doesn't have surgery until 5/16/25. She was told to stop taking 5 days prior. She does not have [employer] to complete e-visit.   "

## 2025-05-05 ENCOUNTER — OFFICE VISIT (OUTPATIENT)
Dept: FAMILY MEDICINE | Facility: CLINIC | Age: 60
End: 2025-05-05
Payer: COMMERCIAL

## 2025-05-05 VITALS
BODY MASS INDEX: 32.05 KG/M2 | HEART RATE: 103 BPM | WEIGHT: 159 LBS | TEMPERATURE: 97 F | SYSTOLIC BLOOD PRESSURE: 102 MMHG | OXYGEN SATURATION: 96 % | RESPIRATION RATE: 20 BRPM | HEIGHT: 59 IN | DIASTOLIC BLOOD PRESSURE: 72 MMHG

## 2025-05-05 DIAGNOSIS — R11.0 NAUSEA: ICD-10-CM

## 2025-05-05 DIAGNOSIS — K21.9 GASTROESOPHAGEAL REFLUX DISEASE, UNSPECIFIED WHETHER ESOPHAGITIS PRESENT: Chronic | ICD-10-CM

## 2025-05-05 DIAGNOSIS — Z01.818 PREOP GENERAL PHYSICAL EXAM: Primary | ICD-10-CM

## 2025-05-05 DIAGNOSIS — Z72.0 TOBACCO ABUSE: Chronic | ICD-10-CM

## 2025-05-05 DIAGNOSIS — M48.062 SPINAL STENOSIS OF LUMBAR REGION WITH NEUROGENIC CLAUDICATION: ICD-10-CM

## 2025-05-05 DIAGNOSIS — G93.89 BRAIN MASS: ICD-10-CM

## 2025-05-05 DIAGNOSIS — L40.9 PSORIASIS: ICD-10-CM

## 2025-05-05 DIAGNOSIS — I10 PRIMARY HYPERTENSION: ICD-10-CM

## 2025-05-05 PROCEDURE — 3074F SYST BP LT 130 MM HG: CPT | Performed by: FAMILY MEDICINE

## 2025-05-05 PROCEDURE — 1126F AMNT PAIN NOTED NONE PRSNT: CPT | Performed by: FAMILY MEDICINE

## 2025-05-05 PROCEDURE — 3078F DIAST BP <80 MM HG: CPT | Performed by: FAMILY MEDICINE

## 2025-05-05 PROCEDURE — 99214 OFFICE O/P EST MOD 30 MIN: CPT | Performed by: FAMILY MEDICINE

## 2025-05-05 RX ORDER — PROMETHAZINE HYDROCHLORIDE 25 MG/1
25 TABLET ORAL EVERY 8 HOURS PRN
Qty: 21 TABLET | Refills: 0 | Status: SHIPPED | OUTPATIENT
Start: 2025-05-05

## 2025-05-05 RX ORDER — CLOBETASOL PROPIONATE 0.5 MG/G
OINTMENT TOPICAL 2 TIMES DAILY
Qty: 60 G | Refills: 0 | Status: SHIPPED | OUTPATIENT
Start: 2025-05-05

## 2025-05-05 ASSESSMENT — PAIN SCALES - GENERAL: PAINLEVEL_OUTOF10: NO PAIN (0)

## 2025-05-05 NOTE — PROGRESS NOTES
Preoperative Evaluation  [facility]  [address] [place] [id number]  Phone: [phone]  Fax: [phone]  Primary Provider: Sera Solo NP  Pre-op Performing Provider: Brendan Mcmillan MD  May 5, 2025             5/5/2025   Surgical Information   What procedure is being done? remove head tumer   Facility or Hospital where procedure/surgery will be performed: [facility]   Who is doing the procedure / surgery? dr suh   Date of surgery / procedure: may 16 2025   Time of surgery / procedure: 7am   Where do you plan to recover after surgery? at home alone     Fax number for surgical facility: Note does not need to be faxed, will be available electronically in Epic.    Assessment & Plan     The proposed surgical procedure is considered INTERMEDIATE risk.      ICD-10-CM    1. Preop general physical exam  Z01.818       2. Meningioma (H)  D32.9       3. Psoriasis  L40.9 clobetasol (TEMOVATE) 0.05 % external ointment      4. Nausea  R11.0 promethazine (PHENERGAN) 25 MG tablet      5. Gastroesophageal reflux disease, unspecified whether esophagitis present  K21.9       6. Tobacco abuse  Z72.0       7. Spinal stenosis of lumbar region with neurogenic claudication  M48.062       8. Primary hypertension  I10           Risks and Recommendations  The patient has the following additional risks and recommendations for perioperative complications:  -  Stressed on smoking cessation   - No identified additional risk factors other than previously addressed    Antiplatelet or Anticoagulation Medication Instructions   - aspirin: Discontinue aspirin 5-7 days prior to procedure to reduce bleeding risk. It should be resumed postoperatively.     Additional Medication Instructions   - celecoxib (Celebrex): DO NOT TAKE 3 days before surgery. May continue without modification for management of severe pain.     Recommendation  Approval given to proceed with proposed procedure, without further  diagnostic evaluation.      Delfin Marks is a 59 year old, presenting for the following:  Pre-Op Exam          5/5/2025     1:19 PM   Additional Questions   Roomed by Kiarra DIEHL LPN   Accompanied by self     HPI:   59-year-old female presents for a preop physical exam.  Patient is scheduled to have craniotomy, using optical tracking system, with neoplasm excision on May 16, 2025.  She requires evaluation and anesthesia risk assessment prior to undergoing surgery/procedure.  Patient denies any fever, chills, sore throat, cough, shortness of breath, chest pain, palpitation, diarrhea, constipation, abdominal pain, headache or other relevant systemic symptoms.             5/5/2025   Pre-Op Questionnaire   Have you ever had a heart attack or stroke? No   Have you ever had surgery on your heart or blood vessels, such as a stent placement, a coronary artery bypass, or surgery on an artery in your head, neck, heart, or legs? (!) YES right carotid stent   Do you have chest pain with activity? No   Do you have a history of heart failure? No   Do you currently have a cold, bronchitis or symptoms of other infection? No   Do you have a cough, shortness of breath, or wheezing? No   Do you or anyone in your family have previous history of blood clots? No   Do you or does anyone in your family have a serious bleeding problem such as prolonged bleeding following surgeries or cuts? No   Have you ever had problems with anemia or been told to take iron pills? (!) YES    Have you had any abnormal blood loss such as black, tarry or bloody stools, or abnormal vaginal bleeding? No   Have you ever had a blood transfusion? No   Are you willing to have a blood transfusion if it is medically needed before, during, or after your surgery? Yes   Have you or any of your relatives ever had problems with anesthesia? No   Do you have sleep apnea, excessive snoring or daytime drowsiness? (!) YES   Do you have a CPAP machine? (!) NO    Do you  have any artifical heart valves or other implanted medical devices like a pacemaker, defibrillator, or continuous glucose monitor? No   Do you have artificial joints? No   Are you allergic to latex? No       Preoperative Review of    reviewed - no record of controlled substances prescribed.      Status of Chronic Conditions:  See problem list for active medical problems.  Problems all longstanding and stable, except as noted/documented.  See ROS for pertinent symptoms related to these conditions.    Patient Active Problem List    Diagnosis Date Noted    Cauda equina compression (H) 04/09/2025     Priority: Medium    Lumbar disc herniation 08/24/2023     Priority: Medium    Carotid stenosis 04/21/2023     Priority: Medium    Primary hypertension 05/07/2022     Priority: Medium    Status post coronary angiogram 05/06/2022     Priority: Medium    Coronary artery disease due to lipid rich plaque      Priority: Medium    Chest pain, unspecified type 05/05/2022     Priority: Medium    Acute chest pain 04/28/2022     Priority: Medium    Hiatal hernia 04/15/2021     Priority: Medium     Formatting of this note might be different from the original.  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 On  fiber, senna, and MOM. Advised to d/c MOM, start Miralax and titer to regular BM's. Pt has been seen by GI.       Brain syndrome, posttraumatic 03/06/2009     Priority: Medium     Work comp.  Has seen Dr. Ahmadi, [facility].  MRIs, EMG unremarkalbe, some foraminal stenosis on C5/C6  Sent her to physiatrist, trigger point injections didn't help.  After some neck traction, had intense unremitting HA, neck pain.  Off/on tingling in arms.  Pain clinic and/or spine surg for more eval probable next steps.    Has failed multiple rescue and preventive HA meds. On no narcotics        Past Medical History:   Diagnosis Date    Anemia     Antiplatelet or antithrombotic long-term use     Asymptomatic stenosis of left carotid artery     Cancer (H)     melanoma    Coronary artery disease     Difficulty walking     Gastroesophageal reflux disease     Hiatal hernia     History of angina     Hypertension     Irritable bowel syndrome     Other chronic pain     Stented coronary artery     Walking troubles      Past Surgical History:   Procedure Laterality Date    BACK SURGERY      CHOLECYSTECTOMY, LAPOROSCOPIC  02/14/2000    Cholecystectomy, Laparoscopic    COLON SURGERY      CV CORONARY ANGIOGRAM N/A 05/06/2022    Procedure: CV CORONARY ANGIOGRAM;  Surgeon: Stefanie Spangler MD;  Location: [facility] CV    CV LEFT HEART CATH N/A 05/06/2022    Procedure: Left Heart Catheterization;  Surgeon: Stefanie Spangler MD;  Location: [facility] CV    CYSTOSCOPY, INSERT LIGHTED STENT URETER(S) N/A 04/10/2018    Procedure: CYSTOSCOPY, INSERT LIGHTED STENT URETER(S);  Lighted Stent Placement,Laparoscopic Assisted Sigmoid Colectomy;  Surgeon: ERVIN Reina MD;  Location: [facility] OR    ENDOVASCULAR CAROTID STENT PLACEMENT Left 4/21/2023    Procedure: Left transcarotid revascularization;  Surgeon: Eveline Manley MD;  Location: [facility] OR    ESOPHAGOSCOPY, GASTROSCOPY, DUODENOSCOPY (EGD), COMBINED N/A  5/1/2025    Procedure: ESOPHAGOGASTRODUODENOSCOPY, WITH BIOPSY;  Surgeon: Souleymane Moreno DO;  Location: [facility]    IR TCAR TRANSCAROTID ARTERY REVASCULARIZATION  4/21/2023    LAPAROSCOPIC ASSISTED COLECTOMY LEFT (DESCENDING) N/A 04/10/2018    Procedure: LAPAROSCOPIC ASSISTED COLECTOMY LEFT (DESCENDING);;  Surgeon: Hill Murray MD;  Location: [facility]    LUMBAR DISCECTOMY Left 09/23/2022    Procedure: left thoracic 12-lumbar 1 hemilaminectomy, medial facetectomy, foraminotomy and microdiscectomy;  Surgeon: Suha Kent MD;  Location: [facility]    NECK SURGERY      OPTICAL TRACKING SYSTEM FUSION SPINE POSTERIOR LUMBAR THREE+ LEVELS Bilateral 6/9/2023    Procedure: Exploration of prior lumbar 3- lumbar 5 instrumented fusion with removal and replacement of pedicle screws and extension to sacral 1. Lumbar 3-sacral 1  Left transforaminal lumbar interbody fusion and posterolateral arthrodesis with use of stealth navigation;  Surgeon: Suha Kent MD;  Location: [facility]    ORTHOPEDIC SURGERY  10/01/2010    Cut palm and reattched tendons and nerves in left hand forefinger.    ND ESOPHAGOGASTRODUODENOSCOPY TRANSORAL DIAGNOSTIC N/A 04/30/2021    Procedure: ESOPHAGOGASTRODUODENOSCOPY (EGD);  Surgeon: Souleymane Moreno DO;  Location: [facility];  Service: General    SURGICAL HISTORY OF -   01/04/2000    Esophagogastroduodenoscopy with biopsy    TUBAL LIGATION       Current Outpatient Medications   Medication Sig Dispense Refill    acetaminophen (TYLENOL) 500 MG tablet Take 1,000 mg by mouth 2 times daily.      aspirin 81 MG EC tablet Take 81 mg by mouth daily      atorvastatin (LIPITOR) 40 MG tablet Take 1 tablet (40 mg) by mouth daily. 90 tablet 3    celecoxib (CELEBREX) 100 MG capsule Take 1 capsule (100 mg) by mouth 2 times daily. 180 capsule 0    clobetasol (TEMOVATE) 0.05 % external ointment Apply topically 2 times daily To affected areas 60 g 0    clobetasol  "propionate (CLOBEX) 0.05 % external shampoo Apply thin film to dry scalp once daily (maximum dose: 50 g/week or 50 mL/week); leave in place for 15 minutes, then add water, lather, and rinse thoroughly. Limit treatment to 4 consecutive weeks. 118 mL 1    fish oil-omega-3 fatty acids 1000 MG capsule Take 1 g by mouth daily      nitroGLYcerin (NITROSTAT) 0.4 MG sublingual tablet One tablet under the tongue every 5 minutes if needed for chest pain. May repeat every 5 minutes for a maximum of 3 doses in 15 minutes\" 25 tablet 3    omeprazole (PRILOSEC) 20 MG DR capsule TAKE 1 CAPSULE BY MOUTH THREE TIMES DAILY 270 capsule 0    promethazine (PHENERGAN) 25 MG tablet Take 1 tablet (25 mg) by mouth every 8 hours as needed for nausea 21 tablet 0    telmisartan (MICARDIS) 80 MG tablet Take 1 tablet (80 mg) by mouth daily. 90 tablet 1    UNABLE TO FIND Take 2 tablets by mouth at bedtime. MEDICATION NAME: Qunol Sleep - melatonin 5 mg, Ashwagandha, L-Theanine      Vitamin D (Cholecalciferol) 25 MCG (1000 UT) TABS Take 1,000 Units by mouth daily      losartan (COZAAR) 25 MG tablet Take 25 mg by mouth daily. (Patient not taking: Reported on 5/5/2025)      methocarbamol (ROBAXIN) 750 MG tablet Take 1 tablet (750 mg) by mouth 4 times daily as needed for muscle spasms (Patient not taking: Reported on 5/5/2025) 42 tablet 0       Allergies   Allergen Reactions    Ciprofloxacin Anaphylaxis    Flagyl [Metronidazole] Anaphylaxis    Gabapentin Other (See Comments)     Suicidal thoughts.    Zofran [Ondansetron] Other (See Comments) and GI Disturbance     Causes bloating, moderately uncomfortable, abd pain      Benadryl [Diphenhydramine] Other (See Comments)     Muscle spasms    Percocet [Oxycodone-Acetaminophen] Other (See Comments) and Headache     Goes nuts - nasty mean irritated, can take Dilaudid    Vicodin [Hydrocodone-Acetaminophen] Other (See Comments)     Anxiety-agitation        Social History     Tobacco Use    Smoking status: Every " "Day     Current packs/day: 0.00     Average packs/day: 0.5 packs/day for 30.0 years (15.0 ttl pk-yrs)     Types: Cigarettes     Start date: 1993     Last attempt to quit: 2023     Years since quittin.9    Smokeless tobacco: Never    Tobacco comments:     6-10 Cigs a day   Substance Use Topics    Alcohol use: Not Currently     Comment: Alcoholic Drinks/day: 2x year      Family History   Problem Relation Age of Onset    C.A.D. Father 50        50's    Diabetes Father     Diabetes Brother     C.A.D. Brother 42        quad bypass and MI    Diabetes Brother         2 brothers have DM    Cancer Brother 34        Melanoma    Aortic aneurysm Brother     Allergies Son         Meds    Allergies Daughter         Med    Cancer Mother     C.A.D. Brother 38        MI age 38    Diabetes Mother     Diabetes Brother      History   Drug Use Unknown           Review of Systems  Constitutional, neuro, ENT, endocrine, pulmonary, cardiac, gastrointestinal, genitourinary, musculoskeletal, integument and psychiatric systems are negative, except as otherwise noted.    Objective    /72   Pulse 103   Temp 97  F (36.1  C) (Tympanic)   Resp 20   Ht 1.499 m (4' 11\")   Wt 72.1 kg (159 lb)   LMP 04/10/2016   SpO2 96%   BMI 32.11 kg/m     Estimated body mass index is 32.11 kg/m  as calculated from the following:    Height as of this encounter: 1.499 m (4' 11\").    Weight as of this encounter: 72.1 kg (159 lb).  Physical Exam  GENERAL: alert and no distress  EYES: Eyes grossly normal to inspection, PERRL and conjunctivae and sclerae normal  HENT: normal cephalic/atraumatic, nose and mouth without ulcers or lesions, oropharynx clear, and oral mucous membranes moist  NECK: no adenopathy, no asymmetry, masses, or scars  RESP: lungs clear to auscultation - no rales, rhonchi or wheezes  CV: regular rate and rhythm, normal S1 S2, no S3 or S4, no murmur, click or rub, no peripheral edema  ABDOMEN: soft, nontender, no " hepatosplenomegaly, no masses and bowel sounds normal  MS: no gross musculoskeletal defects noted, no edema  SKIN: no suspicious lesions or rashes  NEURO: Normal strength and tone, mentation intact and speech normal  PSYCH: mentation appears normal, affect normal/bright    Recent Labs   Lab Test 04/09/25  0837      POTASSIUM 3.9   CR 0.78        Diagnostics  No labs were ordered during this visit.   NM Lexiscan: Recent Lexiscan result reviewed, normal    Revised Cardiac Risk Index (RCRI)  The patient has the following serious cardiovascular risks for perioperative complications:   - Cerebrovascular Disease = 1 point     RCRI Interpretation: 1 point: Class II (low risk - 0.9% complication rate)         Signed Electronically by: Brendan Mcmillan MD  A copy of this evaluation report is provided to the requesting physician.

## 2025-05-08 ENCOUNTER — TELEPHONE (OUTPATIENT)
Dept: SURGERY | Facility: CLINIC | Age: 60
End: 2025-05-08

## 2025-05-08 ENCOUNTER — VIRTUAL VISIT (OUTPATIENT)
Dept: SURGERY | Facility: CLINIC | Age: 60
End: 2025-05-08
Payer: COMMERCIAL

## 2025-05-08 DIAGNOSIS — K44.9 HIATAL HERNIA: Primary | ICD-10-CM

## 2025-05-08 PROCEDURE — 99207 PR NO BILLABLE SERVICE THIS VISIT: CPT | Performed by: SURGERY

## 2025-05-08 NOTE — LETTER
5/8/2025      Selina Parikh  [id number] [facility] [address]      Dear Colleague,    Thank you for referring your patient, Selina Parikh, to the [facility] [place]. Please see a copy of my visit note below.    Virtual Visit Details    Type of service:  Telephone Visit   Phone call duration: 10 minutes   Originating Location (pt. Location): Home    Distant Location (provider location):  On-site  Telephone visit completed due to the patient did not consent to a video visit.     I spoke with Selina regarding the results of her endoscopy.  She continues to have significant gastroesophageal reflux symptoms with Cruz's esophagus noted on endoscopy biopsies.  We discussed options including continued observation and surveillance strategies with PPIs and dietary/lifestyle modification versus surgical correction of her hiatal hernia.  She would like to pursue the surgical option at this point but will have to hold off until she is cleared by her neurosurgeon after her upcoming neurosurgical procedure.  At this point she will continue with PPIs and dietary lifestyle management strategies until she calls back in the clinic to schedule surgery.      Jean Marie Moreno DO FACS  [facility]  [phone]      Again, thank you for allowing me to participate in the care of your patient.        Sincerely,        Souleymane Moreno DO    Electronically signed

## 2025-05-08 NOTE — LETTER
Pre-op Physical: Schedule a pre-op physical with your primary care doctor.  The pre-op physical must be 10-30 days before surgery and since it is required by anesthesia, your surgery will be cancelled if it's not done.      Surgery Date: 8/6/2025     Location: [place] [id number] [place] [id number]    Approximate Arrival Time: 10:30 am  (Unless instructed differently by the pre-op call nurse)     Post op Appointment: 8/18/2025 at 11:10 am with Dr. Tiffanie [facility]   [facility] [address], [place] [id number].       Pre-Surgical Tasks:       Review all medications with your primary care or prescribing physician; they will advise you which meds to stop and when, and when you can resume taking.  Certain medications like blood thinners and weight loss medications need to be stopped in advance of surgery to proceed safely.      Blood thinners including but not exclusive to drugs like Xarelto, Eliquis, Warfarin and Aspirin, should be stopped five days before surgery, if your prescribing provider agrees. Follow your provider's advice on stopping blood thinners because they know you best.  If you are unsure if your medication is a blood thinner, ask your prescribing provider.    Weight loss medications: There are multiple medications being used for weight management and diabetes today, and the list is growing.  Phentermine, Ozempic, Wegovy, Trulicity, and other similar medications need to be stopped one week before surgery to avoid being cancelled.  Victoza and Saxenda can be continued longer but must be stopped one full day before surgery.  Please ask your prescribing provider for advice.    Diabetic medications: in addition to the medications talked about above that are used for either weight loss or diabetes, some people are on insulin that may require adjustment.  Please discuss managing diabetic medications with your  prescribing doctor as these medications may require modification prior to surgery.     Please shower the evening before and morning of surgery with Hibiclens soap.  Any [facility] can provide this to you at no cost, or it can be found at your local pharmacy.     Fasting instructions will be provided by the pre-op nurse who will call you 1-3 days before surgery.  Typically, we advise normal food up to 8 hours before you arrive for surgery. Clear liquids only from then until 2 hours before you arrive surgery, then nothing at all by mouth.  The nurse will review your specific instructions with you at the call.      Smoking impacts your body's ability to heal properly so we advise patients to quit if possible before surgery.  Plastic Surgery patients are required to be nicotine free for at least 8 weeks before surgery.      You will need an adult to drive you home and stay with you 24 hours after surgery. Public transportation or Medical Van Services are not permitted.    Visitor restrictions are subject to change, please verify with the pre-op nurse when they call how many people are permitted to accompany you.    We always encourage you to notify your insurance any time you have medical tests or procedures scheduled including surgery. The number is usually right on the back of your insurance card. To obtain pricing for surgery, please call [facility] Cost of Care at [phone] or email [email or web address].        Call our office if you have any questions! Thank you!     Bree Ruiz MA  [facility]   (General Surgery/ ENT/ Plastics)  Direct Office: [phone]        Electronically signed

## 2025-05-08 NOTE — PROGRESS NOTES
Virtual Visit Details    Type of service:  Telephone Visit   Phone call duration: 10 minutes   Originating Location (pt. Location): Home    Distant Location (provider location):  On-site  Telephone visit completed due to the patient did not consent to a video visit.     I spoke with Selina regarding the results of her endoscopy.  She continues to have significant gastroesophageal reflux symptoms with Cruz's esophagus noted on endoscopy biopsies.  We discussed options including continued observation and surveillance strategies with PPIs and dietary/lifestyle modification versus surgical correction of her hiatal hernia.  She would like to pursue the surgical option at this point but will have to hold off until she is cleared by her neurosurgeon after her upcoming neurosurgical procedure.  At this point she will continue with PPIs and dietary lifestyle management strategies until she calls back in the clinic to schedule surgery.      Jean Marie Moreno DO [facility]  [phone]

## 2025-05-08 NOTE — TELEPHONE ENCOUNTER
Spoke with patient today to schedule surgery as ordered by the provider.    WC/MVA Related?: No    Went over details/instructions as written on the letter.    Pre Op By: H&P by Primary MD  Post Op Scheduled : YES    Medications:  Blood Thinners? No  Weight Loss Meds? No  Diabetes Meds? No    Surgery Letter sent via Mail  Is this the correct address?: Yes  [address] [facility] [address]      (Please see LETTERS TAB in chart to retrieve a copy of this letter)

## 2025-05-08 NOTE — H&P (VIEW-ONLY)
Virtual Visit Details    Type of service:  Telephone Visit   Phone call duration: 10 minutes   Originating Location (pt. Location): Home    Distant Location (provider location):  On-site  Telephone visit completed due to the patient did not consent to a video visit.     I spoke with Selina regarding the results of her endoscopy.  She continues to have significant gastroesophageal reflux symptoms with Cruz's esophagus noted on endoscopy biopsies.  We discussed options including continued observation and surveillance strategies with PPIs and dietary/lifestyle modification versus surgical correction of her hiatal hernia.  She would like to pursue the surgical option at this point but will have to hold off until she is cleared by her neurosurgeon after her upcoming neurosurgical procedure.  At this point she will continue with PPIs and dietary lifestyle management strategies until she calls back in the clinic to schedule surgery.      Jean Marie Moreno DO [facility]  [phone]

## 2025-05-13 DIAGNOSIS — K21.00 GASTROESOPHAGEAL REFLUX DISEASE WITH ESOPHAGITIS WITHOUT HEMORRHAGE: Chronic | ICD-10-CM

## 2025-05-13 RX ORDER — OMEPRAZOLE 20 MG/1
20 CAPSULE, DELAYED RELEASE ORAL 3 TIMES DAILY
Qty: 270 CAPSULE | Refills: 2 | Status: SHIPPED | OUTPATIENT
Start: 2025-05-13

## 2025-05-16 ENCOUNTER — HOSPITAL ENCOUNTER (INPATIENT)
Facility: CLINIC | Age: 60
LOS: 4 days | Discharge: HOME | DRG: 027 | End: 2025-05-20
Attending: SURGERY | Admitting: SURGERY
Payer: COMMERCIAL

## 2025-05-16 DIAGNOSIS — Z98.890 S/P CRANIOTOMY: Primary | ICD-10-CM

## 2025-05-16 LAB
ABO + RH BLD: NORMAL
BLD GP AB SCN SERPL QL: NEGATIVE
CREAT SERPL-MCNC: 0.8 MG/DL (ref 0.51–0.95)
EGFRCR SERPLBLD CKD-EPI 2021: 84 ML/MIN/1.73M2
GLUCOSE BLDC GLUCOMTR-MCNC: 134 MG/DL (ref 70–99)
GLUCOSE BLDC GLUCOMTR-MCNC: 175 MG/DL (ref 70–99)
GLUCOSE SERPL-MCNC: 97 MG/DL (ref 70–99)
HGB BLD-MCNC: 14.5 G/DL (ref 11.7–15.7)
MCV RBC AUTO: 94 FL (ref 78–100)
POTASSIUM SERPL-SCNC: 3.9 MMOL/L (ref 3.4–5.3)
SPECIMEN EXP DATE BLD: NORMAL

## 2025-05-16 PROCEDURE — 8E09XBF COMPUTER ASSISTED PROCEDURE OF HEAD AND NECK REGION, WITH FLUOROSCOPY: ICD-10-PCS | Performed by: SURGERY

## 2025-05-16 PROCEDURE — 250N000011 HC RX IP 250 OP 636: Performed by: SURGERY

## 2025-05-16 PROCEDURE — C1713 ANCHOR/SCREW BN/BN,TIS/BN: HCPCS | Performed by: SURGERY

## 2025-05-16 PROCEDURE — 88342 IMHCHEM/IMCYTCHM 1ST ANTB: CPT | Mod: 26 | Performed by: STUDENT IN AN ORGANIZED HEALTH CARE EDUCATION/TRAINING PROGRAM

## 2025-05-16 PROCEDURE — 00B00ZZ EXCISION OF BRAIN, OPEN APPROACH: ICD-10-PCS | Performed by: SURGERY

## 2025-05-16 PROCEDURE — 85018 HEMOGLOBIN: CPT | Performed by: ANESTHESIOLOGY

## 2025-05-16 PROCEDURE — 82947 ASSAY GLUCOSE BLOOD QUANT: CPT | Performed by: ANESTHESIOLOGY

## 2025-05-16 PROCEDURE — 88331 PATH CONSLTJ SURG 1 BLK 1SPC: CPT | Mod: 26 | Performed by: PATHOLOGY

## 2025-05-16 PROCEDURE — 250N000025 HC SEVOFLURANE, PER MIN: Performed by: SURGERY

## 2025-05-16 PROCEDURE — 250N000011 HC RX IP 250 OP 636: Performed by: ANESTHESIOLOGY

## 2025-05-16 PROCEDURE — 61781 SCAN PROC CRANIAL INTRA: CPT | Mod: AS

## 2025-05-16 PROCEDURE — 82565 ASSAY OF CREATININE: CPT | Performed by: ANESTHESIOLOGY

## 2025-05-16 PROCEDURE — 370N000017 HC ANESTHESIA TECHNICAL FEE, PER MIN: Performed by: SURGERY

## 2025-05-16 PROCEDURE — 88307 TISSUE EXAM BY PATHOLOGIST: CPT | Mod: 26 | Performed by: STUDENT IN AN ORGANIZED HEALTH CARE EDUCATION/TRAINING PROGRAM

## 2025-05-16 PROCEDURE — 250N000011 HC RX IP 250 OP 636: Mod: JZ

## 2025-05-16 PROCEDURE — 272N000004 HC RX 272: Performed by: SURGERY

## 2025-05-16 PROCEDURE — 99222 1ST HOSP IP/OBS MODERATE 55: CPT | Performed by: INTERNAL MEDICINE

## 2025-05-16 PROCEDURE — 36415 COLL VENOUS BLD VENIPUNCTURE: CPT | Performed by: ANESTHESIOLOGY

## 2025-05-16 PROCEDURE — 61781 SCAN PROC CRANIAL INTRA: CPT | Performed by: SURGERY

## 2025-05-16 PROCEDURE — 61512 CRNEC TREPH EXC MNGIOMA STTL: CPT | Mod: AS

## 2025-05-16 PROCEDURE — 250N000013 HC RX MED GY IP 250 OP 250 PS 637

## 2025-05-16 PROCEDURE — 200N000001 HC R&B ICU

## 2025-05-16 PROCEDURE — 999N000141 HC STATISTIC PRE-PROCEDURE NURSING ASSESSMENT: Performed by: SURGERY

## 2025-05-16 PROCEDURE — 61512 CRNEC TREPH EXC MNGIOMA STTL: CPT | Performed by: SURGERY

## 2025-05-16 PROCEDURE — P9045 ALBUMIN (HUMAN), 5%, 250 ML: HCPCS | Performed by: ANESTHESIOLOGY

## 2025-05-16 PROCEDURE — 250N000005 HC OR RX SURGIFLO HEMOSTATIC MATRIX 10ML 199102S OPNP: Performed by: SURGERY

## 2025-05-16 PROCEDURE — 258N000003 HC RX IP 258 OP 636

## 2025-05-16 PROCEDURE — 710N000010 HC RECOVERY PHASE 1, LEVEL 2, PER MIN: Performed by: SURGERY

## 2025-05-16 PROCEDURE — 360N000079 HC SURGERY LEVEL 6, PER MIN: Performed by: SURGERY

## 2025-05-16 PROCEDURE — 88331 PATH CONSLTJ SURG 1 BLK 1SPC: CPT | Mod: TC | Performed by: SURGERY

## 2025-05-16 PROCEDURE — 250N000009 HC RX 250: Performed by: SURGERY

## 2025-05-16 PROCEDURE — 86900 BLOOD TYPING SEROLOGIC ABO: CPT | Performed by: ANESTHESIOLOGY

## 2025-05-16 PROCEDURE — 84132 ASSAY OF SERUM POTASSIUM: CPT | Performed by: ANESTHESIOLOGY

## 2025-05-16 PROCEDURE — 272N000001 HC OR GENERAL SUPPLY STERILE: Performed by: SURGERY

## 2025-05-16 PROCEDURE — 88341 IMHCHEM/IMCYTCHM EA ADD ANTB: CPT | Mod: 26 | Performed by: STUDENT IN AN ORGANIZED HEALTH CARE EDUCATION/TRAINING PROGRAM

## 2025-05-16 DEVICE — SCREW BONE NEURO 3 AXIS 4X1.5MM 56-15934: Type: IMPLANTABLE DEVICE | Site: CRANIAL | Status: FUNCTIONAL

## 2025-05-16 DEVICE — IMP BURR HOLE COVER UNIV NEURO 3 OD14 MM CRANIAL LOW PROF: Type: IMPLANTABLE DEVICE | Site: CRANIAL | Status: FUNCTIONAL

## 2025-05-16 DEVICE — IMPLANTABLE DEVICE: Type: IMPLANTABLE DEVICE | Site: CRANIAL | Status: FUNCTIONAL

## 2025-05-16 RX ORDER — METHOCARBAMOL 500 MG/1
1000 TABLET, FILM COATED ORAL EVERY 6 HOURS PRN
Status: DISCONTINUED | OUTPATIENT
Start: 2025-05-16 | End: 2025-05-20 | Stop reason: HOSPADM

## 2025-05-16 RX ORDER — NALOXONE HYDROCHLORIDE 0.4 MG/ML
0.4 INJECTION, SOLUTION INTRAMUSCULAR; INTRAVENOUS; SUBCUTANEOUS
Status: DISCONTINUED | OUTPATIENT
Start: 2025-05-16 | End: 2025-05-20 | Stop reason: HOSPADM

## 2025-05-16 RX ORDER — HYDROMORPHONE HYDROCHLORIDE 2 MG/1
2 TABLET ORAL
Refills: 0 | Status: DISCONTINUED | OUTPATIENT
Start: 2025-05-16 | End: 2025-05-20 | Stop reason: HOSPADM

## 2025-05-16 RX ORDER — HYDROMORPHONE HCL IN WATER/PF 6 MG/30 ML
0.4 PATIENT CONTROLLED ANALGESIA SYRINGE INTRAVENOUS EVERY 5 MIN PRN
Status: DISCONTINUED | OUTPATIENT
Start: 2025-05-16 | End: 2025-05-16 | Stop reason: HOSPADM

## 2025-05-16 RX ORDER — OXYCODONE HYDROCHLORIDE 5 MG/1
5 TABLET ORAL EVERY 4 HOURS PRN
Refills: 0 | Status: DISCONTINUED | OUTPATIENT
Start: 2025-05-16 | End: 2025-05-16

## 2025-05-16 RX ORDER — ONDANSETRON 2 MG/ML
4 INJECTION INTRAMUSCULAR; INTRAVENOUS EVERY 6 HOURS PRN
Status: DISCONTINUED | OUTPATIENT
Start: 2025-05-16 | End: 2025-05-20 | Stop reason: HOSPADM

## 2025-05-16 RX ORDER — HYDROMORPHONE HYDROCHLORIDE 2 MG/1
4 TABLET ORAL
Refills: 0 | Status: DISCONTINUED | OUTPATIENT
Start: 2025-05-16 | End: 2025-05-20 | Stop reason: HOSPADM

## 2025-05-16 RX ORDER — ACETAMINOPHEN 325 MG/1
975 TABLET ORAL EVERY 8 HOURS
Status: DISCONTINUED | OUTPATIENT
Start: 2025-05-16 | End: 2025-05-18

## 2025-05-16 RX ORDER — BISACODYL 10 MG
10 SUPPOSITORY, RECTAL RECTAL DAILY PRN
Status: DISCONTINUED | OUTPATIENT
Start: 2025-05-19 | End: 2025-05-17

## 2025-05-16 RX ORDER — PROCHLORPERAZINE MALEATE 10 MG
10 TABLET ORAL EVERY 6 HOURS PRN
Status: DISCONTINUED | OUTPATIENT
Start: 2025-05-16 | End: 2025-05-20 | Stop reason: HOSPADM

## 2025-05-16 RX ORDER — POLYETHYLENE GLYCOL 3350 17 G/17G
17 POWDER, FOR SOLUTION ORAL DAILY
Status: DISCONTINUED | OUTPATIENT
Start: 2025-05-17 | End: 2025-05-16

## 2025-05-16 RX ORDER — HYDROMORPHONE HCL IN WATER/PF 6 MG/30 ML
0.4 PATIENT CONTROLLED ANALGESIA SYRINGE INTRAVENOUS
Status: DISCONTINUED | OUTPATIENT
Start: 2025-05-16 | End: 2025-05-20 | Stop reason: HOSPADM

## 2025-05-16 RX ORDER — CEFAZOLIN SODIUM/WATER 2 G/20 ML
2 SYRINGE (ML) INTRAVENOUS
Status: COMPLETED | OUTPATIENT
Start: 2025-05-16 | End: 2025-05-16

## 2025-05-16 RX ORDER — LEVETIRACETAM 500 MG/1
500 TABLET ORAL 2 TIMES DAILY
Status: DISCONTINUED | OUTPATIENT
Start: 2025-05-16 | End: 2025-05-20 | Stop reason: HOSPADM

## 2025-05-16 RX ORDER — BISACODYL 10 MG
10 SUPPOSITORY, RECTAL RECTAL DAILY PRN
Status: DISCONTINUED | OUTPATIENT
Start: 2025-05-19 | End: 2025-05-20 | Stop reason: HOSPADM

## 2025-05-16 RX ORDER — CEFAZOLIN SODIUM/WATER 2 G/20 ML
2 SYRINGE (ML) INTRAVENOUS SEE ADMIN INSTRUCTIONS
Status: DISCONTINUED | OUTPATIENT
Start: 2025-05-16 | End: 2025-05-16 | Stop reason: HOSPADM

## 2025-05-16 RX ORDER — ONDANSETRON 2 MG/ML
4 INJECTION INTRAMUSCULAR; INTRAVENOUS EVERY 6 HOURS PRN
Status: DISCONTINUED | OUTPATIENT
Start: 2025-05-16 | End: 2025-05-16

## 2025-05-16 RX ORDER — SODIUM CHLORIDE 9 MG/ML
INJECTION, SOLUTION INTRAVENOUS CONTINUOUS
Status: DISCONTINUED | OUTPATIENT
Start: 2025-05-16 | End: 2025-05-18

## 2025-05-16 RX ORDER — DEXAMETHASONE SODIUM PHOSPHATE 4 MG/ML
4 INJECTION, SOLUTION INTRA-ARTICULAR; INTRALESIONAL; INTRAMUSCULAR; INTRAVENOUS; SOFT TISSUE EVERY 6 HOURS SCHEDULED
Status: DISCONTINUED | OUTPATIENT
Start: 2025-05-16 | End: 2025-05-18

## 2025-05-16 RX ORDER — SODIUM CHLORIDE, SODIUM LACTATE, POTASSIUM CHLORIDE, CALCIUM CHLORIDE 600; 310; 30; 20 MG/100ML; MG/100ML; MG/100ML; MG/100ML
INJECTION, SOLUTION INTRAVENOUS CONTINUOUS
Status: DISCONTINUED | OUTPATIENT
Start: 2025-05-16 | End: 2025-05-16 | Stop reason: HOSPADM

## 2025-05-16 RX ORDER — CEFAZOLIN SODIUM 2 G/50ML
2 SOLUTION INTRAVENOUS EVERY 8 HOURS
Status: COMPLETED | OUTPATIENT
Start: 2025-05-16 | End: 2025-05-17

## 2025-05-16 RX ORDER — FENTANYL CITRATE 50 UG/ML
50 INJECTION, SOLUTION INTRAMUSCULAR; INTRAVENOUS EVERY 5 MIN PRN
Status: DISCONTINUED | OUTPATIENT
Start: 2025-05-16 | End: 2025-05-16 | Stop reason: HOSPADM

## 2025-05-16 RX ORDER — NALOXONE HYDROCHLORIDE 0.4 MG/ML
0.2 INJECTION, SOLUTION INTRAMUSCULAR; INTRAVENOUS; SUBCUTANEOUS
Status: DISCONTINUED | OUTPATIENT
Start: 2025-05-16 | End: 2025-05-20 | Stop reason: HOSPADM

## 2025-05-16 RX ORDER — ONDANSETRON 4 MG/1
4 TABLET, ORALLY DISINTEGRATING ORAL EVERY 30 MIN PRN
Status: DISCONTINUED | OUTPATIENT
Start: 2025-05-16 | End: 2025-05-16 | Stop reason: HOSPADM

## 2025-05-16 RX ORDER — OXYCODONE HYDROCHLORIDE 5 MG/1
10 TABLET ORAL EVERY 4 HOURS PRN
Refills: 0 | Status: DISCONTINUED | OUTPATIENT
Start: 2025-05-16 | End: 2025-05-16

## 2025-05-16 RX ORDER — ONDANSETRON 4 MG/1
4 TABLET, ORALLY DISINTEGRATING ORAL EVERY 6 HOURS PRN
Status: DISCONTINUED | OUTPATIENT
Start: 2025-05-16 | End: 2025-05-16

## 2025-05-16 RX ORDER — HYDROMORPHONE HCL IN WATER/PF 6 MG/30 ML
0.2 PATIENT CONTROLLED ANALGESIA SYRINGE INTRAVENOUS EVERY 5 MIN PRN
Status: DISCONTINUED | OUTPATIENT
Start: 2025-05-16 | End: 2025-05-16 | Stop reason: HOSPADM

## 2025-05-16 RX ORDER — MAGNESIUM HYDROXIDE 1200 MG/15ML
LIQUID ORAL PRN
Status: DISCONTINUED | OUTPATIENT
Start: 2025-05-16 | End: 2025-05-16 | Stop reason: HOSPADM

## 2025-05-16 RX ORDER — PROCHLORPERAZINE MALEATE 10 MG
10 TABLET ORAL EVERY 6 HOURS PRN
Status: DISCONTINUED | OUTPATIENT
Start: 2025-05-16 | End: 2025-05-16

## 2025-05-16 RX ORDER — OMEPRAZOLE 20 MG/1
20 CAPSULE, DELAYED RELEASE ORAL 3 TIMES DAILY
Status: DISCONTINUED | OUTPATIENT
Start: 2025-05-16 | End: 2025-05-20 | Stop reason: HOSPADM

## 2025-05-16 RX ORDER — DEXAMETHASONE SODIUM PHOSPHATE 4 MG/ML
4 INJECTION, SOLUTION INTRA-ARTICULAR; INTRALESIONAL; INTRAMUSCULAR; INTRAVENOUS; SOFT TISSUE
Status: DISCONTINUED | OUTPATIENT
Start: 2025-05-16 | End: 2025-05-16 | Stop reason: HOSPADM

## 2025-05-16 RX ORDER — LABETALOL HYDROCHLORIDE 5 MG/ML
10 INJECTION, SOLUTION INTRAVENOUS EVERY 10 MIN PRN
Status: DISCONTINUED | OUTPATIENT
Start: 2025-05-16 | End: 2025-05-20 | Stop reason: HOSPADM

## 2025-05-16 RX ORDER — ONDANSETRON 4 MG/1
4 TABLET, ORALLY DISINTEGRATING ORAL EVERY 6 HOURS PRN
Status: DISCONTINUED | OUTPATIENT
Start: 2025-05-16 | End: 2025-05-20 | Stop reason: HOSPADM

## 2025-05-16 RX ORDER — HYDRALAZINE HYDROCHLORIDE 20 MG/ML
10 INJECTION INTRAMUSCULAR; INTRAVENOUS EVERY 30 MIN PRN
Status: DISCONTINUED | OUTPATIENT
Start: 2025-05-16 | End: 2025-05-20 | Stop reason: HOSPADM

## 2025-05-16 RX ORDER — ENOXAPARIN SODIUM 100 MG/ML
40 INJECTION SUBCUTANEOUS EVERY 24 HOURS
Status: DISCONTINUED | OUTPATIENT
Start: 2025-05-18 | End: 2025-05-20 | Stop reason: HOSPADM

## 2025-05-16 RX ORDER — BUPIVACAINE HCL/EPINEPHRINE 0.25-.0005
VIAL (ML) INJECTION PRN
Status: DISCONTINUED | OUTPATIENT
Start: 2025-05-16 | End: 2025-05-16 | Stop reason: HOSPADM

## 2025-05-16 RX ORDER — AMOXICILLIN 250 MG
1 CAPSULE ORAL 2 TIMES DAILY
Status: DISCONTINUED | OUTPATIENT
Start: 2025-05-16 | End: 2025-05-16

## 2025-05-16 RX ORDER — DEXAMETHASONE 4 MG/1
4 TABLET ORAL EVERY 6 HOURS SCHEDULED
Status: DISCONTINUED | OUTPATIENT
Start: 2025-05-16 | End: 2025-05-18

## 2025-05-16 RX ORDER — BACITRACIN ZINC 500 [USP'U]/G
OINTMENT TOPICAL PRN
Status: DISCONTINUED | OUTPATIENT
Start: 2025-05-16 | End: 2025-05-16 | Stop reason: HOSPADM

## 2025-05-16 RX ORDER — NALOXONE HYDROCHLORIDE 0.4 MG/ML
0.1 INJECTION, SOLUTION INTRAMUSCULAR; INTRAVENOUS; SUBCUTANEOUS
Status: DISCONTINUED | OUTPATIENT
Start: 2025-05-16 | End: 2025-05-16 | Stop reason: HOSPADM

## 2025-05-16 RX ORDER — LIDOCAINE 40 MG/G
CREAM TOPICAL
Status: DISCONTINUED | OUTPATIENT
Start: 2025-05-16 | End: 2025-05-20 | Stop reason: HOSPADM

## 2025-05-16 RX ORDER — ONDANSETRON 2 MG/ML
4 INJECTION INTRAMUSCULAR; INTRAVENOUS EVERY 30 MIN PRN
Status: DISCONTINUED | OUTPATIENT
Start: 2025-05-16 | End: 2025-05-16 | Stop reason: HOSPADM

## 2025-05-16 RX ORDER — FENTANYL CITRATE 50 UG/ML
25 INJECTION, SOLUTION INTRAMUSCULAR; INTRAVENOUS EVERY 5 MIN PRN
Status: DISCONTINUED | OUTPATIENT
Start: 2025-05-16 | End: 2025-05-16 | Stop reason: HOSPADM

## 2025-05-16 RX ORDER — POLYETHYLENE GLYCOL 3350 17 G/17G
17 POWDER, FOR SOLUTION ORAL DAILY
Status: DISCONTINUED | OUTPATIENT
Start: 2025-05-17 | End: 2025-05-20 | Stop reason: HOSPADM

## 2025-05-16 RX ORDER — CEFAZOLIN SODIUM/WATER 2 G/20 ML
2 SYRINGE (ML) INTRAVENOUS EVERY 8 HOURS
Status: DISCONTINUED | OUTPATIENT
Start: 2025-05-16 | End: 2025-05-16

## 2025-05-16 RX ORDER — PANTOPRAZOLE SODIUM 40 MG/1
40 TABLET, DELAYED RELEASE ORAL
Status: DISCONTINUED | OUTPATIENT
Start: 2025-05-16 | End: 2025-05-16 | Stop reason: ALTCHOICE

## 2025-05-16 RX ORDER — AMOXICILLIN 250 MG
1 CAPSULE ORAL 2 TIMES DAILY
Status: DISCONTINUED | OUTPATIENT
Start: 2025-05-16 | End: 2025-05-20 | Stop reason: HOSPADM

## 2025-05-16 RX ORDER — HYDROMORPHONE HCL IN WATER/PF 6 MG/30 ML
0.2 PATIENT CONTROLLED ANALGESIA SYRINGE INTRAVENOUS
Status: DISCONTINUED | OUTPATIENT
Start: 2025-05-16 | End: 2025-05-20 | Stop reason: HOSPADM

## 2025-05-16 RX ORDER — METHOCARBAMOL 500 MG/1
500 TABLET, FILM COATED ORAL EVERY 6 HOURS PRN
Status: DISCONTINUED | OUTPATIENT
Start: 2025-05-16 | End: 2025-05-16

## 2025-05-16 RX ORDER — CALCIUM CARBONATE 500 MG/1
500 TABLET, CHEWABLE ORAL DAILY PRN
Status: DISCONTINUED | OUTPATIENT
Start: 2025-05-16 | End: 2025-05-20 | Stop reason: HOSPADM

## 2025-05-16 RX ADMIN — SODIUM CHLORIDE: 0.9 INJECTION, SOLUTION INTRAVENOUS at 13:19

## 2025-05-16 RX ADMIN — DEXAMETHASONE SODIUM PHOSPHATE 4 MG: 4 INJECTION, SOLUTION INTRAMUSCULAR; INTRAVENOUS at 23:52

## 2025-05-16 RX ADMIN — METHOCARBAMOL 1000 MG: 500 TABLET ORAL at 14:33

## 2025-05-16 RX ADMIN — HYDROMORPHONE HYDROCHLORIDE 0.2 MG: 0.2 INJECTION, SOLUTION INTRAMUSCULAR; INTRAVENOUS; SUBCUTANEOUS at 13:59

## 2025-05-16 RX ADMIN — ACETAMINOPHEN 975 MG: 325 TABLET, FILM COATED ORAL at 21:16

## 2025-05-16 RX ADMIN — SENNOSIDES AND DOCUSATE SODIUM 1 TABLET: 50; 8.6 TABLET ORAL at 21:16

## 2025-05-16 RX ADMIN — DEXAMETHASONE SODIUM PHOSPHATE 4 MG: 4 INJECTION, SOLUTION INTRAMUSCULAR; INTRAVENOUS at 13:23

## 2025-05-16 RX ADMIN — PROCHLORPERAZINE EDISYLATE 10 MG: 5 INJECTION INTRAMUSCULAR; INTRAVENOUS at 16:01

## 2025-05-16 RX ADMIN — ALBUMIN HUMAN 12.5 G: 0.05 INJECTION, SOLUTION INTRAVENOUS at 11:37

## 2025-05-16 RX ADMIN — CEFAZOLIN SODIUM 2 G: 2 SOLUTION INTRAVENOUS at 15:42

## 2025-05-16 RX ADMIN — FENTANYL CITRATE 25 MCG: 0.05 INJECTION, SOLUTION INTRAMUSCULAR; INTRAVENOUS at 12:18

## 2025-05-16 RX ADMIN — OMEPRAZOLE 20 MG: 20 CAPSULE, DELAYED RELEASE ORAL at 21:16

## 2025-05-16 RX ADMIN — FENTANYL CITRATE 25 MCG: 0.05 INJECTION, SOLUTION INTRAMUSCULAR; INTRAVENOUS at 12:13

## 2025-05-16 RX ADMIN — HYDROMORPHONE HYDROCHLORIDE 0.4 MG: 0.2 INJECTION, SOLUTION INTRAMUSCULAR; INTRAVENOUS; SUBCUTANEOUS at 17:05

## 2025-05-16 RX ADMIN — HYDROMORPHONE HYDROCHLORIDE 0.2 MG: 0.2 INJECTION, SOLUTION INTRAMUSCULAR; INTRAVENOUS; SUBCUTANEOUS at 20:55

## 2025-05-16 RX ADMIN — CEFAZOLIN SODIUM 2 G: 2 SOLUTION INTRAVENOUS at 23:54

## 2025-05-16 RX ADMIN — LEVETIRACETAM 500 MG: 500 TABLET, FILM COATED ORAL at 21:16

## 2025-05-16 RX ADMIN — HYDROMORPHONE HYDROCHLORIDE 0.4 MG: 0.2 INJECTION, SOLUTION INTRAMUSCULAR; INTRAVENOUS; SUBCUTANEOUS at 23:52

## 2025-05-16 RX ADMIN — HYDROMORPHONE HYDROCHLORIDE 4 MG: 2 TABLET ORAL at 15:18

## 2025-05-16 RX ADMIN — DEXAMETHASONE SODIUM PHOSPHATE 4 MG: 4 INJECTION, SOLUTION INTRAMUSCULAR; INTRAVENOUS at 17:07

## 2025-05-16 ASSESSMENT — ACTIVITIES OF DAILY LIVING (ADL)
ADLS_ACUITY_SCORE: 33
ADLS_ACUITY_SCORE: 34
ADLS_ACUITY_SCORE: 33
ADLS_ACUITY_SCORE: 55
ADLS_ACUITY_SCORE: 29
ADLS_ACUITY_SCORE: 29
ADLS_ACUITY_SCORE: 33
ADLS_ACUITY_SCORE: 34
ADLS_ACUITY_SCORE: 34
ADLS_ACUITY_SCORE: 29
ADLS_ACUITY_SCORE: 33
ADLS_ACUITY_SCORE: 34

## 2025-05-16 NOTE — INTERVAL H&P NOTE
"I have reviewed the surgical (or preoperative) H&P that is linked to this encounter, and examined the patient. There are no significant changes    Clinical Conditions Present on Arrival:  Clinically Significant Risk Factors Present on Admission                    # Drug Induced Platelet Defect: home medication list includes an antiplatelet medication        # Obesity: Estimated body mass index is 31.71 kg/m  as calculated from the following:    Height as of this encounter: 1.499 m (4' 11\").    Weight as of this encounter: 71.2 kg (157 lb).           "

## 2025-05-16 NOTE — PLAN OF CARE
Goal Outcome Evaluation:       Patient arrived around 1300. Neuros intact, baseline numbness, tingling and double vision. 5L NC. LORENZO intact, minimal bleeding on dressing. Pradhan intact. Dilaudid given for pain. Compazine given once. Daughter at bedside and updated.         Problem: Adult Inpatient Plan of Care  Goal: Plan of Care Review  Description:   5/16/2025 1656 by Mary Kohli RN  Outcome: Progressing  5/16/2025 1653 by Mary Kohli RN  Outcome: Progressing  Goal: Patient-Specific Goal (Individualized)  Description:   5/16/2025 1656 by Mary Kohli RN  Outcome: Progressing  5/16/2025 1653 by Mary Kohli RN  Outcome: Progressing  Goal: Absence of Hospital-Acquired Illness or Injury  5/16/2025 1656 by Mary Kohli RN  Outcome: Progressing  5/16/2025 1653 by Mary Kohli RN  Outcome: Progressing  Intervention: Prevent Skin Injury  Recent Flowsheet Documentation  Taken 5/16/2025 1300 by Mary Kohli RN  Body Position: position changed independently  Intervention: Prevent and Manage VTE (Venous Thromboembolism) Risk  Recent Flowsheet Documentation  Taken 5/16/2025 1600 by Mary Kohli RN  VTE Prevention/Management: SCDs on (sequential compression devices)  Taken 5/16/2025 1300 by Mary Kohli RN  VTE Prevention/Management: SCDs on (sequential compression devices)  Goal: Optimal Comfort and Wellbeing  5/16/2025 1656 by Mary Kohli RN  Outcome: Progressing  5/16/2025 1653 by Mary Kohli RN  Outcome: Progressing  Intervention: Provide Person-Centered Care  Recent Flowsheet Documentation  Taken 5/16/2025 1600 by Mary Kohli RN  Trust Relationship/Rapport:   care explained   choices provided   emotional support provided  Taken 5/16/2025 1300 by Mary Kohli RN  Trust Relationship/Rapport:   care explained   choices provided   emotional support provided  Goal: Readiness for Transition of Care  5/16/2025 1656 by Mary Kohli RN  Outcome:  Progressing  5/16/2025 1653 by Mary Kohli, RN  Outcome: Progressing     Problem: Pain Acute  Goal: Optimal Pain Control and Function  Outcome: Progressing     Problem: Fall Injury Risk  Goal: Absence of Fall and Fall-Related Injury  Outcome: Progressing

## 2025-05-16 NOTE — PROGRESS NOTES
CRANIOTOMY, USING OPTICAL TRACKING SYSTEM, WITH NEOPLASM EXCISION (Left: Head) with Suha Kent MD  * Day of Surgery *    Pre-operative Exam:  Headaches, double vision, nausea, poor appetite  CN II-XII grossly intact   Strength 5/5 BUE/BLE    Plan:  - ICU  - LORENZO drain x 1, monitor and record output even if 0  - Imaging: MRI brain with and without contrast in AM. Head CT if neurological changes in the meantime.   - Decadron 4mg q6H for 24 hours, will schedule taper based on MRI results  - Keppra 500 mg BID  - Abx x 3 doses   - BEAU Pradhan to remove once ambulating POD1  - Resume ASA POD 5   - Lovenox for DVT ppx POD 2  - Consults: PT/OT, Hospitalist  - Dispo: pending therapies, follow ups scheduled     Enma Da Silva CNP  [facility]  [address] [place] [id number]  Tel [phone]  Fax [phone]

## 2025-05-16 NOTE — CONSULTS
"[facility]    Hospitalist Consultation    Date of Admission:  5/16/2025  Date of Consult (When I saw the patient): 05/16/25  Reason for consult:Medical co-management    Primary Care Physician   Sera Solo    Assessment & Plan   Selina Parikh is a 59 year old female with history of essential hypertension,  gastroesophageal reflux disease, lumbar spinal stenosis, psoriasis who underwent craniotomy for presumed meningioma earlier today with Dr. Kent from neurosurgery.  Hospitalist service consulted for medical co-management.    1.4 x 1.7 x 1.7 dural based presumed meningioma s/p craniotomy on 5/16/2025.  - Surgery performed by Dr. Suha Kent, neurosurgery  - Defer routine postop care to neurosurgery including DVT prophylaxis, pain management  - Dexamethasone and Keppra per neurosurgery     Essential hypertension  History of carotid artery disease  Known moderate mid distal LAD myocardial bridge  Hyperlipidemia  -Recent Lexiscan on/1/2025 was negative for inducible ischemia  - Hold prior to admission Micardis as blood pressure was soft after surgery  - Resume prior to admission aspirin when okay with neurosurgery  - Resume Lipitor in the next 24 to 48 hours    Other chronic medical problems:  Gastroesophageal reflux disease-resume PPI  Lumbar spinal stenosis with neurogenic claudication  Psoriasis-resume prior to admission topical steroid  IBS-predominantly constipation type    Clinically Significant Risk Factors Present on Admission                 # Drug Induced Platelet Defect: home medication list includes an antiplatelet medication   # Hypertension: Noted on problem list           # Obesity: Estimated body mass index is 31.71 kg/m  as calculated from the following:    Height as of this encounter: 1.499 m (4' 11\").    Weight as of this encounter: 71.2 kg (157 lb).                Medical Decision Making       **CLEAR ALL SELECTIONS**        DVT Prophylaxis: Defer to primary " service  Code Status: Full Code    Disposition:     Chart documentation was completed, in part, with SmartCloud voice-recognition software. Even though reviewed, some grammatical, spelling, and word errors may remain.    Jacob Live MD      History is obtained from the patient    History of Present Illness   Selina Parikh is a 59 year old female who was admitted after undergoing craniotomy for presumed meningioma.  Patient is still somewhat groggy, recovering from anesthesia but answering simple questions appropriately.  She endorses headache.  Denies chest pain or dyspnea.  No nausea or vomiting.  She is able to move all 4 extremities.    Past Medical History    I have reviewed this patient's medical history and updated it with pertinent information if needed.   Past Medical History:   Diagnosis Date    Anemia     Antiplatelet or antithrombotic long-term use     Asymptomatic stenosis of left carotid artery     Cancer (H)     melanoma    Coronary artery disease     Difficulty walking     Gastroesophageal reflux disease     Hiatal hernia     History of angina     Hypertension     Irritable bowel syndrome     Other chronic pain     Stented coronary artery     Walking troubles        Past Surgical History   I have reviewed this patient's surgical history and updated it with pertinent information if needed.  Past Surgical History:   Procedure Laterality Date    BACK SURGERY      CHOLECYSTECTOMY, LAPOROSCOPIC  02/14/2000    Cholecystectomy, Laparoscopic    COLON SURGERY      CV CORONARY ANGIOGRAM N/A 05/06/2022    Procedure: CV CORONARY ANGIOGRAM;  Surgeon: Stefanie Spangler MD;  Location: [facility] CATH [facility] CV    CV LEFT HEART CATH N/A 05/06/2022    Procedure: Left Heart Catheterization;  Surgeon: Stefanie Spangler MD;  Location: [facility] CATH [facility] CV    CYSTOSCOPY, INSERT LIGHTED STENT URETER(S) N/A 04/10/2018    Procedure: CYSTOSCOPY, INSERT LIGHTED STENT URETER(S);  Lighted Stent Placement,Laparoscopic Assisted  Sigmoid Colectomy;  Surgeon: ERVIN Reina MD;  Location: [facility] OR    ENDOVASCULAR CAROTID STENT PLACEMENT Left 4/21/2023    Procedure: Left transcarotid revascularization;  Surgeon: Eveline Manley MD;  Location: [facility]    ESOPHAGOSCOPY, GASTROSCOPY, DUODENOSCOPY (EGD), COMBINED N/A 5/1/2025    Procedure: ESOPHAGOGASTRODUODENOSCOPY, WITH BIOPSY;  Surgeon: Souleymane Moreno DO;  Location: [facility]    IR TCAR TRANSCAROTID ARTERY REVASCULARIZATION  4/21/2023    LAPAROSCOPIC ASSISTED COLECTOMY LEFT (DESCENDING) N/A 04/10/2018    Procedure: LAPAROSCOPIC ASSISTED COLECTOMY LEFT (DESCENDING);;  Surgeon: Hill Murray MD;  Location: [facility] OR    LUMBAR DISCECTOMY Left 09/23/2022    Procedure: left thoracic 12-lumbar 1 hemilaminectomy, medial facetectomy, foraminotomy and microdiscectomy;  Surgeon: Suha Kent MD;  Location: [facility]    NECK SURGERY      OPTICAL TRACKING SYSTEM FUSION SPINE POSTERIOR LUMBAR THREE+ LEVELS Bilateral 6/9/2023    Procedure: Exploration of prior lumbar 3- lumbar 5 instrumented fusion with removal and replacement of pedicle screws and extension to sacral 1. Lumbar 3-sacral 1  Left transforaminal lumbar interbody fusion and posterolateral arthrodesis with use of stealth navigation;  Surgeon: Suha Kent MD;  Location: [facility]    ORTHOPEDIC SURGERY  10/01/2010    Cut palm and reattched tendons and nerves in left hand forefinger.    AZ ESOPHAGOGASTRODUODENOSCOPY TRANSORAL DIAGNOSTIC N/A 04/30/2021    Procedure: ESOPHAGOGASTRODUODENOSCOPY (EGD);  Surgeon: Souleymane Moreno DO;  Location: [facility];  Service: General    SURGICAL HISTORY OF -   01/04/2000    Esophagogastroduodenoscopy with biopsy    TUBAL LIGATION         Prior to Admission Medications   Prior to Admission Medications   Prescriptions Last Dose Informant Patient Reported? Taking?   UNABLE TO FIND   Yes No   Sig: Take 2 tablets by mouth at bedtime. MEDICATION  "NAME: Qunol Sleep - melatonin 5 mg, Ashwagandha, L-Theanine   Vitamin D (Cholecalciferol) 25 MCG (1000 UT) TABS 5/13/2025 Self Yes No   Sig: Take 1,000 Units by mouth daily   acetaminophen (TYLENOL) 500 MG tablet 5/15/2025 at 11:00 PM  Yes Yes   Sig: Take 1,000 mg by mouth 2 times daily.   aspirin 81 MG EC tablet 5/13/2025  Yes No   Sig: Take 81 mg by mouth daily   atorvastatin (LIPITOR) 40 MG tablet 5/15/2025 Morning  No Yes   Sig: Take 1 tablet (40 mg) by mouth daily.   celecoxib (CELEBREX) 100 MG capsule 5/13/2025  No No   Sig: Take 1 capsule (100 mg) by mouth 2 times daily.   clobetasol (TEMOVATE) 0.05 % external ointment 5/9/2025  No No   Sig: Apply topically 2 times daily. To affected areas   clobetasol propionate (CLOBEX) 0.05 % external shampoo 5/9/2025  No No   Sig: Apply thin film to dry scalp once daily (maximum dose: 50 g/week or 50 mL/week); leave in place for 15 minutes, then add water, lather, and rinse thoroughly. Limit treatment to 4 consecutive weeks.   fish oil-omega-3 fatty acids 1000 MG capsule 5/13/2025 Self Yes No   Sig: Take 1 g by mouth daily   methocarbamol (ROBAXIN) 750 MG tablet   No No   Sig: Take 1 tablet (750 mg) by mouth 4 times daily as needed for muscle spasms   Patient not taking: Reported on 5/5/2025   nitroGLYcerin (NITROSTAT) 0.4 MG sublingual tablet More than a month  No Yes   Sig: One tablet under the tongue every 5 minutes if needed for chest pain. May repeat every 5 minutes for a maximum of 3 doses in 15 minutes\"   omeprazole (PRILOSEC) 20 MG DR capsule 5/15/2025 Bedtime  No Yes   Sig: TAKE 1 CAPSULE BY MOUTH THREE TIMES DAILY   promethazine (PHENERGAN) 25 MG tablet 5/9/2025  No No   Sig: Take 1 tablet (25 mg) by mouth every 8 hours as needed for nausea.   telmisartan (MICARDIS) 80 MG tablet 5/15/2025 Morning  No Yes   Sig: Take 1 tablet (80 mg) by mouth daily.      Facility-Administered Medications: None     Allergies   Allergies   Allergen Reactions    Ciprofloxacin " Anaphylaxis    Flagyl [Metronidazole] Anaphylaxis    Gabapentin Other (See Comments)     Suicidal thoughts.    Zofran [Ondansetron] Other (See Comments) and GI Disturbance     Causes bloating, moderately uncomfortable, abd pain      Benadryl [Diphenhydramine] Other (See Comments)     Muscle spasms    Percocet [Oxycodone-Acetaminophen] Other (See Comments) and Headache     Goes nuts - nasty mean irritated, can take Dilaudid    Vicodin [Hydrocodone-Acetaminophen] Other (See Comments)     Anxiety-agitation       Social History   I have reviewed this patient's social history and updated it with pertinent information if needed. Selina Parikh  reports that she has been smoking cigarettes. She started smoking about 31 years ago. She has a 15 pack-year smoking history. She has never used smokeless tobacco. She reports that she does not currently use alcohol. She reports that she does not currently use drugs.    Family History   I have reviewed this patient's family history and updated it with pertinent information if needed.   Family History   Problem Relation Age of Onset    C.A.D. Father 50        50's    Diabetes Father     Diabetes Brother     C.A.D. Brother 42        quad bypass and MI    Diabetes Brother         2 brothers have DM    Cancer Brother 34        Melanoma    Aortic aneurysm Brother     Allergies Son         Meds    Allergies Daughter         Med    Cancer Mother     C.A.D. Brother 38        MI age 38    Diabetes Mother     Diabetes Brother        Review of Systems   The 10 point Review of Systems is negative other than noted in the HPI or here.     Physical Exam   Temp: 97.3  F (36.3  C) Temp src: Temporal BP: 113/78 Pulse: 98   Resp: 19 SpO2: 94 % O2 Device: Oxymask Oxygen Delivery: 5 LPM  Vital Signs with Ranges  Temp:  [97.1  F (36.2  C)-97.4  F (36.3  C)] 97.3  F (36.3  C)  Pulse:  [] 98  Resp:  [16-23] 19  BP: ()/(53-81) 113/78  MAP:  [0 mmHg-80 mmHg] 75 mmHg  Arterial Line BP:  (0-112)/(0-58) 110/56  SpO2:  [90 %-97 %] 94 %  157 lbs 0 oz    Gen: AAOX3, NAD, groggy but answering simple questions appropriately  HEENT: Scalp drain in place  Neck: Supple neck, good ROM, no stridor  Resp: CTA B/L, normal WOB  CVS- RRR, no murmur, no edema  Abd/GI: Soft, non-tender. BS- normoactive  Skin- Warm, dry  MSK: no edema  Neuro- CN- intact. Moving X 4    Data   Data reviewed today:  I personally reviewed no images or EKG's today.        Recent Labs   Lab 05/16/25  0554   HGB 14.5   MCV 94   POTASSIUM 3.9   CR 0.80   GLC 97       No results found for this or any previous visit (from the past 24 hours).

## 2025-05-17 ENCOUNTER — APPOINTMENT (OUTPATIENT)
Dept: OCCUPATIONAL THERAPY | Facility: CLINIC | Age: 60
DRG: 027 | End: 2025-05-17
Payer: COMMERCIAL

## 2025-05-17 ENCOUNTER — APPOINTMENT (OUTPATIENT)
Dept: PHYSICAL THERAPY | Facility: CLINIC | Age: 60
DRG: 027 | End: 2025-05-17
Payer: COMMERCIAL

## 2025-05-17 ENCOUNTER — APPOINTMENT (OUTPATIENT)
Dept: MRI IMAGING | Facility: CLINIC | Age: 60
DRG: 027 | End: 2025-05-17
Payer: COMMERCIAL

## 2025-05-17 LAB
GLUCOSE BLDC GLUCOMTR-MCNC: 137 MG/DL (ref 70–99)
MCV RBC AUTO: 96 FL (ref 78–100)
PLATELET # BLD AUTO: 237 10E3/UL (ref 150–450)

## 2025-05-17 PROCEDURE — 70553 MRI BRAIN STEM W/O & W/DYE: CPT

## 2025-05-17 PROCEDURE — 99232 SBSQ HOSP IP/OBS MODERATE 35: CPT | Performed by: INTERNAL MEDICINE

## 2025-05-17 PROCEDURE — 258N000003 HC RX IP 258 OP 636: Performed by: PHYSICIAN ASSISTANT

## 2025-05-17 PROCEDURE — 250N000013 HC RX MED GY IP 250 OP 250 PS 637

## 2025-05-17 PROCEDURE — 258N000003 HC RX IP 258 OP 636

## 2025-05-17 PROCEDURE — 97530 THERAPEUTIC ACTIVITIES: CPT | Mod: GP

## 2025-05-17 PROCEDURE — A9585 GADOBUTROL INJECTION: HCPCS

## 2025-05-17 PROCEDURE — 200N000001 HC R&B ICU

## 2025-05-17 PROCEDURE — 255N000002 HC RX 255 OP 636

## 2025-05-17 PROCEDURE — 97116 GAIT TRAINING THERAPY: CPT | Mod: GP

## 2025-05-17 PROCEDURE — 97530 THERAPEUTIC ACTIVITIES: CPT | Mod: GO

## 2025-05-17 PROCEDURE — 250N000011 HC RX IP 250 OP 636: Mod: JZ

## 2025-05-17 PROCEDURE — 250N000011 HC RX IP 250 OP 636: Performed by: SURGERY

## 2025-05-17 PROCEDURE — 36415 COLL VENOUS BLD VENIPUNCTURE: CPT | Performed by: SURGERY

## 2025-05-17 PROCEDURE — 97535 SELF CARE MNGMENT TRAINING: CPT | Mod: GO

## 2025-05-17 PROCEDURE — 97165 OT EVAL LOW COMPLEX 30 MIN: CPT | Mod: GO

## 2025-05-17 PROCEDURE — 97161 PT EVAL LOW COMPLEX 20 MIN: CPT | Mod: GP

## 2025-05-17 PROCEDURE — 85049 AUTOMATED PLATELET COUNT: CPT | Performed by: SURGERY

## 2025-05-17 PROCEDURE — 250N000012 HC RX MED GY IP 250 OP 636 PS 637

## 2025-05-17 RX ORDER — TELMISARTAN 40 MG/1
80 TABLET ORAL DAILY
Status: DISCONTINUED | OUTPATIENT
Start: 2025-05-17 | End: 2025-05-19

## 2025-05-17 RX ORDER — GADOBUTROL 604.72 MG/ML
7 INJECTION INTRAVENOUS ONCE
Status: COMPLETED | OUTPATIENT
Start: 2025-05-17 | End: 2025-05-17

## 2025-05-17 RX ADMIN — HYDROMORPHONE HYDROCHLORIDE 0.4 MG: 0.2 INJECTION, SOLUTION INTRAMUSCULAR; INTRAVENOUS; SUBCUTANEOUS at 07:50

## 2025-05-17 RX ADMIN — SENNOSIDES AND DOCUSATE SODIUM 1 TABLET: 50; 8.6 TABLET ORAL at 20:23

## 2025-05-17 RX ADMIN — OMEPRAZOLE 20 MG: 20 CAPSULE, DELAYED RELEASE ORAL at 15:49

## 2025-05-17 RX ADMIN — DEXAMETHASONE SODIUM PHOSPHATE 4 MG: 4 INJECTION, SOLUTION INTRAMUSCULAR; INTRAVENOUS at 11:12

## 2025-05-17 RX ADMIN — ACETAMINOPHEN 975 MG: 325 TABLET, FILM COATED ORAL at 04:02

## 2025-05-17 RX ADMIN — HYDROMORPHONE HYDROCHLORIDE 4 MG: 2 TABLET ORAL at 09:04

## 2025-05-17 RX ADMIN — HYDROMORPHONE HYDROCHLORIDE 4 MG: 2 TABLET ORAL at 02:28

## 2025-05-17 RX ADMIN — PROCHLORPERAZINE EDISYLATE 10 MG: 5 INJECTION INTRAMUSCULAR; INTRAVENOUS at 09:00

## 2025-05-17 RX ADMIN — SODIUM CHLORIDE: 0.9 INJECTION, SOLUTION INTRAVENOUS at 02:32

## 2025-05-17 RX ADMIN — OMEPRAZOLE 20 MG: 20 CAPSULE, DELAYED RELEASE ORAL at 20:23

## 2025-05-17 RX ADMIN — DEXAMETHASONE 4 MG: 4 TABLET ORAL at 18:32

## 2025-05-17 RX ADMIN — CEFAZOLIN SODIUM 2 G: 2 SOLUTION INTRAVENOUS at 07:56

## 2025-05-17 RX ADMIN — POLYETHYLENE GLYCOL 3350 17 G: 17 POWDER, FOR SOLUTION ORAL at 07:52

## 2025-05-17 RX ADMIN — LEVETIRACETAM 500 MG: 500 TABLET, FILM COATED ORAL at 07:52

## 2025-05-17 RX ADMIN — HYDROMORPHONE HYDROCHLORIDE 0.2 MG: 0.2 INJECTION, SOLUTION INTRAMUSCULAR; INTRAVENOUS; SUBCUTANEOUS at 20:30

## 2025-05-17 RX ADMIN — HYDROMORPHONE HYDROCHLORIDE 2 MG: 2 TABLET ORAL at 19:03

## 2025-05-17 RX ADMIN — GADOBUTROL 7 ML: 604.72 INJECTION INTRAVENOUS at 07:25

## 2025-05-17 RX ADMIN — ACETAMINOPHEN 975 MG: 325 TABLET, FILM COATED ORAL at 11:14

## 2025-05-17 RX ADMIN — DEXAMETHASONE SODIUM PHOSPHATE 4 MG: 4 INJECTION, SOLUTION INTRAMUSCULAR; INTRAVENOUS at 05:53

## 2025-05-17 RX ADMIN — HYDROMORPHONE HYDROCHLORIDE 0.4 MG: 0.2 INJECTION, SOLUTION INTRAMUSCULAR; INTRAVENOUS; SUBCUTANEOUS at 05:53

## 2025-05-17 RX ADMIN — ACETAMINOPHEN 975 MG: 325 TABLET, FILM COATED ORAL at 18:32

## 2025-05-17 RX ADMIN — SODIUM CHLORIDE: 0.9 INJECTION, SOLUTION INTRAVENOUS at 17:24

## 2025-05-17 RX ADMIN — OMEPRAZOLE 20 MG: 20 CAPSULE, DELAYED RELEASE ORAL at 07:52

## 2025-05-17 RX ADMIN — LEVETIRACETAM 500 MG: 500 TABLET, FILM COATED ORAL at 20:23

## 2025-05-17 RX ADMIN — SENNOSIDES AND DOCUSATE SODIUM 1 TABLET: 50; 8.6 TABLET ORAL at 07:52

## 2025-05-17 RX ADMIN — METHOCARBAMOL 1000 MG: 500 TABLET ORAL at 04:03

## 2025-05-17 ASSESSMENT — ACTIVITIES OF DAILY LIVING (ADL)
ADLS_ACUITY_SCORE: 34
ADLS_ACUITY_SCORE: 31
ADLS_ACUITY_SCORE: 40
ADLS_ACUITY_SCORE: 34
ADLS_ACUITY_SCORE: 40
ADLS_ACUITY_SCORE: 31
ADLS_ACUITY_SCORE: 34
ADLS_ACUITY_SCORE: 34
ADLS_ACUITY_SCORE: 40
ADLS_ACUITY_SCORE: 34
ADLS_ACUITY_SCORE: 31
ADLS_ACUITY_SCORE: 31
ADLS_ACUITY_SCORE: 34
ADLS_ACUITY_SCORE: 31

## 2025-05-17 NOTE — PROGRESS NOTES
[facility]    Medicine Progress Note - Hospitalist Service        Date of Admission:  5/16/2025  5:34 AM    Assessment & Plan:   Selina Parikh is a 59 year old female with history of essential hypertension,  gastroesophageal reflux disease, lumbar spinal stenosis, psoriasis who underwent craniotomy for presumed meningioma earlier today with Dr. Kent from neurosurgery.  Hospitalist service consulted for medical co-management.    1.4 x 1.7 x 1.7 dural based presumed meningioma s/p craniotomy on 5/16/2025.  ?  Postop small volume of acute ischemia involving the parasagittal left parietal lobe  - Surgery performed by Dr. Suha Kent, neurosurgery  - Defer routine postop care to neurosurgery including DVT prophylaxis, pain management  - Dexamethasone and Keppra per neurosurgery  - Repeat MRI today shows interval postop changes with interval development of small volume of acute ischemia involving the parasagittal left parietal lobe.  Await neurosurgery reevaluation, will consult stroke neurology depending on neurosurgery inputs.     Essential hypertension  History of carotid artery disease  Known moderate mid distal LAD myocardial bridge  Hyperlipidemia  -Recent Lexiscan on/1/2025 was negative for inducible ischemia  - Resume prior to admission Micardis(or formulary equivalent) pending neurosurgery evaluation today as there is concern for interval development of small volume of acute ischemia involving the parasagittal left parietal lobe on repeat MRI  - Resume prior to admission aspirin when okay with neurosurgery  - Resume Lipitor in the next 24 to 48 hours    Other chronic medical problems:  Gastroesophageal reflux disease-resume PPI  Lumbar spinal stenosis with neurogenic claudication  Psoriasis-resume prior to admission topical steroid  IBS-predominantly constipation type      Diet: Advance Diet as Tolerated: Regular Diet Adult     DVT Prophylaxis: Defer to primary service   Lorne  "Catheter: PRESENT, indication: Surgical procedure  Code Status: Full Code     Disposition Plan      Expected Discharge Date: 05/18/2025              Entered: Jacob Live MD 05/17/2025, 9:33 AM        Medically Ready for Discharge: Anticipated in 2-4 Days per neurosurgery       Clinically Significant Risk Factors                   # Hypertension: Noted on problem list            # Obesity: Estimated body mass index is 31.71 kg/m  as calculated from the following:    Height as of this encounter: 1.499 m (4' 11\").    Weight as of this encounter: 71.2 kg (157 lb)., PRESENT ON ADMISSION             The patient's care was discussed with the family.    Medical Decision Making       **CLEAR ALL SELECTIONS**      Labs/Imaging Reviewed:  See Information above and Data section below  Time SPENT BY ME on the date of service doing chart review, history, exam, documentation & further activities per the note:  35 MINUTES    Chart documentation was completed, in part, with Owned it voice-recognition software. Even though reviewed, some grammatical, spelling, and word errors may remain.    Jacob Live MD  Hospitalist Service  [facility]  Text Page 7AM-6PM  Securely message with the Vocera Web Console (learn more here)  Text page via [employer] [facility] Paging/Directory    ______________________________________________________________________    Interval History   Patient had a good night although still complaining of diplopia.  Endorses headache and mild nausea.  No vomiting.  No dyspnea    Data reviewed today: I reviewed all medications, new labs and imaging results over the last 24 hours. I personally reviewed no images or EKG's today.    Physical Exam   Vital signs:  Temp: 97.5  F (36.4  C) Temp src: Temporal BP: (!) 142/78 Pulse: 81   Resp: 18 SpO2: 95 % O2 Device: Nasal cannula Oxygen Delivery: 2 LPM Height: 149.9 cm (4' 11\") Weight: 71.2 kg (157 lb)  Estimated body mass index is 31.71 kg/m  as calculated " "from the following:    Height as of this encounter: 1.499 m (4' 11\").    Weight as of this encounter: 71.2 kg (157 lb).      Wt Readings from Last 2 Encounters:   05/16/25 71.2 kg (157 lb)   05/05/25 72.1 kg (159 lb)       Gen: AAOX3, NAD  HEENT: Scalp drain in place  Resp: CTA B/L, normal WOB  CVS: RRR, no murmur  Abd/GI: Soft, non-tender. BS- normoactive.  Skin: Warm, dry no rashes  MSK: no pedal edema  Neuro- CN- intact. No focal deficits.        Data   Recent Labs   Lab 05/17/25 0552 05/16/25 2350 05/16/25 2053 05/16/25  0554   HGB  --   --   --  14.5   MCV  --   --   --  94   POTASSIUM  --   --   --  3.9   CR  --   --   --  0.80   * 134* 175* 97       Recent Results (from the past 24 hours)   MR Brain w/o & w Contrast    Narrative    EXAM: MR BRAIN W/O and W CONTRAST  LOCATION: [facility]  DATE: 5/17/2025    INDICATION: s p craniotomy  COMPARISON: 26 April 2025 MRI brain.  9 April 2025 MRI brain.  CONTRAST: 7mL gadavist  TECHNIQUE: Routine multiplanar multisequence head MRI without and with intravenous contrast.    FINDINGS:  INTRACRANIAL CONTENTS: Interval postop changes of posterior biparietal craniotomy and left parietal convexity meningioma resection. There is interval development of a volume of restricted diffusion involving the adjacent parasagittal left parietal lobe   cortex measuring 24 mm mediolateral by 15 mm AP by 18 mm cephalocaudad. Mild localized mass effect. No midline shift or intraparenchymal hemorrhage. Negative for intraparenchymal enhancement. Appearance typical for acute ischemia. Venous ischemic focus   possible the adjacent superior sagittal sinus appears grossly patent. Trace extra-axial fluid subjacent to the craniotomy but no evidence for large extra-axial fluid/hemorrhage. No evidence for abnormal enhancement suggestive of residual meningioma.   Normal brain parenchymal signal. Normal ventricles and sulci. Normal position of the cerebellar tonsils. " No pathologic contrast enhancement.    Susceptibility artifact noted at the left frontal convexity on axial image 29 of series 7002.    SELLA: No abnormality accounting for technique.    OSSEOUS STRUCTURES/SOFT TISSUES: Normal marrow signal. The major intracranial vascular flow voids are maintained.     ORBITS: No abnormality accounting for technique.     SINUSES/MASTOIDS: No paranasal sinus mucosal disease. No middle ear or mastoid effusion.       Impression    IMPRESSION:  1.  Interval postop changes of left parietal convexity meningioma resection. No evidence for residual meningioma.  2.  Interval development of small volume of acute ischemia involving the parasagittal left parietal lobe. No significant mass effect or midline shift. Negative MR evidence for intraparenchymal hemorrhage.  3.  No nodular enhancement identified to suggest residual meningioma.  4.  Grossly patent major dural sinuses.       Medications   Current Facility-Administered Medications   Medication Dose Route Frequency Provider Last Rate Last Admin    sodium chloride 0.9 % infusion   Intravenous Continuous Enma Da Silva APRN CNP 75 mL/hr at 05/17/25 0232 New Bag at 05/17/25 0232     Current Facility-Administered Medications   Medication Dose Route Frequency Provider Last Rate Last Admin    acetaminophen (TYLENOL) tablet 975 mg  975 mg Oral Q8H Enma Da Silva APRN CNP   975 mg at 05/17/25 0402    dexAMETHasone (DECADRON) injection 4 mg  4 mg Intravenous Q6H DOROTHY Enma Da Silva APRN CNP   4 mg at 05/17/25 0553    Or    dexAMETHasone (DECADRON) tablet 4 mg  4 mg Oral Q6H DOROTHY Enma Da Silva APRN CNP        [START ON 5/18/2025] enoxaparin ANTICOAGULANT (LOVENOX) injection 40 mg  40 mg Subcutaneous Q24H Enma Da Silva APRN CNP        levETIRAcetam (KEPPRA) tablet 500 mg  500 mg Oral or NG Tube BID Enma Da Silva APRN CNP   500 mg at 05/17/25 0752    omeprazole (PriLOSEC) CR capsule 20 mg  20 mg Oral TID Jacob Live MD   20  mg at 05/17/25 0752    polyethylene glycol (MIRALAX) Packet 17 g  17 g Oral Daily Enma Da Silva APRN CNP   17 g at 05/17/25 0752    senna-docusate (SENOKOT-S/PERICOLACE) 8.6-50 MG per tablet 1 tablet  1 tablet Oral BID Enma Da Silva APRN CNP   1 tablet at 05/17/25 0752    sodium chloride (PF) 0.9% PF flush 3 mL  3 mL Intracatheter Q8H [facility] Enma Da Silva APRN CNP   3 mL at 05/17/25 0558

## 2025-05-17 NOTE — PLAN OF CARE
"Goal Outcome Evaluation:       Neuros intact, baseline numbness, tingling and double vision. Room air. LORENZO intact, minimal bleeding on dressing. Pradhan intact. Ambulated unit with PT. Art line removed. Up in chair most of day. Dilaudid given for pain. Compazine given once. Family called and updated        Problem: Adult Inpatient Plan of Care  Goal: Plan of Care Review  Description: The Plan of Care Review/Shift note should be completed every shift.  The Outcome Evaluation is a brief statement about your assessment that the patient is improving, declining, or no change.  This information will be displayed automatically on your shiftnote.  Outcome: Progressing  Goal: Patient-Specific Goal (Individualized)  Description: You can add care plan individualizations to a care plan. Examples of Individualization might be:  \"Parent requests to be called daily at 9am for status\", \"I have a hard time hearing out of my right ear\", or \"Do not touch me to wake me up as it startlesme\".  Outcome: Progressing  Goal: Absence of Hospital-Acquired Illness or Injury  Outcome: Progressing  Intervention: Identify and Manage Fall Risk  Recent Flowsheet Documentation  Taken 5/17/2025 1200 by Mary Kohli RN  Safety Promotion/Fall Prevention: activity supervised  Taken 5/17/2025 0800 by Mary Kohli RN  Safety Promotion/Fall Prevention: activity supervised  Intervention: Prevent Skin Injury  Recent Flowsheet Documentation  Taken 5/17/2025 1300 by Mary Kohli RN  Body Position: position changed independently  Taken 5/17/2025 1200 by Mary Kohli RN  Body Position:   weight shifting   position changed independently  Taken 5/17/2025 1000 by Mary Kohli RN  Body Position: position changed independently  Intervention: Prevent and Manage VTE (Venous Thromboembolism) Risk  Recent Flowsheet Documentation  Taken 5/17/2025 1200 by Mary Kohli RN  VTE Prevention/Management: SCDs off (sequential compression devices)  Taken " 5/17/2025 0800 by Mary Kohli RN  VTE Prevention/Management: SCDs off (sequential compression devices)  Goal: Optimal Comfort and Wellbeing  Outcome: Progressing  Intervention: Provide Person-Centered Care  Recent Flowsheet Documentation  Taken 5/17/2025 1200 by Mary Kohli RN  Trust Relationship/Rapport:   care explained   choices provided   emotional support provided  Taken 5/17/2025 0800 by Mary Kohli RN  Trust Relationship/Rapport:   care explained   choices provided   emotional support provided  Goal: Readiness for Transition of Care  Outcome: Progressing     Problem: Pain Acute  Goal: Optimal Pain Control and Function  Outcome: Progressing  Intervention: Optimize Psychosocial Wellbeing  Recent Flowsheet Documentation  Taken 5/17/2025 1200 by Mary Kohli RN  Spiritual Activities Assistance: affirmation provided  Supportive Measures: active listening utilized  Taken 5/17/2025 0800 by Mary Kohli RN  Spiritual Activities Assistance: affirmation provided  Supportive Measures: active listening utilized  Intervention: Prevent or Manage Pain  Recent Flowsheet Documentation  Taken 5/17/2025 1200 by Mary Kohli RN  Medication Review/Management: medications reviewed  Taken 5/17/2025 0800 by Mary Kohli RN  Medication Review/Management: medications reviewed     Problem: Fall Injury Risk  Goal: Absence of Fall and Fall-Related Injury  Outcome: Progressing  Intervention: Identify and Manage Contributors  Recent Flowsheet Documentation  Taken 5/17/2025 1200 by Mary Kohli RN  Medication Review/Management: medications reviewed  Taken 5/17/2025 0800 by Mary Kohli RN  Medication Review/Management: medications reviewed  Intervention: Promote Injury-Free Environment  Recent Flowsheet Documentation  Taken 5/17/2025 1200 by Mary Kohli RN  Safety Promotion/Fall Prevention: activity supervised  Taken 5/17/2025 0800 by Mary Kohli RN  Safety Promotion/Fall  Prevention: activity supervised     Problem: Craniotomy/Craniectomy/Cranioplasty  Goal: Optimal Coping with Surgery  Outcome: Progressing  Intervention: Support Psychosocial Response to Surgery  Recent Flowsheet Documentation  Taken 5/17/2025 1200 by Mary Kohli RN  Supportive Measures: active listening utilized  Taken 5/17/2025 0800 by Mary Kohli RN  Supportive Measures: active listening utilized  Goal: Absence of Bleeding  Outcome: Progressing  Goal: Effective Bowel Elimination  Outcome: Progressing  Goal: Optimal Cerebral Tissue Perfusion  Outcome: Progressing  Goal: Fluid and Electrolyte Balance  Outcome: Progressing  Goal: Optimal Functional Ability  Outcome: Progressing  Intervention: Optimize Functional Ability  Recent Flowsheet Documentation  Taken 5/17/2025 1300 by Mary Kohli RN  Activity Management: back to bed  Activity Assistance Provided: assistance, 1 person  Taken 5/17/2025 1200 by Mary Kohli RN  Activity Management: up in chair  Activity Assistance Provided: assistance, 1 person  Taken 5/17/2025 0800 by Mary Kohli RN  Activity Management: up in chair  Activity Assistance Provided: assistance, 1 person  Goal: Absence of Infection Signs and Symptoms  Outcome: Progressing  Goal: Acceptable Pain Control  Outcome: Progressing  Goal: Nausea and Vomiting Relief  Outcome: Progressing  Goal: Effective Urinary Elimination  Outcome: Progressing  Goal: Effective Oxygenation and Ventilation  Outcome: Progressing  Intervention: Optimize Oxygenation and Ventilation  Recent Flowsheet Documentation  Taken 5/17/2025 1300 by Mary Kohli RN  Head of Bed (HOB) Positioning: HOB at 45 degrees

## 2025-05-17 NOTE — PLAN OF CARE
Goal Outcome Evaluation:      Plan of Care Reviewed With: patient    Overall Patient Progress: no changeOverall Patient Progress: no change    Outcome Evaluation: A/o x 4, calm, and cooperative. Neuro exams remain unchanged. Pain adequately managed with tylenol, prn dilaudid, and prn robaxin. SBP meeting goal SBP < 150. Pradhan in place with adequate UO. Tele SR. LORENZO drain remains in place and patent.      Problem: Adult Inpatient Plan of Care  Goal: Plan of Care Review  Description: The Plan of Care Review/Shift note should be completed every shift.  The Outcome Evaluation is a brief statement about your assessment that the patient is improving, declining, or no change.  This information will be displayed automatically on your shiftnote.  Outcome: Progressing  Flowsheets (Taken 5/17/2025 0542)  Outcome Evaluation: A/o x 4, calm, and cooperative. Neuro exams remain unchanged. Pain adequately managed with tylenol, prn dilaudid, and prn robaxin. SBP meeting goal SBP < 150. Pradhan in place with adequate UO. Tele SR. LORENZO drain remains in place and patent.  Plan of Care Reviewed With: patient  Overall Patient Progress: no change  Goal: Absence of Hospital-Acquired Illness or Injury  5/17/2025 0543 by Dong Tolbert RN  Outcome: Progressing  5/17/2025 0540 by Dong Tolbert RN  Outcome: Progressing  Intervention: Identify and Manage Fall Risk  Recent Flowsheet Documentation  Taken 5/17/2025 0400 by Dong Tolbert RN  Safety Promotion/Fall Prevention: activity supervised  Intervention: Prevent Skin Injury  Recent Flowsheet Documentation  Taken 5/17/2025 0400 by Dong Tolbert RN  Body Position: weight shifting  Taken 5/17/2025 0200 by Dong Tolbert RN  Body Position: weight shifting  Intervention: Prevent and Manage VTE (Venous Thromboembolism) Risk  Recent Flowsheet Documentation  Taken 5/17/2025 0400 by Dong Tolbert RN  VTE Prevention/Management: SCDs on (sequential compression devices)  Goal: Optimal Comfort and  Wellbeing  5/17/2025 0543 by Dong Tolbert RN  Outcome: Progressing  5/17/2025 0540 by Dong Tolbert RN  Outcome: Progressing  Intervention: Provide Person-Centered Care  Recent Flowsheet Documentation  Taken 5/17/2025 0400 by Dong Tolbert RN  Trust Relationship/Rapport:   care explained   choices provided   emotional support provided     Problem: Pain Acute  Goal: Optimal Pain Control and Function  Outcome: Progressing  Intervention: Optimize Psychosocial Wellbeing  Recent Flowsheet Documentation  Taken 5/17/2025 0400 by Dong Tolbert RN  Spiritual Activities Assistance: affirmation provided  Supportive Measures: active listening utilized  Intervention: Prevent or Manage Pain  Recent Flowsheet Documentation  Taken 5/17/2025 0400 by Dong Tolbert RN  Medication Review/Management: medications reviewed     Problem: Fall Injury Risk  Goal: Absence of Fall and Fall-Related Injury  5/17/2025 0543 by Dong Tolbert RN  Outcome: Progressing  5/17/2025 0540 by Dong Tolbert RN  Outcome: Progressing  Intervention: Identify and Manage Contributors  Recent Flowsheet Documentation  Taken 5/17/2025 0400 by Dong Tolbert RN  Medication Review/Management: medications reviewed  Intervention: Promote Injury-Free Environment  Recent Flowsheet Documentation  Taken 5/17/2025 0400 by Dong Tolbert RN  Safety Promotion/Fall Prevention: activity supervised     Problem: Craniotomy/Craniectomy/Cranioplasty  Goal: Optimal Coping with Surgery  5/17/2025 0543 by Dong Tolbert RN  Outcome: Progressing  5/17/2025 0540 by Dong Tolbert RN  Outcome: Progressing  Intervention: Support Psychosocial Response to Surgery  Recent Flowsheet Documentation  Taken 5/17/2025 0400 by Dong Tolbert RN  Supportive Measures: active listening utilized  Goal: Absence of Bleeding  5/17/2025 0543 by Dong Tolbert RN  Outcome: Progressing  5/17/2025 0540 by Dong Tolbert RN  Outcome: Progressing  Goal: Optimal Cerebral Tissue  Perfusion  5/17/2025 0543 by Dong Tolbert RN  Outcome: Progressing  5/17/2025 0540 by Dong Tolbert RN  Outcome: Progressing  Goal: Optimal Functional Ability  5/17/2025 0543 by Dong Tolbert RN  Outcome: Progressing  5/17/2025 0540 by Dong Tolbert RN  Outcome: Progressing  Intervention: Optimize Functional Ability  Recent Flowsheet Documentation  Taken 5/17/2025 0400 by Dong Tolbert RN  Activity Management: activity adjusted per tolerance  Activity Assistance Provided: assistance, 1 person  Taken 5/17/2025 0200 by Dong Tolbert RN  Activity Management: stepped/marched in place  Goal: Absence of Infection Signs and Symptoms  5/17/2025 0543 by Dong Tolbert RN  Outcome: Progressing  5/17/2025 0540 by Dong Tolbert RN  Outcome: Progressing  Goal: Anesthesia/Sedation Recovery  Outcome: Met  Intervention: Optimize Anesthesia Recovery  Recent Flowsheet Documentation  Taken 5/17/2025 0400 by Dong Tolbert RN  Safety Promotion/Fall Prevention: activity supervised  Goal: Acceptable Pain Control  5/17/2025 0543 by Dong Tolbert RN  Outcome: Progressing  5/17/2025 0540 by Dong Tolbert RN  Outcome: Progressing  Goal: Nausea and Vomiting Relief  5/17/2025 0543 by Dong Tolbert RN  Outcome: Progressing  5/17/2025 0540 by Dong Tolbert RN  Outcome: Progressing  Goal: Effective Urinary Elimination  5/17/2025 0543 by Dong Tolbert RN  Outcome: Progressing  5/17/2025 0540 by Dong Tolbert RN  Outcome: Progressing  Goal: Effective Oxygenation and Ventilation  5/17/2025 0543 by Dong Tolbert RN  Outcome: Progressing  5/17/2025 0540 by Dong Tolbert RN  Outcome: Progressing  Intervention: Optimize Oxygenation and Ventilation  Recent Flowsheet Documentation  Taken 5/17/2025 0400 by Dong Tolbert RN  Head of Bed (HOB) Positioning: HOB at 60 degrees

## 2025-05-17 NOTE — PROGRESS NOTES
Neurosurgery progress note:    POD#1 s/p left craniotomy for resection of meningioma.     Patient states she slept well overnight (sitting up in a chair)  She feels she was doing well until she laid flat for the MRI.  After returning from MRI she states the pre-op symptoms she had including headache, diplopia/blurry vision and eye pressure having increased.  She also had an episode of vomiting early today but denies nausea now.    On exam, she's sitting up in a chair, incision C/D/I.  Moving all four extremities well with intact strength. GCS 15  LORENZO output 115/8 hours    Brain MRI:    IMPRESSION:  1.  Interval postop changes of left parietal convexity meningioma resection. No evidence for residual meningioma.  2.  Interval development of small volume of acute ischemia involving the parasagittal left parietal lobe. No significant mass effect or midline shift. Negative MR evidence for intraparenchymal hemorrhage.  3.  No nodular enhancement identified to suggest residual meningioma.  4.  Grossly patent major dural sinuses.    RECOMMENDATIONS:  Continue to observe in ICU.  Continue Decadron 4 mg IV every 6 hours.  Increase fluids to 100 ml/hour  Continue LORENZO drain.  HOB 45 degrees.  Lovenox (prophylactic) can start tomorrow 5/18/25.  BP less than 150 systolic.   Remove chaudhari catheter.  PT/OT    Discussed with Dr. Kent and Dr. Mckinnon.    Yessi Chiu MPAS  [facility] Neurosurgery  [address] [address]

## 2025-05-17 NOTE — PROGRESS NOTES
05/17/25 0871   Appointment Info   Signing Clinician's Name / Credentials (PT) Valentina Waggoner, PT, DPT   Living Environment   People in Home alone   Current Living Arrangements house   Home Accessibility stairs to enter home;stairs within home   Number of Stairs, Main Entrance 3   Stair Railings, Main Entrance railings safe and in good condition   Number of Stairs, Within Home, Primary greater than 10 stairs   Stair Railings, Within Home, Primary railings safe and in good condition   Transportation Anticipated family or friend will provide   Living Environment Comments stairs to basement but all needs on main level of home including laundry. Pt reports her mother, daughter and daughter-in-law are able to help out as needed.   Self-Care   Usual Activity Tolerance fair   Current Activity Tolerance moderate   Equipment Currently Used at Home none   Fall history within last six months yes   Number of times patient has fallen within last six months 1  (tripped over dog)   Activity/Exercise/Self-Care Comment pt reports ind with mobility, ADLs/IADLs at baseline. pt was having double vision prior to surgery.   General Information   Onset of Illness/Injury or Date of Surgery 05/16/25   Referring Physician Enma Da Silva, CARLTON BARAKAT   Patient/Family Therapy Goals Statement (PT) Planning on going home when able.   Pertinent History of Current Problem (include personal factors and/or comorbidities that impact the POC) Pt is a 59 year old female with history of essential hypertension,  gastroesophageal reflux disease, lumbar spinal stenosis, psoriasis who underwent craniotomy for presumed meningioma earlier on 5/16 with Dr. Kent from neurosurgery. Pt is POD#1.   Existing Precautions/Restrictions fall;cardiac;oxygen therapy device and L/min  (2 LPM O2)   Cognition   Affect/Mental Status (Cognition) WFL   Orientation Status (Cognition) oriented x 4   Follows Commands (Cognition) WFL   Pain Assessment   Patient Currently in  Pain   (7/10 headache)   Integumentary/Edema   Integumentary/Edema Comments LORENZO drain and incision site on scalp appear CDI   Posture    Posture Forward head position;Protracted shoulders   Range of Motion (ROM)   ROM Comment Grossly WFL BUE and LE with functional transfers.   Strength (Manual Muscle Testing)   Strength (Manual Muscle Testing) Deficits observed during functional mobility   Strength Comments grossly antigravity strength BUE and LE with functional transfers. global weakness.   Bed Mobility   Comment, (Bed Mobility) NA, pt up in chair upon start of session. requested to stay up in chair at end of session.   Transfers   Comment, (Transfers) sit>stand to FWW, SBA   Gait/Stairs (Locomotion)   Distance in Feet (Gait) 5' eval + 300' treatment   Comment, (Gait/Stairs) amb with FWW, SBA, decreased gait speed, no LOB.   Balance   Balance Comments Able to maintain seated balance at EOB. Able to maintain standing balance in FWW, SBA   Sensory Examination   Sensory Perception Comments pt reprots some tingling in B feet at baseline from chronic back pain.   Coordination   Coordination Comments pt reports double vision   Muscle Tone   Muscle Tone no deficits were identified   Oculomotor Exam   Smooth Pursuit Normal   Clinical Impression   Criteria for Skilled Therapeutic Intervention Yes, treatment indicated   PT Diagnosis (PT) Impaired functional mobility   Influenced by the following impairments decreased activity tolerance, pain, post op status, double vision   Functional limitations due to impairments Impaired functional independence   Clinical Presentation (PT Evaluation Complexity) stable   Clinical Presentation Rationale per MR and clinical judgement   Clinical Decision Making (Complexity) low complexity   Planned Therapy Interventions (PT) balance training;bed mobility training;gait training;home exercise program;neuromuscular re-education;patient/family education;ROM (range of motion);stair  "training;strengthening;stretching;transfer training;progressive activity/exercise;motor coordination training;cryotherapy   Risk & Benefits of therapy have been explained evaluation/treatment results reviewed;care plan/treatment goals reviewed;risks/benefits reviewed;current/potential barriers reviewed;participants voiced agreement with care plan;participants included;patient   PT Total Evaluation Time   PT Eval, Low Complexity Minutes (49626) 9   Physical Therapy Goals   PT Frequency Daily   PT Predicted Duration/Target Date for Goal Attainment 05/24/25   PT Goals Bed Mobility;Transfers;Gait;Stairs   PT: Bed Mobility Modified independent;Supine to/from sit   PT: Transfers Modified independent;Sit to/from stand;Bed to/from chair;Assistive device   PT: Gait Modified independent;Assistive device;Rolling walker;Greater than 200 feet   PT: Stairs Modified independent;3 stairs;Rail on both sides   Interventions   Interventions Quick Adds Gait Training;Therapeutic Activity   Therapeutic Activity   Therapeutic Activities: dynamic activities to improve functional performance Minutes (84245) 16   Symptoms Noted During/After Treatment Fatigue;Significant change in vital signs  (nausea)   Treatment Detail/Skilled Intervention Pt agreed to therapy session. Pt sitting up in chair upon start of session. Pt on 2 LPM O2 throughout session. Increased time for room set up, equipment set up, line management, skilled monitoring of vitals, see VSFS for details. Pt completed additional STS throughout session, progressing to mod ind, cues for hand placement and walker safety. Upon post gait training, pt resting in recliner chair. After smooth pursuit assessment, pt states \"I am going to throw up\". Therapist handed pt emesis bag, pt's MN decreased into mid 30's. RN came into room. Pt did throw up small bit, MN back into 80's-90's quickly. Pt left in recliner chair, chair alarm on, RN in room at end of session.   Gait Training   Gait " Training Minutes (37261) 9   Symptoms Noted During/After Treatment (Gait Training) fatigue   Treatment Detail/Skilled Intervention pt amb in room and hallway, two bouts of ambulation with seated rest break due to fatigue. Close WC follow. Cues for upright gaze and posture, cues for walker proximity, cues for PLB. Pt tolerated well, SBA intermittent mod ind.   Distance in Feet 200'+100'   Perry Level (Gait Training) stand-by assist   Physical Assistance Level (Gait Training) set-up required;supervision;verbal cues;nonverbal cues (demo/gestures);1 person + 1 person to manage equipment   Assistive Device (Gait Training) rolling walker   PT Discharge Planning   PT Plan assess bed mobility, progress amb with FWW vs without, stairs, dual task gait   PT Discharge Recommendation (DC Rec) home with assist   PT Rationale for DC Rec Pt tolerated session okay. Pt below baseline functional independence. Pt limited by decreased activity tolerance, pain, post op status, double vision, nausea. Pt at baseline lives alone in house with stairs to enter, ind with mobility, ADLs/IADLs. Pt does report family is able to help out as needed. Pt currently SBA for transfers, use of walker for ambulation. Anticipate with continued mobilization with nursning staff and IP PT, pt will progress to discharge to home with assist as needed for heavier tasks, use of walker for ambulation. Will continue to update as appropriate.   PT Brief overview of current status STS to FWW, SBA. amb with FWW, SBA. Goals of therapy will be to address safe mobility and make recs for d/c to next level of care. Pt and RN will continue to follow all falls risk precautions as documented by RN staff while hospitalized.   PT Total Distance Amb During Session (feet) 305   PT Equipment Needed at Discharge   (pt has walker at home to use if needed.)   Physical Therapy Time and Intention   Timed Code Treatment Minutes 25   Total Session Time (sum of timed and untimed  services) 34

## 2025-05-17 NOTE — PROGRESS NOTES
05/17/25 1400   Appointment Info   Signing Clinician's Name / Credentials (OT) Elaina Rodriguez, OTR/L   Living Environment   People in Home alone   Current Living Arrangements house   Home Accessibility stairs to enter home;stairs within home   Number of Stairs, Main Entrance 3   Stair Railings, Main Entrance railings safe and in good condition   Number of Stairs, Within Home, Primary greater than 10 stairs   Stair Railings, Within Home, Primary railings safe and in good condition   Transportation Anticipated family or friend will provide   Living Environment Comments Uses tub/shower combo, standard ht toilet stools. Pt reports she stays on main floor, has basement but doesn't use.   Self-Care   Usual Activity Tolerance moderate   Current Activity Tolerance fair   Equipment Currently Used at Home none   Fall history within last six months yes   Number of times patient has fallen within last six months 1  (tripped over dog)   Activity/Exercise/Self-Care Comment Baseline: indep all mobilities no AD, indep ADL/IADL although difficult due to h/o lower back and cervical pain (has had fusions in both). Her mother (age 80), daughter and DIL are able to help out if needed at time of discharge.   General Information   Onset of Illness/Injury or Date of Surgery 05/16/25   Referring Physician Enma Da Silva APRN CN   Patient/Family Therapy Goal Statement (OT) return home   Additional Occupational Profile Info/Pertinent History of Current Problem 59 year old female with history of essential hypertension,  gastroesophageal reflux disease, lumbar spinal stenosis, psoriasis  admitted with dural based presumed meningioma s/p craniotomy on 5/16/2025.   Existing Precautions/Restrictions fall;oxygen therapy device and L/min;cardiac  (craniotomy)   Limitations/Impairments visual   General Observations and Info Patient somewhat lethargic but able to be aroused and participate, waxes and wanes   Cognitive Status Examination  "  Orientation Status orientation to person, place and time   Affect/Mental Status (Cognitive) WFL  (Sleepy d/t pain meds)   Follows Commands   (Able to follow directives when maintaining attention)   Visual Perception   Visual Impairment/Limitations diplopia;blurry vision;corrective lenses for reading   Impact of Vision Impairment on Function (Vision) pt reports having had diplopia, increased eye pressure for a month or two prior to surgery which \"came and went\" but feels it is worse post-op.   Sensory   Sensory Comments pt reports having intermittent parathesias in hands at baseline from cervical issues (has h/o cervical fs)   Pain Assessment   Patient Currently in Pain Yes, see Vital Sign flowsheet  (headache pain)   Range of Motion Comprehensive   General Range of Motion no range of motion deficits identified   Strength Comprehensive (MMT)   General Manual Muscle Testing (MMT) Assessment no strength deficits identified   Coordination   Coordination Comments Slight incoordination in R hand with finger/thumb   Bed Mobility   Comment (Bed Mobility) Sup to sit with HOB elevated SBA   Transfers   Transfers bed-chair transfer;sit-stand transfer;toilet transfer   Transfer Skill: Bed to Chair/Chair to Bed   Bed-Chair Leslie (Transfers) contact guard   Assistive Device (Bed-Chair Transfers) rolling walker   Sit-Stand Transfer   Sit-Stand Leslie (Transfers) contact guard   Assistive Device (Sit-Stand Transfers) walker, front-wheeled   Toilet Transfer   Toilet Transfer Comments Bedside commode only - CGA   Balance   Balance Comments Good sitting and standing balance   Activities of Daily Living   BADL Assessment/Intervention other (see comments)  (unable to tolerate at time of eval)   Clinical Impression   Criteria for Skilled Therapeutic Interventions Met (OT) Yes, treatment indicated   OT Diagnosis decline in ADL/IADL performance   OT Problem List-Impairments impacting ADL problems related to;activity " tolerance impaired;sensation;post-surgical precautions;pain;vision   Assessment of Occupational Performance 3-5 Performance Deficits   Identified Performance Deficits functional mob, dressing, toileting, bathing, HH mgmt, community mobility   Planned Therapy Interventions (OT) ADL retraining;IADL retraining;transfer training;visual perception   Clinical Decision Making Complexity (OT) problem focused assessment/low complexity   Risk & Benefits of therapy have been explained evaluation/treatment results reviewed;care plan/treatment goals reviewed;risks/benefits reviewed;current/potential barriers reviewed;participants voiced agreement with care plan;participants included;patient   OT Total Evaluation Time   OT Eval, Low Complexity Minutes (78083) 10   OT Goals   Therapy Frequency (OT) Daily   OT Predicted Duration/Target Date for Goal Attainment 05/23/25   OT Goals Hygiene/Grooming;Lower Body Dressing;Lower Body Bathing;Transfers;Toilet Transfer/Toileting;OT Goal 1   OT: Hygiene/Grooming independent;while standing   OT: Lower Body Dressing Independent;Modified independent;within precautions;including set-up/clothing retrieval   OT: Transfer Modified independent  (simulated tub transfer)   OT: Toilet Transfer/Toileting Modified independent;cleaning and garment management;toilet transfer;using adaptive equipment   OT: Goal 1 Patient will be indep in eye exercises, patch wearing program to promote minocular vision and demonstrate safe scanning strategies to be able to perform functional tasks and navigation in dynamic setting safe manner.   Interventions   Interventions Quick Adds Self-Care/Home Management;Therapeutic Activity   Self-Care/Home Management   Self-Care/Home Mgmt/ADL, Compensatory, Meal Prep Minutes (21532) 71   Treatment Detail/Skilled Intervention OT: issued patch to relieve discomfort from double vision, placed on stronger L eye and instructed pt in use. Initiated training in visual exercises. Pt very  sleepy requiring cues to attend - will cont training next session. Pt oriented to self, , full date, JAMEEL, POTUS. Unable to give correct answer to one simple math problem (13x5) despite multiple attempts or to sequence 4 step task on paper - Dilaudid affecting attention however so will address next session.   Therapeutic Activities   Therapeutic Activity Minutes (48360) 10   Treatment Detail/Skilled Intervention Pt c/o headache and double vision throughout.  Sit-stand form EOB , bed to chair and short distance amb in room with walker CGA provided for safety but no LOB. VSS on RA. Pt left in chair all needs within reach, wearing eye patch, chair alarm activated.   OT Discharge Planning   OT Plan Teach vision exercises and check how eye patch use went. H/G sink, simulated tub transfer, dressing as tolerated.   OT Discharge Recommendation (DC Rec) home with assist   OT Rationale for DC Rec Pt tolerated sesson fairly well however required intermittent cueing to attend as pain meds affecting alertness. SBA bed mob, CGA tranfers and amb in room with FWW. Limited by impaired endurance, headache with double vision. Pt lives alone however reports she has family who may be able to stay with her to A as needed upon return home. Anticipate with continued medical care, inpatient therapies and amb with nursing she will progress to be able to return home with assistance for heavier HH tasks and transport until able to resume.   OT Brief overview of current status SBA bed mob, CGA transfers and ambin room with FWW, eye patch issued to relieve double vision stress/headache.  Goals of therapy will be to address safe mobility and make recs for d/c to next level of care. Pt and RN will continue to follow all falls risk precautions as documented by RN staff while hospitalized    Total Session Time   Timed Code Treatment Minutes 38   Total Session Time (sum of timed and untimed services) 48

## 2025-05-18 ENCOUNTER — APPOINTMENT (OUTPATIENT)
Dept: PHYSICAL THERAPY | Facility: CLINIC | Age: 60
DRG: 027 | End: 2025-05-18
Attending: SURGERY
Payer: COMMERCIAL

## 2025-05-18 ENCOUNTER — APPOINTMENT (OUTPATIENT)
Dept: OCCUPATIONAL THERAPY | Facility: CLINIC | Age: 60
DRG: 027 | End: 2025-05-18
Attending: SURGERY
Payer: COMMERCIAL

## 2025-05-18 LAB
MCV RBC AUTO: 93 FL (ref 78–100)
PLATELET # BLD AUTO: 247 10E3/UL (ref 150–450)

## 2025-05-18 PROCEDURE — 97116 GAIT TRAINING THERAPY: CPT | Mod: GP

## 2025-05-18 PROCEDURE — 97535 SELF CARE MNGMENT TRAINING: CPT | Mod: GO

## 2025-05-18 PROCEDURE — 120N000001 HC R&B MED SURG/OB

## 2025-05-18 PROCEDURE — 99232 SBSQ HOSP IP/OBS MODERATE 35: CPT | Performed by: INTERNAL MEDICINE

## 2025-05-18 PROCEDURE — 250N000011 HC RX IP 250 OP 636: Mod: JZ | Performed by: PHYSICIAN ASSISTANT

## 2025-05-18 PROCEDURE — 250N000012 HC RX MED GY IP 250 OP 636 PS 637

## 2025-05-18 PROCEDURE — 250N000013 HC RX MED GY IP 250 OP 250 PS 637: Performed by: INTERNAL MEDICINE

## 2025-05-18 PROCEDURE — 250N000011 HC RX IP 250 OP 636

## 2025-05-18 PROCEDURE — 250N000013 HC RX MED GY IP 250 OP 250 PS 637: Performed by: PHYSICIAN ASSISTANT

## 2025-05-18 PROCEDURE — 250N000012 HC RX MED GY IP 250 OP 636 PS 637: Performed by: PHYSICIAN ASSISTANT

## 2025-05-18 PROCEDURE — 97530 THERAPEUTIC ACTIVITIES: CPT | Mod: GP

## 2025-05-18 PROCEDURE — 36415 COLL VENOUS BLD VENIPUNCTURE: CPT | Performed by: SURGERY

## 2025-05-18 PROCEDURE — 250N000013 HC RX MED GY IP 250 OP 250 PS 637

## 2025-05-18 PROCEDURE — 258N000003 HC RX IP 258 OP 636: Performed by: PHYSICIAN ASSISTANT

## 2025-05-18 PROCEDURE — 85049 AUTOMATED PLATELET COUNT: CPT | Performed by: SURGERY

## 2025-05-18 RX ORDER — ATORVASTATIN CALCIUM 40 MG/1
40 TABLET, FILM COATED ORAL DAILY
Status: DISCONTINUED | OUTPATIENT
Start: 2025-05-18 | End: 2025-05-20 | Stop reason: HOSPADM

## 2025-05-18 RX ORDER — DEXAMETHASONE 4 MG/1
4 TABLET ORAL EVERY 8 HOURS SCHEDULED
Status: DISCONTINUED | OUTPATIENT
Start: 2025-05-18 | End: 2025-05-19

## 2025-05-18 RX ADMIN — OMEPRAZOLE 20 MG: 20 CAPSULE, DELAYED RELEASE ORAL at 07:38

## 2025-05-18 RX ADMIN — HYDROMORPHONE HYDROCHLORIDE 0.4 MG: 0.2 INJECTION, SOLUTION INTRAMUSCULAR; INTRAVENOUS; SUBCUTANEOUS at 20:36

## 2025-05-18 RX ADMIN — DEXAMETHASONE 4 MG: 4 TABLET ORAL at 05:08

## 2025-05-18 RX ADMIN — HYDROMORPHONE HYDROCHLORIDE 2 MG: 2 TABLET ORAL at 15:34

## 2025-05-18 RX ADMIN — LEVETIRACETAM 500 MG: 500 TABLET, FILM COATED ORAL at 20:13

## 2025-05-18 RX ADMIN — LEVETIRACETAM 500 MG: 500 TABLET, FILM COATED ORAL at 07:38

## 2025-05-18 RX ADMIN — HYDROMORPHONE HYDROCHLORIDE 0.2 MG: 0.2 INJECTION, SOLUTION INTRAMUSCULAR; INTRAVENOUS; SUBCUTANEOUS at 00:53

## 2025-05-18 RX ADMIN — POLYETHYLENE GLYCOL 3350 17 G: 17 POWDER, FOR SOLUTION ORAL at 07:38

## 2025-05-18 RX ADMIN — ACETAMINOPHEN 975 MG: 325 TABLET, FILM COATED ORAL at 11:10

## 2025-05-18 RX ADMIN — OMEPRAZOLE 20 MG: 20 CAPSULE, DELAYED RELEASE ORAL at 15:04

## 2025-05-18 RX ADMIN — SODIUM CHLORIDE: 0.9 INJECTION, SOLUTION INTRAVENOUS at 03:47

## 2025-05-18 RX ADMIN — DEXAMETHASONE 4 MG: 4 TABLET ORAL at 11:10

## 2025-05-18 RX ADMIN — DEXAMETHASONE 4 MG: 4 TABLET ORAL at 22:00

## 2025-05-18 RX ADMIN — HYDROMORPHONE HYDROCHLORIDE 0.2 MG: 0.2 INJECTION, SOLUTION INTRAMUSCULAR; INTRAVENOUS; SUBCUTANEOUS at 08:37

## 2025-05-18 RX ADMIN — ACETAMINOPHEN 975 MG: 325 TABLET, FILM COATED ORAL at 03:46

## 2025-05-18 RX ADMIN — HYDROMORPHONE HYDROCHLORIDE 0.2 MG: 0.2 INJECTION, SOLUTION INTRAMUSCULAR; INTRAVENOUS; SUBCUTANEOUS at 17:15

## 2025-05-18 RX ADMIN — SENNOSIDES AND DOCUSATE SODIUM 1 TABLET: 50; 8.6 TABLET ORAL at 07:38

## 2025-05-18 RX ADMIN — ENOXAPARIN SODIUM 40 MG: 40 INJECTION SUBCUTANEOUS at 11:10

## 2025-05-18 RX ADMIN — DEXAMETHASONE 4 MG: 4 TABLET ORAL at 00:16

## 2025-05-18 RX ADMIN — HYDROMORPHONE HYDROCHLORIDE 0.2 MG: 0.2 INJECTION, SOLUTION INTRAMUSCULAR; INTRAVENOUS; SUBCUTANEOUS at 05:08

## 2025-05-18 RX ADMIN — OMEPRAZOLE 20 MG: 20 CAPSULE, DELAYED RELEASE ORAL at 22:00

## 2025-05-18 RX ADMIN — ATORVASTATIN CALCIUM 40 MG: 40 TABLET, FILM COATED ORAL at 11:43

## 2025-05-18 RX ADMIN — SENNOSIDES AND DOCUSATE SODIUM 1 TABLET: 50; 8.6 TABLET ORAL at 20:13

## 2025-05-18 ASSESSMENT — ACTIVITIES OF DAILY LIVING (ADL)
ADLS_ACUITY_SCORE: 40

## 2025-05-18 NOTE — PROGRESS NOTES
[facility]    Medicine Progress Note - Hospitalist Service        Date of Admission:  5/16/2025  5:34 AM    Assessment & Plan:   Selina Parikh is a 59 year old female with history of essential hypertension,  gastroesophageal reflux disease, lumbar spinal stenosis, psoriasis who underwent craniotomy for presumed meningioma earlier today with Dr. Kent from neurosurgery.  Hospitalist service consulted for medical co-management.    1.4 x 1.7 x 1.7 dural based presumed meningioma s/p craniotomy on 5/16/2025.  ?  Postop small volume of acute ischemia involving the parasagittal left parietal lobe  -Surgery performed by Dr. Suha Kent, neurosurgery  -Defer routine postop care to neurosurgery including DVT prophylaxis, pain management  -Dexamethasone and Keppra per neurosurgery  -Repeat MRI on 5/17 shows interval postop changes with interval development of small volume of acute ischemia involving the parasagittal left parietal lobe.  Discussed with neurosurgery, they do not feel that there is any for stroke neurology to evaluate as she is not a candidate for any antiplatelet agent at the moment anyway.  Prior to admission she is already on aspirin which can be resumed when cleared by neurosurgery.  Will defer further management to neurosurgery     Essential hypertension  History of carotid artery disease  Known moderate mid distal LAD myocardial bridge  Hyperlipidemia  -Recent Lexiscan on/1/2025 was negative for inducible ischemia  -Resume prior to admission Micardis by tomorrow  -Resume prior to admission aspirin when okay with neurosurgery  -Resume Lipitor    Other chronic medical problems:  Gastroesophageal reflux disease-resume PPI  Lumbar spinal stenosis with neurogenic claudication  Psoriasis-resume prior to admission topical steroid  IBS-predominantly constipation type      Diet: Advance Diet as Tolerated: Regular Diet Adult     DVT Prophylaxis: Defer to primary service   Pradhan Catheter:  "Not present  Code Status: Full Code     Disposition Plan       Expected Discharge Date: 05/26/2025              Entered: Jacob Live MD 05/18/2025, 11:10 AM        Medically Ready for Discharge: Anticipated in 2-4 Days per neurosurgery       Clinically Significant Risk Factors                   # Hypertension: Noted on problem list            # Obesity: Estimated body mass index is 32.1 kg/m  as calculated from the following:    Height as of this encounter: 1.499 m (4' 11\").    Weight as of this encounter: 72.1 kg (158 lb 15.2 oz)., PRESENT ON ADMISSION             The patient's care was discussed with the family.  And neurosurgery    Medical Decision Making       **CLEAR ALL SELECTIONS**      Labs/Imaging Reviewed:  See Information above and Data section below  Time SPENT BY ME on the date of service doing chart review, history, exam, documentation & further activities per the note:  35 MINUTES    Chart documentation was completed, in part, with [email or web address] voice-recognition software. Even though reviewed, some grammatical, spelling, and word errors may remain.    Jacob Live MD  Hospitalist Service  [facility]  Text Page 7AM-6PM  Securely message with the Vocera Web Console (learn more here)  Text page via [employer] Paging/Directory    ______________________________________________________________________    Interval History   Denies significant diplopia today.  No nausea or vomiting.  Overall she feels like she is getting better.    Data reviewed today: I reviewed all medications, new labs and imaging results over the last 24 hours. I personally reviewed no images or EKG's today.    Physical Exam   Vital signs:  Temp: 98.2  F (36.8  C) Temp src: Temporal BP: (!) 150/82 Pulse: 101   Resp: 16 SpO2: 98 % O2 Device: Nasal cannula Oxygen Delivery: 2 LPM Height: 149.9 cm (4' 11\") Weight: 72.1 kg (158 lb 15.2 oz)  Estimated body mass index is 32.1 kg/m  as calculated from the following:    " "Height as of this encounter: 1.499 m (4' 11\").    Weight as of this encounter: 72.1 kg (158 lb 15.2 oz).      Wt Readings from Last 2 Encounters:   05/18/25 72.1 kg (158 lb 15.2 oz)   05/05/25 72.1 kg (159 lb)       Gen: AAOX3, NAD  HEENT: Scalp drain in place  Resp: CTA B/L, normal WOB  CVS: RRR, no murmur  Abd/GI: Soft, non-tender. BS- normoactive.  Skin: Warm, dry no rashes  MSK: no pedal edema  Neuro- CN- intact. No focal deficits.        Data   Recent Labs   Lab 05/18/25  0436 05/17/25  1441 05/17/25  0552 05/16/25  2350 05/16/25  2053 05/16/25  0554   HGB  --   --   --   --   --  14.5   MCV 93 96  --   --   --  94    237  --   --   --   --    POTASSIUM  --   --   --   --   --  3.9   CR  --   --   --   --   --  0.80   GLC  --   --  137* 134* 175* 97       No results found for this or any previous visit (from the past 24 hours).    Medications   Current Facility-Administered Medications   Medication Dose Route Frequency Provider Last Rate Last Admin     Current Facility-Administered Medications   Medication Dose Route Frequency Provider Last Rate Last Admin    acetaminophen (TYLENOL) tablet 975 mg  975 mg Oral Q8H Enma Da Silva APRN CNP   975 mg at 05/18/25 0346    dexAMETHasone (DECADRON) injection 4 mg  4 mg Intravenous Q6H [facility] Enma Da Silva APRN CNP   4 mg at 05/17/25 1112    Or    dexAMETHasone (DECADRON) tablet 4 mg  4 mg Oral Q6H [facility] Enma Da Silva APRN CNP   4 mg at 05/18/25 0508    enoxaparin ANTICOAGULANT (LOVENOX) injection 40 mg  40 mg Subcutaneous Q24H Enma Da Silva APRN CNP        levETIRAcetam (KEPPRA) tablet 500 mg  500 mg Oral or NG Tube BID Enma Da Silva APRN CNP   500 mg at 05/18/25 0738    omeprazole (PriLOSEC) CR capsule 20 mg  20 mg Oral TID Jacob Live MD   20 mg at 05/18/25 0738    polyethylene glycol (MIRALAX) Packet 17 g  17 g Oral Daily Enma Da Silva APRN CNP   17 g at 05/18/25 0738    senna-docusate (SENOKOT-S/PERICOLACE) 8.6-50 MG per tablet 1 " tablet  1 tablet Oral BID Enma Da Silva APRN CNP   1 tablet at 05/18/25 0738    sodium chloride (PF) 0.9% PF flush 3 mL  3 mL Intracatheter Q8H [facility] Enma Da Silva APRN CNP   3 mL at 05/17/25 0558    [Held by provider] telmisartan (MICARDIS) tablet 80 mg  80 mg Oral Daily Jacob Live MD

## 2025-05-18 NOTE — PROGRESS NOTES
Neurosurgery progress note:     POD#2 s/p left craniotomy for resection of meningioma.      Patient states she is feeling better today.  Her symptoms including headache, increased head pressure, nausea and blurry vision have all improved.      On exam, she's awake and alert,  incision C/D/I.  Moving all four extremities well with intact strength. GCS 15    LORENZO output 185/8 hours     Brain MRI:     IMPRESSION:  1.  Interval postop changes of left parietal convexity meningioma resection. No evidence for residual meningioma.  2.  Interval development of small volume of acute ischemia involving the parasagittal left parietal lobe. No significant mass effect or midline shift. Negative MR evidence for intraparenchymal hemorrhage.  3.  No nodular enhancement identified to suggest residual meningioma.  4.  Grossly patent major dural sinuses.     RECOMMENDATIONS:  Transfer to neuro floor today.  Start Decadron taper, will change to 4 mg oral every 8 hours today (decreased from 6 hours)  Discontinue IV fluids.  Continue LORENZO drain.  HOB 45 degrees.  Lovenox (prophylactic) can start today.  Resume Asprin POD#5  BP less than 150 systolic.   Remove chaudhari catheter.  PT/OT     Discussed with Dr. Mckinnon.     Yessi Chiu, MPAS  [facility]  [address] [address]

## 2025-05-18 NOTE — PLAN OF CARE
"Goal Outcome Evaluation:      Plan of Care Reviewed With: patient    Overall Patient Progress: no changeOverall Patient Progress: no change    Outcome Evaluation: Pt neuros intact. States that pressure in head continues to goes away with IV dilaudid. Pt stated \"can we keep the chaudhari in until the morning or until I can be off of the IV dilaudid\". Educated pt on infection risk/ external cath use. Still wanted chaudhari to be in place. Educated pt that PO dilaudid may last longer than IV dilaudid but pt states \"the IV dilaudid works better\". Walked to toilet this shift, no BM. Crani dressing reinforced.          Problem: Adult Inpatient Plan of Care  Goal: Plan of Care Review  Description: The Plan of Care Review/Shift note should be completed every shift.  The Outcome Evaluation is a brief statement about your assessment that the patient is improving, declining, or no change.  This information will be displayed automatically on your shiftnote.  Outcome: Progressing  Flowsheets (Taken 5/18/2025 0549)  Outcome Evaluation: Pt neuros intact. States that pressure in head continues to goes away with IV dilaudid. Pt stated \"can we keep the chaudhari in until the morning or until I can be off of the IV dilaudid\". Educated pt on infection risk/ external cath use. Still wanted chauhdari to be in place. Educated pt that PO dilaudid may last longer than IV dilaudid but pt states \"the IV dilaudid works better\". Walked to toilet this shift, no BM. Crani dressing reinforced.  Plan of Care Reviewed With: patient  Overall Patient Progress: no change  Goal: Patient-Specific Goal (Individualized)  Description: You can add care plan individualizations to a care plan. Examples of Individualization might be:  \"Parent requests to be called daily at 9am for status\", \"I have a hard time hearing out of my right ear\", or \"Do not touch me to wake me up as it startlesme\".  Outcome: Progressing  Goal: Absence of Hospital-Acquired Illness or Injury  Outcome: " Progressing  Intervention: Identify and Manage Fall Risk  Recent Flowsheet Documentation  Taken 5/18/2025 0400 by Julia Kearney RN  Safety Promotion/Fall Prevention: activity supervised  Taken 5/18/2025 0000 by Julia Kearney RN  Safety Promotion/Fall Prevention: activity supervised  Taken 5/17/2025 2000 by Julia Kearney RN  Safety Promotion/Fall Prevention: activity supervised  Intervention: Prevent Skin Injury  Recent Flowsheet Documentation  Taken 5/18/2025 0400 by Julia Kearney RN  Body Position:   weight shifting   position changed independently  Taken 5/18/2025 0200 by Julia Kearney RN  Body Position:   weight shifting   position changed independently  Taken 5/18/2025 0000 by Julia Kearney RN  Body Position:   weight shifting   position changed independently  Taken 5/17/2025 2200 by Julia Kearney RN  Body Position:   weight shifting   position changed independently  Taken 5/17/2025 2000 by Julia Kearney RN  Body Position:   weight shifting   position changed independently  Intervention: Prevent and Manage VTE (Venous Thromboembolism) Risk  Recent Flowsheet Documentation  Taken 5/18/2025 0400 by Julia Kearney RN  VTE Prevention/Management: SCDs off (sequential compression devices)  Taken 5/18/2025 0000 by Julia Kearney RN  VTE Prevention/Management: SCDs off (sequential compression devices)  Taken 5/17/2025 2000 by Julia Kearney RN  VTE Prevention/Management: SCDs off (sequential compression devices)  Goal: Optimal Comfort and Wellbeing  Outcome: Progressing  Intervention: Monitor Pain and Promote Comfort  Recent Flowsheet Documentation  Taken 5/18/2025 0440 by Julia Kearney RN  Pain Management Interventions: rest  Taken 5/18/2025 0400 by Julia Kearney RN  Pain Management Interventions: rest  Taken 5/18/2025 0346 by Julia Kearney RN  Pain Management Interventions: medication (see MAR)  Taken 5/18/2025 0119 by Julia Kearney RN  Pain Management Interventions:  rest  Taken 5/18/2025 0053 by Julia Kearney RN  Pain Management Interventions: medication (see MAR)  Taken 5/18/2025 0000 by Julia Kearney RN  Pain Management Interventions:   rest   repositioned  Taken 5/17/2025 2118 by Julia Kearney RN  Pain Management Interventions:   rest   repositioned  Taken 5/17/2025 2030 by Julia Kearney RN  Pain Management Interventions: medication (see MAR)  Taken 5/17/2025 2000 by Julia Kearney RN  Pain Management Interventions:   rest   repositioned  Intervention: Provide Person-Centered Care  Recent Flowsheet Documentation  Taken 5/18/2025 0400 by Julia Kearney RN  Trust Relationship/Rapport:   care explained   choices provided   emotional support provided  Taken 5/18/2025 0000 by Julia Kearney RN  Trust Relationship/Rapport:   care explained   choices provided   emotional support provided  Taken 5/17/2025 2000 by Julia Kearney RN  Trust Relationship/Rapport:   care explained   choices provided   emotional support provided  Goal: Readiness for Transition of Care  Outcome: Progressing     Problem: Pain Acute  Goal: Optimal Pain Control and Function  Outcome: Progressing  Intervention: Optimize Psychosocial Wellbeing  Recent Flowsheet Documentation  Taken 5/18/2025 0400 by Julia Kearney RN  Spiritual Activities Assistance: affirmation provided  Supportive Measures: active listening utilized  Diversional Activities: television  Taken 5/18/2025 0000 by Julia Kearney RN  Spiritual Activities Assistance: affirmation provided  Supportive Measures: active listening utilized  Diversional Activities: television  Taken 5/17/2025 2000 by Julia Kearney RN  Spiritual Activities Assistance: affirmation provided  Supportive Measures: active listening utilized  Diversional Activities: television  Intervention: Develop Pain Management Plan  Recent Flowsheet Documentation  Taken 5/18/2025 0440 by Julia Kearney RN  Pain Management Interventions: rest  Taken 5/18/2025  0400 by Julia Kearney RN  Pain Management Interventions: rest  Taken 5/18/2025 0346 by Julia Kearney RN  Pain Management Interventions: medication (see MAR)  Taken 5/18/2025 0119 by Julia Kearney RN  Pain Management Interventions: rest  Taken 5/18/2025 0053 by Julia Kearney RN  Pain Management Interventions: medication (see MAR)  Taken 5/18/2025 0000 by Julia Kearney RN  Pain Management Interventions:   rest   repositioned  Taken 5/17/2025 2118 by Julia Kearney RN  Pain Management Interventions:   rest   repositioned  Taken 5/17/2025 2030 by Julia Kearney RN  Pain Management Interventions: medication (see MAR)  Taken 5/17/2025 2000 by Julia Kearney RN  Pain Management Interventions:   rest   repositioned  Intervention: Prevent or Manage Pain  Recent Flowsheet Documentation  Taken 5/18/2025 0400 by Julia Kearney RN  Sleep/Rest Enhancement:   awakenings minimized   comfort measures  Bowel Elimination Promotion:   adequate fluid intake promoted   ambulation promoted  Medication Review/Management: medications reviewed  Taken 5/18/2025 0000 by Julia Kearney RN  Sleep/Rest Enhancement:   awakenings minimized   comfort measures  Bowel Elimination Promotion:   adequate fluid intake promoted   ambulation promoted  Medication Review/Management: medications reviewed  Taken 5/17/2025 2000 by Julia Kearney RN  Sleep/Rest Enhancement:   awakenings minimized   comfort measures  Bowel Elimination Promotion:   adequate fluid intake promoted   ambulation promoted  Medication Review/Management: medications reviewed     Problem: Fall Injury Risk  Goal: Absence of Fall and Fall-Related Injury  Outcome: Progressing  Intervention: Identify and Manage Contributors  Recent Flowsheet Documentation  Taken 5/18/2025 0400 by Julia Kearney RN  Medication Review/Management: medications reviewed  Taken 5/18/2025 0000 by Julia Kearney RN  Medication Review/Management: medications reviewed  Taken 5/17/2025 2000  by Julia Kearney RN  Medication Review/Management: medications reviewed  Intervention: Promote Injury-Free Environment  Recent Flowsheet Documentation  Taken 5/18/2025 0400 by Julia Kearney RN  Safety Promotion/Fall Prevention: activity supervised  Taken 5/18/2025 0000 by Julia Kearney RN  Safety Promotion/Fall Prevention: activity supervised  Taken 5/17/2025 2000 by Julia Kearney RN  Safety Promotion/Fall Prevention: activity supervised     Problem: Craniotomy/Craniectomy/Cranioplasty  Goal: Optimal Coping with Surgery  Outcome: Progressing  Intervention: Support Psychosocial Response to Surgery  Recent Flowsheet Documentation  Taken 5/18/2025 0400 by Julia Kearney RN  Supportive Measures: active listening utilized  Family/Support System Care: caregiver stress acknowledged  Taken 5/18/2025 0000 by Julia Kearney RN  Supportive Measures: active listening utilized  Family/Support System Care: caregiver stress acknowledged  Taken 5/17/2025 2000 by Julia Kearney RN  Supportive Measures: active listening utilized  Family/Support System Care: caregiver stress acknowledged  Goal: Absence of Bleeding  Outcome: Progressing  Intervention: Monitor and Manage Bleeding  Recent Flowsheet Documentation  Taken 5/18/2025 0400 by Julia Kearney RN  Bleeding Management:   dressing monitored   movement restricted  Taken 5/18/2025 0000 by Julia Kearney RN  Bleeding Management:   dressing monitored   movement restricted  Taken 5/17/2025 2000 by Julia Kearney RN  Bleeding Management:   dressing monitored   movement restricted  Goal: Effective Bowel Elimination  Outcome: Progressing  Goal: Optimal Cerebral Tissue Perfusion  Outcome: Progressing  Intervention: Protect and Optimize Cerebral Perfusion  Recent Flowsheet Documentation  Taken 5/18/2025 0400 by Julia Kearney RN  Cerebral Perfusion Promotion: blood pressure monitored  Taken 5/18/2025 0000 by Julia Kearney RN  Cerebral Perfusion Promotion: blood  pressure monitored  Taken 5/17/2025 2000 by Julia Kearney RN  Cerebral Perfusion Promotion: blood pressure monitored  Goal: Fluid and Electrolyte Balance  Outcome: Progressing  Goal: Optimal Functional Ability  Outcome: Progressing  Intervention: Optimize Functional Ability  Recent Flowsheet Documentation  Taken 5/18/2025 0400 by Julia Kearney RN  Activity Management: up in chair  Taken 5/18/2025 0000 by Julia Kearney RN  Activity Management: up in chair  Taken 5/17/2025 2000 by Julia Kearney RN  Activity Management: up in chair  Activity Assistance Provided: assistance, stand-by  Goal: Absence of Infection Signs and Symptoms  Outcome: Progressing  Goal: Acceptable Pain Control  Outcome: Progressing  Intervention: Prevent or Manage Pain  Recent Flowsheet Documentation  Taken 5/18/2025 0440 by Julia Kearney RN  Pain Management Interventions: rest  Taken 5/18/2025 0400 by Julia Kearney RN  Pain Management Interventions: rest  Diversional Activities: television  Taken 5/18/2025 0346 by Julia Kearney RN  Pain Management Interventions: medication (see MAR)  Taken 5/18/2025 0119 by Julia Kearney RN  Pain Management Interventions: rest  Taken 5/18/2025 0053 by Julia Kearney RN  Pain Management Interventions: medication (see MAR)  Taken 5/18/2025 0000 by Julia Kearney RN  Pain Management Interventions:   rest   repositioned  Diversional Activities: television  Taken 5/17/2025 2118 by Julia Kearney RN  Pain Management Interventions:   rest   repositioned  Taken 5/17/2025 2030 by Julia Kearney RN  Pain Management Interventions: medication (see MAR)  Taken 5/17/2025 2000 by Julia Kearney RN  Pain Management Interventions:   rest   repositioned  Diversional Activities: television  Goal: Nausea and Vomiting Relief  Outcome: Progressing  Goal: Effective Urinary Elimination  Outcome: Progressing  Goal: Effective Oxygenation and Ventilation  Outcome: Progressing  Intervention: Optimize  Oxygenation and Ventilation  Recent Flowsheet Documentation  Taken 5/18/2025 0400 by Julia Kearney RN  Head of Bed (HOB) Positioning: HOB at 45 degrees  Taken 5/18/2025 0200 by Julia Kearney RN  Head of Bed (hospitals) Positioning: HOB at 45 degrees  Taken 5/18/2025 0000 by Julia Kearney RN  Head of Bed (HOB) Positioning: HOB at 45 degrees  Taken 5/17/2025 2200 by Julia Kearney RN  Head of Bed (hospitals) Positioning: HOB at 45 degrees  Taken 5/17/2025 2000 by Julia Kearney RN  Head of Bed (HOB) Positioning: HOB at 45 degrees

## 2025-05-18 NOTE — PLAN OF CARE
Goal Outcome Evaluation:       Neuros intact, baseline numbness, tingling and double vision. Room air. LORENZO intact, dressing removed by Apple Mendes. Pradhan removed. Ambulated unit with PT. Up in chair most of day. Dilaudid given for pain. Family called and updated         Problem: Adult Inpatient Plan of Care  Goal: Plan of Care Review  Description:   Outcome: Progressing  Goal: Patient-Specific Goal (Individualized)  Description:   Outcome: Progressing  Goal: Absence of Hospital-Acquired Illness or Injury  Outcome: Progressing  Intervention: Identify and Manage Fall Risk  Recent Flowsheet Documentation  Taken 5/18/2025 1200 by Mary Kohli RN  Safety Promotion/Fall Prevention: activity supervised  Taken 5/18/2025 0800 by Mary Kohli RN  Safety Promotion/Fall Prevention: activity supervised  Intervention: Prevent Skin Injury  Recent Flowsheet Documentation  Taken 5/18/2025 1200 by Mary Kohli RN  Body Position:   weight shifting   position changed independently  Taken 5/18/2025 1000 by Mary Kohli RN  Body Position:   weight shifting   position changed independently  Taken 5/18/2025 0800 by Mary Kohli RN  Body Position:   weight shifting   position changed independently  Intervention: Prevent and Manage VTE (Venous Thromboembolism) Risk  Recent Flowsheet Documentation  Taken 5/18/2025 1200 by Mary Kohli RN  VTE Prevention/Management: SCDs off (sequential compression devices)  Taken 5/18/2025 0800 by Mary Kohli RN  VTE Prevention/Management: SCDs off (sequential compression devices)  Goal: Optimal Comfort and Wellbeing  Outcome: Progressing  Intervention: Monitor Pain and Promote Comfort  Recent Flowsheet Documentation  Taken 5/18/2025 0800 by Mary Kohli RN  Pain Management Interventions: rest  Intervention: Provide Person-Centered Care  Recent Flowsheet Documentation  Taken 5/18/2025 1200 by Mary Kohli RN  Trust Relationship/Rapport:   care explained   choices  provided   emotional support provided  Taken 5/18/2025 0800 by Mary Kolhi RN  Trust Relationship/Rapport:   care explained   choices provided   emotional support provided  Goal: Readiness for Transition of Care  Outcome: Progressing     Problem: Pain Acute  Goal: Optimal Pain Control and Function  Outcome: Progressing  Intervention: Optimize Psychosocial Wellbeing  Recent Flowsheet Documentation  Taken 5/18/2025 1200 by Mary Kohli RN  Spiritual Activities Assistance: affirmation provided  Supportive Measures: active listening utilized  Diversional Activities: television  Taken 5/18/2025 0800 by Mary Kohli RN  Spiritual Activities Assistance: affirmation provided  Supportive Measures: active listening utilized  Diversional Activities: television  Intervention: Develop Pain Management Plan  Recent Flowsheet Documentation  Taken 5/18/2025 0800 by Mary Kohli RN  Pain Management Interventions: rest  Intervention: Prevent or Manage Pain  Recent Flowsheet Documentation  Taken 5/18/2025 1200 by Mary Kohli RN  Sleep/Rest Enhancement:   awakenings minimized   comfort measures  Bowel Elimination Promotion:   adequate fluid intake promoted   ambulation promoted  Medication Review/Management: medications reviewed  Taken 5/18/2025 0800 by Mary Kohli RN  Sleep/Rest Enhancement:   awakenings minimized   comfort measures  Bowel Elimination Promotion:   adequate fluid intake promoted   ambulation promoted  Medication Review/Management: medications reviewed     Problem: Fall Injury Risk  Goal: Absence of Fall and Fall-Related Injury  Outcome: Progressing  Intervention: Identify and Manage Contributors  Recent Flowsheet Documentation  Taken 5/18/2025 1200 by Mary Kohli RN  Medication Review/Management: medications reviewed  Taken 5/18/2025 0800 by Mary Kohli RN  Medication Review/Management: medications reviewed  Intervention: Promote Injury-Free Environment  Recent Flowsheet  Documentation  Taken 5/18/2025 1200 by Mary Kohli RN  Safety Promotion/Fall Prevention: activity supervised  Taken 5/18/2025 0800 by Mary Kohli RN  Safety Promotion/Fall Prevention: activity supervised     Problem: Craniotomy/Craniectomy/Cranioplasty  Goal: Optimal Coping with Surgery  Outcome: Progressing  Intervention: Support Psychosocial Response to Surgery  Recent Flowsheet Documentation  Taken 5/18/2025 1200 by Mary Kohli RN  Supportive Measures: active listening utilized  Family/Support System Care: caregiver stress acknowledged  Taken 5/18/2025 0800 by Mary Kohli RN  Supportive Measures: active listening utilized  Family/Support System Care: caregiver stress acknowledged  Goal: Absence of Bleeding  Outcome: Progressing  Intervention: Monitor and Manage Bleeding  Recent Flowsheet Documentation  Taken 5/18/2025 1200 by Mary Kohli RN  Bleeding Management:   dressing monitored   movement restricted  Taken 5/18/2025 0800 by Mary Kohli RN  Bleeding Management:   dressing monitored   movement restricted  Goal: Effective Bowel Elimination  Outcome: Progressing  Goal: Optimal Cerebral Tissue Perfusion  Outcome: Progressing  Intervention: Protect and Optimize Cerebral Perfusion  Recent Flowsheet Documentation  Taken 5/18/2025 1200 by Mary Kohli RN  Cerebral Perfusion Promotion: blood pressure monitored  Taken 5/18/2025 0800 by Mary Kohli RN  Cerebral Perfusion Promotion: blood pressure monitored  Goal: Fluid and Electrolyte Balance  Outcome: Progressing  Goal: Optimal Functional Ability  Outcome: Progressing  Intervention: Optimize Functional Ability  Recent Flowsheet Documentation  Taken 5/18/2025 1524 by Mary Kohli RN  Activity Management:   ambulated to bathroom   up in chair  Taken 5/18/2025 1200 by Mary Kohli RN  Activity Management: up in chair  Taken 5/18/2025 1000 by Mary Kohli RN  Activity Management: up in chair  Taken 5/18/2025  0930 by Mary Kohli, RN  Activity Management: ambulated outside room  Activity Assistance Provided: assistance, stand-by  Taken 5/18/2025 0800 by Mary Kohli RN  Activity Management: up in chair  Goal: Absence of Infection Signs and Symptoms  Outcome: Progressing  Goal: Acceptable Pain Control  Outcome: Progressing  Intervention: Prevent or Manage Pain  Recent Flowsheet Documentation  Taken 5/18/2025 1200 by Mary Kohli RN  Diversional Activities: television  Taken 5/18/2025 0800 by Mary Kohli RN  Pain Management Interventions: rest  Diversional Activities: television  Goal: Nausea and Vomiting Relief  Outcome: Progressing  Goal: Effective Urinary Elimination  Outcome: Progressing  Goal: Effective Oxygenation and Ventilation  Outcome: Progressing

## 2025-05-19 LAB
CREAT SERPL-MCNC: 0.64 MG/DL (ref 0.51–0.95)
EGFRCR SERPLBLD CKD-EPI 2021: >90 ML/MIN/1.73M2
MCV RBC AUTO: 96 FL (ref 78–100)
PLATELET # BLD AUTO: 233 10E3/UL (ref 150–450)

## 2025-05-19 PROCEDURE — 82565 ASSAY OF CREATININE: CPT | Performed by: PHYSICIAN ASSISTANT

## 2025-05-19 PROCEDURE — 250N000011 HC RX IP 250 OP 636: Mod: JZ | Performed by: PHYSICIAN ASSISTANT

## 2025-05-19 PROCEDURE — 97530 THERAPEUTIC ACTIVITIES: CPT | Mod: GO

## 2025-05-19 PROCEDURE — 85049 AUTOMATED PLATELET COUNT: CPT | Performed by: PHYSICIAN ASSISTANT

## 2025-05-19 PROCEDURE — 97530 THERAPEUTIC ACTIVITIES: CPT | Mod: GP

## 2025-05-19 PROCEDURE — 120N000001 HC R&B MED SURG/OB

## 2025-05-19 PROCEDURE — 99232 SBSQ HOSP IP/OBS MODERATE 35: CPT | Performed by: INTERNAL MEDICINE

## 2025-05-19 PROCEDURE — 36415 COLL VENOUS BLD VENIPUNCTURE: CPT | Performed by: PHYSICIAN ASSISTANT

## 2025-05-19 PROCEDURE — 250N000012 HC RX MED GY IP 250 OP 636 PS 637: Performed by: PHYSICIAN ASSISTANT

## 2025-05-19 PROCEDURE — 250N000012 HC RX MED GY IP 250 OP 636 PS 637: Performed by: NURSE PRACTITIONER

## 2025-05-19 PROCEDURE — 250N000013 HC RX MED GY IP 250 OP 250 PS 637: Performed by: PHYSICIAN ASSISTANT

## 2025-05-19 PROCEDURE — 97116 GAIT TRAINING THERAPY: CPT | Mod: GP

## 2025-05-19 RX ORDER — DEXAMETHASONE 4 MG/1
4 TABLET ORAL DAILY
Status: DISCONTINUED | OUTPATIENT
Start: 2025-05-22 | End: 2025-05-20 | Stop reason: HOSPADM

## 2025-05-19 RX ORDER — LOSARTAN POTASSIUM 100 MG/1
100 TABLET ORAL DAILY
Status: DISCONTINUED | OUTPATIENT
Start: 2025-05-19 | End: 2025-05-19

## 2025-05-19 RX ORDER — DEXAMETHASONE 4 MG/1
4 TABLET ORAL 2 TIMES DAILY
Status: DISCONTINUED | OUTPATIENT
Start: 2025-05-19 | End: 2025-05-20 | Stop reason: HOSPADM

## 2025-05-19 RX ORDER — TELMISARTAN 40 MG/1
80 TABLET ORAL DAILY
Status: DISCONTINUED | OUTPATIENT
Start: 2025-05-19 | End: 2025-05-20 | Stop reason: HOSPADM

## 2025-05-19 RX ADMIN — LEVETIRACETAM 500 MG: 500 TABLET, FILM COATED ORAL at 21:19

## 2025-05-19 RX ADMIN — LEVETIRACETAM 500 MG: 500 TABLET, FILM COATED ORAL at 08:09

## 2025-05-19 RX ADMIN — OMEPRAZOLE 20 MG: 20 CAPSULE, DELAYED RELEASE ORAL at 08:10

## 2025-05-19 RX ADMIN — HYDROMORPHONE HYDROCHLORIDE 2 MG: 2 TABLET ORAL at 13:40

## 2025-05-19 RX ADMIN — SENNOSIDES AND DOCUSATE SODIUM 1 TABLET: 50; 8.6 TABLET ORAL at 21:19

## 2025-05-19 RX ADMIN — OMEPRAZOLE 20 MG: 20 CAPSULE, DELAYED RELEASE ORAL at 21:26

## 2025-05-19 RX ADMIN — ENOXAPARIN SODIUM 40 MG: 40 INJECTION SUBCUTANEOUS at 11:40

## 2025-05-19 RX ADMIN — HYDROMORPHONE HYDROCHLORIDE 0.2 MG: 0.2 INJECTION, SOLUTION INTRAMUSCULAR; INTRAVENOUS; SUBCUTANEOUS at 01:00

## 2025-05-19 RX ADMIN — OMEPRAZOLE 20 MG: 20 CAPSULE, DELAYED RELEASE ORAL at 15:27

## 2025-05-19 RX ADMIN — HYDROMORPHONE HYDROCHLORIDE 2 MG: 2 TABLET ORAL at 08:10

## 2025-05-19 RX ADMIN — DEXAMETHASONE 4 MG: 4 TABLET ORAL at 06:52

## 2025-05-19 RX ADMIN — HYDROMORPHONE HYDROCHLORIDE 2 MG: 2 TABLET ORAL at 21:19

## 2025-05-19 RX ADMIN — POLYETHYLENE GLYCOL 3350 17 G: 17 POWDER, FOR SOLUTION ORAL at 08:10

## 2025-05-19 RX ADMIN — SENNOSIDES AND DOCUSATE SODIUM 1 TABLET: 50; 8.6 TABLET ORAL at 08:10

## 2025-05-19 RX ADMIN — DEXAMETHASONE 4 MG: 4 TABLET ORAL at 13:40

## 2025-05-19 RX ADMIN — DEXAMETHASONE 4 MG: 4 TABLET ORAL at 23:13

## 2025-05-19 RX ADMIN — ATORVASTATIN CALCIUM 40 MG: 40 TABLET, FILM COATED ORAL at 08:09

## 2025-05-19 RX ADMIN — TELMISARTAN 80 MG: 40 TABLET ORAL at 15:27

## 2025-05-19 RX ADMIN — HYDROMORPHONE HYDROCHLORIDE 0.2 MG: 0.2 INJECTION, SOLUTION INTRAMUSCULAR; INTRAVENOUS; SUBCUTANEOUS at 04:35

## 2025-05-19 ASSESSMENT — ACTIVITIES OF DAILY LIVING (ADL)
ADLS_ACUITY_SCORE: 37
ADLS_ACUITY_SCORE: 40
ADLS_ACUITY_SCORE: 37
ADLS_ACUITY_SCORE: 40
ADLS_ACUITY_SCORE: 37

## 2025-05-19 NOTE — PROGRESS NOTES
[facility]  Neurosurgery Daily Progress Note  Date of Admission:  5/16/2025    Assessment  Procedure(s):  CRANIOTOMY, USING OPTICAL TRACKING SYSTEM, WITH NEOPLASM EXCISION   3 Days Post-Op  Final pathology - pending     Interval History   Patient reports continued headaches, slightly better today. Double vision and nausea stable to pre op. Neuro exam stable. Incision CDI. Patient denies any new or worsening symptoms today.    Plan   -Drain removed today with sterile technique, site closed with suture  -Monitor neuro exam   -Decadron taper as ordered   -Keppra x 7 days post op   -Hold aspirin x 5 days post op  -DVT ppx: Lovenox started 5/18   -Pain control measures as needed   -Routine wound care, suture / staple removal at 2 weeks post op   -Advance activity as tolerated  -PT/OT   -Discharge plan: Likely discharge home tomorrow pending progress   -Follow up with NSGY clinic at 2 weeks post op  -Appreciate assistance from specialties     Discussed with Dr. Kent and patient     Agustina Aparicio, CNP  [facility]  Clinic phone number: [phone]  Securely message or page via Epic Secure Chat, [employer], or [employer]     Physical Exam   Temp: 97.7  F (36.5  C) Temp src: Oral BP: 134/81 Pulse: 95   Resp: 16 SpO2: 96 % O2 Device: None (Room air) Oxygen Delivery: 2 LPM  Vitals:    05/16/25 0617 05/18/25 0500 05/18/25 2030   Weight: 71.2 kg (157 lb) 72.1 kg (158 lb 15.2 oz) 72.2 kg (159 lb 2.8 oz)       Mental status:  Alert and oriented x 3, speech is fluent, following commands  Cranial nerves:  II-XII intact.   Motor:  Moves all extremities with appropriate strength.   Sensation:  Intact to light touch   Coordination:  Smooth finger to nose testing.   Negative pronator drift.    Incision: CDI

## 2025-05-19 NOTE — PLAN OF CARE
Goal Outcome Evaluation:      Plan of Care Reviewed With: patient    Overall Patient Progress: no changeOverall Patient Progress: no change         Pt here POD 3 crani for meningioma resection. A&O x4. Neuros intact ex double vision, headache, nausea w/o vomiting. VSS on 2L. Regular diet, thin liquids. Takes pills whole. Up with SBA w/ walker. Patient has persistent h/a, PRN IV dilaudid used overnight - pain exacerbated by ambulation; also has chronic back pain. Pt scoring green on the Aggression Stop Light Tool. Discharge plan pending. Pt has LORENZO drain with consistent serosanguinous output.

## 2025-05-19 NOTE — PROGRESS NOTES
[facility]    Medicine Progress Note - Hospitalist Service        Date of Admission:  5/16/2025  5:34 AM    Assessment & Plan:   Selina Parikh is a 59 year old female with history of essential hypertension,  gastroesophageal reflux disease, lumbar spinal stenosis, psoriasis who underwent craniotomy for presumed meningioma earlier today with Dr. Kent from neurosurgery.  Hospitalist service consulted for medical co-management.    1.4 x 1.7 x 1.7 dural based presumed meningioma s/p craniotomy on 5/16/2025.  ?  Postop small volume of acute ischemia involving the parasagittal left parietal lobe  -Surgery performed by Dr. Suha Kent, neurosurgery  -Defer routine postop care to neurosurgery including DVT prophylaxis, pain management  -Dexamethasone and Keppra per neurosurgery  -Repeat MRI on 5/17 shows interval postop changes with interval development of small volume of acute ischemia involving the parasagittal left parietal lobe.  -Discussed with neurosurgery on 5/18, they do not feel that there is any for stroke neurology to evaluate as she is not a candidate for any antiplatelet agent at the moment anyway.  Prior to admission she is already on aspirin which can be resumed when cleared by neurosurgery.  Will defer further management to neurosurgery  -Incentive spirometry, mobilize, PT OT as able     Essential hypertension  History of carotid artery disease  Known moderate mid distal LAD myocardial bridge  Hyperlipidemia  -Recent Lexiscan on/1/2025 was negative for inducible ischemia  -Resume prior to admission Micardis  -Resume prior to admission aspirin when okay with neurosurgery  - Continue Lipitor    Other chronic medical problems:  Gastroesophageal reflux disease-continue PPI  Lumbar spinal stenosis with neurogenic claudication  Psoriasis- prior to admission topical steroid  IBS-predominantly constipation type      Diet: Advance Diet as Tolerated: Regular Diet Adult     DVT Prophylaxis:  "Defer to primary service   Pradhan Catheter: Not present  Code Status: Full Code     Disposition Plan       Expected Discharge Date: 05/22/2025              Entered: Jacob Live MD 05/19/2025, 9:19 AM      Medically Ready for Discharge: Anticipated in 2-4 Days per neurosurgery       Clinically Significant Risk Factors                   # Hypertension: Noted on problem list            # Obesity: Estimated body mass index is 32.15 kg/m  as calculated from the following:    Height as of this encounter: 1.499 m (4' 11\").    Weight as of this encounter: 72.2 kg (159 lb 2.8 oz)., PRESENT ON ADMISSION             The patient's care was discussed with the patient and RN    Medical Decision Making       **CLEAR ALL SELECTIONS**      Labs/Imaging Reviewed:  See Information above and Data section below  Time SPENT BY ME on the date of service doing chart review, history, exam, documentation & further activities per the note:  35 MINUTES    Chart documentation was completed, in part, with [email or web address] voice-recognition software. Even though reviewed, some grammatical, spelling, and word errors may remain.    Jacob Live MD  Hospitalist Service  [facility]  Text Page 7AM-6PM  Securely message with the Vocera Web Console (learn more here)  Text page via [employer] Paging/Directory    ______________________________________________________________________    Interval History   Diplopia is much better.  Still having intermittent headache.  No nausea or vomiting.  No chest pain or dyspnea.    Data reviewed today: I reviewed all medications, new labs and imaging results over the last 24 hours. I personally reviewed no images or EKG's today.    Physical Exam   Vital signs:  Temp: 97.7  F (36.5  C) Temp src: Oral BP: (!) 153/97 Pulse: 83   Resp: 14 SpO2: 95 % O2 Device: Nasal cannula Oxygen Delivery: 2 LPM Height: 149.9 cm (4' 11\") Weight: 72.2 kg (159 lb 2.8 oz)  Estimated body mass index is 32.15 kg/m  as " "calculated from the following:    Height as of this encounter: 1.499 m (4' 11\").    Weight as of this encounter: 72.2 kg (159 lb 2.8 oz).      Wt Readings from Last 2 Encounters:   05/18/25 72.2 kg (159 lb 2.8 oz)   05/05/25 72.1 kg (159 lb)       Gen: AAOX3, NAD  HEENT: Scalp drain in place  Resp: CTA B/L, normal WOB  CVS: RRR, no murmur  Abd/GI: Soft, non-tender. BS- normoactive.  Skin: Warm, dry no rashes  MSK: no pedal edema  Neuro- CN- intact. No focal deficits.        Data   Recent Labs   Lab 05/18/25  0436 05/17/25  1441 05/17/25  0552 05/16/25  2350 05/16/25  2053 05/16/25  0554   HGB  --   --   --   --   --  14.5   MCV 93 96  --   --   --  94    237  --   --   --   --    POTASSIUM  --   --   --   --   --  3.9   CR  --   --   --   --   --  0.80   GLC  --   --  137* 134* 175* 97       No results found for this or any previous visit (from the past 24 hours).    Medications   Current Facility-Administered Medications   Medication Dose Route Frequency Provider Last Rate Last Admin     Current Facility-Administered Medications   Medication Dose Route Frequency Provider Last Rate Last Admin    atorvastatin (LIPITOR) tablet 40 mg  40 mg Oral Daily Yessi Chiu PA-C   40 mg at 05/19/25 0809    dexAMETHasone (DECADRON) tablet 4 mg  4 mg Oral Q8H [facility] Yessi Chiu PA-C   4 mg at 05/19/25 0652    enoxaparin ANTICOAGULANT (LOVENOX) injection 40 mg  40 mg Subcutaneous Q24H Yessi Chiu PA-C   40 mg at 05/18/25 1110    levETIRAcetam (KEPPRA) tablet 500 mg  500 mg Oral or NG Tube BID Yessi Chiu PA-C   500 mg at 05/19/25 0809    omeprazole (PriLOSEC) CR capsule 20 mg  20 mg Oral TID Yessi Chiu PA-C   20 mg at 05/19/25 0810    polyethylene glycol (MIRALAX) Packet 17 g  17 g Oral Daily Yessi Chiu PA-C   17 g at 05/19/25 0810    senna-docusate (SENOKOT-S/PERICOLACE) 8.6-50 MG per tablet 1 tablet  1 tablet Oral BID Yessi Chiu PA-C   1 tablet at 05/19/25 0810    " sodium chloride (PF) 0.9% PF flush 3 mL  3 mL Intracatheter Q8H Yessi Gil PA-C   3 mL at 05/18/25 2204    [Held by provider] telmisartan (MICARDIS) tablet 80 mg  80 mg Oral Daily Jacob Live MD

## 2025-05-19 NOTE — OP NOTE
NEUROSURGERY OPERATIVE REPORT     DATE OF SERVICE: 5/16/2025    PREOPERATIVE DIAGNOSES:  Brain mass     POSTOPERATIVE DIAGNOSES:  Same  Frozen consistent with meningioma brain tumor    PROCEDURES:   Left Craniotomy using optical tracking system for meningioma resection.     SURGEON:  Suha Kent MD    ASSISTANT:  Enma Da Silva APRN CNP     INDICATIONS:  Selina Parikh is a 60 yo female who presents with headaches, double vision nausea, poor appetite, numbness in hands. MRI of the brain demonstrates a 1.5 cm contrast enhancing mas in the left parietal lobe. This was new from 2018 scans. We discussed the brain tumor recommendations from recent review of her imaging.  Her left-sided brain lesion does appear new 2018 and doubled in size since 2023.  Given this recommend removal of her presumed meningioma.  Risks and benefits of resection include but are not limited to infection, fluid collections/hematomas, seizures, new neurological symptoms, strokes, recurrent tumor. She agreed to proceed.     PROCEDURE:  After obtaining informed consent, the patient was brought to the operating room with pneumatic stockings in place.  IV antibiotics was administered.  She was intubated under general endotracheal anesthesia.  She was  positioned supine in a Dominguez head faulkner with all pressure points well padded.   Pradhan catheter was  placed.   The patient's cranium  was shaved prepped and draped in the usual sterile fashion.  A pre incisional infiltration of local anesthetic  was performed along the linear incision line selected by using the Stealth navigation to outline the skin incision based  the location of the underlying tumor.  The incision was opened sharply down to the level of the periosteum.  A skin was retractor was placed. We then placed claire holes on either side of the superior sagittal sinus lateral to the sinus and anterior and posterior to the tumor.  We  the underlying dura  from the  adjacent bone with a 3-Penfield making sure the dura was free from the bone in our intended craniotomy flap.   We used a midas drill with a foot plate to turn the craniotomy flap.     The bone flap was saved on the back table. We noted bleeding from the dura in the region of the superior sagittal sinus. We placed surgicel and gelfoam and cottonoid over the length of the superior sagittal sinus and held pressure to stop the bleeding. While we were waiting for hemostasis we extended the defect in the craniotomy flap created by the tumor. We extended this until no further tumor was visualized. Of note the outer table was not breached.  This was irrigated an cleaned and again placed on the back table. We then opened the dura in a horseshoe shape towards the superior sagittal sinus. This was held in place with 4-0 neurolon. We then removed the tumor. We debulked the tumor and then slowly dissected the tumor off the adjacent brain. The tumor was very adherent to the dura along the superior sagittal sinus. A small amount of tumor remained adherent and unable to separate from this region. We coagulated the tumor. We re approximated the dura with 4-0 neurolon. We placed gelfoam over the dura and the bone flap was replaced with titanium claire hole covers secured with screws. We then proceeded to close the galea with interrupted 2-0 Vicryl.  We approximated the skin edges with a running Ethilon suture.    Sponge and needle counts were correct prior to closure x 2.  Sterile dressings were placed.  Patient tolerated the procedure well, was taken to the recovery room for further monitoring.    ESTIMATED BLOOD LOSS: 700 ml    SPECIMENS:   ID Type Source Tests Collected by Time Destination   1 : Skull Portion Of Mass Tissue Brain SURGICAL PATHOLOGY EXAM Suha Kent MD 5/16/2025  9:16 AM    2 : Parasagittal Mass Tissue Brain SURGICAL PATHOLOGY EXAM Suha Kent MD 5/16/2025 10:08 AM      DRAIN:  edwar-powell    COMPLICATIONS: none immediate    IMPLANTS:   Implant Name Type Inv. Item Serial No.  Lot No. LRB No. Used Action   COVER FAISAL HOLE NEURO III 7MM CRANI LO PRO W/TAB 53-72223 - [id number] Metallic Hardware/[place] COVER FAISAL HOLE NEURO III 7MM CRANI LO PRO W/TAB [id number]  [employer] 41 05 [id number] Left 2 Implanted   IMP FAISAL HOLE COVER UNIV NEURO 3 OD14 MM CRANIAL LOW PROF - [id number] Metallic Hardware/[place] IMP FAISAL HOLE COVER UNIV NEURO 3 OD14 MM CRANIAL LOW PROF  [employer] 41 05 [id number] Left 2 Implanted   SCREW BONE NEURO 3 AXIS 4X1.5MM [id number] - [id number] Metallic Hardware/[place] SCREW BONE NEURO 3 AXIS 4X1.5MM [id number]  [employer] 41 05 [id number] Left 12 Implanted     FINDINGS: frozen tissue consistent with meningioma     Suha Kent MD    Cc: Sera Solo

## 2025-05-19 NOTE — BRIEF OP NOTE
[facility]    Brief Operative Note    Pre-operative diagnosis: Brain mass [G93.89]  Post-operative diagnosis Same as pre-operative diagnosis    Procedure: CRANIOTOMY, USING OPTICAL TRACKING SYSTEM, WITH NEOPLASM EXCISION, Left - Head    Surgeon: Surgeons and Role:     * Suha Kent MD - Primary     * Enma Da Silva APRN CNP - Assisting  Anesthesia: General   Estimated Blood Loss: 700 mL from 5/16/2025  7:33 AM to 5/16/2025 11:06 AM      Drains: Hussain-Wharton  Specimens:   ID Type Source Tests Collected by Time Destination   1 : Skull Portion Of Mass Tissue Brain SURGICAL PATHOLOGY EXAM Suha Kent MD 5/16/2025  9:16 AM    2 : Parasagittal Mass Tissue Brain SURGICAL PATHOLOGY EXAM Suha Kent MD 5/16/2025 10:08 AM      Findings:   None.  Complications: None.  Implants:   Implant Name Type Inv. Item Serial No.  Lot No. LRB No. Used Action   COVER FAISAL HOLE NEURO III 7MM CRANI LO PRO W/TAB [id number] - [id number] Metallic Hardware/[place] COVER FAISAL HOLE NEURO III 7MM CRANI LO PRO W/TAB [id number]  [employer] 41 05 [id number] Left 2 Implanted   COVER FAISAL HOLE NEURO III 7MM CRANI LO PRO W/TAB 53-31937 - [id number] Metallic Hardware/[place] COVER FAISAL HOLE NEURO III 7MM CRANI LO PRO W/TAB [id number]  [employer] 41 05 [id number] Left 2 Wasted   IMP FAISAL HOLE COVER UNIV NEURO 3 OD14 MM CRANIAL LOW PROF - [id number] Metallic Hardware/[place] IMP FAISAL HOLE COVER UNIV NEURO 3 OD14 MM CRANIAL LOW PROF  [employer] 41 05 [id number] Left 2 Implanted   SCREW BONE NEURO 3 AXIS 4X1.5MM 56-08069 - [id number] Metallic Hardware/[place] SCREW BONE NEURO 3 AXIS 4X1.5MM [id number]  [employer] 41 05 [id number] Left 12 Implanted   SCREW BONE NEURO 3 AXIS 4X1.5MM 56-73922 - [id number] Metallic Hardware/[place] SCREW BONE NEURO 3 AXIS 4X1.5MM [id number]  [employer] 41 05 [id number] Left 1 Wasted

## 2025-05-19 NOTE — PLAN OF CARE
Pt here POD 3 L crani for meningioma resection. A&O x4. Neuros dbl vision. VSS, SBP<150. Regular diet, thin liquids. Takes pills whole. SBA GBW. PRN PO Dilaudid given x2 for pain. Pt scoring green on the Aggression Stop Light Tool. LORENZO drain removed this shift. Discharge home likely tomorrow.

## 2025-05-20 ENCOUNTER — TELEPHONE (OUTPATIENT)
Dept: NEUROSURGERY | Facility: CLINIC | Age: 60
End: 2025-05-20
Payer: COMMERCIAL

## 2025-05-20 VITALS
RESPIRATION RATE: 16 BRPM | HEART RATE: 92 BPM | WEIGHT: 159.17 LBS | OXYGEN SATURATION: 96 % | HEIGHT: 59 IN | SYSTOLIC BLOOD PRESSURE: 140 MMHG | DIASTOLIC BLOOD PRESSURE: 92 MMHG | TEMPERATURE: 97.6 F | BODY MASS INDEX: 32.09 KG/M2

## 2025-05-20 LAB
PATH REPORT.COMMENTS IMP SPEC: ABNORMAL
PATH REPORT.COMMENTS IMP SPEC: YES
PATH REPORT.FINAL DX SPEC: ABNORMAL
PATH REPORT.GROSS SPEC: ABNORMAL
PATH REPORT.INTRAOP OBS SPEC DOC: ABNORMAL
PATH REPORT.MICROSCOPIC SPEC OTHER STN: ABNORMAL
PATH REPORT.RELEVANT HX SPEC: ABNORMAL
PHOTO IMAGE: ABNORMAL

## 2025-05-20 PROCEDURE — 99232 SBSQ HOSP IP/OBS MODERATE 35: CPT | Performed by: INTERNAL MEDICINE

## 2025-05-20 PROCEDURE — 250N000013 HC RX MED GY IP 250 OP 250 PS 637: Performed by: PHYSICIAN ASSISTANT

## 2025-05-20 PROCEDURE — 250N000012 HC RX MED GY IP 250 OP 636 PS 637: Performed by: NURSE PRACTITIONER

## 2025-05-20 RX ORDER — AMOXICILLIN 250 MG
1 CAPSULE ORAL DAILY PRN
Qty: 20 TABLET | Refills: 0 | Status: SHIPPED | OUTPATIENT
Start: 2025-05-20

## 2025-05-20 RX ORDER — ASPIRIN 81 MG/1
81 TABLET ORAL DAILY
COMMUNITY
Start: 2025-05-21

## 2025-05-20 RX ORDER — LEVETIRACETAM 500 MG/1
500 TABLET ORAL 2 TIMES DAILY
Qty: 6 TABLET | Refills: 0 | Status: SHIPPED | OUTPATIENT
Start: 2025-05-20 | End: 2025-05-23

## 2025-05-20 RX ORDER — DEXAMETHASONE 4 MG/1
TABLET ORAL
Qty: 4 TABLET | Refills: 0 | Status: SHIPPED | OUTPATIENT
Start: 2025-05-20 | End: 2025-05-23

## 2025-05-20 RX ORDER — HYDROMORPHONE HYDROCHLORIDE 2 MG/1
2 TABLET ORAL EVERY 6 HOURS PRN
Qty: 30 TABLET | Refills: 0 | Status: SHIPPED | OUTPATIENT
Start: 2025-05-20

## 2025-05-20 RX ADMIN — DEXAMETHASONE 4 MG: 4 TABLET ORAL at 08:06

## 2025-05-20 RX ADMIN — OMEPRAZOLE 20 MG: 20 CAPSULE, DELAYED RELEASE ORAL at 08:06

## 2025-05-20 RX ADMIN — POLYETHYLENE GLYCOL 3350 17 G: 17 POWDER, FOR SOLUTION ORAL at 08:06

## 2025-05-20 RX ADMIN — SENNOSIDES AND DOCUSATE SODIUM 1 TABLET: 50; 8.6 TABLET ORAL at 08:06

## 2025-05-20 RX ADMIN — LEVETIRACETAM 500 MG: 500 TABLET, FILM COATED ORAL at 08:06

## 2025-05-20 RX ADMIN — ATORVASTATIN CALCIUM 40 MG: 40 TABLET, FILM COATED ORAL at 08:06

## 2025-05-20 RX ADMIN — HYDROMORPHONE HYDROCHLORIDE 4 MG: 2 TABLET ORAL at 00:31

## 2025-05-20 RX ADMIN — HYDROMORPHONE HYDROCHLORIDE 4 MG: 2 TABLET ORAL at 07:34

## 2025-05-20 RX ADMIN — TELMISARTAN 80 MG: 40 TABLET ORAL at 08:06

## 2025-05-20 ASSESSMENT — ACTIVITIES OF DAILY LIVING (ADL)
ADLS_ACUITY_SCORE: 37

## 2025-05-20 NOTE — DISCHARGE SUMMARY
"NEUROSURGERY DISCHARGE SUMMARY    Patient Name:  Selina Parikh  MRN:  [id number]      :  1965      Date of Admission:  2025  Date of Discharge:  2025  Admitting Physician:  Suha Kent MD  Discharge Physician:    Primary Care Provider:   Sera Solo  Discharge Disposition:   Discharged to home    Admission Diagnoses:  Brain mass    Discharge Diagnoses:    Same    Brief History of Illness:   59-year-old female who presented with headaches double vision nausea poor appetite numbness in hands.  MRI revealed 1.5 cm contrast-enhancing mass in the left parietal lobe.  This was new from scans from 2018.  Decision was made to pursue surgical intervention.    Hospital Course:  Patient underwent craniotomy for resection of mass on May 16, 2025 and discharged on May 20, 2025.    Pertinent Investigations:  None    Consultations:    Hospital medicine    Discharge physical examination:   BP (!) 140/92 (BP Location: Left arm)   Pulse 92   Temp 97.6  F (36.4  C) (Oral)   Resp 16   Ht 1.499 m (4' 11\")   Wt 72.2 kg (159 lb 2.8 oz)   LMP 04/10/2016   SpO2 96%   BMI 32.15 kg/m    General: Awake and alert , Lying in bed, NAD, cooperative, endorses occasional headaches  HEENT: Status post craniotomy with surgical incision, atraumatic, no epistaxis, no oral lesions, MMM  Resp: CTAB, no increased work of breathing  Cardio: RRR  Abdomen: +BS, Soft, non-distended, non-tender  Extremities: Warm and well perfused, peripheral pulses present, no edema  Skin: Not jaundiced, no rash, no ecchymoses  Psych: Normal mood and affect    Neuro:  Mental status: Awake, alert, attentive; oriented to P/P/T/S  Speech: Fluent, comprehension and repetition intact; No dysarthria  Cranial nerves: Visual fields intact double vision currently, Eyes conjugate, PERRLA, EOMI w/ normal and smooth pursuit, face expression symmetric, facial sensation intact and symmetric, hearing intact to conversation, shoulder shrug strong, " "palate rise symmetric, tongue/uvula midline.  Motor: Tone normal. 5/5 strength in x4E. No atrophy or twitches.  Without pronator drift. Finger tapping symmetric. Rolling hands normal rate and coordination  Sensory: Intact to LT, PP x4E; No lateral extinction   Coordination: FNF intact bilaterally, no dysmetria; Normal heel-shin test bilaterally  Gait: Deferred    Discharge Medications:  Current Discharge Medication List        START taking these medications    Details   dexAMETHasone (DECADRON) 4 MG tablet Take 1 tablet (4 mg) by mouth 2 times daily for 1 day, THEN 1 tablet (4 mg) daily for 2 days.  Qty: 4 tablet, Refills: 0    Associated Diagnoses: S/P craniotomy      HYDROmorphone (DILAUDID) 2 MG tablet Take 1 tablet (2 mg) by mouth every 6 hours as needed for severe pain.  Qty: 30 tablet, Refills: 0    Associated Diagnoses: S/P craniotomy      levETIRAcetam (KEPPRA) 500 MG tablet Take 1 tablet (500 mg) by mouth or NG Tube 2 times daily for 3 days.  Qty: 6 tablet, Refills: 0    Associated Diagnoses: S/P craniotomy      senna-docusate (SENOKOT-S/PERICOLACE) 8.6-50 MG tablet Take 1 tablet by mouth daily as needed for constipation.  Qty: 20 tablet, Refills: 0    Associated Diagnoses: S/P craniotomy           CONTINUE these medications which have CHANGED    Details   aspirin 81 MG EC tablet Take 1 tablet (81 mg) by mouth daily.           CONTINUE these medications which have NOT CHANGED    Details   acetaminophen (TYLENOL) 500 MG tablet Take 1,000 mg by mouth 2 times daily.      atorvastatin (LIPITOR) 40 MG tablet Take 1 tablet (40 mg) by mouth daily.  Qty: 90 tablet, Refills: 3    Associated Diagnoses: Coronary artery disease due to lipid rich plaque; Acute chest pain      nitroGLYcerin (NITROSTAT) 0.4 MG sublingual tablet One tablet under the tongue every 5 minutes if needed for chest pain. May repeat every 5 minutes for a maximum of 3 doses in 15 minutes\"  Qty: 25 tablet, Refills: 3    Associated Diagnoses: Acute " chest pain; Coronary artery disease due to lipid rich plaque      omeprazole (PRILOSEC) 20 MG DR capsule TAKE 1 CAPSULE BY MOUTH THREE TIMES DAILY  Qty: 270 capsule, Refills: 2    Associated Diagnoses: Gastroesophageal reflux disease with esophagitis without hemorrhage      telmisartan (MICARDIS) 80 MG tablet Take 1 tablet (80 mg) by mouth daily.  Qty: 90 tablet, Refills: 1    Associated Diagnoses: Primary hypertension      clobetasol (TEMOVATE) 0.05 % external ointment Apply topically 2 times daily. To affected areas  Qty: 60 g, Refills: 0    Associated Diagnoses: Psoriasis      clobetasol propionate (CLOBEX) 0.05 % external shampoo Apply thin film to dry scalp once daily (maximum dose: 50 g/week or 50 mL/week); leave in place for 15 minutes, then add water, lather, and rinse thoroughly. Limit treatment to 4 consecutive weeks.  Qty: 118 mL, Refills: 1    Associated Diagnoses: Psoriasis      fish oil-omega-3 fatty acids 1000 MG capsule Take 1 g by mouth daily      methocarbamol (ROBAXIN) 750 MG tablet Take 1 tablet (750 mg) by mouth 4 times daily as needed for muscle spasms  Qty: 42 tablet, Refills: 0    Associated Diagnoses: Lumbar radiculopathy      promethazine (PHENERGAN) 25 MG tablet Take 1 tablet (25 mg) by mouth every 8 hours as needed for nausea.  Qty: 21 tablet, Refills: 0    Associated Diagnoses: Nausea      UNABLE TO FIND Take 2 tablets by mouth at bedtime. MEDICATION NAME: Qunol Sleep - melatonin 5 mg, Ashwagandha, L-Theanine      Vitamin D (Cholecalciferol) 25 MCG (1000 UT) TABS Take 1,000 Units by mouth daily           STOP taking these medications       celecoxib (CELEBREX) 100 MG capsule Comments:   Reason for Stopping:               Discharge follow up and instructions:     Reason for your hospital stay    Elective craniotomy for resection of brain mass with Dr. Kent     Activity    Discharge instructions:    Ok to shampoo hair, shower / bathe, but do not scrub or submerge incision under water  until evaluated post operatively in clinic.    Ok to walk as tolerated, avoid bedrest.    No high impact activities such as; running/jogging, snowmobile or 4 gamez riding or any other recreational vehicles.    Call my office at [phone] for increasing redness, swelling or pus draining from the incision, increased pain or any other questions and concerns.    Go to ER with any seizure activity, mental status change (increasing confusion), difficulty with speech or increasing or acute weakness     Follow Up    Your post-operative follow up appointments have been/will be made for two weeks with a nurse for a well being and wound check visit as well as in six weeks with a provider.     Diet    Follow this diet upon discharge: Current Diet:Orders Placed This Encounter      Advance Diet as Tolerated: Regular Diet Adult       Casey Gaxiola DNP  Neurosurgery  [phone]

## 2025-05-20 NOTE — PLAN OF CARE
Goal Outcome Evaluation:    Orientation: Aox4, neuros: Neuros dbl vision    Vitals/Tele: VSS on RA, BP< 150    Pain: Dilaudid x2, ice pack    IV Access/drains: PIV SL    Diet: Reg diet, thin liquids    Mobility: Independent in room GB/W, SBA in hallways    GI/: Continent B/B    Wound/Skin: Cranial incision CDI    Discharge Plan: Likely home today      See Flow sheets for assessment

## 2025-05-20 NOTE — TELEPHONE ENCOUNTER
Date: May 20, 2025    Provider: Dr. Kent      Provider/Other: N/A    Reason for out-going call: OTHER      Detailed message: [facility] for patient to call back and schedule a nurse visit 5/27/2025 with the nurse on a day Dr. Kent is in clinic ( Tuesday and Thursday) per Dr. Kent request.           Number provider for patient: [facility]: [phone]

## 2025-05-20 NOTE — PLAN OF CARE
Occupational Therapy Discharge Summary    Reason for therapy discharge:    Discharged to home.    Progress towards therapy goal(s). See goals on Care Plan in [facility] electronic health record for goal details.  Goals partially met.  Barriers to achieving goals:   discharge from facility.    Therapy recommendation(s):      Continued therapy is recommended.  Rationale/Recommendations:   .OT Rationale for DC Rec: Patient currently limited by impaired endurance, headache with double vision. Pt lives alone however reports she has family who may be able to stay with her to A as needed upon return home. Anticipate with continued medical care, inpatient therapies and amb with nursing she will progress to be able to return home with assistance for heavier HH tasks and transport until able to resume. Pending improvement in visual deficits/diplopia may benefit from OP OT visual rehab.    Writing therapist did not treat pt, note written based on previous treating therapist notes and recommendations. Please see chart and flow sheet for additional details.

## 2025-05-20 NOTE — PLAN OF CARE
Physical Therapy Discharge Summary    Reason for therapy discharge:    Discharged to home.    Progress towards therapy goal(s). See goals on Care Plan in [facility] electronic health record for goal details.  Goals met    Therapy recommendation(s):    Continue home exercise program. Pt tolerated session okay. Pt making great progress with therapy. Pt limited by decreased activity tolerance, pain, post op status, double vision, nausea. Pt at baseline lives alone in house with stairs to enter, ind with mobility, ADLs/IADLs. Pt does report family is able to help out as needed. Pt currently ind with transfers, use of walker for ambulation. Anticipate with continued mobilization with nursing staff and IP PT, pt will progress to discharge to home with assist as needed for heavier tasks, use of walker for ambulation. Will continue to update as appropriate.  PT Brief overview of current status: bed mob ind. amb in room ind with walker, amb in hallway, SBA with walker  PT Total Distance Amb During Session (feet): 400  PT Equipment Needed at Discharge:  (pt has walker at home to use if needed.)

## 2025-05-20 NOTE — PLAN OF CARE
Pt here with Acute R CVA. A&O x4. Neuros dbl vision. VSS, SBP<150. Regular diet, thin liquids. Takes pills whole. SBA GB. PRN Dilaudid given for pain. L Crani incision, ROBY/CDI. Pt scoring green on the Aggression Stop Light Tool. Education completed at bedside. Discharge to home via mother.

## 2025-05-20 NOTE — PROGRESS NOTES
[facility]    Medicine Progress Note - Hospitalist Service        Date of Admission:  5/16/2025  5:34 AM    Assessment & Plan:   Selina Parikh is a 59 year old female with history of essential hypertension,  gastroesophageal reflux disease, lumbar spinal stenosis, psoriasis who underwent craniotomy for presumed meningioma earlier today with Dr. Kent from neurosurgery.  Hospitalist service consulted for medical co-management.    1.4 x 1.7 x 1.7 dural based presumed meningioma s/p craniotomy on 5/16/2025.  ?  Postop small volume of acute ischemia involving the parasagittal left parietal lobe  -Surgery performed by Dr. Suha Kent, neurosurgery  -Defer routine postop care to neurosurgery including DVT prophylaxis, pain management  -Dexamethasone and Keppra per neurosurgery  -Repeat MRI on 5/17 shows interval postop changes with interval development of small volume of acute ischemia involving the parasagittal left parietal lobe.    -Discussed with neurosurgery on 5/18, they do not feel that there is any for stroke neurology to evaluate as she is not a candidate for any antiplatelet agent at the moment anyway.  Prior to admission she is already on aspirin which can be resumed when cleared by neurosurgery.  Will defer further management to neurosurgery  -Incentive spirometry, mobilize, anticipate patient discharging today     Essential hypertension  History of carotid artery disease  Known moderate mid distal LAD myocardial bridge  Hyperlipidemia  -Recent Lexiscan on/1/2025 was negative for inducible ischemia  -Continue prior to admission Micardis  -Resume prior to admission aspirin when okay with neurosurgery  -Continue Lipitor    Other chronic medical problems:  Gastroesophageal reflux disease-continue PPI  Lumbar spinal stenosis with neurogenic claudication  Psoriasis- prior to admission topical steroid  IBS-predominantly constipation type      Diet: Advance Diet as Tolerated: Regular Diet  "Adult     DVT Prophylaxis: Defer to primary service   Pradhan Catheter: Not present  Code Status: Full Code     Disposition Plan       Expected Discharge Date: 05/20/2025              Entered: Jacob Live MD 05/20/2025, 9:36 AM      Medically Ready for Discharge: Anticipated Today per neurosurgery       Clinically Significant Risk Factors                   # Hypertension: Noted on problem list            # Obesity: Estimated body mass index is 32.15 kg/m  as calculated from the following:    Height as of this encounter: 1.499 m (4' 11\").    Weight as of this encounter: 72.2 kg (159 lb 2.8 oz).              The patient's care was discussed with the patient and RN    Medical Decision Making       **CLEAR ALL SELECTIONS**      Labs/Imaging Reviewed:  See Information above and Data section below  Time SPENT BY ME on the date of service doing chart review, history, exam, documentation & further activities per the note:  35 MINUTES    Chart documentation was completed, in part, with [employer] voice-recognition software. Even though reviewed, some grammatical, spelling, and word errors may remain.    Jacob Live MD  Hospitalist Service  [facility]  Text Page 7AM-6PM  Securely message with the Vocera Web Console (learn more here)  Text page via [employer] Paging/Directory    ______________________________________________________________________    Interval History   Patient overall is improving.  Still has intermittent headache but improving.  No new neurological changes.  Scalp drain has been removed.  Denies chest pain or dyspnea    Data reviewed today: I reviewed all medications, new labs and imaging results over the last 24 hours. I personally reviewed no images or EKG's today.    Physical Exam   Vital signs:  Temp: 97.6  F (36.4  C) Temp src: Oral BP: (!) 140/92 Pulse: 92   Resp: 16 SpO2: 96 % O2 Device: None (Room air) Oxygen Delivery: 2 LPM Height: 149.9 cm (4' 11\") Weight: 72.2 kg (159 lb " "2.8 oz)  Estimated body mass index is 32.15 kg/m  as calculated from the following:    Height as of this encounter: 1.499 m (4' 11\").    Weight as of this encounter: 72.2 kg (159 lb 2.8 oz).      Wt Readings from Last 2 Encounters:   05/18/25 72.2 kg (159 lb 2.8 oz)   05/05/25 72.1 kg (159 lb)       Gen: AAOX3, NAD  HEENT: Scalp drain in place  Resp: CTA B/L, normal WOB  CVS: RRR, no murmur  Abd/GI: Soft, non-tender. BS- normoactive.  Skin: Warm, dry no rashes  MSK: no pedal edema  Neuro- CN- intact. No focal deficits.        Data   Recent Labs   Lab 05/19/25  0920 05/18/25  0436 05/17/25  1441 05/17/25  0552 05/16/25  2350 05/16/25  2053 05/16/25  0554   HGB  --   --   --   --   --   --  14.5   MCV 96 93 96  --   --   --  94    247 237  --   --   --   --    POTASSIUM  --   --   --   --   --   --  3.9   CR 0.64  --   --   --   --   --  0.80   GLC  --   --   --  137* 134* 175* 97       No results found for this or any previous visit (from the past 24 hours).    Medications   Current Facility-Administered Medications   Medication Dose Route Frequency Provider Last Rate Last Admin     Current Facility-Administered Medications   Medication Dose Route Frequency Provider Last Rate Last Admin    atorvastatin (LIPITOR) tablet 40 mg  40 mg Oral Daily Yessi Chiu PA-C   40 mg at 05/20/25 0806    dexAMETHasone (DECADRON) tablet 4 mg  4 mg Oral BID Agustina Aparicio NP   4 mg at 05/20/25 0806    Followed by    [START ON 5/22/2025] dexAMETHasone (DECADRON) tablet 4 mg  4 mg Oral Daily Agustina Aparicio NP        enoxaparin ANTICOAGULANT (LOVENOX) injection 40 mg  40 mg Subcutaneous Q24H Yessi Chiu PA-C   40 mg at 05/19/25 1140    levETIRAcetam (KEPPRA) tablet 500 mg  500 mg Oral or NG Tube BID Yessi Chiu PA-C   500 mg at 05/20/25 0806    omeprazole (PriLOSEC) CR capsule 20 mg  20 mg Oral TID Yessi Chiu PA-C   20 mg at 05/20/25 0806    polyethylene glycol (MIRALAX) Packet 17 g  17 g " Oral Daily Yessi Chiu PA-C   17 g at 05/20/25 0806    senna-docusate (SENOKOT-S/PERICOLACE) 8.6-50 MG per tablet 1 tablet  1 tablet Oral BID Yessi Chiu PA-C   1 tablet at 05/20/25 0806    sodium chloride (PF) 0.9% PF flush 3 mL  3 mL Intracatheter Q8H DOROTHY Yessi Chiu PA-C   3 mL at 05/20/25 0037    telmisartan (MICARDIS) tablet 80 mg  80 mg Oral Daily Jacob Live MD   80 mg at 05/20/25 0806

## 2025-05-20 NOTE — TELEPHONE ENCOUNTER
----- Message from Suha Kent sent at 5/19/2025  1:48 PM CDT -----  Please add nurse visit on 5/27 Tuesday. Likely discharge from [facility] tomorrow or wed.

## 2025-05-27 ENCOUNTER — ALLIED HEALTH/NURSE VISIT (OUTPATIENT)
Dept: NEUROSURGERY | Facility: CLINIC | Age: 60
End: 2025-05-27
Payer: COMMERCIAL

## 2025-05-27 VITALS
TEMPERATURE: 98.1 F | DIASTOLIC BLOOD PRESSURE: 57 MMHG | SYSTOLIC BLOOD PRESSURE: 119 MMHG | OXYGEN SATURATION: 97 % | HEART RATE: 95 BPM

## 2025-05-27 DIAGNOSIS — Z98.890 S/P CRANIOTOMY: Primary | ICD-10-CM

## 2025-05-27 PROCEDURE — 3078F DIAST BP <80 MM HG: CPT

## 2025-05-27 PROCEDURE — 3074F SYST BP LT 130 MM HG: CPT

## 2025-05-27 PROCEDURE — 99207 PR NO CHARGE NURSE ONLY: CPT

## 2025-05-27 NOTE — PATIENT INSTRUCTIONS
Follow-up 6/10/25 at 10:45 for 2 week post-op wound check.    A dressing is not required.    Keep the wound clean.    Wash your hands before touching the wound.  Ensure that anyone assisting you in the care of your wound washes her/his hands before touching the wound. Good handwashing can decrease the risk of serious infection.    If you are unable to see your wound, have someone check the wound daily for redness, swelling,or drainage. A small amount of drainage is normal.    You may shower.  Pat the wound dry. Do not rub.    No tub baths until the wound is well healed.  Usually 5-6 weeks.     If you develop redness, swelling, drainage, or temp 101 or greater, call our clinic.    * No lifting, pushing or pulling greater than 5-10 pound (this is about a gallon of milk).  *No repetitive bending, twisting, or jarring activities  *No overhead work  *No aerobic or strenuous activity  *No activities with increased risk of falls  *You may move about your home as tolerated  *You may walk up and down stairs as tolerated  *You may increase your activity slowly over the next 4-6 weeks    * WALKING PROGRAM: As you can tolerate, walk daily-start with 5-10 minutes of continuous walking. This is in addition to the walking that you do as part of your daily activities. Increase the time that you walk by 5 minutes every couple of days. Do not exceed 30-45 minutes of continuous walking until seen in follow-up. Walking is the best exercise after surgery.  **Listen to your body, if you find that you are more painful or fatigued, you may need to proceed more slowly.    **Do not smoke or expose yourself to second hand smoke. Cigarette smoke can delay healing and cause complications.     DRIVING: Do not drive. Plan to discuss your return to driving at your 4-6 weeks follow-up appointment.    WORK: If you plan to return to work before you 4-6 weeks appointment, call and discuss with one of the nurses in the neurosurgery office.

## 2025-05-27 NOTE — PROGRESS NOTES
POSTOPERATIVE FOLLOW-UP NURSE VISIT NOTE    Procedure: CRANIOTOMY, USING OPTICAL TRACKING SYSTEM, WITH NEOPLASM EXCISION     Date: 5/16/25    Surgeon: Dr. Kent    Pre-op symptoms: headaches, double vision, nausea, poor appetite, numbness in hands     Post-op symptoms: symptoms are the same, dizziness, fatigue    Pain and medications: 1/10  Hydromorphone 2 mg Q6 PRN: taking less than once a day    Patient reminded that Dr. Kent will refill pain medications for up to 6 weeks after the date of surgery, and if pain medication is still needed at that point they will have to go through primary care.    Bracing compliance: N/A    Medications refilled: none    Imaging ordered: N/A    Wound:  CDI,  approximated, no drainage, minimal erythema, no temperature change compared to surrounding tissue, non tender.     The wound is healing well with no signs of infection. Routine wound care discussed. The patient will follow up with RN for 2 week post-op wound check on 6/10/25 at 10:45 AM.

## 2025-05-28 ENCOUNTER — TELEPHONE (OUTPATIENT)
Dept: NEUROSURGERY | Facility: CLINIC | Age: 60
End: 2025-05-28
Payer: COMMERCIAL

## 2025-05-28 NOTE — TELEPHONE ENCOUNTER
[facility]    Phone Message    May a detailed message be left on voicemail: yes     Reason for Call: Other: Patient calling stating she was exposed to lice.  She was told not to use lice shampoo, but can she petroleum Jelly over night and wash with Lilian dish soap the next day?  Please call her back to advise.      Action Taken: Message routed to:  Other: [facility]     Travel Screening: Not Applicable     Date of Service:

## 2025-05-28 NOTE — TELEPHONE ENCOUNTER
Contacted patient and instructed to follow original recommendations from Dr. Burch to go to PCP or urgent care to confirm wether or not she has lice before treating in order to minimize the risk of applying anything on/near the incision. Patient is agreeable to this plan and states she will go in to be seen.

## 2025-06-10 ENCOUNTER — ALLIED HEALTH/NURSE VISIT (OUTPATIENT)
Dept: NEUROSURGERY | Facility: CLINIC | Age: 60
End: 2025-06-10
Payer: COMMERCIAL

## 2025-06-10 VITALS
SYSTOLIC BLOOD PRESSURE: 134 MMHG | OXYGEN SATURATION: 97 % | DIASTOLIC BLOOD PRESSURE: 69 MMHG | HEART RATE: 88 BPM | TEMPERATURE: 97.8 F

## 2025-06-10 DIAGNOSIS — Z98.890 S/P CRANIOTOMY: Primary | ICD-10-CM

## 2025-06-10 PROCEDURE — 99207 PR NO CHARGE NURSE ONLY: CPT

## 2025-06-10 PROCEDURE — 3078F DIAST BP <80 MM HG: CPT

## 2025-06-10 PROCEDURE — 3075F SYST BP GE 130 - 139MM HG: CPT

## 2025-06-10 NOTE — PATIENT INSTRUCTIONS
Follow-up 7/1/25 at 10:15 AM    A dressing is not required.    Keep the wound clean.    Wash your hands before touching the wound.  Ensure that anyone assisting you in the care of your wound washes her/his hands before touching the wound. Good handwashing can decrease the risk of serious infection.    If you are unable to see your wound, have someone check the wound daily for redness, swelling,or drainage. A small amount of drainage is normal.    You may shower.  Pat the wound dry. Do not rub.    No tub baths until the wound is well healed.  Usually 5-6 weeks.     If you develop redness, swelling, drainage, or temp 101 or greater, call our clinic.    * No lifting, pushing or pulling greater than 5-10 pound (this is about a gallon of milk).  *No repetitive bending, twisting, or jarring activities  *No overhead work  *No aerobic or strenuous activity  *No activities with increased risk of falls  *You may move about your home as tolerated  *You may walk up and down stairs as tolerated  *You may increase your activity slowly over the next 4-6 weeks    * WALKING PROGRAM: As you can tolerate, walk daily-start with 5-10 minutes of continuous walking. This is in addition to the walking that you do as part of your daily activities. Increase the time that you walk by 5 minutes every couple of days. Do not exceed 30-45 minutes of continuous walking until seen in follow-up. Walking is the best exercise after surgery.  **Listen to your body, if you find that you are more painful or fatigued, you may need to proceed more slowly.    **Do not smoke or expose yourself to second hand smoke. Cigarette smoke can delay healing and cause complications.     DRIVING: Do not drive. Plan to discuss your return to driving at your 4-6 weeks follow-up appointment.    WORK: If you plan to return to work before you 4-6 weeks appointment, call and discuss with one of the nurses in the neurosurgery office.

## 2025-06-10 NOTE — PROGRESS NOTES
POSTOPERATIVE FOLLOW-UP NURSE VISIT NOTE    Procedure: CRANIOTOMY, USING OPTICAL TRACKING SYSTEM, WITH NEOPLASM EXCISION     Date: 5/16/25    Surgeon: Dr. Kent    Pre-op symptoms: headaches, double vision, nausea, poor appetite, numbness in hands     Post-op symptoms: headaches are improved, seem to be related to motion, double vision is the same, ringing in ears is the same, appetite is about the same, bilateral hand numbness is about the same    Pain and medications: 2/10  Hydromorphone 2 mg Q6 PRN: taking <1x/day  Acetaminophen: taking 1000 mg Q8  NSAIDs: none  Topicals: none  Ice/Heat: ice    Patient reminded that Dr. Kent will refill pain medications for up to 6 weeks after the date of surgery, and if pain medication is still needed at that point they will have to go through primary care.    Bracing compliance: N/A    Medications refilled: none    Imaging ordered: N/A    Wound:  CDI,  approximated, no drainage, no erythema, no temperature change compared to surrounding tissue, non tender.  Cleansed with iodine, sutures removed, cleansed again with iodine.    Selina HERNANDEZ Perryjuan presents to the clinic today for removal of sutures.  The patient has had the sutures in place for 25 days. There has been no history of infection or drainage. All sutures were easily removed today.     The wound is healing well with no signs of infection. Routine wound care discussed. The patient will follow up with STACEY for 6 week post-op on 7/1/25 at 10:15 AM.

## 2025-06-30 NOTE — PROGRESS NOTES
[facility]   Follow Up Visit      HPI:   4/10/25: 60 yo female who presents with headaches,  double vision nausea, poor appetite, numbness in hands. MRI of the brain demonstrates a 1.5 cm contrast enhancing mas in the left parietal lobe. This was new from 2018 scans. She also has films from 2022 and 2023. We will evaluate all these imaging with radiology at her next tumor board. If shows interval growth would recommend treatment. Final recommendations pending review or all prior imaging.     7/1/25: 6 weeks s/p craniotomy, using optical tracking system with neoplasm excision with Dr. Kent 5/16/2025.  Final pathology demonstrated grade 1 meningioma.  Patient overall doing well in regards to recent meningioma resection.  Reports occasional chronic headaches.  Reports improvements of preoperative double vision and nausea.  Currently denies headaches, nausea/vomiting, speech changes, new/worsening vision changes, confusion/altered mental status, or incisional concerns.     Patient also wanting to discuss chronic low back and bilateral leg pain.  Patient has history of exploration of prior L3-L5 instrumented fusion with removal and replacement of pedicle screws and extension to sacral 1 with left L5-S1 TLIF with posterior lateral arthrodesis with the use of Stealth 6/9/2023 with Dr. Kent.  Patient reporting chronic low back pain radiating to bilateral groin, eft anterior thigh/knee, and down the right lower extremity to the right foot.  Reports chronic paresthesias in bilateral feet.  Denies saddle anesthesia, acute bowel bladder dysfunction, recent fall/trauma.  Patient currently using OTC analgesics as needed.  Patient previously underwent L2-L3 bilateral TFESI and right SI joint injection with good improvement.  Doing PT exercises at home.  Patient is interested in repeat lumbar and SI injections, would like to avoid surgical intervention if possible.      Past Medical History:    Diagnosis Date    Anemia     Antiplatelet or antithrombotic long-term use     Asymptomatic stenosis of left carotid artery     Cancer (H)     melanoma    Coronary artery disease     Difficulty walking     Gastroesophageal reflux disease     Hiatal hernia     History of angina     Hypertension     Irritable bowel syndrome     Other chronic pain     Stented coronary artery     Walking troubles          Past Surgical History:   Procedure Laterality Date    BACK SURGERY      CHOLECYSTECTOMY, LAPOROSCOPIC  02/14/2000    Cholecystectomy, Laparoscopic    COLON SURGERY      CV CORONARY ANGIOGRAM N/A 05/06/2022    Procedure: CV CORONARY ANGIOGRAM;  Surgeon: Stefanie Spangler MD;  Location: [facility] CV    CV LEFT HEART CATH N/A 05/06/2022    Procedure: Left Heart Catheterization;  Surgeon: Stefanie Spangler MD;  Location: [facility] CV    CYSTOSCOPY, INSERT LIGHTED STENT URETER(S) N/A 04/10/2018    Procedure: CYSTOSCOPY, INSERT LIGHTED STENT URETER(S);  Lighted Stent Placement,Laparoscopic Assisted Sigmoid Colectomy;  Surgeon: ERVIN Reina MD;  Location: [facility] OR    ENDOVASCULAR CAROTID STENT PLACEMENT Left 4/21/2023    Procedure: Left transcarotid revascularization;  Surgeon: Eveline Manley MD;  Location: [facility] OR    ESOPHAGOSCOPY, GASTROSCOPY, DUODENOSCOPY (EGD), COMBINED N/A 5/1/2025    Procedure: ESOPHAGOGASTRODUODENOSCOPY, WITH BIOPSY;  Surgeon: Souleymane Moreno DO;  Location: [facility] OR    IR TCAR TRANSCAROTID ARTERY REVASCULARIZATION  4/21/2023    LAPAROSCOPIC ASSISTED COLECTOMY LEFT (DESCENDING) N/A 04/10/2018    Procedure: LAPAROSCOPIC ASSISTED COLECTOMY LEFT (DESCENDING);;  Surgeon: Hill uMrray MD;  Location: [facility] OR    LUMBAR DISCECTOMY Left 09/23/2022    Procedure: left thoracic 12-lumbar 1 hemilaminectomy, medial facetectomy, foraminotomy and microdiscectomy;  Surgeon: Suha Kent MD;  Location: [facility] OR    NECK SURGERY      OPTICAL TRACKING  SYSTEM CRANIOTOMY, EXCISE TUMOR, COMBINED Left 5/16/2025    Procedure: CRANIOTOMY, USING OPTICAL TRACKING SYSTEM, WITH NEOPLASM EXCISION;  Surgeon: Suha Kent MD;  Location:  OR    OPTICAL TRACKING SYSTEM FUSION SPINE POSTERIOR LUMBAR THREE+ LEVELS Bilateral 6/9/2023    Procedure: Exploration of prior lumbar 3- lumbar 5 instrumented fusion with removal and replacement of pedicle screws and extension to sacral 1. Lumbar 3-sacral 1  Left transforaminal lumbar interbody fusion and posterolateral arthrodesis with use of stealth navigation;  Surgeon: Suha Kent MD;  Location: [facility]    ORTHOPEDIC SURGERY  10/01/2010    Cut palm and reattched tendons and nerves in left hand forefinger.    MI ESOPHAGOGASTRODUODENOSCOPY TRANSORAL DIAGNOSTIC N/A 04/30/2021    Procedure: ESOPHAGOGASTRODUODENOSCOPY (EGD);  Surgeon: Souleymane Moreno DO;  Location: [facility];  Service: General    SURGICAL HISTORY OF -   01/04/2000    Esophagogastroduodenoscopy with biopsy    TUBAL LIGATION         Current Outpatient Medications   Medication Sig Dispense Refill    acetaminophen (TYLENOL) 500 MG tablet Take 1,000 mg by mouth 2 times daily.      aspirin 81 MG EC tablet Take 1 tablet (81 mg) by mouth daily.      atorvastatin (LIPITOR) 40 MG tablet Take 1 tablet (40 mg) by mouth daily. 90 tablet 3    clobetasol (TEMOVATE) 0.05 % external ointment Apply topically 2 times daily. To affected areas 60 g 0    clobetasol propionate (CLOBEX) 0.05 % external shampoo Apply thin film to dry scalp once daily (maximum dose: 50 g/week or 50 mL/week); leave in place for 15 minutes, then add water, lather, and rinse thoroughly. Limit treatment to 4 consecutive weeks. 118 mL 1    dexAMETHasone (DECADRON) 4 MG tablet Take 1 tablet (4 mg) by mouth 2 times daily for 1 day, THEN 1 tablet (4 mg) daily for 2 days. 4 tablet 0    fish oil-omega-3 fatty acids 1000 MG capsule Take 1 g by mouth daily      HYDROmorphone (DILAUDID) 2  "MG tablet Take 1 tablet (2 mg) by mouth every 6 hours as needed for severe pain. 30 tablet 0    levETIRAcetam (KEPPRA) 500 MG tablet Take 1 tablet (500 mg) by mouth or NG Tube 2 times daily for 3 days. 6 tablet 0    nitroGLYcerin (NITROSTAT) 0.4 MG sublingual tablet One tablet under the tongue every 5 minutes if needed for chest pain. May repeat every 5 minutes for a maximum of 3 doses in 15 minutes\" 25 tablet 3    omeprazole (PRILOSEC) 20 MG DR capsule TAKE 1 CAPSULE BY MOUTH THREE TIMES DAILY 270 capsule 2    promethazine (PHENERGAN) 25 MG tablet Take 1 tablet (25 mg) by mouth every 8 hours as needed for nausea. 21 tablet 0    senna-docusate (SENOKOT-S/PERICOLACE) 8.6-50 MG tablet Take 1 tablet by mouth daily as needed for constipation. 20 tablet 0    telmisartan (MICARDIS) 80 MG tablet Take 1 tablet (80 mg) by mouth daily. 90 tablet 1    UNABLE TO FIND Take 2 tablets by mouth at bedtime. MEDICATION NAME: Qunol Sleep - melatonin 5 mg, Ashwagandha, L-Theanine      Vitamin D (Cholecalciferol) 25 MCG (1000 UT) TABS Take 1,000 Units by mouth daily       No current facility-administered medications for this visit.       Allergies   Allergen Reactions    Ciprofloxacin Anaphylaxis    Flagyl [Metronidazole] Anaphylaxis    Gabapentin Other (See Comments)     Suicidal thoughts.    Zofran [Ondansetron] Other (See Comments) and GI Disturbance     Causes bloating, moderately uncomfortable, abd pain      Benadryl [Diphenhydramine] Other (See Comments)     Muscle spasms    Percocet [Oxycodone-Acetaminophen] Other (See Comments) and Headache     Goes nuts - nasty mean irritated, can take Dilaudid    Vicodin [Hydrocodone-Acetaminophen] Other (See Comments)     Anxiety-agitation       Social History     Socioeconomic History    Marital status:    Tobacco Use    Smoking status: Every Day     Current packs/day: 0.00     Average packs/day: 0.5 packs/day for 30.0 years (15.0 ttl pk-yrs)     Types: Cigarettes     Start date: " 1993     Last attempt to quit: 2023     Years since quittin.0    Smokeless tobacco: Never    Tobacco comments:     A pack a day   Vaping Use    Vaping status: Never Used   Substance and Sexual Activity    Alcohol use: Not Currently     Comment: rare    Drug use: Not Currently    Sexual activity: Not Currently     Partners: Male     Birth control/protection: Surgical   Other Topics Concern    Parent/sibling w/ CABG, MI or angioplasty before 65F 55M? Yes     Comment: brother- 38, MI, brother 42- quad bypass     Social Drivers of Health     Financial Resource Strain: Low Risk  (2024)    Financial Resource Strain     Within the past 12 months, have you or your family members you live with been unable to get utilities (heat, electricity) when it was really needed?: No   Food Insecurity: Low Risk  (2024)    Food Insecurity     Within the past 12 months, did you worry that your food would run out before you got money to buy more?: No     Within the past 12 months, did the food you bought just not last and you didn t have money to get more?: No   Transportation Needs: Low Risk  (2024)    Transportation Needs     Within the past 12 months, has lack of transportation kept you from medical appointments, getting your medicines, non-medical meetings or appointments, work, or from getting things that you need?: No    Received from [facility]    Social Connections   Interpersonal Safety: Low Risk  (2025)    Interpersonal Safety     Do you feel physically and emotionally safe where you currently live?: Yes     Within the past 12 months, have you been hit, slapped, kicked or otherwise physically hurt by someone?: No     Within the past 12 months, have you been humiliated or emotionally abused in other ways by your partner or ex-partner?: No   Housing Stability: Low Risk  (2024)    Housing Stability     Do you have housing? : Yes     Are you worried about losing  "your housing?: No       Family History   Problem Relation Age of Onset    C.A.D. Father 50        50's    Diabetes Father     Diabetes Brother     C.A.D. Brother 42        quad bypass and MI    Diabetes Brother         2 brothers have DM    Cancer Brother 34        Melanoma    Aortic aneurysm Brother     Allergies Son         Meds    Allergies Daughter         Med    Cancer Mother     C.A.D. Brother 38        MI age 38    Diabetes Mother     Diabetes Brother        ROS: 10 point ROS neg other than the symptoms noted above in the HPI.    Vital Signs: BP 99/54   Pulse 87   Ht 1.499 m (4' 11\")   Wt 70.8 kg (156 lb)   LMP 04/10/2016   SpO2 96%   BMI 31.51 kg/m      Examination:  Constitutional:  Alert, well nourished, NAD.  HEENT: Normocephalic, atraumatic.   Pulm:  Without shortness of breath or audible adventitious respiratory sounds.  CV:  No pitting edema of BLE.  Brisk capillary refill, CMS intact.    Neurological:  Awake  Alert  Oriented x 3  Speech clear  Cranial nerves II - XII intact  PERRL  EOMI  Face symmetric  Tongue midline  Motor exam:  5/5 strength in all four extremities.   Sensation intact BUE/BLE  Finger to Nose smooth and accurate bilaterally  Pronator drift negative bilaterally  Gait: Able to stand from a seated position. Normal non-antalgic, non-myelopathic gait.  Incision: Healing well.  No surrounding erythema/edema/drainage  Well-healed lumbar incision  Negative SLR bilaterally    SI joint testing:  Tender over bilateral SI joints (left greater than right)  Positive Edu's bilaterally  Positive SI joint compression test bilaterally  Positive SI joint thigh thrust test bilaterally  Positive finger Joy sign bilaterally    Imaging:   No updated imaging available review    Assessment/Plan:   6 weeks s/p craniotomy, using optical tracking system with neoplasm excision with Dr. Kent 5/16/2025.  Final pathology demonstrated grade 1 meningioma.  Patient overall doing well in regards to " recent meningioma resection.  Reports occasional chronic headaches.  Reports improvements of preoperative double vision and nausea.  Currently denies headaches, nausea/vomiting, speech changes, new/worsening vision changes, confusion/altered mental status, or incisional concerns.     Patient also wanting to discuss chronic low back and bilateral leg pain.  Patient has history of exploration of prior L3-L5 instrumented fusion with removal and replacement of pedicle screws and extension to sacral 1 with left L5-S1 TLIF with posterior lateral arthrodesis with the use of Stealth 6/9/2023 with Dr. Kent.  Patient reporting chronic low back pain radiating to bilateral groin, eft anterior thigh/knee, and down the right lower extremity to the right foot.  Reports chronic paresthesias in bilateral feet.  Denies saddle anesthesia, acute bowel bladder dysfunction, recent fall/trauma.  Patient currently using OTC analgesics as needed.  Patient previously underwent L2-L3 bilateral TFESI and right SI joint injection with good improvement.  Doing PT exercises at home.  Patient is interested in repeat lumbar and SI injections, would like to avoid surgical intervention if possible.    Will plan to order bilateral SI joint injection given multiple SI joint provocative tests on examination and good improvement with previous right SI joint injection.  Will also repeat bilateral L2-L3 TFESI as patient had good improvement previously.  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  Recommend patient continue physical therapy exercises at home.  Will attempt to exhaust conservative measures as patient would like to avoid surgical intervention if possible.    Plan:   - 12-week postop appointment scheduled with Dr. Kent 8/12/2025 with Brain MRI prior  - Okay to gradually increase activity  - Routine post-op cares  - Repeat bilateral L2-L3  TFESI, bilateral SI joint injections ordered  - Continue physical therapy exercises for your back at home    Call the clinic at [phone] for any other questions and concerns.    Discussed red flag symptoms and advised to seek medical attention with any increased headaches, dizziness, nausea/vomiting, vision/speech changes, weakness, confusion, seizure activity, or other neurological changes. Patient voiced understanding and agreement.      Marifer Santiago PA-C  [facility]  [address] [place] [phone]

## 2025-07-01 ENCOUNTER — OFFICE VISIT (OUTPATIENT)
Dept: NEUROSURGERY | Facility: CLINIC | Age: 60
End: 2025-07-01
Payer: COMMERCIAL

## 2025-07-01 ENCOUNTER — TELEPHONE (OUTPATIENT)
Dept: NEUROSURGERY | Facility: CLINIC | Age: 60
End: 2025-07-01

## 2025-07-01 VITALS
WEIGHT: 156 LBS | DIASTOLIC BLOOD PRESSURE: 54 MMHG | OXYGEN SATURATION: 96 % | SYSTOLIC BLOOD PRESSURE: 99 MMHG | HEIGHT: 59 IN | HEART RATE: 87 BPM | BODY MASS INDEX: 31.45 KG/M2

## 2025-07-01 DIAGNOSIS — Z98.1 S/P LUMBAR FUSION: ICD-10-CM

## 2025-07-01 DIAGNOSIS — M25.552 BILATERAL HIP PAIN: ICD-10-CM

## 2025-07-01 DIAGNOSIS — M54.16 LUMBAR RADICULOPATHY: ICD-10-CM

## 2025-07-01 DIAGNOSIS — D32.9 MENINGIOMA (H): ICD-10-CM

## 2025-07-01 DIAGNOSIS — M53.3 SACROILIAC JOINT PAIN: ICD-10-CM

## 2025-07-01 DIAGNOSIS — M25.551 BILATERAL HIP PAIN: ICD-10-CM

## 2025-07-01 DIAGNOSIS — Z98.890 S/P CRANIOTOMY: Primary | ICD-10-CM

## 2025-07-01 DIAGNOSIS — M19.90 OSTEOARTHRITIS, UNSPECIFIED OSTEOARTHRITIS TYPE, UNSPECIFIED SITE: ICD-10-CM

## 2025-07-01 DIAGNOSIS — M48.062 SPINAL STENOSIS OF LUMBAR REGION WITH NEUROGENIC CLAUDICATION: ICD-10-CM

## 2025-07-01 PROCEDURE — 1126F AMNT PAIN NOTED NONE PRSNT: CPT

## 2025-07-01 PROCEDURE — 3074F SYST BP LT 130 MM HG: CPT

## 2025-07-01 PROCEDURE — 99213 OFFICE O/P EST LOW 20 MIN: CPT | Mod: 24

## 2025-07-01 PROCEDURE — 99024 POSTOP FOLLOW-UP VISIT: CPT

## 2025-07-01 PROCEDURE — 3078F DIAST BP <80 MM HG: CPT

## 2025-07-01 ASSESSMENT — PAIN SCALES - GENERAL: PAINLEVEL_OUTOF10: NO PAIN (0)

## 2025-07-01 NOTE — TELEPHONE ENCOUNTER
Called to patient to touch base.     Pt report that she was off of the medication due to a brain procedure (craniotomy).   She is not back on it, she started her old prescription as she does not tolerate well without it.     She has been taking it for over a week now and needing a refill. She is taking 2 tablets daily as previously prescribed.     Routing to PCP to review request and advise on next step.     Savage HERBERT RN

## 2025-07-01 NOTE — NURSING NOTE
"Selina Parikh is a 59 year old female who presents for:  Chief Complaint   Patient presents with    Surgical Followup     6 week post-op for craniotomy. Patient reports no pain currently, headaches about 3 times a week that are irritating but do not require medication. Patient does report some issues with lower back and right foot that they would like to talk about with an STACEY or with Dr. Kent.        Initial Vitals:  BP 99/54   Pulse 87   Ht 4' 11\" (1.499 m)   Wt 156 lb (70.8 kg)   LMP 04/10/2016   SpO2 96%   BMI 31.51 kg/m   Estimated body mass index is 31.51 kg/m  as calculated from the following:    Height as of this encounter: 4' 11\" (1.499 m).    Weight as of this encounter: 156 lb (70.8 kg). Body surface area is 1.72 meters squared. BP completed using cuff size: regular  No Pain (0)        Kulwinder Washburn    "

## 2025-07-01 NOTE — TELEPHONE ENCOUNTER
Procedure ordered? YES    What insurance are we billing for this procedure?  [employer]  IF SCHEDULING AT [place] [facility] PLEASE SCHEDULE AT LEAST 7-10 BUSINESS DAYS OUT SO A PA CAN BE OBTAINED    Is a  PAIN  order available to link to injection appointment or does one need to be transcribed?  YES: Bilateral L2-3 TFESI  & Bilateral SI joint injection    If needed, route to CN to assist in doing so.    Is  needed?: No   If YES, please initiate in-person  request.    Will patient have a ?  Yes    All fluoro procedures require a .  These US procedures also require a : piriformis, pudendal, scalene, & carpal tunnel.    Is patient taking any blood thinners (i.e. Plavix, coumadin, jantoven, warfarin, heparin, Xarelto, Pradaxa, Eliquis, Brilinta, or Effient, etc)? No   If YES, schedule out 2 weeks, and route to RN pool to determine if medication hold is needed.    Is patient taking aspirin? YES: BABY ASPIRIN  For CERVICAL or INTERLAMINAR procedures, route message to Care Navigation so they can seek approval from managing provider to hold aspirin for 6 days prior to the procedure.    Does patient have an allergy to contrast dye or iodine?  No  If YES, OK to schedule. Route to RN pool AND add allergy information to appointment notes    Is patient diabetic? No If YES, blood glucose level will be checked on day of procedure and will need to be 300mg/dL or below (for steroid injections).    Does patient have an active infection or treated for one within the past week? No   If YES, do NOT schedule and route to Care Navigation.     Is patient currently taking any antibiotics or have an active infection?  No  For patients on chronic, preventative, or prophylactic antibiotics, procedures may be scheduled.   For patients on antibiotics for active or recent infection: antibiotic course must have been completed and symptom-free of infection to safely proceed with injection.  Send  to Care Navigation if unsure.    Is patient actively being treated for cancer or immunocompromised? No  If YES, do NOT schedule and route to RN pool.     Any chance of pregnancy? NO   If YES, do NOT schedule and route to RN pool.    Have you had any vaccines in the last 2 weeks? NO  If YES, please route to Care Navigation to discuss before scheduling.     Reminders:  -  If you are started on any steroids or antibiotics between now and your appointment, you must contact us because it may affect our ability to perform your procedure.   reviewed    -  Informed patient that it is OK to take normal medications before the procedure and must hold blood thinners as instructed.  reviewed    -  Patients scheduled for MBBs should not take pain meds prior to injection and pain rating needs to be 5/10 or greater the day of the procedure.    -  Informed cervical injection patients that an IV will be placed as a precautionary measure.  reviewed    -  Patients should eat a light meal prior to their procedure appointment.  reviewed    -  All radiofrequency ablations are in a 40 minute time slot and not to be scheduled until in-basket message has been sent by the procedure team that the PA has been  received.  reviewed     -  Spinal cord stimulator trial scheduling is coordinated by the procedure team.    -  Care Navigation (#[phone]) is available to assist patients with additional questions.  reviewed    -  Cervical TFESIs with Dr. Savage only.    *PLEASE FORWARD TO CARE NAVIGATION IF INDICATED.  PATIENT SHOULD BE INFORMED THAT THEY WILL BE CONTACTED BY CARE NAVIGATION ONLY IF CHANGES TO MEDICATION/CARE PLAN RECOMMENDATIONS ARE NEEDED.  IF THEY DO NOT HEAR BACK FROM CARE NAVIGATION, THEY ARE FINE TO CONTINUE WITH THEIR CURRENT MEDICATIONS/CARE PLAN.*

## 2025-07-01 NOTE — TELEPHONE ENCOUNTER
Clinic RN: Please investigate patient's chart or contact patient if the information cannot be found because the medication is listed as historical or discontinued. Confirm patient is taking this medication. Document findings and route refill encounter to provider for approval or denial.    Yesenia Hull RN on 7/1/2025 at 2:21 PM

## 2025-07-01 NOTE — PATIENT INSTRUCTIONS
- Okay to gradually increase lifting to up to 35-40 pounds.  Continue to limit excessive bending, twisting, jarring motions.    - Scheduled for 12-week postop appointment 8/12/25 with Dr. Kent with MRI prior. Please call [facility] at [phone] to schedule the date, time, and location that is most convenient for you to obtain your imaging. You can also schedule your imaging via [employer] or schedulers will contact you within the next 1-2 business days. Our office will contact you with your imaging results with in 3-5 business days after imaging is obtained.     - Will place order for repeat Bilateral L2-L3 TFESI and bilateral SI joint injections. Schedulers will contact you to schedule appointment. Please pay attention to how you feel immediately and the days following the injections.     - Please contact neurosurgery clinic via [employer] or telephone [phone] for questions, concerns, scheduling assistance, new or worsening symptoms.

## 2025-07-01 NOTE — LETTER
7/1/2025      Selina Parikh  [address] [facility] [address]      Dear Colleague,    Thank you for referring your patient, Selina Parikh, to the [facility]. Please see a copy of my visit note below.    [facility]   Follow Up Visit      HPI:   4/10/25: 60 yo female who presents with headaches,  double vision nausea, poor appetite, numbness in hands. MRI of the brain demonstrates a 1.5 cm contrast enhancing mas in the left parietal lobe. This was new from 2018 scans. She also has films from 2022 and 2023. We will evaluate all these imaging with radiology at her next tumor board. If shows interval growth would recommend treatment. Final recommendations pending review or all prior imaging.     7/1/25: 6 weeks s/p craniotomy, using optical tracking system with neoplasm excision with Dr. Kent 5/16/2025.  Final pathology demonstrated grade 1 meningioma.  Patient overall doing well in regards to recent meningioma resection.  Reports occasional chronic headaches.  Reports improvements of preoperative double vision and nausea.  Currently denies headaches, nausea/vomiting, speech changes, new/worsening vision changes, confusion/altered mental status, or incisional concerns.     Patient also wanting to discuss chronic low back and bilateral leg pain.  Patient has history of exploration of prior L3-L5 instrumented fusion with removal and replacement of pedicle screws and extension to sacral 1 with left L5-S1 TLIF with posterior lateral arthrodesis with the use of Stealth 6/9/2023 with Dr. Kent.  Patient reporting chronic low back pain radiating to bilateral groin, eft anterior thigh/knee, and down the right lower extremity to the right foot.  Reports chronic paresthesias in bilateral feet.  Denies saddle anesthesia, acute bowel bladder dysfunction, recent fall/trauma.  Patient currently using OTC analgesics as needed.  Patient previously underwent L2-L3  bilateral TFESI and right SI joint injection with good improvement.  Doing PT exercises at home.  Patient is interested in repeat lumbar and SI injections, would like to avoid surgical intervention if possible.      Past Medical History:   Diagnosis Date     Anemia      Antiplatelet or antithrombotic long-term use      Asymptomatic stenosis of left carotid artery      Cancer (H)     melanoma     Coronary artery disease      Difficulty walking      Gastroesophageal reflux disease      Hiatal hernia      History of angina      Hypertension      Irritable bowel syndrome      Other chronic pain      Stented coronary artery      Walking troubles          Past Surgical History:   Procedure Laterality Date     BACK SURGERY       CHOLECYSTECTOMY, LAPOROSCOPIC  02/14/2000    Cholecystectomy, Laparoscopic     COLON SURGERY       CV CORONARY ANGIOGRAM N/A 05/06/2022    Procedure: CV CORONARY ANGIOGRAM;  Surgeon: Stefanie Spangler MD;  Location: [facility] CATH [facility] CV     CV LEFT HEART CATH N/A 05/06/2022    Procedure: Left Heart Catheterization;  Surgeon: Stefanie Spangler MD;  Location: [facility] CV     CYSTOSCOPY, INSERT LIGHTED STENT URETER(S) N/A 04/10/2018    Procedure: CYSTOSCOPY, INSERT LIGHTED STENT URETER(S);  Lighted Stent Placement,Laparoscopic Assisted Sigmoid Colectomy;  Surgeon: ERVIN Reina MD;  Location: [facility] OR     ENDOVASCULAR CAROTID STENT PLACEMENT Left 4/21/2023    Procedure: Left transcarotid revascularization;  Surgeon: Eveline Manley MD;  Location: [facility] OR     ESOPHAGOSCOPY, GASTROSCOPY, DUODENOSCOPY (EGD), COMBINED N/A 5/1/2025    Procedure: ESOPHAGOGASTRODUODENOSCOPY, WITH BIOPSY;  Surgeon: Souleymane Moreno DO;  Location: [facility] OR     IR TCAR TRANSCAROTID ARTERY REVASCULARIZATION  4/21/2023     LAPAROSCOPIC ASSISTED COLECTOMY LEFT (DESCENDING) N/A 04/10/2018    Procedure: LAPAROSCOPIC ASSISTED COLECTOMY LEFT (DESCENDING);;  Surgeon: Hill Murray MD;   Location: [facility] OR     LUMBAR DISCECTOMY Left 09/23/2022    Procedure: left thoracic 12-lumbar 1 hemilaminectomy, medial facetectomy, foraminotomy and microdiscectomy;  Surgeon: Suha Kent MD;  Location: [facility]     NECK SURGERY       OPTICAL TRACKING SYSTEM CRANIOTOMY, EXCISE TUMOR, COMBINED Left 5/16/2025    Procedure: CRANIOTOMY, USING OPTICAL TRACKING SYSTEM, WITH NEOPLASM EXCISION;  Surgeon: Suha Kent MD;  Location: [facility]     OPTICAL TRACKING SYSTEM FUSION SPINE POSTERIOR LUMBAR THREE+ LEVELS Bilateral 6/9/2023    Procedure: Exploration of prior lumbar 3- lumbar 5 instrumented fusion with removal and replacement of pedicle screws and extension to sacral 1. Lumbar 3-sacral 1  Left transforaminal lumbar interbody fusion and posterolateral arthrodesis with use of stealth navigation;  Surgeon: Suha Kent MD;  Location: [facility]     ORTHOPEDIC SURGERY  10/01/2010    Cut palm and reattched tendons and nerves in left hand forefinger.     SD ESOPHAGOGASTRODUODENOSCOPY TRANSORAL DIAGNOSTIC N/A 04/30/2021    Procedure: ESOPHAGOGASTRODUODENOSCOPY (EGD);  Surgeon: Souleymane Moreno DO;  Location: [facility];  Service: General     SURGICAL HISTORY OF -   01/04/2000    Esophagogastroduodenoscopy with biopsy     TUBAL LIGATION         Current Outpatient Medications   Medication Sig Dispense Refill     acetaminophen (TYLENOL) 500 MG tablet Take 1,000 mg by mouth 2 times daily.       aspirin 81 MG EC tablet Take 1 tablet (81 mg) by mouth daily.       atorvastatin (LIPITOR) 40 MG tablet Take 1 tablet (40 mg) by mouth daily. 90 tablet 3     clobetasol (TEMOVATE) 0.05 % external ointment Apply topically 2 times daily. To affected areas 60 g 0     clobetasol propionate (CLOBEX) 0.05 % external shampoo Apply thin film to dry scalp once daily (maximum dose: 50 g/week or 50 mL/week); leave in place for 15 minutes, then add water, lather, and rinse thoroughly. Limit treatment to  "4 consecutive weeks. 118 mL 1     dexAMETHasone (DECADRON) 4 MG tablet Take 1 tablet (4 mg) by mouth 2 times daily for 1 day, THEN 1 tablet (4 mg) daily for 2 days. 4 tablet 0     fish oil-omega-3 fatty acids 1000 MG capsule Take 1 g by mouth daily       HYDROmorphone (DILAUDID) 2 MG tablet Take 1 tablet (2 mg) by mouth every 6 hours as needed for severe pain. 30 tablet 0     levETIRAcetam (KEPPRA) 500 MG tablet Take 1 tablet (500 mg) by mouth or NG Tube 2 times daily for 3 days. 6 tablet 0     nitroGLYcerin (NITROSTAT) 0.4 MG sublingual tablet One tablet under the tongue every 5 minutes if needed for chest pain. May repeat every 5 minutes for a maximum of 3 doses in 15 minutes\" 25 tablet 3     omeprazole (PRILOSEC) 20 MG DR capsule TAKE 1 CAPSULE BY MOUTH THREE TIMES DAILY 270 capsule 2     promethazine (PHENERGAN) 25 MG tablet Take 1 tablet (25 mg) by mouth every 8 hours as needed for nausea. 21 tablet 0     senna-docusate (SENOKOT-S/PERICOLACE) 8.6-50 MG tablet Take 1 tablet by mouth daily as needed for constipation. 20 tablet 0     telmisartan (MICARDIS) 80 MG tablet Take 1 tablet (80 mg) by mouth daily. 90 tablet 1     UNABLE TO FIND Take 2 tablets by mouth at bedtime. MEDICATION NAME: Qunol Sleep - melatonin 5 mg, Ashwagandha, L-Theanine       Vitamin D (Cholecalciferol) 25 MCG (1000 UT) TABS Take 1,000 Units by mouth daily       No current facility-administered medications for this visit.       Allergies   Allergen Reactions     Ciprofloxacin Anaphylaxis     Flagyl [Metronidazole] Anaphylaxis     Gabapentin Other (See Comments)     Suicidal thoughts.     Zofran [Ondansetron] Other (See Comments) and GI Disturbance     Causes bloating, moderately uncomfortable, abd pain       Benadryl [Diphenhydramine] Other (See Comments)     Muscle spasms     Percocet [Oxycodone-Acetaminophen] Other (See Comments) and Headache     Goes nuts - nasty mean irritated, can take Dilaudid     Vicodin [Hydrocodone-Acetaminophen] " Other (See Comments)     Anxiety-agitation       Social History     Socioeconomic History     Marital status:    Tobacco Use     Smoking status: Every Day     Current packs/day: 0.00     Average packs/day: 0.5 packs/day for 30.0 years (15.0 ttl pk-yrs)     Types: Cigarettes     Start date: 1993     Last attempt to quit: 2023     Years since quittin.0     Smokeless tobacco: Never     Tobacco comments:     A pack a day   Vaping Use     Vaping status: Never Used   Substance and Sexual Activity     Alcohol use: Not Currently     Comment: rare     Drug use: Not Currently     Sexual activity: Not Currently     Partners: Male     Birth control/protection: Surgical   Other Topics Concern     Parent/sibling w/ CABG, MI or angioplasty before 65F 55M? Yes     Comment: brother- 38, MI, brother 42- quad bypass     Social Drivers of Health     Financial Resource Strain: Low Risk  (2024)    Financial Resource Strain      Within the past 12 months, have you or your family members you live with been unable to get utilities (heat, electricity) when it was really needed?: No   Food Insecurity: Low Risk  (2024)    Food Insecurity      Within the past 12 months, did you worry that your food would run out before you got money to buy more?: No      Within the past 12 months, did the food you bought just not last and you didn t have money to get more?: No   Transportation Needs: Low Risk  (2024)    Transportation Needs      Within the past 12 months, has lack of transportation kept you from medical appointments, getting your medicines, non-medical meetings or appointments, work, or from getting things that you need?: No    [facility]    Social Connections   Interpersonal Safety: Low Risk  (2025)    Interpersonal Safety      Do you feel physically and emotionally safe where you currently live?: Yes      Within the past 12 months, have you been hit,  "slapped, kicked or otherwise physically hurt by someone?: No      Within the past 12 months, have you been humiliated or emotionally abused in other ways by your partner or ex-partner?: No   Housing Stability: Low Risk  (1/26/2024)    Housing Stability      Do you have housing? : Yes      Are you worried about losing your housing?: No       Family History   Problem Relation Age of Onset     C.A.D. Father 50        50's     Diabetes Father      Diabetes Brother      C.A.D. Brother 42        quad bypass and MI     Diabetes Brother         2 brothers have DM     Cancer Brother 34        Melanoma     Aortic aneurysm Brother      Allergies Son         Meds     Allergies Daughter         Med     Cancer Mother      C.A.D. Brother 38        MI age 38     Diabetes Mother      Diabetes Brother        ROS: 10 point ROS neg other than the symptoms noted above in the HPI.    Vital Signs: BP 99/54   Pulse 87   Ht 1.499 m (4' 11\")   Wt 70.8 kg (156 lb)   LMP 04/10/2016   SpO2 96%   BMI 31.51 kg/m      Examination:  Constitutional:  Alert, well nourished, NAD.  HEENT: Normocephalic, atraumatic.   Pulm:  Without shortness of breath or audible adventitious respiratory sounds.  CV:  No pitting edema of BLE.  Brisk capillary refill, CMS intact.    Neurological:  Awake  Alert  Oriented x 3  Speech clear  Cranial nerves II - XII intact  PERRL  EOMI  Face symmetric  Tongue midline  Motor exam:  5/5 strength in all four extremities.   Sensation intact BUE/BLE  Finger to Nose smooth and accurate bilaterally  Pronator drift negative bilaterally  Gait: Able to stand from a seated position. Normal non-antalgic, non-myelopathic gait.  Incision: Healing well.  No surrounding erythema/edema/drainage  Well-healed lumbar incision  Negative SLR bilaterally    SI joint testing:  Tender over bilateral SI joints (left greater than right)  Positive Edu's bilaterally  Positive SI joint compression test bilaterally  Positive SI joint thigh thrust " test bilaterally  Positive finger Joy sign bilaterally    Imaging:   No updated imaging available review    Assessment/Plan:   6 weeks s/p craniotomy, using optical tracking system with neoplasm excision with Dr. Kent 5/16/2025.  Final pathology demonstrated grade 1 meningioma.  Patient overall doing well in regards to recent meningioma resection.  Reports occasional chronic headaches.  Reports improvements of preoperative double vision and nausea.  Currently denies headaches, nausea/vomiting, speech changes, new/worsening vision changes, confusion/altered mental status, or incisional concerns.     Patient also wanting to discuss chronic low back and bilateral leg pain.  Patient has history of exploration of prior L3-L5 instrumented fusion with removal and replacement of pedicle screws and extension to sacral 1 with left L5-S1 TLIF with posterior lateral arthrodesis with the use of Stealth 6/9/2023 with Dr. Kent.  Patient reporting chronic low back pain radiating to bilateral groin, eft anterior thigh/knee, and down the right lower extremity to the right foot.  Reports chronic paresthesias in bilateral feet.  Denies saddle anesthesia, acute bowel bladder dysfunction, recent fall/trauma.  Patient currently using OTC analgesics as needed.  Patient previously underwent L2-L3 bilateral TFESI and right SI joint injection with good improvement.  Doing PT exercises at home.  Patient is interested in repeat lumbar and SI injections, would like to avoid surgical intervention if possible.    Will plan to order bilateral SI joint injection given multiple SI joint provocative tests on examination and good improvement with previous right SI joint injection.  Will also repeat bilateral L2-L3 TFESI as patient had good improvement previously.  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  Recommend patient continue physical  therapy exercises at home.  Will attempt to exhaust conservative measures as patient would like to avoid surgical intervention if possible.    Plan:   - 12-week postop appointment scheduled with Dr. Kent 8/12/2025 with Brain MRI prior  - Okay to gradually increase activity  - Routine post-op cares  - Repeat bilateral L2-L3 TFESI, bilateral SI joint injections ordered  - Continue physical therapy exercises for your back at home    Call the clinic at [phone] for any other questions and concerns.    Discussed red flag symptoms and advised to seek medical attention with any increased headaches, dizziness, nausea/vomiting, vision/speech changes, weakness, confusion, seizure activity, or other neurological changes. Patient voiced understanding and agreement.      Marifer Santiago PA-C  [facility]  [address] [place] [address]      Again, thank you for allowing me to participate in the care of your patient.        Sincerely,        Marifer Santiago PA-C    Electronically signed

## 2025-07-02 DIAGNOSIS — L40.9 PSORIASIS: ICD-10-CM

## 2025-07-02 DIAGNOSIS — M19.90 OSTEOARTHRITIS, UNSPECIFIED OSTEOARTHRITIS TYPE, UNSPECIFIED SITE: ICD-10-CM

## 2025-07-02 RX ORDER — CELECOXIB 100 MG/1
100 CAPSULE ORAL 2 TIMES DAILY
Qty: 180 CAPSULE | Refills: 0 | Status: SHIPPED | OUTPATIENT
Start: 2025-07-02

## 2025-07-03 RX ORDER — CELECOXIB 100 MG/1
100 CAPSULE ORAL 2 TIMES DAILY
Qty: 180 CAPSULE | Refills: 0 | OUTPATIENT
Start: 2025-07-03

## 2025-07-06 RX ORDER — CLOBETASOL PROPIONATE 0.5 MG/G
OINTMENT TOPICAL 2 TIMES DAILY
Qty: 60 G | Refills: 0 | Status: SHIPPED | OUTPATIENT
Start: 2025-07-06

## 2025-07-14 DIAGNOSIS — L40.9 PSORIASIS: ICD-10-CM

## 2025-07-14 NOTE — LETTER
7/20/2023         RE: Selina Parikh  [address] [facility] [address]        Dear Colleague,    Thank you for referring your patient, Selina Parikh, to the [facility]ERY. Please see a copy of my visit note below.    Neurosurgery Progress Note: 7/20/2023     A/P: postoperative exploration of prior lumbar 3- lumbar 5 instrumented fusion with removal and replacement of pedicle screws and extension to sacral 1. Lumbar 3-sacral 1  Left transforaminal lumbar interbody fusion and posterolateral arthrodesis with use of stealth navigation on 6/9/2023 by Dr. Kent     Plan: Patient has been advised to wean out of her brace. By removing the brace for 1-2 hours a day, increasing each day by 1-2 hour increments.  If at any time during the wean you experience excessive fatigue, back pain or spasm, back down to the previous level for a day or so, then resume schedule.  Once out of brace for a full 8 hours, discontinue altogether or wear only as needed for comfort. She has also been advised to gradually increase activity and can add 5 pounds per week until back to her normal amount. Provided referral to physical therapy to focus on core strength and mobility as well as lower extremity strength. Will plan to follow up in 6 weeks with lumbar xray and understands to contact the office sooner should any symptoms worsen. She has also been advised to follow up with her PCP in regards to her LLE swelling.        Ms. Parikh is a pleasant 57 year old right handed female who presents postoperative exploration of prior lumbar 3- lumbar 5 instrumented fusion with removal and replacement of pedicle screws and extension to sacral 1. Lumbar 3-sacral 1  Left transforaminal lumbar interbody fusion and posterolateral arthrodesis with use of stealth navigation on 6/9/2023 by Dr. Kent. Presently she states that she is overall doing well. She has continued complaint of left radicular leg pain but states that  Medication passed protocol.     Medication: spironolactone (ALDACTONE) 50 MG tablet passed protocol.   Last office visit date: 6/24/2025  Next appointment scheduled?: No     Name from pharmacy: SPIRONOLACTONE 50 MG TABLET          Will file in chart as: spironolactone (ALDACTONE) 50 MG tablet    Sig: TAKE 2 TABLETS BY MOUTH EVERY DAY    Disp: 180 tablet    Refills: 1    Start: 7/13/2025    Class: Eprescribe    Non-formulary    Last ordered: 6 months ago (1/7/2025) by Ana Dobson PA-C    Last refill: 4/11/2025    Rx #: [id number]    Diuretics Refill Protocol - 12 Month Protocol [id number]/2025 07:50 AM   Protocol Details eGFR resulted within last 12 months -- IF CRITERIA FAILED REFER TO PROTOCOL DETAILS    Seen by prescribing provider or same department within the last 12 months or has a future appt in 3 months - IF FAILED PLEASE LOOK AT CHART REVIEW FOR LAST VISIT AND PROCEED ACCORDINGLY    eGFR greater than 29 within last 12 months looking at last value    Medication (including dose and sig) on current meds list    Last BP within the last 12 months was equal to or less than 150/100 -- IF CRITERIA FAILED REFER TO PROTOCOL DETAILS    Potassium resulted within last 12 months is within 10% of normal range looking at last value -- IF CRITERIA FAILED REFER TO PROTOCOL DETAILS    Sodium resulted within last 12 months is within 10% of normal range looking at last value -- IF CRITERIA FAILED REFER TO PROTOCOL DETAILS         following surgery it become more severe and has since improved though still presently. She notes that her anterior shin is very sensitive to touch. She also states a few days ago she went to  her left leg and had weakness in her hip flexor strength. She feels that her left leg is swollen but has no calf tenderness. She states that overall she is doing better. She was seen by pain management and advised to being aquatic therapy but there were no openings until September and it was too far to travel.     HPI: 58 yo female who is s/p L3-5 fusion at [facility] and recent left thoracic 12-lumbar 1 microdiscectomy in 9/22. Ongoing L5 radiculopathy. MRI lumbar spine shows moderate facet hypertrophy with effusions and moderate to severe foraminal narrowing at lumbar 5-sacral 1 contributing to her symptoms. Recommend exploration and extension of her fusion to S1 with a left lumbar 5-sacral 1 TILF.  Risks and benefits were discussed in detail including but not limited to infection, hematoma, nerve damage including paralysis, post op radiculitis, durotomy, lack of a sold bone fusion, hardware malfunction, risks associated with the use of general anesthesia, blood clots in the lungs or legs. She agreed to proceed.     Exam:  /68   Pulse 87   LMP 04/10/2016   SpO2 97%     General: alert and oriented x3     Strength is 5/5 throughout both upper extremities throughout slight weakness of left hip flexor, in standing march not able to lift left leg are high as right side     Sensation is intact throughout both upper and lower extremities    Gait is smooth and coordinated      Imaging:  Xray reviewed personally  IMPRESSION:   Nomenclature is based on 5 lumbar vertebral bodies. Unchanged mild levoconvex curvature with apex at L1-L2. Minimal retrolisthesis T12 on L1 and mild retrolisthesis L1 on L2 and L2 on L3, unchanged. Small presumed chronic Schmorl's node along the T12   superior endplate, unchanged. No definite new gross  vertebral body height loss identified.     Interbody spacer devices/graft in the L3-L4, L4-L5 and L5-S1 disc spaces, as before. Bilateral posterior fusion instrumentation spanning L3-S1, unchanged. No definite radiographic findings of hardware loosening or failure are identified. There is severe   disc space narrowing at L1-L2 with mild and moderate multilevel disc space narrowing elsewhere. Scattered degenerative hypertrophic changes of the facets, as before. Surgical clips in the right upper quadrant of the abdomen. Suture material seen   projecting over the pelvis. The previously seen lower back skin staples overlying the lumbosacral spine have been removed since the prior exam.   This result has not been signed. Information might be incomplete.         Sandra Rivas PA-C  [facility]  O: [phone]      Again, thank you for allowing me to participate in the care of your patient.        Sincerely,        Sandra Rivas PA-C

## 2025-07-15 RX ORDER — CLOBETASOL PROPIONATE 0.05 G/100ML
SHAMPOO TOPICAL
Qty: 118 ML | Refills: 1 | Status: SHIPPED | OUTPATIENT
Start: 2025-07-15

## 2025-07-30 ENCOUNTER — OFFICE VISIT (OUTPATIENT)
Dept: FAMILY MEDICINE | Facility: CLINIC | Age: 60
End: 2025-07-30
Payer: COMMERCIAL

## 2025-07-30 VITALS
SYSTOLIC BLOOD PRESSURE: 102 MMHG | BODY MASS INDEX: 31.57 KG/M2 | HEART RATE: 87 BPM | HEIGHT: 59 IN | DIASTOLIC BLOOD PRESSURE: 76 MMHG | RESPIRATION RATE: 16 BRPM | OXYGEN SATURATION: 95 % | TEMPERATURE: 97.7 F | WEIGHT: 156.6 LBS

## 2025-07-30 DIAGNOSIS — K44.9 HIATAL HERNIA: ICD-10-CM

## 2025-07-30 DIAGNOSIS — I10 PRIMARY HYPERTENSION: ICD-10-CM

## 2025-07-30 DIAGNOSIS — I65.22 STENOSIS OF LEFT CAROTID ARTERY: ICD-10-CM

## 2025-07-30 DIAGNOSIS — R06.83 SNORING: ICD-10-CM

## 2025-07-30 DIAGNOSIS — Z72.0 TOBACCO ABUSE: ICD-10-CM

## 2025-07-30 DIAGNOSIS — Z01.818 PREOP GENERAL PHYSICAL EXAM: Primary | ICD-10-CM

## 2025-07-30 PROCEDURE — 1125F AMNT PAIN NOTED PAIN PRSNT: CPT | Performed by: FAMILY MEDICINE

## 2025-07-30 PROCEDURE — 3074F SYST BP LT 130 MM HG: CPT | Performed by: FAMILY MEDICINE

## 2025-07-30 PROCEDURE — 99214 OFFICE O/P EST MOD 30 MIN: CPT | Performed by: FAMILY MEDICINE

## 2025-07-30 PROCEDURE — 3078F DIAST BP <80 MM HG: CPT | Performed by: FAMILY MEDICINE

## 2025-07-30 ASSESSMENT — PATIENT HEALTH QUESTIONNAIRE - PHQ9
10. IF YOU CHECKED OFF ANY PROBLEMS, HOW DIFFICULT HAVE THESE PROBLEMS MADE IT FOR YOU TO DO YOUR WORK, TAKE CARE OF THINGS AT HOME, OR GET ALONG WITH OTHER PEOPLE: SOMEWHAT DIFFICULT
SUM OF ALL RESPONSES TO PHQ QUESTIONS 1-9: 14
SUM OF ALL RESPONSES TO PHQ QUESTIONS 1-9: 14

## 2025-07-30 ASSESSMENT — PAIN SCALES - GENERAL: PAINLEVEL_OUTOF10: MODERATE PAIN (4)

## 2025-07-30 NOTE — PROGRESS NOTES
Preoperative Evaluation  [facility]  [id number]  [facility] [address]  Phone: [phone]  Primary Provider: Joselin Gutierrez DO  Pre-op Performing Provider: Joselin Gutierrez DO  Jul 30, 2025 7/30/2025   Surgical Information   What procedure is being done? Hiatal hernia repair with mesh with partial fundoplication ROBOT-ASSISTED, LAPAROSCOPIC, USING DA BARNEY XI    Facility or Hospital where procedure/surgery will be performed[facility]   Who is doing the procedure / surgery? Souleymane Moreno DO   Date of surgery / procedure: aug 6 2025   Time of surgery / procedure: 11:35 AM   Where do you plan to recover after surgery? at home alone     Fax number for surgical facility: Note does not need to be faxed, will be available electronically in Epic.    Assessment & Plan     The proposed surgical procedure is considered INTERMEDIATE risk.    1. Preop general physical exam (Primary)      2. Hiatal hernia  Indication for surgery    3. Snoring  ? Of untreated sleep apnea  - Adult Sleep Eval & Management  Referral; Future    4. Tobacco abuse    5. Primary hypertension  Well controlled, hold telmisartan day of surgery    6. Stenosis of left carotid artery  S/p stent. Hold baby aspirin           Possible Sleep Apnea:         No data to display                    - No identified additional risk factors other than previously addressed    Preoperative Medication Instructions  Antiplatelet or Anticoagulation Medication Instructions   - aspirin: Discontinue aspirin 7 days prior to procedure to reduce bleeding risk. It should be resumed postoperatively.     Additional Medication Instructions   - Herbal medications and vitamins: DO NOT TAKE 14 days prior to surgery.   - ACE/ARB/ARNI (lisinopril, enalapril, losartan, valsartan, olmesartan, sacubritril/valsartan) : DO NOT TAKE on day of surgery (minimum 11 hours for general anesthesia).   - celecoxib (Celebrex): DO NOT TAKE 3  days before surgery. May continue without modification for management of severe pain.     Recommendation  Approval given to proceed with proposed procedure, without further diagnostic evaluation.        Delfin Marks is a 59 year old, presenting for the following:  Pre-Op Exam          7/30/2025     1:18 PM   Additional Questions   Roomed by Lisbeth AMEZQUITA MA   Accompanied by Self     HPI:             7/30/2025   Pre-Op Questionnaire   Have you ever had a heart attack or stroke? No   Have you ever had surgery on your heart or blood vessels, such as a stent placement, a coronary artery bypass, or surgery on an artery in your head, neck, heart, or legs? (!) YES - carotid artery stent    Do you have chest pain with activity? No   Do you have a history of heart failure? No   Do you currently have a cold, bronchitis or symptoms of other infection? No   Do you have a cough, shortness of breath, or wheezing? (!) YES - chronic cough, smoking    Do you or anyone in your family have previous history of blood clots? (!) UNKNOWN    Do you or does anyone in your family have a serious bleeding problem such as prolonged bleeding following surgeries or cuts? (!) UNKNOWN    Have you ever had problems with anemia or been told to take iron pills? (!) YES - after surgery    Have you had any abnormal blood loss such as black, tarry or bloody stools, or abnormal vaginal bleeding? No   Have you ever had a blood transfusion? No   Are you willing to have a blood transfusion if it is medically needed before, during, or after your surgery? Yes   Have you or any of your relatives ever had problems with anesthesia? No   Do you have sleep apnea, excessive snoring or daytime drowsiness? (!) YES- ? Of untreated sleep apnea    Do you have a CPAP machine? (!) NO    Do you have any artifical heart valves or other implanted medical devices like a pacemaker, defibrillator, or continuous glucose monitor? No   Do you have artificial joints? No   Are you  allergic to latex? No     Advance Care Planning    Discussed advance care planning with patient; informed AVS has link to [facility] [employer].    Preoperative Review of    reviewed - no record of controlled substances prescribed.      Status of Chronic Conditions:  See problem list for active medical problems.  Problems all longstanding and stable, except as noted/documented.  See ROS for pertinent symptoms related to these conditions.    Patient Active Problem List    Diagnosis Date Noted    S/P craniotomy 05/16/2025     Priority: Medium    Cauda equina compression (H) 04/09/2025     Priority: Medium    Lumbar disc herniation 08/24/2023     Priority: Medium    Carotid stenosis 04/21/2023     Priority: Medium    Primary hypertension 05/07/2022     Priority: Medium    Status post coronary angiogram 05/06/2022     Priority: Medium    Coronary artery disease due to lipid rich plaque      Priority: Medium    Chest pain, unspecified type 05/05/2022     Priority: Medium    Acute chest pain 04/28/2022     Priority: Medium    Hiatal hernia 04/15/2021     Priority: Medium     Formatting of this note might be different from the original.  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 On fiber, senna,  and MOM. Advised to d/c MOM, start Miralax and titer to regular BM's. Pt has been seen by GI.       Brain syndrome, posttraumatic 03/06/2009     Priority: Medium     Work comp.  Has seen Dr. Ahmadi, [facility] clinic of neurology.  MRIs, EMG unremarkalbe, some foraminal stenosis on C5/C6  Sent her to physiatrist, trigger point injections didn't help.  After some neck traction, had intense unremitting HA, neck pain.  Off/on tingling in arms.  Pain clinic and/or spine surg for more eval probable next steps.    Has failed multiple rescue and preventive HA meds. On no narcotics        Past Medical History:   Diagnosis Date    Anemia     Antiplatelet or antithrombotic long-term use     Asymptomatic stenosis of left carotid artery     Cancer (H)     melanoma    Coronary artery disease     Difficulty walking     Gastroesophageal reflux disease     Hiatal hernia     History of angina     Hypertension     Irritable bowel syndrome     Other chronic pain     Stented coronary artery     Walking troubles      Past Surgical History:   Procedure Laterality Date    BACK SURGERY      CHOLECYSTECTOMY, LAPOROSCOPIC  02/14/2000    Cholecystectomy, Laparoscopic    COLON SURGERY      CV CORONARY ANGIOGRAM N/A 05/06/2022    Procedure: CV CORONARY ANGIOGRAM;  Surgeon: Stefanie Spangler MD;  Location: [facility] CV    CV LEFT HEART CATH N/A 05/06/2022    Procedure: Left Heart Catheterization;  Surgeon: Stefanie Spangler MD;  Location: [facility] CV    CYSTOSCOPY, INSERT LIGHTED STENT URETER(S) N/A 04/10/2018    Procedure: CYSTOSCOPY, INSERT LIGHTED STENT URETER(S);  Lighted Stent Placement,Laparoscopic Assisted Sigmoid Colectomy;  Surgeon: ERVIN Reina MD;  Location: [facility] OR    ENDOVASCULAR CAROTID STENT PLACEMENT Left 4/21/2023    Procedure: Left transcarotid revascularization;  Surgeon: Eveline Manley MD;  [facility]    ESOPHAGOSCOPY, GASTROSCOPY, DUODENOSCOPY (EGD), COMBINED N/A 5/1/2025     Procedure: ESOPHAGOGASTRODUODENOSCOPY, WITH BIOPSY;  Surgeon: Souleymane Moreno DO;  Location: [facility]    IR TCAR TRANSCAROTID ARTERY REVASCULARIZATION  4/21/2023    LAPAROSCOPIC ASSISTED COLECTOMY LEFT (DESCENDING) N/A 04/10/2018    Procedure: LAPAROSCOPIC ASSISTED COLECTOMY LEFT (DESCENDING);;  Surgeon: Hill Murray MD;  Location: [facility] OR    LUMBAR DISCECTOMY Left 09/23/2022    Procedure: left thoracic 12-lumbar 1 hemilaminectomy, medial facetectomy, foraminotomy and microdiscectomy;  Surgeon: Suha Kent MD;  Location: [facility]    NECK SURGERY      OPTICAL TRACKING SYSTEM CRANIOTOMY, EXCISE TUMOR, COMBINED Left 5/16/2025    Procedure: CRANIOTOMY, USING OPTICAL TRACKING SYSTEM, WITH NEOPLASM EXCISION;  Surgeon: Suha Kent MD;  Location: [facility]    OPTICAL TRACKING SYSTEM FUSION SPINE POSTERIOR LUMBAR THREE+ LEVELS Bilateral 6/9/2023    Procedure: Exploration of prior lumbar 3- lumbar 5 instrumented fusion with removal and replacement of pedicle screws and extension to sacral 1. Lumbar 3-sacral 1  Left transforaminal lumbar interbody fusion and posterolateral arthrodesis with use of stealth navigation;  Surgeon: Suha Kent MD;  Location: [facility]    ORTHOPEDIC SURGERY  10/01/2010    Cut palm and reattched tendons and nerves in left hand forefinger.    WI ESOPHAGOGASTRODUODENOSCOPY TRANSORAL DIAGNOSTIC N/A 04/30/2021    Procedure: ESOPHAGOGASTRODUODENOSCOPY (EGD);  Surgeon: Souleymane Moreno DO;  Location: [facility];  Service: General    SURGICAL HISTORY OF -   01/04/2000    Esophagogastroduodenoscopy with biopsy    TUBAL LIGATION       Current Outpatient Medications   Medication Sig Dispense Refill    acetaminophen (TYLENOL) 500 MG tablet Take 1,000 mg by mouth 2 times daily.      aspirin 81 MG EC tablet Take 1 tablet (81 mg) by mouth daily.      atorvastatin (LIPITOR) 40 MG tablet Take 1 tablet (40 mg) by mouth daily. 90 tablet 3    celecoxib  "(CELEBREX) 100 MG capsule Take 1 capsule by mouth twice daily 180 capsule 0    clobetasol (TEMOVATE) 0.05 % external ointment Apply topically 2 times daily. To affected areas 60 g 0    clobetasol propionate (CLOBEX) 0.05 % external shampoo Apply thin film to dry scalp once daily (maximum dose: 50 g/week or 50 mL/week); leave in place for 15 minutes, then add water, lather, and rinse thoroughly. Limit treatment to 4 consecutive weeks. 118 mL 1    fish oil-omega-3 fatty acids 1000 MG capsule Take 1 g by mouth daily      nitroGLYcerin (NITROSTAT) 0.4 MG sublingual tablet One tablet under the tongue every 5 minutes if needed for chest pain. May repeat every 5 minutes for a maximum of 3 doses in 15 minutes\" 25 tablet 3    omeprazole (PRILOSEC) 20 MG DR capsule TAKE 1 CAPSULE BY MOUTH THREE TIMES DAILY 270 capsule 2    promethazine (PHENERGAN) 25 MG tablet Take 1 tablet (25 mg) by mouth every 8 hours as needed for nausea. 21 tablet 0    senna-docusate (SENOKOT-S/PERICOLACE) 8.6-50 MG tablet Take 1 tablet by mouth daily as needed for constipation. 20 tablet 0    telmisartan (MICARDIS) 80 MG tablet Take 1 tablet (80 mg) by mouth daily. 90 tablet 1    UNABLE TO FIND Take 2 tablets by mouth at bedtime. MEDICATION NAME: Qunol Sleep - melatonin 5 mg, Ashwagandha, L-Theanine      Vitamin D (Cholecalciferol) 25 MCG (1000 UT) TABS Take 1,000 Units by mouth daily         Allergies   Allergen Reactions    Ciprofloxacin Anaphylaxis    Flagyl [Metronidazole] Anaphylaxis    Gabapentin Other (See Comments)     Suicidal thoughts.    Zofran [Ondansetron] Other (See Comments) and GI Disturbance     Causes bloating, moderately uncomfortable, abd pain      Benadryl [Diphenhydramine] Other (See Comments)     Muscle spasms    Percocet [Oxycodone-Acetaminophen] Other (See Comments) and Headache     Goes nuts - nasty mean irritated, can take Dilaudid    Vicodin [Hydrocodone-Acetaminophen] Other (See Comments)     Anxiety-agitation        Social " "History     Tobacco Use    Smoking status: Every Day     Current packs/day: 0.00     Average packs/day: 0.5 packs/day for 30.0 years (15.0 ttl pk-yrs)     Types: Cigarettes     Start date: 1993     Last attempt to quit: 2023     Years since quittin.1     Passive exposure: Never    Smokeless tobacco: Never    Tobacco comments:     A pack a day   Substance Use Topics    Alcohol use: Not Currently     Comment: rare     Family History   Problem Relation Age of Onset    C.A.D. Father 50        50's    Diabetes Father     Diabetes Brother     C.A.D. Brother 42        quad bypass and MI    Diabetes Brother         2 brothers have DM    Cancer Brother 34        Melanoma    Aortic aneurysm Brother     Allergies Son         Meds    Allergies Daughter         Med    Cancer Mother     C.A.D. Brother 38        MI age 38    Diabetes Mother     Diabetes Brother      History   Drug Use Unknown             Review of Systems  Constitutional, HEENT, cardiovascular, pulmonary, gi and gu systems are negative, except as otherwise noted.    Objective    /76 (BP Location: Right arm, Patient Position: Chair, Cuff Size: Adult Regular)   Pulse 87   Temp 97.7  F (36.5  C)   Resp 16   Ht 1.499 m (4' 11\")   Wt 71 kg (156 lb 9.6 oz)   LMP 04/10/2016   SpO2 95%   BMI 31.63 kg/m     Estimated body mass index is 31.63 kg/m  as calculated from the following:    Height as of this encounter: 1.499 m (4' 11\").    Weight as of this encounter: 71 kg (156 lb 9.6 oz).  Physical Exam  GENERAL: alert and no distress  EYES: Eyes grossly normal to inspection, PERRL and conjunctivae and sclerae normal  HENT: ear canals and TM's normal, nose and mouth without ulcers or lesions  NECK: no adenopathy, no asymmetry, masses, or scars  RESP: lungs clear to auscultation - no rales, rhonchi or wheezes  CV: regular rate and rhythm, normal S1 S2, no S3 or S4, no murmur, click or rub, no peripheral edema  ABDOMEN: soft, nontender, no " hepatosplenomegaly, no masses and bowel sounds normal  MS: no gross musculoskeletal defects noted, no edema  SKIN: no suspicious lesions or rashes  NEURO: Normal strength and tone, mentation intact and speech normal  PSYCH: mentation appears normal, affect normal/bright    Recent Labs   Lab Test 05/19/25  0920 05/18/25  0436 05/17/25  1441 05/16/25  0554 04/09/25  0837   HGB  --   --   --  14.5  --     247   < >  --   --    NA  --   --   --   --  144   POTASSIUM  --   --   --  3.9 3.9   CR 0.64  --   --  0.80 0.78    < > = values in this interval not displayed.        Diagnostics  No labs were ordered during this visit.       Revised Cardiac Risk Index (RCRI)  The patient has the following serious cardiovascular risks for perioperative complications:   - No serious cardiac risks = 0 points     RCRI Interpretation: 0 points: Class I (very low risk - 0.4% complication rate)         Signed Electronically by: Joselin Gutierrez DO  A copy of this evaluation report is provided to the requesting physician.

## 2025-07-30 NOTE — PATIENT INSTRUCTIONS
How to Take Your Medication Before Surgery  Preoperative Medication Instructions   Antiplatelet or Anticoagulation Medication Instructions   - aspirin: Discontinue aspirin 7 days prior to procedure to reduce bleeding risk. It should be resumed postoperatively.     Additional Medication Instructions   - Herbal medications and vitamins: DO NOT TAKE 14 days prior to surgery.   - ACE/ARB/ARNI (lisinopril, enalapril, losartan, valsartan, olmesartan, sacubritril/valsartan) : DO NOT TAKE on day of surgery (minimum 11 hours for general anesthesia).   - celecoxib (Celebrex): DO NOT TAKE 3 days before surgery. May continue without modification for management of severe pain.          Patient Education   Preparing for Your Surgery  For Adults  Getting started  In most cases, a nurse will call to review your health history and instructions. They will give you an arrival time based on your scheduled surgery time. Please be ready to share:  Your doctor's clinic name and phone number  Your medical, surgical, and anesthesia history  A list of allergies and sensitivities  A list of medicines, including herbal treatments and over-the-counter drugs  Whether the patient has a legal guardian (ask how to send us the papers in advance)  Note: You may not receive a call if you were seen at our PAC (Preoperative Assessment Center).  Please tell us if you're pregnant--or if there's any chance you might be pregnant. Some surgeries may injure a fetus (unborn baby), so they require a pregnancy test. Surgeries that are safe for a fetus don't always need a test, and you can choose whether to have one.   Preparing for surgery  Within 10 to 30 days of surgery: Have a pre-op exam (sometimes called an H&P, or History and Physical). This can be done at a clinic or pre-operative center.  If you're having a , you may not need this exam. Talk to your care team.  At your pre-op exam, talk to your care team about all medicines you take. (This  includes CBD oil and any drugs, such as THC, marijuana, and other forms of cannabis.) If you need to stop any medicine before surgery, ask when to start taking it again.  This is for your safety. Many medicines and drugs can make you bleed too much during surgery. Some change how well surgery (anesthesia) drugs work.  Call your insurance company to let them know you're having surgery. (If you don't have insurance, call [phone].)  Call your clinic if there's any change in your health. This includes a scrape or scratch near the surgery site, or any signs of a cold (sore throat, runny nose, cough, rash, fever).  Eating and drinking guidelines  For your safety: Unless your surgeon tells you otherwise, follow the guidelines below.  Eat and drink as normal until 8 hours before you arrive for surgery. After that, no food or milk. You can spit out gum when you arrive.  Drink clear liquids until 2 hours before you arrive. These are liquids you can see through, like water, Gatorade, and Propel Water. They also include plain black coffee and tea (no cream or milk).  No alcohol for 24 hours before you arrive. The night before surgery, stop any drinks that contain THC.  If your care team tells you to take medicine on the morning of surgery, it's okay to take it with a sip of water. No other medicines or drugs are allowed (including CBD oil)--follow your care team's instructions.  If you have questions the day of surgery, call your hospital or surgery center.   Preventing infection  Shower or bathe the night before and the morning of surgery. Follow the instructions your clinic gave you. (If no instructions, use regular soap.)  Don't shave or clip hair near your surgery site. We'll remove the hair if needed.  Don't smoke or vape the morning of surgery. No chewing tobacco for 6 hours before you arrive. A nicotine patch is okay. You may spit out nicotine gum when you arrive.  For some surgeries, the surgeon will tell you to  fully quit smoking and nicotine.  We will make every effort to keep you safe from infection. We will:  Clean our hands often with soap and water (or an alcohol-based hand rub).  Clean the skin at your surgery site with a special soap that kills germs.  Give you a special gown to keep you warm. (Cold raises the risk of infection.)  Wear hair covers, masks, gowns, and gloves during surgery.  Give antibiotic medicine, if prescribed. Not all surgeries need this medicine.  What to bring on the day of surgery  Photo ID and insurance card  Copy of your health care directive, if you have one  Glasses and hearing aids (bring cases)  You can't wear contacts during surgery  Inhaler and eye drops, if you use them (tell us about these when you arrive)  CPAP machine or breathing device, if you use them  A few personal items, if spending the night  If you have . . .  A pacemaker, ICD (cardiac defibrillator), or other implant: Bring the ID card.  An implanted stimulator: Bring the remote control.  A legal guardian: Bring a copy of the certified (court-stamped) guardianship papers.  Please remove any jewelry, including body piercings. Leave jewelry and other valuables at home.  If you're going home the day of surgery  You must have a support person drive you home. They should stay with you overnight, and they may need to help with your self-care.  If you don't have a support person, please tells us as soon as possible. We can help.  After surgery  If it's hard to control your pain or you need more pain medicine, please call your surgeon's office.  Questions?   If you have any questions for your care team, list them here:   ____________________________________________________________________________________________________________________________________________________________________________________________________________________________________________________________  For informational purposes only. Not to replace the advice of  your health care provider. Copyright   2003, 2019 [facility]. All rights reserved. Clinically reviewed by Yoav Briseno MD. [employer] 535187 - REV 02/25.

## 2025-08-01 ENCOUNTER — HOSPITAL ENCOUNTER (OUTPATIENT)
Dept: MRI IMAGING | Facility: CLINIC | Age: 60
Discharge: HOME | End: 2025-08-01
Payer: COMMERCIAL

## 2025-08-01 DIAGNOSIS — Z98.890 S/P CRANIOTOMY: ICD-10-CM

## 2025-08-01 DIAGNOSIS — D32.9 MENINGIOMA (H): ICD-10-CM

## 2025-08-01 PROCEDURE — A9585 GADOBUTROL INJECTION: HCPCS

## 2025-08-01 PROCEDURE — 255N000002 HC RX 255 OP 636

## 2025-08-01 PROCEDURE — 70553 MRI BRAIN STEM W/O & W/DYE: CPT

## 2025-08-01 RX ORDER — GADOBUTROL 604.72 MG/ML
7 INJECTION INTRAVENOUS ONCE
Status: COMPLETED | OUTPATIENT
Start: 2025-08-01 | End: 2025-08-01

## 2025-08-01 RX ADMIN — GADOBUTROL 7 ML: 604.72 INJECTION INTRAVENOUS at 14:23

## 2025-08-05 ENCOUNTER — ANESTHESIA EVENT (OUTPATIENT)
Dept: SURGERY | Facility: CLINIC | Age: 60
End: 2025-08-05
Payer: COMMERCIAL

## 2025-08-05 ENCOUNTER — TELEPHONE (OUTPATIENT)
Dept: SURGERY | Facility: CLINIC | Age: 60
End: 2025-08-05
Payer: COMMERCIAL

## 2025-08-05 ASSESSMENT — LIFESTYLE VARIABLES: TOBACCO_USE: 1

## 2025-08-06 ENCOUNTER — ANESTHESIA (OUTPATIENT)
Dept: SURGERY | Facility: CLINIC | Age: 60
End: 2025-08-06
Payer: COMMERCIAL

## 2025-08-06 ENCOUNTER — HOSPITAL ENCOUNTER (OUTPATIENT)
Facility: CLINIC | Age: 60
Discharge: HOME | End: 2025-08-07
Attending: SURGERY | Admitting: SURGERY
Payer: COMMERCIAL

## 2025-08-06 DIAGNOSIS — K44.9 HIATAL HERNIA: Primary | ICD-10-CM

## 2025-08-06 DIAGNOSIS — Z87.19 HISTORY OF REPAIR OF HIATAL HERNIA: ICD-10-CM

## 2025-08-06 DIAGNOSIS — Z98.890 HISTORY OF REPAIR OF HIATAL HERNIA: ICD-10-CM

## 2025-08-06 LAB
CREAT SERPL-MCNC: 0.79 MG/DL (ref 0.51–0.95)
EGFRCR SERPLBLD CKD-EPI 2021: 86 ML/MIN/1.73M2
HGB BLD-MCNC: 13.3 G/DL (ref 11.7–15.7)
MCV RBC AUTO: 92 FL (ref 78–100)
MCV RBC AUTO: 94 FL (ref 78–100)
PLATELET # BLD AUTO: 254 10E3/UL (ref 150–450)

## 2025-08-06 PROCEDURE — 250N000011 HC RX IP 250 OP 636: Performed by: FAMILY MEDICINE

## 2025-08-06 PROCEDURE — 258N000003 HC RX IP 258 OP 636: Performed by: ANESTHESIOLOGY

## 2025-08-06 PROCEDURE — 250N000009 HC RX 250: Performed by: NURSE ANESTHETIST, CERTIFIED REGISTERED

## 2025-08-06 PROCEDURE — 370N000017 HC ANESTHESIA TECHNICAL FEE, PER MIN: Performed by: SURGERY

## 2025-08-06 PROCEDURE — 250N000011 HC RX IP 250 OP 636: Performed by: ANESTHESIOLOGY

## 2025-08-06 PROCEDURE — 250N000025 HC SEVOFLURANE, PER MIN: Performed by: SURGERY

## 2025-08-06 PROCEDURE — 85049 AUTOMATED PLATELET COUNT: CPT | Performed by: SURGERY

## 2025-08-06 PROCEDURE — 258N000003 HC RX IP 258 OP 636: Performed by: NURSE ANESTHETIST, CERTIFIED REGISTERED

## 2025-08-06 PROCEDURE — 999N000141 HC STATISTIC PRE-PROCEDURE NURSING ASSESSMENT: Performed by: SURGERY

## 2025-08-06 PROCEDURE — S2900 ROBOTIC SURGICAL SYSTEM: HCPCS | Performed by: SURGERY

## 2025-08-06 PROCEDURE — 250N000009 HC RX 250: Performed by: ANESTHESIOLOGY

## 2025-08-06 PROCEDURE — 99204 OFFICE O/P NEW MOD 45 MIN: CPT | Performed by: FAMILY MEDICINE

## 2025-08-06 PROCEDURE — 36415 COLL VENOUS BLD VENIPUNCTURE: CPT | Performed by: ANESTHESIOLOGY

## 2025-08-06 PROCEDURE — 250N000013 HC RX MED GY IP 250 OP 250 PS 637: Performed by: FAMILY MEDICINE

## 2025-08-06 PROCEDURE — 250N000011 HC RX IP 250 OP 636: Performed by: SURGERY

## 2025-08-06 PROCEDURE — 250N000011 HC RX IP 250 OP 636: Performed by: NURSE ANESTHETIST, CERTIFIED REGISTERED

## 2025-08-06 PROCEDURE — 250N000013 HC RX MED GY IP 250 OP 250 PS 637: Performed by: SURGERY

## 2025-08-06 PROCEDURE — 36415 COLL VENOUS BLD VENIPUNCTURE: CPT | Performed by: SURGERY

## 2025-08-06 PROCEDURE — 43282 LAP PARAESOPH HER RPR W/MESH: CPT | Performed by: SURGERY

## 2025-08-06 PROCEDURE — 710N000010 HC RECOVERY PHASE 1, LEVEL 2, PER MIN: Performed by: SURGERY

## 2025-08-06 PROCEDURE — 85018 HEMOGLOBIN: CPT | Performed by: ANESTHESIOLOGY

## 2025-08-06 PROCEDURE — 82565 ASSAY OF CREATININE: CPT | Performed by: SURGERY

## 2025-08-06 PROCEDURE — P9045 ALBUMIN (HUMAN), 5%, 250 ML: HCPCS | Mod: JZ | Performed by: NURSE ANESTHETIST, CERTIFIED REGISTERED

## 2025-08-06 PROCEDURE — C1781 MESH (IMPLANTABLE): HCPCS | Performed by: SURGERY

## 2025-08-06 PROCEDURE — 360N000080 HC SURGERY LEVEL 7, PER MIN: Performed by: SURGERY

## 2025-08-06 PROCEDURE — 272N000001 HC OR GENERAL SUPPLY STERILE: Performed by: SURGERY

## 2025-08-06 DEVICE — IMPLANTABLE DEVICE: Type: IMPLANTABLE DEVICE | Site: ABDOMEN | Status: FUNCTIONAL

## 2025-08-06 RX ORDER — SODIUM CHLORIDE, SODIUM LACTATE, POTASSIUM CHLORIDE, CALCIUM CHLORIDE 600; 310; 30; 20 MG/100ML; MG/100ML; MG/100ML; MG/100ML
INJECTION, SOLUTION INTRAVENOUS CONTINUOUS
Status: DISCONTINUED | OUTPATIENT
Start: 2025-08-06 | End: 2025-08-06 | Stop reason: HOSPADM

## 2025-08-06 RX ORDER — ACETAMINOPHEN 10 MG/ML
1000 INJECTION, SOLUTION INTRAVENOUS ONCE
Status: COMPLETED | OUTPATIENT
Start: 2025-08-06 | End: 2025-08-06

## 2025-08-06 RX ORDER — METHOCARBAMOL 100 MG/ML
1000 INJECTION, SOLUTION INTRAMUSCULAR; INTRAVENOUS ONCE
Status: COMPLETED | OUTPATIENT
Start: 2025-08-06 | End: 2025-08-06

## 2025-08-06 RX ORDER — ATORVASTATIN CALCIUM 40 MG/1
40 TABLET, FILM COATED ORAL DAILY
Status: DISCONTINUED | OUTPATIENT
Start: 2025-08-07 | End: 2025-08-07 | Stop reason: HOSPADM

## 2025-08-06 RX ORDER — NITROGLYCERIN 0.4 MG/1
0.4 TABLET SUBLINGUAL EVERY 5 MIN PRN
Status: DISCONTINUED | OUTPATIENT
Start: 2025-08-06 | End: 2025-08-07 | Stop reason: HOSPADM

## 2025-08-06 RX ORDER — HALOPERIDOL 5 MG/ML
1 INJECTION INTRAMUSCULAR
Status: DISCONTINUED | OUTPATIENT
Start: 2025-08-06 | End: 2025-08-06 | Stop reason: HOSPADM

## 2025-08-06 RX ORDER — MAGNESIUM SULFATE 4 G/50ML
4 INJECTION INTRAVENOUS ONCE
Status: COMPLETED | OUTPATIENT
Start: 2025-08-06 | End: 2025-08-06

## 2025-08-06 RX ORDER — DEXAMETHASONE SODIUM PHOSPHATE 10 MG/ML
INJECTION, SOLUTION INTRAMUSCULAR; INTRAVENOUS PRN
Status: DISCONTINUED | OUTPATIENT
Start: 2025-08-06 | End: 2025-08-06

## 2025-08-06 RX ORDER — NALOXONE HYDROCHLORIDE 0.4 MG/ML
0.4 INJECTION, SOLUTION INTRAMUSCULAR; INTRAVENOUS; SUBCUTANEOUS
Status: DISCONTINUED | OUTPATIENT
Start: 2025-08-06 | End: 2025-08-07 | Stop reason: HOSPADM

## 2025-08-06 RX ORDER — PROPOFOL 10 MG/ML
INJECTION, EMULSION INTRAVENOUS PRN
Status: DISCONTINUED | OUTPATIENT
Start: 2025-08-06 | End: 2025-08-06

## 2025-08-06 RX ORDER — ONDANSETRON 4 MG/1
4 TABLET, ORALLY DISINTEGRATING ORAL EVERY 6 HOURS PRN
Status: DISCONTINUED | OUTPATIENT
Start: 2025-08-06 | End: 2025-08-06

## 2025-08-06 RX ORDER — LIDOCAINE 40 MG/G
CREAM TOPICAL
Status: DISCONTINUED | OUTPATIENT
Start: 2025-08-06 | End: 2025-08-06 | Stop reason: HOSPADM

## 2025-08-06 RX ORDER — FENTANYL CITRATE 50 UG/ML
25 INJECTION, SOLUTION INTRAMUSCULAR; INTRAVENOUS EVERY 5 MIN PRN
Status: DISCONTINUED | OUTPATIENT
Start: 2025-08-06 | End: 2025-08-06 | Stop reason: HOSPADM

## 2025-08-06 RX ORDER — ENOXAPARIN SODIUM 100 MG/ML
40 INJECTION SUBCUTANEOUS EVERY 24 HOURS
Status: DISCONTINUED | OUTPATIENT
Start: 2025-08-07 | End: 2025-08-07 | Stop reason: HOSPADM

## 2025-08-06 RX ORDER — TELMISARTAN 20 MG/1
80 TABLET ORAL DAILY
Status: DISCONTINUED | OUTPATIENT
Start: 2025-08-06 | End: 2025-08-07 | Stop reason: HOSPADM

## 2025-08-06 RX ORDER — FENTANYL CITRATE 50 UG/ML
INJECTION, SOLUTION INTRAMUSCULAR; INTRAVENOUS PRN
Status: DISCONTINUED | OUTPATIENT
Start: 2025-08-06 | End: 2025-08-06

## 2025-08-06 RX ORDER — ASPIRIN 81 MG/1
81 TABLET ORAL DAILY
Status: DISCONTINUED | OUTPATIENT
Start: 2025-08-06 | End: 2025-08-07 | Stop reason: HOSPADM

## 2025-08-06 RX ORDER — HYDROMORPHONE HCL IN WATER/PF 6 MG/30 ML
0.2 PATIENT CONTROLLED ANALGESIA SYRINGE INTRAVENOUS EVERY 5 MIN PRN
Status: DISCONTINUED | OUTPATIENT
Start: 2025-08-06 | End: 2025-08-06 | Stop reason: HOSPADM

## 2025-08-06 RX ORDER — BUPIVACAINE HYDROCHLORIDE 2.5 MG/ML
INJECTION, SOLUTION INFILTRATION; PERINEURAL
Status: DISCONTINUED
Start: 2025-08-06 | End: 2025-08-06 | Stop reason: HOSPADM

## 2025-08-06 RX ORDER — BISACODYL 10 MG
10 SUPPOSITORY, RECTAL RECTAL DAILY PRN
Status: DISCONTINUED | OUTPATIENT
Start: 2025-08-09 | End: 2025-08-07 | Stop reason: HOSPADM

## 2025-08-06 RX ORDER — HYDROMORPHONE HYDROCHLORIDE 4 MG/1
4 TABLET ORAL EVERY 4 HOURS PRN
Status: DISCONTINUED | OUTPATIENT
Start: 2025-08-06 | End: 2025-08-07 | Stop reason: HOSPADM

## 2025-08-06 RX ORDER — BUPIVACAINE HYDROCHLORIDE 2.5 MG/ML
INJECTION, SOLUTION EPIDURAL; INFILTRATION; INTRACAUDAL; PERINEURAL PRN
Status: DISCONTINUED | OUTPATIENT
Start: 2025-08-06 | End: 2025-08-06 | Stop reason: HOSPADM

## 2025-08-06 RX ORDER — ACETAMINOPHEN 325 MG/1
975 TABLET ORAL EVERY 8 HOURS
Status: DISCONTINUED | OUTPATIENT
Start: 2025-08-06 | End: 2025-08-07 | Stop reason: HOSPADM

## 2025-08-06 RX ORDER — PROCHLORPERAZINE MALEATE 10 MG
10 TABLET ORAL EVERY 6 HOURS PRN
Status: DISCONTINUED | OUTPATIENT
Start: 2025-08-06 | End: 2025-08-07 | Stop reason: HOSPADM

## 2025-08-06 RX ORDER — HYDROMORPHONE HCL IN WATER/PF 6 MG/30 ML
0.2 PATIENT CONTROLLED ANALGESIA SYRINGE INTRAVENOUS
Status: DISCONTINUED | OUTPATIENT
Start: 2025-08-06 | End: 2025-08-07 | Stop reason: HOSPADM

## 2025-08-06 RX ORDER — HYDROMORPHONE HCL IN WATER/PF 6 MG/30 ML
0.4 PATIENT CONTROLLED ANALGESIA SYRINGE INTRAVENOUS
Status: DISCONTINUED | OUTPATIENT
Start: 2025-08-06 | End: 2025-08-07 | Stop reason: HOSPADM

## 2025-08-06 RX ORDER — FENTANYL CITRATE 50 UG/ML
50 INJECTION, SOLUTION INTRAMUSCULAR; INTRAVENOUS EVERY 5 MIN PRN
Status: DISCONTINUED | OUTPATIENT
Start: 2025-08-06 | End: 2025-08-06 | Stop reason: HOSPADM

## 2025-08-06 RX ORDER — POLYETHYLENE GLYCOL 3350 17 G/17G
17 POWDER, FOR SOLUTION ORAL DAILY
Status: DISCONTINUED | OUTPATIENT
Start: 2025-08-07 | End: 2025-08-07 | Stop reason: HOSPADM

## 2025-08-06 RX ORDER — LIDOCAINE 40 MG/G
CREAM TOPICAL
Status: DISCONTINUED | OUTPATIENT
Start: 2025-08-06 | End: 2025-08-07 | Stop reason: HOSPADM

## 2025-08-06 RX ORDER — CLOBETASOL PROPIONATE 0.5 MG/G
OINTMENT TOPICAL 2 TIMES DAILY
Status: DISCONTINUED | OUTPATIENT
Start: 2025-08-06 | End: 2025-08-07 | Stop reason: HOSPADM

## 2025-08-06 RX ORDER — HYDROMORPHONE HCL IN WATER/PF 6 MG/30 ML
0.4 PATIENT CONTROLLED ANALGESIA SYRINGE INTRAVENOUS EVERY 5 MIN PRN
Status: DISCONTINUED | OUTPATIENT
Start: 2025-08-06 | End: 2025-08-06 | Stop reason: HOSPADM

## 2025-08-06 RX ORDER — NALOXONE HYDROCHLORIDE 0.4 MG/ML
0.2 INJECTION, SOLUTION INTRAMUSCULAR; INTRAVENOUS; SUBCUTANEOUS
Status: DISCONTINUED | OUTPATIENT
Start: 2025-08-06 | End: 2025-08-07 | Stop reason: HOSPADM

## 2025-08-06 RX ORDER — AMOXICILLIN 250 MG
1 CAPSULE ORAL 2 TIMES DAILY
Status: DISCONTINUED | OUTPATIENT
Start: 2025-08-06 | End: 2025-08-07 | Stop reason: HOSPADM

## 2025-08-06 RX ORDER — DEXAMETHASONE SODIUM PHOSPHATE 4 MG/ML
INJECTION, SOLUTION INTRA-ARTICULAR; INTRALESIONAL; INTRAMUSCULAR; INTRAVENOUS; SOFT TISSUE PRN
Status: DISCONTINUED | OUTPATIENT
Start: 2025-08-06 | End: 2025-08-06

## 2025-08-06 RX ORDER — KETAMINE HYDROCHLORIDE 10 MG/ML
INJECTION INTRAMUSCULAR; INTRAVENOUS PRN
Status: DISCONTINUED | OUTPATIENT
Start: 2025-08-06 | End: 2025-08-06

## 2025-08-06 RX ORDER — KETOROLAC TROMETHAMINE 30 MG/ML
30 INJECTION, SOLUTION INTRAMUSCULAR; INTRAVENOUS EVERY 6 HOURS PRN
Status: DISCONTINUED | OUTPATIENT
Start: 2025-08-06 | End: 2025-08-07 | Stop reason: HOSPADM

## 2025-08-06 RX ORDER — CELECOXIB 100 MG/1
200 CAPSULE ORAL DAILY
COMMUNITY

## 2025-08-06 RX ORDER — HYDROMORPHONE HYDROCHLORIDE 2 MG/1
2 TABLET ORAL EVERY 4 HOURS PRN
Status: DISCONTINUED | OUTPATIENT
Start: 2025-08-06 | End: 2025-08-07 | Stop reason: HOSPADM

## 2025-08-06 RX ORDER — NALOXONE HYDROCHLORIDE 0.4 MG/ML
0.1 INJECTION, SOLUTION INTRAMUSCULAR; INTRAVENOUS; SUBCUTANEOUS
Status: DISCONTINUED | OUTPATIENT
Start: 2025-08-06 | End: 2025-08-06 | Stop reason: HOSPADM

## 2025-08-06 RX ORDER — IPRATROPIUM BROMIDE AND ALBUTEROL SULFATE 2.5; .5 MG/3ML; MG/3ML
3 SOLUTION RESPIRATORY (INHALATION) ONCE
Status: COMPLETED | OUTPATIENT
Start: 2025-08-06 | End: 2025-08-06

## 2025-08-06 RX ORDER — CELECOXIB 200 MG/1
200 CAPSULE ORAL DAILY
Status: DISCONTINUED | OUTPATIENT
Start: 2025-08-06 | End: 2025-08-07 | Stop reason: HOSPADM

## 2025-08-06 RX ORDER — ONDANSETRON 2 MG/ML
4 INJECTION INTRAMUSCULAR; INTRAVENOUS EVERY 6 HOURS PRN
Status: DISCONTINUED | OUTPATIENT
Start: 2025-08-06 | End: 2025-08-06

## 2025-08-06 RX ADMIN — HYDROMORPHONE HYDROCHLORIDE 0.4 MG: 0.2 INJECTION, SOLUTION INTRAMUSCULAR; INTRAVENOUS; SUBCUTANEOUS at 21:32

## 2025-08-06 RX ADMIN — PHENYLEPHRINE HYDROCHLORIDE 50 MCG: 10 INJECTION INTRAVENOUS at 12:56

## 2025-08-06 RX ADMIN — Medication 200 MG: at 13:16

## 2025-08-06 RX ADMIN — HYDROMORPHONE HYDROCHLORIDE 0.2 MG: 0.2 INJECTION, SOLUTION INTRAMUSCULAR; INTRAVENOUS; SUBCUTANEOUS at 14:58

## 2025-08-06 RX ADMIN — PHENYLEPHRINE HYDROCHLORIDE 100 MCG: 10 INJECTION INTRAVENOUS at 12:22

## 2025-08-06 RX ADMIN — FENTANYL CITRATE 25 MCG: 50 INJECTION INTRAMUSCULAR; INTRAVENOUS at 14:44

## 2025-08-06 RX ADMIN — HYDROMORPHONE HYDROCHLORIDE 0.4 MG: 0.2 INJECTION, SOLUTION INTRAMUSCULAR; INTRAVENOUS; SUBCUTANEOUS at 16:40

## 2025-08-06 RX ADMIN — CLOBETASOL PROPIONATE: 0.5 OINTMENT TOPICAL at 20:24

## 2025-08-06 RX ADMIN — MAGNESIUM SULFATE HEPTAHYDRATE 4 G: 80 INJECTION, SOLUTION INTRAVENOUS at 11:00

## 2025-08-06 RX ADMIN — PROPOFOL 50 MCG/KG/MIN: 10 INJECTION, EMULSION INTRAVENOUS at 11:42

## 2025-08-06 RX ADMIN — FENTANYL CITRATE 25 MCG: 50 INJECTION INTRAMUSCULAR; INTRAVENOUS at 14:24

## 2025-08-06 RX ADMIN — KETOROLAC TROMETHAMINE 30 MG: 30 INJECTION, SOLUTION INTRAMUSCULAR at 18:23

## 2025-08-06 RX ADMIN — FENTANYL CITRATE 25 MCG: 50 INJECTION INTRAMUSCULAR; INTRAVENOUS at 14:31

## 2025-08-06 RX ADMIN — PROPOFOL 150 MG: 10 INJECTION, EMULSION INTRAVENOUS at 11:39

## 2025-08-06 RX ADMIN — SENNOSIDES AND DOCUSATE SODIUM 1 TABLET: 50; 8.6 TABLET ORAL at 20:24

## 2025-08-06 RX ADMIN — Medication 50 MG: at 13:17

## 2025-08-06 RX ADMIN — ALBUMIN (HUMAN): 12.5 SOLUTION INTRAVENOUS at 12:22

## 2025-08-06 RX ADMIN — DEXAMETHASONE SODIUM PHOSPHATE 10 MG: 4 INJECTION, SOLUTION INTRA-ARTICULAR; INTRALESIONAL; INTRAMUSCULAR; INTRAVENOUS; SOFT TISSUE at 11:39

## 2025-08-06 RX ADMIN — HYDROMORPHONE HYDROCHLORIDE 2 MG: 2 TABLET ORAL at 20:23

## 2025-08-06 RX ADMIN — DEXAMETHASONE SODIUM PHOSPHATE 10 MG: 10 INJECTION, SOLUTION INTRAMUSCULAR; INTRAVENOUS at 11:38

## 2025-08-06 RX ADMIN — ACETAMINOPHEN 975 MG: 325 TABLET ORAL at 20:22

## 2025-08-06 RX ADMIN — LIDOCAINE HYDROCHLORIDE 50 MG: 10 INJECTION, SOLUTION EPIDURAL; INFILTRATION; INTRACAUDAL; PERINEURAL at 11:38

## 2025-08-06 RX ADMIN — TELMISARTAN 80 MG: 20 TABLET ORAL at 18:24

## 2025-08-06 RX ADMIN — ROCURONIUM BROMIDE 10 MG: 10 INJECTION, SOLUTION INTRAVENOUS at 13:01

## 2025-08-06 RX ADMIN — NICOTINE 7 MG/24 HR DAILY TRANSDERMAL PATCH 1 PATCH: at 18:24

## 2025-08-06 RX ADMIN — FENTANYL CITRATE 25 MCG: 50 INJECTION INTRAMUSCULAR; INTRAVENOUS at 14:39

## 2025-08-06 RX ADMIN — PHENYLEPHRINE HYDROCHLORIDE 0.2 MCG/KG/MIN: 10 INJECTION INTRAVENOUS at 12:15

## 2025-08-06 RX ADMIN — KETAMINE HYDROCHLORIDE 30 MG: 10 INJECTION, SOLUTION INTRAMUSCULAR; INTRAVENOUS at 11:39

## 2025-08-06 RX ADMIN — HYDROMORPHONE HYDROCHLORIDE 0.2 MG: 0.2 INJECTION, SOLUTION INTRAMUSCULAR; INTRAVENOUS; SUBCUTANEOUS at 15:18

## 2025-08-06 RX ADMIN — HYDROMORPHONE HYDROCHLORIDE 0.2 MG: 0.2 INJECTION, SOLUTION INTRAMUSCULAR; INTRAVENOUS; SUBCUTANEOUS at 15:06

## 2025-08-06 RX ADMIN — PANTOPRAZOLE SODIUM 40 MG: 40 INJECTION, POWDER, LYOPHILIZED, FOR SOLUTION INTRAVENOUS at 18:23

## 2025-08-06 RX ADMIN — HYDROMORPHONE HYDROCHLORIDE 0.5 MG: 1 INJECTION, SOLUTION INTRAMUSCULAR; INTRAVENOUS; SUBCUTANEOUS at 13:54

## 2025-08-06 RX ADMIN — IPRATROPIUM BROMIDE AND ALBUTEROL SULFATE 3 ML: .5; 3 SOLUTION RESPIRATORY (INHALATION) at 13:56

## 2025-08-06 RX ADMIN — DEXMEDETOMIDINE HYDROCHLORIDE 16 MCG: 100 INJECTION, SOLUTION INTRAVENOUS at 12:00

## 2025-08-06 RX ADMIN — ACETAMINOPHEN 1000 MG: 10 INJECTION, SOLUTION INTRAVENOUS at 11:46

## 2025-08-06 RX ADMIN — MIDAZOLAM 2 MG: 1 INJECTION INTRAMUSCULAR; INTRAVENOUS at 11:29

## 2025-08-06 RX ADMIN — HYDROMORPHONE HYDROCHLORIDE 0.5 MG: 1 INJECTION, SOLUTION INTRAMUSCULAR; INTRAVENOUS; SUBCUTANEOUS at 12:05

## 2025-08-06 RX ADMIN — SODIUM CHLORIDE, SODIUM LACTATE, POTASSIUM CHLORIDE, AND CALCIUM CHLORIDE: .6; .31; .03; .02 INJECTION, SOLUTION INTRAVENOUS at 10:50

## 2025-08-06 RX ADMIN — PROPOFOL 40 MG: 10 INJECTION, EMULSION INTRAVENOUS at 12:42

## 2025-08-06 RX ADMIN — HYDROMORPHONE HYDROCHLORIDE 0.2 MG: 0.2 INJECTION, SOLUTION INTRAMUSCULAR; INTRAVENOUS; SUBCUTANEOUS at 15:11

## 2025-08-06 RX ADMIN — FENTANYL CITRATE 100 MCG: 50 INJECTION INTRAMUSCULAR; INTRAVENOUS at 11:38

## 2025-08-06 RX ADMIN — ROCURONIUM BROMIDE 60 MG: 10 INJECTION, SOLUTION INTRAVENOUS at 11:55

## 2025-08-06 RX ADMIN — METHOCARBAMOL 1000 MG: 100 INJECTION INTRAMUSCULAR; INTRAVENOUS at 14:01

## 2025-08-06 RX ADMIN — Medication 100 MG: at 11:39

## 2025-08-06 ASSESSMENT — ACTIVITIES OF DAILY LIVING (ADL)
ADLS_ACUITY_SCORE: 59
ADLS_ACUITY_SCORE: 61
ADLS_ACUITY_SCORE: 61
ADLS_ACUITY_SCORE: 60
ADLS_ACUITY_SCORE: 61
ADLS_ACUITY_SCORE: 60
ADLS_ACUITY_SCORE: 59
ADLS_ACUITY_SCORE: 61
ADLS_ACUITY_SCORE: 61
ADLS_ACUITY_SCORE: 59
ADLS_ACUITY_SCORE: 60
ADLS_ACUITY_SCORE: 61
ADLS_ACUITY_SCORE: 59

## 2025-08-06 ASSESSMENT — COLUMBIA-SUICIDE SEVERITY RATING SCALE - C-SSRS
2. HAVE YOU ACTUALLY HAD ANY THOUGHTS OF KILLING YOURSELF IN THE PAST MONTH?: NO
1. IN THE PAST MONTH, HAVE YOU WISHED YOU WERE DEAD OR WISHED YOU COULD GO TO SLEEP AND NOT WAKE UP?: NO
6. HAVE YOU EVER DONE ANYTHING, STARTED TO DO ANYTHING, OR PREPARED TO DO ANYTHING TO END YOUR LIFE?: NO

## 2025-08-07 ENCOUNTER — TELEPHONE (OUTPATIENT)
Dept: SURGERY | Facility: CLINIC | Age: 60
End: 2025-08-07
Payer: COMMERCIAL

## 2025-08-07 VITALS
HEART RATE: 93 BPM | RESPIRATION RATE: 18 BRPM | TEMPERATURE: 98.6 F | OXYGEN SATURATION: 94 % | HEIGHT: 59 IN | BODY MASS INDEX: 32.58 KG/M2 | WEIGHT: 161.6 LBS | DIASTOLIC BLOOD PRESSURE: 74 MMHG | SYSTOLIC BLOOD PRESSURE: 133 MMHG

## 2025-08-07 PROCEDURE — 250N000013 HC RX MED GY IP 250 OP 250 PS 637: Performed by: FAMILY MEDICINE

## 2025-08-07 PROCEDURE — 96372 THER/PROPH/DIAG INJ SC/IM: CPT | Performed by: SURGERY

## 2025-08-07 PROCEDURE — 99232 SBSQ HOSP IP/OBS MODERATE 35: CPT | Performed by: FAMILY MEDICINE

## 2025-08-07 PROCEDURE — 250N000011 HC RX IP 250 OP 636: Performed by: FAMILY MEDICINE

## 2025-08-07 PROCEDURE — 250N000013 HC RX MED GY IP 250 OP 250 PS 637: Performed by: SURGERY

## 2025-08-07 PROCEDURE — 250N000011 HC RX IP 250 OP 636: Performed by: SURGERY

## 2025-08-07 RX ORDER — POLYETHYLENE GLYCOL 3350 17 G/17G
1 POWDER, FOR SOLUTION ORAL DAILY PRN
COMMUNITY
Start: 2025-08-07

## 2025-08-07 RX ORDER — HYDROMORPHONE HYDROCHLORIDE 4 MG/1
4 TABLET ORAL EVERY 4 HOURS PRN
Qty: 15 TABLET | Refills: 0 | Status: SHIPPED | OUTPATIENT
Start: 2025-08-07

## 2025-08-07 RX ORDER — DOCUSATE SODIUM 100 MG/1
100 CAPSULE, LIQUID FILLED ORAL 2 TIMES DAILY
Qty: 60 CAPSULE | Refills: 0 | Status: SHIPPED | OUTPATIENT
Start: 2025-08-07

## 2025-08-07 RX ORDER — ACETAMINOPHEN 500 MG
500-1000 TABLET ORAL EVERY 6 HOURS PRN
COMMUNITY
Start: 2025-08-07

## 2025-08-07 RX ADMIN — HYDROMORPHONE HYDROCHLORIDE 0.4 MG: 0.2 INJECTION, SOLUTION INTRAMUSCULAR; INTRAVENOUS; SUBCUTANEOUS at 03:52

## 2025-08-07 RX ADMIN — NICOTINE 7 MG/24 HR DAILY TRANSDERMAL PATCH 1 PATCH: at 08:39

## 2025-08-07 RX ADMIN — ENOXAPARIN SODIUM 40 MG: 40 INJECTION SUBCUTANEOUS at 08:38

## 2025-08-07 RX ADMIN — HYDROMORPHONE HYDROCHLORIDE 4 MG: 4 TABLET ORAL at 10:14

## 2025-08-07 RX ADMIN — ACETAMINOPHEN 975 MG: 325 TABLET ORAL at 03:05

## 2025-08-07 RX ADMIN — SENNOSIDES AND DOCUSATE SODIUM 1 TABLET: 50; 8.6 TABLET ORAL at 08:37

## 2025-08-07 RX ADMIN — TELMISARTAN 80 MG: 20 TABLET ORAL at 08:37

## 2025-08-07 RX ADMIN — ATORVASTATIN CALCIUM 40 MG: 40 TABLET, FILM COATED ORAL at 08:37

## 2025-08-07 RX ADMIN — PANTOPRAZOLE SODIUM 40 MG: 40 INJECTION, POWDER, LYOPHILIZED, FOR SOLUTION INTRAVENOUS at 06:21

## 2025-08-07 RX ADMIN — CLOBETASOL PROPIONATE: 0.5 OINTMENT TOPICAL at 08:52

## 2025-08-07 RX ADMIN — POLYETHYLENE GLYCOL 3350 17 G: 17 POWDER, FOR SOLUTION ORAL at 08:37

## 2025-08-07 RX ADMIN — KETOROLAC TROMETHAMINE 30 MG: 30 INJECTION, SOLUTION INTRAMUSCULAR at 02:53

## 2025-08-07 ASSESSMENT — ACTIVITIES OF DAILY LIVING (ADL)
ADLS_ACUITY_SCORE: 61
ADLS_ACUITY_SCORE: 62
ADLS_ACUITY_SCORE: 61
ADLS_ACUITY_SCORE: 62
ADLS_ACUITY_SCORE: 61

## 2025-08-12 ENCOUNTER — OFFICE VISIT (OUTPATIENT)
Dept: NEUROSURGERY | Facility: CLINIC | Age: 60
End: 2025-08-12
Payer: COMMERCIAL

## 2025-08-12 VITALS
BODY MASS INDEX: 31.19 KG/M2 | HEART RATE: 92 BPM | WEIGHT: 154.7 LBS | DIASTOLIC BLOOD PRESSURE: 57 MMHG | OXYGEN SATURATION: 95 % | SYSTOLIC BLOOD PRESSURE: 119 MMHG | HEIGHT: 59 IN

## 2025-08-12 DIAGNOSIS — Z98.890 S/P CRANIOTOMY: ICD-10-CM

## 2025-08-12 DIAGNOSIS — D32.9 MENINGIOMA (H): Primary | ICD-10-CM

## 2025-08-12 PROCEDURE — 1125F AMNT PAIN NOTED PAIN PRSNT: CPT | Performed by: SURGERY

## 2025-08-12 PROCEDURE — 3078F DIAST BP <80 MM HG: CPT | Performed by: SURGERY

## 2025-08-12 PROCEDURE — 3074F SYST BP LT 130 MM HG: CPT | Performed by: SURGERY

## 2025-08-12 PROCEDURE — 99024 POSTOP FOLLOW-UP VISIT: CPT | Performed by: SURGERY

## 2025-08-12 ASSESSMENT — PAIN SCALES - GENERAL: PAINLEVEL_OUTOF10: MODERATE PAIN (4)

## 2025-08-18 ENCOUNTER — RADIOLOGY INJECTION OFFICE VISIT (OUTPATIENT)
Dept: PHYSICAL MEDICINE AND REHAB | Facility: CLINIC | Age: 60
End: 2025-08-18
Payer: COMMERCIAL

## 2025-08-18 ENCOUNTER — OFFICE VISIT (OUTPATIENT)
Dept: SURGERY | Facility: CLINIC | Age: 60
End: 2025-08-18
Payer: COMMERCIAL

## 2025-08-18 VITALS
TEMPERATURE: 97 F | RESPIRATION RATE: 16 BRPM | SYSTOLIC BLOOD PRESSURE: 114 MMHG | DIASTOLIC BLOOD PRESSURE: 72 MMHG | OXYGEN SATURATION: 95 % | HEART RATE: 82 BPM

## 2025-08-18 DIAGNOSIS — M48.062 SPINAL STENOSIS OF LUMBAR REGION WITH NEUROGENIC CLAUDICATION: ICD-10-CM

## 2025-08-18 DIAGNOSIS — K44.9 HIATAL HERNIA: Primary | ICD-10-CM

## 2025-08-18 DIAGNOSIS — Z98.1 S/P LUMBAR FUSION: ICD-10-CM

## 2025-08-18 DIAGNOSIS — M53.3 SACROILIAC JOINT PAIN: ICD-10-CM

## 2025-08-18 DIAGNOSIS — K22.70 BARRETT'S ESOPHAGUS WITHOUT DYSPLASIA: ICD-10-CM

## 2025-08-18 DIAGNOSIS — M25.552 BILATERAL HIP PAIN: ICD-10-CM

## 2025-08-18 DIAGNOSIS — M25.551 BILATERAL HIP PAIN: ICD-10-CM

## 2025-08-18 PROCEDURE — 99024 POSTOP FOLLOW-UP VISIT: CPT | Performed by: SURGERY

## 2025-08-18 PROCEDURE — 27096 INJECT SACROILIAC JOINT: CPT | Mod: 50 | Performed by: PAIN MEDICINE

## 2025-08-18 RX ORDER — TRIAMCINOLONE ACETONIDE 40 MG/ML
INJECTION, SUSPENSION INTRA-ARTICULAR; INTRAMUSCULAR
Status: COMPLETED | OUTPATIENT
Start: 2025-08-18 | End: 2025-08-18

## 2025-08-18 RX ORDER — LIDOCAINE HYDROCHLORIDE 10 MG/ML
INJECTION, SOLUTION EPIDURAL; INFILTRATION; INTRACAUDAL; PERINEURAL
Status: COMPLETED | OUTPATIENT
Start: 2025-08-18 | End: 2025-08-18

## 2025-08-18 RX ORDER — ROPIVACAINE HYDROCHLORIDE 5 MG/ML
INJECTION, SOLUTION EPIDURAL; INFILTRATION; PERINEURAL
Status: COMPLETED | OUTPATIENT
Start: 2025-08-18 | End: 2025-08-18

## 2025-08-18 RX ADMIN — TRIAMCINOLONE ACETONIDE 40 MG: 40 INJECTION, SUSPENSION INTRA-ARTICULAR; INTRAMUSCULAR at 13:43

## 2025-08-18 RX ADMIN — LIDOCAINE HYDROCHLORIDE 2 ML: 10 INJECTION, SOLUTION EPIDURAL; INFILTRATION; INTRACAUDAL; PERINEURAL at 13:41

## 2025-08-18 RX ADMIN — ROPIVACAINE HYDROCHLORIDE 5 ML: 5 INJECTION, SOLUTION EPIDURAL; INFILTRATION; PERINEURAL at 13:42

## 2025-08-18 ASSESSMENT — PAIN SCALES - GENERAL
PAINLEVEL_OUTOF10: MODERATE PAIN (4)
PAINLEVEL_OUTOF10: MODERATE PAIN (4)

## (undated) DEVICE — TOOL DISSECT MIDAS MR8 9CM BALL 6MM DIA MR8-9BA60

## (undated) DEVICE — GLOVE BIOGEL INDICATOR 7.5 LF 41675

## (undated) DEVICE — PAD FLOOR SURGISAFE

## (undated) DEVICE — CATH TRAY FOLEY SURESTEP 16FR DRAIN BAG STATOCK A899916

## (undated) DEVICE — CUSTOM PACK SPEC PROCEDURE SAN5BSPHEA

## (undated) DEVICE — PREP SCRUB SOL EXIDINE 4% CHG 4OZ 29002-404

## (undated) DEVICE — SOL WATER IRRIG 1000ML BOTTLE 2F7114

## (undated) DEVICE — ENDO TROCAR BLADED 12MM STD VERSAONE B12STF

## (undated) DEVICE — DAVINCI HOT SHEARS TIP COVER  400180

## (undated) DEVICE — CUSTOM PACK LAP CHOLE SBA5BLCHEA

## (undated) DEVICE — DAVINCI XI SEAL UNIVERSAL 5-12MM 470500

## (undated) DEVICE — SYR 20ML LL W/O NDL 302830

## (undated) DEVICE — ANTIFOG SOLUTION W/FOAM PAD 31142527

## (undated) DEVICE — DRAPE WARMER 66X44" ORS-300

## (undated) DEVICE — LINEN TOWEL PACK X5 5464

## (undated) DEVICE — DAVINCI XI DRAPE ARM 470015

## (undated) DEVICE — DRAIN FLAT 7MM FULL PERF SIL 0070430

## (undated) DEVICE — SU PROLENE 6-0 BV-1 DA 24" 8805H

## (undated) DEVICE — VESSEL LOOP WHITE MAXI 30-721

## (undated) DEVICE — DRSG PRIMAPORE 03 1/8X6" 66000318

## (undated) DEVICE — GLOVE UNDER INDICATOR PI SZ 7.0 LF 41670

## (undated) DEVICE — SOL NACL 0.9% IRRIG 1000ML BOTTLE 2F7124

## (undated) DEVICE — GOWN IMPERVIOUS BREATHABLE SMART XLG 89045

## (undated) DEVICE — EXCHANGE WIRE .035 260 STAR/JFC/035/260/ M001491681

## (undated) DEVICE — STOCKING SLEEVE COMPRESSION CALF LG

## (undated) DEVICE — ESU GROUND PAD ADULT W/CORD E7507

## (undated) DEVICE — STPL SKIN 35W ROTATING HEAD PRW35

## (undated) DEVICE — BLADE KNIFE SURG 15 371115

## (undated) DEVICE — PREP CHLORAPREP 26ML TINTED HI-LITE ORANGE 930815

## (undated) DEVICE — CONTAINER URINE SPEC 4OZ STRL 1053

## (undated) DEVICE — SOL NACL 0.9% IRRIG 3000ML BAG 07972-08

## (undated) DEVICE — BATTERY NEURO DRIVER AXS PDCMF-BP-001

## (undated) DEVICE — ELECTRODE PATIENT RETURN ADULT L10 FT 2 PLATE CORD 0855C

## (undated) DEVICE — SUTURE VICRYL+ 2-0 18 CT1/CR VLT VCP839D

## (undated) DEVICE — ENDO TROCAR BLUNT 100MM W/THRD ANCHOR BLUNTPORT BPT12STS

## (undated) DEVICE — RX SURGIFLO HEMOSTATIC MATRIX W/THROMBIN 8ML 2994

## (undated) DEVICE — CUSTOM PACK CORONARY SAN5BCRHEA

## (undated) DEVICE — ESU PENCIL SMOKE EVAC W/ROCKER SWITCH 0703-047-000

## (undated) DEVICE — PREP SKIN SCRUB TRAY 4461A

## (undated) DEVICE — Device

## (undated) DEVICE — PREP DURAPREP 06ML APL 8635

## (undated) DEVICE — SU NUROLON 4-0 TF CR 8X18" C584D

## (undated) DEVICE — ENDO GELPORT 100/120MM C8XX2

## (undated) DEVICE — PLATE GROUNDING ADULT W/CORD 9165L

## (undated) DEVICE — CATH ANGIO INFINITI JL3.5 4FRX100CM 538418

## (undated) DEVICE — DRSG PRIMAPORE 02X3"

## (undated) DEVICE — DRAPE STERI TOWEL LG 1010

## (undated) DEVICE — GLOVE BIOGEL PI ULTRATOUCH G SZ 6.5 42165

## (undated) DEVICE — SPONGE SURGIFOAM 100 1974

## (undated) DEVICE — CUSTOM PACK LUMBAR FUSION SNE5BLFHEA

## (undated) DEVICE — PERFORATOR 14MM CODMAN

## (undated) DEVICE — SHEATH GLIDE RADIAL 4FR 25CM 0.021

## (undated) DEVICE — SYR 01ML TBC SLIP TIP W/O NDL

## (undated) DEVICE — DRSG KERLIX FLUFFS X5

## (undated) DEVICE — SUCTION MANIFOLD NEPTUNE 2 SYS 4 PORT 0702-020-000

## (undated) DEVICE — TOOL DISSECT MIDAS MR8 14CM MATCH HEAD 3MM MR8-14MH30

## (undated) DEVICE — GLOVE UNDER INDICATOR PI SZ 6.5 LF 41665

## (undated) DEVICE — SPONGE COTTONOID 1X1" 80-1403

## (undated) DEVICE — RX SURGIFLO HEMOSTATIC MATRIX 8ML 2991

## (undated) DEVICE — PACK LAPAROSCOPY/PELVISCOPY STD

## (undated) DEVICE — GOWN XLG DISP 9545

## (undated) DEVICE — CUSTOM PACK CAROTID ENDARTERECTOMY PR

## (undated) DEVICE — COVER LIGHT HANDLE STRL SGL USE AM3612

## (undated) DEVICE — PROTECTOR ARM STANDARD ONE STEP

## (undated) DEVICE — SYR BULB IRRIG DOVER 60 ML LATEX FREE 67000

## (undated) DEVICE — TOWEL SURG 16INW X 26INL WHITE STRL XRAY DETECTABLE X8314W

## (undated) DEVICE — COVER ULTRASOUND PROBE STRL 9001C0197

## (undated) DEVICE — BLADE KNIFE SURG 11 371111

## (undated) DEVICE — KIT HAND CONTROL ACIST 014644 AR-P54

## (undated) DEVICE — GLOVE PROTEXIS W/NEU-THERA 7.5  2D73TE75

## (undated) DEVICE — SU MONOCRYL+ 4-0 18IN PS2 UND MCP496G

## (undated) DEVICE — PITCHER STERILE 1000ML  SSK9004A

## (undated) DEVICE — TOOL DISSECT MIDAS MR8 F2/7CM TAPER 2.3MM DI MR8-F2/7TA23

## (undated) DEVICE — PACK COLD 6 X 10 1-HOUR 0814-0610

## (undated) DEVICE — MARKER SURG SKIN STRL 77734

## (undated) DEVICE — SYR ANGIOGRAPHY MULTIUSE KIT ACIST 014612

## (undated) DEVICE — SOL WATER IRRIG 1000ML BOTTLE 07139-09

## (undated) DEVICE — MARKER SPHERES PASSIVE MEDT PACK 5 8801075

## (undated) DEVICE — ESU PENCIL W/COATED BLADE E2450H

## (undated) DEVICE — DRSG PRIMAPORE 04X11 3/4"

## (undated) DEVICE — SU VICRYL+ 0 8-18 CT1/CR UND VCP840D

## (undated) DEVICE — DRAPE O ARM TUBE 9732722

## (undated) DEVICE — ESU LIGASURE LAPAROSCOPIC BLUNT TIP SEALER 5MMX37CM LF1837

## (undated) DEVICE — NDL 25GA 1.5" 305127

## (undated) DEVICE — ESU ELEC BLADE 2.75" COATED/INSULATED E1455

## (undated) DEVICE — CLIP HORIZON MED BLUE 002200

## (undated) DEVICE — SU SILK 2-0 TIE 18' A185H

## (undated) DEVICE — SUTURE SILK 3-0 TIES 30IN SA84H

## (undated) DEVICE — SUTURE VICRYL+ 2-0 27 VIO VCP317H

## (undated) DEVICE — TUBING SMOKE EVAC PNEUMOCLEAR HIGH FLOW 0620050250

## (undated) DEVICE — COVER C-ARM SNAP 30X36" LF 01-3036

## (undated) DEVICE — GOWN XXL 9575

## (undated) DEVICE — SOLUTION WATER 1000ML BOTTLE R5000-01

## (undated) DEVICE — SU VICRYL 2-0 CT-2 27" J333H

## (undated) DEVICE — DRAPE BACK TABLE PADDED 60X90

## (undated) DEVICE — MANIFOLD KIT ANGIO AUTOMATED 014613

## (undated) DEVICE — ALCOHOL ISOPROPYL 4 OZ 70% IA7004

## (undated) DEVICE — TUBING SUCTION MEDI-VAC SOFT 3/16"X20' N520A

## (undated) DEVICE — DRAIN RESERVOIR 100ML JP 0070740

## (undated) DEVICE — SYR 50ML SLIP TIP W/O NDL 309654

## (undated) DEVICE — DAVINCI XI DRAPE COLUMN 470341

## (undated) DEVICE — DRAPE O ARM BAR MEDTRONIC 9733023

## (undated) DEVICE — SU VICRYL 2-0 CT-2 CR 8X18" J726D

## (undated) DEVICE — KIT TURNOVER FAIRVIEW SOUTHDALE FULL SP3889

## (undated) DEVICE — POSITIONER ARM CRADLE LAMINECTOMY DISP

## (undated) DEVICE — GLOVE PROTEXIS W/NEU-THERA 8.0  2D73TE80

## (undated) DEVICE — POSITIONER HEADREST FOAM DONUT H2INX9IN DIA NS LF DISP HR104

## (undated) DEVICE — SPONGE RAY-TEC 4X8" 7318

## (undated) DEVICE — GOWN LG DISP 9515

## (undated) DEVICE — SUCTION TIP YANKAUER STR K87

## (undated) DEVICE — PACK NEURO SNE15SNFSA

## (undated) DEVICE — WIPE MICRO-TIP GRAPHIC CONTROL 3300

## (undated) DEVICE — SYR 30ML LL W/O NDL 302832

## (undated) DEVICE — SU VICRYL 3-0 SH 27" UND J416H

## (undated) DEVICE — DRAPE C-ARM 60X42" 1013

## (undated) DEVICE — DRAPE POUCH INSTRUMENT 3 POCKET 1018L

## (undated) DEVICE — SYR KIT ANGIO YELLOW CONTRAST 10ML  K01-60102

## (undated) DEVICE — DRAIN PENROSE 3/8"X12" LATEX 30414-038

## (undated) DEVICE — TUBING SUCTION MEDI-VAC 1/4"X20' N620A

## (undated) DEVICE — KIT POSITIONER NUBLUE XL TRND CHST PD BD STRAP TP3000E-S-NB

## (undated) DEVICE — ADH LIQUID MASTISOL TOPICAL VIAL 2-3ML 0523-48

## (undated) DEVICE — TUBING C02 INSUFFLATION W/FILTER

## (undated) DEVICE — TUBING SUCTION 12"X1/4" N612

## (undated) DEVICE — DRAIN JACKSON PRATT RESERVOIR 100ML SU130-1305

## (undated) DEVICE — TIP SUCTION FRAIZER 10FR DISP 163

## (undated) DEVICE — DECANTER BAG 2002S

## (undated) DEVICE — BASIN SET MINOR DISP

## (undated) DEVICE — SUTURE SILK 0 TIES 30IN SA86G

## (undated) DEVICE — SYR KIT ANGIO LT BLUE SALINE 10ML K01-60083

## (undated) DEVICE — SUCTION IRR STRYKERFLOW II W/TIP 250-070-520

## (undated) DEVICE — SU ETHIBOND 0 CT-2 30" X412H

## (undated) DEVICE — SUTURE MONOCRYL+ 4-0 PS-2 27IN MCP426H

## (undated) DEVICE — DRAPE MAYO STAND 23X54 8337

## (undated) DEVICE — TUBING PRESSURE TRANSDUCER MALE TO FEMALE LL 72" 50P172

## (undated) DEVICE — TRAY PREP DRY SKIN SCRUB 067

## (undated) DEVICE — SUTURE VICRYL+ 2-0 27IN SH UND VCP417H

## (undated) DEVICE — SLEEVE TR BAND RADIAL COMPRESSION DEVICE 24CM TRB24-REG

## (undated) DEVICE — PIN SKULL MAYFIELD ADULT TITANIUM 3/PK A1120

## (undated) DEVICE — COVER ULTRASOUND PROBE FLEXI-FEEL 4X96" 25-FF496

## (undated) DEVICE — ENDO SHEARS RENEW LAP ENDOCUT SCISSOR TIP 16.5MM 3142

## (undated) DEVICE — STPL CIRCULAR DST 28MM W/TILT TIP EEA28

## (undated) DEVICE — GUIDEWIRE STARTER .035INX150CM

## (undated) DEVICE — SOL NACL 0.9% IRRIG 1000ML BOTTLE 07138-09

## (undated) DEVICE — IMPLANTABLE DEVICE
Type: IMPLANTABLE DEVICE | Site: URETER | Status: NON-FUNCTIONAL
Removed: 2018-04-10

## (undated) DEVICE — STPL ENDO HANDLE GIA ULTRA UNIVERSAL STD EGIAUSTND

## (undated) DEVICE — DRAPE STERI U 1015

## (undated) DEVICE — SUTURE BOOTS 051003PBX

## (undated) DEVICE — SU SILK 2-0 SH CR 8X18" C012D

## (undated) DEVICE — SUCTION MANIFOLD NEPTUNE 2 SYS 1 PORT 702-025-000

## (undated) DEVICE — ADHESIVE SWIFTSET 0.8ML OCTYL SS6

## (undated) DEVICE — DRESSING COVERLET STRIP 3/4 X 3 LF 230

## (undated) DEVICE — DRAPE C-ARMOR 5 SIDED 5523

## (undated) DEVICE — PREP DYNA-HEX 4% CHG SCRUB 4OZ BOTTLE MDS098710

## (undated) DEVICE — ESU GROUND PAD UNIVERSAL W/O CORD

## (undated) DEVICE — SU VICRYL 4-0 FS-2 27" J422-H

## (undated) DEVICE — DRAPE SHEET REV FOLD 3/4 9349

## (undated) DEVICE — STOCKING SLEEVE COMPRESSION CALF MED

## (undated) DEVICE — ADH SKIN CLOSURE PREMIERPRO EXOFIN 1.0ML 3470

## (undated) DEVICE — GOWN IMPERVIOUS BREATHABLE SMART LG 89015

## (undated) DEVICE — DRAPE IOBAN LG .375X23.5" 6648EZ

## (undated) DEVICE — DRAIN FLAT 10MM FULL PERF SIL 0070440

## (undated) DEVICE — WRAP LUMBAR COMPRESS COLD THERAPY 4632P

## (undated) DEVICE — SU ETHILON 3-0 PS-2 18" 1669H

## (undated) DEVICE — DECANTER VIAL 2006S

## (undated) DEVICE — SU SILK 2-0 SH 30" K833H

## (undated) DEVICE — SU SILK 0 TIE 6X30" A306H

## (undated) DEVICE — SYR 10ML FINGER CONTROL W/O NDL 309695

## (undated) DEVICE — DRSG STERI STRIP 1/2X4" R1547

## (undated) DEVICE — DRAIN JACKSON PRATT 07MM FLAT SU130-1310

## (undated) DEVICE — PEN MARKING W/RULER DYNJSM04

## (undated) DEVICE — DAVINCI XI OBTURATOR BLADELESS 8MM 470359

## (undated) DEVICE — DRSG TELFA 3X8" 1238

## (undated) DEVICE — BLADE KNIFE SURG 10 371110

## (undated) DEVICE — SPONGE COTTONOID 1X3" 80-1408

## (undated) DEVICE — SU VICRYL 0 CT-1 CR 8X18" J740D

## (undated) DEVICE — NEEDLE SPINAL DISP 22GA X 3.5" QUINCKE 333320

## (undated) DEVICE — MARKER SPHERES PASSIVE MEDT PACK 1 8801071

## (undated) DEVICE — LUBRICANT INST ELECTROLUBE EL101

## (undated) DEVICE — SU PDS II 0 TP-1 60" Z991G

## (undated) DEVICE — SPONGE COTTONOID 1/2X3" 80-1407

## (undated) DEVICE — SU VICRYL 0 TIE 54" UND J287G

## (undated) DEVICE — CATH DIAG 4FR JR 5.0 538423

## (undated) DEVICE — DRAPE IOBAN INCISE 23X17" 6650EZ

## (undated) DEVICE — ENDO CANNULA 05MM VERSAONE UNIVERSAL UNVCA5STF

## (undated) DEVICE — SPONGE COTTONOID 1/4X1/4" 80-1399

## (undated) DEVICE — SU VICRYL 2-0 CT-1 36" UND J945H

## (undated) DEVICE — DRAPE U SPLIT 74X120" 29440

## (undated) DEVICE — SUCTION TIP POOLE K770

## (undated) DEVICE — GLOVE BIOGEL PI SZ 7.5 40875

## (undated) DEVICE — SUTURE PROLENE 5-0 RB-2 8555H

## (undated) DEVICE — ENDO TROCAR 05MM VERSAONE BLADED W/STD FIX CANNULA B5STF

## (undated) DEVICE — CATH TRAY FOLEY COUDE SURESTEP 16FR W/DRN BAG LATEX A304416A

## (undated) DEVICE — SU VICRYL+ 3-0 27IN SH UND VCP416H

## (undated) DEVICE — CLIP HORIZON SM YELLOW 001200

## (undated) DEVICE — INTRO MICRO MINI STICK 4FR STIFF NITINOL 45-753

## (undated) DEVICE — NDL 19GA 1.5"

## (undated) DEVICE — KIT MICRO INTRODUCER 7CMZX21G SR-4F21G7D-MP

## (undated) DEVICE — SOL NACL 0.9% INJ 1000ML BAG 2B1324X

## (undated) DEVICE — DRAPE STERI FLUOROSCOPE 35X43"1012 LATEX FREE

## (undated) DEVICE — CLIP HORIZON MULTI SM YELLOW 001204

## (undated) DEVICE — DRAPE MICROSCOPE LEICA 54X150" AR8033650

## (undated) DEVICE — TUBING CYSTO/BLADDER IRRIG SET 80" 06544-01

## (undated) DEVICE — POSITIONER PT PRONESAFE HEAD REST W/DERMAPROX INSERT 40599

## (undated) DEVICE — LUBRICATING JELLY 4.25OZ

## (undated) DEVICE — NDL INSUFFLATION 13GA 120MM C2201

## (undated) DEVICE — GLOVE BIOGEL PI ORTHOPRO SZ 7.5 47675

## (undated) DEVICE — STPL ENDO RELOAD 60MM MEDIUM THICK PURPLE EGIA60AMT

## (undated) DEVICE — ELECTRODE ADULT PACING MULTI P-211-M1

## (undated) DEVICE — SU UMBILICAL TAPE .125X30" U11T

## (undated) DEVICE — DAVINCI XI HANDPIECE ESU VESSEL SEALER 8MM EXT 480422

## (undated) DEVICE — INTRO MICRO MINI STICK 4FR STD NITINOL

## (undated) DEVICE — CATH FOLEY 16FR 5ML SIL

## (undated) DEVICE — DEVICE CLOSURE V-LOC 0 GS-21 6IN VLOCN0306

## (undated) DEVICE — SPONGE COTTONOID 1/2X1/2" 80-1400

## (undated) DEVICE — SU VICRYL 0 UR-6 27" J603H

## (undated) DEVICE — SU ETHILON 2-0 FS 18" 664G

## (undated) DEVICE — BLANKET BAIR HUGGER UNDERBODY 36X84 AU63500

## (undated) DEVICE — GOWN IMPERVIOUS SPECIALTY XLG/XLONG 32474

## (undated) DEVICE — BNDG ABDOMINAL BINDER 12X26-50" 0814-0146

## (undated) RX ORDER — KETOROLAC TROMETHAMINE 30 MG/ML
INJECTION, SOLUTION INTRAMUSCULAR; INTRAVENOUS
Status: DISPENSED
Start: 2018-04-10

## (undated) RX ORDER — PROPOFOL 10 MG/ML
INJECTION, EMULSION INTRAVENOUS
Status: DISPENSED
Start: 2025-05-16

## (undated) RX ORDER — FENTANYL CITRATE 50 UG/ML
INJECTION, SOLUTION INTRAMUSCULAR; INTRAVENOUS
Status: DISPENSED
Start: 2025-05-16

## (undated) RX ORDER — HYDROMORPHONE HYDROCHLORIDE 1 MG/ML
INJECTION, SOLUTION INTRAMUSCULAR; INTRAVENOUS; SUBCUTANEOUS
Status: DISPENSED
Start: 2025-05-16

## (undated) RX ORDER — FENTANYL CITRATE 50 UG/ML
INJECTION, SOLUTION INTRAMUSCULAR; INTRAVENOUS
Status: DISPENSED
Start: 2018-04-10

## (undated) RX ORDER — DEXAMETHASONE SODIUM PHOSPHATE 4 MG/ML
INJECTION, SOLUTION INTRA-ARTICULAR; INTRALESIONAL; INTRAMUSCULAR; INTRAVENOUS; SOFT TISSUE
Status: DISPENSED
Start: 2025-05-16

## (undated) RX ORDER — FENTANYL CITRATE 0.05 MG/ML
INJECTION, SOLUTION INTRAMUSCULAR; INTRAVENOUS
Status: DISPENSED
Start: 2025-05-16

## (undated) RX ORDER — ONDANSETRON 2 MG/ML
INJECTION INTRAMUSCULAR; INTRAVENOUS
Status: DISPENSED
Start: 2022-09-23

## (undated) RX ORDER — DEXAMETHASONE SODIUM PHOSPHATE 10 MG/ML
INJECTION, SOLUTION INTRAMUSCULAR; INTRAVENOUS
Status: DISPENSED
Start: 2023-04-21

## (undated) RX ORDER — LIDOCAINE HYDROCHLORIDE 10 MG/ML
INJECTION, SOLUTION EPIDURAL; INFILTRATION; INTRACAUDAL; PERINEURAL
Status: DISPENSED
Start: 2023-06-09

## (undated) RX ORDER — DEXAMETHASONE SODIUM PHOSPHATE 10 MG/ML
INJECTION, EMULSION INTRAMUSCULAR; INTRAVENOUS
Status: DISPENSED
Start: 2022-09-23

## (undated) RX ORDER — GLYCOPYRROLATE 0.2 MG/ML
INJECTION, SOLUTION INTRAMUSCULAR; INTRAVENOUS
Status: DISPENSED
Start: 2018-04-10

## (undated) RX ORDER — LIDOCAINE HYDROCHLORIDE 10 MG/ML
INJECTION, SOLUTION EPIDURAL; INFILTRATION; INTRACAUDAL; PERINEURAL
Status: DISPENSED
Start: 2023-04-21

## (undated) RX ORDER — LIDOCAINE HYDROCHLORIDE 10 MG/ML
INJECTION, SOLUTION EPIDURAL; INFILTRATION; INTRACAUDAL; PERINEURAL
Status: DISPENSED
Start: 2018-04-10

## (undated) RX ORDER — ONDANSETRON 2 MG/ML
INJECTION INTRAMUSCULAR; INTRAVENOUS
Status: DISPENSED
Start: 2023-06-09

## (undated) RX ORDER — FENTANYL CITRATE 50 UG/ML
INJECTION, SOLUTION INTRAMUSCULAR; INTRAVENOUS
Status: DISPENSED
Start: 2023-06-09

## (undated) RX ORDER — ONDANSETRON 2 MG/ML
INJECTION INTRAMUSCULAR; INTRAVENOUS
Status: DISPENSED
Start: 2025-05-16

## (undated) RX ORDER — FENTANYL CITRATE-0.9 % NACL/PF 10 MCG/ML
PLASTIC BAG, INJECTION (ML) INTRAVENOUS
Status: DISPENSED
Start: 2022-09-23

## (undated) RX ORDER — PROPOFOL 10 MG/ML
INJECTION, EMULSION INTRAVENOUS
Status: DISPENSED
Start: 2023-06-09

## (undated) RX ORDER — BUPIVACAINE HYDROCHLORIDE AND EPINEPHRINE 5; 5 MG/ML; UG/ML
INJECTION, SOLUTION EPIDURAL; INTRACAUDAL; PERINEURAL
Status: DISPENSED
Start: 2018-04-10

## (undated) RX ORDER — LIDOCAINE HYDROCHLORIDE AND EPINEPHRINE 10; 10 MG/ML; UG/ML
INJECTION, SOLUTION INFILTRATION; PERINEURAL
Status: DISPENSED
Start: 2023-04-21

## (undated) RX ORDER — DEXAMETHASONE SODIUM PHOSPHATE 10 MG/ML
INJECTION, EMULSION INTRAMUSCULAR; INTRAVENOUS
Status: DISPENSED
Start: 2023-06-09

## (undated) RX ORDER — GLYCOPYRROLATE 0.2 MG/ML
INJECTION, SOLUTION INTRAMUSCULAR; INTRAVENOUS
Status: DISPENSED
Start: 2023-04-21

## (undated) RX ORDER — DEXAMETHASONE SODIUM PHOSPHATE 4 MG/ML
INJECTION, SOLUTION INTRA-ARTICULAR; INTRALESIONAL; INTRAMUSCULAR; INTRAVENOUS; SOFT TISSUE
Status: DISPENSED
Start: 2018-04-10

## (undated) RX ORDER — FENTANYL CITRATE 50 UG/ML
INJECTION, SOLUTION INTRAMUSCULAR; INTRAVENOUS
Status: DISPENSED
Start: 2022-09-23

## (undated) RX ORDER — PROPOFOL 10 MG/ML
INJECTION, EMULSION INTRAVENOUS
Status: DISPENSED
Start: 2023-04-21

## (undated) RX ORDER — PROPOFOL 10 MG/ML
INJECTION, EMULSION INTRAVENOUS
Status: DISPENSED
Start: 2018-04-10

## (undated) RX ORDER — ONDANSETRON 2 MG/ML
INJECTION INTRAMUSCULAR; INTRAVENOUS
Status: DISPENSED
Start: 2018-04-10

## (undated) RX ORDER — CEFAZOLIN SODIUM 1 G/3ML
INJECTION, POWDER, FOR SOLUTION INTRAMUSCULAR; INTRAVENOUS
Status: DISPENSED
Start: 2023-06-09

## (undated) RX ORDER — PROPOFOL 10 MG/ML
INJECTION, EMULSION INTRAVENOUS
Status: DISPENSED
Start: 2022-09-23

## (undated) RX ORDER — EPHEDRINE SULFATE 50 MG/ML
INJECTION, SOLUTION INTRAMUSCULAR; INTRAVENOUS; SUBCUTANEOUS
Status: DISPENSED
Start: 2023-04-21

## (undated) RX ORDER — KETAMINE HYDROCHLORIDE 10 MG/ML
INJECTION INTRAMUSCULAR; INTRAVENOUS
Status: DISPENSED
Start: 2018-04-10

## (undated) RX ORDER — GLYCOPYRROLATE 0.2 MG/ML
INJECTION INTRAMUSCULAR; INTRAVENOUS
Status: DISPENSED
Start: 2022-09-23

## (undated) RX ORDER — GINSENG 100 MG
CAPSULE ORAL
Status: DISPENSED
Start: 2025-05-16

## (undated) RX ORDER — LIDOCAINE HYDROCHLORIDE 10 MG/ML
INJECTION, SOLUTION EPIDURAL; INFILTRATION; INTRACAUDAL; PERINEURAL
Status: DISPENSED
Start: 2022-09-23

## (undated) RX ORDER — FENTANYL CITRATE 50 UG/ML
INJECTION, SOLUTION INTRAMUSCULAR; INTRAVENOUS
Status: DISPENSED
Start: 2025-08-06

## (undated) RX ORDER — FENTANYL CITRATE 50 UG/ML
INJECTION, SOLUTION INTRAMUSCULAR; INTRAVENOUS
Status: DISPENSED
Start: 2022-05-06

## (undated) RX ORDER — BUPIVACAINE HYDROCHLORIDE AND EPINEPHRINE 2.5; 5 MG/ML; UG/ML
INJECTION, SOLUTION EPIDURAL; INFILTRATION; INTRACAUDAL; PERINEURAL
Status: DISPENSED
Start: 2025-05-16

## (undated) RX ORDER — DIAZEPAM 5 MG
TABLET ORAL
Status: DISPENSED
Start: 2022-05-06

## (undated) RX ORDER — BUPIVACAINE HYDROCHLORIDE AND EPINEPHRINE 2.5; 5 MG/ML; UG/ML
INJECTION, SOLUTION EPIDURAL; INFILTRATION; INTRACAUDAL; PERINEURAL
Status: DISPENSED
Start: 2023-04-21

## (undated) RX ORDER — ALBUTEROL SULFATE 90 UG/1
INHALANT RESPIRATORY (INHALATION)
Status: DISPENSED
Start: 2025-05-16

## (undated) RX ORDER — CEFAZOLIN SODIUM 1 G/3ML
INJECTION, POWDER, FOR SOLUTION INTRAMUSCULAR; INTRAVENOUS
Status: DISPENSED
Start: 2018-04-10

## (undated) RX ORDER — NEOSTIGMINE METHYLSULFATE 1 MG/ML
VIAL (ML) INJECTION
Status: DISPENSED
Start: 2018-04-10

## (undated) RX ORDER — HEPARIN SODIUM 1000 [USP'U]/ML
INJECTION, SOLUTION INTRAVENOUS; SUBCUTANEOUS
Status: DISPENSED
Start: 2023-04-21

## (undated) RX ORDER — FENTANYL CITRATE 50 UG/ML
INJECTION, SOLUTION INTRAMUSCULAR; INTRAVENOUS
Status: DISPENSED
Start: 2023-04-21